# Patient Record
Sex: FEMALE | Race: OTHER | HISPANIC OR LATINO | Employment: UNEMPLOYED | URBAN - METROPOLITAN AREA
[De-identification: names, ages, dates, MRNs, and addresses within clinical notes are randomized per-mention and may not be internally consistent; named-entity substitution may affect disease eponyms.]

---

## 2017-02-17 ENCOUNTER — HOSPITAL ENCOUNTER (EMERGENCY)
Facility: HOSPITAL | Age: 52
Discharge: HOME | End: 2017-02-17
Attending: EMERGENCY MEDICINE | Admitting: EMERGENCY MEDICINE
Payer: COMMERCIAL

## 2017-02-17 ENCOUNTER — APPOINTMENT (EMERGENCY)
Dept: RADIOLOGY | Facility: HOSPITAL | Age: 52
End: 2017-02-17
Payer: COMMERCIAL

## 2017-02-17 ENCOUNTER — TRANSCRIBE ORDERS (OUTPATIENT)
Dept: URGENT CARE | Age: 52
End: 2017-02-17

## 2017-02-17 ENCOUNTER — APPOINTMENT (OUTPATIENT)
Dept: LAB | Age: 52
End: 2017-02-17
Attending: NURSE PRACTITIONER
Payer: COMMERCIAL

## 2017-02-17 ENCOUNTER — OFFICE VISIT (OUTPATIENT)
Dept: URGENT CARE | Age: 52
End: 2017-02-17
Payer: COMMERCIAL

## 2017-02-17 VITALS
DIASTOLIC BLOOD PRESSURE: 63 MMHG | SYSTOLIC BLOOD PRESSURE: 130 MMHG | OXYGEN SATURATION: 94 % | RESPIRATION RATE: 20 BRPM | TEMPERATURE: 98.1 F | HEART RATE: 87 BPM | HEIGHT: 60 IN | WEIGHT: 180 LBS | BODY MASS INDEX: 35.34 KG/M2

## 2017-02-17 DIAGNOSIS — R10.9 ABDOMINAL PAIN: ICD-10-CM

## 2017-02-17 DIAGNOSIS — R10.9 FLANK PAIN: Primary | ICD-10-CM

## 2017-02-17 LAB
ANION GAP SERPL CALCULATED.3IONS-SCNC: 6 MMOL/L (ref 4–13)
BACTERIA UR QL AUTO: ABNORMAL /HPF
BILIRUB UR QL STRIP: NEGATIVE
BUN SERPL-MCNC: 16 MG/DL (ref 5–25)
CALCIUM SERPL-MCNC: 9.4 MG/DL (ref 8.3–10.1)
CHLORIDE SERPL-SCNC: 106 MMOL/L (ref 100–108)
CLARITY UR: CLEAR
CO2 SERPL-SCNC: 28 MMOL/L (ref 21–32)
COLOR UR: YELLOW
CREAT SERPL-MCNC: 0.76 MG/DL (ref 0.6–1.3)
GFR SERPL CREATININE-BSD FRML MDRD: >60 ML/MIN/1.73SQ M
GLUCOSE SERPL-MCNC: 81 MG/DL (ref 65–140)
GLUCOSE UR STRIP-MCNC: NEGATIVE MG/DL
HGB UR QL STRIP.AUTO: ABNORMAL
KETONES UR STRIP-MCNC: NEGATIVE MG/DL
LEUKOCYTE ESTERASE UR QL STRIP: NEGATIVE
NITRITE UR QL STRIP: NEGATIVE
NON-SQ EPI CELLS URNS QL MICRO: ABNORMAL /HPF
PH UR STRIP.AUTO: 7 [PH] (ref 4.5–8)
POTASSIUM SERPL-SCNC: 3.7 MMOL/L (ref 3.5–5.3)
PROT UR STRIP-MCNC: NEGATIVE MG/DL
RBC #/AREA URNS AUTO: ABNORMAL /HPF
SODIUM SERPL-SCNC: 140 MMOL/L (ref 136–145)
SP GR UR STRIP.AUTO: 1.01 (ref 1–1.03)
UROBILINOGEN UR QL STRIP.AUTO: 0.2 E.U./DL
WBC #/AREA URNS AUTO: ABNORMAL /HPF

## 2017-02-17 PROCEDURE — G0382 LEV 3 HOSP TYPE B ED VISIT: HCPCS | Performed by: FAMILY MEDICINE

## 2017-02-17 PROCEDURE — 96375 TX/PRO/DX INJ NEW DRUG ADDON: CPT

## 2017-02-17 PROCEDURE — 87086 URINE CULTURE/COLONY COUNT: CPT

## 2017-02-17 PROCEDURE — 81002 URINALYSIS NONAUTO W/O SCOPE: CPT | Performed by: FAMILY MEDICINE

## 2017-02-17 PROCEDURE — 74176 CT ABD & PELVIS W/O CONTRAST: CPT

## 2017-02-17 PROCEDURE — 36415 COLL VENOUS BLD VENIPUNCTURE: CPT | Performed by: EMERGENCY MEDICINE

## 2017-02-17 PROCEDURE — 81001 URINALYSIS AUTO W/SCOPE: CPT

## 2017-02-17 PROCEDURE — 96374 THER/PROPH/DIAG INJ IV PUSH: CPT

## 2017-02-17 PROCEDURE — 99284 EMERGENCY DEPT VISIT MOD MDM: CPT

## 2017-02-17 PROCEDURE — 80048 BASIC METABOLIC PNL TOTAL CA: CPT | Performed by: EMERGENCY MEDICINE

## 2017-02-17 PROCEDURE — 99283 EMERGENCY DEPT VISIT LOW MDM: CPT | Performed by: FAMILY MEDICINE

## 2017-02-17 RX ORDER — ONDANSETRON 4 MG/1
4 TABLET, ORALLY DISINTEGRATING ORAL EVERY 8 HOURS PRN
Qty: 10 TABLET | Refills: 0 | Status: SHIPPED | OUTPATIENT
Start: 2017-02-17 | End: 2019-06-12

## 2017-02-17 RX ORDER — DICLOFENAC POTASSIUM 50 MG/1
50 TABLET, FILM COATED ORAL 2 TIMES DAILY
Qty: 60 TABLET | Refills: 0 | Status: SHIPPED | OUTPATIENT
Start: 2017-02-17 | End: 2017-03-19

## 2017-02-17 RX ORDER — ONDANSETRON 2 MG/ML
4 INJECTION INTRAMUSCULAR; INTRAVENOUS ONCE
Status: COMPLETED | OUTPATIENT
Start: 2017-02-17 | End: 2017-02-17

## 2017-02-17 RX ADMIN — HYDROMORPHONE HYDROCHLORIDE 0.5 MG: 1 INJECTION, SOLUTION INTRAMUSCULAR; INTRAVENOUS; SUBCUTANEOUS at 17:22

## 2017-02-17 RX ADMIN — ONDANSETRON 4 MG: 2 INJECTION INTRAMUSCULAR; INTRAVENOUS at 17:04

## 2017-02-18 LAB — BACTERIA UR CULT: NORMAL

## 2017-02-19 LAB — BACTERIA UR CULT: NORMAL

## 2017-03-25 ENCOUNTER — HOSPITAL ENCOUNTER (EMERGENCY)
Facility: HOSPITAL | Age: 52
Discharge: HOME | End: 2017-03-26
Attending: EMERGENCY MEDICINE
Payer: COMMERCIAL

## 2017-03-25 DIAGNOSIS — S29.012A STRAIN OF MID-BACK, INITIAL ENCOUNTER: ICD-10-CM

## 2017-03-25 DIAGNOSIS — N30.91 HEMORRHAGIC CYSTITIS: ICD-10-CM

## 2017-03-25 DIAGNOSIS — R10.9 FLANK PAIN: Primary | ICD-10-CM

## 2017-03-26 ENCOUNTER — APPOINTMENT (EMERGENCY)
Dept: RADIOLOGY | Facility: HOSPITAL | Age: 52
End: 2017-03-26
Payer: COMMERCIAL

## 2017-03-26 VITALS
OXYGEN SATURATION: 95 % | HEART RATE: 87 BPM | DIASTOLIC BLOOD PRESSURE: 69 MMHG | TEMPERATURE: 98.3 F | BODY MASS INDEX: 35.15 KG/M2 | RESPIRATION RATE: 16 BRPM | WEIGHT: 180 LBS | SYSTOLIC BLOOD PRESSURE: 125 MMHG

## 2017-03-26 LAB
ALBUMIN SERPL BCP-MCNC: 3.3 G/DL (ref 3.5–5)
ALP SERPL-CCNC: 68 U/L (ref 46–116)
ALT SERPL W P-5'-P-CCNC: 15 U/L (ref 12–78)
ANION GAP SERPL CALCULATED.3IONS-SCNC: 7 MMOL/L (ref 4–13)
AST SERPL W P-5'-P-CCNC: 8 U/L (ref 5–45)
BACTERIA UR QL AUTO: ABNORMAL /HPF
BASOPHILS # BLD AUTO: 0.02 THOUSANDS/ΜL (ref 0–0.1)
BASOPHILS NFR BLD AUTO: 0 % (ref 0–1)
BILIRUB SERPL-MCNC: 0.22 MG/DL (ref 0.2–1)
BILIRUB UR QL STRIP: NEGATIVE
BUN SERPL-MCNC: 12 MG/DL (ref 5–25)
CALCIUM SERPL-MCNC: 8.3 MG/DL (ref 8.3–10.1)
CHLORIDE SERPL-SCNC: 107 MMOL/L (ref 100–108)
CLARITY UR: CLEAR
CO2 SERPL-SCNC: 26 MMOL/L (ref 21–32)
COLOR UR: YELLOW
COLOR, POC: YELLOW
CREAT SERPL-MCNC: 0.64 MG/DL (ref 0.6–1.3)
EOSINOPHIL # BLD AUTO: 0.03 THOUSAND/ΜL (ref 0–0.61)
EOSINOPHIL NFR BLD AUTO: 0 % (ref 0–6)
ERYTHROCYTE [DISTWIDTH] IN BLOOD BY AUTOMATED COUNT: 14.6 % (ref 11.6–15.1)
GFR SERPL CREATININE-BSD FRML MDRD: >60 ML/MIN/1.73SQ M
GLUCOSE SERPL-MCNC: 122 MG/DL (ref 65–140)
GLUCOSE UR STRIP-MCNC: NEGATIVE MG/DL
HCG UR QL: NEGATIVE
HCT VFR BLD AUTO: 37.2 % (ref 34.8–46.1)
HGB BLD-MCNC: 12.5 G/DL (ref 11.5–15.4)
HGB UR QL STRIP.AUTO: ABNORMAL
HYALINE CASTS #/AREA URNS LPF: ABNORMAL /LPF
KETONES UR STRIP-MCNC: NEGATIVE MG/DL
LEUKOCYTE ESTERASE UR QL STRIP: ABNORMAL
LIPASE SERPL-CCNC: 93 U/L (ref 73–393)
LYMPHOCYTES # BLD AUTO: 2.31 THOUSANDS/ΜL (ref 0.6–4.47)
LYMPHOCYTES NFR BLD AUTO: 28 % (ref 14–44)
MCH RBC QN AUTO: 32.6 PG (ref 26.8–34.3)
MCHC RBC AUTO-ENTMCNC: 33.6 G/DL (ref 31.4–37.4)
MCV RBC AUTO: 97 FL (ref 82–98)
MONOCYTES # BLD AUTO: 0.41 THOUSAND/ΜL (ref 0.17–1.22)
MONOCYTES NFR BLD AUTO: 5 % (ref 4–12)
NEUTROPHILS # BLD AUTO: 5.51 THOUSANDS/ΜL (ref 1.85–7.62)
NEUTS SEG NFR BLD AUTO: 67 % (ref 43–75)
NITRITE UR QL STRIP: NEGATIVE
NON-SQ EPI CELLS URNS QL MICRO: ABNORMAL /HPF
NRBC BLD AUTO-RTO: 0 /100 WBCS
PH UR STRIP.AUTO: 7.5 [PH] (ref 4.5–8)
PLATELET # BLD AUTO: 299 THOUSANDS/UL (ref 149–390)
PMV BLD AUTO: 9.3 FL (ref 8.9–12.7)
POTASSIUM SERPL-SCNC: 3.8 MMOL/L (ref 3.5–5.3)
PROT SERPL-MCNC: 6.8 G/DL (ref 6.4–8.2)
PROT UR STRIP-MCNC: ABNORMAL MG/DL
RBC # BLD AUTO: 3.84 MILLION/UL (ref 3.81–5.12)
RBC #/AREA URNS AUTO: ABNORMAL /HPF
SODIUM SERPL-SCNC: 140 MMOL/L (ref 136–145)
SP GR UR STRIP.AUTO: 1.02 (ref 1–1.03)
UROBILINOGEN UR QL STRIP.AUTO: 1 E.U./DL
WBC # BLD AUTO: 8.29 THOUSAND/UL (ref 4.31–10.16)
WBC #/AREA URNS AUTO: ABNORMAL /HPF

## 2017-03-26 PROCEDURE — 99284 EMERGENCY DEPT VISIT MOD MDM: CPT

## 2017-03-26 PROCEDURE — 96361 HYDRATE IV INFUSION ADD-ON: CPT

## 2017-03-26 PROCEDURE — 87086 URINE CULTURE/COLONY COUNT: CPT

## 2017-03-26 PROCEDURE — 74176 CT ABD & PELVIS W/O CONTRAST: CPT

## 2017-03-26 PROCEDURE — 83690 ASSAY OF LIPASE: CPT | Performed by: EMERGENCY MEDICINE

## 2017-03-26 PROCEDURE — 96375 TX/PRO/DX INJ NEW DRUG ADDON: CPT

## 2017-03-26 PROCEDURE — 80053 COMPREHEN METABOLIC PANEL: CPT | Performed by: EMERGENCY MEDICINE

## 2017-03-26 PROCEDURE — 81001 URINALYSIS AUTO W/SCOPE: CPT

## 2017-03-26 PROCEDURE — 81002 URINALYSIS NONAUTO W/O SCOPE: CPT | Performed by: EMERGENCY MEDICINE

## 2017-03-26 PROCEDURE — 96374 THER/PROPH/DIAG INJ IV PUSH: CPT

## 2017-03-26 PROCEDURE — 81025 URINE PREGNANCY TEST: CPT | Performed by: EMERGENCY MEDICINE

## 2017-03-26 PROCEDURE — 36415 COLL VENOUS BLD VENIPUNCTURE: CPT | Performed by: EMERGENCY MEDICINE

## 2017-03-26 PROCEDURE — 85025 COMPLETE CBC W/AUTO DIFF WBC: CPT | Performed by: EMERGENCY MEDICINE

## 2017-03-26 RX ORDER — LIDOCAINE 50 MG/G
1 PATCH TOPICAL ONCE
Status: DISCONTINUED | OUTPATIENT
Start: 2017-03-26 | End: 2017-03-26 | Stop reason: HOSPADM

## 2017-03-26 RX ORDER — ONDANSETRON 2 MG/ML
4 INJECTION INTRAMUSCULAR; INTRAVENOUS ONCE
Status: COMPLETED | OUTPATIENT
Start: 2017-03-26 | End: 2017-03-26

## 2017-03-26 RX ORDER — NAPROXEN 500 MG/1
500 TABLET ORAL 2 TIMES DAILY WITH MEALS
Qty: 20 TABLET | Refills: 0 | Status: SHIPPED | OUTPATIENT
Start: 2017-03-26 | End: 2017-04-05

## 2017-03-26 RX ORDER — LIDOCAINE 50 MG/G
1 PATCH TOPICAL EVERY 24 HOURS
Qty: 6 PATCH | Refills: 0 | Status: SHIPPED | OUTPATIENT
Start: 2017-03-26 | End: 2017-04-01

## 2017-03-26 RX ORDER — CEPHALEXIN 500 MG/1
500 CAPSULE ORAL 2 TIMES DAILY
Qty: 28 CAPSULE | Refills: 0 | Status: SHIPPED | OUTPATIENT
Start: 2017-03-26 | End: 2017-04-09

## 2017-03-26 RX ORDER — KETOROLAC TROMETHAMINE 30 MG/ML
15 INJECTION, SOLUTION INTRAMUSCULAR; INTRAVENOUS ONCE
Status: COMPLETED | OUTPATIENT
Start: 2017-03-26 | End: 2017-03-26

## 2017-03-26 RX ORDER — OXYCODONE HYDROCHLORIDE AND ACETAMINOPHEN 5; 325 MG/1; MG/1
1 TABLET ORAL ONCE
Status: COMPLETED | OUTPATIENT
Start: 2017-03-26 | End: 2017-03-26

## 2017-03-26 RX ADMIN — ONDANSETRON 4 MG: 2 INJECTION INTRAMUSCULAR; INTRAVENOUS at 01:12

## 2017-03-26 RX ADMIN — HYDROMORPHONE HYDROCHLORIDE 0.5 MG: 1 INJECTION, SOLUTION INTRAMUSCULAR; INTRAVENOUS; SUBCUTANEOUS at 03:38

## 2017-03-26 RX ADMIN — SODIUM CHLORIDE 1000 ML: 0.9 INJECTION, SOLUTION INTRAVENOUS at 03:34

## 2017-03-26 RX ADMIN — LIDOCAINE 1 PATCH: 50 PATCH CUTANEOUS at 05:52

## 2017-03-26 RX ADMIN — SODIUM CHLORIDE 1000 ML: 0.9 INJECTION, SOLUTION INTRAVENOUS at 01:09

## 2017-03-26 RX ADMIN — KETOROLAC TROMETHAMINE 15 MG: 30 INJECTION, SOLUTION INTRAMUSCULAR at 01:13

## 2017-03-26 RX ADMIN — OXYCODONE HYDROCHLORIDE AND ACETAMINOPHEN 1 TABLET: 5; 325 TABLET ORAL at 01:57

## 2017-03-27 LAB — BACTERIA UR CULT: NORMAL

## 2019-04-26 ENCOUNTER — OFFICE VISIT (OUTPATIENT)
Dept: URGENT CARE | Age: 54
End: 2019-04-26
Payer: COMMERCIAL

## 2019-04-26 VITALS
HEART RATE: 99 BPM | TEMPERATURE: 98.2 F | SYSTOLIC BLOOD PRESSURE: 138 MMHG | OXYGEN SATURATION: 99 % | RESPIRATION RATE: 20 BRPM | DIASTOLIC BLOOD PRESSURE: 85 MMHG

## 2019-04-26 DIAGNOSIS — M25.551 RIGHT HIP PAIN: Primary | ICD-10-CM

## 2019-04-26 PROCEDURE — G0382 LEV 3 HOSP TYPE B ED VISIT: HCPCS | Performed by: FAMILY MEDICINE

## 2019-04-26 RX ORDER — SENNOSIDES 8.6 MG
650 CAPSULE ORAL EVERY 8 HOURS PRN
COMMUNITY

## 2019-04-26 RX ORDER — DICLOFENAC POTASSIUM 50 MG/1
50 TABLET, FILM COATED ORAL 2 TIMES DAILY
COMMUNITY
Start: 2017-12-15 | End: 2019-06-12

## 2019-04-26 RX ORDER — NAPROXEN 500 MG/1
500 TABLET ORAL 2 TIMES DAILY WITH MEALS
Qty: 30 TABLET | Refills: 0 | Status: SHIPPED | OUTPATIENT
Start: 2019-04-26 | End: 2019-06-12

## 2019-04-26 RX ORDER — LIDOCAINE 50 MG/G
1 PATCH TOPICAL DAILY PRN
COMMUNITY
Start: 2017-04-26 | End: 2021-11-08

## 2019-04-27 ENCOUNTER — HOSPITAL ENCOUNTER (EMERGENCY)
Facility: HOSPITAL | Age: 54
Discharge: HOME | End: 2019-04-27
Attending: EMERGENCY MEDICINE
Payer: COMMERCIAL

## 2019-04-27 ENCOUNTER — APPOINTMENT (EMERGENCY)
Dept: RADIOLOGY | Facility: HOSPITAL | Age: 54
End: 2019-04-27
Payer: COMMERCIAL

## 2019-04-27 VITALS
TEMPERATURE: 99 F | RESPIRATION RATE: 20 BRPM | HEART RATE: 122 BPM | SYSTOLIC BLOOD PRESSURE: 192 MMHG | BODY MASS INDEX: 34.55 KG/M2 | HEIGHT: 61 IN | OXYGEN SATURATION: 99 % | WEIGHT: 183 LBS | DIASTOLIC BLOOD PRESSURE: 108 MMHG

## 2019-04-27 DIAGNOSIS — M54.31 SCIATICA OF RIGHT SIDE: Primary | ICD-10-CM

## 2019-04-27 PROCEDURE — 99284 EMERGENCY DEPT VISIT MOD MDM: CPT | Performed by: PHYSICIAN ASSISTANT

## 2019-04-27 PROCEDURE — 99283 EMERGENCY DEPT VISIT LOW MDM: CPT

## 2019-04-27 PROCEDURE — 96372 THER/PROPH/DIAG INJ SC/IM: CPT

## 2019-04-27 PROCEDURE — 73502 X-RAY EXAM HIP UNI 2-3 VIEWS: CPT

## 2019-04-27 RX ORDER — METHOCARBAMOL 500 MG/1
500 TABLET, FILM COATED ORAL ONCE
Status: COMPLETED | OUTPATIENT
Start: 2019-04-27 | End: 2019-04-27

## 2019-04-27 RX ORDER — LIDOCAINE 50 MG/G
1 PATCH TOPICAL ONCE
Status: DISCONTINUED | OUTPATIENT
Start: 2019-04-27 | End: 2019-04-27 | Stop reason: HOSPADM

## 2019-04-27 RX ORDER — OXYCODONE HYDROCHLORIDE AND ACETAMINOPHEN 5; 325 MG/1; MG/1
1 TABLET ORAL EVERY 4 HOURS PRN
Qty: 8 TABLET | Refills: 0 | Status: SHIPPED | OUTPATIENT
Start: 2019-04-27 | End: 2019-06-12

## 2019-04-27 RX ORDER — KETOROLAC TROMETHAMINE 30 MG/ML
15 INJECTION, SOLUTION INTRAMUSCULAR; INTRAVENOUS ONCE
Status: COMPLETED | OUTPATIENT
Start: 2019-04-27 | End: 2019-04-27

## 2019-04-27 RX ORDER — METHOCARBAMOL 500 MG/1
500 TABLET, FILM COATED ORAL 4 TIMES DAILY
Qty: 30 TABLET | Refills: 0 | Status: SHIPPED | OUTPATIENT
Start: 2019-04-27 | End: 2019-06-12

## 2019-04-27 RX ORDER — ACETAMINOPHEN 325 MG/1
650 TABLET ORAL ONCE
Status: COMPLETED | OUTPATIENT
Start: 2019-04-27 | End: 2019-04-27

## 2019-04-27 RX ADMIN — ACETAMINOPHEN 650 MG: 325 TABLET ORAL at 16:18

## 2019-04-27 RX ADMIN — LIDOCAINE 1 PATCH: 50 PATCH CUTANEOUS at 16:19

## 2019-04-27 RX ADMIN — METHOCARBAMOL 500 MG: 500 TABLET, FILM COATED ORAL at 16:18

## 2019-04-27 RX ADMIN — KETOROLAC TROMETHAMINE 15 MG: 30 INJECTION, SOLUTION INTRAMUSCULAR at 16:20

## 2019-05-06 PROBLEM — M20.42 OTHER HAMMER TOE(S) (ACQUIRED), LEFT FOOT: Status: ACTIVE | Noted: 2019-05-06

## 2019-05-06 PROBLEM — M20.12 ACQUIRED HALLUX VALGUS OF LEFT FOOT: Status: ACTIVE | Noted: 2019-05-06

## 2019-05-06 PROBLEM — M21.6X2 OTHER ACQUIRED DEFORMITIES OF LEFT FOOT: Status: ACTIVE | Noted: 2019-05-06

## 2019-05-20 ENCOUNTER — TRANSCRIBE ORDERS (OUTPATIENT)
Dept: ADMINISTRATIVE | Age: 54
End: 2019-05-20

## 2019-05-20 ENCOUNTER — OFFICE VISIT (OUTPATIENT)
Dept: LAB | Age: 54
End: 2019-05-20
Payer: COMMERCIAL

## 2019-05-20 ENCOUNTER — APPOINTMENT (OUTPATIENT)
Dept: LAB | Age: 54
End: 2019-05-20
Payer: COMMERCIAL

## 2019-05-20 DIAGNOSIS — Z01.818 PRE-OP TESTING: Primary | ICD-10-CM

## 2019-05-20 DIAGNOSIS — Z01.818 PRE-OP TESTING: ICD-10-CM

## 2019-05-20 LAB
ANION GAP SERPL CALCULATED.3IONS-SCNC: 7 MMOL/L (ref 4–13)
BUN SERPL-MCNC: 15 MG/DL (ref 5–25)
CALCIUM SERPL-MCNC: 9 MG/DL (ref 8.3–10.1)
CHLORIDE SERPL-SCNC: 104 MMOL/L (ref 100–108)
CO2 SERPL-SCNC: 28 MMOL/L (ref 21–32)
CREAT SERPL-MCNC: 0.83 MG/DL (ref 0.6–1.3)
GFR SERPL CREATININE-BSD FRML MDRD: 80 ML/MIN/1.73SQ M
GLUCOSE SERPL-MCNC: 93 MG/DL (ref 65–140)
POTASSIUM SERPL-SCNC: 3.9 MMOL/L (ref 3.5–5.3)
SODIUM SERPL-SCNC: 139 MMOL/L (ref 136–145)

## 2019-05-20 PROCEDURE — 93005 ELECTROCARDIOGRAM TRACING: CPT

## 2019-05-20 PROCEDURE — 80048 BASIC METABOLIC PNL TOTAL CA: CPT

## 2019-05-20 PROCEDURE — 36415 COLL VENOUS BLD VENIPUNCTURE: CPT

## 2019-05-21 LAB
ATRIAL RATE: 91 BPM
P AXIS: 41 DEGREES
PR INTERVAL: 190 MS
QRS AXIS: 53 DEGREES
QRSD INTERVAL: 82 MS
QT INTERVAL: 362 MS
QTC INTERVAL: 445 MS
T WAVE AXIS: 43 DEGREES
VENTRICULAR RATE: 91 BPM

## 2019-05-21 PROCEDURE — 93010 ELECTROCARDIOGRAM REPORT: CPT | Performed by: INTERNAL MEDICINE

## 2019-06-04 ENCOUNTER — OFFICE VISIT (OUTPATIENT)
Dept: PODIATRY | Facility: CLINIC | Age: 54
End: 2019-06-04
Payer: COMMERCIAL

## 2019-06-04 VITALS
WEIGHT: 183 LBS | DIASTOLIC BLOOD PRESSURE: 81 MMHG | HEIGHT: 61 IN | SYSTOLIC BLOOD PRESSURE: 132 MMHG | BODY MASS INDEX: 34.55 KG/M2

## 2019-06-04 DIAGNOSIS — M21.6X2 PLANTAR FLEXED METATARSAL, LEFT: Primary | ICD-10-CM

## 2019-06-04 PROCEDURE — 99212 OFFICE O/P EST SF 10 MIN: CPT | Performed by: PODIATRIST

## 2019-06-13 ENCOUNTER — ANESTHESIA EVENT (OUTPATIENT)
Dept: PERIOP | Facility: HOSPITAL | Age: 54
End: 2019-06-13
Payer: COMMERCIAL

## 2019-06-14 ENCOUNTER — APPOINTMENT (OUTPATIENT)
Dept: RADIOLOGY | Facility: HOSPITAL | Age: 54
End: 2019-06-14
Payer: COMMERCIAL

## 2019-06-14 ENCOUNTER — ANESTHESIA (OUTPATIENT)
Dept: PERIOP | Facility: HOSPITAL | Age: 54
End: 2019-06-14
Payer: COMMERCIAL

## 2019-06-14 ENCOUNTER — HOSPITAL ENCOUNTER (OUTPATIENT)
Facility: HOSPITAL | Age: 54
Setting detail: OUTPATIENT SURGERY
Discharge: HOME | End: 2019-06-14
Attending: PODIATRIST | Admitting: PODIATRIST
Payer: COMMERCIAL

## 2019-06-14 VITALS
TEMPERATURE: 98.1 F | HEART RATE: 81 BPM | RESPIRATION RATE: 16 BRPM | OXYGEN SATURATION: 98 % | BODY MASS INDEX: 33.61 KG/M2 | DIASTOLIC BLOOD PRESSURE: 87 MMHG | WEIGHT: 178 LBS | SYSTOLIC BLOOD PRESSURE: 140 MMHG | HEIGHT: 61 IN

## 2019-06-14 DIAGNOSIS — Z98.890 POST-OPERATIVE STATE: Primary | ICD-10-CM

## 2019-06-14 PROCEDURE — 73630 X-RAY EXAM OF FOOT: CPT

## 2019-06-14 PROCEDURE — 99024 POSTOP FOLLOW-UP VISIT: CPT | Performed by: PODIATRIST

## 2019-06-14 PROCEDURE — 28308 INCISION OF METATARSAL: CPT | Performed by: PODIATRIST

## 2019-06-14 RX ORDER — PROPOFOL 10 MG/ML
INJECTION, EMULSION INTRAVENOUS AS NEEDED
Status: DISCONTINUED | OUTPATIENT
Start: 2019-06-14 | End: 2019-06-14 | Stop reason: SURG

## 2019-06-14 RX ORDER — FENTANYL CITRATE/PF 50 MCG/ML
25 SYRINGE (ML) INJECTION
Status: DISCONTINUED | OUTPATIENT
Start: 2019-06-14 | End: 2019-06-14 | Stop reason: HOSPADM

## 2019-06-14 RX ORDER — BUPIVACAINE HYDROCHLORIDE 5 MG/ML
INJECTION, SOLUTION PERINEURAL AS NEEDED
Status: DISCONTINUED | OUTPATIENT
Start: 2019-06-14 | End: 2019-06-14 | Stop reason: HOSPADM

## 2019-06-14 RX ORDER — ONDANSETRON 2 MG/ML
INJECTION INTRAMUSCULAR; INTRAVENOUS AS NEEDED
Status: DISCONTINUED | OUTPATIENT
Start: 2019-06-14 | End: 2019-06-14 | Stop reason: SURG

## 2019-06-14 RX ORDER — DEXAMETHASONE SODIUM PHOSPHATE 4 MG/ML
INJECTION, SOLUTION INTRA-ARTICULAR; INTRALESIONAL; INTRAMUSCULAR; INTRAVENOUS; SOFT TISSUE AS NEEDED
Status: DISCONTINUED | OUTPATIENT
Start: 2019-06-14 | End: 2019-06-14 | Stop reason: SURG

## 2019-06-14 RX ORDER — MIDAZOLAM HYDROCHLORIDE 1 MG/ML
INJECTION INTRAMUSCULAR; INTRAVENOUS AS NEEDED
Status: DISCONTINUED | OUTPATIENT
Start: 2019-06-14 | End: 2019-06-14 | Stop reason: SURG

## 2019-06-14 RX ORDER — OXYCODONE HYDROCHLORIDE AND ACETAMINOPHEN 5; 325 MG/1; MG/1
1 TABLET ORAL EVERY 4 HOURS PRN
Status: DISCONTINUED | OUTPATIENT
Start: 2019-06-14 | End: 2019-06-14 | Stop reason: HOSPADM

## 2019-06-14 RX ORDER — CEFAZOLIN SODIUM 2 G/50ML
SOLUTION INTRAVENOUS AS NEEDED
Status: DISCONTINUED | OUTPATIENT
Start: 2019-06-14 | End: 2019-06-14 | Stop reason: SURG

## 2019-06-14 RX ORDER — SODIUM CHLORIDE 9 MG/ML
125 INJECTION, SOLUTION INTRAVENOUS CONTINUOUS
Status: DISCONTINUED | OUTPATIENT
Start: 2019-06-14 | End: 2019-06-14 | Stop reason: HOSPADM

## 2019-06-14 RX ORDER — ONDANSETRON 2 MG/ML
4 INJECTION INTRAMUSCULAR; INTRAVENOUS ONCE AS NEEDED
Status: DISCONTINUED | OUTPATIENT
Start: 2019-06-14 | End: 2019-06-14 | Stop reason: HOSPADM

## 2019-06-14 RX ORDER — FENTANYL CITRATE 50 UG/ML
INJECTION, SOLUTION INTRAMUSCULAR; INTRAVENOUS AS NEEDED
Status: DISCONTINUED | OUTPATIENT
Start: 2019-06-14 | End: 2019-06-14 | Stop reason: SURG

## 2019-06-14 RX ORDER — OXYCODONE HYDROCHLORIDE AND ACETAMINOPHEN 5; 325 MG/1; MG/1
1 TABLET ORAL EVERY 4 HOURS PRN
Qty: 30 TABLET | Refills: 0 | Status: SHIPPED | OUTPATIENT
Start: 2019-06-14 | End: 2019-06-24

## 2019-06-14 RX ADMIN — MIDAZOLAM 2 MG: 1 INJECTION INTRAMUSCULAR; INTRAVENOUS at 07:28

## 2019-06-14 RX ADMIN — SODIUM CHLORIDE 125 ML/HR: 0.9 INJECTION, SOLUTION INTRAVENOUS at 06:32

## 2019-06-14 RX ADMIN — SODIUM CHLORIDE: 0.9 INJECTION, SOLUTION INTRAVENOUS at 08:32

## 2019-06-14 RX ADMIN — CEFAZOLIN SODIUM 2000 MG: 2 SOLUTION INTRAVENOUS at 08:01

## 2019-06-14 RX ADMIN — FENTANYL CITRATE 50 MCG: 50 INJECTION, SOLUTION INTRAMUSCULAR; INTRAVENOUS at 07:40

## 2019-06-14 RX ADMIN — LIDOCAINE HYDROCHLORIDE 100 MG: 20 INJECTION, SOLUTION INTRAVENOUS at 07:32

## 2019-06-14 RX ADMIN — DEXAMETHASONE SODIUM PHOSPHATE 4 MG: 4 INJECTION, SOLUTION INTRAMUSCULAR; INTRAVENOUS at 07:44

## 2019-06-14 RX ADMIN — FENTANYL CITRATE 50 MCG: 50 INJECTION, SOLUTION INTRAMUSCULAR; INTRAVENOUS at 08:31

## 2019-06-14 RX ADMIN — PROPOFOL 200 MG: 10 INJECTION, EMULSION INTRAVENOUS at 07:32

## 2019-06-14 RX ADMIN — ONDANSETRON HYDROCHLORIDE 4 MG: 2 INJECTION, SOLUTION INTRAVENOUS at 08:27

## 2019-06-21 ENCOUNTER — OFFICE VISIT (OUTPATIENT)
Dept: PODIATRY | Facility: CLINIC | Age: 54
End: 2019-06-21

## 2019-06-21 VITALS
HEIGHT: 61 IN | HEART RATE: 77 BPM | WEIGHT: 177 LBS | DIASTOLIC BLOOD PRESSURE: 71 MMHG | BODY MASS INDEX: 33.42 KG/M2 | SYSTOLIC BLOOD PRESSURE: 122 MMHG

## 2019-06-21 DIAGNOSIS — M21.6X2 PLANTAR FLEXED METATARSAL, LEFT: Primary | ICD-10-CM

## 2019-06-21 PROCEDURE — 99024 POSTOP FOLLOW-UP VISIT: CPT | Performed by: PODIATRIST

## 2019-06-28 ENCOUNTER — OFFICE VISIT (OUTPATIENT)
Dept: PODIATRY | Facility: CLINIC | Age: 54
End: 2019-06-28

## 2019-06-28 VITALS
HEIGHT: 61 IN | SYSTOLIC BLOOD PRESSURE: 120 MMHG | WEIGHT: 175 LBS | DIASTOLIC BLOOD PRESSURE: 70 MMHG | BODY MASS INDEX: 33.04 KG/M2

## 2019-06-28 DIAGNOSIS — M21.6X2 PLANTAR FLEXED METATARSAL, LEFT: Primary | ICD-10-CM

## 2019-06-28 PROCEDURE — 99024 POSTOP FOLLOW-UP VISIT: CPT | Performed by: PODIATRIST

## 2019-07-19 ENCOUNTER — OFFICE VISIT (OUTPATIENT)
Dept: PODIATRY | Facility: CLINIC | Age: 54
End: 2019-07-19

## 2019-07-19 VITALS
BODY MASS INDEX: 32.47 KG/M2 | HEART RATE: 90 BPM | WEIGHT: 172 LBS | SYSTOLIC BLOOD PRESSURE: 117 MMHG | HEIGHT: 61 IN | DIASTOLIC BLOOD PRESSURE: 70 MMHG

## 2019-07-19 DIAGNOSIS — M21.6X2 PLANTAR FLEXED METATARSAL, LEFT: Primary | ICD-10-CM

## 2019-07-19 PROCEDURE — 99024 POSTOP FOLLOW-UP VISIT: CPT | Performed by: PODIATRIST

## 2019-07-19 RX ORDER — IBUPROFEN 600 MG/1
600 TABLET ORAL EVERY 6 HOURS PRN
Qty: 90 TABLET | Refills: 0 | Status: SHIPPED | OUTPATIENT
Start: 2019-07-19 | End: 2021-11-08

## 2019-07-19 NOTE — PROGRESS NOTES
Patient presents 5 weeks post osteotomy 3rd metatarsal left foot  Patient is still having significant pain at the surgical site and about the surgical site  The lesion appears to be going away beneath the 3rd metatarsal head however  Patient is still in a surgical shoe  She states that she can wear the running shoe but for only 4 hours daily  She does not feel that she is able to return to work on July 29th as she has a standing and ambulatory job  X-rays, two views left foot reveal continued presence of fracture line but no displacement  Patient advised to discontinue wearing any pads on the bottom of her feet  She was placed on ibuprofen 600 mg t i d  P c   She was urged to return to the running shoe  Patient will be rescheduled in 3 weeks  We will reassess her return to work at that time

## 2019-08-09 ENCOUNTER — OFFICE VISIT (OUTPATIENT)
Dept: PODIATRY | Facility: CLINIC | Age: 54
End: 2019-08-09

## 2019-08-09 VITALS
SYSTOLIC BLOOD PRESSURE: 118 MMHG | HEIGHT: 61 IN | BODY MASS INDEX: 32.66 KG/M2 | DIASTOLIC BLOOD PRESSURE: 68 MMHG | WEIGHT: 173 LBS

## 2019-08-09 DIAGNOSIS — M21.6X2 PLANTAR FLEXED METATARSAL, LEFT: Primary | ICD-10-CM

## 2019-08-09 PROCEDURE — 99024 POSTOP FOLLOW-UP VISIT: CPT | Performed by: PODIATRIST

## 2019-08-09 NOTE — LETTER
August 9, 2019     Patient: Clovis Aggarwal   YOB: 1965   Date of Visit: 8/9/2019       To Whom it May Concern:    Clovis Aggarwal is under my professional care  She was seen in my office on 8/9/2019  She may return to work on August 26, 2019  If you have any questions or concerns, please don't hesitate to call           Sincerely,          Eli Owen DPM        CC: No Recipients

## 2019-08-09 NOTE — PROGRESS NOTES
Patient presents approximately 2 months post osteotomy left 3rd metatarsal neck  She relates improvement  She still has pain in the toes of the left foot  She states that she takes ibuprofen sporadically  On exam, superficial hyperkeratotic lesion plantar aspect left foot at 3rd metatarsal head  This lesion was trimmed  Patient does not feel that she can return to work as of yet  She was told that she may return on August 26, 2019 pending her visit on August 23rd  She was advised to take ibuprofen 600 mg t i d  P c

## 2019-08-23 ENCOUNTER — OFFICE VISIT (OUTPATIENT)
Dept: PODIATRY | Facility: CLINIC | Age: 54
End: 2019-08-23
Payer: COMMERCIAL

## 2019-08-23 VITALS
DIASTOLIC BLOOD PRESSURE: 107 MMHG | HEART RATE: 88 BPM | HEIGHT: 61 IN | BODY MASS INDEX: 35.53 KG/M2 | SYSTOLIC BLOOD PRESSURE: 142 MMHG | WEIGHT: 188.2 LBS

## 2019-08-23 DIAGNOSIS — M21.6X2 PLANTAR FLEXED METATARSAL, LEFT: Primary | ICD-10-CM

## 2019-08-23 DIAGNOSIS — B35.1 ONYCHOMYCOSIS: ICD-10-CM

## 2019-08-23 PROCEDURE — 99212 OFFICE O/P EST SF 10 MIN: CPT | Performed by: PODIATRIST

## 2019-08-23 NOTE — LETTER
August 23, 2019     Patient: Olivia Barahona   YOB: 1965   Date of Visit: 8/23/2019       To Whom it May Concern:    Olivia Barahona is under my professional care  She was seen in my office on 8/23/2019  She may return to work on August 26, 2019 at full duty       If you have any questions or concerns, please don't hesitate to call           Sincerely,          Jesus Alberto Peña DPM        CC: No Recipients

## 2019-08-23 NOTE — PROGRESS NOTES
Patient presents approximately 3 months post osteotomy 3rd metatarsal left foot  She relates minimal discomfort at this time  Patient may now return to work on August 26 that full duty  Patient relates concern regarding the thickness of her toenails  Each toenail is thickened yellow with subungual debris  They are cosmetically  displeasing  After discussing treatment options patient desires to try oral Lamisil  She was referred for a liver function test   Once the results are in and that if they are acceptable we will call in a prescription for oral Lamisil and reschedule patient for 6 weeks

## 2019-09-20 ENCOUNTER — APPOINTMENT (OUTPATIENT)
Dept: LAB | Age: 54
End: 2019-09-20
Payer: COMMERCIAL

## 2019-09-20 ENCOUNTER — TELEPHONE (OUTPATIENT)
Dept: PODIATRY | Facility: CLINIC | Age: 54
End: 2019-09-20

## 2019-09-20 DIAGNOSIS — B35.1 ONYCHOMYCOSIS: ICD-10-CM

## 2019-09-20 LAB
ALBUMIN SERPL BCP-MCNC: 4.3 G/DL (ref 3.5–5)
ALP SERPL-CCNC: 97 U/L (ref 46–116)
ALT SERPL W P-5'-P-CCNC: 26 U/L (ref 12–78)
AST SERPL W P-5'-P-CCNC: 16 U/L (ref 5–45)
BASOPHILS # BLD AUTO: 0.04 THOUSANDS/ΜL (ref 0–0.1)
BASOPHILS NFR BLD AUTO: 1 % (ref 0–1)
BILIRUB DIRECT SERPL-MCNC: 0.09 MG/DL (ref 0–0.2)
BILIRUB SERPL-MCNC: 0.33 MG/DL (ref 0.2–1)
EOSINOPHIL # BLD AUTO: 0.12 THOUSAND/ΜL (ref 0–0.61)
EOSINOPHIL NFR BLD AUTO: 1 % (ref 0–6)
ERYTHROCYTE [DISTWIDTH] IN BLOOD BY AUTOMATED COUNT: 15.1 % (ref 11.6–15.1)
HCT VFR BLD AUTO: 45.4 % (ref 34.8–46.1)
HGB BLD-MCNC: 14.9 G/DL (ref 11.5–15.4)
IMM GRANULOCYTES # BLD AUTO: 0.02 THOUSAND/UL (ref 0–0.2)
IMM GRANULOCYTES NFR BLD AUTO: 0 % (ref 0–2)
LYMPHOCYTES # BLD AUTO: 3.19 THOUSANDS/ΜL (ref 0.6–4.47)
LYMPHOCYTES NFR BLD AUTO: 37 % (ref 14–44)
MCH RBC QN AUTO: 32.5 PG (ref 26.8–34.3)
MCHC RBC AUTO-ENTMCNC: 32.8 G/DL (ref 31.4–37.4)
MCV RBC AUTO: 99 FL (ref 82–98)
MONOCYTES # BLD AUTO: 0.58 THOUSAND/ΜL (ref 0.17–1.22)
MONOCYTES NFR BLD AUTO: 7 % (ref 4–12)
NEUTROPHILS # BLD AUTO: 4.61 THOUSANDS/ΜL (ref 1.85–7.62)
NEUTS SEG NFR BLD AUTO: 54 % (ref 43–75)
NRBC BLD AUTO-RTO: 0 /100 WBCS
PLATELET # BLD AUTO: 354 THOUSANDS/UL (ref 149–390)
PMV BLD AUTO: 9.6 FL (ref 8.9–12.7)
PROT SERPL-MCNC: 8.4 G/DL (ref 6.4–8.2)
RBC # BLD AUTO: 4.59 MILLION/UL (ref 3.81–5.12)
WBC # BLD AUTO: 8.56 THOUSAND/UL (ref 4.31–10.16)

## 2019-09-20 PROCEDURE — 36415 COLL VENOUS BLD VENIPUNCTURE: CPT

## 2019-09-20 PROCEDURE — 85025 COMPLETE CBC W/AUTO DIFF WBC: CPT

## 2019-09-20 PROCEDURE — 80076 HEPATIC FUNCTION PANEL: CPT

## 2019-09-20 RX ORDER — TERBINAFINE HYDROCHLORIDE 250 MG/1
250 TABLET ORAL DAILY
Qty: 84 TABLET | Refills: 0 | Status: SHIPPED | OUTPATIENT
Start: 2019-09-20 | End: 2019-12-13

## 2019-11-25 ENCOUNTER — HOSPITAL ENCOUNTER (EMERGENCY)
Facility: HOSPITAL | Age: 54
Discharge: HOME | End: 2019-11-25
Attending: EMERGENCY MEDICINE | Admitting: EMERGENCY MEDICINE
Payer: COMMERCIAL

## 2019-11-25 VITALS
OXYGEN SATURATION: 93 % | WEIGHT: 180 LBS | BODY MASS INDEX: 33.99 KG/M2 | SYSTOLIC BLOOD PRESSURE: 152 MMHG | DIASTOLIC BLOOD PRESSURE: 94 MMHG | RESPIRATION RATE: 14 BRPM | HEIGHT: 61 IN | HEART RATE: 82 BPM

## 2019-11-25 DIAGNOSIS — R42 DIZZINESS: Primary | ICD-10-CM

## 2019-11-25 LAB
ANION GAP SERPL CALCULATED.3IONS-SCNC: 6 MMOL/L (ref 4–13)
ATRIAL RATE: 83 BPM
BACTERIA UR QL AUTO: ABNORMAL /HPF
BASOPHILS # BLD AUTO: 0.04 THOUSANDS/ΜL (ref 0–0.1)
BASOPHILS NFR BLD AUTO: 0 % (ref 0–1)
BILIRUB UR QL STRIP: NEGATIVE
BUN SERPL-MCNC: 13 MG/DL (ref 5–25)
CALCIUM SERPL-MCNC: 9.6 MG/DL (ref 8.3–10.1)
CHLORIDE SERPL-SCNC: 110 MMOL/L (ref 100–108)
CLARITY UR: CLEAR
CO2 SERPL-SCNC: 27 MMOL/L (ref 21–32)
COLOR UR: YELLOW
CREAT SERPL-MCNC: 0.67 MG/DL (ref 0.6–1.3)
EOSINOPHIL # BLD AUTO: 0.11 THOUSAND/ΜL (ref 0–0.61)
EOSINOPHIL NFR BLD AUTO: 1 % (ref 0–6)
ERYTHROCYTE [DISTWIDTH] IN BLOOD BY AUTOMATED COUNT: 14.4 % (ref 11.6–15.1)
GFR SERPL CREATININE-BSD FRML MDRD: 100 ML/MIN/1.73SQ M
GLUCOSE SERPL-MCNC: 91 MG/DL (ref 65–140)
GLUCOSE UR STRIP-MCNC: NEGATIVE MG/DL
HCT VFR BLD AUTO: 42.4 % (ref 34.8–46.1)
HGB BLD-MCNC: 14.3 G/DL (ref 11.5–15.4)
HGB UR QL STRIP.AUTO: ABNORMAL
HYALINE CASTS #/AREA URNS LPF: ABNORMAL /LPF
IMM GRANULOCYTES # BLD AUTO: 0.01 THOUSAND/UL (ref 0–0.2)
IMM GRANULOCYTES NFR BLD AUTO: 0 % (ref 0–2)
KETONES UR STRIP-MCNC: NEGATIVE MG/DL
LEUKOCYTE ESTERASE UR QL STRIP: NEGATIVE
LYMPHOCYTES # BLD AUTO: 3.47 THOUSANDS/ΜL (ref 0.6–4.47)
LYMPHOCYTES NFR BLD AUTO: 36 % (ref 14–44)
MCH RBC QN AUTO: 32.7 PG (ref 26.8–34.3)
MCHC RBC AUTO-ENTMCNC: 33.7 G/DL (ref 31.4–37.4)
MCV RBC AUTO: 97 FL (ref 82–98)
MONOCYTES # BLD AUTO: 0.91 THOUSAND/ΜL (ref 0.17–1.22)
MONOCYTES NFR BLD AUTO: 10 % (ref 4–12)
NEUTROPHILS # BLD AUTO: 5.07 THOUSANDS/ΜL (ref 1.85–7.62)
NEUTS SEG NFR BLD AUTO: 53 % (ref 43–75)
NITRITE UR QL STRIP: NEGATIVE
NON-SQ EPI CELLS URNS QL MICRO: ABNORMAL /HPF
NRBC BLD AUTO-RTO: 0 /100 WBCS
P AXIS: 58 DEGREES
PH UR STRIP.AUTO: 7 [PH] (ref 4.5–8)
PLATELET # BLD AUTO: 350 THOUSANDS/UL (ref 149–390)
PMV BLD AUTO: 9.3 FL (ref 8.9–12.7)
POTASSIUM SERPL-SCNC: 4 MMOL/L (ref 3.5–5.3)
PR INTERVAL: 212 MS
PROT UR STRIP-MCNC: NEGATIVE MG/DL
QRS AXIS: 61 DEGREES
QRSD INTERVAL: 78 MS
QT INTERVAL: 342 MS
QTC INTERVAL: 401 MS
RBC # BLD AUTO: 4.37 MILLION/UL (ref 3.81–5.12)
RBC #/AREA URNS AUTO: ABNORMAL /HPF
SODIUM SERPL-SCNC: 143 MMOL/L (ref 136–145)
SP GR UR STRIP.AUTO: 1.01 (ref 1–1.03)
T WAVE AXIS: 46 DEGREES
UROBILINOGEN UR QL STRIP.AUTO: 0.2 E.U./DL
VENTRICULAR RATE: 83 BPM
WBC # BLD AUTO: 9.61 THOUSAND/UL (ref 4.31–10.16)
WBC #/AREA URNS AUTO: ABNORMAL /HPF

## 2019-11-25 PROCEDURE — 85025 COMPLETE CBC W/AUTO DIFF WBC: CPT | Performed by: EMERGENCY MEDICINE

## 2019-11-25 PROCEDURE — 96361 HYDRATE IV INFUSION ADD-ON: CPT

## 2019-11-25 PROCEDURE — 99284 EMERGENCY DEPT VISIT MOD MDM: CPT | Performed by: EMERGENCY MEDICINE

## 2019-11-25 PROCEDURE — 96375 TX/PRO/DX INJ NEW DRUG ADDON: CPT

## 2019-11-25 PROCEDURE — 80048 BASIC METABOLIC PNL TOTAL CA: CPT | Performed by: EMERGENCY MEDICINE

## 2019-11-25 PROCEDURE — 93005 ELECTROCARDIOGRAM TRACING: CPT

## 2019-11-25 PROCEDURE — 96374 THER/PROPH/DIAG INJ IV PUSH: CPT

## 2019-11-25 PROCEDURE — 93010 ELECTROCARDIOGRAM REPORT: CPT | Performed by: INTERNAL MEDICINE

## 2019-11-25 PROCEDURE — 36415 COLL VENOUS BLD VENIPUNCTURE: CPT | Performed by: EMERGENCY MEDICINE

## 2019-11-25 PROCEDURE — 99284 EMERGENCY DEPT VISIT MOD MDM: CPT

## 2019-11-25 PROCEDURE — 81001 URINALYSIS AUTO W/SCOPE: CPT

## 2019-11-25 RX ORDER — ONDANSETRON 2 MG/ML
4 INJECTION INTRAMUSCULAR; INTRAVENOUS ONCE
Status: COMPLETED | OUTPATIENT
Start: 2019-11-25 | End: 2019-11-25

## 2019-11-25 RX ORDER — MECLIZINE HCL 12.5 MG/1
12.5 TABLET ORAL 3 TIMES DAILY PRN
Qty: 30 TABLET | Refills: 0 | Status: SHIPPED | OUTPATIENT
Start: 2019-11-25 | End: 2021-11-08

## 2019-11-25 RX ORDER — DIAZEPAM 5 MG/ML
5 INJECTION, SOLUTION INTRAMUSCULAR; INTRAVENOUS ONCE
Status: DISCONTINUED | OUTPATIENT
Start: 2019-11-25 | End: 2019-11-25

## 2019-11-25 RX ORDER — DEXAMETHASONE SODIUM PHOSPHATE 4 MG/ML
10 INJECTION, SOLUTION INTRA-ARTICULAR; INTRALESIONAL; INTRAMUSCULAR; INTRAVENOUS; SOFT TISSUE ONCE
Status: COMPLETED | OUTPATIENT
Start: 2019-11-25 | End: 2019-11-25

## 2019-11-25 RX ORDER — MECLIZINE HCL 12.5 MG/1
12.5 TABLET ORAL ONCE
Status: COMPLETED | OUTPATIENT
Start: 2019-11-25 | End: 2019-11-25

## 2019-11-25 RX ORDER — DIAZEPAM 5 MG/ML
5 INJECTION, SOLUTION INTRAMUSCULAR; INTRAVENOUS ONCE
Status: COMPLETED | OUTPATIENT
Start: 2019-11-25 | End: 2019-11-25

## 2019-11-25 RX ADMIN — SODIUM CHLORIDE 1000 ML: 0.9 INJECTION, SOLUTION INTRAVENOUS at 18:30

## 2019-11-25 RX ADMIN — ONDANSETRON 4 MG: 2 INJECTION INTRAMUSCULAR; INTRAVENOUS at 18:30

## 2019-11-25 RX ADMIN — MECLIZINE HYDROCHLORIDE 12.5 MG: 12.5 TABLET, FILM COATED ORAL at 18:34

## 2019-11-25 RX ADMIN — DEXAMETHASONE SODIUM PHOSPHATE 10 MG: 4 INJECTION, SOLUTION INTRAMUSCULAR; INTRAVENOUS at 20:02

## 2019-11-25 RX ADMIN — DIAZEPAM 5 MG: 10 INJECTION, SOLUTION INTRAMUSCULAR; INTRAVENOUS at 20:01

## 2019-11-25 NOTE — ED PROVIDER NOTES
History  Chief Complaint   Patient presents with    Dizziness     Pt reports she has felt dizzy since she woke up this morning   Vomiting     Pt reports she vomited 7 times today also had one episode of diarrhea     HPI  46 yo female presents to the ED with room spinning dizziness since waking up this morning  Pt describes seeing triple and feeling like she has to hold on to the bed rails or she might fall out  Symptoms are worse with standing and trying to walk  Pt reports she has associated nausea and has vomited 7 times today  She has some mild abdominal discomfort since the vomiting  Reports one episode of nonbloody diarrhea  No fever  No numbness, weakness  States symptoms feel similar to an episode of vertigo she had several years ago  Prior to Admission Medications   Prescriptions Last Dose Informant Patient Reported?  Taking?   acetaminophen (TYLENOL) 650 mg CR tablet Unknown at Unknown time  Yes No   Sig: Take 650 mg by mouth every 8 (eight) hours as needed   ibuprofen (MOTRIN) 600 mg tablet   No No   Sig: Take 1 tablet (600 mg total) by mouth every 6 (six) hours as needed for mild pain for up to 30 days   lidocaine (LIDODERM) 5 % Unknown at Unknown time  Yes No   Sig: Place 1 patch on the skin daily as needed    terbinafine (LamISIL) 250 mg tablet Unknown at Unknown time  No No   Sig: Take 1 tablet (250 mg total) by mouth daily      Facility-Administered Medications: None       Past Medical History:   Diagnosis Date    Acquired deformity of left foot     Anesthesia complication     difficulty awakening    Arthritis     Deaf, left     Depression     Hallux valgus of left foot     Hammer toe of left foot     Headache     Kidney stone     Sciatic pain, right     intermittent       Past Surgical History:   Procedure Laterality Date    CHOLECYSTECTOMY      HERNIA REPAIR      RI OSTEOTOMY METATARSAL (NOT 1ST) Left 6/14/2019    Procedure: 3rd Metatarsal Osteotomy;  Surgeon: Nicholas Johnson DPM; Location: [facility] OR;  Service: Podiatry    TUBAL LIGATION         Family History   Problem Relation Age of Onset    Arthritis Family      I have reviewed and agree with the history as documented  Social History     Tobacco Use    Smoking status: Current Every Day Smoker     Packs/day: 1 00     Years: 15 00     Pack years: 15 00     Types: Cigarettes    Smokeless tobacco: Never Used   Substance Use Topics    Alcohol use: No    Drug use: No        Review of Systems   Constitutional: Negative for chills and fever  HENT: Negative for congestion, rhinorrhea and sore throat  Respiratory: Negative for chest tightness and shortness of breath  Cardiovascular: Negative for chest pain  Gastrointestinal: Positive for abdominal pain, diarrhea, nausea and vomiting  Genitourinary: Negative for dysuria and hematuria  Musculoskeletal: Negative for arthralgias and myalgias  Skin: Negative for rash  Neurological: Positive for dizziness  Negative for syncope, weakness, light-headedness, numbness and headaches  All other systems reviewed and are negative  Physical Exam  ED Triage Vitals   Temp Pulse Respirations Blood Pressure SpO2   -- 11/25/19 1745 11/25/19 1747 11/25/19 1745 11/25/19 1745    84 16 (!) 178/102 99 %      Temp src Heart Rate Source Patient Position - Orthostatic VS BP Location FiO2 (%)   -- 11/25/19 1745 11/25/19 1745 11/25/19 1745 --    Monitor Lying Right arm       Pain Score       11/25/19 1747       No Pain             Orthostatic Vital Signs  Vitals:    11/25/19 1745 11/25/19 1747 11/25/19 1845 11/25/19 2000   BP: (!) 178/102 (!) 181/104 (!) 160/105 152/94   Pulse: 84 85 84 82   Patient Position - Orthostatic VS: Lying Lying Lying Lying       Physical Exam   Constitutional: She is oriented to person, place, and time  She appears well-developed and well-nourished  No distress  HENT:   Head: Normocephalic and atraumatic     Right Ear: External ear normal    Left Ear: External ear normal    Nose: Nose normal    Eyes: Pupils are equal, round, and reactive to light  Conjunctivae and EOM are normal    Neck: Normal range of motion  Neck supple  Cardiovascular: Normal rate, regular rhythm, normal heart sounds and intact distal pulses  Exam reveals no gallop and no friction rub  No murmur heard  Pulmonary/Chest: Effort normal and breath sounds normal  No respiratory distress  She has no wheezes  She has no rhonchi  She has no rales  Abdominal: Soft  Bowel sounds are normal  She exhibits no distension and no mass  There is generalized tenderness  There is no rebound and no guarding  Musculoskeletal: Normal range of motion  Lymphadenopathy:     She has no cervical adenopathy  Neurological: She is alert and oriented to person, place, and time  She has normal strength  No cranial nerve deficit or sensory deficit  Coordination normal    horizontal nystagmus left and right   Skin: Skin is warm and dry  She is not diaphoretic  Psychiatric: She has a normal mood and affect  Nursing note and vitals reviewed        ED Medications  Medications   sodium chloride 0 9 % bolus 1,000 mL (0 mL Intravenous Stopped 11/25/19 2028)   ondansetron (ZOFRAN) injection 4 mg (4 mg Intravenous Given 11/25/19 1830)   meclizine (ANTIVERT) tablet 12 5 mg (12 5 mg Oral Given 11/25/19 1834)   diazepam (VALIUM) injection 5 mg (5 mg Intravenous Given 11/25/19 2001)   dexamethasone (DECADRON) injection 10 mg (10 mg Intravenous Given 11/25/19 2002)       Diagnostic Studies  Results Reviewed     Procedure Component Value Units Date/Time    Urine Microscopic [[id number]]  (Abnormal) Collected:  11/25/19 1930    Lab Status:  Final result Specimen:  Urine, Clean Catch Updated:  11/25/19 1951     RBC, UA 4-10 /hpf      WBC, UA None Seen /hpf      Epithelial Cells None Seen /hpf      Bacteria, UA None Seen /hpf      Hyaline Casts, UA None Seen /lpf     POCT urinalysis dipstick [[id number]]  (Abnormal) Resulted:  11/25/19 1932 Lab Status:  Final result Updated:  11/25/19 1938    Urine Macroscopic, POC [[id number]]  (Abnormal) Collected:  11/25/19 1930    Lab Status:  Final result Specimen:  Urine Updated:  11/25/19 1930     Color, UA Yellow     Clarity, UA Clear     pH, UA 7 0     Leukocytes, UA Negative     Nitrite, UA Negative     Protein, UA Negative mg/dl      Glucose, UA Negative mg/dl      Ketones, UA Negative mg/dl      Urobilinogen, UA 0 2 E U /dl      Bilirubin, UA Negative     Blood, UA Small     Specific [place], UA 1 015    Narrative:       CLINITEK RESULT    Basic metabolic panel [[id number]]  (Abnormal) Collected:  11/25/19 1831    Lab Status:  Final result Specimen:  Blood from Arm, Right Updated:  11/25/19 1905     Sodium 143 mmol/L      Potassium 4 0 mmol/L      Chloride 110 mmol/L      CO2 27 mmol/L      ANION GAP 6 mmol/L      BUN 13 mg/dL      Creatinine 0 67 mg/dL      Glucose 91 mg/dL      Calcium 9 6 mg/dL      eGFR 100 ml/min/1 73sq m     Narrative:       [place] guidelines for Chronic Kidney Disease (CKD):     Stage 1 with normal or high GFR (GFR > 90 mL/min/1 73 square meters)    Stage 2 Mild CKD (GFR = 60-89 mL/min/1 73 square meters)    Stage 3A Moderate CKD (GFR = 45-59 mL/min/1 73 square meters)    Stage 3B Moderate CKD (GFR = 30-44 mL/min/1 73 square meters)    Stage 4 Severe CKD (GFR = 15-29 mL/min/1 73 square meters)    Stage 5 End Stage CKD (GFR <15 mL/min/1 73 square meters)  Note: GFR calculation is accurate only with a steady state creatinine    CBC and differential [[id number]] Collected:  11/25/19 1831    Lab Status:  Final result Specimen:  Blood from Arm, Right Updated:  11/25/19 1848     WBC 9 61 Thousand/uL      RBC 4 37 Million/uL      Hemoglobin 14 3 g/dL      Hematocrit 42 4 %      MCV 97 fL      MCH 32 7 pg      MCHC 33 7 g/dL      RDW 14 4 %      MPV 9 3 fL      Platelets 581 Thousands/uL      nRBC 0 /100 WBCs      Neutrophils Relative 53 %      Immat GRANS % 0 %      Lymphocytes Relative 36 %      Monocytes Relative 10 %      Eosinophils Relative 1 %      Basophils Relative 0 %      Neutrophils Absolute 5 07 Thousands/µL      Immature Grans Absolute 0 01 Thousand/uL      Lymphocytes Absolute 3 47 Thousands/µL      Monocytes Absolute 0 91 Thousand/µL      Eosinophils Absolute 0 11 Thousand/µL      Basophils Absolute 0 04 Thousands/µL                  No orders to display         Procedures  Procedures        ED Course  ED Course as of Nov 25 2047 Mon Nov 25, 2019   1928 Pt reports dizziness is only mildly improved by meclazine      2025 Pt reports dizziness is improved with valium, and that she is now very tired and wants to go home and sleep                                  MDM  Number of Diagnoses or Management Options  Dizziness:   dizziness, likely peripheral  No other concerning neurologic features concerning for central process  Offered CT scan to evaluate, but pt denies wanting imaging at this time  Will get CBC, BMP to evaluate electrolytes and for infection with vomiting  Will treat with IV fluids, zofran, meclizine and reassess  Disposition  Final diagnoses:   Dizziness     Time reflects when diagnosis was documented in both MDM as applicable and the Disposition within this note     Time User Action Codes Description Comment    11/25/2019  8:26 PM Jose Luis Siddiqui Add [R42] Dizziness       ED Disposition     ED Disposition Condition Date/Time Comment    Discharge Stable Mon Nov 25, 2019  8:26 PM Bobby 95 discharge to home/self care              Follow-up Information     Follow up With Specialties Details Why Contact Info Additional [address] [facility] Schedule an appointment as soon as possible for a visit  or your primary care physician Merissa [address] [facility] [id number]  [address] [place] [address], [address] [place] [address], [phone]          Discharge Medication List as of 11/25/2019  8:28 PM      START taking these medications    Details   meclizine (ANTIVERT) 12 5 MG tablet Take 1 tablet (12 5 mg total) by mouth 3 (three) times a day as needed for dizziness, Starting Mon 11/25/2019, Normal         CONTINUE these medications which have NOT CHANGED    Details   acetaminophen (TYLENOL) 650 mg CR tablet Take 650 mg by mouth every 8 (eight) hours as needed, Historical Med      ibuprofen (MOTRIN) 600 mg tablet Take 1 tablet (600 mg total) by mouth every 6 (six) hours as needed for mild pain for up to 30 days, Starting Fri 7/19/2019, Until Sun 8/18/2019, Normal      lidocaine (LIDODERM) 5 % Place 1 patch on the skin daily as needed , Starting Wed 4/26/2017, Historical Med      terbinafine (LamISIL) 250 mg tablet Take 1 tablet (250 mg total) by mouth daily, Starting Fri 9/20/2019, Until Fri 12/13/2019, Normal           No discharge procedures on file  ED Provider  Attending physically available and evaluated Nj Ana Maria MARSHALL managed the patient along with the ED Attending      Electronically Signed by         Scott Cohen MD  11/25/19 9450

## 2019-11-26 NOTE — DISCHARGE INSTRUCTIONS
Take medication as needed for dizziness  Return to the emergency department for severe worsening of symptoms, numbness, weakness, inability to walk, fever, any other new or concerning symptoms

## 2019-11-26 NOTE — ED ATTENDING ATTESTATION
11/25/2019  IDaniel DO, saw and evaluated the patient  I have discussed the patient with the resident/non-physician practitioner and agree with the resident's/non-physician practitioner's findings, Plan of Care, and MDM as documented in the resident's/non-physician practitioner's note, except where noted  All available labs and Radiology studies were reviewed  I was present for key portions of any procedure(s) performed by the resident/non-physician practitioner and I was immediately available to provide assistance  At this point I agree with the current assessment done in the Emergency Department  I have conducted an independent evaluation of this patient a history and physical is as follows:    ED Course       [de-identified] old female presenting with dizziness  Patient was this morning the room spinning sensation  Has felt nauseated and episodes of vomiting throughout the day  Symptoms worse upon standing and generalized movement of her head  No previous symptoms of vertigo  No recent illness or trauma  Denies any tinnitus or hearing loss  No other neurological deficits  Also noted some diarrhea this morning  No recent travel  Physical exam patient does have horizontal nystagmus have takes than 20 seconds when moving her head  No carotid bruits noted  Lungs are clear, heart is regular  Afebrile  No rash  Normal appearing tympanic membranes  No tenderness over the mastoid process  Patient declined a head CT  Will administer IV fluids, p o  Meclizine and nausea medication and reassess  Patient states nausea slightly better but still having episodes of vertigo although not as severe  Will administer Decadron, Valium and reassess  Symptoms resolve  Will give prescription for Antivert  Follow up with primary care physician and return if worsens      Critical Care Time  Procedures

## 2020-06-04 ENCOUNTER — OFFICE VISIT (OUTPATIENT)
Dept: PODIATRY | Facility: CLINIC | Age: 55
End: 2020-06-04
Payer: COMMERCIAL

## 2020-06-04 VITALS — WEIGHT: 182 LBS | BODY MASS INDEX: 34.36 KG/M2 | HEIGHT: 61 IN

## 2020-06-04 DIAGNOSIS — B35.3 TINEA PEDIS OF RIGHT FOOT: Primary | ICD-10-CM

## 2020-06-04 PROCEDURE — 99213 OFFICE O/P EST LOW 20 MIN: CPT | Performed by: PODIATRIST

## 2020-06-04 RX ORDER — CLOTRIMAZOLE AND BETAMETHASONE DIPROPIONATE 10; .64 MG/G; MG/G
CREAM TOPICAL 2 TIMES DAILY
Qty: 30 G | Refills: 0 | Status: SHIPPED | OUTPATIENT
Start: 2020-06-04 | End: 2021-03-10 | Stop reason: HOSPADM

## 2020-06-24 ENCOUNTER — OFFICE VISIT (OUTPATIENT)
Dept: PODIATRY | Facility: CLINIC | Age: 55
End: 2020-06-24
Payer: COMMERCIAL

## 2020-06-24 VITALS — WEIGHT: 186 LBS | SYSTOLIC BLOOD PRESSURE: 150 MMHG | DIASTOLIC BLOOD PRESSURE: 90 MMHG | BODY MASS INDEX: 35.14 KG/M2

## 2020-06-24 DIAGNOSIS — L03.115 CELLULITIS OF RIGHT FOOT: Primary | ICD-10-CM

## 2020-06-24 DIAGNOSIS — L98.9 SKIN ABNORMALITIES: ICD-10-CM

## 2020-06-24 PROCEDURE — 99213 OFFICE O/P EST LOW 20 MIN: CPT | Performed by: PODIATRIST

## 2020-06-24 RX ORDER — CEPHALEXIN 500 MG/1
500 CAPSULE ORAL 4 TIMES DAILY
Qty: 40 CAPSULE | Refills: 0 | Status: SHIPPED | OUTPATIENT
Start: 2020-06-24 | End: 2020-07-06 | Stop reason: HOSPADM

## 2020-06-29 ENCOUNTER — OFFICE VISIT (OUTPATIENT)
Dept: PODIATRY | Facility: CLINIC | Age: 55
End: 2020-06-29
Payer: COMMERCIAL

## 2020-06-29 ENCOUNTER — APPOINTMENT (INPATIENT)
Dept: RADIOLOGY | Facility: HOSPITAL | Age: 55
DRG: 574 | End: 2020-06-29
Payer: COMMERCIAL

## 2020-06-29 ENCOUNTER — HOSPITAL ENCOUNTER (INPATIENT)
Facility: HOSPITAL | Age: 55
LOS: 7 days | Discharge: HOME HEALTH | DRG: 574 | End: 2020-07-06
Attending: PODIATRIST | Admitting: PODIATRIST
Payer: COMMERCIAL

## 2020-06-29 VITALS
HEIGHT: 61 IN | WEIGHT: 186 LBS | SYSTOLIC BLOOD PRESSURE: 145 MMHG | DIASTOLIC BLOOD PRESSURE: 99 MMHG | BODY MASS INDEX: 35.12 KG/M2

## 2020-06-29 DIAGNOSIS — S91.301A WOUND OF RIGHT FOOT: Primary | ICD-10-CM

## 2020-06-29 DIAGNOSIS — L03.031 CELLULITIS OF GREAT TOE OF RIGHT FOOT: ICD-10-CM

## 2020-06-29 DIAGNOSIS — L03.115 CELLULITIS OF RIGHT FOOT: Primary | ICD-10-CM

## 2020-06-29 DIAGNOSIS — Z01.818 PRE-OPERATIVE CLEARANCE: ICD-10-CM

## 2020-06-29 DIAGNOSIS — Z98.890 POST-OPERATIVE STATE: ICD-10-CM

## 2020-06-29 LAB
ALBUMIN SERPL BCP-MCNC: 3.6 G/DL (ref 3.5–5)
ALP SERPL-CCNC: 89 U/L (ref 46–116)
ALT SERPL W P-5'-P-CCNC: 24 U/L (ref 12–78)
ANION GAP SERPL CALCULATED.3IONS-SCNC: 7 MMOL/L (ref 4–13)
AST SERPL W P-5'-P-CCNC: 15 U/L (ref 5–45)
BASOPHILS # BLD AUTO: 0.05 THOUSANDS/ΜL (ref 0–0.1)
BASOPHILS NFR BLD AUTO: 0 % (ref 0–1)
BILIRUB SERPL-MCNC: 0.27 MG/DL (ref 0.2–1)
BUN SERPL-MCNC: 8 MG/DL (ref 5–25)
CALCIUM SERPL-MCNC: 8.9 MG/DL (ref 8.3–10.1)
CHLORIDE SERPL-SCNC: 110 MMOL/L (ref 100–108)
CO2 SERPL-SCNC: 21 MMOL/L (ref 21–32)
CREAT SERPL-MCNC: 0.62 MG/DL (ref 0.6–1.3)
EOSINOPHIL # BLD AUTO: 0.1 THOUSAND/ΜL (ref 0–0.61)
EOSINOPHIL NFR BLD AUTO: 1 % (ref 0–6)
ERYTHROCYTE [DISTWIDTH] IN BLOOD BY AUTOMATED COUNT: 14.8 % (ref 11.6–15.1)
GFR SERPL CREATININE-BSD FRML MDRD: 102 ML/MIN/1.73SQ M
GLUCOSE SERPL-MCNC: 86 MG/DL (ref 65–140)
HCT VFR BLD AUTO: 41.8 % (ref 34.8–46.1)
HGB BLD-MCNC: 14 G/DL (ref 11.5–15.4)
IMM GRANULOCYTES # BLD AUTO: 0.03 THOUSAND/UL (ref 0–0.2)
IMM GRANULOCYTES NFR BLD AUTO: 0 % (ref 0–2)
LYMPHOCYTES # BLD AUTO: 3.69 THOUSANDS/ΜL (ref 0.6–4.47)
LYMPHOCYTES NFR BLD AUTO: 33 % (ref 14–44)
MCH RBC QN AUTO: 32.3 PG (ref 26.8–34.3)
MCHC RBC AUTO-ENTMCNC: 33.5 G/DL (ref 31.4–37.4)
MCV RBC AUTO: 97 FL (ref 82–98)
MONOCYTES # BLD AUTO: 1 THOUSAND/ΜL (ref 0.17–1.22)
MONOCYTES NFR BLD AUTO: 9 % (ref 4–12)
NEUTROPHILS # BLD AUTO: 6.27 THOUSANDS/ΜL (ref 1.85–7.62)
NEUTS SEG NFR BLD AUTO: 57 % (ref 43–75)
NRBC BLD AUTO-RTO: 0 /100 WBCS
PLATELET # BLD AUTO: 311 THOUSANDS/UL (ref 149–390)
PMV BLD AUTO: 9 FL (ref 8.9–12.7)
POTASSIUM SERPL-SCNC: 3.9 MMOL/L (ref 3.5–5.3)
PROT SERPL-MCNC: 7.8 G/DL (ref 6.4–8.2)
RBC # BLD AUTO: 4.33 MILLION/UL (ref 3.81–5.12)
SODIUM SERPL-SCNC: 138 MMOL/L (ref 136–145)
WBC # BLD AUTO: 11.14 THOUSAND/UL (ref 4.31–10.16)

## 2020-06-29 PROCEDURE — 99213 OFFICE O/P EST LOW 20 MIN: CPT | Performed by: PODIATRIST

## 2020-06-29 PROCEDURE — 85025 COMPLETE CBC W/AUTO DIFF WBC: CPT | Performed by: STUDENT IN AN ORGANIZED HEALTH CARE EDUCATION/TRAINING PROGRAM

## 2020-06-29 PROCEDURE — 73630 X-RAY EXAM OF FOOT: CPT

## 2020-06-29 PROCEDURE — 80053 COMPREHEN METABOLIC PANEL: CPT | Performed by: STUDENT IN AN ORGANIZED HEALTH CARE EDUCATION/TRAINING PROGRAM

## 2020-06-29 PROCEDURE — 99222 1ST HOSP IP/OBS MODERATE 55: CPT | Performed by: PODIATRIST

## 2020-06-29 PROCEDURE — 87040 BLOOD CULTURE FOR BACTERIA: CPT | Performed by: STUDENT IN AN ORGANIZED HEALTH CARE EDUCATION/TRAINING PROGRAM

## 2020-06-29 RX ORDER — DOCUSATE SODIUM 100 MG/1
100 CAPSULE, LIQUID FILLED ORAL 2 TIMES DAILY PRN
Status: DISCONTINUED | OUTPATIENT
Start: 2020-06-29 | End: 2020-07-06 | Stop reason: HOSPADM

## 2020-06-29 RX ORDER — OXYCODONE HYDROCHLORIDE 5 MG/1
2.5 TABLET ORAL EVERY 4 HOURS PRN
Status: DISCONTINUED | OUTPATIENT
Start: 2020-06-29 | End: 2020-06-30

## 2020-06-29 RX ORDER — ONDANSETRON 2 MG/ML
4 INJECTION INTRAMUSCULAR; INTRAVENOUS EVERY 6 HOURS PRN
Status: DISCONTINUED | OUTPATIENT
Start: 2020-06-29 | End: 2020-07-06 | Stop reason: HOSPADM

## 2020-06-29 RX ORDER — HEPARIN SODIUM 5000 [USP'U]/ML
5000 INJECTION, SOLUTION INTRAVENOUS; SUBCUTANEOUS EVERY 8 HOURS SCHEDULED
Status: DISCONTINUED | OUTPATIENT
Start: 2020-06-29 | End: 2020-07-06 | Stop reason: HOSPADM

## 2020-06-29 RX ORDER — ACETAMINOPHEN 325 MG/1
650 TABLET ORAL EVERY 6 HOURS PRN
Status: DISCONTINUED | OUTPATIENT
Start: 2020-06-29 | End: 2020-06-30

## 2020-06-29 RX ORDER — METRONIDAZOLE 500 MG/1
500 TABLET ORAL EVERY 8 HOURS SCHEDULED
Status: DISCONTINUED | OUTPATIENT
Start: 2020-06-29 | End: 2020-07-06 | Stop reason: HOSPADM

## 2020-06-29 RX ADMIN — METRONIDAZOLE 500 MG: 500 TABLET ORAL at 14:53

## 2020-06-29 RX ADMIN — OXYCODONE HYDROCHLORIDE 2.5 MG: 5 TABLET ORAL at 15:54

## 2020-06-29 RX ADMIN — CEFEPIME HYDROCHLORIDE 2000 MG: 2 INJECTION, POWDER, FOR SOLUTION INTRAVENOUS at 16:45

## 2020-06-29 RX ADMIN — NICOTINE 1 PATCH: 7 PATCH TRANSDERMAL at 14:53

## 2020-06-29 RX ADMIN — OXYCODONE HYDROCHLORIDE 2.5 MG: 5 TABLET ORAL at 22:03

## 2020-06-29 RX ADMIN — METRONIDAZOLE 500 MG: 500 TABLET ORAL at 22:03

## 2020-06-29 NOTE — PLAN OF CARE
Problem: PAIN - ADULT  Goal: Verbalizes/displays adequate comfort level or baseline comfort level  Description  Interventions:  - Encourage patient to monitor pain and request assistance  - Assess pain using appropriate pain scale  - Administer analgesics based on type and severity of pain and evaluate response  - Implement non-pharmacological measures as appropriate and evaluate response  - Consider cultural and social influences on pain and pain management  - Notify physician/advanced practitioner if interventions unsuccessful or patient reports new pain  Outcome: Progressing     Problem: INFECTION - ADULT  Goal: Absence or prevention of progression during hospitalization  Description  INTERVENTIONS:  - Assess and monitor for signs and symptoms of infection  - Monitor lab/diagnostic results  - Monitor all insertion sites, i e  indwelling lines, tubes, and drains  - Monitor endotracheal if appropriate and nasal secretions for changes in amount and color  - [place] appropriate cooling/warming therapies per order  - Administer medications as ordered  - Instruct and encourage patient and family to use good hand hygiene technique  - Identify and instruct in appropriate isolation precautions for identified infection/condition  Outcome: Progressing  Goal: Absence of fever/infection during neutropenic period  Description  INTERVENTIONS:  - Monitor WBC    Outcome: Progressing     Problem: SAFETY ADULT  Goal: Patient will remain free of falls  Description  INTERVENTIONS:  - Assess patient frequently for physical needs  -  Identify cognitive and physical deficits and behaviors that affect risk of falls    -  [place] fall precautions as indicated by assessment   - Educate patient/family on patient safety including physical limitations  - Instruct patient to call for assistance with activity based on assessment  - Modify environment to reduce risk of injury  - Consider OT/PT consult to assist with strengthening/mobility  Outcome: Progressing  Goal: Maintain or return to baseline ADL function  Description  INTERVENTIONS:  -  Assess patient's ability to carry out ADLs; assess patient's baseline for ADL function and identify physical deficits which impact ability to perform ADLs (bathing, care of mouth/teeth, toileting, grooming, dressing, etc )  - Assess/evaluate cause of self-care deficits   - Assess range of motion  - Assess patient's mobility; develop plan if impaired  - Assess patient's need for assistive devices and provide as appropriate  - Encourage maximum independence but intervene and supervise when necessary  - Involve family in performance of ADLs  - Assess for home care needs following discharge   - Consider OT consult to assist with ADL evaluation and planning for discharge  - Provide patient education as appropriate  Outcome: Progressing  Goal: Maintain or return mobility status to optimal level  Description  INTERVENTIONS:  - Assess patient's baseline mobility status (ambulation, transfers, stairs, etc )    - Identify cognitive and physical deficits and behaviors that affect mobility  - Identify mobility aids required to assist with transfers and/or ambulation (gait belt, sit-to-stand, lift, walker, cane, etc )  - [place] fall precautions as indicated by assessment  - Record patient progress and toleration of activity level on Mobility SBAR; progress patient to next Phase/Stage  - Instruct patient to call for assistance with activity based on assessment  - Consider rehabilitation consult to assist with strengthening/weightbearing, etc   Outcome: Progressing     Problem: DISCHARGE PLANNING  Goal: Discharge to home or other facility with appropriate resources  Description  INTERVENTIONS:  - Identify barriers to discharge w/patient and caregiver  - Arrange for needed discharge resources and transportation as appropriate  - Identify discharge learning needs (meds, wound care, etc )  - Arrange for interpretive services to assist at discharge as needed  - Refer to Case Management Department for coordinating discharge planning if the patient needs post-hospital services based on physician/advanced practitioner order or complex needs related to functional status, cognitive ability, or social support system  Outcome: Progressing     Problem: Knowledge Deficit  Goal: Patient/family/caregiver demonstrates understanding of disease process, treatment plan, medications, and discharge instructions  Description  Complete learning assessment and assess knowledge base  Interventions:  - Provide teaching at level of understanding  - Provide teaching via preferred learning methods  Outcome: Progressing     Problem: Nutrition/Hydration-ADULT  Goal: Nutrient/Hydration intake appropriate for improving, restoring or maintaining nutritional needs  Description  Monitor and assess patient's nutrition/hydration status for malnutrition  Collaborate with interdisciplinary team and initiate plan and interventions as ordered  Monitor patient's weight and dietary intake as ordered or per policy  Utilize nutrition screening tool and intervene as necessary  Determine patient's food preferences and provide high-protein, high-caloric foods as appropriate       INTERVENTIONS:  - Monitor oral intake, urinary output, labs, and treatment plans  - Assess nutrition and hydration status and recommend course of action  - Evaluate amount of meals eaten  - Assist patient with eating if necessary   - Allow adequate time for meals  - Recommend/ encourage appropriate diets, oral nutritional supplements, and vitamin/mineral supplements  - Order, calculate, and assess calorie counts as needed  - Recommend, monitor, and adjust tube feedings and TPN/PPN based on assessed needs  - Assess need for intravenous fluids  - Provide specific nutrition/hydration education as appropriate  - Include patient/family/caregiver in decisions related to nutrition  Outcome: Progressing     Problem: SKIN/TISSUE INTEGRITY - ADULT  Goal: Skin integrity remains intact  Description  INTERVENTIONS  - Identify patients at risk for skin breakdown  - Assess and monitor skin integrity  - Assess and monitor nutrition and hydration status  - Monitor labs (i e  albumin)  - Assess for incontinence   - Turn and reposition patient  - Assist with mobility/ambulation  - Relieve pressure over bony prominences  - Avoid friction and shearing  - Provide appropriate hygiene as needed including keeping skin clean and dry  - Evaluate need for skin moisturizer/barrier cream  - Collaborate with interdisciplinary team (i e  Nutrition, Rehabilitation, etc )   - Patient/family teaching  Outcome: Progressing  Goal: Incision(s), wounds(s) or drain site(s) healing without S/S of infection  Description  INTERVENTIONS  - Assess and document risk factors for skin impairment   - Assess and document dressing, incision, wound bed, drain sites and surrounding tissue  - Consider nutrition services referral as needed  - Oral mucous membranes remain intact  - Provide patient/ family education  Outcome: Progressing  Goal: Oral mucous membranes remain intact  Description  INTERVENTIONS  - Assess oral mucosa and hygiene practices  - Implement preventative oral hygiene regimen  - Implement oral medicated treatments as ordered  - Initiate Nutrition services referral as needed  Outcome: Progressing     Problem: MUSCULOSKELETAL - ADULT  Goal: Maintain or return mobility to safest level of function  Description  INTERVENTIONS:  - Assess patient's ability to carry out ADLs; assess patient's baseline for ADL function and identify physical deficits which impact ability to perform ADLs (bathing, care of mouth/teeth, toileting, grooming, dressing, etc )  - Assess/evaluate cause of self-care deficits   - Assess range of motion  - Assess patient's mobility  - Assess patient's need for assistive devices and provide as appropriate  - Encourage maximum independence but intervene and supervise when necessary  - Involve family in performance of ADLs  - Assess for home care needs following discharge   - Consider OT consult to assist with ADL evaluation and planning for discharge  - Provide patient education as appropriate  Outcome: Progressing  Goal: Maintain proper alignment of affected body part  Description  INTERVENTIONS:  - Support, maintain and protect limb and body alignment  - Provide patient/ family with appropriate education  Outcome: Progressing

## 2020-06-29 NOTE — H&P
[address] [facility] - H&P  Jenni Cantu 54 y o  female MRN: [id number]  Unit/Bed#: -01 Encounter: [id number]  Admission Date: 06/29/20    ASSESSMENT:    Jenni Cantu is a 54 y o  female with:    1  Wound, right 1st interspace  2  Cellulitis, right foot    PLAN:    · No plan for surgical intervention at this time  Will continue to monitor cellulitis with daily serial foot exams  · Right foot x-ray ordered to assess for underlying DONNIE or OM  Will obtain further advanced imaging if concern for abscess arises or if no resolution symptoms occurs  · Begin IV antibiotics  Broad spectrum coverage with IV Cefepime/PO Flagyl  · Maxorb rope to right 1st interspace  · Blood culture pending  · WBAT  · Educated patient on tobacco cessation  Nicoderm patch while in house  · Will discuss this plan with my attending and update as needed  Antibiotics started: metronidazole and cefepime  Pharmacologic VTE Prophylaxis: Heparin   Mechanical VTE Prophylaxis: sequential compression device       Disposition:  Patient requires >2 midnight stay for IV antibiotics  SUBJECTIVE    History of Present Illness:    Jenni Cantu is a 54 y o  female who presents with right foot cellulitis  Patient states that current symptoms have been present for approximately 1 week  Patient states that she was told by podiatrist Dr Dara Bundy that she has a wound within the 1st interspace that became infected on June 24  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  Patient states that she noticed green discharge from interspace  Patient states that she was putting mupirocin ointment within the 1st interspace given by Dr Dara Bundy  She did not notice improvement  She was thereafter placed on oral Keflex 500mg qid by podiatrist   She failed to note improvement and was therefore sent to [facility] for IV antibiotics after being seen by podiatrist during follow-up visit today    Patient complains of pain at the right 1st interspace and right great toe  Patient reports that she has current smoker  Patient denies history of diabetes  Patient denies numbness/tingling at the feet  Review of Systems:    Constitutional: Negative  HENT: Negative  Eyes: Negative  Respiratory: Negative  Cardiovascular: Negative  Gastrointestinal: Negative  Musculoskeletal:  Negative   Skin:  Right foot cellulitis, right 1st interspace wound   Neurological:  Negative  Psych: Negative       Past Medical and Surgical History:     Past Medical History:   Diagnosis Date    Acquired deformity of left foot     Anesthesia complication     difficulty awakening    Arthritis     Deaf, left     Depression     Hallux valgus of left foot     Hammer toe of left foot     Headache     Kidney stone     Sciatic pain, right     intermittent       Past Surgical History:   Procedure Laterality Date    CHOLECYSTECTOMY      HERNIA REPAIR      MI OSTEOTOMY METATARSAL (NOT 1ST) Left 6/14/2019    Procedure: 3rd Metatarsal Osteotomy;  Surgeon: Temo Yoon DPM;  Location: [facility];  Service: Podiatry    TUBAL LIGATION         Meds/Allergies:    Medications Prior to Admission   Medication    acetaminophen (TYLENOL) 650 mg CR tablet    cephalexin (KEFLEX) 500 mg capsule    clotrimazole-betamethasone (LOTRISONE) 1-0 05 % cream    ibuprofen (MOTRIN) 600 mg tablet    lidocaine (LIDODERM) 5 %    meclizine (ANTIVERT) 12 5 MG tablet    mupirocin (BACTROBAN) 2 % ointment       Allergies   Allergen Reactions    Caffeine      Blisters in mouth-        Social History:    Social History     Marital Status: Single    Substance Use History:   Social History     Substance and Sexual Activity   Alcohol Use Yes    Frequency: 2-4 times a month     Social History     Tobacco Use   Smoking Status Current Every Day Smoker    Packs/day: 1 00    Years: 15 00    Pack years: 15 00    Types: Cigarettes   Smokeless Tobacco Never Used     Social History     Substance and Sexual Activity   Drug Use No       Family History:    Family History   Problem Relation Age of Onset    Arthritis Family          OBJECTIVE:    First Vitals:   Blood Pressure: 142/94 (06/29/20 1130)  Pulse: 103 (06/29/20 1130)  Temperature: 99 7 °F (37 6 °C) (06/29/20 1130)  Respirations: 20 (06/29/20 1130)  Height: 5' 1" (154 9 cm) (06/29/20 1130)  Weight - Scale: 84 8 kg (186 lb 15 2 oz) (06/29/20 1130)  SpO2: 93 % (06/29/20 1130)    Current Vitals:   Blood Pressure: 142/94 (06/29/20 1130)  Pulse: 103 (06/29/20 1130)  Temperature: 99 7 °F (37 6 °C) (06/29/20 1130)  Respirations: 20 (06/29/20 1130)  Height: 5' 1" (154 9 cm) (06/29/20 1130)  Weight - Scale: 84 8 kg (186 lb 15 2 oz) (06/29/20 1130)  SpO2: 93 % (06/29/20 1130)      Physical Exam:    General Appearance: Alert, cooperative, no distress  HEENT: Head normocephalic, atraumatic, without obvious abnormality  Heart: Normal rate and rhythm  Lungs: Non-labored breathing  No respiratory distress  Abdomen: Without distension  Psychiatric: AAOx3  Lower Extremity:  Vascular:   Pedal pulses on the left are present  Pedal pulses on the right are present  CRT brisk at the digits  +1/4 edema noted at bilateral lower extremities  Pedal hair is absent  Skin temperature is WNL bilaterally  Musculoskeletal:  MMT is 5/5 in all muscle compartments bilaterally  ROM at the 1st MPJ and ankle joint are decreased bilaterally with the leg extended  Pain on palpation of right hallux and interspace  Pain with splaying of the digits at the right 1st interspace  Bilateral HAV deformity noted  Dermatological:  Maceration located within the right 1st interspace  There is a wound noted, however, unable to appreciate wound bed due to guarding and refusal secondary to pain  There is blanchable erythema located on the dorsum of the right hallux and right 1st interspace coursing proximally to the level of the MPJ    There is tenderness to palpation of the right hallux and 1st interspace  Neurological:  Gross sensation is intact  Protective sensation is intact  Patient Denies numbness and/or paresthesias  Clinical Images 06/29/20:      Lab Results:   No visits with results within 1 Day(s) from this visit     Latest known visit with results is:   Admission on 11/25/2019, Discharged on 11/25/2019   Component Date Value    WBC 11/25/2019 9 61     RBC 11/25/2019 4 37     Hemoglobin 11/25/2019 14 3     Hematocrit 11/25/2019 42 4     MCV 11/25/2019 97     MCH 11/25/2019 32 7     MCHC 11/25/2019 33 7     RDW 11/25/2019 14 4     MPV 11/25/2019 9 3     Platelets 04/00/3420 350     nRBC 11/25/2019 0     Neutrophils Relative 11/25/2019 53     Immat GRANS % 11/25/2019 0     Lymphocytes Relative 11/25/2019 36     Monocytes Relative 11/25/2019 10     Eosinophils Relative 11/25/2019 1     Basophils Relative 11/25/2019 0     Neutrophils Absolute 11/25/2019 5 07     Immature Grans Absolute 11/25/2019 0 01     Lymphocytes Absolute 11/25/2019 3 47     Monocytes Absolute 11/25/2019 0 91     Eosinophils Absolute 11/25/2019 0 11     Basophils Absolute 11/25/2019 0 04     Sodium 11/25/2019 143     Potassium 11/25/2019 4 0     Chloride 11/25/2019 110*    CO2 11/25/2019 27     ANION GAP 11/25/2019 6     BUN 11/25/2019 13     Creatinine 11/25/2019 0 67     Glucose 11/25/2019 91     Calcium 11/25/2019 9 6     eGFR 11/25/2019 100     Ventricular Rate 11/25/2019 83     Atrial Rate 11/25/2019 83     [place] Interval 11/25/2019 212     QRSD Interval 11/25/2019 78     QT Interval 11/25/2019 342     QTC Interval 11/25/2019 401     P Axis 11/25/2019 58     QRS Axis 11/25/2019 61     T Wave Axis 11/25/2019 46     Color, UA 11/25/2019 Yellow     Clarity, UA 11/25/2019 Clear     pH, UA 11/25/2019 7 0     Leukocytes, UA 11/25/2019 Negative     Nitrite, UA 11/25/2019 Negative     Protein, UA 11/25/2019 Negative     Glucose, UA 11/25/2019 Negative     Ketones, UA 11/25/2019 Negative     Urobilinogen, UA 11/25/2019 0 2     Bilirubin, UA 11/25/2019 Negative     Blood, UA 11/25/2019 Small*    Specific [place], UA 11/25/2019 1 015     RBC, UA 11/25/2019 4-10*    WBC, UA 11/25/2019 None Seen     Epithelial Cells 11/25/2019 None Seen     Bacteria, UA 11/25/2019 None Seen     Hyaline Casts, UA 11/25/2019 None Seen        Cultures:            Imaging: I have personally reviewed pertinent films in PACS  No results found  EKG, Pathology, and Other Studies: I have personally reviewed pertinent reports        Code Status: No Order  Advance Directive and Living Will:      Power of :    POLST:

## 2020-06-30 PROBLEM — Z01.818 PREOPERATIVE CLEARANCE: Status: ACTIVE | Noted: 2020-06-30

## 2020-06-30 LAB
ANION GAP SERPL CALCULATED.3IONS-SCNC: 8 MMOL/L (ref 4–13)
BUN SERPL-MCNC: 11 MG/DL (ref 5–25)
CALCIUM SERPL-MCNC: 8.8 MG/DL (ref 8.3–10.1)
CHLORIDE SERPL-SCNC: 108 MMOL/L (ref 100–108)
CO2 SERPL-SCNC: 21 MMOL/L (ref 21–32)
CREAT SERPL-MCNC: 0.75 MG/DL (ref 0.6–1.3)
ERYTHROCYTE [DISTWIDTH] IN BLOOD BY AUTOMATED COUNT: 14.6 % (ref 11.6–15.1)
GFR SERPL CREATININE-BSD FRML MDRD: 90 ML/MIN/1.73SQ M
GLUCOSE SERPL-MCNC: 123 MG/DL (ref 65–140)
HCT VFR BLD AUTO: 43.2 % (ref 34.8–46.1)
HGB BLD-MCNC: 14.1 G/DL (ref 11.5–15.4)
MCH RBC QN AUTO: 31.7 PG (ref 26.8–34.3)
MCHC RBC AUTO-ENTMCNC: 32.6 G/DL (ref 31.4–37.4)
MCV RBC AUTO: 97 FL (ref 82–98)
PLATELET # BLD AUTO: 321 THOUSANDS/UL (ref 149–390)
PMV BLD AUTO: 9 FL (ref 8.9–12.7)
POTASSIUM SERPL-SCNC: 4.3 MMOL/L (ref 3.5–5.3)
RBC # BLD AUTO: 4.45 MILLION/UL (ref 3.81–5.12)
SARS-COV-2 RNA RESP QL NAA+PROBE: NEGATIVE
SODIUM SERPL-SCNC: 137 MMOL/L (ref 136–145)
URATE SERPL-MCNC: 4 MG/DL (ref 2–6.8)
WBC # BLD AUTO: 8.85 THOUSAND/UL (ref 4.31–10.16)

## 2020-06-30 PROCEDURE — 99223 1ST HOSP IP/OBS HIGH 75: CPT | Performed by: INTERNAL MEDICINE

## 2020-06-30 PROCEDURE — 99232 SBSQ HOSP IP/OBS MODERATE 35: CPT | Performed by: PODIATRIST

## 2020-06-30 PROCEDURE — 84550 ASSAY OF BLOOD/URIC ACID: CPT | Performed by: PODIATRIST

## 2020-06-30 PROCEDURE — 99221 1ST HOSP IP/OBS SF/LOW 40: CPT | Performed by: INTERNAL MEDICINE

## 2020-06-30 PROCEDURE — U0003 INFECTIOUS AGENT DETECTION BY NUCLEIC ACID (DNA OR RNA); SEVERE ACUTE RESPIRATORY SYNDROME CORONAVIRUS 2 (SARS-COV-2) (CORONAVIRUS DISEASE [COVID-19]), AMPLIFIED PROBE TECHNIQUE, MAKING USE OF HIGH THROUGHPUT TECHNOLOGIES AS DESCRIBED BY CMS-2020-01-R: HCPCS | Performed by: STUDENT IN AN ORGANIZED HEALTH CARE EDUCATION/TRAINING PROGRAM

## 2020-06-30 PROCEDURE — 85027 COMPLETE CBC AUTOMATED: CPT | Performed by: STUDENT IN AN ORGANIZED HEALTH CARE EDUCATION/TRAINING PROGRAM

## 2020-06-30 PROCEDURE — 80048 BASIC METABOLIC PNL TOTAL CA: CPT | Performed by: STUDENT IN AN ORGANIZED HEALTH CARE EDUCATION/TRAINING PROGRAM

## 2020-06-30 PROCEDURE — 93005 ELECTROCARDIOGRAM TRACING: CPT

## 2020-06-30 RX ORDER — ACETAMINOPHEN 325 MG/1
650 TABLET ORAL EVERY 6 HOURS SCHEDULED
Status: DISCONTINUED | OUTPATIENT
Start: 2020-07-01 | End: 2020-07-02

## 2020-06-30 RX ORDER — OXYCODONE HYDROCHLORIDE 5 MG/1
5 TABLET ORAL EVERY 4 HOURS PRN
Status: DISCONTINUED | OUTPATIENT
Start: 2020-06-30 | End: 2020-07-06 | Stop reason: HOSPADM

## 2020-06-30 RX ORDER — HYDROMORPHONE HCL/PF 1 MG/ML
0.5 SYRINGE (ML) INJECTION EVERY 4 HOURS PRN
Status: DISCONTINUED | OUTPATIENT
Start: 2020-06-30 | End: 2020-07-02

## 2020-06-30 RX ORDER — SODIUM CHLORIDE, SODIUM LACTATE, POTASSIUM CHLORIDE, CALCIUM CHLORIDE 600; 310; 30; 20 MG/100ML; MG/100ML; MG/100ML; MG/100ML
100 INJECTION, SOLUTION INTRAVENOUS CONTINUOUS
Status: DISCONTINUED | OUTPATIENT
Start: 2020-07-01 | End: 2020-07-01

## 2020-06-30 RX ADMIN — NICOTINE 1 PATCH: 7 PATCH TRANSDERMAL at 09:04

## 2020-06-30 RX ADMIN — METRONIDAZOLE 500 MG: 500 TABLET ORAL at 13:22

## 2020-06-30 RX ADMIN — METRONIDAZOLE 500 MG: 500 TABLET ORAL at 21:37

## 2020-06-30 RX ADMIN — HYDROMORPHONE HYDROCHLORIDE 0.5 MG: 1 INJECTION, SOLUTION INTRAMUSCULAR; INTRAVENOUS; SUBCUTANEOUS at 20:33

## 2020-06-30 RX ADMIN — ACETAMINOPHEN 650 MG: 325 TABLET ORAL at 09:08

## 2020-06-30 RX ADMIN — CEFEPIME HYDROCHLORIDE 2000 MG: 2 INJECTION, POWDER, FOR SOLUTION INTRAVENOUS at 03:19

## 2020-06-30 RX ADMIN — METRONIDAZOLE 500 MG: 500 TABLET ORAL at 05:54

## 2020-06-30 RX ADMIN — CEFEPIME HYDROCHLORIDE 2000 MG: 2 INJECTION, POWDER, FOR SOLUTION INTRAVENOUS at 13:22

## 2020-06-30 RX ADMIN — OXYCODONE HYDROCHLORIDE 2.5 MG: 5 TABLET ORAL at 15:51

## 2020-06-30 NOTE — PROGRESS NOTES
[facility] Podiatry - Progress Note  Patient: Ruben Bettencourt 54 y o  female   MRN: [id number]  PCP: No primary care provider on file  Unit/Bed#: -01 Encounter: [id number]  Date Of Visit: 20    ASSESSMENT:    Ruben Bettencourt is a 54 y o  female with:    1  Cellulitis right foot  2  Wound right 1st interspace      PLAN:    · Tentative plan is to take patient to OR tomorrow, 2020, for right foot I&D and possible wound VAC application with Dr Burton Castellanos  Risks versus benefits of procedure were discussed with the patient  All questions and concerns addressed  Patient amenable to proceed with surgical intervention  · NPO with IVF at midnight  · MRI with contrast of right forefoot ordered  Assess for underlying abscess or fluid collection  · Right foot Xray reviewed showing no evidence of DONNIE or OM  · Uric Acid WNL  · Continue IV antibiotic therapy with IV cefepime/PO Flagyl  Leukocytosis resolved  WBC 8 85 today  Blood culture pending  Will consider infectious disease consultation  · COVID-19 r/o for surgical planning  · Continue wound care with maxorb rope, DSD  · WBAT        SUBJECTIVE:     The patient was seen, evaluated, and assessed at bedside today  The patient was awake, alert, and in no acute distress  No acute events overnight  The patient reports continued pain/tenderness at the right hallux and 1st interspace  Patient denies N/V/F/chills/SOB/CP  OBJECTIVE:     Vitals:   /95   Pulse 82   Temp 98 °F (36 7 °C)   Resp 15   Ht 5' 1" (1 549 m)   Wt 84 8 kg (186 lb 15 2 oz)   SpO2 93%   BMI 35 32 kg/m²     Temp (24hrs), Av 7 °F (37 1 °C), Min:98 °F (36 7 °C), Max:99 7 °F (37 6 °C)      Physical Exam :     General:  Alert, cooperative, and in no distress  Lower extremity exam:  Cardiovascular status at baseline  Neurological status at baseline  Musculoskeletal status at baseline  No calf tenderness noted bilaterally    Right 1st interspace with fissure with surrounding maceration of soft tissue  There is severe pain on palpation of the 1st interspace and hallux  There is erythema and edema of the dorsum of the right hallux and 1st interspace  Clinical Images 06/30/20: Additional Data:     Labs:    Results from last 7 days   Lab Units 06/30/20  0616 06/29/20  1438   WBC Thousand/uL 8 85 11 14*   HEMOGLOBIN g/dL 14 1 14 0   HEMATOCRIT % 43 2 41 8   PLATELETS Thousands/uL 321 311   NEUTROS PCT %  --  57   LYMPHS PCT %  --  33   MONOS PCT %  --  9   EOS PCT %  --  1     Results from last 7 days   Lab Units 06/30/20  0616 06/29/20  1438   POTASSIUM mmol/L 4 3 3 9   CHLORIDE mmol/L 108 110*   CO2 mmol/L 21 21   BUN mg/dL 11 8   CREATININE mg/dL 0 75 0 62   CALCIUM mg/dL 8 8 8 9   ALK PHOS U/L  --  89   ALT U/L  --  24   AST U/L  --  15           * I Have Reviewed All Lab Data Listed Above  Recent Cultures (last 7 days):     Results from last 7 days   Lab Units 06/29/20  1541   BLOOD CULTURE  Received in Microbiology Lab  Culture in Progress  Imaging: I have personally reviewed pertinent films in PACS  Pathology, and Other Studies: I have personally reviewed pertinent reports  ** Please Note: Portions of the record may have been created with voice recognition software  Occasional wrong word or "sound a like" substitutions may have occurred due to the inherent limitations of voice recognition software  Read the chart carefully and recognize, using context, where substitutions have occurred   **

## 2020-06-30 NOTE — UTILIZATION REVIEW
Initial Clinical Review    Admission: Date/Time/Statement: Admission Orders (From admission, onward)     Ordered        06/29/20 1407  Inpatient Admission  Once                   Orders Placed This Encounter   Procedures    Inpatient Admission     Standing Status:   Standing     Number of Occurrences:   1     Order Specific Question:   Admitting Physician     Answer:   Jonathon Grimes [[id number]]     Order Specific Question:   Level of Care     Answer:   Med Surg [[id number]]     Order Specific Question:   Estimated length of stay     Answer:   More than 2 Midnights     Order Specific Question:   Certification     Answer:   I certify that inpatient services are medically necessary for this patient for a duration of greater than two midnights  See H&P and MD Progress Notes for additional information about the patient's course of treatment  Assessment/Plan:   55y Female, Direct admission from Podiatry service  Presents with right foot cellulitis  Pt seen by Podiatry on 06/24 with 1st interspace wound with green discharge and placed on Keflex 500 mg qid  No improvement on antibiotics and sent for IV antibiotics after f/u visit today with Podiatry  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  Admit Inpatient level of care for Rigth 1st interspace wound and Right foot cellulitis  Start Iv antibiotics  Bld culture pending  WBAT  Maxorb rope to right 1st interspace  No plan for surgical intervention at this time  Will continue to monitor cellulitis with daily serial foot exams  On exam; + edema to BLE  Right great toe is bright red and there is increased swelling  6/30 Progress notes; Tentative plan is to take patient to OR tomorrow, 07/01/2020, for right foot I&D and possible wound VAC application  NPO with IVF at midnight  MRI right forefoot  Continue Iv antibiotics  Bld culture pending  R/o COVID  Continue wound care with maxorb rope, DSD   Patient reports continued pain/tenderness at the right hallux and 1st interspace  Infectious Disease consult  7/1 Tentative OR - INCISION AND DRAINAGE (I&D) EXTREMITY (Right Foot)      APPLICATION VAC DRESSING (Right Foot)              Vitals   Temperature Pulse Respirations Blood Pressure SpO2   06/29/20 1130 06/29/20 1130 06/29/20 1130 06/29/20 1130 06/29/20 1130   99 7 °F (37 6 °C) 103 20 142/94 93 %      Temp src Heart Rate Source Patient Position - Orthostatic VS BP Location FiO2 (%)   -- -- -- -- --             Pain Score       06/29/20 1130       9        Wt Readings from Last 1 Encounters:   06/29/20 84 8 kg (186 lb 15 2 oz)     Additional Vital Signs:   06/30/20 07:18:27  98 °F (36 7 °C)  82  15  133/95  108  93 %     06/29/20 22:25:33    94    133/95  108  96 %     06/29/20 1600            97 %  None (Room air)   06/29/20 15:49:53  98 4 °F (36 9 °C)  119Abnormal         97 %       Pertinent Labs/Diagnostic Test Results:   6/29 Xray Right Foot - No radiographic evidence of osteomyelitis  Degenerative changes  6/30 MRI Right Forefoot - pending      Results from last 7 days   Lab Units 06/30/20  0616 06/29/20  1438   WBC Thousand/uL 8 85 11 14*   HEMOGLOBIN g/dL 14 1 14 0   HEMATOCRIT % 43 2 41 8   PLATELETS Thousands/uL 321 311   NEUTROS ABS Thousands/µL  --  6 27         Results from last 7 days   Lab Units 06/30/20  0616 06/29/20  1438   SODIUM mmol/L 137 138   POTASSIUM mmol/L 4 3 3 9   CHLORIDE mmol/L 108 110*   CO2 mmol/L 21 21   ANION GAP mmol/L 8 7   BUN mg/dL 11 8   CREATININE mg/dL 0 75 0 62   EGFR ml/min/1 73sq m 90 102   CALCIUM mg/dL 8 8 8 9     Results from last 7 days   Lab Units 06/29/20  1438   AST U/L 15   ALT U/L 24   ALK PHOS U/L 89   TOTAL PROTEIN g/dL 7 8   ALBUMIN g/dL 3 6   TOTAL BILIRUBIN mg/dL 0 27         Results from last 7 days   Lab Units 06/30/20  0616 06/29/20  1438   GLUCOSE RANDOM mg/dL 123 86     Results from last 7 days   Lab Units 06/29/20  1541   BLOOD CULTURE  Received in Microbiology Lab   Culture in Progress  Past Medical History:   Diagnosis Date    Acquired deformity of left foot     Anesthesia complication     difficulty awakening    Arthritis     Deaf, left     Depression     Hallux valgus of left foot     Hammer toe of left foot     Headache     Kidney stone     Sciatic pain, right     intermittent     Present on Admission:   Cellulitis of great toe of right foot   Wound of right foot      Admitting Diagnosis: Cellulitis of foot, right [L03 [id number]]  Age/Sex: 54 y o  female     Admission Orders:  Bld culture   Infectious Disease consult    Scheduled Medications:  Medications:  cefepime 2,000 mg Intravenous Q12H   heparin (porcine) 5,000 Units Subcutaneous Q8H [facility]   metroNIDAZOLE 500 mg Oral Q8H [facility]   nicotine 1 patch Transdermal Daily     Continuous IV Infusions: None     PRN Meds:  acetaminophen 650 mg Oral Q6H PRN 6/29 x1   docusate sodium 100 mg Oral BID PRN   ondansetron 4 mg Intravenous Q6H PRN   oxyCODONE 2 5 mg Oral Q4H PRN 6/29 x2       Network Utilization Review Department  [email or web address]  ATTENTION: Please call with any questions or concerns to [phone] and carefully listen to the prompts so that you are directed to the right person  All voicemails are confidential   Chula Bee all requests for admission clinical reviews, approved or denied determinations and any other requests to dedicated fax number below belonging to the campus where the patient is receiving treatment   List of dedicated fax numbers for the Facilities:  [address] DENIALS (Administrative/Medical Necessity) [phone]   [address] (Maternity/NICU/Pediatrics) [phone]   Dominik Negro [phone]   Marlen Banda [phone]   Miguel Ángel Olmos [phone]   Osorio Beth [address] [facility] [phone]   [address] [facility]  [phone] [address] [address] [address] [facility] [phone]

## 2020-06-30 NOTE — CONSULTS
Consultation - Infectious Disease   Laurel Rogers 54 y o  female MRN: [id number]  Unit/Bed#: -01 Encounter: [id number]      IMPRESSION & RECOMMENDATIONS:   Impression/Recommendations:  1  Right foot cellulitis  In the setting of #2  Consider underlying abscess given severity of pain, restriction of motion  X-ray was negative for osteomyelitis  No active drainage on exam   Consider Pseudomonas given water exposure and lack of response to oral Keflex  Now improving on IV cefepime and Flagyl      -continue IV cefepime  -continue oral Flagyl  -follow-up pending MRI  -tentative plan for OR tomorrow  Will followup operative findings and cultures to guide further recommendations    -serial foot exams     2  Right 1st interspace wound  Likely etiology of #1  No active drainage  Consider deeper infection, as above     -antibiotic plan as above  -follow-up MRI findings  -local wound care    3  Obesity  Risk factor for development of infection  Recommend weight loss/dietary changes  4  Tobacco use  Risk factor for poor wound healing  Recommend smoking cessation  Antibiotics:  Cefepime/Flagyl 2    I discussed above plan with patient, and with Podiatry service  I personally reviewed recent medical records  Thank you for this consultation  We will follow along with you  HISTORY OF PRESENT ILLNESS:  Reason for Consult: Foot cellulitis    HPI: Laurel Rogers is a 54 y o  female with tobacco use who was admitted on 06/29 due to concern for persistent foot infection arising from 1st interspace  Patient noticed worsening hallux and 1st interspace pain several weeks ago for which she was seen by Dr Kurtis Hanna, her outpatient podiatrist, and was given topical mupirocin without any improvement  She was then started on oral Keflex, but pain, redness and swelling worsened  She was again seen by Dr Kurtis Hanna on Monday and was referred to the ER for further evaluation    Foot x-ray showed no evidence of osteomyelitis  Patient was started on IV cefepime and Flagyl  She does note some improvement in surrounding redness and swelling today, but still has severe hallux pain with impairment of motion  She is pending MRI  She otherwise feels well and denies fevers or chills  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  Her feet are frequently soaked  Denies any foot trauma  Denies animal bites or scratches  REVIEW OF SYSTEMS:  A complete system-based review of systems is otherwise negative  PAST MEDICAL HISTORY:  Past Medical History:   Diagnosis Date    Acquired deformity of left foot     Anesthesia complication     difficulty awakening    Arthritis     Deaf, left     Depression     Hallux valgus of left foot     Hammer toe of left foot     Headache     Kidney stone     Sciatic pain, right     intermittent     Past Surgical History:   Procedure Laterality Date    CHOLECYSTECTOMY      HERNIA REPAIR      MD OSTEOTOMY METATARSAL (NOT 1ST) Left 2019    Procedure: 3rd Metatarsal Osteotomy;  Surgeon: Evelyn Hale DPM;  Location: [facility];  Service: Podiatry    TUBAL LIGATION         FAMILY HISTORY:  Non-contributory    SOCIAL HISTORY:  Social History     Substance and Sexual Activity   Alcohol Use Yes    Frequency: 2-4 times a month     Social History     Substance and Sexual Activity   Drug Use No     Social History     Tobacco Use   Smoking Status Current Every Day Smoker    Packs/day: 1 00    Years: 15 00    Pack years: 15 00    Types: Cigarettes   Smokeless Tobacco Never Used       ALLERGIES:  Allergies   Allergen Reactions    Caffeine      Blisters in mouth-        MEDICATIONS:  All current active medications have been reviewed      PHYSICAL EXAM:  Vitals:  Temp:  [98 °F (36 7 °C)-98 4 °F (36 9 °C)] 98 °F (36 7 °C)  HR:  [] 82  Resp:  [15] 15  BP: (133)/(95) 133/95  SpO2:  [93 %-97 %] 93 %  Temp (24hrs), Av 2 °F (36 8 °C), Min:98 °F (36 7 °C), Max:98 4 °F (36 9 °C)  Current: Temperature: 98 °F (36 7 °C)     Physical Exam:  General:  Well-nourished, well-developed, in no acute distress  Eyes:  Conjunctive clear with no hemorrhages or effusions  Oropharynx:  No ulcers, no lesions  Neck:  Supple, no lymphadenopathy  Lungs:  Clear to auscultation bilaterally, no accessory muscle use  Cardiac:  Regular rate and rhythm, no murmurs  Abdomen:  Soft, non-tender, non-distended  Extremities:  No peripheral cyanosis, clubbing, or edema  Skin:  No rashes  Right 1st interspace fissure with erythema and edema of dorsal aspect of right hallux and 1st interspace  No fluctuance or active drainage  Neurological:  Moves all four extremities spontaneously    LABS, IMAGING, & OTHER STUDIES:  Lab Results:  I have personally reviewed pertinent labs  Results from last 7 days   Lab Units 06/30/20  0616 06/29/20  1438   POTASSIUM mmol/L 4 3 3 9   CHLORIDE mmol/L 108 110*   CO2 mmol/L 21 21   BUN mg/dL 11 8   CREATININE mg/dL 0 75 0 62   EGFR ml/min/1 73sq m 90 102   CALCIUM mg/dL 8 8 8 9   AST U/L  --  15   ALT U/L  --  24   ALK PHOS U/L  --  89     Results from last 7 days   Lab Units 06/30/20  0616 06/29/20  1438   WBC Thousand/uL 8 85 11 14*   HEMOGLOBIN g/dL 14 1 14 0   PLATELETS Thousands/uL 321 311     Results from last 7 days   Lab Units 06/29/20  1541   BLOOD CULTURE  Received in Microbiology Lab  Culture in Progress  Imaging Studies:   I have personally reviewed pertinent imaging study reports and images in PACS  Right foot x-ray shows no evidence of osteomyelitis  EKG, Pathology, and Other Studies:   I have personally reviewed pertinent reports

## 2020-06-30 NOTE — PLAN OF CARE
Problem: PAIN - ADULT  Goal: Verbalizes/displays adequate comfort level or baseline comfort level  Description  Interventions:  - Encourage patient to monitor pain and request assistance  - Assess pain using appropriate pain scale  - Administer analgesics based on type and severity of pain and evaluate response  - Implement non-pharmacological measures as appropriate and evaluate response  - Consider cultural and social influences on pain and pain management  - Notify physician/advanced practitioner if interventions unsuccessful or patient reports new pain  Outcome: Progressing     Problem: INFECTION - ADULT  Goal: Absence or prevention of progression during hospitalization  Description  INTERVENTIONS:  - Assess and monitor for signs and symptoms of infection  - Monitor lab/diagnostic results  - Monitor all insertion sites, i e  indwelling lines, tubes, and drains  - Monitor endotracheal if appropriate and nasal secretions for changes in amount and color  - Kimberly appropriate cooling/warming therapies per order  - Administer medications as ordered  - Instruct and encourage patient and family to use good hand hygiene technique  - Identify and instruct in appropriate isolation precautions for identified infection/condition  Outcome: Progressing  Goal: Absence of fever/infection during neutropenic period  Description  INTERVENTIONS:  - Monitor WBC    Outcome: Progressing     Problem: SAFETY ADULT  Goal: Patient will remain free of falls  Description  INTERVENTIONS:  - Assess patient frequently for physical needs  -  Identify cognitive and physical deficits and behaviors that affect risk of falls    -  Kimberly fall precautions as indicated by assessment   - Educate patient/family on patient safety including physical limitations  - Instruct patient to call for assistance with activity based on assessment  - Modify environment to reduce risk of injury  - Consider OT/PT consult to assist with strengthening/mobility  Outcome: Progressing  Goal: Maintain or return to baseline ADL function  Description  INTERVENTIONS:  -  Assess patient's ability to carry out ADLs; assess patient's baseline for ADL function and identify physical deficits which impact ability to perform ADLs (bathing, care of mouth/teeth, toileting, grooming, dressing, etc )  - Assess/evaluate cause of self-care deficits   - Assess range of motion  - Assess patient's mobility; develop plan if impaired  - Assess patient's need for assistive devices and provide as appropriate  - Encourage maximum independence but intervene and supervise when necessary  - Involve family in performance of ADLs  - Assess for home care needs following discharge   - Consider OT consult to assist with ADL evaluation and planning for discharge  - Provide patient education as appropriate  Outcome: Progressing  Goal: Maintain or return mobility status to optimal level  Description  INTERVENTIONS:  - Assess patient's baseline mobility status (ambulation, transfers, stairs, etc )    - Identify cognitive and physical deficits and behaviors that affect mobility  - Identify mobility aids required to assist with transfers and/or ambulation (gait belt, sit-to-stand, lift, walker, cane, etc )  - [place] fall precautions as indicated by assessment  - Record patient progress and toleration of activity level on Mobility SBAR; progress patient to next Phase/Stage  - Instruct patient to call for assistance with activity based on assessment  - Consider rehabilitation consult to assist with strengthening/weightbearing, etc   Outcome: Progressing     Problem: DISCHARGE PLANNING  Goal: Discharge to home or other facility with appropriate resources  Description  INTERVENTIONS:  - Identify barriers to discharge w/patient and caregiver  - Arrange for needed discharge resources and transportation as appropriate  - Identify discharge learning needs (meds, wound care, etc )  - Arrange for interpretive services to assist at discharge as needed  - Refer to Case Management Department for coordinating discharge planning if the patient needs post-hospital services based on physician/advanced practitioner order or complex needs related to functional status, cognitive ability, or social support system  Outcome: Progressing     Problem: Knowledge Deficit  Goal: Patient/family/caregiver demonstrates understanding of disease process, treatment plan, medications, and discharge instructions  Description  Complete learning assessment and assess knowledge base  Interventions:  - Provide teaching at level of understanding  - Provide teaching via preferred learning methods  Outcome: Progressing     Problem: Nutrition/Hydration-ADULT  Goal: Nutrient/Hydration intake appropriate for improving, restoring or maintaining nutritional needs  Description  Monitor and assess patient's nutrition/hydration status for malnutrition  Collaborate with interdisciplinary team and initiate plan and interventions as ordered  Monitor patient's weight and dietary intake as ordered or per policy  Utilize nutrition screening tool and intervene as necessary  Determine patient's food preferences and provide high-protein, high-caloric foods as appropriate       INTERVENTIONS:  - Monitor oral intake, urinary output, labs, and treatment plans  - Assess nutrition and hydration status and recommend course of action  - Evaluate amount of meals eaten  - Assist patient with eating if necessary   - Allow adequate time for meals  - Recommend/ encourage appropriate diets, oral nutritional supplements, and vitamin/mineral supplements  - Order, calculate, and assess calorie counts as needed  - Recommend, monitor, and adjust tube feedings and TPN/PPN based on assessed needs  - Assess need for intravenous fluids  - Provide specific nutrition/hydration education as appropriate  - Include patient/family/caregiver in decisions related to nutrition  Outcome: Progressing     Problem: SKIN/TISSUE INTEGRITY - ADULT  Goal: Skin integrity remains intact  Description  INTERVENTIONS  - Identify patients at risk for skin breakdown  - Assess and monitor skin integrity  - Assess and monitor nutrition and hydration status  - Monitor labs (i e  albumin)  - Assess for incontinence   - Turn and reposition patient  - Assist with mobility/ambulation  - Relieve pressure over bony prominences  - Avoid friction and shearing  - Provide appropriate hygiene as needed including keeping skin clean and dry  - Evaluate need for skin moisturizer/barrier cream  - Collaborate with interdisciplinary team (i e  Nutrition, Rehabilitation, etc )   - Patient/family teaching  Outcome: Progressing  Goal: Incision(s), wounds(s) or drain site(s) healing without S/S of infection  Description  INTERVENTIONS  - Assess and document risk factors for skin impairment   - Assess and document dressing, incision, wound bed, drain sites and surrounding tissue  - Consider nutrition services referral as needed  - Oral mucous membranes remain intact  - Provide patient/ family education  Outcome: Progressing  Goal: Oral mucous membranes remain intact  Description  INTERVENTIONS  - Assess oral mucosa and hygiene practices  - Implement preventative oral hygiene regimen  - Implement oral medicated treatments as ordered  - Initiate Nutrition services referral as needed  Outcome: Progressing     Problem: MUSCULOSKELETAL - ADULT  Goal: Maintain or return mobility to safest level of function  Description  INTERVENTIONS:  - Assess patient's ability to carry out ADLs; assess patient's baseline for ADL function and identify physical deficits which impact ability to perform ADLs (bathing, care of mouth/teeth, toileting, grooming, dressing, etc )  - Assess/evaluate cause of self-care deficits   - Assess range of motion  - Assess patient's mobility  - Assess patient's need for assistive devices and provide as appropriate  - Encourage maximum independence but intervene and supervise when necessary  - Involve family in performance of ADLs  - Assess for home care needs following discharge   - Consider OT consult to assist with ADL evaluation and planning for discharge  - Provide patient education as appropriate  Outcome: Progressing  Goal: Maintain proper alignment of affected body part  Description  INTERVENTIONS:  - Support, maintain and protect limb and body alignment  - Provide patient/ family with appropriate education  Outcome: Progressing     Problem: Potential for Falls  Goal: Patient will remain free of falls  Description  INTERVENTIONS:  - Assess patient frequently for physical needs  -  Identify cognitive and physical deficits and behaviors that affect risk of falls    -  [place] fall precautions as indicated by assessment   - Educate patient/family on patient safety including physical limitations  - Instruct patient to call for assistance with activity based on assessment  - Modify environment to reduce risk of injury  - Consider OT/PT consult to assist with strengthening/mobility  Outcome: Progressing

## 2020-06-30 NOTE — UTILIZATION REVIEW
Notification of Inpatient Admission/Inpatient Authorization Request   This is a Notification of Inpatient Admission for [address]  Be advised that this patient was admitted to our facility under Inpatient Status  Contact Gina Nichole at [phone] for additional admission information  [address] [place] [address] DEPT  DEDICATED [phone]  Patient Name:   Nelly Ferrer   YOB: 1965       [address]   [address]:   [facility]  Tax ID: [id number]  NPI: [id number] Attending Provider/NPI:  Phone:  Address: Army Vineet Given [[id number]]  [phone]  Same as Facility   Place of Service Code: 24     Place of Service Name:  [address] [place] [address]   Start Date: 6/29/20 1117 Discharge Date & Time: No discharge date for patient encounter  Type of Admission: Inpatient Status Discharge Disposition (if discharged): Home/Self Care   Patient Diagnoses: Cellulitis of foot, right [Y84 375]     Orders: Admission Orders (From admission, onward)     Ordered        06/29/20 1407  Inpatient Admission  Once                    Assigned Utilization Review Contact: Gina Nichole  Utilization ,   Network Utilization Review Department  Phone: [phone]; Fax [phone] Email: [email or web address]   ATTENTION PAYERS: Please call the assigned Utilization  directly with any questions or concerns ALL voicemails in the department are confidential  Send all requests for admission clinical reviews, approved or denied determinations and any other requests to dedicated fax number belonging to the campus where the patient is receiving treatment

## 2020-07-01 ENCOUNTER — APPOINTMENT (INPATIENT)
Dept: RADIOLOGY | Facility: HOSPITAL | Age: 55
DRG: 574 | End: 2020-07-01
Payer: COMMERCIAL

## 2020-07-01 ENCOUNTER — ANESTHESIA (INPATIENT)
Dept: PERIOP | Facility: HOSPITAL | Age: 55
DRG: 574 | End: 2020-07-01
Payer: COMMERCIAL

## 2020-07-01 ENCOUNTER — ANESTHESIA EVENT (INPATIENT)
Dept: PERIOP | Facility: HOSPITAL | Age: 55
DRG: 574 | End: 2020-07-01
Payer: COMMERCIAL

## 2020-07-01 LAB
ATRIAL RATE: 87 BPM
ERYTHROCYTE [DISTWIDTH] IN BLOOD BY AUTOMATED COUNT: 14.6 % (ref 11.6–15.1)
HCT VFR BLD AUTO: 41 % (ref 34.8–46.1)
HGB BLD-MCNC: 13.5 G/DL (ref 11.5–15.4)
MCH RBC QN AUTO: 32.3 PG (ref 26.8–34.3)
MCHC RBC AUTO-ENTMCNC: 32.9 G/DL (ref 31.4–37.4)
MCV RBC AUTO: 98 FL (ref 82–98)
P AXIS: 55 DEGREES
PLATELET # BLD AUTO: 334 THOUSANDS/UL (ref 149–390)
PMV BLD AUTO: 9.3 FL (ref 8.9–12.7)
PR INTERVAL: 200 MS
QRS AXIS: 66 DEGREES
QRSD INTERVAL: 80 MS
QT INTERVAL: 374 MS
QTC INTERVAL: 450 MS
RBC # BLD AUTO: 4.18 MILLION/UL (ref 3.81–5.12)
T WAVE AXIS: 49 DEGREES
VENTRICULAR RATE: 87 BPM
WBC # BLD AUTO: 9.57 THOUSAND/UL (ref 4.31–10.16)

## 2020-07-01 PROCEDURE — 87185 SC STD ENZYME DETCJ PER NZM: CPT | Performed by: PODIATRIST

## 2020-07-01 PROCEDURE — 87075 CULTR BACTERIA EXCEPT BLOOD: CPT | Performed by: PODIATRIST

## 2020-07-01 PROCEDURE — 85027 COMPLETE CBC AUTOMATED: CPT | Performed by: STUDENT IN AN ORGANIZED HEALTH CARE EDUCATION/TRAINING PROGRAM

## 2020-07-01 PROCEDURE — 0JBQ0ZZ EXCISION OF RIGHT FOOT SUBCUTANEOUS TISSUE AND FASCIA, OPEN APPROACH: ICD-10-PCS | Performed by: PODIATRIST

## 2020-07-01 PROCEDURE — 28003 TREATMENT OF FOOT INFECTION: CPT | Performed by: PODIATRIST

## 2020-07-01 PROCEDURE — NC001 PR NO CHARGE: Performed by: PODIATRIST

## 2020-07-01 PROCEDURE — 87070 CULTURE OTHR SPECIMN AEROBIC: CPT | Performed by: PODIATRIST

## 2020-07-01 PROCEDURE — 87076 CULTURE ANAEROBE IDENT EACH: CPT | Performed by: PODIATRIST

## 2020-07-01 PROCEDURE — A9585 GADOBUTROL INJECTION: HCPCS | Performed by: PODIATRIST

## 2020-07-01 PROCEDURE — 87205 SMEAR GRAM STAIN: CPT | Performed by: PODIATRIST

## 2020-07-01 PROCEDURE — A6550 NEG PRES WOUND THER DRSG SET: HCPCS | Performed by: PODIATRIST

## 2020-07-01 PROCEDURE — 73720 MRI LWR EXTREMITY W/O&W/DYE: CPT

## 2020-07-01 PROCEDURE — 93010 ELECTROCARDIOGRAM REPORT: CPT | Performed by: INTERNAL MEDICINE

## 2020-07-01 RX ORDER — METOCLOPRAMIDE HYDROCHLORIDE 5 MG/ML
INJECTION INTRAMUSCULAR; INTRAVENOUS AS NEEDED
Status: DISCONTINUED | OUTPATIENT
Start: 2020-07-01 | End: 2020-07-01 | Stop reason: SURG

## 2020-07-01 RX ORDER — METOCLOPRAMIDE HYDROCHLORIDE 5 MG/ML
10 INJECTION INTRAMUSCULAR; INTRAVENOUS ONCE AS NEEDED
Status: DISCONTINUED | OUTPATIENT
Start: 2020-07-01 | End: 2020-07-01 | Stop reason: HOSPADM

## 2020-07-01 RX ORDER — LIDOCAINE HYDROCHLORIDE 10 MG/ML
INJECTION, SOLUTION EPIDURAL; INFILTRATION; INTRACAUDAL; PERINEURAL AS NEEDED
Status: DISCONTINUED | OUTPATIENT
Start: 2020-07-01 | End: 2020-07-01 | Stop reason: HOSPADM

## 2020-07-01 RX ORDER — GABAPENTIN 300 MG/1
300 CAPSULE ORAL ONCE
Status: COMPLETED | OUTPATIENT
Start: 2020-07-01 | End: 2020-07-01

## 2020-07-01 RX ORDER — ONDANSETRON 2 MG/ML
4 INJECTION INTRAMUSCULAR; INTRAVENOUS ONCE AS NEEDED
Status: DISCONTINUED | OUTPATIENT
Start: 2020-07-01 | End: 2020-07-01 | Stop reason: HOSPADM

## 2020-07-01 RX ORDER — EPHEDRINE SULFATE 50 MG/ML
INJECTION INTRAVENOUS AS NEEDED
Status: DISCONTINUED | OUTPATIENT
Start: 2020-07-01 | End: 2020-07-01 | Stop reason: SURG

## 2020-07-01 RX ORDER — FENTANYL CITRATE 50 UG/ML
INJECTION, SOLUTION INTRAMUSCULAR; INTRAVENOUS AS NEEDED
Status: DISCONTINUED | OUTPATIENT
Start: 2020-07-01 | End: 2020-07-01 | Stop reason: SURG

## 2020-07-01 RX ORDER — ONDANSETRON 2 MG/ML
INJECTION INTRAMUSCULAR; INTRAVENOUS AS NEEDED
Status: DISCONTINUED | OUTPATIENT
Start: 2020-07-01 | End: 2020-07-01 | Stop reason: SURG

## 2020-07-01 RX ORDER — HYDROMORPHONE HCL/PF 1 MG/ML
0.5 SYRINGE (ML) INJECTION
Status: DISCONTINUED | OUTPATIENT
Start: 2020-07-01 | End: 2020-07-01 | Stop reason: HOSPADM

## 2020-07-01 RX ORDER — LIDOCAINE HYDROCHLORIDE 10 MG/ML
INJECTION, SOLUTION EPIDURAL; INFILTRATION; INTRACAUDAL; PERINEURAL AS NEEDED
Status: DISCONTINUED | OUTPATIENT
Start: 2020-07-01 | End: 2020-07-01 | Stop reason: SURG

## 2020-07-01 RX ORDER — PROPOFOL 10 MG/ML
INJECTION, EMULSION INTRAVENOUS AS NEEDED
Status: DISCONTINUED | OUTPATIENT
Start: 2020-07-01 | End: 2020-07-01 | Stop reason: SURG

## 2020-07-01 RX ORDER — SODIUM CHLORIDE, SODIUM LACTATE, POTASSIUM CHLORIDE, CALCIUM CHLORIDE 600; 310; 30; 20 MG/100ML; MG/100ML; MG/100ML; MG/100ML
INJECTION, SOLUTION INTRAVENOUS CONTINUOUS PRN
Status: DISCONTINUED | OUTPATIENT
Start: 2020-07-01 | End: 2020-07-01 | Stop reason: SURG

## 2020-07-01 RX ORDER — BUPIVACAINE HYDROCHLORIDE 5 MG/ML
INJECTION, SOLUTION PERINEURAL AS NEEDED
Status: DISCONTINUED | OUTPATIENT
Start: 2020-07-01 | End: 2020-07-01 | Stop reason: HOSPADM

## 2020-07-01 RX ADMIN — CEFEPIME HYDROCHLORIDE 2000 MG: 2 INJECTION, POWDER, FOR SOLUTION INTRAVENOUS at 04:03

## 2020-07-01 RX ADMIN — ACETAMINOPHEN 650 MG: 325 TABLET ORAL at 11:25

## 2020-07-01 RX ADMIN — FENTANYL CITRATE 50 MCG: 50 INJECTION, SOLUTION INTRAMUSCULAR; INTRAVENOUS at 14:48

## 2020-07-01 RX ADMIN — HYDROMORPHONE HYDROCHLORIDE 0.5 MG: 1 INJECTION, SOLUTION INTRAMUSCULAR; INTRAVENOUS; SUBCUTANEOUS at 16:10

## 2020-07-01 RX ADMIN — SODIUM CHLORIDE, SODIUM LACTATE, POTASSIUM CHLORIDE, AND CALCIUM CHLORIDE 100 ML/HR: .6; .31; .03; .02 INJECTION, SOLUTION INTRAVENOUS at 00:03

## 2020-07-01 RX ADMIN — ACETAMINOPHEN 650 MG: 325 TABLET ORAL at 17:10

## 2020-07-01 RX ADMIN — LIDOCAINE HYDROCHLORIDE 50 MG: 10 INJECTION, SOLUTION EPIDURAL; INFILTRATION; INTRACAUDAL; PERINEURAL at 14:48

## 2020-07-01 RX ADMIN — ONDANSETRON 4 MG: 2 INJECTION INTRAMUSCULAR; INTRAVENOUS at 15:25

## 2020-07-01 RX ADMIN — SODIUM CHLORIDE, SODIUM LACTATE, POTASSIUM CHLORIDE, AND CALCIUM CHLORIDE: .6; .31; .03; .02 INJECTION, SOLUTION INTRAVENOUS at 14:42

## 2020-07-01 RX ADMIN — GADOBUTROL 8 ML: 604.72 INJECTION INTRAVENOUS at 03:20

## 2020-07-01 RX ADMIN — EPHEDRINE SULFATE 10 MG: 50 INJECTION, SOLUTION INTRAVENOUS at 15:15

## 2020-07-01 RX ADMIN — GABAPENTIN 300 MG: 300 CAPSULE ORAL at 21:44

## 2020-07-01 RX ADMIN — ACETAMINOPHEN 650 MG: 325 TABLET ORAL at 05:50

## 2020-07-01 RX ADMIN — PROPOFOL 200 MG: 10 INJECTION, EMULSION INTRAVENOUS at 14:48

## 2020-07-01 RX ADMIN — FENTANYL CITRATE 50 MCG: 50 INJECTION, SOLUTION INTRAMUSCULAR; INTRAVENOUS at 15:03

## 2020-07-01 RX ADMIN — ACETAMINOPHEN 650 MG: 325 TABLET ORAL at 00:03

## 2020-07-01 RX ADMIN — METOCLOPRAMIDE 10 MG: 5 INJECTION, SOLUTION INTRAMUSCULAR; INTRAVENOUS at 15:25

## 2020-07-01 RX ADMIN — METRONIDAZOLE 500 MG: 500 TABLET ORAL at 05:50

## 2020-07-01 RX ADMIN — METRONIDAZOLE 500 MG: 500 TABLET ORAL at 21:44

## 2020-07-01 NOTE — QUICK NOTE
Plan for OR today with primary team  Continue IV abx for cellulitis as per primary team   Seen by attending for OR clearance 6/30, low risk  Will see postop day 1  Please call with questions/concerns  SHAILA Wesley

## 2020-07-01 NOTE — ASSESSMENT & PLAN NOTE
Patient does not have any known cardiac or pulmonary disease  Renal in liver function stable on labs  Denies any problems anesthesia in the past   Denies any chest pain or shortness of breath ever  Patient can proceed to OR with low risk of perioperative medical complications for a low risk surgery

## 2020-07-01 NOTE — ANESTHESIA PREPROCEDURE EVALUATION
Review of Systems/Medical History  Patient summary reviewed  Chart reviewed  No history of anesthetic complications     Cardiovascular  Negative cardio ROS    Pulmonary  Smoker cigarette smoker  ,        GI/Hepatic  Negative GI/hepatic ROS          Kidney stones,        Endo/Other  Negative endo/other ROS      GYN  Negative gynecology ROS          Hematology  Negative hematology ROS      Musculoskeletal    Arthritis     Neurology    Headaches,    Psychology   Depression ,              Physical Exam    Airway    Mallampati score: II  TM Distance: >3 FB  Neck ROM: full     Dental       Cardiovascular  Comment: Negative ROS,     Pulmonary      Other Findings        Anesthesia Plan  ASA Score- 2     Anesthesia Type- general with ASA Monitors  Additional Monitors:   Airway Plan: LMA  Plan Factors-    Induction- intravenous  Postoperative Plan-     Informed Consent- Anesthetic plan and risks discussed with patient  I personally reviewed this patient with the CRNA  Discussed and agreed on the Anesthesia Plan with the CRNA  Dionne Neville

## 2020-07-01 NOTE — PROGRESS NOTES
[facility] Podiatry - Progress Note  Patient: Val Blandon 54 y o  female   MRN: [id number]  PCP: No primary care provider on file  Unit/Bed#: -01 Encounter: [id number]  Date Of Visit: 20    ASSESSMENT:    Val Blandon is a 54 y o  female with:    1  Cellulitis right foot   2  Right foot abscess      PLAN:    · MRI shows no deep abscess or OM  · She is planned to go to OR  time unknown  · Discussed the necessity of this procedure with risks and benefits discussed  · No leukocytosis  · Uric acid normal  · Continue pain regimen   · NPO since midnight  · Medical clearance obtained  · covid negative  · Rest of care per primary team        SUBJECTIVE:     The patient was seen, evaluated, and assessed at bedside today  The patient was awake, alert, and in no acute distress  No acute events overnight  The patient reports pain is better since change in medications  She is very uncomfortable with not eating for this amount of time  She states she will get a headache and is worried she will vomit if she does not get her coffee  She was informed of the importance of staying NPO and that we are working to get an OR time  She  Patient denies N/V/F/chills/SOB/CP  OBJECTIVE:     Vitals:   /90   Pulse 80   Temp (!) 97 3 °F (36 3 °C)   Resp 15   Ht 5' 1" (1 549 m)   Wt 84 8 kg (186 lb 15 2 oz)   SpO2 99%   BMI 35 32 kg/m²     Temp (24hrs), Av °F (36 7 °C), Min:97 3 °F (36 3 °C), Max:98 8 °F (37 1 °C)      Physical Exam :     General:  Alert, cooperative, and in no distress  Lower extremity exam:  Cardiovascular status at baseline  Neurological status at baseline  Musculoskeletal status at baseline  No calf tenderness noted bilaterally  Right dorsal hallux skin nodule larger than 6/30  White central spot noticed   Severe pain with manipulation and palpation of the digit        Additional Data:     Labs:    Results from last 7 days   Lab Units 20  0503  20  1438   WBC Thousand/uL 9 57   < > 11 14*   HEMOGLOBIN g/dL 13 5   < > 14 0   HEMATOCRIT % 41 0   < > 41 8   PLATELETS Thousands/uL 334   < > 311   NEUTROS PCT %  --   --  57   LYMPHS PCT %  --   --  33   MONOS PCT %  --   --  9   EOS PCT %  --   --  1    < > = values in this interval not displayed  Results from last 7 days   Lab Units 06/30/20  0616 06/29/20  1438   POTASSIUM mmol/L 4 3 3 9   CHLORIDE mmol/L 108 110*   CO2 mmol/L 21 21   BUN mg/dL 11 8   CREATININE mg/dL 0 75 0 62   CALCIUM mg/dL 8 8 8 9   ALK PHOS U/L  --  89   ALT U/L  --  24   AST U/L  --  15           * I Have Reviewed All Lab Data Listed Above  Recent Cultures (last 7 days):     Results from last 7 days   Lab Units 06/29/20  1541   BLOOD CULTURE  No Growth at 24 hrs  Imaging: I have personally reviewed pertinent films in PACS  Pathology, and Other Studies: I have personally reviewed pertinent reports  ** Please Note: Portions of the record may have been created with voice recognition software  Occasional wrong word or "sound a like" substitutions may have occurred due to the inherent limitations of voice recognition software  Read the chart carefully and recognize, using context, where substitutions have occurred   **

## 2020-07-01 NOTE — CONSULTS
Consult- Nimisha Warner 1965, 54 y o  female MRN: [id number]    Unit/Bed#: -01 Encounter: [id number]    Primary Care Provider: No primary care provider on file  Date and time admitted to hospital: 6/29/2020 11:17 AM      Inpatient consult to Internal Medicine  Consult performed by: Ying Cabello MD  Consult ordered by: Karen Aguilera DPM          * Cellulitis of great toe of right foot  Assessment & Plan  Management per primary Podiatry team   Plan for OR tomorrow  Antibiotics per ID  Preoperative clearance  Assessment & Plan  Patient does not have any known cardiac or pulmonary disease  Renal in liver function stable on labs  Denies any problems anesthesia in the past   Denies any chest pain or shortness of breath ever  Patient can proceed to OR with low risk of perioperative medical complications for a low risk surgery  VTE Prophylaxis: Heparin  / sequential compression device     Recommendations for Discharge:  Per primary Podiatry Service    Counseling / Coordination of Care Time: 30 minutes  Greater than 50% of total time spent on patient counseling and coordination of care  Collaboration of Care: Were Recommendations Directly Discussed with Primary Treatment Team? - Yes     History of Present Illness:    Nimisha Warner is a 54 y o  female who is originally admitted to the podiatry service on 6/29/2020 due to right great toe cellulitis/abscess  Does not have any significant chronic medical problems  We are consulted for preoperative clearance  Patient currently complaining of pain in the right foot and mild headache  No other complaints  Does not have any known heart or lung problems  Never any problems with anesthesia from previous surgeries  Review of Systems:    Review of Systems   Constitutional: Negative for chills and fever  HENT: Negative for congestion and sore throat  Respiratory: Negative for cough and shortness of breath  Cardiovascular: Negative for chest pain and palpitations  Gastrointestinal: Negative for abdominal pain, nausea and vomiting  Genitourinary: Negative for difficulty urinating, dysuria, enuresis, flank pain, frequency and urgency  Musculoskeletal: Positive for arthralgias  Skin: Positive for color change  Negative for pallor  Neurological: Positive for headaches  Negative for dizziness and light-headedness  Psychiatric/Behavioral: Negative for agitation, behavioral problems and confusion  Past Medical and Surgical History:     Past Medical History:   Diagnosis Date    Acquired deformity of left foot     Anesthesia complication     difficulty awakening    Arthritis     Deaf, left     Depression     Hallux valgus of left foot     Hammer toe of left foot     Headache     Kidney stone     Sciatic pain, right     intermittent       Past Surgical History:   Procedure Laterality Date    CHOLECYSTECTOMY      HERNIA REPAIR      [place] OSTEOTOMY METATARSAL (NOT 1ST) Left 6/14/2019    Procedure: 3rd Metatarsal Osteotomy;  Surgeon: Dylan Oconnell DPM;  Location: [facility];  Service: Podiatry    TUBAL LIGATION         Meds/Allergies:    all medications and allergies reviewed    Allergies:    Allergies   Allergen Reactions    Caffeine      Blisters in mouth-        Social History:     Marital Status: Single    Substance Use History:   Social History     Substance and Sexual Activity   Alcohol Use Yes    Frequency: 2-4 times a month     Social History     Tobacco Use   Smoking Status Current Every Day Smoker    Packs/day: 1 00    Years: 15 00    Pack years: 15 00    Types: Cigarettes   Smokeless Tobacco Never Used     Social History     Substance and Sexual Activity   Drug Use No       Family History:    Family History   Problem Relation Age of Onset    Arthritis Family        Physical Exam:     Vitals:   Blood Pressure: 140/99 (06/30/20 1532)  Pulse: 102 (06/30/20 1532)  Temperature: 98 8 °F (37 1 °C) (06/30/20 1532)  Respirations: 18 (06/30/20 1532)  Height: 5' 1" (154 9 cm) (06/29/20 1130)  Weight - Scale: 84 8 kg (186 lb 15 2 oz) (06/29/20 1130)  SpO2: 94 % (06/30/20 1532)    Physical Exam    Constitutional: Pt appears comfortable  Not in any acute distress  HENT:   Head: Normocephalic and atraumatic  Eyes: EOM are normal    Neck: Neck supple  Cardiovascular: Normal rate, regular rhythm, normal heart sounds  No murmur heard  Pulmonary/Chest: Effort normal, air entry b/l equal  No respiratory distress  Pt has no wheezes or crackles  Abdominal: Soft  Non-distended, Non-tender  Bowel sounds are normal    Rt foot - redness and swelling at the base of the right toe, tenderness to palpation  Neurological: awake, alert  Moving all extremities spontaneously  Psychiatric: normal mood and affect  Additional Data:     Lab Results: I have personally reviewed pertinent reports  Results from last 7 days   Lab Units 06/30/20  0616 06/29/20  1438   WBC Thousand/uL 8 85 11 14*   HEMOGLOBIN g/dL 14 1 14 0   HEMATOCRIT % 43 2 41 8   PLATELETS Thousands/uL 321 311   NEUTROS PCT %  --  57   LYMPHS PCT %  --  33   MONOS PCT %  --  9   EOS PCT %  --  1     Results from last 7 days   Lab Units 06/30/20  0616 06/29/20  1438   POTASSIUM mmol/L 4 3 3 9   CHLORIDE mmol/L 108 110*   CO2 mmol/L 21 21   BUN mg/dL 11 8   CREATININE mg/dL 0 75 0 62   CALCIUM mg/dL 8 8 8 9   ALK PHOS U/L  --  89   ALT U/L  --  24   AST U/L  --  15           Imaging: I have personally reviewed pertinent reports  Xr Foot 3+ Vw Right    Result Date: 6/30/2020  Narrative: RIGHT FOOT INDICATION:   right 1st interspace ulcer; cellulitis  COMPARISON:  None VIEWS:  XR FOOT 3+ VW RIGHT Images: 3 FINDINGS: There is no acute fracture or dislocation  Severe hallux valgus  Small calcaneal spur noted  No periosteal reaction or cortical destruction to suggest osteomyelitis  Soft tissues are unremarkable       Impression: No radiographic evidence of osteomyelitis  Degenerative changes  Workstation performed: [id number]       EKG, Pathology, and Other Studies Reviewed on Admission:   · EKG:     ** Please Note: This note has been constructed using a voice recognition system   **

## 2020-07-01 NOTE — ANESTHESIA POSTPROCEDURE EVALUATION
Post-Op Assessment Note    CV Status:  Stable  Pain Score: 0    Pain management: adequate     Mental Status:  Alert and awake   Hydration Status:  Euvolemic   PONV Controlled:  Controlled   Airway Patency:  Patent   Post Op Vitals Reviewed: Yes      Staff: CRNA   Comments: report to RN          BP (P) 119/73 (07/01/20 1543)    Temp (P) 97 9 °F (36 6 °C) (07/01/20 1543)    Pulse (!) (P) 109 (07/01/20 1543)   Resp (P) 15 (07/01/20 1543)    SpO2 (P) 97 % (07/01/20 1543)

## 2020-07-01 NOTE — SOCIAL WORK
Patient is not a bundle  Patient is a 30 day readmission  Cm met with patient to review role CM  Patient reported that she lives with her boyfriend Jacquie Soler and that her emergency contact is Marilin Ellis, [phone]  Patient reported that they live in a 3 story home with 8-9 steps with rails  Patient reported that bedroom/bathroom on 1st floor  Patient was independent with ADLS PTA  Patient denied having any inpatient PT/OT, inpatient MH, substance abuse treatment or [facility] services  Patient reported that she has a RW and cane at home  Patient reported that pharmacy of choice is [facility] on [address]  Patient's PCP is [facility]  CM reviewed d/c planning process including the following: identifying help at home, patient preference for d/c planning needs, Discharge Lounge, [facility] Meds to Bed program, availability of treatment team to discuss questions or concerns patient and/or family may have regarding understanding medications and recognizing signs and symptoms once discharged  CM also encouraged patient to follow up with all recommended appointments after discharge  Patient advised of importance for patient and family to participate in managing patients medical well being

## 2020-07-01 NOTE — OP NOTE
OPERATIVE REPORT - Podiatry  PATIENT NAME: Pari Abrams    :  1965  MRN: [id number]  Pt Location: [facility] OR ROOM 03    SURGERY DATE: 2020    Surgeon(s) and Role:     * Connor Calle DPM - Primary     * Verena Chung DPM - Assisting    Pre-op Diagnosis:  Wound of right foot [S91 301A]  Cellulitis of great toe of right foot [L03 031]    Post-Op Diagnosis Codes:     * Wound of right foot [S91 301A]     * Cellulitis of great toe of right foot [L03 031]    Procedure(s) (LRB):  INCISION AND DRAINAGE (I&D) EXTREMITY (Right)  APPLICATION VAC DRESSING (Right)    Specimen(s):  ID Type Source Tests Collected by Time Destination   A : RIGHT FOOT abscess Wound Wound ANAEROBIC CULTURE AND GRAM STAIN, WOUND CULTURE Connor Calle DPM 2020 1506        Estimated Blood Loss:   Minimal    Drains:  Negative Pressure Wound Therapy (V A C ) Foot Right (Active)   Black foam- # applied 2 2020  3:31 PM   Other- # applied 1 2020  3:31 PM   Dressing Status Clean;Dry; Intact 2020 12:00 AM   Number of days: 0       Anesthesia Type:   Choice with 10 ml of 1% Lidocaine and 0 5% Bupivacaine in a 1:1 mixture,3 cc's of 1% lidocaine plain and 7 ccs n0 5 % bupivicaine plain    Hemostasis:  Surgical dissection  Pneumatic ankle tourniquet placed for 18 minutes    Materials:  Wound VAC application  1  Number of sponges: 2  2  Pressure settin continuous  3  Size of wound: 6 x 1x 1 0 cm   4  Description of wound: underlying subcutaneous tissue with no exposed bone or capsule    Operative Findings:  Abscess noted of the right 1st interspace  Approximately 5 cc's of purulence noted and cultured  No underlying myonecrosis or fat necrosis noted  Complications:   None    Procedure and Technique:     Under mild sedation, the patient was brought into the operating room and placed on the operating room table in the supine position   A pneumatic tourniquet was then placed around the patient's right lower extremity with ample webril padding  A time out was performed to confirm the correct patient, procedure and site with all parties in agreement  Following IV sedation, a bain block was performed consisting of 10 ml of 1% Lidocaine and 0 5% Bupivacaine in a 1:1 mixture  The foot was then scrubbed, prepped and draped in the usual aseptic manner  An esmarch bandage was utilized to exsangunate the patients foot and the tourniquet was then inflated  The esmarch bandage was removed and the foot was placed on the operating room table  1st interspace was dried with a sponge and interspace draining sinus was noted  A #15 scalpel was utilized to make an incision along the central aspect of the abscess  Cultures were taken  Infection was noted of about 5 cc's  Debridement was performed to the level of subcutaneous tissue  Post debridement wound measured 6 0 cm x 1 0 cm x 1 0 cm  The right 1st interspace was debrided to the plantar sulcus  An esmarch was utilized to exsanguinate the right foot  The touriniquet was then inflated      The surgical incision was irrigated with copious amounts of normal sterile saline  The foot was then cleansed and dried  A postoperative injection consisting of 7 ml of 0 5% Bupivacaine was performed  The incision site was dressed with Wound Vac Dresing applied at 125mmHg, 4x4 gauze, and ABD  This was then covered with a Kerlix and an ACE wrap  The tourniquet was deflated and normal hyperemic flush was noted to all digits  The patient tolerated the procedure and anesthesia well and was transported to the PACU with vital signs stable  As with many limb salvage procedures, we contemplate the possibility of performing further stages to this procedure  Procedures may include debridements, delayed closure, plastic surgery techniques, or more proximal amputations  This procedure may be considered part of a multi-staged limb salvage treatment plan        Dr Bernadette Vanegas was present during the entire procedure and participated in all key aspects  SIGNATURE: Sandy Calhoun DPM  DATE: July 1, 2020  TIME: 3:37 PM      Portions of the record may have been created with voice recognition software  Occasional wrong word or "sound a like" substitutions may have occurred due to the inherent limitations of voice recognition software  Read the chart carefully and recognize, using context, where substitutions have occurred

## 2020-07-02 PROBLEM — Z72.0 TOBACCO ABUSE: Status: ACTIVE | Noted: 2020-07-02

## 2020-07-02 LAB
ERYTHROCYTE [DISTWIDTH] IN BLOOD BY AUTOMATED COUNT: 14.9 % (ref 11.6–15.1)
HCT VFR BLD AUTO: 40.2 % (ref 34.8–46.1)
HGB BLD-MCNC: 13 G/DL (ref 11.5–15.4)
MCH RBC QN AUTO: 31.6 PG (ref 26.8–34.3)
MCHC RBC AUTO-ENTMCNC: 32.3 G/DL (ref 31.4–37.4)
MCV RBC AUTO: 98 FL (ref 82–98)
PLATELET # BLD AUTO: 323 THOUSANDS/UL (ref 149–390)
PMV BLD AUTO: 9.3 FL (ref 8.9–12.7)
RBC # BLD AUTO: 4.11 MILLION/UL (ref 3.81–5.12)
WBC # BLD AUTO: 8.81 THOUSAND/UL (ref 4.31–10.16)

## 2020-07-02 PROCEDURE — 97167 OT EVAL HIGH COMPLEX 60 MIN: CPT

## 2020-07-02 PROCEDURE — 99233 SBSQ HOSP IP/OBS HIGH 50: CPT | Performed by: INTERNAL MEDICINE

## 2020-07-02 PROCEDURE — 99232 SBSQ HOSP IP/OBS MODERATE 35: CPT | Performed by: PHYSICIAN ASSISTANT

## 2020-07-02 PROCEDURE — NC001 PR NO CHARGE: Performed by: PODIATRIST

## 2020-07-02 PROCEDURE — 85027 COMPLETE CBC AUTOMATED: CPT | Performed by: PODIATRIST

## 2020-07-02 PROCEDURE — 97163 PT EVAL HIGH COMPLEX 45 MIN: CPT

## 2020-07-02 RX ORDER — ACETAMINOPHEN 325 MG/1
975 TABLET ORAL EVERY 6 HOURS SCHEDULED
Status: DISCONTINUED | OUTPATIENT
Start: 2020-07-02 | End: 2020-07-06 | Stop reason: HOSPADM

## 2020-07-02 RX ORDER — HYDROMORPHONE HCL/PF 1 MG/ML
0.5 SYRINGE (ML) INJECTION
Status: DISCONTINUED | OUTPATIENT
Start: 2020-07-02 | End: 2020-07-06 | Stop reason: HOSPADM

## 2020-07-02 RX ORDER — OXYCODONE HYDROCHLORIDE 10 MG/1
10 TABLET ORAL EVERY 4 HOURS PRN
Status: DISCONTINUED | OUTPATIENT
Start: 2020-07-02 | End: 2020-07-06 | Stop reason: HOSPADM

## 2020-07-02 RX ADMIN — HEPARIN SODIUM 5000 UNITS: 5000 INJECTION INTRAVENOUS; SUBCUTANEOUS at 13:30

## 2020-07-02 RX ADMIN — OXYCODONE HYDROCHLORIDE 10 MG: 10 TABLET ORAL at 20:41

## 2020-07-02 RX ADMIN — METRONIDAZOLE 500 MG: 500 TABLET ORAL at 05:52

## 2020-07-02 RX ADMIN — ACETAMINOPHEN 975 MG: 325 TABLET ORAL at 18:03

## 2020-07-02 RX ADMIN — ACETAMINOPHEN 975 MG: 325 TABLET ORAL at 11:19

## 2020-07-02 RX ADMIN — METRONIDAZOLE 500 MG: 500 TABLET ORAL at 21:57

## 2020-07-02 RX ADMIN — CEFEPIME HYDROCHLORIDE 2000 MG: 2 INJECTION, POWDER, FOR SOLUTION INTRAVENOUS at 15:04

## 2020-07-02 RX ADMIN — OXYCODONE HYDROCHLORIDE 5 MG: 5 TABLET ORAL at 03:35

## 2020-07-02 RX ADMIN — ACETAMINOPHEN 650 MG: 325 TABLET ORAL at 01:14

## 2020-07-02 RX ADMIN — OXYCODONE HYDROCHLORIDE 10 MG: 10 TABLET ORAL at 10:42

## 2020-07-02 RX ADMIN — ACETAMINOPHEN 650 MG: 325 TABLET ORAL at 05:51

## 2020-07-02 RX ADMIN — CEFEPIME HYDROCHLORIDE 2000 MG: 2 INJECTION, POWDER, FOR SOLUTION INTRAVENOUS at 03:35

## 2020-07-02 RX ADMIN — METRONIDAZOLE 500 MG: 500 TABLET ORAL at 13:31

## 2020-07-02 NOTE — ASSESSMENT & PLAN NOTE
· Patient presented with cellulitis of right foot, status post incision and drainage and wound VAC placement 7/1  · Follow-up intraoperative wound cultures   · Appreciate infectious disease input, continue cefepime and flagyl   · Wound care per primary, Podiatry  · Pain control, add Oxy 10 mg for severe pain and change IV Dilaudid to every 3 hours for breakthrough pain

## 2020-07-02 NOTE — OCCUPATIONAL THERAPY NOTE
Occupational Therapy Evaluation     Patient Name: Nimisha Warner  KQRVB'V Date: 7/2/2020  Problem List  Principal Problem:    Abscess of bursa of right foot  Active Problems:    Cellulitis of great toe of right foot    Wound of right foot    Preoperative clearance    Tobacco abuse    Past Medical History  Past Medical History:   Diagnosis Date    Acquired deformity of left foot     Anesthesia complication     difficulty awakening    Arthritis     Deaf, left     Depression     Hallux valgus of left foot     Hammer toe of left foot     Headache     Kidney stone     Sciatic pain, right     intermittent     Past Surgical History  Past Surgical History:   Procedure Laterality Date    CHOLECYSTECTOMY      HERNIA REPAIR      [place] OSTEOTOMY METATARSAL (NOT 1ST) Left 6/14/2019    Procedure: 3rd Metatarsal Osteotomy;  Surgeon: Jennifer Tomlin DPM;  Location: [facility];  Service: Podiatry    TUBAL LIGATION             07/02/20 0910   Note Type   Note type Eval/Treat   Restrictions/Precautions   Weight Bearing Precautions Per Order Yes   RLE Weight Bearing Per Order NWB   Other Precautions   (wound vac RLE)   Pain Assessment   Pain Assessment Tool 0-10   Home Living   Type of Home House   Home Layout Multi-level; Able to live on main level with bedroom/bathroom   Bathroom Shower/Tub Tub/shower unit   Bathroom Toilet Standard   Bathroom Equipment   (none)   [address] [place]   Additional Comments Pt has 9+ DONNIE from the front, but can go in through the back with 2-3 DONNIE  Bed and bath on 1st floor  Prior Function   Level of [place] Independent with ADLs and functional mobility   Lives With Significant other; Son   Yrn Help From Family   ADL Assistance Independent   IADLs Independent   Falls in the last 6 months 0   Vocational Full time employment   Comments Pt has access to a walker   Lifestyle   Autonomy Pt was I w/ ADLs, IADLs, and mobility PTA   She lives w/ s/o and son who are home to assist if needed  Reciprocal Relationships Lives w/ s/o and son   Service to Others Works full time in a [employer]   Intrinsic Gratification Pt independent PTA   Psychosocial   Psychosocial (WDL) WDL   ADL   Where Assessed Edge of bed   Eating Assistance 7  Independent   Grooming Assistance 4  Minimal Assistance   [address] 4  Minimal Assistance   LB [address] 3  Moderate Assistance   [address] 4  [address] 3  Moderate [address]  4  Minimal Assistance   Functional Assistance 4  Minimal Assistance   Bed Mobility   Supine to Sit 4  Minimal assistance   Additional items Assist x 1   Transfers   Sit to Stand 4  Minimal assistance   Additional items Assist x 1   Stand to Sit 4  Minimal assistance   Additional items Assist x 1   Toilet transfer 4  Minimal assistance   Additional items Assist x 1;Commode   Additional Comments Pt performed sit<>stand OOB and ambulated to the bathroom w/ Ax1 and RW  She performed toilet transfer w/ BSC over toilet  Functional Mobility   Functional Mobility 4  Minimal assistance   Additional Comments Ax1 w/ RW and wound vac   Balance   Static Sitting Fair +   Dynamic Sitting Fair   Static Standing Fair -   Dynamic Standing Fair -   Activity Tolerance   Activity Tolerance Patient limited by fatigue;Patient limited by pain   Medical Staff Made Aware PT, [address]    Nurse Made Aware Okay to see per RN   RUE Assessment   RUE Assessment WFL   LUE Assessment   LUE Assessment WFL   Hand Function   Gross Motor Coordination Functional   Fine Motor Coordination Functional   Cognition   Overall Cognitive Status WFL   Arousal/Participation Alert; Responsive; Cooperative   Attention Within functional limits   Orientation Level Oriented X4   Memory Within functional limits   Following Commands Follows all commands and directions without difficulty   Comments Pt pleasant and cooperative  Able to answer all questions appropriately  Assessment   Limitation Decreased ADL status; Decreased endurance;Decreased high-level ADLs; Decreased self-care trans   Prognosis Good   Assessment Pt is a 54 y o  female who was [facility] to [facility] on 6/29/2020 with Abscess of bursa of right foot  Her other comorbidities include tobacco abuse, acquired hallux valgus of L foot, and other L foot deformities  Pt is s/p incision and drainage w/ wound VAC placement on 7/1 and is NWB on RLE  At baseline pt was I w/ ADLs, IADLs, and mobility  She has access to a RW  Pt works full time in a [employer]  Pt lives with her s/o and son who are able to assist if/as needed  Currently pt requires S-MIN A for UB ADLs and mod A for LB ADLs  She is a min Ax1 for functional mobility/transfers w/ RW  Pt currently presents with impairments in the following categories -difficulty performing ADLS and difficulty performing IADLS  activity tolerance, endurance and standing balance/tolerance  These impairments, as well as pt's fatigue, pain, WBS  and risk for falls  limit pt's ability to safely engage in all baseline areas of occupation, including grooming, bathing, dressing, toileting, functional mobility/transfers, house maintenance and leisure activities  From OT standpoint, anticipate pt will progress to be able to return home w/ family support and home therapy as needed upon D/C  Will cont to monitor progress  OT will continue to follow to address the below stated goals  Goals   Patient Goals to get better   LTG Time Frame 7-10   Long Term Goal see below goals   Plan   Treatment Interventions ADL retraining;Functional transfer training;Patient/family training; Endurance training;Equipment evaluation/education; Compensatory technique education; Energy conservation   Goal Expiration Date 07/12/20   OT Frequency 3-5x/wk   Recommendation   OT Discharge Recommendation Home with skilled therapy  (pending progress)   OT - OK to Discharge No  (once medically stable)   Barthel Index   Feeding 10   Bathing 0   Grooming Score 0   Dressing Score 5   Bladder Score 10   Bowels Score 10   Toilet Use Score 5   Transfers (Bed/Chair) Score 10   Mobility (Level Surface) Score 0   Stairs Score 0   Barthel Index Score 50     LTG (7-10 DAYS)  Pt will improve activity tolerance to G for min 30 min txment sessions to increase participation in ADLs    Pt will complete ADLs/self care w/ mod I using adaptive device and DME as needed    Pt will complete toileting w/ mod I w/ G hygiene/thoroughness using DME as needed    Pt will improve functional transfers on/off all surfaces to mod I using DME as needed w/ G balance/safety  Pt will improve functional mobility during ADL/IADL/leisure tasks to mod I using DME as needed w/ G balance/safety  Pt will improve standing balance to G for 5-8 minutes of purposeful activity w/ G endurance      Pt will demonstrate G carryover of pt/caregiver education and training as appropriate w/ mod I w/o cues w/ good tolerance    Pt will demonstrate 100% carryover of energy conservation techniques w/ mod I t/o functional I/ADL/leisure tasks w/o cues s/p skilled education       Aba Lepe, OTR/L

## 2020-07-02 NOTE — PLAN OF CARE
Problem: PAIN - ADULT  Goal: Verbalizes/displays adequate comfort level or baseline comfort level  Description  Interventions:  - Encourage patient to monitor pain and request assistance  - Assess pain using appropriate pain scale  - Administer analgesics based on type and severity of pain and evaluate response  - Implement non-pharmacological measures as appropriate and evaluate response  - Consider cultural and social influences on pain and pain management  - Notify physician/advanced practitioner if interventions unsuccessful or patient reports new pain  Outcome: Progressing     Problem: INFECTION - ADULT  Goal: Absence or prevention of progression during hospitalization  Description  INTERVENTIONS:  - Assess and monitor for signs and symptoms of infection  - Monitor lab/diagnostic results  - Monitor all insertion sites, i e  indwelling lines, tubes, and drains  - Monitor endotracheal if appropriate and nasal secretions for changes in amount and color  - [place] appropriate cooling/warming therapies per order  - Administer medications as ordered  - Instruct and encourage patient and family to use good hand hygiene technique  - Identify and instruct in appropriate isolation precautions for identified infection/condition  Outcome: Progressing  Goal: Absence of fever/infection during neutropenic period  Description  INTERVENTIONS:  - Monitor WBC    Outcome: Progressing     Problem: SAFETY ADULT  Goal: Patient will remain free of falls  Description  INTERVENTIONS:  - Assess patient frequently for physical needs  -  Identify cognitive and physical deficits and behaviors that affect risk of falls    -  [place] fall precautions as indicated by assessment   - Educate patient/family on patient safety including physical limitations  - Instruct patient to call for assistance with activity based on assessment  - Modify environment to reduce risk of injury  - Consider OT/PT consult to assist with strengthening/mobility  Outcome: Progressing  Goal: Maintain or return to baseline ADL function  Description  INTERVENTIONS:  -  Assess patient's ability to carry out ADLs; assess patient's baseline for ADL function and identify physical deficits which impact ability to perform ADLs (bathing, care of mouth/teeth, toileting, grooming, dressing, etc )  - Assess/evaluate cause of self-care deficits   - Assess range of motion  - Assess patient's mobility; develop plan if impaired  - Assess patient's need for assistive devices and provide as appropriate  - Encourage maximum independence but intervene and supervise when necessary  - Involve family in performance of ADLs  - Assess for home care needs following discharge   - Consider OT consult to assist with ADL evaluation and planning for discharge  - Provide patient education as appropriate  Outcome: Progressing  Goal: Maintain or return mobility status to optimal level  Description  INTERVENTIONS:  - Assess patient's baseline mobility status (ambulation, transfers, stairs, etc )    - Identify cognitive and physical deficits and behaviors that affect mobility  - Identify mobility aids required to assist with transfers and/or ambulation (gait belt, sit-to-stand, lift, walker, cane, etc )  - [place] fall precautions as indicated by assessment  - Record patient progress and toleration of activity level on Mobility SBAR; progress patient to next Phase/Stage  - Instruct patient to call for assistance with activity based on assessment  - Consider rehabilitation consult to assist with strengthening/weightbearing, etc   Outcome: Progressing     Problem: DISCHARGE PLANNING  Goal: Discharge to home or other facility with appropriate resources  Description  INTERVENTIONS:  - Identify barriers to discharge w/patient and caregiver  - Arrange for needed discharge resources and transportation as appropriate  - Identify discharge learning needs (meds, wound care, etc )  - Arrange for interpretive services to assist at discharge as needed  - Refer to Case Management Department for coordinating discharge planning if the patient needs post-hospital services based on physician/advanced practitioner order or complex needs related to functional status, cognitive ability, or social support system  Outcome: Progressing     Problem: Knowledge Deficit  Goal: Patient/family/caregiver demonstrates understanding of disease process, treatment plan, medications, and discharge instructions  Description  Complete learning assessment and assess knowledge base  Interventions:  - Provide teaching at level of understanding  - Provide teaching via preferred learning methods  Outcome: Progressing     Problem: Nutrition/Hydration-ADULT  Goal: Nutrient/Hydration intake appropriate for improving, restoring or maintaining nutritional needs  Description  Monitor and assess patient's nutrition/hydration status for malnutrition  Collaborate with interdisciplinary team and initiate plan and interventions as ordered  Monitor patient's weight and dietary intake as ordered or per policy  Utilize nutrition screening tool and intervene as necessary  Determine patient's food preferences and provide high-protein, high-caloric foods as appropriate       INTERVENTIONS:  - Monitor oral intake, urinary output, labs, and treatment plans  - Assess nutrition and hydration status and recommend course of action  - Evaluate amount of meals eaten  - Assist patient with eating if necessary   - Allow adequate time for meals  - Recommend/ encourage appropriate diets, oral nutritional supplements, and vitamin/mineral supplements  - Order, calculate, and assess calorie counts as needed  - Recommend, monitor, and adjust tube feedings and TPN/PPN based on assessed needs  - Assess need for intravenous fluids  - Provide specific nutrition/hydration education as appropriate  - Include patient/family/caregiver in decisions related to nutrition  Outcome: Progressing     Problem: SKIN/TISSUE INTEGRITY - ADULT  Goal: Skin integrity remains intact  Description  INTERVENTIONS  - Identify patients at risk for skin breakdown  - Assess and monitor skin integrity  - Assess and monitor nutrition and hydration status  - Monitor labs (i e  albumin)  - Assess for incontinence   - Turn and reposition patient  - Assist with mobility/ambulation  - Relieve pressure over bony prominences  - Avoid friction and shearing  - Provide appropriate hygiene as needed including keeping skin clean and dry  - Evaluate need for skin moisturizer/barrier cream  - Collaborate with interdisciplinary team (i e  Nutrition, Rehabilitation, etc )   - Patient/family teaching  Outcome: Progressing  Goal: Incision(s), wounds(s) or drain site(s) healing without S/S of infection  Description  INTERVENTIONS  - Assess and document risk factors for skin impairment   - Assess and document dressing, incision, wound bed, drain sites and surrounding tissue  - Consider nutrition services referral as needed  - Oral mucous membranes remain intact  - Provide patient/ family education  Outcome: Progressing  Goal: Oral mucous membranes remain intact  Description  INTERVENTIONS  - Assess oral mucosa and hygiene practices  - Implement preventative oral hygiene regimen  - Implement oral medicated treatments as ordered  - Initiate Nutrition services referral as needed  Outcome: Progressing     Problem: MUSCULOSKELETAL - ADULT  Goal: Maintain or return mobility to safest level of function  Description  INTERVENTIONS:  - Assess patient's ability to carry out ADLs; assess patient's baseline for ADL function and identify physical deficits which impact ability to perform ADLs (bathing, care of mouth/teeth, toileting, grooming, dressing, etc )  - Assess/evaluate cause of self-care deficits   - Assess range of motion  - Assess patient's mobility  - Assess patient's need for assistive devices and provide as appropriate  - Encourage maximum independence but intervene and supervise when necessary  - Involve family in performance of ADLs  - Assess for home care needs following discharge   - Consider OT consult to assist with ADL evaluation and planning for discharge  - Provide patient education as appropriate  Outcome: Progressing  Goal: Maintain proper alignment of affected body part  Description  INTERVENTIONS:  - Support, maintain and protect limb and body alignment  - Provide patient/ family with appropriate education  Outcome: Progressing     Problem: Potential for Falls  Goal: Patient will remain free of falls  Description  INTERVENTIONS:  - Assess patient frequently for physical needs  -  Identify cognitive and physical deficits and behaviors that affect risk of falls    -  [place] fall precautions as indicated by assessment   - Educate patient/family on patient safety including physical limitations  - Instruct patient to call for assistance with activity based on assessment  - Modify environment to reduce risk of injury  - Consider OT/PT consult to assist with strengthening/mobility  Outcome: Progressing

## 2020-07-02 NOTE — PHYSICAL THERAPY NOTE
Physical Therapy Evaluation    Patient's Name: Bailey Mccray    Admitting Diagnosis  Cellulitis of foot, right [L03 [id number]]    Problem List  Patient Active Problem List   Diagnosis    Acquired hallux valgus of left foot    Other hammer toe(s) (acquired), left foot    Other acquired deformities of left foot    Cellulitis of great toe of right foot    Wound of right foot    Preoperative clearance    Abscess of bursa of right foot    Tobacco abuse       Past Medical History  Past Medical History:   Diagnosis Date    Acquired deformity of left foot     Anesthesia complication     difficulty awakening    Arthritis     Deaf, left     Depression     Hallux valgus of left foot     Hammer toe of left foot     Headache     Kidney stone     Sciatic pain, right     intermittent       Past Surgical History  Past Surgical History:   Procedure Laterality Date    CHOLECYSTECTOMY      HERNIA REPAIR      INCISION AND DRAINAGE OF WOUND Right 7/1/2020    Procedure: INCISION AND DRAINAGE (I&D) EXTREMITY;  Surgeon: Jesus Marley DPM;  Location: [facility];  Service: Podiatry    NC OSTEOTOMY METATARSAL (NOT 1ST) Left 6/14/2019    Procedure: 3rd Metatarsal Osteotomy;  Surgeon: Joan Dowd DPM;  Location: [facility];  Service: Podiatry    TUBAL LIGATION      VAC DRESSING APPLICATION Right 7/8/3201    Procedure: APPLICATION VAC DRESSING;  Surgeon: Jesus Marley DPM;  Location: [facility];  Service: Podiatry          07/02/20 2715   Note Type   Note type Eval only   Pain Assessment   Pain Assessment Tool 0-10   Pain Score 7   Pain Location/Orientation Orientation: Right;Location: Foot   Patient's Stated Pain Goal No pain   Hospital Pain Intervention(s) Ambulation/increased activity   Home Living   Type of Home House   Additional Comments Resides w/ s/o and son in 3 story home w/ first floor setup, 9 DONNIE front vs 2-3 in garage    indep self care, work FT   Restrictions/Precautions   [place] Bearing Precautions Per Order Yes   RLE Weight Bearing Per Order NWB   Other Precautions Multiple lines; Fall Risk;Pain  (vac in place)   General   Family/Caregiver Present No   Cognition   Overall Cognitive Status WFL   Attention Attends with cues to redirect   Orientation Level Oriented X4   Memory Unable to assess   Following Commands Follows one step commands without difficulty   Bed Mobility   Supine to Sit 5  Supervision   Additional items Assist x 1   Transfers   Sit to Stand 4  Minimal assistance   Additional items Assist x 1   Stand to Sit 4  Minimal assistance   Additional items Assist x 1   Toilet transfer 4  Minimal assistance   Additional items Assist x 1   Ambulation/Elevation   Gait pattern   (slow, mild lateral sway)   Gait Assistance 4  Minimal assist   Additional items Assist x 1   Assistive Device Rolling walker   Distance 15'x1 from bed to bathroom   Balance   Static Sitting Good   Dynamic Sitting Fair   Static Standing Fair -   Dynamic Standing Fair -   Ambulatory Fair -   Endurance Deficit   Endurance Deficit Yes   Endurance Deficit Description fatigue, weakness, pain   Activity Tolerance   Activity Tolerance Patient limited by fatigue;Patient limited by pain;Treatment limited secondary to medical complications (Comment)   Nurse Made Aware yes   Assessment   Prognosis Good   Problem List Decreased strength;Decreased endurance; Impaired balance;Decreased mobility;Pain;Orthopedic restrictions   Assessment Pt seen for high complexity physical therapy evaluation  Pt is a 55 y/o female w/ history/comorbidities of deaf L ear, depression who is now admitted w/ R foot cellulitis and abscess R foot, had I&D and VAC 7/1  Due to acute medical issues, pain, new NWB status, fall risk, note unstable clinical picture  PT consulted to assess mobility, d/c needs  Pt presents w/ decreased functional mob, standing balance, endurance, B LE strength, barriers at home  will benefit from skilled PT to corretc for the above problems  Anticipate that pending progress, can d/c home w/ RW, ? home therapies  Goals   Patient Goals to get better   STG Expiration Date 07/16/20   Short Term Goal #1 1-2 wks: bed mob and transfers w/ indep, standing balance to good w/ device, ambulate  ft w/ RW and S NWB on R, increase B LE strength by 1/2 -1 grade, ambulate 2-3 DONNIE w/ S   PT Treatment Day 0   Plan   Treatment/Interventions Functional transfer training;LE strengthening/ROM; Elevations; Therapeutic exercise; Endurance training;Patient/family training;Equipment eval/education; Bed mobility;Gait training   PT Frequency Other (Comment)  (3-5x/wk)   Recommendation   PT Discharge Recommendation Home with skilled therapy  (home PT pending progress)   Equipment Recommended Walker  (recommend RW at home    )   PT - OK to Discharge No   Modified Kelli Scale   Modified [place] Scale 4         Cory Pink PT, DPT, CSRS

## 2020-07-02 NOTE — PROGRESS NOTES
Progress Note - Infectious Disease   Jesusita Mayo 54 y o  female MRN: [id number]  Unit/Bed#: -01 Encounter: [id number]      IMPRESSION & RECOMMENDATIONS:   Impression/Recommendations:  1  Right foot cellulitis and abscess of right 1st interspace  In the setting of #2  X-ray was negative for osteomyelitis  No active drainage on exam   Consider Pseudomonas given water exposure and lack of response to oral Keflex  Now status post I and D of abscess  No underlying myonecrosis or fat necrosis noted intraoperatively  MRI was negative for osteomyelitis  Cellulitis has been responding to IV cefepime/Flagyl  Will continue for now pending OR cultures      -continue IV cefepime for now  -continue oral Flagyl  -follow-up operative cultures to guide further antibiotic recommendations  -wound VAC management per Podiatry  -serial foot exams      2  Right 1st interspace wound  Likely etiology of #1  MRI was negative for osteomyelitis      -antibiotic plan as above  -wound VAC management per Podiatry  -local wound care     3  Obesity  Risk factor for development of infection  Recommend weight loss/dietary changes      4  Tobacco use  Risk factor for poor wound healing  Recommend smoking cessation         Antibiotics:  Cefepime/Flagyl 4  POD1     I discussed above plan with patient, and with Clifford Serrato from primary service  Subjective:  Patient went to OR yesterday for I and D of abscess in right 1st interspace  5 cc of purulence was noted in sent for culture  Complains of foot pain  No fevers, chills      Objective:  Vitals:  Temp:  [97 5 °F (36 4 °C)-98 1 °F (36 7 °C)] 98 1 °F (36 7 °C)  HR:  [] 78  Resp:  [13-18] 18  BP: (115-141)/(73-99) 131/83  SpO2:  [92 %-98 %] 93 %  Temp (24hrs), Av 9 °F (36 6 °C), Min:97 5 °F (36 4 °C), Max:98 1 °F (36 7 °C)  Current: Temperature: 98 1 °F (36 7 °C)    Physical Exam:   General:  No acute distress  Psychiatric:  Awake and alert  Pulmonary:  Normal respiratory excursion without accessory muscle use  Abdomen:  Soft, nontender  Extremities:  No edema  Foot wound VAC in place  Skin:  No rashes    Lab Results:  I have personally reviewed pertinent labs  Results from last 7 days   Lab Units 06/30/20  0616 06/29/20  1438   POTASSIUM mmol/L 4 3 3 9   CHLORIDE mmol/L 108 110*   CO2 mmol/L 21 21   BUN mg/dL 11 8   CREATININE mg/dL 0 75 0 62   EGFR ml/min/1 73sq m 90 102   CALCIUM mg/dL 8 8 8 9   AST U/L  --  15   ALT U/L  --  24   ALK PHOS U/L  --  89     Results from last 7 days   Lab Units 07/02/20  0550 07/01/20  0503 06/30/20  0616   WBC Thousand/uL 8 81 9 57 8 85   HEMOGLOBIN g/dL 13 0 13 5 14 1   PLATELETS Thousands/uL 323 334 321     Results from last 7 days   Lab Units 07/01/20  1506 06/29/20  1541   BLOOD CULTURE   --  No Growth at 48 hrs  GRAM STAIN RESULT  No Polys or Bacteria seen  --    WOUND CULTURE  Culture too young- will reincubate  --        Imaging Studies:   I have personally reviewed pertinent imaging study reports and images in PACS  MRI of the foot shows mild proximal hallux and 1st interspace cellulitis with no definitive abscess  No osteomyelitis  EKG, Pathology, and Other Studies:   I have personally reviewed pertinent reports  Operative report was reviewed in detail

## 2020-07-02 NOTE — PROGRESS NOTES
Progress Note - Podiatry  Khris Economy 54 y o  female MRN: [id number]  Unit/Bed#: -01 Encounter: [id number]    Assessment:  54 y o  Female  1  Cellulitis right foot  2  Right 1st interspace abcess  - S/P I&D right foot (DOS 7/1/20)  3  Obesity  4  Tobacco use    Plan:  - Patient was seen postoperatively with wound vac intact on right foot  Plan to remove wound vac and evaluate wound tomorrow 7/3 and determine further podiatric surgery pending such    - Follow-up intra-op wound cultures  - Reviewed labs  No leukocytosis noted  Patient is afebrile  Vital signs are stable  - Appreciate medical management per internal medicine  - Appreciate infectious disease for antibiotic reccomendations    Weight bearing status: Non-weight bearing right foot  PT and OT consulted  DVT prophylaxis: Heparin subcutaneous  Antibiotics per ID: Cefepime and flagyl    Disposition: Patient is not stable for discharge yet due to above      Subjective/Objective   Chief Complaint: No chief complaint on file  Subjective: 54 y o  female was seen and evaluated at bedside  No acute distress  Moderate pain, controlled with medication  She was informed of the importance of remaining non-weight bearing to the right foot at this time  She expressed discomfort with heparin injections, however we discussed the importance of DVT prophylaxis due to non-weight bearing status  Blood pressure 131/83, pulse 78, temperature 98 1 °F (36 7 °C), resp  rate 18, height 5' 1" (1 549 m), weight 84 8 kg (186 lb 15 2 oz), SpO2 93 %, not currently breastfeeding  Body mass index is 35 32 kg/m²  Physical Exam:   General: Alert, cooperative and no distress  Lower Extremities: Right LE wound vac running low continuous at 125mmhg without appreciated leaks  S/P right 1st interspace I&D with wound vac intact  Pain noted to surgical site  Neurovascular status baseline         Lab, Imaging and other studies:   Results from last 7 days   Lab Units 07/02/20  0550  06/29/20  1438   WBC Thousand/uL 8 81   < > 11 14*   HEMOGLOBIN g/dL 13 0   < > 14 0   HEMATOCRIT % 40 2   < > 41 8   PLATELETS Thousands/uL 323   < > 311   NEUTROS PCT %  --   --  57   LYMPHS PCT %  --   --  33   MONOS PCT %  --   --  9   EOS PCT %  --   --  1    < > = values in this interval not displayed  Results from last 7 days   Lab Units 06/30/20  0616 06/29/20  1438   POTASSIUM mmol/L 4 3 3 9   CHLORIDE mmol/L 108 110*   CO2 mmol/L 21 21   BUN mg/dL 11 8   CREATININE mg/dL 0 75 0 62   CALCIUM mg/dL 8 8 8 9   ALK PHOS U/L  --  89   ALT U/L  --  24   AST U/L  --  15           Results from last 7 days   Lab Units 07/01/20  1506 06/29/20  1541   BLOOD CULTURE   --  No Growth at 48 hrs  GRAM STAIN RESULT  No Polys or Bacteria seen  --      Results from last 7 days   Lab Units 07/01/20  1506   ANAEROBIC CULTURE  Culture results to follow  Imaging: I have personally reviewed pertinent films and reports in PACS  EKG, Pathology, and Other Studies: I have personally reviewed pertinent reports  VTE Pharmacologic Prophylaxis: Heparin    Portions of the record may have been created with voice recognition software  Occasional wrong word or "sound a like" substitutions may have occurred due to the inherent limitations of voice recognition software  Read the chart carefully and recognize, using context, where substitutions have occurred      Carrillo Hollingsworth DPM

## 2020-07-02 NOTE — PLAN OF CARE
Problem: PHYSICAL THERAPY ADULT  Goal: Performs mobility at highest level of function for planned discharge setting  See evaluation for individualized goals  Description  Treatment/Interventions: Functional transfer training, LE strengthening/ROM, Elevations, Therapeutic exercise, Endurance training, Patient/family training, Equipment eval/education, Bed mobility, Gait training  Equipment Recommended: Walker(recommend RW at home   )       See flowsheet documentation for full assessment, interventions and recommendations  Note:   Prognosis: Good  Problem List: Decreased strength, Decreased endurance, Impaired balance, Decreased mobility, Pain, Orthopedic restrictions  Assessment: Pt seen for high complexity physical therapy evaluation  Pt is a 53 y/o female w/ history/comorbidities of deaf L ear, depression who is now admitted w/ R foot cellulitis and abscess R foot, had I&D and VAC 7/1  Due to acute medical issues, pain, new NWB status, fall risk, note unstable clinical picture  PT consulted to assess mobility, d/c needs  Pt presents w/ decreased functional mob, standing balance, endurance, B LE strength, barriers at home  will benefit from skilled PT to corretc for the above problems  Anticipate that pending progress, can d/c home w/ RW, ? home therapies  PT Discharge Recommendation: Home with skilled therapy(home PT pending progress)     PT - OK to Discharge: No    See flowsheet documentation for full assessment

## 2020-07-02 NOTE — PLAN OF CARE
Problem: OCCUPATIONAL THERAPY ADULT  Goal: Performs self-care activities at highest level of function for planned discharge setting  See evaluation for individualized goals  Description  Treatment Interventions: ADL retraining, Functional transfer training, Patient/family training, Endurance training, Equipment evaluation/education, Compensatory technique education, Energy conservation      See flowsheet documentation for full assessment, interventions and recommendations  Note:   Limitation: Decreased ADL status, Decreased endurance, Decreased high-level ADLs, Decreased self-care trans  Prognosis: Good  Assessment: Pt is a 54 y o  female who was [facility]/2020 with Abscess of bursa of right foot  Her other comorbidities include tobacco abuse, acquired hallux valgus of L foot, and other L foot deformities  Pt is s/p incision and drainage w/ wound VAC placement on 7/1 and is NWB on RLE  At baseline pt was I w/ ADLs, IADLs, and mobility  She has access to a RW  Pt works full time in a [employer]  Pt lives with her s/o and son who are able to assist if/as needed  Currently pt requires S-MIN A for UB ADLs and mod A for LB ADLs  She is a min Ax1 for functional mobility/transfers w/ RW  Pt currently presents with impairments in the following categories -difficulty performing ADLS and difficulty performing IADLS  activity tolerance, endurance and standing balance/tolerance  These impairments, as well as pt's fatigue, pain, WBS  and risk for falls  limit pt's ability to safely engage in all baseline areas of occupation, including grooming, bathing, dressing, toileting, functional mobility/transfers, house maintenance and leisure activities  From OT standpoint, anticipate pt will progress to be able to return home w/ family support and home therapy as needed upon D/C  Will cont to monitor progress  OT will continue to follow to address the below stated goals        OT Discharge Recommendation: Home with skilled therapy(pending progress)  OT - OK to Discharge: No(once medically stable)

## 2020-07-02 NOTE — PROGRESS NOTES
Progress Note - Mady Fall 1965, 54 y o  female MRN: [id number]  Unit/Bed#: -01 Encounter: [id number] DOS: 2020  Primary Care Provider: No primary care provider on file  Date and time admitted to hospital: 2020 11:17 AM    * Abscess of bursa of right foot  Assessment & Plan  · Patient presented with cellulitis of right foot, status post incision and drainage and wound VAC placement   · Follow-up intraoperative wound cultures   · Appreciate infectious disease input, continue cefepime and flagyl   · Wound care per primary, Podiatry  · Pain control, add Oxy 10 mg for severe pain and change IV Dilaudid to every 3 hours for breakthrough pain    Tobacco abuse  Assessment & Plan  · Encourage cessation, especially to improve wound healing   · NRT ordered     VTE Pharmacologic Prophylaxis:   Pharmacologic: Heparin  Mechanical VTE Prophylaxis in Place: Yes    Patient Centered Rounds: I have performed bedside rounds with nursing staff today  Discussions with Specialists or Other Care Team Provider: nursing     Education and Discussions with Family / Patient: patient     Time Spent for Care: 30 minutes  More than 50% of total time spent on counseling and coordination of care as described above  Current Length of Stay: 3 day(s)    Current Patient Status: Inpatient   Certification Statement: The patient will continue to require additional inpatient hospital stay due to pending ongoing post op care and IV abx for foot wound/abscess/cellulitis     Discharge Plan: pending clinical course     Code Status: Level 1 - Full Code    Subjective:   Pt seen and examined at bedside  Did not sleep well due to pain  Right foot pain is sharp and she's trying to distract herself       Objective:     Vitals:   Temp (24hrs), Av 9 °F (36 6 °C), Min:97 5 °F (36 4 °C), Max:98 1 °F (36 7 °C)    Temp:  [97 5 °F (36 4 °C)-98 1 °F (36 7 °C)] 98 1 °F (36 7 °C)  HR:  [] 78  Resp:  [13-18] 18  BP: (115-141)/(73-99) 131/83  SpO2:  [92 %-98 %] 93 %  Body mass index is 35 32 kg/m²  Input and Output Summary (last 24 hours): Intake/Output Summary (Last 24 hours) at 7/2/2020 0816  Last data filed at 7/1/2020 1630  Gross per 24 hour   Intake 600 ml   Output    Net 600 ml       Physical Exam:     Physical Exam   Constitutional: She is oriented to person, place, and time  She appears well-developed and well-nourished  No distress  HENT:   Head: Normocephalic and atraumatic  Eyes: EOM are normal  No scleral icterus  Neck: Normal range of motion  Neck supple  Cardiovascular: Normal rate, regular rhythm and normal heart sounds  Pulmonary/Chest: Effort normal and breath sounds normal    Abdominal: Soft  Bowel sounds are normal    Musculoskeletal: Normal range of motion  She exhibits no edema  Neurological: She is alert and oriented to person, place, and time  Skin: Skin is warm and dry  Right foot dressing c/d/i with wound vac apparatus in place    Psychiatric: She has a normal mood and affect  Additional Data:     Labs:    Results from last 7 days   Lab Units 07/02/20  0550  06/29/20  1438   WBC Thousand/uL 8 81   < > 11 14*   HEMOGLOBIN g/dL 13 0   < > 14 0   HEMATOCRIT % 40 2   < > 41 8   PLATELETS Thousands/uL 323   < > 311   NEUTROS PCT %  --   --  57   LYMPHS PCT %  --   --  33   MONOS PCT %  --   --  9   EOS PCT %  --   --  1    < > = values in this interval not displayed  Results from last 7 days   Lab Units 06/30/20  0616 06/29/20  1438   SODIUM mmol/L 137 138   POTASSIUM mmol/L 4 3 3 9   CHLORIDE mmol/L 108 110*   CO2 mmol/L 21 21   BUN mg/dL 11 8   CREATININE mg/dL 0 75 0 62   ANION GAP mmol/L 8 7   CALCIUM mg/dL 8 8 8 9   ALBUMIN g/dL  --  3 6   TOTAL BILIRUBIN mg/dL  --  0 27   ALK PHOS U/L  --  89   ALT U/L  --  24   AST U/L  --  15   GLUCOSE RANDOM mg/dL 123 86                           * I Have Reviewed All Lab Data Listed Above    * Additional Pertinent Lab Tests Reviewed: Dean 66 Admission Reviewed    Imaging:    Imaging Reports Reviewed Today Include: all  Imaging Personally Reviewed by Myself Includes:  none    Recent Cultures (last 7 days):     Results from last 7 days   Lab Units 07/01/20  1506 06/29/20  1541   BLOOD CULTURE   --  No Growth at 48 hrs  GRAM STAIN RESULT  No Polys or Bacteria seen  --        Last 24 Hours Medication List:     Current Facility-Administered Medications:  acetaminophen 650 mg Oral Q6H [facility] [place] Lottie, DPM    cefepime 2,000 mg Intravenous Q12H Lyndsey Flash, DPM Last Rate: 2,000 mg (07/02/20 0335)   docusate sodium 100 mg Oral BID PRN [place] Lottie, DPM    heparin (porcine) 5,000 Units Subcutaneous [facility] [place] V[place], DPM    HYDROmorphone 0 5 mg Intravenous Q3H PRN Oumou Ventura PA-C    metroNIDAZOLE 500 mg Oral Q8H [facility] Lyndsey Lottie, DPM    nicotine 1 patch Transdermal Daily Lyndsey Flash, DPM    ondansetron 4 mg Intravenous Q6H PRN Lyndsey Lottie, DPM    oxyCODONE 10 mg Oral Q4H PRN Marilee Oconnor PA-C    oxyCODONE 5 mg Oral Q4H PRN Lyndsey Lottie DPANGEL         Today, Patient Was Seen By: Marilee Oconnor PA-C    ** Please Note: Dictation voice to text software may have been used in the creation of this document   **

## 2020-07-03 PROBLEM — R51.9 HEADACHE: Status: ACTIVE | Noted: 2020-07-03

## 2020-07-03 LAB
ANION GAP SERPL CALCULATED.3IONS-SCNC: 6 MMOL/L (ref 4–13)
BACTERIA WND AEROBE CULT: NORMAL
BUN SERPL-MCNC: 11 MG/DL (ref 5–25)
CALCIUM SERPL-MCNC: 9.3 MG/DL (ref 8.3–10.1)
CHLORIDE SERPL-SCNC: 110 MMOL/L (ref 100–108)
CO2 SERPL-SCNC: 24 MMOL/L (ref 21–32)
CREAT SERPL-MCNC: 0.69 MG/DL (ref 0.6–1.3)
ERYTHROCYTE [DISTWIDTH] IN BLOOD BY AUTOMATED COUNT: 14.8 % (ref 11.6–15.1)
GFR SERPL CREATININE-BSD FRML MDRD: 98 ML/MIN/1.73SQ M
GLUCOSE SERPL-MCNC: 101 MG/DL (ref 65–140)
GRAM STN SPEC: NORMAL
HCT VFR BLD AUTO: 41 % (ref 34.8–46.1)
HGB BLD-MCNC: 13.5 G/DL (ref 11.5–15.4)
MCH RBC QN AUTO: 32.2 PG (ref 26.8–34.3)
MCHC RBC AUTO-ENTMCNC: 32.9 G/DL (ref 31.4–37.4)
MCV RBC AUTO: 98 FL (ref 82–98)
PLATELET # BLD AUTO: 341 THOUSANDS/UL (ref 149–390)
PMV BLD AUTO: 9.1 FL (ref 8.9–12.7)
POTASSIUM SERPL-SCNC: 4.2 MMOL/L (ref 3.5–5.3)
RBC # BLD AUTO: 4.19 MILLION/UL (ref 3.81–5.12)
SODIUM SERPL-SCNC: 140 MMOL/L (ref 136–145)
WBC # BLD AUTO: 9.1 THOUSAND/UL (ref 4.31–10.16)

## 2020-07-03 PROCEDURE — 99232 SBSQ HOSP IP/OBS MODERATE 35: CPT | Performed by: PHYSICIAN ASSISTANT

## 2020-07-03 PROCEDURE — 97530 THERAPEUTIC ACTIVITIES: CPT

## 2020-07-03 PROCEDURE — 99233 SBSQ HOSP IP/OBS HIGH 50: CPT | Performed by: INTERNAL MEDICINE

## 2020-07-03 PROCEDURE — 85027 COMPLETE CBC AUTOMATED: CPT | Performed by: PODIATRIST

## 2020-07-03 PROCEDURE — 97116 GAIT TRAINING THERAPY: CPT

## 2020-07-03 PROCEDURE — 99024 POSTOP FOLLOW-UP VISIT: CPT | Performed by: PODIATRIST

## 2020-07-03 PROCEDURE — 80048 BASIC METABOLIC PNL TOTAL CA: CPT | Performed by: PHYSICIAN ASSISTANT

## 2020-07-03 RX ORDER — LIDOCAINE HYDROCHLORIDE 20 MG/ML
20 INJECTION, SOLUTION EPIDURAL; INFILTRATION; INTRACAUDAL; PERINEURAL ONCE
Status: COMPLETED | OUTPATIENT
Start: 2020-07-04 | End: 2020-07-04

## 2020-07-03 RX ORDER — SENNOSIDES 8.6 MG
1 TABLET ORAL
Status: DISCONTINUED | OUTPATIENT
Start: 2020-07-03 | End: 2020-07-06 | Stop reason: HOSPADM

## 2020-07-03 RX ORDER — CEFAZOLIN SODIUM 2 G/50ML
2000 SOLUTION INTRAVENOUS EVERY 8 HOURS
Status: DISCONTINUED | OUTPATIENT
Start: 2020-07-03 | End: 2020-07-06 | Stop reason: HOSPADM

## 2020-07-03 RX ADMIN — CEFEPIME HYDROCHLORIDE 2000 MG: 2 INJECTION, POWDER, FOR SOLUTION INTRAVENOUS at 02:37

## 2020-07-03 RX ADMIN — CEFAZOLIN SODIUM 2000 MG: 2 SOLUTION INTRAVENOUS at 16:18

## 2020-07-03 RX ADMIN — OXYCODONE HYDROCHLORIDE 10 MG: 10 TABLET ORAL at 05:21

## 2020-07-03 RX ADMIN — METRONIDAZOLE 500 MG: 500 TABLET ORAL at 15:58

## 2020-07-03 RX ADMIN — ACETAMINOPHEN 975 MG: 325 TABLET ORAL at 05:19

## 2020-07-03 RX ADMIN — ACETAMINOPHEN 975 MG: 325 TABLET ORAL at 12:42

## 2020-07-03 RX ADMIN — ACETAMINOPHEN 975 MG: 325 TABLET ORAL at 18:26

## 2020-07-03 RX ADMIN — METRONIDAZOLE 500 MG: 500 TABLET ORAL at 21:29

## 2020-07-03 RX ADMIN — METRONIDAZOLE 500 MG: 500 TABLET ORAL at 05:19

## 2020-07-03 NOTE — PLAN OF CARE
Problem: PHYSICAL THERAPY ADULT  Goal: Performs mobility at highest level of function for planned discharge setting  See evaluation for individualized goals  Description  Treatment/Interventions: Functional transfer training, LE strengthening/ROM, Elevations, Therapeutic exercise, Endurance training, Patient/family training, Equipment eval/education, Bed mobility, Gait training  Equipment Recommended: Walker(recommend RW at home   )       See flowsheet documentation for full assessment, interventions and recommendations  Note:   Prognosis: Good  Problem List: Decreased strength, Decreased endurance, Impaired balance, Decreased mobility, Pain  Assessment: Pt seen for session for setup, bed mob, few min on EOB, transfers, gait w/ rest time and repositioning  Pt cooperative, willing to mobilize  Limited by pain when LE hangs in a dependant positioning  able to maintain NWB well, and able to ambulate an increased distance  remains appropriate to d/c home w/ RW, family support when stable        PT Discharge Recommendation: Return to previous environment with social support, Home with skilled therapy(and home PT)     PT - OK to Discharge: (S) Yes(when stable to home w/ RW, home PT)    See flowsheet documentation for full assessment

## 2020-07-03 NOTE — PROGRESS NOTES
Progress Note - Podiatry  Mirella Pereira 54 y o  female MRN: [id number]  Unit/Bed#: -01 Encounter: [id number]    Assessment:  54 y o  Female  1  Cellulitis right foot secondary to right 1st interspace abscess  2  Right 1st interspace abcess  - S/P I&D right foot (DOS 7/1/20)  3  Obesity  4  Tobacco use    Plan:  - Plan was discussed with Dr Guajardo List, patient will have wound vac inspected and removed completely vs reapplication pending appearance of wound  Discharge is pending the appearance of her wound at [address] visit     -Antiobiotic plan as above, wound vac will be inspected today, planned removal tomorrow 7/4, for wound evaluation  Intra-op wound culture and gram stain negative for bacterial growth  Still pending results of anaerobic intra-op wound cultures    -Labs reviewed, no leukocytosis noted, patient is afebrile, vital signs are stable  - Appreciate medical management per internal medicine  -Appreciate infectious disease for antibiotic recommendations  Weight bearing status: Non-weightbearing to right foot  PT and OT consulted  DVT prophylaxis: Heparin  Antibiotics: cefepime IV, Flagyl PO per infectious disease      Subjective/Objective   Chief Complaint: No chief complaint on file  Subjective: 54 y o  female was seen and evaluated at bedside  She denies any acute events overnight  She complains of "pain in her arms from being stuck too many times " She mentions moderate pain to her right foot, however states that the pain is much better than pre-operative pain  Patient denies nausea, vomiting, chest pain, shortness of breath, chills, fever  Blood pressure 127/74, pulse 97, temperature 97 9 °F (36 6 °C), resp  rate 18, height 5' 1" (1 549 m), weight 84 8 kg (186 lb 15 2 oz), SpO2 95 %, not currently breastfeeding  Body mass index is 35 32 kg/m²        Physical Exam:   General: Alert, cooperative and no distress  Lower Extremities: Neurovascular status at baseline bilaterally, musculoskeletal function at baseline bilaterally, no calf tenderness bilaterally  Less erythema noted today than yesterday  Wound vac is clean, dry, and intact and functioning correctly at 125mmHg with no leaks  Lab, Imaging and other studies:   Results from last 7 days   Lab Units 07/03/20  0453  06/29/20  1438   WBC Thousand/uL 9 10   < > 11 14*   HEMOGLOBIN g/dL 13 5   < > 14 0   HEMATOCRIT % 41 0   < > 41 8   PLATELETS Thousands/uL 341   < > 311   NEUTROS PCT %  --   --  57   LYMPHS PCT %  --   --  33   MONOS PCT %  --   --  9   EOS PCT %  --   --  1    < > = values in this interval not displayed  Results from last 7 days   Lab Units 07/03/20  0453  06/29/20  1438   POTASSIUM mmol/L 4 2   < > 3 9   CHLORIDE mmol/L 110*   < > 110*   CO2 mmol/L 24   < > 21   BUN mg/dL 11   < > 8   CREATININE mg/dL 0 69   < > 0 62   CALCIUM mg/dL 9 3   < > 8 9   ALK PHOS U/L  --   --  89   ALT U/L  --   --  24   AST U/L  --   --  15    < > = values in this interval not displayed  Results from last 7 days   Lab Units 07/01/20  1506 06/29/20  1541   BLOOD CULTURE   --  No Growth at 72 hrs  GRAM STAIN RESULT  No Polys or Bacteria seen  --    WOUND CULTURE  Culture too young- will reincubate  --      Results from last 7 days   Lab Units 07/01/20  1506   ANAEROBIC CULTURE  Culture results to follow  Imaging: I have personally reviewed pertinent films and reports in PACS  EKG, Pathology, and Other Studies: I have personally reviewed pertinent reports  VTE Pharmacologic Prophylaxis: Heparin    Portions of the record may have been created with voice recognition software  Occasional wrong word or "sound a like" substitutions may have occurred due to the inherent limitations of voice recognition software  Read the chart carefully and recognize, using context, where substitutions have occurred      Jolie Bobo DPM

## 2020-07-03 NOTE — PLAN OF CARE
Problem: PAIN - ADULT  Goal: Verbalizes/displays adequate comfort level or baseline comfort level  Description  Interventions:  - Encourage patient to monitor pain and request assistance  - Assess pain using appropriate pain scale  - Administer analgesics based on type and severity of pain and evaluate response  - Implement non-pharmacological measures as appropriate and evaluate response  - Consider cultural and social influences on pain and pain management  - Notify physician/advanced practitioner if interventions unsuccessful or patient reports new pain  Outcome: Progressing     Problem: INFECTION - ADULT  Goal: Absence or prevention of progression during hospitalization  Description  INTERVENTIONS:  - Assess and monitor for signs and symptoms of infection  - Monitor lab/diagnostic results  - Monitor all insertion sites, i e  indwelling lines, tubes, and drains  - Monitor endotracheal if appropriate and nasal secretions for changes in amount and color  - [place] appropriate cooling/warming therapies per order  - Administer medications as ordered  - Instruct and encourage patient and family to use good hand hygiene technique  - Identify and instruct in appropriate isolation precautions for identified infection/condition  Outcome: Progressing  Goal: Absence of fever/infection during neutropenic period  Description  INTERVENTIONS:  - Monitor WBC    Outcome: Progressing     Problem: SAFETY ADULT  Goal: Patient will remain free of falls  Description  INTERVENTIONS:  - Assess patient frequently for physical needs  -  Identify cognitive and physical deficits and behaviors that affect risk of falls    -  [place] fall precautions as indicated by assessment   - Educate patient/family on patient safety including physical limitations  - Instruct patient to call for assistance with activity based on assessment  - Modify environment to reduce risk of injury  - Consider OT/PT consult to assist with strengthening/mobility  Outcome: Progressing  Goal: Maintain or return to baseline ADL function  Description  INTERVENTIONS:  -  Assess patient's ability to carry out ADLs; assess patient's baseline for ADL function and identify physical deficits which impact ability to perform ADLs (bathing, care of mouth/teeth, toileting, grooming, dressing, etc )  - Assess/evaluate cause of self-care deficits   - Assess range of motion  - Assess patient's mobility; develop plan if impaired  - Assess patient's need for assistive devices and provide as appropriate  - Encourage maximum independence but intervene and supervise when necessary  - Involve family in performance of ADLs  - Assess for home care needs following discharge   - Consider OT consult to assist with ADL evaluation and planning for discharge  - Provide patient education as appropriate  Outcome: Progressing  Goal: Maintain or return mobility status to optimal level  Description  INTERVENTIONS:  - Assess patient's baseline mobility status (ambulation, transfers, stairs, etc )    - Identify cognitive and physical deficits and behaviors that affect mobility  - Identify mobility aids required to assist with transfers and/or ambulation (gait belt, sit-to-stand, lift, walker, cane, etc )  - [place] fall precautions as indicated by assessment  - Record patient progress and toleration of activity level on Mobility SBAR; progress patient to next Phase/Stage  - Instruct patient to call for assistance with activity based on assessment  - Consider rehabilitation consult to assist with strengthening/weightbearing, etc   Outcome: Progressing     Problem: DISCHARGE PLANNING  Goal: Discharge to home or other facility with appropriate resources  Description  INTERVENTIONS:  - Identify barriers to discharge w/patient and caregiver  - Arrange for needed discharge resources and transportation as appropriate  - Identify discharge learning needs (meds, wound care, etc )  - Arrange for interpretive services to assist at discharge as needed  - Refer to Case Management Department for coordinating discharge planning if the patient needs post-hospital services based on physician/advanced practitioner order or complex needs related to functional status, cognitive ability, or social support system  Outcome: Progressing     Problem: Knowledge Deficit  Goal: Patient/family/caregiver demonstrates understanding of disease process, treatment plan, medications, and discharge instructions  Description  Complete learning assessment and assess knowledge base  Interventions:  - Provide teaching at level of understanding  - Provide teaching via preferred learning methods  Outcome: Progressing     Problem: Nutrition/Hydration-ADULT  Goal: Nutrient/Hydration intake appropriate for improving, restoring or maintaining nutritional needs  Description  Monitor and assess patient's nutrition/hydration status for malnutrition  Collaborate with interdisciplinary team and initiate plan and interventions as ordered  Monitor patient's weight and dietary intake as ordered or per policy  Utilize nutrition screening tool and intervene as necessary  Determine patient's food preferences and provide high-protein, high-caloric foods as appropriate       INTERVENTIONS:  - Monitor oral intake, urinary output, labs, and treatment plans  - Assess nutrition and hydration status and recommend course of action  - Evaluate amount of meals eaten  - Assist patient with eating if necessary   - Allow adequate time for meals  - Recommend/ encourage appropriate diets, oral nutritional supplements, and vitamin/mineral supplements  - Order, calculate, and assess calorie counts as needed  - Recommend, monitor, and adjust tube feedings and TPN/PPN based on assessed needs  - Assess need for intravenous fluids  - Provide specific nutrition/hydration education as appropriate  - Include patient/family/caregiver in decisions related to nutrition  Outcome: Progressing     Problem: SKIN/TISSUE INTEGRITY - ADULT  Goal: Skin integrity remains intact  Description  INTERVENTIONS  - Identify patients at risk for skin breakdown  - Assess and monitor skin integrity  - Assess and monitor nutrition and hydration status  - Monitor labs (i e  albumin)  - Assess for incontinence   - Turn and reposition patient  - Assist with mobility/ambulation  - Relieve pressure over bony prominences  - Avoid friction and shearing  - Provide appropriate hygiene as needed including keeping skin clean and dry  - Evaluate need for skin moisturizer/barrier cream  - Collaborate with interdisciplinary team (i e  Nutrition, Rehabilitation, etc )   - Patient/family teaching  Outcome: Progressing  Goal: Incision(s), wounds(s) or drain site(s) healing without S/S of infection  Description  INTERVENTIONS  - Assess and document risk factors for skin impairment   - Assess and document dressing, incision, wound bed, drain sites and surrounding tissue  - Consider nutrition services referral as needed  - Oral mucous membranes remain intact  - Provide patient/ family education  Outcome: Progressing  Goal: Oral mucous membranes remain intact  Description  INTERVENTIONS  - Assess oral mucosa and hygiene practices  - Implement preventative oral hygiene regimen  - Implement oral medicated treatments as ordered  - Initiate Nutrition services referral as needed  Outcome: Progressing     Problem: MUSCULOSKELETAL - ADULT  Goal: Maintain or return mobility to safest level of function  Description  INTERVENTIONS:  - Assess patient's ability to carry out ADLs; assess patient's baseline for ADL function and identify physical deficits which impact ability to perform ADLs (bathing, care of mouth/teeth, toileting, grooming, dressing, etc )  - Assess/evaluate cause of self-care deficits   - Assess range of motion  - Assess patient's mobility  - Assess patient's need for assistive devices and provide as appropriate  - Encourage maximum independence but intervene and supervise when necessary  - Involve family in performance of ADLs  - Assess for home care needs following discharge   - Consider OT consult to assist with ADL evaluation and planning for discharge  - Provide patient education as appropriate  Outcome: Progressing  Goal: Maintain proper alignment of affected body part  Description  INTERVENTIONS:  - Support, maintain and protect limb and body alignment  - Provide patient/ family with appropriate education  Outcome: Progressing     Problem: Potential for Falls  Goal: Patient will remain free of falls  Description  INTERVENTIONS:  - Assess patient frequently for physical needs  -  Identify cognitive and physical deficits and behaviors that affect risk of falls    -  [place] fall precautions as indicated by assessment   - Educate patient/family on patient safety including physical limitations  - Instruct patient to call for assistance with activity based on assessment  - Modify environment to reduce risk of injury  - Consider OT/PT consult to assist with strengthening/mobility  Outcome: Progressing

## 2020-07-03 NOTE — PROGRESS NOTES
Progress Note - Dell Manzanares 1965, 54 y o  female MRN: [id number]  Unit/Bed#: -01 Encounter: [id number] DOS: 7/3/2020  Primary Care Provider: No primary care provider on file  Date and time admitted to hospital: 6/29/2020 11:17 AM    * Abscess of bursa of right foot  Assessment & Plan  · Patient presented with cellulitis of right foot, status post incision and drainage and wound VAC placement 7/1  · Follow-up intraoperative wound cultures   · Appreciate infectious disease input, continue cefepime and flagyl   · Wound care per primary, Podiatry  · Pain control - improved today     Headache  Assessment & Plan  · Pt c/o caffeine withdrawal headache  · Caffeine listed as allergy - this is clinically insignificant and will remove from allergy list   · States she took a caffeine pill once and developed blisters in her mouth but drinks caffeinated beverages daily     Tobacco abuse  Assessment & Plan  · Encourage cessation, especially to improve wound healing   · NRT ordered     VTE Pharmacologic Prophylaxis:   Pharmacologic: Heparin  Mechanical VTE Prophylaxis in Place: Yes    Patient Centered Rounds: I have performed bedside rounds with nursing staff today  Discussions with Specialists or Other Care Team Provider: tangela     Education and Discussions with Family / Patient: patient     Time Spent for Care: 30 minutes  More than 50% of total time spent on counseling and coordination of care as described above  Current Length of Stay: 4 day(s)    Current Patient Status: Inpatient   Certification Statement: The patient will continue to require additional inpatient hospital stay due to pending ongoing work up per podiatry, wound vac in place     Discharge Plan: pending clinical course     Code Status: Level 1 - Full Code    Subjective:   Pt seen and examined at bedside  Refusing any more blood work from now on  Also wants coffee but states she has caffeine pills listed as an allergy       Objective: Vitals:   Temp (24hrs), Av 1 °F (36 7 °C), Min:97 9 °F (36 6 °C), Max:98 2 °F (36 8 °C)    Temp:  [97 9 °F (36 6 °C)-98 2 °F (36 8 °C)] 97 9 °F (36 6 °C)  HR:  [97] 97  Resp:  [18] 18  BP: (127-130)/(74-90) 127/74  SpO2:  [94 %-95 %] 95 %  Body mass index is 35 32 kg/m²  Input and Output Summary (last 24 hours): Intake/Output Summary (Last 24 hours) at 7/3/2020 0850  Last data filed at 2020 2100  Gross per 24 hour   Intake 840 ml   Output 955 ml   Net -115 ml       Physical Exam:     Physical Exam   Constitutional: She is oriented to person, place, and time  She appears well-developed and well-nourished  No distress  HENT:   Head: Normocephalic and atraumatic  Eyes: EOM are normal  No scleral icterus  Neck: Normal range of motion  Neck supple  Cardiovascular: Normal rate, regular rhythm and normal heart sounds  Pulmonary/Chest: Effort normal and breath sounds normal    Abdominal: Soft  Bowel sounds are normal    Musculoskeletal: Normal range of motion  She exhibits no edema  Neurological: She is alert and oriented to person, place, and time  Skin: Skin is warm and dry  Wound vac in place R foot    Psychiatric: She has a normal mood and affect  Additional Data:     Labs:    Results from last 7 days   Lab Units 20  1438   WBC Thousand/uL 9 10   < > 11 14*   HEMOGLOBIN g/dL 13 5   < > 14 0   HEMATOCRIT % 41 0   < > 41 8   PLATELETS Thousands/uL 341   < > 311   NEUTROS PCT %  --   --  57   LYMPHS PCT %  --   --  33   MONOS PCT %  --   --  9   EOS PCT %  --   --  1    < > = values in this interval not displayed       Results from last 7 days   Lab Units 20  1438   SODIUM mmol/L 140   < > 138   POTASSIUM mmol/L 4 2   < > 3 9   CHLORIDE mmol/L 110*   < > 110*   CO2 mmol/L 24   < > 21   BUN mg/dL 11   < > 8   CREATININE mg/dL 0 69   < > 0 62   ANION GAP mmol/L 6   < > 7   CALCIUM mg/dL 9 3   < > 8 9   ALBUMIN g/dL  --   --  3 6   TOTAL BILIRUBIN mg/dL  --   --  0 27   ALK PHOS U/L  --   --  89   ALT U/L  --   --  24   AST U/L  --   --  15   GLUCOSE RANDOM mg/dL 101   < > 86    < > = values in this interval not displayed  * I Have Reviewed All Lab Data Listed Above  * Additional Pertinent Lab Tests Reviewed: Dean 66 Admission Reviewed    Imaging:    Imaging Reports Reviewed Today Include: all  Imaging Personally Reviewed by Myself Includes:  None     Recent Cultures (last 7 days):     Results from last 7 days   Lab Units 07/01/20  1506 06/29/20  1541   BLOOD CULTURE   --  No Growth at 72 hrs  GRAM STAIN RESULT  No Polys or Bacteria seen  --    WOUND CULTURE  Culture too young- will reincubate  --        Last 24 Hours Medication List:     Current Facility-Administered Medications:  acetaminophen 975 mg Oral Q6H [facility] Marilin Coker, DPM    cefepime 2,000 mg Intravenous Q12H Emre , DPM Last Rate: 2,000 mg (07/03/20 0237)   docusate sodium 100 mg Oral BID PRN Emre , DPM    heparin (porcine) 5,000 Units Subcutaneous [facility] [place] , DPM    HYDROmorphone 0 5 mg Intravenous Q3H PRN Barbara Ventura PA-C    metroNIDAZOLE 500 mg Oral Q8H [facility] Emre , DPM    nicotine 1 patch Transdermal Daily Emre , DPM    ondansetron 4 mg Intravenous Q6H PRN Emre , DPM    oxyCODONE 10 mg Oral Q4H PRN Barbara Ventura PA-C    oxyCODONE 5 mg Oral Q4H PRN [place] , DPM    senna 1 tablet Oral HS Barbara Ventura PA-C         Today, Patient Was Seen By: Davin Rodrigues PA-C    ** Please Note: Dictation voice to text software may have been used in the creation of this document   **

## 2020-07-03 NOTE — ASSESSMENT & PLAN NOTE
· Patient presented with cellulitis of right foot, status post incision and drainage and wound VAC placement 7/1  · Follow-up intraoperative wound cultures   · Appreciate infectious disease input, continue cefepime and flagyl   · Wound care per primary, Podiatry  · Pain control - improved today

## 2020-07-03 NOTE — PHYSICAL THERAPY NOTE
Physical Therapy Treatment Note       07/03/20 1010   Pain Assessment   Pain Assessment Tool 0-10   Pain Score 8   Pain Location/Orientation Location: Foot   Patient's Stated Pain Goal No pain   Hospital Pain Intervention(s) Ambulation/increased activity   Restrictions/Precautions   Weight Bearing Precautions Per Order Yes   RLE Weight Bearing Per Order NWB   Other Precautions WBS; Multiple lines; Fall Risk;Pain   General   Chart Reviewed Yes   Family/Caregiver Present No   Cognition   Overall Cognitive Status WFL   Arousal/Participation Responsive   Attention Attends with cues to redirect   Orientation Level Oriented X4   Memory Unable to assess   Following Commands Follows one step commands without difficulty   Subjective   Subjective states she feels OK, but pain when LE hangs in the dependant position   Bed Mobility   Supine to Sit 5  Supervision   Additional items Assist x 1   Sit to Supine 5  Supervision   Additional items Assist x 1   Transfers   Sit to Stand 4  Minimal assistance   Additional items Assist x 1   Stand to Sit 4  Minimal assistance   Additional items Assist x 1   Ambulation/Elevation   Gait pattern   (slow, antalgic, good ability to maintain NWB)   Gait Assistance 4  Minimal assist   Additional items Assist x 1  (2nd for a chair follow)   Assistive Device Rolling walker   Distance 20'x2 w/ 5 min seated rest   time spent for setup, repositioning   Balance   Static Sitting Good   Dynamic Sitting Fair   Static Standing Fair -   Dynamic Standing Fair -   Ambulatory Fair -   Endurance Deficit   Endurance Deficit Yes   Endurance Deficit Description weakness, fatigue, pain   Activity Tolerance   Activity Tolerance Patient tolerated treatment well;Patient limited by fatigue;Patient limited by pain;Treatment limited secondary to medical complications (Comment)   Nurse Made Aware yes   Assessment   Prognosis Good   Problem List Decreased strength;Decreased endurance; Impaired balance;Decreased mobility;Pain   Assessment Pt seen for session for setup, bed mob, few min on EOB, transfers, gait w/ rest time and repositioning  Pt cooperative, willing to mobilize  Limited by pain when LE hangs in a dependant positioning  able to maintain NWB well, and able to ambulate an increased distance  remains appropriate to d/c home w/ RW, family support when stable   Goals   Patient Goals to have less pain   STG Expiration Date 07/16/20   PT Treatment Day 1   Plan   Treatment/Interventions Functional transfer training;LE strengthening/ROM; Endurance training;Elevations; Therapeutic exercise;Patient/family training;Equipment eval/education;Gait training;Bed mobility   Progress Progressing toward goals   PT Frequency Other (Comment)  (3-5x/wk)   Recommendation   PT Discharge Recommendation Return to previous environment with social support;Home with skilled therapy  (and home PT)   Equipment Recommended Walker   PT - OK to Discharge Yes  (when stable to home w/ RW, home PT)   Adarsh Vivar PT, DPT CSRS

## 2020-07-03 NOTE — RESTORATIVE TECHNICIAN NOTE
Restorative Specialist Mobility Note       Activity: Ambulate in [place], Ambulate in room, Bathroom privileges, Chair, Dangle, Stand at bedside(Educated/encouraged pt to ambulate with assistance 3-4 x's/day  Bed alarm on  Pt callbell, phone/tray within reach )     Assistive Device: Front wheel walker, Other (Comment)(Assist x1 with chair follow  Pt is NWB to R LE   Assisted PT Lisa Feng )          Juanita CHRISTINE, Restorative Technician, [employer]

## 2020-07-03 NOTE — PROGRESS NOTES
Progress Note - Infectious Disease   Briana Chen 54 y o  female MRN: [id number]  Unit/Bed#: -01 Encounter: [id number]      IMPRESSION & RECOMMENDATIONS:   Impression/Recommendations:  1  Right foot cellulitis and abscess of right 1st interspace   In the setting of #2   X-ray was negative for osteomyelitis   No active drainage on exam   Consider Pseudomonas given water exposure and lack of response to oral Keflex  Now status post I and D of abscess  No underlying myonecrosis or fat necrosis noted intraoperatively  MRI was negative for osteomyelitis  OR culture was negative  Lack of response to Keflex was likely secondary to inadequate source control  Cellulitis continues to improve      -discontinue cefepime  -start IV cefazolin  -continue oral Flagyl pending anaerobic culture  -wound VAC management per Podiatry  -serial foot exams   -if continues to improve, plan to complete 10 day postoperative course of oral Keflex +/-Flagyl, through       2  Right 1st interspace wound   Likely etiology of #1   MRI was negative for osteomyelitis      -antibiotic plan as above  -wound VAC management per Podiatry  -local wound care     3  Obesity   Risk factor for development of infection   Recommend weight loss/dietary changes      4  Tobacco use   Risk factor for poor wound healing   Recommend smoking cessation         Antibiotics:  Cefepime/Flagyl 5  POD2     I discussed above plan with patient, and with Cheng Thompson from primary service  If remains in hospital, will re-evaluate patient again on   Please call us with any new questions in the interim          Subjective:  Complains of foot pain  No fevers, chills or other systemic complaints      Objective:  Vitals:  Temp:  [97 9 °F (36 6 °C)-98 2 °F (36 8 °C)] 97 9 °F (36 6 °C)  HR:  [97] 97  Resp:  [18] 18  BP: (127-130)/(74-90) 127/74  SpO2:  [94 %-95 %] 95 %  Temp (24hrs), Av 1 °F (36 7 °C), Min:97 9 °F (36 6 °C), Max:98 2 °F (36 8 °C)  Current: Temperature: 97 9 °F (36 6 °C)    Physical Exam:   General:  No acute distress  Psychiatric:  Awake and alert  Pulmonary:  Normal respiratory excursion without accessory muscle use  Abdomen:  Soft, nontender  Extremities:  No edema  Foot wound VAC in place with no ascending erythema  Skin:  No rashes    Lab Results:  I have personally reviewed pertinent labs  Results from last 7 days   Lab Units 07/03/20  0453 06/30/20  0616 06/29/20  1438   POTASSIUM mmol/L 4 2 4 3 3 9   CHLORIDE mmol/L 110* 108 110*   CO2 mmol/L 24 21 21   BUN mg/dL 11 11 8   CREATININE mg/dL 0 69 0 75 0 62   EGFR ml/min/1 73sq m 98 90 102   CALCIUM mg/dL 9 3 8 8 8 9   AST U/L  --   --  15   ALT U/L  --   --  24   ALK PHOS U/L  --   --  89     Results from last 7 days   Lab Units 07/03/20  0453 07/02/20  0550 07/01/20  0503   WBC Thousand/uL 9 10 8 81 9 57   HEMOGLOBIN g/dL 13 5 13 0 13 5   PLATELETS Thousands/uL 341 323 334     Results from last 7 days   Lab Units 07/01/20  1506 06/29/20  1541   BLOOD CULTURE   --  No Growth at 72 hrs  GRAM STAIN RESULT  No Polys or Bacteria seen  --    WOUND CULTURE  1+ Growth of   --        Imaging Studies:   I have personally reviewed pertinent imaging study reports and images in PACS  EKG, Pathology, and Other Studies:   I have personally reviewed pertinent reports

## 2020-07-03 NOTE — ASSESSMENT & PLAN NOTE
· Pt c/o caffeine withdrawal headache  · Caffeine listed as allergy - this is clinically insignificant and will remove from allergy list   · States she took a caffeine pill once and developed blisters in her mouth but drinks caffeinated beverages daily

## 2020-07-03 NOTE — PLAN OF CARE
Problem: PAIN - ADULT  Goal: Verbalizes/displays adequate comfort level or baseline comfort level  Description  Interventions:  - Encourage patient to monitor pain and request assistance  - Assess pain using appropriate pain scale  - Administer analgesics based on type and severity of pain and evaluate response  - Implement non-pharmacological measures as appropriate and evaluate response  - Consider cultural and social influences on pain and pain management  - Notify physician/advanced practitioner if interventions unsuccessful or patient reports new pain  Outcome: Progressing     Problem: INFECTION - ADULT  Goal: Absence or prevention of progression during hospitalization  Description  INTERVENTIONS:  - Assess and monitor for signs and symptoms of infection  - Monitor lab/diagnostic results  - Monitor all insertion sites, i e  indwelling lines, tubes, and drains  - Monitor endotracheal if appropriate and nasal secretions for changes in amount and color  - [place] appropriate cooling/warming therapies per order  - Administer medications as ordered  - Instruct and encourage patient and family to use good hand hygiene technique  - Identify and instruct in appropriate isolation precautions for identified infection/condition  Outcome: Progressing  Goal: Absence of fever/infection during neutropenic period  Description  INTERVENTIONS:  - Monitor WBC    Outcome: Progressing     Problem: SAFETY ADULT  Goal: Patient will remain free of falls  Description  INTERVENTIONS:  - Assess patient frequently for physical needs  -  Identify cognitive and physical deficits and behaviors that affect risk of falls    -  [place] fall precautions as indicated by assessment   - Educate patient/family on patient safety including physical limitations  - Instruct patient to call for assistance with activity based on assessment  - Modify environment to reduce risk of injury  - Consider OT/PT consult to assist with strengthening/mobility  Outcome: Progressing  Goal: Maintain or return to baseline ADL function  Description  INTERVENTIONS:  -  Assess patient's ability to carry out ADLs; assess patient's baseline for ADL function and identify physical deficits which impact ability to perform ADLs (bathing, care of mouth/teeth, toileting, grooming, dressing, etc )  - Assess/evaluate cause of self-care deficits   - Assess range of motion  - Assess patient's mobility; develop plan if impaired  - Assess patient's need for assistive devices and provide as appropriate  - Encourage maximum independence but intervene and supervise when necessary  - Involve family in performance of ADLs  - Assess for home care needs following discharge   - Consider OT consult to assist with ADL evaluation and planning for discharge  - Provide patient education as appropriate  Outcome: Progressing  Goal: Maintain or return mobility status to optimal level  Description  INTERVENTIONS:  - Assess patient's baseline mobility status (ambulation, transfers, stairs, etc )    - Identify cognitive and physical deficits and behaviors that affect mobility  - Identify mobility aids required to assist with transfers and/or ambulation (gait belt, sit-to-stand, lift, walker, cane, etc )  - [place] fall precautions as indicated by assessment  - Record patient progress and toleration of activity level on Mobility SBAR; progress patient to next Phase/Stage  - Instruct patient to call for assistance with activity based on assessment  - Consider rehabilitation consult to assist with strengthening/weightbearing, etc   Outcome: Progressing     Problem: DISCHARGE PLANNING  Goal: Discharge to home or other facility with appropriate resources  Description  INTERVENTIONS:  - Identify barriers to discharge w/patient and caregiver  - Arrange for needed discharge resources and transportation as appropriate  - Identify discharge learning needs (meds, wound care, etc )  - Arrange for interpretive services to assist at discharge as needed  - Refer to Case Management Department for coordinating discharge planning if the patient needs post-hospital services based on physician/advanced practitioner order or complex needs related to functional status, cognitive ability, or social support system  Outcome: Progressing     Problem: Knowledge Deficit  Goal: Patient/family/caregiver demonstrates understanding of disease process, treatment plan, medications, and discharge instructions  Description  Complete learning assessment and assess knowledge base  Interventions:  - Provide teaching at level of understanding  - Provide teaching via preferred learning methods  Outcome: Progressing     Problem: Nutrition/Hydration-ADULT  Goal: Nutrient/Hydration intake appropriate for improving, restoring or maintaining nutritional needs  Description  Monitor and assess patient's nutrition/hydration status for malnutrition  Collaborate with interdisciplinary team and initiate plan and interventions as ordered  Monitor patient's weight and dietary intake as ordered or per policy  Utilize nutrition screening tool and intervene as necessary  Determine patient's food preferences and provide high-protein, high-caloric foods as appropriate       INTERVENTIONS:  - Monitor oral intake, urinary output, labs, and treatment plans  - Assess nutrition and hydration status and recommend course of action  - Evaluate amount of meals eaten  - Assist patient with eating if necessary   - Allow adequate time for meals  - Recommend/ encourage appropriate diets, oral nutritional supplements, and vitamin/mineral supplements  - Order, calculate, and assess calorie counts as needed  - Recommend, monitor, and adjust tube feedings and TPN/PPN based on assessed needs  - Assess need for intravenous fluids  - Provide specific nutrition/hydration education as appropriate  - Include patient/family/caregiver in decisions related to nutrition  Outcome: Progressing     Problem: SKIN/TISSUE INTEGRITY - ADULT  Goal: Skin integrity remains intact  Description  INTERVENTIONS  - Identify patients at risk for skin breakdown  - Assess and monitor skin integrity  - Assess and monitor nutrition and hydration status  - Monitor labs (i e  albumin)  - Assess for incontinence   - Turn and reposition patient  - Assist with mobility/ambulation  - Relieve pressure over bony prominences  - Avoid friction and shearing  - Provide appropriate hygiene as needed including keeping skin clean and dry  - Evaluate need for skin moisturizer/barrier cream  - Collaborate with interdisciplinary team (i e  Nutrition, Rehabilitation, etc )   - Patient/family teaching  Outcome: Progressing  Goal: Incision(s), wounds(s) or drain site(s) healing without S/S of infection  Description  INTERVENTIONS  - Assess and document risk factors for skin impairment   - Assess and document dressing, incision, wound bed, drain sites and surrounding tissue  - Consider nutrition services referral as needed  - Oral mucous membranes remain intact  - Provide patient/ family education  Outcome: Progressing  Goal: Oral mucous membranes remain intact  Description  INTERVENTIONS  - Assess oral mucosa and hygiene practices  - Implement preventative oral hygiene regimen  - Implement oral medicated treatments as ordered  - Initiate Nutrition services referral as needed  Outcome: Progressing     Problem: MUSCULOSKELETAL - ADULT  Goal: Maintain or return mobility to safest level of function  Description  INTERVENTIONS:  - Assess patient's ability to carry out ADLs; assess patient's baseline for ADL function and identify physical deficits which impact ability to perform ADLs (bathing, care of mouth/teeth, toileting, grooming, dressing, etc )  - Assess/evaluate cause of self-care deficits   - Assess range of motion  - Assess patient's mobility  - Assess patient's need for assistive devices and provide as appropriate  - Encourage maximum independence but intervene and supervise when necessary  - Involve family in performance of ADLs  - Assess for home care needs following discharge   - Consider OT consult to assist with ADL evaluation and planning for discharge  - Provide patient education as appropriate  Outcome: Progressing  Goal: Maintain proper alignment of affected body part  Description  INTERVENTIONS:  - Support, maintain and protect limb and body alignment  - Provide patient/ family with appropriate education  Outcome: Progressing     Problem: Potential for Falls  Goal: Patient will remain free of falls  Description  INTERVENTIONS:  - Assess patient frequently for physical needs  -  Identify cognitive and physical deficits and behaviors that affect risk of falls    -  [place] fall precautions as indicated by assessment   - Educate patient/family on patient safety including physical limitations  - Instruct patient to call for assistance with activity based on assessment  - Modify environment to reduce risk of injury  - Consider OT/PT consult to assist with strengthening/mobility  Outcome: Progressing

## 2020-07-04 LAB — BACTERIA BLD CULT: NORMAL

## 2020-07-04 PROCEDURE — 99232 SBSQ HOSP IP/OBS MODERATE 35: CPT | Performed by: PHYSICIAN ASSISTANT

## 2020-07-04 PROCEDURE — 99024 POSTOP FOLLOW-UP VISIT: CPT | Performed by: PODIATRIST

## 2020-07-04 RX ORDER — CEFAZOLIN SODIUM 2 G/50ML
2000 SOLUTION INTRAVENOUS
Status: DISCONTINUED | OUTPATIENT
Start: 2020-07-05 | End: 2020-07-05 | Stop reason: HOSPADM

## 2020-07-04 RX ORDER — LORAZEPAM 2 MG/ML
1 INJECTION INTRAMUSCULAR ONCE
Status: COMPLETED | OUTPATIENT
Start: 2020-07-04 | End: 2020-07-04

## 2020-07-04 RX ADMIN — LIDOCAINE HYDROCHLORIDE 20 ML: 20 INJECTION, SOLUTION EPIDURAL; INFILTRATION; INTRACAUDAL; PERINEURAL at 07:54

## 2020-07-04 RX ADMIN — CEFAZOLIN SODIUM 2000 MG: 2 SOLUTION INTRAVENOUS at 09:50

## 2020-07-04 RX ADMIN — METRONIDAZOLE 500 MG: 500 TABLET ORAL at 21:39

## 2020-07-04 RX ADMIN — LORAZEPAM 1 MG: 2 INJECTION INTRAMUSCULAR; INTRAVENOUS at 08:13

## 2020-07-04 RX ADMIN — ACETAMINOPHEN 975 MG: 325 TABLET ORAL at 00:23

## 2020-07-04 RX ADMIN — MORPHINE SULFATE 2 MG: 2 INJECTION, SOLUTION INTRAMUSCULAR; INTRAVENOUS at 07:59

## 2020-07-04 RX ADMIN — METRONIDAZOLE 500 MG: 500 TABLET ORAL at 06:25

## 2020-07-04 RX ADMIN — CEFAZOLIN SODIUM 2000 MG: 2 SOLUTION INTRAVENOUS at 17:28

## 2020-07-04 RX ADMIN — HEPARIN SODIUM 5000 UNITS: 5000 INJECTION INTRAVENOUS; SUBCUTANEOUS at 14:35

## 2020-07-04 RX ADMIN — STANDARDIZED SENNA CONCENTRATE 8.6 MG: 8.6 TABLET ORAL at 21:39

## 2020-07-04 RX ADMIN — METRONIDAZOLE 500 MG: 500 TABLET ORAL at 14:45

## 2020-07-04 RX ADMIN — ACETAMINOPHEN 975 MG: 325 TABLET ORAL at 06:25

## 2020-07-04 RX ADMIN — CEFAZOLIN SODIUM 2000 MG: 2 SOLUTION INTRAVENOUS at 00:23

## 2020-07-04 RX ADMIN — ACETAMINOPHEN 975 MG: 325 TABLET ORAL at 13:26

## 2020-07-04 NOTE — PROGRESS NOTES
Progress Note - Laurel Rogers 1965, 54 y o  female MRN: [id number]  Unit/Bed#: -01 Encounter: [id number] DOS: 2020  Primary Care Provider: No primary care provider on file  Date and time admitted to hospital: 2020 11:17 AM    * Abscess of bursa of right foot  Assessment & Plan  · Patient presented with cellulitis of right foot, status post incision and drainage and wound VAC placement   · Follow-up intraoperative wound cultures - negative thus far   · Appreciate infectious disease input - discontinue cefepime and start IV cefazolin, continue oral Flagyl pending anaerobic culture and plan plan to complete 10 day postoperative course of oral Keflex +/-Flagyl, through   · Wound care per primary, Podiatry  Wound vac removed today  · Likely back to OR tomorrow   · Pain control     Tobacco abuse  Assessment & Plan  · Encourage cessation, especially to improve wound healing   · NRT ordered     VTE Pharmacologic Prophylaxis:   Pharmacologic: Heparin  Mechanical VTE Prophylaxis in Place: Yes    Patient Centered Rounds: I have performed bedside rounds with nursing staff today  Discussions with Specialists or Other Care Team Provider: nursing     Education and Discussions with Family / Patient: patient     Time Spent for Care: 30 minutes  More than 50% of total time spent on counseling and coordination of care as described above  Current Length of Stay: 5 day(s)    Current Patient Status: Inpatient   Certification Statement: The patient will continue to require additional inpatient hospital stay due to ongoing podiatry surgical plan     Discharge Plan: pending clinical course     Code Status: Level 1 - Full Code      Subjective:   Pt seen and examined at bedside  Just removed wound vac  In pain  Wants coffee       Objective:     Vitals:   Temp (24hrs), Av °F (36 7 °C), Min:97 7 °F (36 5 °C), Max:98 4 °F (36 9 °C)    Temp:  [97 7 °F (36 5 °C)-98 4 °F (36 9 °C)] 97 7 °F (36 5 °C)  HR: [67-83] 67  Resp:  [14-17] 14  BP: (141-172)/() 147/86  SpO2:  [94 %-97 %] 97 %  Body mass index is 35 32 kg/m²  Input and Output Summary (last 24 hours): Intake/Output Summary (Last 24 hours) at 7/4/2020 0930  Last data filed at 7/4/2020 0313  Gross per 24 hour   Intake 480 ml   Output 1425 ml   Net -945 ml       Physical Exam:     Physical Exam   Constitutional: She is oriented to person, place, and time  She appears well-developed and well-nourished  HENT:   Head: Normocephalic and atraumatic  Eyes: EOM are normal  No scleral icterus  Neck: Normal range of motion  Neck supple  Cardiovascular: Normal rate, regular rhythm and normal heart sounds  Pulmonary/Chest: Effort normal and breath sounds normal    Abdominal: Soft  Bowel sounds are normal    Musculoskeletal: Normal range of motion  She exhibits no edema  Neurological: She is alert and oriented to person, place, and time  Skin: Skin is warm and dry  No erythema  Psychiatric: She has a normal mood and affect  Additional Data:     Labs:    Results from last 7 days   Lab Units 07/03/20  0453  06/29/20  1438   WBC Thousand/uL 9 10   < > 11 14*   HEMOGLOBIN g/dL 13 5   < > 14 0   HEMATOCRIT % 41 0   < > 41 8   PLATELETS Thousands/uL 341   < > 311   NEUTROS PCT %  --   --  57   LYMPHS PCT %  --   --  33   MONOS PCT %  --   --  9   EOS PCT %  --   --  1    < > = values in this interval not displayed  Results from last 7 days   Lab Units 07/03/20  0453  06/29/20  1438   SODIUM mmol/L 140   < > 138   POTASSIUM mmol/L 4 2   < > 3 9   CHLORIDE mmol/L 110*   < > 110*   CO2 mmol/L 24   < > 21   BUN mg/dL 11   < > 8   CREATININE mg/dL 0 69   < > 0 62   ANION GAP mmol/L 6   < > 7   CALCIUM mg/dL 9 3   < > 8 9   ALBUMIN g/dL  --   --  3 6   TOTAL BILIRUBIN mg/dL  --   --  0 27   ALK PHOS U/L  --   --  89   ALT U/L  --   --  24   AST U/L  --   --  15   GLUCOSE RANDOM mg/dL 101   < > 86    < > = values in this interval not displayed  * I Have Reviewed All Lab Data Listed Above  * Additional Pertinent Lab Tests Reviewed: Dean 66 Admission Reviewed    Imaging:    Imaging Reports Reviewed Today Include: all  Imaging Personally Reviewed by Myself Includes:  none    Recent Cultures (last 7 days):     Results from last 7 days   Lab Units 07/01/20  1506 06/29/20  1541   BLOOD CULTURE   --  No Growth After 4 Days  GRAM STAIN RESULT  No Polys or Bacteria seen  --    WOUND CULTURE  1+ Growth of   --        Last 24 Hours Medication List:     Current Facility-Administered Medications:  acetaminophen 975 mg Oral Q6H [address] Gely Bone, DPM    cefazolin 2,000 mg Intravenous Q8H Jenelle Aldea, DO Last Rate: 2,000 mg (07/04/20 0023)   cefazolin 2,000 mg Intravenous On Call To OR Gely Bone, DPM    docusate sodium 100 mg Oral BID PRN Aleyda Milder, DPM    heparin (porcine) 5,000 Units Subcutaneous [facility] Aleyda Milder, DPM    HYDROmorphone 0 5 mg Intravenous Q3H PRN Tony Ventura PA-C    metroNIDAZOLE 500 mg Oral Q8H [address] Aleyda Milder, DPM    ondansetron 4 mg Intravenous Q6H PRN Aleyda Milder, DPM    oxyCODONE 10 mg Oral Q4H PRN Tony Ventura PA-C    oxyCODONE 5 mg Oral Q4H PRN Aleyda Milder, DPM    senna 1 tablet Oral HS Tony Ventura PA-C         Today, Patient Was Seen By: Noni Llanos PA-C    ** Please Note: Dictation voice to text software may have been used in the creation of this document   **

## 2020-07-04 NOTE — ASSESSMENT & PLAN NOTE
· Patient presented with cellulitis of right foot, status post incision and drainage and wound VAC placement 7/1  · Follow-up intraoperative wound cultures - negative thus far   · Appreciate infectious disease input - discontinue cefepime and start IV cefazolin, continue oral Flagyl pending anaerobic culture and plan plan to complete 10 day postoperative course of oral Keflex +/-Flagyl, through 7/11  · Wound care per primary, Podiatry  Wound vac removed today    · Likely back to OR tomorrow   · Pain control

## 2020-07-04 NOTE — PROGRESS NOTES
Progress Note - Podiatry  Maddy Alexander 54 y o  female MRN: [id number]  Unit/Bed#: -01 Encounter: [id number]    Assessment:  54 y o  Female  1  Cellulitis right foot secondary to right 1st interspace abscess- Cellulite resolved  2  Right 1st interspace abcess  - S/P I&D right foot (DOS 7/1/20)  3  Obesity  4  Tobacco use    Plan:  - Wound vac was removed and underlining wound was assessed: Appear granular and with out acute sign of infection  Plan for [facility] application of skin graft substitute tomorrow 7/5/20  NPO midnight  Consent to be reviewed and signed tomorrow  - Intra op wound culture: gram stain negative for bacterial growth; pending anaerobic intra-op wound cultures  NO leukocytosis, afebrile, vital signs stable  - Appreciate medical manage per Internal medicine  - Appreciate antibiotic management per ID  - Plan was discussed with Dr Alexi Fernandes on bed side rounds    Weight bearing status: NWB right foot  DVT prophylaxis: heparin  Antibiotics: Flagyl, Cefazolin per ID    Disposition: patient is not stable for discharge yet      Subjective/Objective   Chief Complaint: No chief complaint on file  Subjective: 54 y o  female was seen and evaluated at bedside  She reports pain on right foot  Blood pressure (!) 172/102, pulse 83, temperature 97 9 °F (36 6 °C), resp  rate 17, height 5' 1" (1 549 m), weight 84 8 kg (186 lb 15 2 oz), SpO2 94 %, not currently breastfeeding  Body mass index is 35 32 kg/m²  Physical Exam:   General: Alert, cooperative and no distress  Lower Extremities: Neurovascular status at baseline bilaterally, musculoskeletal function at baseline bilaterally, no calf tenderness bilaterally, no caff tenderness  Wound was assess and it appears granular  NO erythema, no edema, no crepitus, no pus, no fluctuance  NO gross signs of infection                       Add picture       Lab, Imaging and other studies:   Results from last 7 days   Lab Units 07/03/20  7939 06/29/20  1438   WBC Thousand/uL 9 10   < > 11 14*   HEMOGLOBIN g/dL 13 5   < > 14 0   HEMATOCRIT % 41 0   < > 41 8   PLATELETS Thousands/uL 341   < > 311   NEUTROS PCT %  --   --  57   LYMPHS PCT %  --   --  33   MONOS PCT %  --   --  9   EOS PCT %  --   --  1    < > = values in this interval not displayed  Results from last 7 days   Lab Units 07/03/20  0453  06/29/20  1438   POTASSIUM mmol/L 4 2   < > 3 9   CHLORIDE mmol/L 110*   < > 110*   CO2 mmol/L 24   < > 21   BUN mg/dL 11   < > 8   CREATININE mg/dL 0 69   < > 0 62   CALCIUM mg/dL 9 3   < > 8 9   ALK PHOS U/L  --   --  89   ALT U/L  --   --  24   AST U/L  --   --  15    < > = values in this interval not displayed  Results from last 7 days   Lab Units 07/01/20  1506 06/29/20  1541   BLOOD CULTURE   --  No Growth After 4 Days  GRAM STAIN RESULT  No Polys or Bacteria seen  --    WOUND CULTURE  1+ Growth of   --      Results from last 7 days   Lab Units 07/01/20  1506   ANAEROBIC CULTURE  Culture results to follow  Imaging: I have personally reviewed pertinent films and reports in PACS  EKG, Pathology, and Other Studies: I have personally reviewed pertinent reports  VTE Pharmacologic Prophylaxis: Heparin    Portions of the record may have been created with voice recognition software  Occasional wrong word or "sound a like" substitutions may have occurred due to the inherent limitations of voice recognition software  Read the chart carefully and recognize, using context, where substitutions have occurred      LEONCIO Aguirre [facility]

## 2020-07-05 ENCOUNTER — ANESTHESIA (INPATIENT)
Dept: PERIOP | Facility: HOSPITAL | Age: 55
DRG: 574 | End: 2020-07-05
Payer: COMMERCIAL

## 2020-07-05 ENCOUNTER — ANESTHESIA EVENT (INPATIENT)
Dept: PERIOP | Facility: HOSPITAL | Age: 55
DRG: 574 | End: 2020-07-05
Payer: COMMERCIAL

## 2020-07-05 LAB
BACTERIA SPEC ANAEROBE CULT: ABNORMAL
BACTERIA SPEC ANAEROBE CULT: ABNORMAL

## 2020-07-05 PROCEDURE — 0HRMXK4 REPLACEMENT OF RIGHT FOOT SKIN WITH NONAUTOLOGOUS TISSUE SUBSTITUTE, PARTIAL THICKNESS, EXTERNAL APPROACH: ICD-10-PCS | Performed by: PODIATRIST

## 2020-07-05 PROCEDURE — 13160 SEC CLSR SURG WND/DEHSN XTN: CPT | Performed by: PODIATRIST

## 2020-07-05 PROCEDURE — 15271 SKIN SUB GRAFT TRNK/ARM/LEG: CPT | Performed by: PODIATRIST

## 2020-07-05 PROCEDURE — 99232 SBSQ HOSP IP/OBS MODERATE 35: CPT | Performed by: PHYSICIAN ASSISTANT

## 2020-07-05 PROCEDURE — NC001 PR NO CHARGE: Performed by: PODIATRIST

## 2020-07-05 DEVICE — IMPLANTABLE DEVICE: Type: IMPLANTABLE DEVICE | Site: FOOT | Status: FUNCTIONAL

## 2020-07-05 RX ORDER — FENTANYL CITRATE/PF 50 MCG/ML
25 SYRINGE (ML) INJECTION
Status: DISCONTINUED | OUTPATIENT
Start: 2020-07-05 | End: 2020-07-05 | Stop reason: HOSPADM

## 2020-07-05 RX ORDER — LIDOCAINE HYDROCHLORIDE 10 MG/ML
INJECTION, SOLUTION EPIDURAL; INFILTRATION; INTRACAUDAL; PERINEURAL AS NEEDED
Status: DISCONTINUED | OUTPATIENT
Start: 2020-07-05 | End: 2020-07-05 | Stop reason: SURG

## 2020-07-05 RX ORDER — EPHEDRINE SULFATE 50 MG/ML
INJECTION INTRAVENOUS AS NEEDED
Status: DISCONTINUED | OUTPATIENT
Start: 2020-07-05 | End: 2020-07-05 | Stop reason: SURG

## 2020-07-05 RX ORDER — ONDANSETRON 2 MG/ML
4 INJECTION INTRAMUSCULAR; INTRAVENOUS ONCE AS NEEDED
Status: DISCONTINUED | OUTPATIENT
Start: 2020-07-05 | End: 2020-07-05 | Stop reason: HOSPADM

## 2020-07-05 RX ORDER — PROPOFOL 10 MG/ML
INJECTION, EMULSION INTRAVENOUS AS NEEDED
Status: DISCONTINUED | OUTPATIENT
Start: 2020-07-05 | End: 2020-07-05 | Stop reason: SURG

## 2020-07-05 RX ORDER — MIDAZOLAM HYDROCHLORIDE 2 MG/2ML
INJECTION, SOLUTION INTRAMUSCULAR; INTRAVENOUS AS NEEDED
Status: DISCONTINUED | OUTPATIENT
Start: 2020-07-05 | End: 2020-07-05 | Stop reason: SURG

## 2020-07-05 RX ORDER — ONDANSETRON 2 MG/ML
INJECTION INTRAMUSCULAR; INTRAVENOUS AS NEEDED
Status: DISCONTINUED | OUTPATIENT
Start: 2020-07-05 | End: 2020-07-05 | Stop reason: SURG

## 2020-07-05 RX ORDER — SODIUM CHLORIDE, SODIUM LACTATE, POTASSIUM CHLORIDE, CALCIUM CHLORIDE 600; 310; 30; 20 MG/100ML; MG/100ML; MG/100ML; MG/100ML
INJECTION, SOLUTION INTRAVENOUS CONTINUOUS PRN
Status: DISCONTINUED | OUTPATIENT
Start: 2020-07-05 | End: 2020-07-05 | Stop reason: SURG

## 2020-07-05 RX ORDER — FENTANYL CITRATE 50 UG/ML
INJECTION, SOLUTION INTRAMUSCULAR; INTRAVENOUS AS NEEDED
Status: DISCONTINUED | OUTPATIENT
Start: 2020-07-05 | End: 2020-07-05 | Stop reason: SURG

## 2020-07-05 RX ORDER — DEXAMETHASONE SODIUM PHOSPHATE 10 MG/ML
INJECTION, SOLUTION INTRAMUSCULAR; INTRAVENOUS AS NEEDED
Status: DISCONTINUED | OUTPATIENT
Start: 2020-07-05 | End: 2020-07-05 | Stop reason: SURG

## 2020-07-05 RX ADMIN — EPHEDRINE SULFATE 10 MG: 50 INJECTION, SOLUTION INTRAVENOUS at 08:20

## 2020-07-05 RX ADMIN — METRONIDAZOLE 500 MG: 500 TABLET ORAL at 15:42

## 2020-07-05 RX ADMIN — HEPARIN SODIUM 5000 UNITS: 5000 INJECTION INTRAVENOUS; SUBCUTANEOUS at 15:33

## 2020-07-05 RX ADMIN — ONDANSETRON 4 MG: 2 INJECTION INTRAMUSCULAR; INTRAVENOUS at 08:08

## 2020-07-05 RX ADMIN — ACETAMINOPHEN 975 MG: 325 TABLET ORAL at 00:09

## 2020-07-05 RX ADMIN — PHENYLEPHRINE HYDROCHLORIDE 100 MCG: 10 INJECTION INTRAVENOUS at 08:26

## 2020-07-05 RX ADMIN — FENTANYL CITRATE 50 MCG: 50 INJECTION, SOLUTION INTRAMUSCULAR; INTRAVENOUS at 07:58

## 2020-07-05 RX ADMIN — PHENYLEPHRINE HYDROCHLORIDE 200 MCG: 10 INJECTION INTRAVENOUS at 08:37

## 2020-07-05 RX ADMIN — FENTANYL CITRATE 50 MCG: 50 INJECTION, SOLUTION INTRAMUSCULAR; INTRAVENOUS at 08:07

## 2020-07-05 RX ADMIN — SODIUM CHLORIDE, SODIUM LACTATE, POTASSIUM CHLORIDE, AND CALCIUM CHLORIDE: .6; .31; .03; .02 INJECTION, SOLUTION INTRAVENOUS at 07:41

## 2020-07-05 RX ADMIN — EPHEDRINE SULFATE 10 MG: 50 INJECTION, SOLUTION INTRAVENOUS at 08:14

## 2020-07-05 RX ADMIN — METRONIDAZOLE 500 MG: 500 TABLET ORAL at 05:23

## 2020-07-05 RX ADMIN — DEXAMETHASONE SODIUM PHOSPHATE 10 MG: 10 INJECTION, SOLUTION INTRAMUSCULAR; INTRAVENOUS at 08:08

## 2020-07-05 RX ADMIN — PHENYLEPHRINE HYDROCHLORIDE 100 MCG: 10 INJECTION INTRAVENOUS at 08:24

## 2020-07-05 RX ADMIN — ACETAMINOPHEN 975 MG: 325 TABLET ORAL at 22:02

## 2020-07-05 RX ADMIN — METRONIDAZOLE 500 MG: 500 TABLET ORAL at 22:01

## 2020-07-05 RX ADMIN — LIDOCAINE HYDROCHLORIDE 100 MG: 10 INJECTION, SOLUTION EPIDURAL; INFILTRATION; INTRACAUDAL; PERINEURAL at 07:58

## 2020-07-05 RX ADMIN — CEFAZOLIN SODIUM 2000 MG: 2 SOLUTION INTRAVENOUS at 16:13

## 2020-07-05 RX ADMIN — PROPOFOL 200 MG: 10 INJECTION, EMULSION INTRAVENOUS at 07:58

## 2020-07-05 RX ADMIN — CEFAZOLIN SODIUM 2000 MG: 2 SOLUTION INTRAVENOUS at 00:09

## 2020-07-05 RX ADMIN — MIDAZOLAM 2 MG: 1 INJECTION INTRAMUSCULAR; INTRAVENOUS at 07:49

## 2020-07-05 RX ADMIN — CEFAZOLIN SODIUM 2000 MG: 2 SOLUTION INTRAVENOUS at 07:49

## 2020-07-05 RX ADMIN — PHENYLEPHRINE HYDROCHLORIDE 100 MCG: 10 INJECTION INTRAVENOUS at 08:20

## 2020-07-05 NOTE — PLAN OF CARE
Problem: PAIN - ADULT  Goal: Verbalizes/displays adequate comfort level or baseline comfort level  Description  Interventions:  - Encourage patient to monitor pain and request assistance  - Assess pain using appropriate pain scale  - Administer analgesics based on type and severity of pain and evaluate response  - Implement non-pharmacological measures as appropriate and evaluate response  - Consider cultural and social influences on pain and pain management  - Notify physician/advanced practitioner if interventions unsuccessful or patient reports new pain  Outcome: Progressing     Problem: INFECTION - ADULT  Goal: Absence or prevention of progression during hospitalization  Description  INTERVENTIONS:  - Assess and monitor for signs and symptoms of infection  - Monitor lab/diagnostic results  - Monitor all insertion sites, i e  indwelling lines, tubes, and drains  - Monitor endotracheal if appropriate and nasal secretions for changes in amount and color  - [place] appropriate cooling/warming therapies per order  - Administer medications as ordered  - Instruct and encourage patient and family to use good hand hygiene technique  - Identify and instruct in appropriate isolation precautions for identified infection/condition  Outcome: Progressing  Goal: Absence of fever/infection during neutropenic period  Description  INTERVENTIONS:  - Monitor WBC    Outcome: Progressing     Problem: SAFETY ADULT  Goal: Patient will remain free of falls  Description  INTERVENTIONS:  - Assess patient frequently for physical needs  -  Identify cognitive and physical deficits and behaviors that affect risk of falls    -  [place] fall precautions as indicated by assessment   - Educate patient/family on patient safety including physical limitations  - Instruct patient to call for assistance with activity based on assessment  - Modify environment to reduce risk of injury  - Consider OT/PT consult to assist with strengthening/mobility  Outcome: Progressing  Goal: Maintain or return to baseline ADL function  Description  INTERVENTIONS:  -  Assess patient's ability to carry out ADLs; assess patient's baseline for ADL function and identify physical deficits which impact ability to perform ADLs (bathing, care of mouth/teeth, toileting, grooming, dressing, etc )  - Assess/evaluate cause of self-care deficits   - Assess range of motion  - Assess patient's mobility; develop plan if impaired  - Assess patient's need for assistive devices and provide as appropriate  - Encourage maximum independence but intervene and supervise when necessary  - Involve family in performance of ADLs  - Assess for home care needs following discharge   - Consider OT consult to assist with ADL evaluation and planning for discharge  - Provide patient education as appropriate  Outcome: Progressing  Goal: Maintain or return mobility status to optimal level  Description  INTERVENTIONS:  - Assess patient's baseline mobility status (ambulation, transfers, stairs, etc )    - Identify cognitive and physical deficits and behaviors that affect mobility  - Identify mobility aids required to assist with transfers and/or ambulation (gait belt, sit-to-stand, lift, walker, cane, etc )  - [place] fall precautions as indicated by assessment  - Record patient progress and toleration of activity level on Mobility SBAR; progress patient to next Phase/Stage  - Instruct patient to call for assistance with activity based on assessment  - Consider rehabilitation consult to assist with strengthening/weightbearing, etc   Outcome: Progressing     Problem: DISCHARGE PLANNING  Goal: Discharge to home or other facility with appropriate resources  Description  INTERVENTIONS:  - Identify barriers to discharge w/patient and caregiver  - Arrange for needed discharge resources and transportation as appropriate  - Identify discharge learning needs (meds, wound care, etc )  - Arrange for interpretive services to assist at discharge as needed  - Refer to Case Management Department for coordinating discharge planning if the patient needs post-hospital services based on physician/advanced practitioner order or complex needs related to functional status, cognitive ability, or social support system  Outcome: Progressing     Problem: Knowledge Deficit  Goal: Patient/family/caregiver demonstrates understanding of disease process, treatment plan, medications, and discharge instructions  Description  Complete learning assessment and assess knowledge base  Interventions:  - Provide teaching at level of understanding  - Provide teaching via preferred learning methods  Outcome: Progressing     Problem: Nutrition/Hydration-ADULT  Goal: Nutrient/Hydration intake appropriate for improving, restoring or maintaining nutritional needs  Description  Monitor and assess patient's nutrition/hydration status for malnutrition  Collaborate with interdisciplinary team and initiate plan and interventions as ordered  Monitor patient's weight and dietary intake as ordered or per policy  Utilize nutrition screening tool and intervene as necessary  Determine patient's food preferences and provide high-protein, high-caloric foods as appropriate       INTERVENTIONS:  - Monitor oral intake, urinary output, labs, and treatment plans  - Assess nutrition and hydration status and recommend course of action  - Evaluate amount of meals eaten  - Assist patient with eating if necessary   - Allow adequate time for meals  - Recommend/ encourage appropriate diets, oral nutritional supplements, and vitamin/mineral supplements  - Order, calculate, and assess calorie counts as needed  - Recommend, monitor, and adjust tube feedings and TPN/PPN based on assessed needs  - Assess need for intravenous fluids  - Provide specific nutrition/hydration education as appropriate  - Include patient/family/caregiver in decisions related to nutrition  Outcome: Progressing     Problem: SKIN/TISSUE INTEGRITY - ADULT  Goal: Skin integrity remains intact  Description  INTERVENTIONS  - Identify patients at risk for skin breakdown  - Assess and monitor skin integrity  - Assess and monitor nutrition and hydration status  - Monitor labs (i e  albumin)  - Assess for incontinence   - Turn and reposition patient  - Assist with mobility/ambulation  - Relieve pressure over bony prominences  - Avoid friction and shearing  - Provide appropriate hygiene as needed including keeping skin clean and dry  - Evaluate need for skin moisturizer/barrier cream  - Collaborate with interdisciplinary team (i e  Nutrition, Rehabilitation, etc )   - Patient/family teaching  Outcome: Progressing  Goal: Incision(s), wounds(s) or drain site(s) healing without S/S of infection  Description  INTERVENTIONS  - Assess and document risk factors for skin impairment   - Assess and document dressing, incision, wound bed, drain sites and surrounding tissue  - Consider nutrition services referral as needed  - Oral mucous membranes remain intact  - Provide patient/ family education  Outcome: Progressing  Goal: Oral mucous membranes remain intact  Description  INTERVENTIONS  - Assess oral mucosa and hygiene practices  - Implement preventative oral hygiene regimen  - Implement oral medicated treatments as ordered  - Initiate Nutrition services referral as needed  Outcome: Progressing     Problem: MUSCULOSKELETAL - ADULT  Goal: Maintain or return mobility to safest level of function  Description  INTERVENTIONS:  - Assess patient's ability to carry out ADLs; assess patient's baseline for ADL function and identify physical deficits which impact ability to perform ADLs (bathing, care of mouth/teeth, toileting, grooming, dressing, etc )  - Assess/evaluate cause of self-care deficits   - Assess range of motion  - Assess patient's mobility  - Assess patient's need for assistive devices and provide as appropriate  - Encourage maximum independence but intervene and supervise when necessary  - Involve family in performance of ADLs  - Assess for home care needs following discharge   - Consider OT consult to assist with ADL evaluation and planning for discharge  - Provide patient education as appropriate  Outcome: Progressing  Goal: Maintain proper alignment of affected body part  Description  INTERVENTIONS:  - Support, maintain and protect limb and body alignment  - Provide patient/ family with appropriate education  Outcome: Progressing     Problem: Potential for Falls  Goal: Patient will remain free of falls  Description  INTERVENTIONS:  - Assess patient frequently for physical needs  -  Identify cognitive and physical deficits and behaviors that affect risk of falls    -  [place] fall precautions as indicated by assessment   - Educate patient/family on patient safety including physical limitations  - Instruct patient to call for assistance with activity based on assessment  - Modify environment to reduce risk of injury  - Consider OT/PT consult to assist with strengthening/mobility  Outcome: Progressing

## 2020-07-05 NOTE — OP NOTE
OPERATIVE REPORT  PATIENT NAME: Lorri Hylton    :  1965  MRN: [id number]  Pt Location:  OR ROOM 07    SURGERY DATE: 2020    Surgeon(s) and Role:     * Esther Kohli DPM - Primary     * Marilin Coker DPM - Assisting    Preop Diagnosis:  Wound of right foot [S91 301A]    Post-Op Diagnosis Codes:     * Wound of right foot [S91 301A]    Procedure(s) (LRB):  CLOSURE DELAYED PRIMARY and application of skin graft substitute (Right)    Specimen(s):  * No specimens in log *    Estimated Blood Loss:   Minimal    Drains:  * No LDAs found *    Anesthesia Type:   Choice    Operative Indications:  Wound of right foot [S91 301A]      Operative Findings:  No signs of active infection: no purulence, no malodor, no ascending erythema, no crepitus, no fluctuance  Wound bed appeared fully granular with bleeding viable tissue  No purulence expressed  Complications:   None    Procedure and Technique:  Under mild sedation, the patient was brought into the operating room and placed on the operating room table in the supine position  A pneumatic ankle tourniquet was then placed around the patient's right ankle with ample webril padding  A time out was performed to confirm the correct patient, procedure and site with all parties in agreement  Following IV sedation, a "Brunett" block was performed consisting of 14 ml of 1% Lidocaine and 0 5% Bupivacaine in a 1:1 mixture  The foot was then scrubbed, prepped and draped in the usual aseptic manner  An esmarch bandage was utilized to exsangunate the patients foot and the pneumatic ankle tourniquet was then inflated  The esmarch bandage was removed and the foot was placed on the operating room table  Right 1st interspace wound was flushed with copious amount of normal sterile saline  A curette was used to remove all fibrotic, non-viable tissue   The wound bed appears granular and was inspected with no signs of active infection: no purulence, no malodor, no ascending erythema, no crepitus, no fluctuance  A #15 blade was then used to stimulate activate bleeding in the wound bed  The wound was again flushed with copious amounts of normal sterile saline  The foot was then cleansed and dried  Skin prep was applied to the skin surrounding the wound  5cm x 5cm Hyalomatrix was applied partially within the distal portion of the wound, the remaining Hyalomatrix was sutured to the distal aspect of the wound  The proximal, dorsal aspect of the incision was closed with 4-0 Nylon  The area was then dressed with adaptic and DSD  The tourniquet was deflated with normal hyperemic response noted in all toes  The patient tolerated the procedure and anesthesia well and was transported to the PACU with vital signs stable  As with many limb salvage procedures, we contemplate the possibility of performing further stages to this procedure  Procedures may include debridements, delayed closure, plastic surgery techniques, or more proximal amputations  This procedure may be considered part of a multi-staged limb salvage treatment plan        Patient Disposition:  PACU     SIGNATURE: Marilin Coker DPM  DATE: July 5, 2020  TIME: 8:52 AM

## 2020-07-05 NOTE — DISCHARGE INSTRUCTIONS
Discharge Instructions - Podiatry    Weight Bearing Status: Nonweightbearing right foot  Pain: Continue analgesics as directed    Follow-up appointment instructions: Please make an appointment within one week of discharge with Dr Alexi Fernandes at [address] [place] [address]  Contact sooner if any increase in pain, or signs of infection occur  Wound Care: Leave dressings clean, dry, and intact until 1st outpatient office visit  Nursing Instructions: Every M, W, F dressing changes to right foot consisting of adaptic and DSD  Please assess for signs of infection

## 2020-07-05 NOTE — ANESTHESIA PREPROCEDURE EVALUATION
Review of Systems/Medical History  Patient summary reviewed  Chart reviewed  No history of anesthetic complications     Cardiovascular  Exercise tolerance (METS): >4,     Pulmonary  Smoker cigarette smoker  Cumulative Pack Years: 13,        GI/Hepatic       Kidney stones,        Endo/Other     GYN       Hematology   Musculoskeletal  Sciatica,   Arthritis     Neurology   Psychology   Depression ,              Physical Exam    Airway    Mallampati score: II  TM Distance: >3 FB  Neck ROM: full     Dental   No notable dental hx     Cardiovascular  Cardiovascular exam normal    Pulmonary  Pulmonary exam normal     Other Findings        Anesthesia Plan  ASA Score- 2     Anesthesia Type- general with ASA Monitors  Additional Monitors:   Airway Plan: LMA  Plan Factors-    Induction- intravenous  Postoperative Plan- Plan for postoperative opioid use  Informed Consent- Anesthetic plan and risks discussed with patient  I personally reviewed this patient with the CRNA  Discussed and agreed on the Anesthesia Plan with the CRNA  Matthew Carvajal

## 2020-07-05 NOTE — ASSESSMENT & PLAN NOTE
· Patient presented with cellulitis of right foot, status post incision and drainage and wound VAC placement 7/1, vac takedown 7/4, and s/p delayed primary closure with application of skin graft 7/5   · Follow-up intraoperative wound cultures - negative thus far   · Appreciate infectious disease input - continue IV cefazolin and oral Flagyl pending anaerobic culture and plan plan to complete 10 day postoperative course of oral Keflex +/-Flagyl, through 7/11    · Wound care per primary, Podiatry   · Pain control

## 2020-07-05 NOTE — PROGRESS NOTES
Progress Note - Kevin Saavedra 1965, 54 y o  female MRN: [id number]  Unit/Bed#: -01 Encounter: [id number] DOS: 2020  Primary Care Provider: No primary care provider on file  Date and time admitted to hospital: 2020 11:17 AM    * Abscess of bursa of right foot  Assessment & Plan  · Patient presented with cellulitis of right foot, status post incision and drainage and wound VAC placement , vac takedown , and s/p delayed primary closure with application of skin graft    · Follow-up intraoperative wound cultures - negative thus far   · Appreciate infectious disease input - continue IV cefazolin and oral Flagyl pending anaerobic culture and plan plan to complete 10 day postoperative course of oral Keflex +/-Flagyl, through   · Wound care per primary, Podiatry   · Pain control     Tobacco abuse  Assessment & Plan  · Encourage cessation, especially to improve wound healing   · NRT ordered     VTE Pharmacologic Prophylaxis:   Pharmacologic: Heparin  Mechanical VTE Prophylaxis in Place: Yes    Patient Centered Rounds: I have performed bedside rounds with nursing staff today  Discussions with Specialists or Other Care Team Provider: nursing     Education and Discussions with Family / Patient: patient    Time Spent for Care: 30 minutes  More than 50% of total time spent on counseling and coordination of care as described above  Current Length of Stay: 6 day(s)    Current Patient Status: Inpatient   Certification Statement: The patient will continue to require additional inpatient hospital stay due to pending improvement in cellulitis and surgical plan per primary     Discharge Plan: medically stable for d/c from medicine standpoint, ongoing surgical plan per primary     Code Status: Level 1 - Full Code    Subjective:   Pt seen and examined at bedside  Notes dizziness post op  Pain controlled       Objective:     Vitals:   Temp (24hrs), Av 9 °F (36 6 °C), Min:97 2 °F (36 2 °C), Max:98 2 °F (36 8 °C)    Temp:  [97 2 °F (36 2 °C)-98 2 °F (36 8 °C)] 98 1 °F (36 7 °C)  HR:  [] 100  Resp:  [14-21] 21  BP: ()/() 110/73  SpO2:  [93 %-98 %] 98 %  Body mass index is 35 32 kg/m²  Input and Output Summary (last 24 hours): Intake/Output Summary (Last 24 hours) at 7/5/2020 1122  Last data filed at 7/5/2020 4627  Gross per 24 hour   Intake 750 ml   Output 2150 ml   Net -1400 ml       Physical Exam:     Physical Exam   Constitutional: She is oriented to person, place, and time  She appears well-developed and well-nourished  No distress  HENT:   Head: Normocephalic and atraumatic  Eyes: EOM are normal  No scleral icterus  Neck: Normal range of motion  Neck supple  Cardiovascular: Normal rate, regular rhythm and normal heart sounds  Pulmonary/Chest: Effort normal and breath sounds normal    Abdominal: Soft  Bowel sounds are normal    Musculoskeletal: Normal range of motion  She exhibits no edema  Neurological: She is alert and oriented to person, place, and time  Skin: Skin is warm and dry  Right foot dressing intact    Psychiatric: She has a normal mood and affect  Additional Data:     Labs:    Results from last 7 days   Lab Units 07/03/20  0453  06/29/20  1438   WBC Thousand/uL 9 10   < > 11 14*   HEMOGLOBIN g/dL 13 5   < > 14 0   HEMATOCRIT % 41 0   < > 41 8   PLATELETS Thousands/uL 341   < > 311   NEUTROS PCT %  --   --  57   LYMPHS PCT %  --   --  33   MONOS PCT %  --   --  9   EOS PCT %  --   --  1    < > = values in this interval not displayed       Results from last 7 days   Lab Units 07/03/20  0453  06/29/20  1438   SODIUM mmol/L 140   < > 138   POTASSIUM mmol/L 4 2   < > 3 9   CHLORIDE mmol/L 110*   < > 110*   CO2 mmol/L 24   < > 21   BUN mg/dL 11   < > 8   CREATININE mg/dL 0 69   < > 0 62   ANION GAP mmol/L 6   < > 7   CALCIUM mg/dL 9 3   < > 8 9   ALBUMIN g/dL  --   --  3 6   TOTAL BILIRUBIN mg/dL  --   --  0 27   ALK PHOS U/L  --   --  89   ALT U/L  --   --  24   AST U/L  --   --  15   GLUCOSE RANDOM mg/dL 101   < > 86    < > = values in this interval not displayed  * I Have Reviewed All Lab Data Listed Above  * Additional Pertinent Lab Tests Reviewed: Dean 66 Admission Reviewed    Imaging:    Imaging Reports Reviewed Today Include: all  Imaging Personally Reviewed by Myself Includes:  none    Recent Cultures (last 7 days):     Results from last 7 days   Lab Units 07/01/20  1506 06/29/20  1541   BLOOD CULTURE   --  No Growth After 5 Days  GRAM STAIN RESULT  No Polys or Bacteria seen  --    WOUND CULTURE  1+ Growth of   --        Last 24 Hours Medication List:     Current Facility-Administered Medications:  acetaminophen 975 mg Oral Q6H [facility] Cece Joyner, DPM    cefazolin 2,000 mg Intravenous Q8H Cece Joyner, DPM Last Rate: 0 mg (07/05/20 0039)   docusate sodium 100 mg Oral BID PRN Cece Joyner, DPM    heparin (porcine) 5,000 Units Subcutaneous [facility] Ar, DPM    HYDROmorphone 0 5 mg Intravenous Q3H PRN [facility] Ar, DPM    metroNIDAZOLE 500 mg Oral [facility], DPM    ondansetron 4 mg Intravenous Q6H PRN [facility] Ar, DPM    oxyCODONE 10 mg Oral Q4H PRN [facility] Ar, DPM    oxyCODONE 5 mg Oral Q4H PRN [facility], D[facility]    senna 1 tablet Oral HS Cecemolly Joyner, DPM         Today, Patient Was Seen By: Minetta Gaucher, PA-C    ** Please Note: Dictation voice to text software may have been used in the creation of this document   **

## 2020-07-05 NOTE — ANESTHESIA POSTPROCEDURE EVALUATION
Post-Op Assessment Note    CV Status:  Stable  Pain Score: 0    Pain management: adequate     Mental Status:  Alert and awake   Hydration Status:  Euvolemic and stable   PONV Controlled:  None   Airway Patency:  Patent   Post Op Vitals Reviewed: Yes      Staff: CRNA           /68 (07/05/20 0841)    Temp (!) 97 4 °F (36 3 °C) (07/05/20 0841)    Pulse 96 (07/05/20 0841)   Resp 12 (07/05/20 0841)    SpO2 98 % (07/05/20 0841)

## 2020-07-05 NOTE — PROGRESS NOTES
PRE-Operative Note - Podiatry  Elaine Smith 54 y o  female MRN: [id number]  Unit/Bed#: -01 Encounter: [id number]    Assessment:  1  Cellulitis right foot  2  Right 1st interspace abcess  - S/P I&D right foot (DOS 7/1/20)  3  Obesity  4  Tobacco use    Plan:  1  Consent signed and in chart  Right foot wound delayed primary closure with possible application of skin graft substitute  2  Confirmed NPO status  3  H&P reviewed, no changes  4  Alternatives, risks, and complications discussed with patient  5  All questions answered  No guarantees given to outcome of procedure  6  Ancef 2g IV one dose 30 minutes pre-op  7  Will follow-up with Dr Layne Hamilton as an outpatient  Subjective/Objective   Chief Complaint: No chief complaint on file  Subjective: 54 y o  Female was seen and evaluated bedside  Alert, oriented, and in no acute distress  Consent was reviewed and signed with all questions and concerns addressed  She reports mild pain to the right foot  Dressing is clean, dry, and intact  Patient denies nausea, vomiting, chest pain, shortness of breath, chills, fever  Blood pressure 156/97, pulse 101, temperature 97 7 °F (36 5 °C), resp  rate 14, height 5' 1" (1 549 m), weight 84 8 kg (186 lb 15 2 oz), SpO2 93 %, not currently breastfeeding  ,Body mass index is 35 32 kg/m²  Invasive Devices     Peripheral Intravenous Line            Peripheral IV 07/03/20 Dorsal (posterior); Left Hand 2 days          Drain            Negative Pressure Wound Therapy (V A C ) Foot Right 3 days                Physical Exam:   General appearance: alert, cooperative and no distress    Extremity: NV baseline bilaterally, MSK baseline bilaterally, no calf tenderness bilaterally  Dressing clean dry and intact                Lab, Imaging and other studies:   Admission on 06/29/2020   Component Date Value    Sodium 06/29/2020 138     Potassium 06/29/2020 3 9     Chloride 06/29/2020 110*    CO2 06/29/2020 21     ANION GAP 06/29/2020 7     BUN 06/29/2020 8     Creatinine 06/29/2020 0 62     Glucose 06/29/2020 86     Calcium 06/29/2020 8 9     AST 06/29/2020 15     ALT 06/29/2020 24     Alkaline Phosphatase 06/29/2020 89     Total Protein 06/29/2020 7 8     Albumin 06/29/2020 3 6     Total Bilirubin 06/29/2020 0 27     eGFR 06/29/2020 102     WBC 06/29/2020 11 14*    RBC 06/29/2020 4 33     Hemoglobin 06/29/2020 14 0     Hematocrit 06/29/2020 41 8     MCV 06/29/2020 97     MCH 06/29/2020 32 3     MCHC 06/29/2020 33 5     RDW 06/29/2020 14 8     MPV 06/29/2020 9 0     Platelets 03/11/3605 311     nRBC 06/29/2020 0     Neutrophils Relative 06/29/2020 57     Immat GRANS % 06/29/2020 0     Lymphocytes Relative 06/29/2020 33     Monocytes Relative 06/29/2020 9     Eosinophils Relative 06/29/2020 1     Basophils Relative 06/29/2020 0     Neutrophils Absolute 06/29/2020 6 27     Immature Grans Absolute 06/29/2020 0 03     Lymphocytes Absolute 06/29/2020 3 69     Monocytes Absolute 06/29/2020 1 00     Eosinophils Absolute 06/29/2020 0 10     Basophils Absolute 06/29/2020 0 05     Blood Culture 06/29/2020 No Growth After 5 Days       Sodium 06/30/2020 137     Potassium 06/30/2020 4 3     Chloride 06/30/2020 108     CO2 06/30/2020 21     ANION GAP 06/30/2020 8     BUN 06/30/2020 11     Creatinine 06/30/2020 0 75     Glucose 06/30/2020 123     Calcium 06/30/2020 8 8     eGFR 06/30/2020 90     WBC 06/30/2020 8 85     RBC 06/30/2020 4 45     Hemoglobin 06/30/2020 14 1     Hematocrit 06/30/2020 43 2     MCV 06/30/2020 97     MCH 06/30/2020 31 7     MCHC 06/30/2020 32 6     RDW 06/30/2020 14 6     Platelets 48/45/9421 321     MPV 06/30/2020 9 0     Uric Acid 06/30/2020 4 0     SARS-CoV-2 06/30/2020 Negative     WBC 07/01/2020 9 57     RBC 07/01/2020 4 18     Hemoglobin 07/01/2020 13 5     Hematocrit 07/01/2020 41 0     MCV 07/01/2020 98     MCH 07/01/2020 32 3     MCHC 07/01/2020 32 9  RDW 07/01/2020 14 6     Platelets 99/84/1621 334     MPV 07/01/2020 9 3     Ventricular Rate 06/30/2020 87     Atrial Rate 06/30/2020 87     CO Interval 06/30/2020 200     QRSD Interval 06/30/2020 80     QT Interval 06/30/2020 374     QTC Interval 06/30/2020 450     P Axis 06/30/2020 55     QRS Axis 06/30/2020 66     T Wave Axis 06/30/2020 49     Anaerobic Culture 07/01/2020 Culture results to follow       Wound Culture 07/01/2020 1+ Growth of      Gram Stain Result 07/01/2020 No Polys or Bacteria seen     WBC 07/02/2020 8 81     RBC 07/02/2020 4 11     Hemoglobin 07/02/2020 13 0     Hematocrit 07/02/2020 40 2     MCV 07/02/2020 98     MCH 07/02/2020 31 6     MCHC 07/02/2020 32 3     RDW 07/02/2020 14 9     Platelets 60/06/2667 323     MPV 07/02/2020 9 3     WBC 07/03/2020 9 10     RBC 07/03/2020 4 19     Hemoglobin 07/03/2020 13 5     Hematocrit 07/03/2020 41 0     MCV 07/03/2020 98     MCH 07/03/2020 32 2     MCHC 07/03/2020 32 9     RDW 07/03/2020 14 8     Platelets 97/33/6310 341     MPV 07/03/2020 9 1     Sodium 07/03/2020 140     Potassium 07/03/2020 4 2     Chloride 07/03/2020 110*    CO2 07/03/2020 24     ANION GAP 07/03/2020 6     BUN 07/03/2020 11     Creatinine 07/03/2020 0 69     Glucose 07/03/2020 101     Calcium 07/03/2020 9 3     eGFR 07/03/2020 98      Imaging: I have personally reviewed pertinent films in PACS

## 2020-07-06 ENCOUNTER — TELEPHONE (OUTPATIENT)
Dept: PODIATRY | Facility: CLINIC | Age: 55
End: 2020-07-06

## 2020-07-06 VITALS
OXYGEN SATURATION: 99 % | HEART RATE: 94 BPM | DIASTOLIC BLOOD PRESSURE: 103 MMHG | BODY MASS INDEX: 35.3 KG/M2 | RESPIRATION RATE: 14 BRPM | SYSTOLIC BLOOD PRESSURE: 157 MMHG | TEMPERATURE: 98.3 F | WEIGHT: 186.95 LBS | HEIGHT: 61 IN

## 2020-07-06 PROBLEM — L03.031 CELLULITIS OF GREAT TOE OF RIGHT FOOT: Status: RESOLVED | Noted: 2020-06-29 | Resolved: 2020-07-06

## 2020-07-06 PROCEDURE — 99232 SBSQ HOSP IP/OBS MODERATE 35: CPT | Performed by: INTERNAL MEDICINE

## 2020-07-06 PROCEDURE — 99024 POSTOP FOLLOW-UP VISIT: CPT | Performed by: PODIATRIST

## 2020-07-06 RX ORDER — CEPHALEXIN 500 MG/1
500 CAPSULE ORAL EVERY 6 HOURS SCHEDULED
Qty: 28 CAPSULE | Refills: 0 | Status: SHIPPED | OUTPATIENT
Start: 2020-07-06 | End: 2020-07-13

## 2020-07-06 RX ORDER — METRONIDAZOLE 500 MG/1
500 TABLET ORAL EVERY 8 HOURS SCHEDULED
Qty: 21 TABLET | Refills: 0 | Status: SHIPPED | OUTPATIENT
Start: 2020-07-06 | End: 2020-07-13

## 2020-07-06 RX ADMIN — METRONIDAZOLE 500 MG: 500 TABLET ORAL at 05:31

## 2020-07-06 RX ADMIN — CEFAZOLIN SODIUM 2000 MG: 2 SOLUTION INTRAVENOUS at 00:16

## 2020-07-06 RX ADMIN — ACETAMINOPHEN 975 MG: 325 TABLET ORAL at 09:09

## 2020-07-06 RX ADMIN — METRONIDAZOLE 500 MG: 500 TABLET ORAL at 13:11

## 2020-07-06 RX ADMIN — CEFAZOLIN SODIUM 2000 MG: 2 SOLUTION INTRAVENOUS at 09:09

## 2020-07-06 NOTE — PROGRESS NOTES
Progress Note - Infectious Disease   Susan King 54 y o  female MRN: [id number]  Unit/Bed#: -01 Encounter: [id number]      Impression/Recommendations:  1  Right foot cellulitis and abscess of right 1st interspace   In the setting of #2   X-ray was negative for osteomyelitis   No active drainage on exam   Now status post I and D of abscess 20   No underlying myonecrosis or fat necrosis noted intraoperatively   MRI was// negative for osteomyelitis    OR culture with Finegoldia  Lack of response to Keflex was likely secondary to inadequate source control versus need for anaerobic coverage  Cellulitis continues to improve  Now status post DPC/STSG 20      -change antibiotics to Keflex/Flagyl to continue through  to complete 10 day postoperative course of antibiotics  -LWC/postoperative care per Podiatry     2  Right 1st interspace wound   Likely etiology of #1   MRI was negative for osteomyelitis      -antibiotic plan as above  -[address] per Podiatry     3  Obesity   Risk factor for development of infection   Recommend weight loss/dietary changes      4  Tobacco use   Risk factor for poor wound healing   Recommend smoking cessation      The patient is stable from an ID standpoint for D/C at any time      Antibiotics:  Cefazolin/Flagyl  Antibiotics #8  POD #5    Subjective:  Patient seen on AM rounds  Having 2-3 soft stools daily  Denies fevers, chills, sweats, nausea, vomiting, or diarrhea  [employer] to go home  24 Hour Events:  Went to OR yesterday for DPC/STSG  Wound noted to be granular without purulence  No documented fevers, chills, sweats, nausea, vomiting, or diarrhea      Objective:  Vitals:  Temp:  [97 6 °F (36 4 °C)-98 3 °F (36 8 °C)] 98 3 °F (36 8 °C)  HR:  [] 94  Resp:  [14-20] 14  BP: (135-157)/() 157/103  SpO2:  [97 %-99 %] 99 %  Temp (24hrs), Av 9 °F (36 6 °C), Min:97 6 °F (36 4 °C), Max:98 3 °F (36 8 °C)  Current: Temperature: 98 3 °F (36 8 °C)    Physical Exam: General:  No acute distress  Psychiatric:  Awake and alert  Pulmonary:  Normal respiratory excursion without accessory muscle use  Abdomen:  Soft, nontender  Extremities:  No edema, right foot wrap intact  Skin:  No rashes    Lab Results:  I have personally reviewed pertinent labs  Results from last 7 days   Lab Units 07/03/20  0453 06/30/20  0616 06/29/20  1438   POTASSIUM mmol/L 4 2 4 3 3 9   CHLORIDE mmol/L 110* 108 110*   CO2 mmol/L 24 21 21   BUN mg/dL 11 11 8   CREATININE mg/dL 0 69 0 75 0 62   EGFR ml/min/1 73sq m 98 90 102   CALCIUM mg/dL 9 3 8 8 8 9   AST U/L  --   --  15   ALT U/L  --   --  24   ALK PHOS U/L  --   --  89     Results from last 7 days   Lab Units 07/03/20  0453 07/02/20  0550 07/01/20  0503   WBC Thousand/uL 9 10 8 81 9 57   HEMOGLOBIN g/dL 13 5 13 0 13 5   PLATELETS Thousands/uL 341 323 334     Results from last 7 days   Lab Units 07/01/20  1506 06/29/20  1541   BLOOD CULTURE   --  No Growth After 5 Days  GRAM STAIN RESULT  No Polys or Bacteria seen  --    WOUND CULTURE  1+ Growth of   --        Imaging Studies:   I have personally reviewed pertinent imaging study reports and images in PACS  EKG, Pathology, and Other Studies:   I have personally reviewed pertinent reports

## 2020-07-06 NOTE — PROGRESS NOTES
Podiatry - Progress Note  Patient: Bailey Mccray 54 y o  female   MRN: [id number]  PCP: No primary care provider on file  Unit/Bed#: -01 Encounter: [id number]  Date Of Visit: 20    ASSESSMENT:    Bailey Mccray is a 54 y o  female with:    1  Cellulitis right foot secondary to right 1st interspace abscess- Cellulite resolved  2  Right 1st interspace abscess - S/P I&D right foot (DOS 20)  3  Obesity  4  Tobacco use      PLAN:    · Patient is stable for discharge per Podiatry  Postoperatively patient on VNA for dressing changes  Podiatry will continue working case management to establish VNA services Ariadna Wilkinson Once VNA has been established patient will be discharged, hopefully today  · Surgical site was assess and appears stable  No clinical signs of acute infection are noted surgical site  Graft remains intact  · Patient is afebrile, no leukocytosis noted, vital signs stable, and nontoxic in appearance  · Patient to be discharged with 10 days postoperative course of oral Keflex and Flagyl per Infectious Disease (through )  · Partial weight-bearing to heel of right foot with walker in a Darco wedge shoe  · Appreciate medical management per SLIM  · Rest of medical care per primary team        SUBJECTIVE:     The patient was seen, evaluated, and assessed at bedside today  The patient was awake, alert, and in no acute distress  No acute events overnight  The patient reports she is eager to get home  Patient denies N/V/F/chills/SOB/CP  OBJECTIVE:     Vitals:   BP (!) 157/103   Pulse 94   Temp 98 3 °F (36 8 °C)   Resp 14   Ht 5' 1" (1 549 m)   Wt 84 8 kg (186 lb 15 2 oz)   SpO2 99%   BMI 35 32 kg/m²     Temp (24hrs), Av 8 °F (36 6 °C), Min:97 2 °F (36 2 °C), Max:98 3 °F (36 8 °C)      Physical Exam:     General:  Alert, cooperative, and in no distress  Lower extremity exam:  Cardiovascular status at baseline  Neurological status at baseline    Musculoskeletal status at baseline  No calf tenderness noted  Stable surgical site located of right foot 1st interspace  Hyalomatrix graft appears intact  All sutures remain intact  No clinical signs of acute infection are noted at this time  Additional Data:     Labs:    Results from last 7 days   Lab Units 07/03/20  0453  06/29/20  1438   WBC Thousand/uL 9 10   < > 11 14*   HEMOGLOBIN g/dL 13 5   < > 14 0   HEMATOCRIT % 41 0   < > 41 8   PLATELETS Thousands/uL 341   < > 311   NEUTROS PCT %  --   --  57   LYMPHS PCT %  --   --  33   MONOS PCT %  --   --  9   EOS PCT %  --   --  1    < > = values in this interval not displayed  Results from last 7 days   Lab Units 07/03/20  0453  06/29/20  1438   POTASSIUM mmol/L 4 2   < > 3 9   CHLORIDE mmol/L 110*   < > 110*   CO2 mmol/L 24   < > 21   BUN mg/dL 11   < > 8   CREATININE mg/dL 0 69   < > 0 62   CALCIUM mg/dL 9 3   < > 8 9   ALK PHOS U/L  --   --  89   ALT U/L  --   --  24   AST U/L  --   --  15    < > = values in this interval not displayed  * I Have Reviewed All Lab Data Listed Above  Recent Cultures (last 7 days):     Results from last 7 days   Lab Units 07/01/20  1506 06/29/20  1541   BLOOD CULTURE   --  No Growth After 5 Days  GRAM STAIN RESULT  No Polys or Bacteria seen  --    WOUND CULTURE  1+ Growth of   --      Results from last 7 days   Lab Units 07/01/20  1506   ANAEROBIC CULTURE  2+ Growth of   Finegoldia magna*       Imaging: I have personally reviewed pertinent films in PACS  EKG, Pathology, and Other Studies: I have personally reviewed pertinent reports  ** Please Note: Portions of the record may have been created with voice recognition software  Occasional wrong word or "sound a like" substitutions may have occurred due to the inherent limitations of voice recognition software  Read the chart carefully and recognize, using context, where substitutions have occurred   **

## 2020-07-06 NOTE — ORTHOTIC NOTE
Orthotic Note            Date: 7/6/2020      Patient Name: Elaine Smith        Time: 13:30pm    Reason for Consult:  Patient Active Problem List   Diagnosis    Acquired hallux valgus of left foot    Other hammer toe(s) (acquired), left foot    Other acquired deformities of left foot    Wound of right foot    Preoperative clearance    Tobacco abuse    Headache   Darco Wedge Shoe  RLE  Per Podiatry  Shoe Size 8-8 5    I measured, fit, and donned Medium Darco Wedge Shoe to patient's RLE while she was sitting at edge of bed  Patient tolerated well and instructions/adjustments reviewed during this time x's 3  My contact information and instructions at bedside  RN present and aware  Recommendations:  Please call Mobility Coordinator at ext  [phone] in regards to bracing instruction and/or adjustment  Kyle Wallace Mobility Coordinator LCFo, LCOF, ASOP R  O T, O B T

## 2020-07-06 NOTE — DISCHARGE SUMMARY
PODIATRY DISCHARGE SUMMARY     Patient Name: Briana Chen   Age & Sex: 54 y o  female   MRN: [id number]  Unit/Bed#: -01   Encounter: [id number]  Length of Stay: 7 days    Active Problems:    Wound of right foot    Tobacco abuse    Headache      No new Assessment & Plan notes have been filed under this hospital service since the last note was generated  Service: [facility] COURSE     Briana Chen was admitted to the hospital under podiatry service 06/29/2020 due to failing outpatient course of antibiotics and increasing cellulitis of right lower extremity  Patient was started on IV antibiotics upon admittance to the hospital   X-ray imaging was obtained which was negative for osteomyelitis of the right foot  An MRI was also obtained of the right foot showing no osteomyelitis or underlying abscess  Patient was tested as preoperative precaution for COVID-19 and was found to be negative  Patient was taken to the operating room 775307 for right foot incision and drainage  This is part of a staged procedure and the wound was left open until the patient was taken back to the operating room 984398 delayed primary closure with skin graft substitute application (Hyalomatrix)  In the interim Internal Medicine and Infectious Disease were consulted  Per Infectious Disease recommendations placed was placed on IV Ancef and p o  Flagyl  Patient was cleared for both surgeries by Internal Medicine  On 07/06/2020 patient was cleared for discharge from all following services  Per Infectious Disease recommendations patient was placed on p o  Keflex and p o  Flagyl through 07/13/2020  Patient was discharged with walker and Darco wedge shoe  She was told to be nonambulatory as much as possible to right lower extremity  Appropriate follow-up information was placed in patient's chart    Case management was able to establish visiting nursing for patient for postoperative dressing changes in between office visit  Patient will follow up with Dr Sanchez Scruggs in the outpatient the setting at the [address] in [facility]  DISCHARGE INFORMATION     PCP at Discharge: No primary care provider on file  Admitting Provider: Feng Arnold  Admission Date: 6/29/2020     Discharge Provider: Bear Tee  Discharge Date: 07/06/20    Discharge Disposition: Home with VNA Services (Reminder: Complete face to face encounter)  Discharge Condition: Good  Discharge with Lines: No  Discharge Diet:  Regular  Activity Restrictions: PWB to heel of right foot with walker and Darco wedge shoe  Test Results Pending at Discharge:  None  Medications at Discharge: See after visit summary for reconciled discharge medications provided to patient and family  Discharge Diagnoses: Active Problems:    Wound of right foot    Tobacco abuse    Headache      Consulting Providers:  Infectious disease  Internal Medicine    Diagnostic & Therapeutic Procedures Performed:  Xr Foot 3+ Vw Right    Result Date: 6/30/2020  Impression: No radiographic evidence of osteomyelitis  Degenerative changes  Workstation performed: [id number]     Mri Foot/forefoot Toes Right W Wo Contrast    Result Date: 7/1/2020  Impression: Mild proximal hallux and first interspace cellulitis  No abscess  No osteomyelitis  Workstation performed: [id number]       Code Status: Level 1 - Full Code  Advance Directive and Living Will:      Power of :    POLST:      FOLLOW-UP     PCP Outpatient Follow-up:    Consulting Providers Follow-up:    Active Issues Requiring Follow-Up:    Discharge Statement:  I spent 30 minutes discharging the patient  This time was spent on the day of discharge  I had direct contact with the patient on the day of discharge  Additional documentation is required if more than 30 minutes were spent on discharge  Portions of the record may have been created with voice recognition software    Occasional wrong word or "sound a like" substitutions may have occurred due to the inherent limitations of voice recognition software    Read the chart carefully and recognize, using context, where substitutions have occurred   ==  Alon Copeland, [facility]  Podiatric Medicine & Surgery PGY-1

## 2020-07-06 NOTE — SOCIAL WORK
CM met w/pt @ bedside to discuss recommendations for home health care, PT/OT  Pt provided with list for choices  [facility] referral sent to [facility] per pt request  Pt accepted w/SOC 7/8/20; Fax the [facility] and H&P [phone]  Added to pt's list of follow up providers  A post acute care recommendation was made by your care team for [facility]  Discussed Freedom of Choice with patient  List of agencies given to patient via in person  patient aware the list is custom filtered for them by preference  and that [facility]'s post acute providers are designated

## 2020-07-07 NOTE — UTILIZATION REVIEW
Initial Clinical Review    Admission: Date/Time/Statement: Admission Orders (From admission, onward)     Ordered        06/29/20 1407  Inpatient Admission  Once                   Orders Placed This Encounter   Procedures    Inpatient Admission     Standing Status:   Standing     Number of Occurrences:   1     Order Specific Question:   Admitting Physician     Answer:   Senia Orozco [[id number]]     Order Specific Question:   Level of Care     Answer:   Med Surg [[id number]]     Order Specific Question:   Estimated length of stay     Answer:   More than 2 Midnights     Order Specific Question:   Certification     Answer:   I certify that inpatient services are medically necessary for this patient for a duration of greater than two midnights  See H&P and MD Progress Notes for additional information about the patient's course of treatment  Assessment/Plan:   55y Female, Direct admission from Podiatry service  Presents with right foot cellulitis  Pt seen by Podiatry on 06/24 with 1st interspace wound with green discharge and placed on Keflex 500 mg qid  No improvement on antibiotics and sent for IV antibiotics after f/u visit today with Podiatry  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  Admit Inpatient level of care for Rigth 1st interspace wound and Right foot cellulitis  Start Iv antibiotics  Bld culture pending  WBAT  Maxorb rope to right 1st interspace  No plan for surgical intervention at this time  Will continue to monitor cellulitis with daily serial foot exams  On exam; + edema to BLE  Right great toe is bright red and there is increased swelling  6/30 Progress notes; Tentative plan is to take patient to OR tomorrow, 07/01/2020, for right foot I&D and possible wound VAC application  NPO with IVF at midnight  MRI right forefoot  Continue Iv antibiotics  Bld culture pending  R/o COVID  Continue wound care with maxorb rope, DSD   Patient reports continued pain/tenderness at the right hallux and 1st interspace  Infectious Disease consult  7/1 Tentative OR - INCISION AND DRAINAGE (I&D) EXTREMITY (Right Foot)      APPLICATION VAC DRESSING (Right Foot)              Vitals   Temperature Pulse Respirations Blood Pressure SpO2   06/29/20 1130 06/29/20 1130 06/29/20 1130 06/29/20 1130 06/29/20 1130   99 7 °F (37 6 °C) 103 20 142/94 93 %      Temp src Heart Rate Source Patient Position - Orthostatic VS BP Location FiO2 (%)   -- -- -- -- --             Pain Score       06/29/20 1130       9        Wt Readings from Last 1 Encounters:   06/29/20 84 8 kg (186 lb 15 2 oz)     Additional Vital Signs:   06/30/20 07:18:27  98 °F (36 7 °C)  82  15  133/95  108  93 %     06/29/20 22:25:33    94    133/95  108  96 %     06/29/20 1600            97 %  None (Room air)   06/29/20 15:49:53  98 4 °F (36 9 °C)  119Abnormal         97 %       Pertinent Labs/Diagnostic Test Results:   6/29 Xray Right Foot - No radiographic evidence of osteomyelitis  Degenerative changes  6/30 MRI Right Forefoot - pending      Results from last 7 days   Lab Units 06/30/20  0616 06/29/20  1438   WBC Thousand/uL 8 85 11 14*   HEMOGLOBIN g/dL 14 1 14 0   HEMATOCRIT % 43 2 41 8   PLATELETS Thousands/uL 321 311   NEUTROS ABS Thousands/µL  --  6 27         Results from last 7 days   Lab Units 06/30/20  0616 06/29/20  1438   SODIUM mmol/L 137 138   POTASSIUM mmol/L 4 3 3 9   CHLORIDE mmol/L 108 110*   CO2 mmol/L 21 21   ANION GAP mmol/L 8 7   BUN mg/dL 11 8   CREATININE mg/dL 0 75 0 62   EGFR ml/min/1 73sq m 90 102   CALCIUM mg/dL 8 8 8 9     Results from last 7 days   Lab Units 06/29/20  1438   AST U/L 15   ALT U/L 24   ALK PHOS U/L 89   TOTAL PROTEIN g/dL 7 8   ALBUMIN g/dL 3 6   TOTAL BILIRUBIN mg/dL 0 27         Results from last 7 days   Lab Units 06/30/20  0616 06/29/20  1438   GLUCOSE RANDOM mg/dL 123 86     Results from last 7 days   Lab Units 06/29/20  1541   BLOOD CULTURE  Received in Microbiology Lab   Culture in Progress  Past Medical History:   Diagnosis Date    Acquired deformity of left foot     Anesthesia complication     difficulty awakening    Arthritis     Deaf, left     Depression     Hallux valgus of left foot     Hammer toe of left foot     Headache     Kidney stone     Sciatic pain, right     intermittent     Present on Admission:   Cellulitis of great toe of right foot   Wound of right foot      Admitting Diagnosis: Cellulitis of foot, right [L03 115]  Age/Sex: 54 y o  female     Admission Orders:  Bld culture   Infectious Disease consult    Scheduled Medications:  Medications:  cefepime 2,000 mg Intravenous Q12H   heparin (porcine) 5,000 Units Subcutaneous Q8H [address]   metroNIDAZOLE 500 mg Oral Q8H [address]   nicotine 1 patch Transdermal Daily     Continuous IV Infusions: None     PRN Meds:  acetaminophen 650 mg Oral Q6H PRN 6/29 x1   docusate sodium 100 mg Oral BID PRN   ondansetron 4 mg Intravenous Q6H PRN   oxyCODONE 2 5 mg Oral Q4H PRN 6/29 x2       Network Utilization Review Department  [email or web address]  ATTENTION: Please call with any questions or concerns to [phone] and carefully listen to the prompts so that you are directed to the right person  All voicemails are confidential   Lashawn Stahl all requests for admission clinical reviews, approved or denied determinations and any other requests to dedicated fax number below belonging to the campus where the patient is receiving treatment   List of dedicated fax numbers for the Facilities:  [address] DENIALS (Administrative/Medical Necessity) [phone]   [address] (Maternity/NICU/Pediatrics) [phone]   Colton Baptise [phone]   Areta Sell [phone]   Tracey Pitch [phone]   [facility] [address] [facility] [phone]   220 [facility]  [phone]   31 [facility] 1000 [facility] [phone]       Notification of Discharge  This is a Notification of Discharge from our facility [facility]ie [facility]on  Please be advised that this patient has been discharge from our facility  Below you will find the admission and discharge date and time including the patients disposition  PRESENTATION DATE: 6/29/2020 11:17 AM  OBS ADMISSION DATE:   IP ADMISSION DATE: 6/29/20 1117   DISCHARGE DATE: 7/6/2020  3:19 PM  DIS[facility]ITION[facility]   Admission Orders listed below:  Admission Orders (From admission, onward)     Ordered        06/29/20 1407  Inpatient Admission  Once                   Please contact the UR Department if additional information is required to close this patient's authorization/case  [address] Utilization Review Department  Main: [phone] x carefully listen to the prompts  All voicemails are confidential   [email or web address]  Send all requests for admission clinical reviews, approved or denied determinations and any other requests to dedicated fax number below belonging to the campus where the patient is receiving treatment   List of dedicated fax numbers:  FACILITY NAME UR FAX NUMBER   ADMISSION DENIALS (Administrative/Medical Necessity) [phone]   [address]  (Maternity/NICU/Pediatrics) [phone]   Rajat Tejeda [phone]   Mela [facility] [phone]   Brett Golden [phone]   [facility] 1525 [facility] [address] [facility] 1000 [facility] [phone]

## 2020-07-10 ENCOUNTER — OFFICE VISIT (OUTPATIENT)
Dept: PODIATRY | Facility: CLINIC | Age: 55
End: 2020-07-10
Payer: COMMERCIAL

## 2020-07-10 VITALS
SYSTOLIC BLOOD PRESSURE: 122 MMHG | DIASTOLIC BLOOD PRESSURE: 86 MMHG | HEART RATE: 111 BPM | HEIGHT: 61 IN | WEIGHT: 187 LBS | BODY MASS INDEX: 35.3 KG/M2

## 2020-07-10 DIAGNOSIS — L03.115 CELLULITIS OF RIGHT FOOT: Primary | ICD-10-CM

## 2020-07-10 DIAGNOSIS — S91.301D UNSPECIFIED OPEN WOUND, RIGHT FOOT, SUBSEQUENT ENCOUNTER: ICD-10-CM

## 2020-07-10 PROCEDURE — 99213 OFFICE O/P EST LOW 20 MIN: CPT | Performed by: PODIATRIST

## 2020-07-10 NOTE — PROGRESS NOTES
PATIENT:  Vandana Pacheco      1965    ASSESSMENT     1  Cellulitis of right foot     2  Unspecified open wound, right foot, subsequent encounter            PLAN  1  Patient is doing well with decreased pain  Redressed with betadine and DSD  2  Instructed resting and elevation  Finish abx as prescribed  3  Instructed proper off-loading with orthowedge shoe and walker  4  Sent wound care order to [facility]  5  Call if any increase in pain, fevers, calf pain, shortness of breath, or general distress is noted  Patient instructed to go to ER if call is not returned immediately  RA in 1 week  HISTORY OF PRESENT ILLNESS  Patient presents for right foot evaluation  Her pain is much better  She tolerates antibiotics well with no side effects  She presents with orthowedge shoe and walker  No new complaint  REVIEW OF SYSTEMS  Patient denied CP, SOB, fever, chills, palpatation, HA, GI problem, or calf pain  PHYSICAL EXAMINATION  GENERAL  The patient appears in NAD / non-toxic  Afebrile  VSS    VASCULAR EXAM  Pedal pulses and vascular status are intact  No calf pain or edema bilaterally  No cyanosis  DERMATOLOGIC EXAM  Incision is coapted and healing well  Graft intact  No signs of infection  No active drainage  No redness  Minimal edema  Mild maceration right 1st interspace  NEUROLOGIC EXAM  AAO X 3  No focal neurologic deficit  Neurologic status is intact BLE  MUSCULOSKELETAL EXAM  ROM intact  No fluctuation or crepitus  Bunion deformity noted bilaterally

## 2020-07-13 ENCOUNTER — TELEPHONE (OUTPATIENT)
Dept: PODIATRY | Facility: CLINIC | Age: 55
End: 2020-07-13

## 2020-07-13 NOTE — TELEPHONE ENCOUNTER
I received a call from [facility]  She wanted to know if Marlys's status is still non weight bearing  I told her that she was instructed proper off loading with an orthowedge shoe & walker  She did not tell any of this to the Home Health Nurse and they just want you to verify it

## 2020-07-14 ENCOUNTER — TELEPHONE (OUTPATIENT)
Dept: PODIATRY | Facility: CLINIC | Age: 55
End: 2020-07-14

## 2020-07-14 NOTE — TELEPHONE ENCOUNTER
Pt called in and stated she is supposed to receive home health care 3x/week  When the nurse came yesterday, she did not have the medication she needed to clean the wound  Pt sent her  to the pharmacy to get it and they did not have it  She was not sure what the medication was  The nurse is coming on 7/15 to clean wounds  She wanted to know if you could call in whatever medication it was that was requested for wound care

## 2020-07-17 ENCOUNTER — OFFICE VISIT (OUTPATIENT)
Dept: PODIATRY | Facility: CLINIC | Age: 55
End: 2020-07-17
Payer: COMMERCIAL

## 2020-07-17 VITALS — BODY MASS INDEX: 35.5 KG/M2 | WEIGHT: 188 LBS | HEIGHT: 61 IN

## 2020-07-17 DIAGNOSIS — S91.301D UNSPECIFIED OPEN WOUND, RIGHT FOOT, SUBSEQUENT ENCOUNTER: Primary | ICD-10-CM

## 2020-07-17 PROCEDURE — 99213 OFFICE O/P EST LOW 20 MIN: CPT | Performed by: PODIATRIST

## 2020-07-17 NOTE — PROGRESS NOTES
PATIENT:  Julissa Champ      1965    ASSESSMENT     1  Unspecified open wound, right foot, subsequent encounter            PLAN  1  Patient is doing well with decreased pain  Wound was cleaned with betadine  DSD applied right foot  Continue local care with betadine and DSD  2  Instructed resting and elevation  Instructed proper off-loading with orthowedge shoe and walker  3  Call if any increase in pain, fevers, calf pain, shortness of breath, or general distress is noted  Patient instructed to go to ER if call is not returned immediately  RA in 1 week  HISTORY OF PRESENT ILLNESS  Patient presents for right foot evaluation  Decreased pain right foot  She still feels some throbbing on occasion  She presents with orthowedge shoe and walker  No new complaint  REVIEW OF SYSTEMS  Patient denied CP, SOB, fever, chills, palpatation, HA, GI problem, or calf pain  PHYSICAL EXAMINATION  GENERAL  The patient appears in NAD / non-toxic  Afebrile  VSS    VASCULAR EXAM  Pedal pulses and vascular status are intact  No calf pain or edema bilaterally  No cyanosis  DERMATOLOGIC EXAM  Wound is healing well  Graft intact  No signs of infection  No drainage  Edema resolved  No redness  Minimal maceration right 1st interspace  NEUROLOGIC EXAM  AAO X 3  No focal neurologic deficit  Neurologic status is intact BLE  MUSCULOSKELETAL EXAM  ROM intact  No fluctuation or crepitus  Bunion deformity noted bilaterally

## 2020-07-24 ENCOUNTER — OFFICE VISIT (OUTPATIENT)
Dept: PODIATRY | Facility: CLINIC | Age: 55
End: 2020-07-24
Payer: COMMERCIAL

## 2020-07-24 VITALS — HEIGHT: 61 IN | TEMPERATURE: 98.3 F | WEIGHT: 188 LBS | BODY MASS INDEX: 35.5 KG/M2

## 2020-07-24 DIAGNOSIS — S91.301D UNSPECIFIED OPEN WOUND, RIGHT FOOT, SUBSEQUENT ENCOUNTER: Primary | ICD-10-CM

## 2020-07-24 PROCEDURE — 99213 OFFICE O/P EST LOW 20 MIN: CPT | Performed by: PODIATRIST

## 2020-07-24 NOTE — PROGRESS NOTES
PATIENT:  Pari Abrams      1965    ASSESSMENT     1  Unspecified open wound, right foot, subsequent encounter            PLAN  1  Sutures were removed  Silicone layer was also removed from the graft  D/C betadine  Start silver alginate to right 1st interspace  2  Instructed resting and elevation  Instructed proper off-loading with orthowedge shoe and walker  3  Call if any increase in pain, fevers, calf pain, shortness of breath, or general distress is noted  Patient instructed to go to ER if call is not returned immediately  RA in 2 weeks  HISTORY OF PRESENT ILLNESS  Patient presents for right foot evaluation  Decreased pain right foot  She still feels some throbbing  She presents with orthowedge shoe and walker  No new complaint  REVIEW OF SYSTEMS  Patient denied CP, SOB, fever, chills, palpatation, HA, GI problem, or calf pain  PHYSICAL EXAMINATION  GENERAL  The patient appears in NAD / non-toxic  Afebrile  VSS    VASCULAR EXAM  Pedal pulses and vascular status are intact  No calf pain or edema bilaterally  No cyanosis  DERMATOLOGIC EXAM  Incision looks healed  Wound/ graft stable right foot  No redness or edema  No signs of infection  No drainage  Edema resolved  No redness  NEUROLOGIC EXAM  AAO X 3  No focal neurologic deficit  Neurologic status is intact BLE  MUSCULOSKELETAL EXAM  ROM intact  No fluctuation or crepitus  Bunion deformity noted bilaterally

## 2020-08-07 ENCOUNTER — PROCEDURE VISIT (OUTPATIENT)
Dept: PODIATRY | Facility: CLINIC | Age: 55
End: 2020-08-07
Payer: COMMERCIAL

## 2020-08-07 VITALS
HEART RATE: 108 BPM | BODY MASS INDEX: 35.52 KG/M2 | SYSTOLIC BLOOD PRESSURE: 127 MMHG | TEMPERATURE: 96.6 F | DIASTOLIC BLOOD PRESSURE: 88 MMHG | HEIGHT: 61 IN

## 2020-08-07 DIAGNOSIS — M20.12 VALGUS DEFORMITY OF BOTH GREAT TOES: ICD-10-CM

## 2020-08-07 DIAGNOSIS — S91.301D UNSPECIFIED OPEN WOUND, RIGHT FOOT, SUBSEQUENT ENCOUNTER: Primary | ICD-10-CM

## 2020-08-07 DIAGNOSIS — B35.1 ONYCHOMYCOSIS: ICD-10-CM

## 2020-08-07 DIAGNOSIS — M20.11 VALGUS DEFORMITY OF BOTH GREAT TOES: ICD-10-CM

## 2020-08-07 PROCEDURE — 99213 OFFICE O/P EST LOW 20 MIN: CPT | Performed by: PODIATRIST

## 2020-08-07 NOTE — PROGRESS NOTES
PATIENT:  Michael Morocho      1965    ASSESSMENT     1  Unspecified open wound, right foot, subsequent encounter     2  Onychomycosis     3  Valgus deformity of both great toes            PLAN  1  Wound was cleaned and dressing reapplied  Her wound looks well with no signs of infection  Instructed toe spacer  2  Instructed resting and elevation  Instructed proper off-loading with orthowedge shoe and walker  3  She also has mycotic nails  Will start pulse dose Lamisil once her wound is healed  4  Call if any increase in pain, fevers, calf pain, shortness of breath, or general distress is noted  Patient instructed to go to ER if call is not returned immediately  RA in 2 weeks  HISTORY OF PRESENT ILLNESS  Patient presents for right foot evaluation  Decreased pain right foot  Her pain is about 4 out of 10  She presents with orthowedge shoe and walker  She reports she has toenail fungus  She had oral medications last year  It looks much better, but she still has residual thickening and discoloration  REVIEW OF SYSTEMS  Patient denied CP, SOB, fever, chills, palpatation, HA, GI problem, or calf pain  PHYSICAL EXAMINATION  GENERAL  The patient appears in NAD / non-toxic  Afebrile  VSS    VASCULAR EXAM  Pedal pulses and vascular status are intact  No calf pain or edema bilaterally  No cyanosis  DERMATOLOGIC EXAM  Wound stable right 1st interspace  40% reduction in size  It is still about 1 0 X 0 4 X 0 1 cm  No redness or edema  No signs of infection  Minimal drainage  Mycotic nails present with mild thickening and discoloration  NEUROLOGIC EXAM  AAO X 3  No focal neurologic deficit  Neurologic status is intact BLE  MUSCULOSKELETAL EXAM  ROM intact  No fluctuation or crepitus  Severe bunion deformity noted bilaterally

## 2020-08-21 ENCOUNTER — OFFICE VISIT (OUTPATIENT)
Dept: PODIATRY | Facility: CLINIC | Age: 55
End: 2020-08-21

## 2020-08-21 VITALS
HEIGHT: 61 IN | DIASTOLIC BLOOD PRESSURE: 84 MMHG | BODY MASS INDEX: 35.52 KG/M2 | SYSTOLIC BLOOD PRESSURE: 119 MMHG | HEART RATE: 102 BPM

## 2020-08-21 DIAGNOSIS — S91.301D UNSPECIFIED OPEN WOUND, RIGHT FOOT, SUBSEQUENT ENCOUNTER: Primary | ICD-10-CM

## 2020-08-21 PROCEDURE — 99024 POSTOP FOLLOW-UP VISIT: CPT | Performed by: PODIATRIST

## 2020-08-21 NOTE — PROGRESS NOTES
PATIENT:  Tevin Tyson      1965    ASSESSMENT     1  Unspecified open wound, right foot, subsequent encounter            PLAN  1  Wound was cleaned and dressing reapplied  Her wound looks mostly healed with no signs of infection  Continue dry dressing right 1st interspace  2  Instructed resting and elevation  Okay to try regular shoe  3  Call if any increase in pain, fevers, calf pain, shortness of breath, or general distress is noted  Patient instructed to go to ER if call is not returned immediately  RA in 2 weeks  HISTORY OF PRESENT ILLNESS  Patient presents for right foot evaluation  Decreased pain right foot  Her pain is about 3-4 out of 10  She presents with orthowedge shoe and walker  No redness or edema  REVIEW OF SYSTEMS  Patient denied CP, SOB, fever, chills, palpatation, HA, GI problem, or calf pain  PHYSICAL EXAMINATION  GENERAL  The patient appears in NAD / non-toxic  Afebrile  VSS    VASCULAR EXAM  Pedal pulses and vascular status are intact  No calf pain or edema bilaterally  No cyanosis  DERMATOLOGIC EXAM  Wound stable right 1st interspace  It is 0 5 X 0 2 cm  Wound looks epithelized  No redness or edema  No signs of infection  Minimal drainage  Mycotic nails present with mild thickening and discoloration  NEUROLOGIC EXAM  AAO X 3  No focal neurologic deficit  Neurologic status is intact BLE  MUSCULOSKELETAL EXAM  ROM intact  No fluctuation or crepitus  Severe bunion deformity noted bilaterally

## 2020-09-04 ENCOUNTER — OFFICE VISIT (OUTPATIENT)
Dept: PODIATRY | Facility: CLINIC | Age: 55
End: 2020-09-04
Payer: COMMERCIAL

## 2020-09-04 VITALS — HEIGHT: 61 IN | BODY MASS INDEX: 35.52 KG/M2

## 2020-09-04 DIAGNOSIS — L85.1 ACQUIRED KERATODERMA: ICD-10-CM

## 2020-09-04 DIAGNOSIS — M79.672 PAIN IN BOTH FEET: ICD-10-CM

## 2020-09-04 DIAGNOSIS — M21.6X9 OTHER ACQUIRED DEFORMITIES OF UNSPECIFIED FOOT: ICD-10-CM

## 2020-09-04 DIAGNOSIS — B35.1 ONYCHOMYCOSIS: ICD-10-CM

## 2020-09-04 DIAGNOSIS — S91.301D UNSPECIFIED OPEN WOUND, RIGHT FOOT, SUBSEQUENT ENCOUNTER: Primary | ICD-10-CM

## 2020-09-04 DIAGNOSIS — M79.671 PAIN IN BOTH FEET: ICD-10-CM

## 2020-09-04 PROCEDURE — 11056 PARNG/CUTG B9 HYPRKR LES 2-4: CPT | Performed by: PODIATRIST

## 2020-09-04 PROCEDURE — 99213 OFFICE O/P EST LOW 20 MIN: CPT | Performed by: PODIATRIST

## 2020-09-04 RX ORDER — TERBINAFINE HYDROCHLORIDE 250 MG/1
250 TABLET ORAL DAILY
Qty: 14 TABLET | Refills: 0 | Status: SHIPPED | OUTPATIENT
Start: 2020-09-04 | End: 2020-09-18

## 2020-09-04 NOTE — PROGRESS NOTES
PATIENT:  Vandana Pacheco      1965    ASSESSMENT     1  Unspecified open wound, right foot, subsequent encounter     2  Other acquired deformities of unspecified foot     3  Acquired keratoderma  Lesion Destruction   4  Pain in both feet     5  Onychomycosis  terbinafine (LamISIL) 250 mg tablet          PLAN  1  Instructed betadine paint and dry gauze right 1st interspace  Instructed her to monitor for any drainage  2  Advance shoes as tolerated  Slowly increase activity  3  She has [address] submet 2 bilaterally related to severe foot deformity  Discussed proper footwear and pads  Options were also discussed  4  Rx pulse dose Lamisil for onychomycosis  Discussed possible side effects and risks  No alcohol while she is on Lamisil  5  Call if any increase in pain, signs of infection, fevers, calf pain, shortness of breath, or general distress is noted  Patient instructed to go to ER if call is not returned immediately  RA in 3 weeks  Procedure: All hyperkeratotic skin lesion(s) were sharply pared with a scalpel with no bleeding or evidence of ulceration  Patient tolerated procedure(s) well without complications  HISTORY OF PRESENT ILLNESS  Patient presents for right foot evaluation  No redness or edema  Minimal drainage  She has complained of bilateral foot pain plantarly  She has chronic skin lesions with sharp pain when she walks  She also complained of mycotic nails  She was on oral medication several years ago and it cleared  But, it is re-occurring in the past 1-1 5 year  REVIEW OF SYSTEMS  Patient denied CP, SOB, fever, chills, palpatation, HA, GI problem, or calf pain  PHYSICAL EXAMINATION  GENERAL  The patient appears in NAD / non-toxic  Afebrile  VSS    VASCULAR EXAM  Pedal pulses and vascular status are intact  No calf pain or edema bilaterally  No cyanosis  DERMATOLOGIC EXAM  Mild maceration right 1st interspace  Full epithelization noted  No redness or edema  No signs of infection  Mycotic nails present with mild thickening and discoloration  IPK bilateral submet 2 with pain on palpation  NEUROLOGIC EXAM  AAO X 3  No focal neurologic deficit  Neurologic status is intact BLE  MUSCULOSKELETAL EXAM  ROM intact  No fluctuation or crepitus  Severe bunion deformity noted bilaterally

## 2020-09-17 ENCOUNTER — TELEPHONE (OUTPATIENT)
Dept: PODIATRY | Facility: CLINIC | Age: 55
End: 2020-09-17

## 2020-09-25 ENCOUNTER — OFFICE VISIT (OUTPATIENT)
Dept: PODIATRY | Facility: CLINIC | Age: 55
End: 2020-09-25
Payer: COMMERCIAL

## 2020-09-25 VITALS
DIASTOLIC BLOOD PRESSURE: 87 MMHG | HEIGHT: 61 IN | BODY MASS INDEX: 35.52 KG/M2 | SYSTOLIC BLOOD PRESSURE: 124 MMHG | HEART RATE: 111 BPM

## 2020-09-25 DIAGNOSIS — M20.12 VALGUS DEFORMITY OF BOTH GREAT TOES: ICD-10-CM

## 2020-09-25 DIAGNOSIS — S91.301D UNSPECIFIED OPEN WOUND, RIGHT FOOT, SUBSEQUENT ENCOUNTER: Primary | ICD-10-CM

## 2020-09-25 DIAGNOSIS — M20.11 VALGUS DEFORMITY OF BOTH GREAT TOES: ICD-10-CM

## 2020-09-25 DIAGNOSIS — R29.898 WEAKNESS OF BOTH LOWER EXTREMITIES: ICD-10-CM

## 2020-09-25 PROCEDURE — 99213 OFFICE O/P EST LOW 20 MIN: CPT | Performed by: PODIATRIST

## 2020-09-25 NOTE — PROGRESS NOTES
PATIENT:  Maddy Alexander      1965    ASSESSMENT     1  Unspecified open wound, right foot, subsequent encounter     2  Weakness of both lower extremities  Ambulatory referral to Physical Therapy   3  Valgus deformity of both great toes            PLAN  1  Instructed toe spacers  Toe spacers and lamb's wool were dispensed  D/C dressing  Instructed her to monitor for any drainage  2  Advance shoes as tolerated  Advance activity as tolerated  3  Referred to PT for general weakness of legs  Awaits evaluation per ortho for knee pain  4  Call if any increase in pain, signs of infection, fevers, calf pain, shortness of breath, or general distress is noted  Patient instructed to go to ER if call is not returned immediately  RA in 4 weeks  HISTORY OF PRESENT ILLNESS  Patient presents for right foot evaluation  No redness or edema  No drainage  Still little tender right 1st interspace  She has been having some knee pains and weakness of legs  No calf pain  REVIEW OF SYSTEMS  Patient denied CP, SOB, fever, chills, palpatation, HA, GI problem, or calf pain  PHYSICAL EXAMINATION  GENERAL  The patient appears in NAD / non-toxic  Afebrile  VSS    VASCULAR EXAM  Pedal pulses and vascular status are intact  No calf pain or edema bilaterally  No cyanosis  DERMATOLOGIC EXAM  Maceration resolved right 1st interspace  Wound healed  No redness or edema  No signs of infection  Mycotic nails present with mild thickening and discoloration  NEUROLOGIC EXAM  AAO X 3  No focal neurologic deficit  Neurologic status is intact BLE  MUSCULOSKELETAL EXAM  ROM intact  No fluctuation or crepitus  Severe bunion deformity noted bilaterally  Decreased MMT - 4+/5 dorsiflexion of foot

## 2020-10-05 ENCOUNTER — EVALUATION (OUTPATIENT)
Dept: PHYSICAL THERAPY | Facility: CLINIC | Age: 55
End: 2020-10-05
Payer: COMMERCIAL

## 2020-10-05 DIAGNOSIS — R29.898 WEAKNESS OF BOTH LOWER EXTREMITIES: Primary | ICD-10-CM

## 2020-10-05 PROCEDURE — 97110 THERAPEUTIC EXERCISES: CPT | Performed by: PHYSICAL THERAPIST

## 2020-10-05 PROCEDURE — 97535 SELF CARE MNGMENT TRAINING: CPT | Performed by: PHYSICAL THERAPIST

## 2020-10-05 PROCEDURE — 97161 PT EVAL LOW COMPLEX 20 MIN: CPT | Performed by: PHYSICAL THERAPIST

## 2020-10-07 ENCOUNTER — OFFICE VISIT (OUTPATIENT)
Dept: PHYSICAL THERAPY | Facility: CLINIC | Age: 55
End: 2020-10-07
Payer: COMMERCIAL

## 2020-10-07 DIAGNOSIS — R29.898 WEAKNESS OF BOTH LOWER EXTREMITIES: Primary | ICD-10-CM

## 2020-10-07 PROCEDURE — 97110 THERAPEUTIC EXERCISES: CPT

## 2020-10-07 PROCEDURE — 97112 NEUROMUSCULAR REEDUCATION: CPT

## 2020-10-07 PROCEDURE — 97140 MANUAL THERAPY 1/> REGIONS: CPT

## 2020-10-12 ENCOUNTER — OFFICE VISIT (OUTPATIENT)
Dept: PHYSICAL THERAPY | Facility: CLINIC | Age: 55
End: 2020-10-12
Payer: COMMERCIAL

## 2020-10-12 DIAGNOSIS — R29.898 WEAKNESS OF BOTH LOWER EXTREMITIES: Primary | ICD-10-CM

## 2020-10-12 PROCEDURE — 97110 THERAPEUTIC EXERCISES: CPT | Performed by: PHYSICAL THERAPIST

## 2020-10-12 PROCEDURE — 97112 NEUROMUSCULAR REEDUCATION: CPT | Performed by: PHYSICAL THERAPIST

## 2020-10-12 PROCEDURE — 97140 MANUAL THERAPY 1/> REGIONS: CPT | Performed by: PHYSICAL THERAPIST

## 2020-10-15 ENCOUNTER — OFFICE VISIT (OUTPATIENT)
Dept: PHYSICAL THERAPY | Facility: CLINIC | Age: 55
End: 2020-10-15
Payer: COMMERCIAL

## 2020-10-15 DIAGNOSIS — R29.898 WEAKNESS OF BOTH LOWER EXTREMITIES: Primary | ICD-10-CM

## 2020-10-15 PROCEDURE — 97112 NEUROMUSCULAR REEDUCATION: CPT | Performed by: PHYSICAL THERAPIST

## 2020-10-15 PROCEDURE — 97110 THERAPEUTIC EXERCISES: CPT | Performed by: PHYSICAL THERAPIST

## 2020-10-15 PROCEDURE — 97140 MANUAL THERAPY 1/> REGIONS: CPT | Performed by: PHYSICAL THERAPIST

## 2020-10-20 ENCOUNTER — OFFICE VISIT (OUTPATIENT)
Dept: PHYSICAL THERAPY | Facility: CLINIC | Age: 55
End: 2020-10-20
Payer: COMMERCIAL

## 2020-10-20 DIAGNOSIS — R29.898 WEAKNESS OF BOTH LOWER EXTREMITIES: Primary | ICD-10-CM

## 2020-10-20 PROCEDURE — 97110 THERAPEUTIC EXERCISES: CPT

## 2020-10-20 PROCEDURE — 97140 MANUAL THERAPY 1/> REGIONS: CPT

## 2020-10-20 PROCEDURE — 97112 NEUROMUSCULAR REEDUCATION: CPT

## 2020-10-22 ENCOUNTER — OFFICE VISIT (OUTPATIENT)
Dept: PHYSICAL THERAPY | Facility: CLINIC | Age: 55
End: 2020-10-22
Payer: COMMERCIAL

## 2020-10-22 DIAGNOSIS — R29.898 WEAKNESS OF BOTH LOWER EXTREMITIES: Primary | ICD-10-CM

## 2020-10-22 PROCEDURE — 97110 THERAPEUTIC EXERCISES: CPT | Performed by: PHYSICAL THERAPIST

## 2020-10-22 PROCEDURE — 97112 NEUROMUSCULAR REEDUCATION: CPT | Performed by: PHYSICAL THERAPIST

## 2020-10-22 PROCEDURE — 97140 MANUAL THERAPY 1/> REGIONS: CPT | Performed by: PHYSICAL THERAPIST

## 2020-10-23 ENCOUNTER — OFFICE VISIT (OUTPATIENT)
Dept: PODIATRY | Facility: CLINIC | Age: 55
End: 2020-10-23
Payer: COMMERCIAL

## 2020-10-23 VITALS — HEIGHT: 61 IN | HEART RATE: 97 BPM | BODY MASS INDEX: 35.52 KG/M2

## 2020-10-23 DIAGNOSIS — M25.561 CHRONIC PAIN OF RIGHT KNEE: ICD-10-CM

## 2020-10-23 DIAGNOSIS — M20.11 VALGUS DEFORMITY OF BOTH GREAT TOES: ICD-10-CM

## 2020-10-23 DIAGNOSIS — G89.29 CHRONIC PAIN OF RIGHT KNEE: ICD-10-CM

## 2020-10-23 DIAGNOSIS — S91.301D UNSPECIFIED OPEN WOUND, RIGHT FOOT, SUBSEQUENT ENCOUNTER: Primary | ICD-10-CM

## 2020-10-23 DIAGNOSIS — M20.12 VALGUS DEFORMITY OF BOTH GREAT TOES: ICD-10-CM

## 2020-10-23 PROCEDURE — 99213 OFFICE O/P EST LOW 20 MIN: CPT | Performed by: PODIATRIST

## 2020-10-27 ENCOUNTER — OFFICE VISIT (OUTPATIENT)
Dept: OBGYN CLINIC | Facility: HOSPITAL | Age: 55
End: 2020-10-27
Payer: COMMERCIAL

## 2020-10-27 ENCOUNTER — HOSPITAL ENCOUNTER (OUTPATIENT)
Dept: RADIOLOGY | Facility: HOSPITAL | Age: 55
Discharge: HOME | End: 2020-10-27
Attending: ORTHOPAEDIC SURGERY
Payer: COMMERCIAL

## 2020-10-27 VITALS
SYSTOLIC BLOOD PRESSURE: 127 MMHG | HEIGHT: 61 IN | WEIGHT: 195.4 LBS | HEART RATE: 93 BPM | DIASTOLIC BLOOD PRESSURE: 87 MMHG | BODY MASS INDEX: 36.89 KG/M2

## 2020-10-27 DIAGNOSIS — M17.11 PRIMARY OSTEOARTHRITIS OF RIGHT KNEE: Primary | ICD-10-CM

## 2020-10-27 DIAGNOSIS — M25.561 ACUTE PAIN OF RIGHT KNEE: ICD-10-CM

## 2020-10-27 PROCEDURE — 20610 DRAIN/INJ JOINT/BURSA W/O US: CPT | Performed by: ORTHOPAEDIC SURGERY

## 2020-10-27 PROCEDURE — 99203 OFFICE O/P NEW LOW 30 MIN: CPT | Performed by: ORTHOPAEDIC SURGERY

## 2020-10-27 PROCEDURE — 73562 X-RAY EXAM OF KNEE 3: CPT

## 2020-10-27 RX ORDER — MECLIZINE HYDROCHLORIDE 25 MG/1
TABLET ORAL
COMMUNITY
Start: 2020-10-16 | End: 2021-03-10 | Stop reason: HOSPADM

## 2020-10-27 RX ORDER — MELOXICAM 7.5 MG/1
7.5 TABLET ORAL DAILY
COMMUNITY
Start: 2020-09-25 | End: 2021-11-08

## 2020-10-27 RX ORDER — NICOTINE 21 MG/24HR
1 PATCH, TRANSDERMAL 24 HOURS TRANSDERMAL EVERY 24 HOURS
COMMUNITY
Start: 2020-10-23 | End: 2020-11-22

## 2020-10-27 RX ORDER — ATORVASTATIN CALCIUM 20 MG/1
20 TABLET, FILM COATED ORAL EVERY EVENING
COMMUNITY
Start: 2020-10-23

## 2020-10-27 RX ORDER — METHYLPREDNISOLONE ACETATE 40 MG/ML
2 INJECTION, SUSPENSION INTRA-ARTICULAR; INTRALESIONAL; INTRAMUSCULAR; SOFT TISSUE
Status: COMPLETED | OUTPATIENT
Start: 2020-10-27 | End: 2020-10-27

## 2020-10-27 RX ORDER — BUPIVACAINE HYDROCHLORIDE 2.5 MG/ML
2 INJECTION, SOLUTION INFILTRATION; PERINEURAL
Status: COMPLETED | OUTPATIENT
Start: 2020-10-27 | End: 2020-10-27

## 2020-10-27 RX ADMIN — METHYLPREDNISOLONE ACETATE 2 ML: 40 INJECTION, SUSPENSION INTRA-ARTICULAR; INTRALESIONAL; INTRAMUSCULAR; SOFT TISSUE at 10:28

## 2020-10-27 RX ADMIN — BUPIVACAINE HYDROCHLORIDE 2 ML: 2.5 INJECTION, SOLUTION INFILTRATION; PERINEURAL at 10:28

## 2020-10-29 ENCOUNTER — APPOINTMENT (OUTPATIENT)
Dept: PHYSICAL THERAPY | Facility: CLINIC | Age: 55
End: 2020-10-29
Payer: COMMERCIAL

## 2020-10-30 ENCOUNTER — OFFICE VISIT (OUTPATIENT)
Dept: PHYSICAL THERAPY | Facility: CLINIC | Age: 55
End: 2020-10-30
Payer: COMMERCIAL

## 2020-10-30 DIAGNOSIS — R29.898 WEAKNESS OF BOTH LOWER EXTREMITIES: Primary | ICD-10-CM

## 2020-10-30 PROCEDURE — 97110 THERAPEUTIC EXERCISES: CPT

## 2020-10-30 PROCEDURE — 97112 NEUROMUSCULAR REEDUCATION: CPT

## 2020-11-02 ENCOUNTER — OFFICE VISIT (OUTPATIENT)
Dept: PHYSICAL THERAPY | Facility: CLINIC | Age: 55
End: 2020-11-02
Payer: COMMERCIAL

## 2020-11-02 DIAGNOSIS — R29.898 WEAKNESS OF BOTH LOWER EXTREMITIES: Primary | ICD-10-CM

## 2020-11-02 PROCEDURE — 97112 NEUROMUSCULAR REEDUCATION: CPT

## 2020-11-02 PROCEDURE — 97110 THERAPEUTIC EXERCISES: CPT

## 2020-11-02 PROCEDURE — 97140 MANUAL THERAPY 1/> REGIONS: CPT

## 2020-11-04 ENCOUNTER — OFFICE VISIT (OUTPATIENT)
Dept: PHYSICAL THERAPY | Facility: CLINIC | Age: 55
End: 2020-11-04
Payer: COMMERCIAL

## 2020-11-04 DIAGNOSIS — R29.898 WEAKNESS OF BOTH LOWER EXTREMITIES: Primary | ICD-10-CM

## 2020-11-04 PROCEDURE — 97112 NEUROMUSCULAR REEDUCATION: CPT

## 2020-11-04 PROCEDURE — 97140 MANUAL THERAPY 1/> REGIONS: CPT

## 2020-11-04 PROCEDURE — 97110 THERAPEUTIC EXERCISES: CPT

## 2020-11-06 ENCOUNTER — OFFICE VISIT (OUTPATIENT)
Dept: PODIATRY | Facility: CLINIC | Age: 55
End: 2020-11-06
Payer: COMMERCIAL

## 2020-11-06 VITALS — BODY MASS INDEX: 36.82 KG/M2 | TEMPERATURE: 97.5 F | WEIGHT: 195 LBS | HEIGHT: 61 IN

## 2020-11-06 DIAGNOSIS — S91.301D UNSPECIFIED OPEN WOUND, RIGHT FOOT, SUBSEQUENT ENCOUNTER: ICD-10-CM

## 2020-11-06 DIAGNOSIS — B35.1 ONYCHOMYCOSIS: ICD-10-CM

## 2020-11-06 DIAGNOSIS — M20.11 VALGUS DEFORMITY OF BOTH GREAT TOES: Primary | ICD-10-CM

## 2020-11-06 DIAGNOSIS — M20.12 VALGUS DEFORMITY OF BOTH GREAT TOES: Primary | ICD-10-CM

## 2020-11-06 PROCEDURE — 99213 OFFICE O/P EST LOW 20 MIN: CPT | Performed by: PODIATRIST

## 2020-11-06 RX ORDER — TERBINAFINE HYDROCHLORIDE 250 MG/1
250 TABLET ORAL DAILY
Qty: 14 TABLET | Refills: 0 | Status: SHIPPED | OUTPATIENT
Start: 2020-11-06 | End: 2020-11-20

## 2020-11-11 ENCOUNTER — OFFICE VISIT (OUTPATIENT)
Dept: PHYSICAL THERAPY | Facility: CLINIC | Age: 55
End: 2020-11-11
Payer: COMMERCIAL

## 2020-11-11 DIAGNOSIS — R29.898 WEAKNESS OF BOTH LOWER EXTREMITIES: Primary | ICD-10-CM

## 2020-11-11 PROCEDURE — 97110 THERAPEUTIC EXERCISES: CPT

## 2020-11-11 PROCEDURE — 97140 MANUAL THERAPY 1/> REGIONS: CPT

## 2020-11-11 PROCEDURE — 97112 NEUROMUSCULAR REEDUCATION: CPT

## 2020-11-12 ENCOUNTER — OFFICE VISIT (OUTPATIENT)
Dept: PHYSICAL THERAPY | Facility: CLINIC | Age: 55
End: 2020-11-12
Payer: COMMERCIAL

## 2020-11-12 DIAGNOSIS — R29.898 WEAKNESS OF BOTH LOWER EXTREMITIES: Primary | ICD-10-CM

## 2020-11-12 PROCEDURE — 97112 NEUROMUSCULAR REEDUCATION: CPT | Performed by: PHYSICAL THERAPIST

## 2020-11-12 PROCEDURE — 97140 MANUAL THERAPY 1/> REGIONS: CPT | Performed by: PHYSICAL THERAPIST

## 2020-11-12 PROCEDURE — 97110 THERAPEUTIC EXERCISES: CPT | Performed by: PHYSICAL THERAPIST

## 2020-11-16 ENCOUNTER — OFFICE VISIT (OUTPATIENT)
Dept: PHYSICAL THERAPY | Facility: CLINIC | Age: 55
End: 2020-11-16
Payer: COMMERCIAL

## 2020-11-16 DIAGNOSIS — R29.898 WEAKNESS OF BOTH LOWER EXTREMITIES: Primary | ICD-10-CM

## 2020-11-16 PROCEDURE — 97140 MANUAL THERAPY 1/> REGIONS: CPT

## 2020-11-16 PROCEDURE — 97110 THERAPEUTIC EXERCISES: CPT

## 2020-11-16 PROCEDURE — 97112 NEUROMUSCULAR REEDUCATION: CPT

## 2020-11-17 ENCOUNTER — APPOINTMENT (OUTPATIENT)
Dept: PHYSICAL THERAPY | Facility: CLINIC | Age: 55
End: 2020-11-17
Payer: COMMERCIAL

## 2020-11-18 ENCOUNTER — OFFICE VISIT (OUTPATIENT)
Dept: PHYSICAL THERAPY | Facility: CLINIC | Age: 55
End: 2020-11-18
Payer: COMMERCIAL

## 2020-11-18 DIAGNOSIS — R29.898 WEAKNESS OF BOTH LOWER EXTREMITIES: Primary | ICD-10-CM

## 2020-11-18 PROCEDURE — 97140 MANUAL THERAPY 1/> REGIONS: CPT

## 2020-11-18 PROCEDURE — 97112 NEUROMUSCULAR REEDUCATION: CPT

## 2020-11-18 PROCEDURE — 97110 THERAPEUTIC EXERCISES: CPT

## 2020-11-19 ENCOUNTER — APPOINTMENT (OUTPATIENT)
Dept: PHYSICAL THERAPY | Facility: CLINIC | Age: 55
End: 2020-11-19
Payer: COMMERCIAL

## 2020-11-30 ENCOUNTER — OFFICE VISIT (OUTPATIENT)
Dept: PHYSICAL THERAPY | Facility: CLINIC | Age: 55
End: 2020-11-30
Payer: COMMERCIAL

## 2020-11-30 DIAGNOSIS — R29.898 WEAKNESS OF BOTH LOWER EXTREMITIES: Primary | ICD-10-CM

## 2020-11-30 PROCEDURE — 97140 MANUAL THERAPY 1/> REGIONS: CPT

## 2020-11-30 PROCEDURE — 97110 THERAPEUTIC EXERCISES: CPT

## 2020-11-30 PROCEDURE — 97112 NEUROMUSCULAR REEDUCATION: CPT

## 2020-12-02 ENCOUNTER — OFFICE VISIT (OUTPATIENT)
Dept: PHYSICAL THERAPY | Facility: CLINIC | Age: 55
End: 2020-12-02
Payer: COMMERCIAL

## 2020-12-02 DIAGNOSIS — R29.898 WEAKNESS OF BOTH LOWER EXTREMITIES: Primary | ICD-10-CM

## 2020-12-02 PROCEDURE — 97110 THERAPEUTIC EXERCISES: CPT

## 2020-12-02 PROCEDURE — 97112 NEUROMUSCULAR REEDUCATION: CPT

## 2020-12-02 PROCEDURE — 97140 MANUAL THERAPY 1/> REGIONS: CPT

## 2020-12-07 ENCOUNTER — OFFICE VISIT (OUTPATIENT)
Dept: PHYSICAL THERAPY | Facility: CLINIC | Age: 55
End: 2020-12-07
Payer: COMMERCIAL

## 2020-12-07 DIAGNOSIS — R29.898 WEAKNESS OF BOTH LOWER EXTREMITIES: Primary | ICD-10-CM

## 2020-12-07 PROCEDURE — 97112 NEUROMUSCULAR REEDUCATION: CPT | Performed by: PHYSICAL THERAPIST

## 2020-12-07 PROCEDURE — 97110 THERAPEUTIC EXERCISES: CPT | Performed by: PHYSICAL THERAPIST

## 2020-12-10 ENCOUNTER — OFFICE VISIT (OUTPATIENT)
Dept: PHYSICAL THERAPY | Facility: CLINIC | Age: 55
End: 2020-12-10
Payer: COMMERCIAL

## 2020-12-10 DIAGNOSIS — R29.898 WEAKNESS OF BOTH LOWER EXTREMITIES: Primary | ICD-10-CM

## 2020-12-10 PROCEDURE — 97112 NEUROMUSCULAR REEDUCATION: CPT | Performed by: PHYSICAL THERAPIST

## 2020-12-10 PROCEDURE — 97110 THERAPEUTIC EXERCISES: CPT | Performed by: PHYSICAL THERAPIST

## 2021-01-28 ENCOUNTER — OFFICE VISIT (OUTPATIENT)
Dept: OBGYN CLINIC | Facility: HOSPITAL | Age: 56
End: 2021-01-28
Payer: COMMERCIAL

## 2021-01-28 VITALS
HEIGHT: 61 IN | BODY MASS INDEX: 36.44 KG/M2 | WEIGHT: 193 LBS | DIASTOLIC BLOOD PRESSURE: 89 MMHG | HEART RATE: 92 BPM | SYSTOLIC BLOOD PRESSURE: 141 MMHG

## 2021-01-28 DIAGNOSIS — M17.11 PRIMARY OSTEOARTHRITIS OF RIGHT KNEE: Primary | ICD-10-CM

## 2021-01-28 PROCEDURE — 99213 OFFICE O/P EST LOW 20 MIN: CPT | Performed by: PHYSICIAN ASSISTANT

## 2021-01-28 PROCEDURE — 20610 DRAIN/INJ JOINT/BURSA W/O US: CPT | Performed by: PHYSICIAN ASSISTANT

## 2021-01-28 RX ORDER — BUPIVACAINE HYDROCHLORIDE 2.5 MG/ML
2 INJECTION, SOLUTION INFILTRATION; PERINEURAL
Status: COMPLETED | OUTPATIENT
Start: 2021-01-28 | End: 2021-01-28

## 2021-01-28 RX ORDER — METHYLPREDNISOLONE ACETATE 40 MG/ML
2 INJECTION, SUSPENSION INTRA-ARTICULAR; INTRALESIONAL; INTRAMUSCULAR; SOFT TISSUE
Status: COMPLETED | OUTPATIENT
Start: 2021-01-28 | End: 2021-01-28

## 2021-01-28 RX ADMIN — METHYLPREDNISOLONE ACETATE 2 ML: 40 INJECTION, SUSPENSION INTRA-ARTICULAR; INTRALESIONAL; INTRAMUSCULAR; SOFT TISSUE at 17:00

## 2021-01-28 RX ADMIN — BUPIVACAINE HYDROCHLORIDE 2 ML: 2.5 INJECTION, SOLUTION INFILTRATION; PERINEURAL at 17:00

## 2021-01-28 NOTE — PROGRESS NOTES
Orthopedics          Gaetano Atul 54 y o  female MRN: [id number]      Chief Complaint:   right knee pain    HPI:   54 y  o female complaining of right knee pain  Patient presents office today regarding chronic right knee pain  Patient has been diagnosed with right knee pain due to osteoarthritis in the past   Patient states the pain is aching nature worse with weight-bearing localized her right knee  She denies instability clicking popping catching her right knee at this time  Patient has had intra-articular injections significantly relieved her pain since returned                  Review Of Systems:   · Skin: Normal  · Neuro: See HPI  · Musculoskeletal: See HPI  · All other systems reviewed and are negative    Past Medical History:   Past Medical History:   Diagnosis Date    Acquired deformity of left foot     Anesthesia complication     difficulty awakening    Arthritis     Deaf, left     Depression     Hallux valgus of left foot     Hammer toe of left foot     Headache     Kidney stone     Sciatic pain, right     intermittent       Past Surgical History:   Past Surgical History:   Procedure Laterality Date    CHOLECYSTECTOMY      CLOSURE DELAYED PRIMARY Right 7/5/2020    Procedure: CLOSURE DELAYED PRIMARY and application of skin graft substitute;  Surgeon: Bre Watson DPM;  [facility];  Service: Podiatry    HERNIA REPAIR      INCISION AND DRAINAGE OF WOUND Right 7/1/2020    Procedure: INCISION AND DRAINAGE (I&D) EXTREMITY;  Surgeon: Bre Watson DPM;  [facility];  Service: Podiatry    [place] OSTEOTOMY METATARSAL (NOT 1ST) Left 6/14/2019    Procedure: 3rd Metatarsal Osteotomy;  Surgeon: Alpa Lundberg DPM;  [facility];  Service: Podiatry    TUBAL LIGATION      VAC DRESSING APPLICATION Right 0/0/5591    Procedure: APPLICATION VAC DRESSING;  Surgeon: Bre Watson DPM;  [facility];  Service: Podiatry       Family History:  Family history reviewed and non-contributory  Family History   Problem Relation Age of Onset    Arthritis Family          Social History:  Social History     Socioeconomic History    Marital status: Single     Spouse name: Not on file    Number of children: Not on file    Years of education: Not on file    Highest education level: Not on file   Occupational History    Not on file   Social Needs    Financial resource strain: Not on file    Food insecurity     Worry: Not on file     Inability: Not on file    Transportation needs     Medical: Not on file     Non-medical: Not on file   Tobacco Use    Smoking status: Current Every Day Smoker     Packs/day: 1 00     Years: 15 00     Pack years: 15 00     Types: Cigarettes    Smokeless tobacco: Never Used   Substance and Sexual Activity    Alcohol use: Yes     Frequency: 2-4 times a month    Drug use: No    Sexual activity: Not on file   Lifestyle    Physical activity     Days per week: Not on file     Minutes per session: Not on file    Stress: Not on file   Relationships    Social connections     Talks on phone: Not on file     Gets together: Not on file     Attends [employer] service: Not on file     Active member of club or organization: Not on file     Attends meetings of clubs or organizations: Not on file     Relationship status: Not on file    Intimate partner violence     Fear of current or ex partner: Not on file     Emotionally abused: Not on file     Physically abused: Not on file     Forced sexual activity: Not on file   Other Topics Concern    Not on file   Social History Narrative    Not on file       Allergies:   No Known Allergies    Labs:  0   Lab Value Date/Time    HCT 41 0 07/03/2020 0453    HCT 40 2 07/02/2020 0550    HCT 41 0 07/01/2020 0503    HCT 39 9 09/08/2014 0924    HGB 13 5 07/03/2020 0453    HGB 13 0 07/02/2020 0550    HGB 13 5 07/01/2020 0503    HGB 13 4 09/08/2014 0924    WBC 9 10 07/03/2020 0453    WBC 8 81 07/02/2020 0550    WBC 9 57 07/01/2020 0503    WBC 11 73 (H) 09/08/2014 7983       Meds:    Current Outpatient Medications:     acetaminophen (TYLENOL) 650 mg CR tablet, Take 650 mg by mouth every 8 (eight) hours as needed, Disp: , Rfl:     atorvastatin (LIPITOR) 20 mg tablet, Take 20 mg by mouth every evening, Disp: , Rfl:     clotrimazole-betamethasone (LOTRISONE) 1-0 05 % cream, Apply topically 2 (two) times a day, Disp: 30 g, Rfl: 0    ibuprofen (MOTRIN) 600 mg tablet, Take 1 tablet (600 mg total) by mouth every 6 (six) hours as needed for mild pain for up to 30 days, Disp: 90 tablet, Rfl: 0    lidocaine (LIDODERM) 5 %, Place 1 patch on the skin daily as needed , Disp: , Rfl:     meclizine (ANTIVERT) 12 5 MG tablet, Take 1 tablet (12 5 mg total) by mouth 3 (three) times a day as needed for dizziness, Disp: 30 tablet, Rfl: 0    meclizine (ANTIVERT) 25 mg tablet, One q 8 hours prn, Disp: , Rfl:     meloxicam (MOBIC) 7 5 mg tablet, Take 7 5 mg by mouth daily, Disp: , Rfl:       Physical Exam:     General Appearance:    Alert, cooperative, no distress, appears stated age   Head:    Normocephalic, without obvious abnormality, atraumatic   Eyes:    conjunctiva/corneas clear, both eyes        Nose:   Nares normal, septum midline, no drainage    Throat:   Lips normal; teeth and gums normal   Neck:    symmetrical, trachea midline, ;     thyroid:  no enlargement/   Back:     Symmetric, no curvature, ROM normal   Lungs:   No audible wheezing or labored breathing   Chest Wall:    No tenderness or deformity    Heart:    Regular rate and rhythm               Pulses:   2+ and symmetric all extremities   Skin:   Skin color, texture, turgor normal, no rashes or lesions   Neurologic:   normal strength, sensation and reflexes     throughout       Musculoskeletal: right lower extremity  ·   On examination of the right knee there is no effusion, no erythema  Range of motion to full active extension and flexion to greater than 120°    Pain on palpation medial and lateral joint lines  There is crepitus with range of motion, no warmth to palpation, bony enlargement noted  No pain on palpation pes anserine bursa region or distal iliotibial band  Stable to varus and valgus stress without pain or gapping  Negative anterior and posterior drawer testing  Sensation intact distal pulses present  Large joint arthrocentesis: R knee  Universal Protocol:  Consent given by: patient  Patient understanding: patient states understanding of the procedure being performed  Site marked: the operative site was marked  Patient identity confirmed: verbally with patient    Supporting Documentation  Indications: pain   Procedure Details  Location: knee - R knee  Needle size: 22 G  Approach: superior  Medications administered: 2 mL bupivacaine 0 25 %; 2 mL methylPREDNISolone acetate 40 mg/mL    Patient tolerance: patient tolerated the procedure well with no immediate complications            _*_*_*_*_*_*_*_*_*_*_*_*_*_*_*_*_*_*_*_*_*_*_*_*_*_*_*_*_*_*_*_*_*_*_*_*_*_*_*_*_*    Assessment:  54 y  o female with right knee   Chronic pain due to osteoarthritis    Plan:   · Weight bearing as tolerated  right lower extremity  ·  right knee intra-articular steroid injection given as noted above  ·  Patient advised should they develop any increasing pain, redness, drainage, numbness, tingling or swelling surrounding the injection sight, they are to contact our office or present to the emergency department    · Follow up in 3 months or sooner if needed      Delvin Phillips PA-C

## 2021-02-05 ENCOUNTER — HOSPITAL ENCOUNTER (EMERGENCY)
Facility: HOSPITAL | Age: 56
Discharge: HOME | End: 2021-02-06
Attending: EMERGENCY MEDICINE | Admitting: EMERGENCY MEDICINE
Payer: COMMERCIAL

## 2021-02-05 VITALS
OXYGEN SATURATION: 99 % | TEMPERATURE: 98 F | DIASTOLIC BLOOD PRESSURE: 90 MMHG | SYSTOLIC BLOOD PRESSURE: 129 MMHG | HEART RATE: 102 BPM | RESPIRATION RATE: 22 BRPM

## 2021-02-05 DIAGNOSIS — N61.0 CELLULITIS OF RIGHT BREAST: Primary | ICD-10-CM

## 2021-02-05 PROCEDURE — 99283 EMERGENCY DEPT VISIT LOW MDM: CPT

## 2021-02-05 PROCEDURE — 99284 EMERGENCY DEPT VISIT MOD MDM: CPT | Performed by: EMERGENCY MEDICINE

## 2021-02-05 RX ORDER — IBUPROFEN 400 MG/1
400 TABLET ORAL ONCE
Status: COMPLETED | OUTPATIENT
Start: 2021-02-05 | End: 2021-02-05

## 2021-02-05 RX ORDER — ACETAMINOPHEN 325 MG/1
975 TABLET ORAL ONCE
Status: COMPLETED | OUTPATIENT
Start: 2021-02-05 | End: 2021-02-05

## 2021-02-05 RX ORDER — DOXYCYCLINE HYCLATE 100 MG/1
100 CAPSULE ORAL 2 TIMES DAILY
Qty: 20 CAPSULE | Refills: 0 | Status: SHIPPED | OUTPATIENT
Start: 2021-02-05 | End: 2021-02-15

## 2021-02-05 RX ORDER — DOXYCYCLINE HYCLATE 100 MG/1
100 CAPSULE ORAL ONCE
Status: COMPLETED | OUTPATIENT
Start: 2021-02-05 | End: 2021-02-05

## 2021-02-05 RX ADMIN — IBUPROFEN 400 MG: 400 TABLET ORAL at 23:20

## 2021-02-05 RX ADMIN — DOXYCYCLINE 100 MG: 100 CAPSULE ORAL at 23:45

## 2021-02-05 RX ADMIN — ACETAMINOPHEN 975 MG: 325 TABLET, FILM COATED ORAL at 23:20

## 2021-02-05 NOTE — Clinical Note
Lynnie Brittle was seen and treated in our emergency department on 2/5/2021  Diagnosis: Breast cellulitis    Marlys    She may return on this date: 02/07/2021         If you have any questions or concerns, please don't hesitate to call        Sam Saab MD    ______________________________           _______________          _______________  Hospital Representative                              Date                                Time

## 2021-02-06 NOTE — DISCHARGE INSTRUCTIONS
You were seen in the ED today for your breast pain  At this time there is concern for cellulitis  We will give you a prescription for doxycycline which you will take for 10 days  You will take 1 tablet twice a day for 10 days  I would also like you to give a call to our breast surgeon on Monday so that you can schedule and appointment for follow up  I would let them know that you were seen in the ED and that you need to follow up with your cellulitis and ED visit  Return to the ED for fevers or chills

## 2021-02-06 NOTE — ED PROVIDER NOTES
History  Chief Complaint   Patient presents with    Breast Pain     pt reports breast pain for "couple months"  states she has severe right breast pain  she was told she has a "clogged vein"  59-year-old female past medical history significant for prior mammograms due to a righ sided breast pain that is presenting today to the ED for right-sided breast pain  Patient said that she has been dealing with this pain for the last year  She got a mammogram in December that showed some intraductal debris  She also had an ultrasound to workup her pain  She has tried using warm compresses with minimal relief as well as over-the-counter pain medications  She denies any discharge from her nipple  She denies any fevers or chills at home  She says that the pain in her breast makes it so tender that she is unable to wear a bra and even wearing a shirt is uncomfortable for her  Prior to Admission Medications   Prescriptions Last Dose Informant Patient Reported?  Taking?   acetaminophen (TYLENOL) 650 mg CR tablet   Yes No   Sig: Take 650 mg by mouth every 8 (eight) hours as needed   atorvastatin (LIPITOR) 20 mg tablet   Yes No   Sig: Take 20 mg by mouth every evening   clotrimazole-betamethasone (LOTRISONE) 1-0 05 % cream   No No   Sig: Apply topically 2 (two) times a day   ibuprofen (MOTRIN) 600 mg tablet   No No   Sig: Take 1 tablet (600 mg total) by mouth every 6 (six) hours as needed for mild pain for up to 30 days   lidocaine (LIDODERM) 5 %   Yes No   Sig: Place 1 patch on the skin daily as needed    meclizine (ANTIVERT) 12 5 MG tablet   No No   Sig: Take 1 tablet (12 5 mg total) by mouth 3 (three) times a day as needed for dizziness   meclizine (ANTIVERT) 25 mg tablet   Yes No   Sig: One q 8 hours prn   meloxicam (MOBIC) 7 5 mg tablet   Yes No   Sig: Take 7 5 mg by mouth daily      Facility-Administered Medications: None       Past Medical History:   Diagnosis Date    Acquired deformity of left foot     Anesthesia complication     difficulty awakening    Arthritis     Deaf, left     Depression     Hallux valgus of left foot     Hammer toe of left foot     Headache     Kidney stone     Sciatic pain, right     intermittent       Past Surgical History:   Procedure Laterality Date    CHOLECYSTECTOMY      CLOSURE DELAYED PRIMARY Right 7/5/2020    Procedure: CLOSURE DELAYED PRIMARY and application of skin graft substitute;  Surgeon: Dana Osborn DPM;  [facility];  Service: Podiatry    HERNIA REPAIR      INCISION AND DRAINAGE OF WOUND Right 7/1/2020    Procedure: INCISION AND DRAINAGE (I&D) EXTREMITY;  Surgeon: Dana Osborn DPM;  [facility];  Service: Podiatry    [address] [place] (NOT 1ST) Left 6/14/2019    Procedure: 3rd Metatarsal Osteotomy;  Surgeon: Claudia Guzman DPM;  [facility];  Service: Podiatry    TUBAL LIGATION      VAC DRESSING APPLICATION Right 1/4/9349    Procedure: APPLICATION VAC DRESSING;  Surgeon: Dana Osborn DPM;  [facility];  Service: Podiatry       Family History   Problem Relation Age of Onset    Arthritis Family      I have reviewed and agree with the history as documented  E-Cigarette/Vaping    E-Cigarette Use Never User      E-Cigarette/Vaping Substances    Nicotine No     THC No     CBD No     Flavoring No     Other No     Unknown No      Social History     Tobacco Use    Smoking status: Current Every Day Smoker     Packs/day: 1 00     Years: 15 00     Pack years: 15 00     Types: Cigarettes    Smokeless tobacco: Never Used   Substance Use Topics    Alcohol use: Yes     Frequency: 2-4 times a month    Drug use: No        Review of Systems   Constitutional: Negative for chills and fever  HENT: Negative for hearing loss  Eyes: Negative for visual disturbance  Respiratory: Negative for shortness of breath  Cardiovascular: Negative for chest pain     Gastrointestinal: Negative for abdominal pain, constipation, diarrhea, nausea and vomiting  Genitourinary: Negative for difficulty urinating  Musculoskeletal: Negative for myalgias  Skin: Negative for color change  Neurological: Negative for dizziness and headaches  Psychiatric/Behavioral: Negative for agitation  All other systems reviewed and are negative  Physical Exam  ED Triage Vitals [02/05/21 2023]   Temperature Pulse Respirations Blood Pressure SpO2   98 °F (36 7 °C) 102 22 133/91 99 %      Temp Source Heart Rate Source Patient Position - Orthostatic VS BP Location FiO2 (%)   Oral Monitor Sitting Right arm --      Pain Score       Worst Possible Pain             Orthostatic Vital Signs  Vitals:    02/05/21 2023 02/05/21 2132   BP: 133/91 129/90   Pulse: 102    Patient Position - Orthostatic VS: Sitting        Physical Exam  Vitals signs and nursing note reviewed  Constitutional:       General: She is not in acute distress  Appearance: Normal appearance  She is well-developed  She is not ill-appearing  HENT:      Head: Normocephalic and atraumatic  Right Ear: External ear normal       Left Ear: External ear normal       Nose: Nose normal       Mouth/Throat:      Mouth: Mucous membranes are moist       Pharynx: Oropharynx is clear  No oropharyngeal exudate  Eyes:      Extraocular Movements: Extraocular movements intact  Conjunctiva/sclera: Conjunctivae normal       Pupils: Pupils are equal, round, and reactive to light  Neck:      Musculoskeletal: Normal range of motion and neck supple  Cardiovascular:      Rate and Rhythm: Normal rate and regular rhythm  Heart sounds: Normal heart sounds  No murmur  No friction rub  No gallop  Pulmonary:      Effort: Pulmonary effort is normal  No respiratory distress  Breath sounds: Normal breath sounds  No stridor  No wheezing  Chest:      Comments: Patient has tenderness the right past as well as erythema around the areola    No area of fluctuance appreciated on exam   Abdominal: General: Bowel sounds are normal  There is no distension  Palpations: Abdomen is soft  Tenderness: There is no abdominal tenderness  Musculoskeletal: Normal range of motion  Skin:     General: Skin is warm and dry  Neurological:      Mental Status: She is alert and oriented to person, place, and time  Psychiatric:         Mood and Affect: Mood normal          Behavior: Behavior normal          ED Medications  Medications   acetaminophen (TYLENOL) tablet 975 mg (975 mg Oral Given 2/5/21 2320)   ibuprofen (MOTRIN) tablet 400 mg (400 mg Oral Given 2/5/21 2320)   doxycycline hyclate (VIBRAMYCIN) capsule 100 mg (100 mg Oral Given 2/5/21 2345)       Diagnostic Studies  Results Reviewed     None                 No orders to display         Procedures  Procedures      ED Course                                       MDM  Number of Diagnoses or Management Options  Cellulitis of right breast:   Diagnosis management comments: 79-year-old female with right-sided breast pain  At this time likely diagnosis is cellulitis of the right breast   Will give the patient Tylenol and ibuprofen for pain control also give the patient p o  Doxycycline 100 mg and give the patient a prescription for a 10 day course of doxycycline  I also give the patient information for follow-up with our breast surgeon as an outpatient  Disposition  Final diagnoses:   Cellulitis of right breast     Time reflects when diagnosis was documented in both MDM as applicable and the Disposition within this note     Time User Action Codes Description Comment    2/5/2021 11:42 PM Michelle Marte Add [N61 [id number]] Cellulitis of right breast       ED Disposition     ED Disposition Condition Date/Time Comment    Discharge Stable Fri Feb 5, 2021 11:42 PM Vidhi [place] discharge to home/self care              Follow-up Information     Follow up With Specialties Details Why Contact Info Additional Information    Denisse Cope MD General Surgery Call Call on Monday to sched [address]  [address] [facility] Emergency Department Emergency Medicine Go to  If symptoms worsen, As needed [address]  [address] [facility] [address] Department, [address], [place], [place], [place] [address]    Helen Mae MD Family Medicine Schedule an appointment as soon as possible for a visit in 2 days to follow up on ED visit Fina [address]  [phone]             Discharge Medication List as of 2/6/2021 12:08 AM      START taking these medications    Details   doxycycline hyclate (VIBRAMYCIN) 100 mg capsule Take 1 capsule (100 mg total) by mouth 2 (two) times a day for 10 days, Starting Fri 2/5/2021, Until Mon 2/15/2021, Normal         CONTINUE these medications which have NOT CHANGED    Details   acetaminophen (TYLENOL) 650 mg CR tablet Take 650 mg by mouth every 8 (eight) hours as needed, Historical Med      atorvastatin (LIPITOR) 20 mg tablet Take 20 mg by mouth every evening, Starting Fri 10/23/2020, Historical Med      clotrimazole-betamethasone (LOTRISONE) 1-0 05 % cream Apply topically 2 (two) times a day, Starting Thu 6/4/2020, Normal      ibuprofen (MOTRIN) 600 mg tablet Take 1 tablet (600 mg total) by mouth every 6 (six) hours as needed for mild pain for up to 30 days, Starting Fri 7/19/2019, Until Sun 8/18/2019, Normal      lidocaine (LIDODERM) 5 % Place 1 patch on the skin daily as needed , Starting Wed 4/26/2017, Historical Med      !! meclizine (ANTIVERT) 12 5 MG tablet Take 1 tablet (12 5 mg total) by mouth 3 (three) times a day as needed for dizziness, Starting Mon 11/25/2019, Normal      !! meclizine (ANTIVERT) 25 mg tablet One q 8 hours prn, Historical Med      meloxicam (MOBIC) 7 5 mg tablet Take 7 5 mg by mouth daily, Starting Fri 9/25/2020, Until Sat 9/25/2021, Historical Med       !! - Potential duplicate medications found  Please discuss with provider  No discharge procedures on file  PDMP Review     None           ED Provider  Attending physically available and evaluated Shayla Lewis I managed the patient along with the ED Attending      Electronically Signed by         Liam Bullock MD  02/06/21 0082

## 2021-02-06 NOTE — ED ATTENDING ATTESTATION
2/5/2021  IDario MD, saw and evaluated the patient  I have discussed the patient with the resident/non-physician practitioner and agree with the resident's/non-physician practitioner's findings, Plan of Care, and MDM as documented in the resident's/non-physician practitioner's note, except where noted  All available labs and Radiology studies were reviewed  I was present for key portions of any procedure(s) performed by the resident/non-physician practitioner and I was immediately available to provide assistance  At this point I agree with the current assessment done in the Emergency Department  I have conducted an independent evaluation of this patient a history and physical is as follows:    ED Course     49-year-old female presenting to the emergency department for evaluation of right breast pain  Patient reports that she has had a history of intermittent pain in the right breast, and reports that she was seen at [facility] approximately year ago for the same  In reviewing her medical records though folic and find is that she was admitted in June for a cellulitis of the foot  Patient had a mammogram in December that was negative and a breast ultrasound in December that demonstrated a dilated intramammary duct with debris  Patient is presenting to the emergency department for evaluation of a 3 day history of increased erythema to the right breast, increased warmth, and increased pain of the right breast   Patient denies any systemic symptoms including fever, general malaise, chest pain or shortness of breath  On examination of the breast with the nurse chaperoning, the patient is noted to have more violaceous appearance to the right areola when compared to the left and a small rim of erythema that extends from approximately 07:00 o'clock through 10:00 o'clock with a crescent of erythema measuring about a centimeter outside of the areola    The patient has some induration in the subareolar tissue without any notable fluctuance  This area is tender to palpation  Assessment and plan:  Likely breast infection  Plan is antibiotics with MRSA coverage, warm packs, Tylenol and Motrin, and recommendations to follow-up with Michelle Valdez from breast surgery for re-evaluation early next week      Critical Care Time  Procedures

## 2021-02-09 ENCOUNTER — TELEPHONE (OUTPATIENT)
Dept: SURGERY | Facility: CLINIC | Age: 56
End: 2021-02-09

## 2021-02-09 ENCOUNTER — CONSULT (OUTPATIENT)
Dept: SURGERY | Facility: CLINIC | Age: 56
End: 2021-02-09
Payer: COMMERCIAL

## 2021-02-09 ENCOUNTER — OFFICE VISIT (OUTPATIENT)
Dept: PODIATRY | Facility: CLINIC | Age: 56
End: 2021-02-09
Payer: COMMERCIAL

## 2021-02-09 VITALS — BODY MASS INDEX: 36.82 KG/M2 | WEIGHT: 195 LBS | HEIGHT: 61 IN

## 2021-02-09 VITALS
WEIGHT: 195.4 LBS | SYSTOLIC BLOOD PRESSURE: 130 MMHG | TEMPERATURE: 96.9 F | DIASTOLIC BLOOD PRESSURE: 80 MMHG | HEIGHT: 61 IN | HEART RATE: 101 BPM | BODY MASS INDEX: 36.89 KG/M2

## 2021-02-09 DIAGNOSIS — B35.1 ONYCHOMYCOSIS: ICD-10-CM

## 2021-02-09 DIAGNOSIS — M20.11 VALGUS DEFORMITY OF BOTH GREAT TOES: Primary | ICD-10-CM

## 2021-02-09 DIAGNOSIS — N64.4 BREAST PAIN: Primary | ICD-10-CM

## 2021-02-09 DIAGNOSIS — M20.12 VALGUS DEFORMITY OF BOTH GREAT TOES: Primary | ICD-10-CM

## 2021-02-09 PROCEDURE — 99213 OFFICE O/P EST LOW 20 MIN: CPT | Performed by: PODIATRIST

## 2021-02-09 PROCEDURE — 99203 OFFICE O/P NEW LOW 30 MIN: CPT | Performed by: PHYSICIAN ASSISTANT

## 2021-02-09 RX ORDER — TERBINAFINE HYDROCHLORIDE 250 MG/1
250 TABLET ORAL DAILY
Qty: 14 TABLET | Refills: 0 | Status: SHIPPED | OUTPATIENT
Start: 2021-02-09 | End: 2021-02-23

## 2021-02-09 NOTE — PROGRESS NOTES
PATIENT:  Madison Martinez      1965    ASSESSMENT     1  Valgus deformity of both great toes     2  Onychomycosis  terbinafine (LamISIL) 250 mg tablet          PLAN  1  Instructed her to use toe spacers at all times  Betadine to macerated skin  2  Consider surgical repair of HAV in the future  3  Renewed Lamisil  Discussed possible risks and side effects  Avoid alcohol while she is on Lamisil  4  HPK X 2 trimmed  Instructed proper footwear and skin care  5  RA in 3 months  HISTORY OF PRESENT ILLNESS  Patient presents for foot evaluation  No redness or edema  No recurring wounds or infection  She complained of pain on plantar feet  Sharp sensation when she walks  Her toenails look better now        PAST MEDICAL HISTORY:  Past Medical History:   Diagnosis Date    Acquired deformity of left foot     Anesthesia complication     difficulty awakening    Arthritis     Deaf, left     Depression     Hallux valgus of left foot     Hammer toe of left foot     Headache     Kidney stone     Sciatic pain, right     intermittent       PAST SURGICAL HISTORY:  Past Surgical History:   Procedure Laterality Date    CHOLECYSTECTOMY      CLOSURE DELAYED PRIMARY Right 7/5/2020    Procedure: CLOSURE DELAYED PRIMARY and application of skin graft substitute;  Surgeon: Deloris Hodgkins, DPM;  [facility];  Service: Podiatry    HERNIA REPAIR      INCISION AND DRAINAGE OF WOUND Right 7/1/2020    Procedure: INCISION AND DRAINAGE (I&D) EXTREMITY;  Surgeon: Deloris Hodgkins, DPM;  [facility];  Service: Podiatry    [address] (NOT 1ST) Left 6/14/2019    Procedure: 3rd Metatarsal Osteotomy;  Surgeon: João Plummer DPM;  [facility];  Service: Podiatry    TUBAL LIGATION      VAC DRESSING APPLICATION Right 9/5/2671    Procedure: APPLICATION VAC DRESSING;  Surgeon: Deloris Hodgkins, DPM;  [facility];  Service: Podiatry        ALLERGIES:  Patient has no known allergies  MEDICATIONS:  Current Outpatient Medications   Medication Sig Dispense Refill    acetaminophen (TYLENOL) 650 mg CR tablet Take 650 mg by mouth every 8 (eight) hours as needed      atorvastatin (LIPITOR) 20 mg tablet Take 20 mg by mouth every evening      clotrimazole-betamethasone (LOTRISONE) 1-0 05 % cream Apply topically 2 (two) times a day 30 g 0    doxycycline hyclate (VIBRAMYCIN) 100 mg capsule Take 1 capsule (100 mg total) by mouth 2 (two) times a day for 10 days 20 capsule 0    lidocaine (LIDODERM) 5 % Place 1 patch on the skin daily as needed       meclizine (ANTIVERT) 12 5 MG tablet Take 1 tablet (12 5 mg total) by mouth 3 (three) times a day as needed for dizziness 30 tablet 0    meclizine (ANTIVERT) 25 mg tablet One q 8 hours prn      meloxicam (MOBIC) 7 5 mg tablet Take 7 5 mg by mouth daily      ibuprofen (MOTRIN) 600 mg tablet Take 1 tablet (600 mg total) by mouth every 6 (six) hours as needed for mild pain for up to 30 days 90 tablet 0    terbinafine (LamISIL) 250 mg tablet Take 1 tablet (250 mg total) by mouth daily for 14 days 14 tablet 0     No current facility-administered medications for this visit          SOCIAL HISTORY:  Social History     Socioeconomic History    Marital status: Single     Spouse name: None    Number of children: None    Years of education: None    Highest education level: None   Occupational History    None   Social Needs    Financial resource strain: None    Food insecurity     Worry: None     Inability: None    Transportation needs     Medical: None     Non-medical: None   Tobacco Use    Smoking status: Current Every Day Smoker     Packs/day: 1 00     Years: 15 00     Pack years: 15 00     Types: Cigarettes    Smokeless tobacco: Never Used   Substance and Sexual Activity    Alcohol use: Yes     Frequency: 2-4 times a month    Drug use: No    Sexual activity: None   Lifestyle    Physical activity     Days per week: None     Minutes per session: None    Stress: None   Relationships    Social connections     Talks on phone: None     Gets together: None     Attends [employer] service: None     Active member of club or organization: None     Attends meetings of clubs or organizations: None     Relationship status: None    Intimate partner violence     Fear of current or ex partner: None     Emotionally abused: None     Physically abused: None     Forced sexual activity: None   Other Topics Concern    None   Social History Narrative    None      REVIEW OF SYSTEMS  Patient denied CP, SOB, fever, chills, palpitation, HA, GI problem, or calf pain  PHYSICAL EXAMINATION  GENERAL  The patient appears in NAD / non-toxic  Afebrile  VSS    VASCULAR EXAM  Pedal pulses and vascular status are intact  No calf pain or edema bilaterally  No cyanosis  DERMATOLOGIC EXAM  No ulcer  Maceration right 1st interspace  No redness or edema  No signs of infection  HPK submet 2 bilaterally  Decreased thickening and discoloration of toenails  NEUROLOGIC EXAM  AAO X 3  No focal neurologic deficit  Neurologic status is intact BLE  MUSCULOSKELETAL EXAM  ROM intact  No fluctuation or crepitus  Severe bunion deformity noted bilaterally

## 2021-02-09 NOTE — PROGRESS NOTES
Assessment/Plan:   Riley Cortez is a 54 y  o female who is here for a breast concern  78-year-old female with right breast pain  She has been doing with this since November of last year  She got a diagnostic mammogram and ultrasound  Intraductal debris at the 4 o'clock  Position  Pain has been constant associated with some skin changes of her areola are  She presented to the emergency department on Friday and prescribe some oral antibiotics    On exam and upon review of recent breast imaging:  December 2020  At [facility] BI-RADS 3, probable benign short interval follow-up suggested    Plan:  Repeat diagnostic ultrasound to evaluate for fluid or drainable collection  Complete course of antibiotics as prescribed  Close follow-up after ultrasound    depending on ultrasound may consider MRI            _______________________________________________________  HPI:  Riley Cortez is a 54 y  o female who was referred for evaluation of Follow-up (ED Right breast celluitis )    Currently:  Right breast pain and skin changes  Duration symptoms: approximately 4  To 6 months     Symptoms:   positive for - nipple changes and  Breast pain    Most recent mammogram:  December 2020 BI-RADS 3      Hormone Replacement Therapy:   no    Previous breast biopsy: None reported    Family history: None    ROS:  General ROS: negative  negative for - chills, fatigue, fever or night sweats, weight loss  Respiratory ROS: no cough, shortness of breath, or wheezing  Cardiovascular ROS: no chest pain or dyspnea on exertion  Genito-Urinary ROS: no dysuria, trouble voiding, or hematuria  Musculoskeletal ROS: negative for - gait disturbance, joint pain or muscle pain  Neurological ROS: no TIA or stroke symptoms  Breast ROS: see HPI  GI ROS: see HPI  Skin ROS: no new rashes or lesions   Lymphatic ROS: no new adenopathy noted by pt     GYN ROS: see HPI, no new GYN history or bleeding noted  Psy ROS: no new mental or behavioral disturbances               Patient Active Problem List   Diagnosis    Acquired hallux valgus of left foot    Other hammer toe(s) (acquired), left foot    Other acquired deformities of left foot    Wound of right foot    Preoperative clearance    Tobacco abuse    Headache         Allergies: Patient has no known allergies      Meds:   Current Outpatient Medications:     acetaminophen (TYLENOL) 650 mg CR tablet, Take 650 mg by mouth every 8 (eight) hours as needed, Disp: , Rfl:     atorvastatin (LIPITOR) 20 mg tablet, Take 20 mg by mouth every evening, Disp: , Rfl:     clotrimazole-betamethasone (LOTRISONE) 1-0 05 % cream, Apply topically 2 (two) times a day, Disp: 30 g, Rfl: 0    doxycycline hyclate (VIBRAMYCIN) 100 mg capsule, Take 1 capsule (100 mg total) by mouth 2 (two) times a day for 10 days, Disp: 20 capsule, Rfl: 0    lidocaine (LIDODERM) 5 %, Place 1 patch on the skin daily as needed , Disp: , Rfl:     meclizine (ANTIVERT) 12 5 MG tablet, Take 1 tablet (12 5 mg total) by mouth 3 (three) times a day as needed for dizziness, Disp: 30 tablet, Rfl: 0    meclizine (ANTIVERT) 25 mg tablet, One q 8 hours prn, Disp: , Rfl:     meloxicam (MOBIC) 7 5 mg tablet, Take 7 5 mg by mouth daily, Disp: , Rfl:     ibuprofen (MOTRIN) 600 mg tablet, Take 1 tablet (600 mg total) by mouth every 6 (six) hours as needed for mild pain for up to 30 days, Disp: 90 tablet, Rfl: 0    PMH:   Past Medical History:   Diagnosis Date    Acquired deformity of left foot     Anesthesia complication     difficulty awakening    Arthritis     Deaf, left     Depression     Hallux valgus of left foot     Hammer toe of left foot     Headache     Kidney stone     Sciatic pain, right     intermittent       PSHx:   Past Surgical History:   Procedure Laterality Date    CHOLECYSTECTOMY      CLOSURE DELAYED PRIMARY Right 7/5/2020    Procedure: CLOSURE DELAYED PRIMARY and application of skin graft substitute;  Surgeon: David Blank DPM;  [facility];  Service: Podiatry    HERNIA REPAIR      INCISION AND DRAINAGE OF WOUND Right 7/1/2020    Procedure: INCISION AND DRAINAGE (I&D) EXTREMITY;  Surgeon: David Blank DPM;  [facility];  Service: Podiatry    [address] [place] (NOT 1ST) Left 6/14/2019    Procedure: 3rd Metatarsal Osteotomy;  Surgeon: Michaela Mckeon DPM;  [facility];  Service: Podiatry    TUBAL LIGATION      VAC DRESSING APPLICATION Right 8/8/9887    Procedure: APPLICATION VAC DRESSING;  Surgeon: David Blank DPM;  [facility];  Service: Podiatry       Family History:   Family History   Problem Relation Age of Onset    Arthritis Family        Social History:  reports that she has been smoking cigarettes  She has a 15 00 pack-year smoking history  She has never used smokeless tobacco  She reports current alcohol use  She reports that she does not use drugs  Imaging: No new pertinent imaging studies         Lab Results   Component Value Date    WBC 9 10 07/03/2020    HGB 13 5 07/03/2020    HCT 41 0 07/03/2020    MCV 98 07/03/2020     07/03/2020     Lab Results   Component Value Date     (L) 09/08/2014    K 4 2 07/03/2020     (H) 07/03/2020    CO2 24 07/03/2020    ANIONGAP 7 09/08/2014    BUN 11 07/03/2020    CREATININE 0 69 07/03/2020    GLUCOSE 130 09/08/2014    CALCIUM 9 3 07/03/2020    AST 15 06/29/2020    ALT 24 06/29/2020    ALKPHOS 89 06/29/2020    PROT 7 9 09/08/2014    BILITOT 0 26 09/08/2014    EGFR 98 07/03/2020       Vitals:    02/09/21 1151   BP: 130/80   Pulse: 101   Temp: (!) 96 9 °F (36 1 °C)          PHYSICAL EXAM  General Appearance:    Alert, cooperative, no distress   Head:    Normocephalic without obvious abnormality   Eyes:    PERRL, conjunctiva/corneas clear      Neck:   Supple, no adenopathy, no JVD   Back:     Symmetric, no spinal or CVA tenderness   Lungs:     Clear to auscultation bilaterally,   Heart:  Breast:    Regular rate and rhythm, S1 and S2 normal, no murmur    No nipple discharge or bleeding, No axillary or supraclavicular adenopathy, left breast normal without mass, skin or nipple changes or axillary nodes,  Right  Breast with skin changes at the area roller with induration but no fluctuance, tender to palpitation  No dominant masses noted but difficult to assess due to tenderness  Abdomen:     Soft, nontender   Extremities:   Extremities normal  No clubbing, cyanosis or edema   Psych:   Normal Affect, AOx3  Neurologic:  Skin:   CNII-XII intact  Strength symmetric, speech intact    Warm, dry, intact, no visible rashes or lesions                       Signature:     Rashad Martin PA-C    Date: 2/9/2021 Time: 12:08 PM                 Some portions of this record may have been generated with voice recognition software  There may be translation, syntax,  or grammatical errors  Occasional wrong word or "sound-a-like" substitutions may have occurred due to the inherent limitations of the voice recognition software  Read the chart carefully and recognize, using context, where substitutions may have occurred  If you have any questions, please contact the dictating provider for clarification or correction, as needed  This encounter has been coded by a physician, non certified coder

## 2021-02-09 NOTE — TELEPHONE ENCOUNTER
Faxed request to Steven Gross for disc and reports to be sent to the office  Patient is scheduled for u/s at [facility] on Monday, 2/15/21  When disc is received, will be sent to reading room to be uploaded into PACS

## 2021-02-15 ENCOUNTER — HOSPITAL ENCOUNTER (OUTPATIENT)
Dept: ULTRASOUND IMAGING | Facility: CLINIC | Age: 56
Discharge: HOME | End: 2021-02-15
Payer: COMMERCIAL

## 2021-02-15 VITALS — BODY MASS INDEX: 36.82 KG/M2 | HEIGHT: 61 IN | WEIGHT: 195 LBS

## 2021-02-15 DIAGNOSIS — N64.4 BREAST PAIN: ICD-10-CM

## 2021-02-15 PROCEDURE — 76642 ULTRASOUND BREAST LIMITED: CPT

## 2021-02-17 ENCOUNTER — TELEPHONE (OUTPATIENT)
Dept: SURGERY | Facility: CLINIC | Age: 56
End: 2021-02-17

## 2021-02-17 NOTE — TELEPHONE ENCOUNTER
Called lmom for patient to go over Breast US results and see if she can come in earlier tomorrow for office appt or Monday afternoon   MUST  [address]

## 2021-02-17 NOTE — TELEPHONE ENCOUNTER
Received call from patient  She'll come in earlier tomorrow afternoon  If she is unable to make it due to inclement weather, she'll call and we'll r/s to Monday

## 2021-02-17 NOTE — TELEPHONE ENCOUNTER
Patient called back, she is aware of results and need to follow up with Dr Anthony    She needs to check with work if she can come in at 130pm Thursday or 315pm Monday

## 2021-02-18 ENCOUNTER — OFFICE VISIT (OUTPATIENT)
Dept: SURGERY | Facility: CLINIC | Age: 56
End: 2021-02-18
Payer: COMMERCIAL

## 2021-02-18 VITALS
BODY MASS INDEX: 37.19 KG/M2 | DIASTOLIC BLOOD PRESSURE: 80 MMHG | WEIGHT: 197 LBS | HEART RATE: 98 BPM | SYSTOLIC BLOOD PRESSURE: 148 MMHG | HEIGHT: 61 IN | TEMPERATURE: 96.5 F

## 2021-02-18 DIAGNOSIS — Z72.0 TOBACCO ABUSE: ICD-10-CM

## 2021-02-18 DIAGNOSIS — N61.1 CHRONIC ABSCESS OF BREAST: Primary | ICD-10-CM

## 2021-02-18 DIAGNOSIS — F17.200 SMOKER: ICD-10-CM

## 2021-02-18 PROCEDURE — 99214 OFFICE O/P EST MOD 30 MIN: CPT | Performed by: SURGERY

## 2021-02-18 NOTE — PROGRESS NOTES
Assessment/Plan:   Alethea Ureña is a 54 y  o female who is here for a breast concern  On exam and upon review of recent breast imagin  7:00 a m  chronic abscess waxes and wanes over 1 year  With multiple ER visits and never fully improving  2  4:00 a m  BI-RADS 3 mammogram and ultrasound from [facility] from 2020 with a debris-filled duct  They recommended short interval follow-up which would have been late 2021  3  Chronic smoker    PLAN:   1  Excisional debridement of chronic abscess cavity 7 o'clock position right breast   In the operating room under anesthesia  We discussed the need to excise the chronic abscess cavity and leave the wound either partially closed or partially open with potential several weeks of wound care  This is her best option for a permanent resolution  Her smoking and obesity limited self to poor wound healing, and delayed wound closure, and recurrence  Her son was present when we discussed this  I will give her a 75% chance of primary closure and full healing on the 1st try and she may requires multiple surgeries  we also discussed that in the face of a chronic nonhealing abscess, 1 always has to rule out the idea of a an occult breast cancer  We also spoke to Radiology regarding the issue at the 4 o'clock position  They will review her outside films and determine if we should biopsy both at the same surgery to avoid having to take off multiple times  2    Follow-up ultrasound and mammogram at [facility] with her 4 o'clock position debris-filled duct BI-RADS 3 ultrasound  If she has surgery before this then we will need to postpone this follow-up in till perhaps 2021  3   Smoking cessation counseling performed in the office again in the presence of her son        Over 50 minutes of counseling and work in the office between the [place] and myself in regarding full history, discussion of surgical options versus nonsurgical options, and counseling the patient has occurred  Post op pain management:  Norco      _______________________________________________________  HPI:  Vidhi Washington is a 54 y  o female who was referred for evaluation of Follow-up (Right breast celluitis; )    Currently:   Duration symptoms:     Symptoms:   positive for -   Chronic intermittent abscess over 1 year time in the 7 o'clock position of the right breast   She gets inflamed red mastitis without drainage, treated by antibiotics  This is resulted in multiple ER visits           Most recent mammogram:  December 2020 with a BI-RADS 3 right breast ultrasound at the 4 o'clock position    Number of children: G 4  Menses 12     Age at first child or pregnancy >3 months: 21    Menopause: 50    Hormone Replacement Therapy:   no    Previous breast biopsy: None reported    Family history: None    ROS:  General ROS: negative  negative for - chills, fatigue, fever or night sweats, weight loss  Respiratory ROS: no cough, shortness of breath, or wheezing  Cardiovascular ROS: no chest pain or dyspnea on exertion  Genito-Urinary ROS: no dysuria, trouble voiding, or hematuria  Musculoskeletal ROS: negative for - gait disturbance, joint pain or muscle pain  Neurological ROS: no TIA or stroke symptoms  Breast ROS: see HPI  GI ROS: see HPI  Skin ROS: no new rashes or lesions   Lymphatic ROS: no new adenopathy noted by pt  GYN ROS: see HPI, no new GYN history or bleeding noted  Psy ROS: no new mental or behavioral disturbances               Patient Active Problem List   Diagnosis    Acquired hallux valgus of left foot    Other hammer toe(s) (acquired), left foot    Other acquired deformities of left foot    Wound of right foot    Preoperative clearance    Tobacco abuse    Headache         Allergies: Patient has no known allergies      Meds:   Current Outpatient Medications:     acetaminophen (TYLENOL) 650 mg CR tablet, Take 650 mg by mouth every 8 (eight) hours as needed, Disp: , Rfl:     atorvastatin (LIPITOR) 20 mg tablet, Take 20 mg by mouth every evening, Disp: , Rfl:     clotrimazole-betamethasone (LOTRISONE) 1-0 05 % cream, Apply topically 2 (two) times a day, Disp: 30 g, Rfl: 0    lidocaine (LIDODERM) 5 %, Place 1 patch on the skin daily as needed , Disp: , Rfl:     meclizine (ANTIVERT) 12 5 MG tablet, Take 1 tablet (12 5 mg total) by mouth 3 (three) times a day as needed for dizziness, Disp: 30 tablet, Rfl: 0    meclizine (ANTIVERT) 25 mg tablet, One q 8 hours prn, Disp: , Rfl:     meloxicam (MOBIC) 7 5 mg tablet, Take 7 5 mg by mouth daily, Disp: , Rfl:     terbinafine (LamISIL) 250 mg tablet, Take 1 tablet (250 mg total) by mouth daily for 14 days, Disp: 14 tablet, Rfl: 0    ibuprofen (MOTRIN) 600 mg tablet, Take 1 tablet (600 mg total) by mouth every 6 (six) hours as needed for mild pain for up to 30 days, Disp: 90 tablet, Rfl: 0    PMH:   Past Medical History:   Diagnosis Date    Acquired deformity of left foot     Anesthesia complication     difficulty awakening    Arthritis     Deaf, left     Depression     Hallux valgus of left foot     Hammer toe of left foot     Headache     Kidney stone     Sciatic pain, right     intermittent       PSHx:   Past Surgical History:   Procedure Laterality Date    CHOLECYSTECTOMY      CLOSURE DELAYED PRIMARY Right 7/5/2020    Procedure: CLOSURE DELAYED PRIMARY and application of skin graft substitute;  Surgeon: Josiah Kayser, DPM;  [facility];  Service: Podiatry    HERNIA REPAIR      INCISION AND DRAINAGE OF WOUND Right 7/1/2020    Procedure: INCISION AND DRAINAGE (I&D) EXTREMITY;  Surgeon: Josiah Kayser, DPM;  [facility];  Service: Podiatry    MD OSTEOTOMY METATARSAL (NOT 1ST) Left 6/14/2019    Procedure: 3rd Metatarsal Osteotomy;  Surgeon: Alivia Pitt DPM;  [facility];  Service: Podiatry    TUBAL LIGATION      VAC DRESSING APPLICATION Right 2/8/7748 Procedure: APPLICATION VAC DRESSING;  Surgeon: Meggan Brown DPM;  [facility];  Service: Podiatry       Family History:   Family History   Problem Relation Age of Onset    Arthritis Family        Social History:  reports that she has been smoking cigarettes  She has a 15 00 pack-year smoking history  She has never used smokeless tobacco  She reports current alcohol use  She reports that she does not use drugs  Imaging: I personally reviewed the radiology studies, my impression is as follows:  Reviewed with radiologist as well  There are 2 separate issues going on         Lab Results   Component Value Date    WBC 9 10 07/03/2020    HGB 13 5 07/03/2020    HCT 41 0 07/03/2020    MCV 98 07/03/2020     07/03/2020     Lab Results   Component Value Date     (L) 09/08/2014    K 4 2 07/03/2020     (H) 07/03/2020    CO2 24 07/03/2020    ANIONGAP 7 09/08/2014    BUN 11 07/03/2020    CREATININE 0 69 07/03/2020    GLUCOSE 130 09/08/2014    CALCIUM 9 3 07/03/2020    AST 15 06/29/2020    ALT 24 06/29/2020    ALKPHOS 89 06/29/2020    PROT 7 9 09/08/2014    BILITOT 0 26 09/08/2014    EGFR 98 07/03/2020       Vitals:    02/18/21 1149   BP: 148/80   Pulse: 98   Temp: (!) 96 5 °F (35 8 °C)          PHYSICAL EXAM  General Appearance:    Alert, cooperative, no distress,    Head:    Normocephalic without obvious abnormality   Eyes:    PERRL, conjunctiva/corneas clear      Neck:   Supple, no adenopathy, no JVD   Back:     Symmetric, no spinal or CVA tenderness   Lungs:     Clear to auscultation bilaterally,   Heart:  Breast:    Regular rate and rhythm, S1 and S2 normal, no murmur    normal appearance, no masses or tenderness, breasts appear normal, no suspicious masses, no skin or nipple changes or axillary nodes    Physical Exam  Chest:             This area has chronic skin changes without erythema or well-circumscribed mass but it has a indurated hard feeling to it    This is not characteristic of breast cancer but more so of a chronic abscess  Abdomen:     Soft, nontender   Extremities:   Extremities normal  No clubbing, cyanosis or edema   Psych:   Normal Affect, AOx3  Neurologic:  Skin:   CNII-XII intact  Strength symmetric, speech intact    Warm, dry, intact, no visible rashes or lesions                       Signature:     Jaylin Alonso MD    Date: 2/18/2021 Time: 12:19 PM                 Some portions of this record may have been generated with voice recognition software  There may be translation, syntax,  or grammatical errors  Occasional wrong word or "sound-a-like" substitutions may have occurred due to the inherent limitations of the voice recognition software  Read the chart carefully and recognize, using context, where substitutions may have occurred  If you have any questions, please contact the dictating provider for clarification or correction, as needed  This encounter has been coded by a physician, non certified coder

## 2021-02-18 NOTE — H&P (VIEW-ONLY)
Assessment/Plan:   Anne Christianson is a 54 y  o female who is here for a breast concern  On exam and upon review of recent breast imagin  7:00 a m  chronic abscess waxes and wanes over 1 year  With multiple ER visits and never fully improving  2  4:00 a m  BI-RADS 3 mammogram and ultrasound from [facility] from 2020 with a debris-filled duct  They recommended short interval follow-up which would have been late 2021  3  Chronic smoker    PLAN:   1  Excisional debridement of chronic abscess cavity 7 o'clock position right breast   In the operating room under anesthesia  We discussed the need to excise the chronic abscess cavity and leave the wound either partially closed or partially open with potential several weeks of wound care  This is her best option for a permanent resolution  Her smoking and obesity limited self to poor wound healing, and delayed wound closure, and recurrence  Her son was present when we discussed this  I will give her a 75% chance of primary closure and full healing on the 1st try and she may requires multiple surgeries  we also discussed that in the face of a chronic nonhealing abscess, 1 always has to rule out the idea of a an occult breast cancer  We also spoke to Radiology regarding the issue at the 4 o'clock position  They will review her outside films and determine if we should biopsy both at the same surgery to avoid having to take off multiple times  2    Follow-up ultrasound and mammogram at [facility] with her 4 o'clock position debris-filled duct BI-RADS 3 ultrasound  If she has surgery before this then we will need to postpone this follow-up in till perhaps 2021  3   Smoking cessation counseling performed in the office again in the presence of her son        Over 50 minutes of counseling and work in the office between the [address] and myself in regarding full history, discussion of surgical options versus nonsurgical options, and counseling the patient has occurred  Post op pain management:  Norco      _______________________________________________________  HPI:  Ricardo Barrett is a 54 y  o female who was referred for evaluation of Follow-up (Right breast celluitis; )    Currently:   Duration symptoms:     Symptoms:   positive for -   Chronic intermittent abscess over 1 year time in the 7 o'clock position of the right breast   She gets inflamed red mastitis without drainage, treated by antibiotics  This is resulted in multiple ER visits           Most recent mammogram:  December 2020 with a BI-RADS 3 right breast ultrasound at the 4 o'clock position    Number of children: G 4  Menses 12     Age at first child or pregnancy >3 months: 21    Menopause: 50    Hormone Replacement Therapy:   no    Previous breast biopsy: None reported    Family history: None    ROS:  General ROS: negative  negative for - chills, fatigue, fever or night sweats, weight loss  Respiratory ROS: no cough, shortness of breath, or wheezing  Cardiovascular ROS: no chest pain or dyspnea on exertion  Genito-Urinary ROS: no dysuria, trouble voiding, or hematuria  Musculoskeletal ROS: negative for - gait disturbance, joint pain or muscle pain  Neurological ROS: no TIA or stroke symptoms  Breast ROS: see HPI  GI ROS: see HPI  Skin ROS: no new rashes or lesions   Lymphatic ROS: no new adenopathy noted by pt  GYN ROS: see HPI, no new GYN history or bleeding noted  Psy ROS: no new mental or behavioral disturbances               Patient Active Problem List   Diagnosis    Acquired hallux valgus of left foot    Other hammer toe(s) (acquired), left foot    Other acquired deformities of left foot    Wound of right foot    Preoperative clearance    Tobacco abuse    Headache         Allergies: Patient has no known allergies      Meds:   Current Outpatient Medications:     acetaminophen (TYLENOL) 650 mg CR tablet, Take 650 mg by mouth every 8 (eight) hours as needed, Disp: , Rfl:     atorvastatin (LIPITOR) 20 mg tablet, Take 20 mg by mouth every evening, Disp: , Rfl:     clotrimazole-betamethasone (LOTRISONE) 1-0 05 % cream, Apply topically 2 (two) times a day, Disp: 30 g, Rfl: 0    lidocaine (LIDODERM) 5 %, Place 1 patch on the skin daily as needed , Disp: , Rfl:     meclizine (ANTIVERT) 12 5 MG tablet, Take 1 tablet (12 5 mg total) by mouth 3 (three) times a day as needed for dizziness, Disp: 30 tablet, Rfl: 0    meclizine (ANTIVERT) 25 mg tablet, One q 8 hours prn, Disp: , Rfl:     meloxicam (MOBIC) 7 5 mg tablet, Take 7 5 mg by mouth daily, Disp: , Rfl:     terbinafine (LamISIL) 250 mg tablet, Take 1 tablet (250 mg total) by mouth daily for 14 days, Disp: 14 tablet, Rfl: 0    ibuprofen (MOTRIN) 600 mg tablet, Take 1 tablet (600 mg total) by mouth every 6 (six) hours as needed for mild pain for up to 30 days, Disp: 90 tablet, Rfl: 0    PMH:   Past Medical History:   Diagnosis Date    Acquired deformity of left foot     Anesthesia complication     difficulty awakening    Arthritis     Deaf, left     Depression     Hallux valgus of left foot     Hammer toe of left foot     Headache     Kidney stone     Sciatic pain, right     intermittent       PSHx:   Past Surgical History:   Procedure Laterality Date    CHOLECYSTECTOMY      CLOSURE DELAYED PRIMARY Right 7/5/2020    Procedure: CLOSURE DELAYED PRIMARY and application of skin graft substitute;  Surgeon: Missy Jacinto DPM;  [facility];  Service: Podiatry    HERNIA REPAIR      INCISION AND DRAINAGE OF WOUND Right 7/1/2020    Procedure: INCISION AND DRAINAGE (I&D) EXTREMITY;  Surgeon: Missy Jacinto DPM;  [facility];  Service: Podiatry    CO OSTEOTOMY METATARSAL (NOT 1ST) Left 6/14/2019    Procedure: 3rd Metatarsal Osteotomy;  Surgeon: Miguelina Villagomez DPM;  [facility];  Service: Podiatry    TUBAL LIGATION      VAC DRESSING APPLICATION Right 5/9/6555 Procedure: APPLICATION VAC DRESSING;  Surgeon: Meggan Brown DPM;  [facility];  Service: Podiatry       Family History:   Family History   Problem Relation Age of Onset    Arthritis Family        Social History:  reports that she has been smoking cigarettes  She has a 15 00 pack-year smoking history  She has never used smokeless tobacco  She reports current alcohol use  She reports that she does not use drugs  Imaging: I personally reviewed the radiology studies, my impression is as follows:  Reviewed with radiologist as well  There are 2 separate issues going on         Lab Results   Component Value Date    WBC 9 10 07/03/2020    HGB 13 5 07/03/2020    HCT 41 0 07/03/2020    MCV 98 07/03/2020     07/03/2020     Lab Results   Component Value Date     (L) 09/08/2014    K 4 2 07/03/2020     (H) 07/03/2020    CO2 24 07/03/2020    ANIONGAP 7 09/08/2014    BUN 11 07/03/2020    CREATININE 0 69 07/03/2020    GLUCOSE 130 09/08/2014    CALCIUM 9 3 07/03/2020    AST 15 06/29/2020    ALT 24 06/29/2020    ALKPHOS 89 06/29/2020    PROT 7 9 09/08/2014    BILITOT 0 26 09/08/2014    EGFR 98 07/03/2020       Vitals:    02/18/21 1149   BP: 148/80   Pulse: 98   Temp: (!) 96 5 °F (35 8 °C)          PHYSICAL EXAM  General Appearance:    Alert, cooperative, no distress,    Head:    Normocephalic without obvious abnormality   Eyes:    PERRL, conjunctiva/corneas clear      Neck:   Supple, no adenopathy, no JVD   Back:     Symmetric, no spinal or CVA tenderness   Lungs:     Clear to auscultation bilaterally,   Heart:  Breast:    Regular rate and rhythm, S1 and S2 normal, no murmur    normal appearance, no masses or tenderness, breasts appear normal, no suspicious masses, no skin or nipple changes or axillary nodes    Physical Exam  Chest:             This area has chronic skin changes without erythema or well-circumscribed mass but it has a indurated hard feeling to it    This is not characteristic of breast cancer but more so of a chronic abscess  Abdomen:     Soft, nontender   Extremities:   Extremities normal  No clubbing, cyanosis or edema   Psych:   Normal Affect, AOx3  Neurologic:  Skin:   CNII-XII intact  Strength symmetric, speech intact    Warm, dry, intact, no visible rashes or lesions                       Signature:     Sarina Askew MD    Date: 2/18/2021 Time: 12:19 PM                 Some portions of this record may have been generated with voice recognition software  There may be translation, syntax,  or grammatical errors  Occasional wrong word or "sound-a-like" substitutions may have occurred due to the inherent limitations of the voice recognition software  Read the chart carefully and recognize, using context, where substitutions may have occurred  If you have any questions, please contact the dictating provider for clarification or correction, as needed  This encounter has been coded by a physician, non certified coder

## 2021-02-19 ENCOUNTER — TELEPHONE (OUTPATIENT)
Dept: SURGERY | Facility: CLINIC | Age: 56
End: 2021-02-19

## 2021-02-19 NOTE — TELEPHONE ENCOUNTER
Called and left message for patient to return call to office  RE: Courtesy Call  Appt: 2/18  Scheduled for surgery & follow-up testing:  YES    PLAN:   1  Excisional debridement of chronic abscess cavity 7 o'clock position right breast   In the operating room under anesthesia  we also discussed that in the face of a chronic nonhealing abscess, 1 always has to rule out the idea of a an occult breast cancer        We also spoke to Radiology regarding the issue at the 4 o'clock position  They will review her outside films and determine if we should biopsy both at the same surgery to avoid having to take off multiple times      2  Follow-up ultrasound and mammogram at [facility] with her 4 o'clock position debris-filled duct BI-RADS 3 ultrasound  If she has surgery before this then we will need to postpone this follow-up in till perhaps June 2021

## 2021-02-22 ENCOUNTER — TELEPHONE (OUTPATIENT)
Dept: SURGERY | Facility: CLINIC | Age: 56
End: 2021-02-22

## 2021-02-22 ENCOUNTER — TRANSCRIBE ORDERS (OUTPATIENT)
Dept: SURGERY | Facility: CLINIC | Age: 56
End: 2021-02-22

## 2021-02-22 DIAGNOSIS — N61.1 CHRONIC ABSCESS OF BREAST: Primary | ICD-10-CM

## 2021-02-22 DIAGNOSIS — N64.4 BREAST PAIN: ICD-10-CM

## 2021-02-22 NOTE — TELEPHONE ENCOUNTER
(late Friday) Received return call from patient  She states that she has no questions at this time and she's understands and is aware of everything  She thanked us for the call  She'll call if she needs us

## 2021-02-22 NOTE — TELEPHONE ENCOUNTER
Per Dr Kael Willard who spoke with Dr Sergo Serna radiologist regarding outside films from [facility]  Patient should have a follow up Right Breast US with biopsy prior to surgery  Called [facility] spoke with Sterling malik  Patient scheduled for Breast US with biopsy 3/4/21 900am and then biopsy at 10am if needed  Dr Sergo Serna will perform even though he is not doing procedures that day  Called lmom for patient to call office back to relay info  Surgery will be tentative on 3/10 depending on US and biopsy

## 2021-02-23 ENCOUNTER — OFFICE VISIT (OUTPATIENT)
Dept: OBGYN CLINIC | Facility: HOSPITAL | Age: 56
End: 2021-02-23
Payer: COMMERCIAL

## 2021-02-23 VITALS
DIASTOLIC BLOOD PRESSURE: 94 MMHG | HEIGHT: 61 IN | HEART RATE: 98 BPM | WEIGHT: 192.2 LBS | BODY MASS INDEX: 36.29 KG/M2 | SYSTOLIC BLOOD PRESSURE: 138 MMHG

## 2021-02-23 DIAGNOSIS — M17.11 PRIMARY OSTEOARTHRITIS OF RIGHT KNEE: Primary | ICD-10-CM

## 2021-02-23 DIAGNOSIS — M76.32 IT BAND SYNDROME, LEFT: ICD-10-CM

## 2021-02-23 PROCEDURE — 99213 OFFICE O/P EST LOW 20 MIN: CPT | Performed by: ORTHOPAEDIC SURGERY

## 2021-02-23 NOTE — LETTER
February 23, 2021     Patient: Mariposa Leong   YOB: 1965   Date of Visit: 2/23/2021       To Whom it May Concern:    Mariposa Leong is under my professional care  She was seen in my office on 2/23/2021  She is unable to work on 2/24/2021  If you have any questions or concerns, please don't hesitate to call           Sincerely,          Marley Clay MD        CC: No Recipients

## 2021-02-23 NOTE — PROGRESS NOTES
Assessment:  1  Primary osteoarthritis of right knee     2  It band syndrome, left         Plan:      Patient has left IT band syndrome    weight bear as tolerated left lower extremity    patient declined left IT band steroid injection in the office today     Recommended patient to use over-the-counter Voltaren gel and to  massage over the lateral aspect of left knee  Also recommended patient to use a massage point jacknobber to massage over lateral aspect of the left knee   Follow up PRN         The above stated was discussed in layman's terms and the patient expressed understanding  All questions were answered to the patient's satisfaction  Subjective:   Keyur Sevilla is a 54 y o  female who presents  Today pain over the lateral aspect of the left knee  She states she has been having pain over the lateral aspect of the left knee for a few weeks,   Denies any falls or trauma  She does states that she does stand on concrete for 8-10 hours a day  Today she is having no pain or discomfort  Denies any numbness or tingling        Review of systems negative unless otherwise specified in HPI    Past Medical History:   Diagnosis Date    Acquired deformity of left foot     Anesthesia complication     difficulty awakening    Arthritis     Deaf, left     Depression     Hallux valgus of left foot     Hammer toe of left foot     Headache     Kidney stone     Sciatic pain, right     intermittent       Past Surgical History:   Procedure Laterality Date    CHOLECYSTECTOMY      CLOSURE DELAYED PRIMARY Right 7/5/2020    Procedure: CLOSURE DELAYED PRIMARY and application of skin graft substitute;  Surgeon: Josiah Kayser, DPM;  [facility];  Service: Podiatry    HERNIA REPAIR      INCISION AND DRAINAGE OF WOUND Right 7/1/2020    Procedure: INCISION AND DRAINAGE (I&D) EXTREMITY;  Surgeon: Josiah Kayser, DPM;  [facility];  Service: Podiatry    AL OSTEOTOMY METATARSAL (NOT 1ST) Left 6/14/2019 Procedure: 3rd Metatarsal Osteotomy;  Surgeon: Kya Mcfadden DPM;  [facility];  Service: Podiatry    TUBAL LIGATION      VAC DRESSING APPLICATION Right 3/8/6528    Procedure: APPLICATION VAC DRESSING;  Surgeon: Guerita Curran DPM;  [facility];  Service: Podiatry       Family History   Problem Relation Age of Onset    Arthritis Family        Social History     Occupational History    Not on file   Tobacco Use    Smoking status: Current Every Day Smoker     Packs/day: 1 00     Years: 15 00     Pack years: 15 00     Types: Cigarettes    Smokeless tobacco: Never Used   Substance and Sexual Activity    Alcohol use: Yes     Frequency: 2-4 times a month    Drug use: No    Sexual activity: Not on file         Current Outpatient Medications:     acetaminophen (TYLENOL) 650 mg CR tablet, Take 650 mg by mouth every 8 (eight) hours as needed, Disp: , Rfl:     atorvastatin (LIPITOR) 20 mg tablet, Take 20 mg by mouth every evening, Disp: , Rfl:     clotrimazole-betamethasone (LOTRISONE) 1-0 05 % cream, Apply topically 2 (two) times a day, Disp: 30 g, Rfl: 0    ibuprofen (MOTRIN) 600 mg tablet, Take 1 tablet (600 mg total) by mouth every 6 (six) hours as needed for mild pain for up to 30 days, Disp: 90 tablet, Rfl: 0    lidocaine (LIDODERM) 5 %, Place 1 patch on the skin daily as needed , Disp: , Rfl:     meclizine (ANTIVERT) 12 5 MG tablet, Take 1 tablet (12 5 mg total) by mouth 3 (three) times a day as needed for dizziness, Disp: 30 tablet, Rfl: 0    meclizine (ANTIVERT) 25 mg tablet, One q 8 hours prn, Disp: , Rfl:     meloxicam (MOBIC) 7 5 mg tablet, Take 7 5 mg by mouth daily, Disp: , Rfl:     terbinafine (LamISIL) 250 mg tablet, Take 1 tablet (250 mg total) by mouth daily for 14 days, Disp: 14 tablet, Rfl: 0    No Known Allergies         Vitals:    02/23/21 1517   BP: 138/94   Pulse: 98       Objective:            Physical Exam  · General: Awake, Alert, Oriented  · Eyes: Pupils equal, round and reactive to light  · Heart: regular rate and rhythm  · Lungs: No audible wheezing  · Abdomen: soft                    Ortho Exam    Left knee   No lacerations, no abrasions, no open wounds  No erythema   Tenderness to palpation of the distal IT band and Gerdy's tubercle region   Stable to varus and valgus stress  Neurovascularly Intact Distally     Diagnostics, reviewed and taken today if performed as documented:    None performed      Procedures, if performed today:    Procedures    None performed      Scribe Attestation    I,:  Jeremi Alvarez am acting as a scribe while in the presence of the attending physician :       I,:  Brandon Craig MD personally performed the services described in this documentation    as scribed in my presence :             Portions of the record may have been created with voice recognition software  Occasional wrong word or "sound a like" substitutions may have occurred due to the inherent limitations of voice recognition software  Read the chart carefully and recognize, using context, where substitutions have occurred

## 2021-03-04 ENCOUNTER — HOSPITAL ENCOUNTER (OUTPATIENT)
Dept: MAMMOGRAPHY | Facility: CLINIC | Age: 56
Discharge: HOME | End: 2021-03-04
Payer: COMMERCIAL

## 2021-03-04 ENCOUNTER — HOSPITAL ENCOUNTER (OUTPATIENT)
Dept: ULTRASOUND IMAGING | Facility: CLINIC | Age: 56
Discharge: HOME | End: 2021-03-04
Payer: COMMERCIAL

## 2021-03-04 ENCOUNTER — HOSPITAL ENCOUNTER (OUTPATIENT)
Dept: ULTRASOUND IMAGING | Facility: CLINIC | Age: 56
Discharge: HOME | End: 2021-03-04
Admitting: RADIOLOGY
Payer: COMMERCIAL

## 2021-03-04 VITALS — HEIGHT: 61 IN | BODY MASS INDEX: 36.25 KG/M2 | WEIGHT: 192 LBS

## 2021-03-04 VITALS — DIASTOLIC BLOOD PRESSURE: 80 MMHG | SYSTOLIC BLOOD PRESSURE: 138 MMHG | HEART RATE: 72 BPM

## 2021-03-04 VITALS — HEART RATE: 84 BPM | DIASTOLIC BLOOD PRESSURE: 88 MMHG | SYSTOLIC BLOOD PRESSURE: 154 MMHG

## 2021-03-04 DIAGNOSIS — N61.1 CHRONIC ABSCESS OF BREAST: ICD-10-CM

## 2021-03-04 DIAGNOSIS — R92.8 ABNORMAL ULTRASOUND OF BREAST: ICD-10-CM

## 2021-03-04 DIAGNOSIS — N64.4 BREAST PAIN: ICD-10-CM

## 2021-03-04 PROCEDURE — A4648 IMPLANTABLE TISSUE MARKER: HCPCS

## 2021-03-04 PROCEDURE — 88305 TISSUE EXAM BY PATHOLOGIST: CPT | Performed by: PATHOLOGY

## 2021-03-04 PROCEDURE — 76642 ULTRASOUND BREAST LIMITED: CPT

## 2021-03-04 PROCEDURE — 77066 DX MAMMO INCL CAD BI: CPT

## 2021-03-04 PROCEDURE — 19083 BX BREAST 1ST LESION US IMAG: CPT

## 2021-03-04 PROCEDURE — G0279 TOMOSYNTHESIS, MAMMO: HCPCS

## 2021-03-04 RX ORDER — LIDOCAINE HYDROCHLORIDE 10 MG/ML
5 INJECTION, SOLUTION EPIDURAL; INFILTRATION; INTRACAUDAL; PERINEURAL ONCE
Status: COMPLETED | OUTPATIENT
Start: 2021-03-04 | End: 2021-03-04

## 2021-03-04 RX ORDER — LIDOCAINE HYDROCHLORIDE 10 MG/ML
5 INJECTION, SOLUTION EPIDURAL; INFILTRATION; INTRACAUDAL; PERINEURAL ONCE
Status: DISCONTINUED | OUTPATIENT
Start: 2021-03-04 | End: 2021-03-05 | Stop reason: HOSPADM

## 2021-03-04 RX ADMIN — LIDOCAINE HYDROCHLORIDE 5 ML: 10 INJECTION, SOLUTION EPIDURAL; INFILTRATION; INTRACAUDAL; PERINEURAL at 10:53

## 2021-03-04 NOTE — DISCHARGE INSTR - OTHER ORDERS
POST LARGE CORE BREAST BIOPSY PATIENT INFORMATION      1  Place an ice pack inside your bra over the top of the dressing every hour for 20 minutes (20 minutes on, 60 minutes off)  Do this until bedtime  2  Do not shower or bathe until the following morning  3  You may bathe your breast carefully with the steri-strips in place  Be careful    Not to loosen them  The steri-strips will fall off in 3-5 days  4  You may have mild discomfort, and you may have some bruising where the   Needle entered the skin  This should clear within 5-7 days  5  If you need medicine for discomfort, take acetaminophen products such as   Tylenol  You may also take Advil or Motrin products  6  Do not participate in strenuous activities such as-tennis, aerobics, skiing,  Weight lifting, etc  for 24 hours  Refrain from swimming/soaking for 72 hours  7  Wearing a bra for sleeping may be more comfortable for the first 24-48 hours  8  Watch for continued bleeding, pain or fever over 101; please call with any questions or concerns  For procedures done at the [facility] [address] call:  [facility] at [phone]                    *After 4 PM call the Interventional Radiology Department                    [phone] and ask to speak with the nurse on call  For procedures done at the [address] [place] [address] call:         Briseyda Rojas RN at   *After 4 PM call the Interventional Radiology Department   [phone] and ask to speak with the nurse on call  For procedures done at [address] [place] [address] call: The Radiology Nurse at [phone]  *After 4 PM call your physician, or go to the Emergency Department  9          The final results of your biopsy are usually available within one week

## 2021-03-04 NOTE — PROGRESS NOTES
Procedure type:    __x___ultrasound guided _____stereotactic    Breast:    _____Left __x___Right    Location: 4 o'clock Retro    Needle: 12ga Wiley    # of passes: 2    Clip: Heart(Ultraclip)    Performed by: Dr Corby Ramirez held for 5 minutes by:  Shukri Boyle(PCA)    Steri Strips:    __X___yes _____no    Band aid:    ___X__yes_____no    Tape and guaze:    _____yes __X___no    Tolerated procedure:    ___X__yes _____no

## 2021-03-04 NOTE — PROGRESS NOTES
Met with patient and Dr John Juarez regarding recommendation for;      __x___ RIGHT ______LEFT      __x___Ultrasound guided  ______Stereotactic  Breast biopsy  __x___Verbalized understanding        Blood thinners:  _____yes __x___no    Date stopped: ____n/a_______    Biopsy teaching sheet given and reviewed:  ___x____yes ______no

## 2021-03-05 NOTE — PROGRESS NOTES
Post procedure call completed    Bleeding: _____yes __x___no    Pain: _____yes ___x___no    Redness/Swelling: ______yes __x____no    Band aid removed: _____yes __x___no - will remove after shower today    Steri-Strips intact: __x____yes _____no

## 2021-03-08 NOTE — PRE-PROCEDURE INSTRUCTIONS
Pre-Surgery Instructions:   Medication Instructions    acetaminophen (TYLENOL) 650 mg CR tablet Instructed patient per Anesthesia Guidelines   atorvastatin (LIPITOR) 20 mg tablet Instructed patient per Anesthesia Guidelines   clotrimazole-betamethasone (LOTRISONE) 1-0 05 % cream Instructed patient per Anesthesia Guidelines   ibuprofen (MOTRIN) 600 mg tablet Instructed patient per Anesthesia Guidelines   meclizine (ANTIVERT) 12 5 MG tablet Instructed patient per Anesthesia Guidelines   meloxicam (MOBIC) 7 5 mg tablet Instructed patient per Anesthesia Guidelines  Pre-op and bathing instructions given  Pt advised to hold meloxicam until after sx  Pt stated she will use antibacterial soap for pre-op showers

## 2021-03-09 ENCOUNTER — ANESTHESIA EVENT (OUTPATIENT)
Dept: PERIOP | Facility: HOSPITAL | Age: 56
End: 2021-03-09
Payer: COMMERCIAL

## 2021-03-10 ENCOUNTER — ANESTHESIA (OUTPATIENT)
Dept: PERIOP | Facility: HOSPITAL | Age: 56
End: 2021-03-10
Payer: COMMERCIAL

## 2021-03-10 ENCOUNTER — HOSPITAL ENCOUNTER (OUTPATIENT)
Facility: HOSPITAL | Age: 56
Setting detail: OUTPATIENT SURGERY
Discharge: HOME | End: 2021-03-10
Attending: SURGERY | Admitting: SURGERY
Payer: COMMERCIAL

## 2021-03-10 VITALS
HEIGHT: 61 IN | RESPIRATION RATE: 20 BRPM | BODY MASS INDEX: 36.25 KG/M2 | WEIGHT: 192 LBS | SYSTOLIC BLOOD PRESSURE: 108 MMHG | HEART RATE: 93 BPM | OXYGEN SATURATION: 94 % | TEMPERATURE: 98.3 F | DIASTOLIC BLOOD PRESSURE: 61 MMHG

## 2021-03-10 DIAGNOSIS — N61.1 CHRONIC ABSCESS OF BREAST: Primary | ICD-10-CM

## 2021-03-10 PROBLEM — Z72.0 TOBACCO ABUSE: Status: RESOLVED | Noted: 2020-07-02 | Resolved: 2021-03-10

## 2021-03-10 PROBLEM — Z78.9 EX-SMOKER FOR LESS THAN 1 YEAR: Status: ACTIVE | Noted: 2021-03-10

## 2021-03-10 PROBLEM — E66.9 OBESITY (BMI 35.0-39.9 WITHOUT COMORBIDITY): Status: ACTIVE | Noted: 2021-03-10

## 2021-03-10 PROCEDURE — 88342 IMHCHEM/IMCYTCHM 1ST ANTB: CPT | Performed by: PATHOLOGY

## 2021-03-10 PROCEDURE — 88341 IMHCHEM/IMCYTCHM EA ADD ANTB: CPT | Performed by: PATHOLOGY

## 2021-03-10 PROCEDURE — 88304 TISSUE EXAM BY PATHOLOGIST: CPT | Performed by: PATHOLOGY

## 2021-03-10 PROCEDURE — 19120 REMOVAL OF BREAST LESION: CPT | Performed by: SURGERY

## 2021-03-10 RX ORDER — HYDROCODONE BITARTRATE AND ACETAMINOPHEN 5; 325 MG/1; MG/1
1 TABLET ORAL EVERY 6 HOURS PRN
Status: DISCONTINUED | OUTPATIENT
Start: 2021-03-10 | End: 2021-03-10 | Stop reason: HOSPADM

## 2021-03-10 RX ORDER — FENTANYL CITRATE 50 UG/ML
25 INJECTION, SOLUTION INTRAMUSCULAR; INTRAVENOUS
Status: DISCONTINUED | OUTPATIENT
Start: 2021-03-10 | End: 2021-03-10 | Stop reason: HOSPADM

## 2021-03-10 RX ORDER — CEFAZOLIN SODIUM 2 G/50ML
2000 SOLUTION INTRAVENOUS ONCE
Status: COMPLETED | OUTPATIENT
Start: 2021-03-10 | End: 2021-03-10

## 2021-03-10 RX ORDER — ONDANSETRON 2 MG/ML
INJECTION INTRAMUSCULAR; INTRAVENOUS AS NEEDED
Status: DISCONTINUED | OUTPATIENT
Start: 2021-03-10 | End: 2021-03-10

## 2021-03-10 RX ORDER — HYDROCODONE BITARTRATE AND ACETAMINOPHEN 5; 325 MG/1; MG/1
1 TABLET ORAL EVERY 6 HOURS PRN
Qty: 5 TABLET | Refills: 0 | Status: SHIPPED | OUTPATIENT
Start: 2021-03-10 | End: 2021-11-08

## 2021-03-10 RX ORDER — EPHEDRINE SULFATE 50 MG/ML
INJECTION INTRAVENOUS AS NEEDED
Status: DISCONTINUED | OUTPATIENT
Start: 2021-03-10 | End: 2021-03-10

## 2021-03-10 RX ORDER — ONDANSETRON 2 MG/ML
4 INJECTION INTRAMUSCULAR; INTRAVENOUS EVERY 8 HOURS PRN
Status: DISCONTINUED | OUTPATIENT
Start: 2021-03-10 | End: 2021-03-10 | Stop reason: HOSPADM

## 2021-03-10 RX ORDER — MIDAZOLAM HYDROCHLORIDE 2 MG/2ML
INJECTION, SOLUTION INTRAMUSCULAR; INTRAVENOUS AS NEEDED
Status: DISCONTINUED | OUTPATIENT
Start: 2021-03-10 | End: 2021-03-10

## 2021-03-10 RX ORDER — HYDROMORPHONE HCL/PF 1 MG/ML
0.5 SYRINGE (ML) INJECTION
Status: DISCONTINUED | OUTPATIENT
Start: 2021-03-10 | End: 2021-03-10 | Stop reason: HOSPADM

## 2021-03-10 RX ORDER — ACETAMINOPHEN 325 MG/1
650 TABLET ORAL EVERY 6 HOURS PRN
Status: DISCONTINUED | OUTPATIENT
Start: 2021-03-10 | End: 2021-03-10 | Stop reason: HOSPADM

## 2021-03-10 RX ORDER — SODIUM CHLORIDE 9 MG/ML
125 INJECTION, SOLUTION INTRAVENOUS CONTINUOUS
Status: DISCONTINUED | OUTPATIENT
Start: 2021-03-10 | End: 2021-03-10 | Stop reason: HOSPADM

## 2021-03-10 RX ORDER — ONDANSETRON 4 MG/1
4 TABLET, ORALLY DISINTEGRATING ORAL EVERY 8 HOURS PRN
Status: DISCONTINUED | OUTPATIENT
Start: 2021-03-10 | End: 2021-03-10 | Stop reason: HOSPADM

## 2021-03-10 RX ORDER — DEXAMETHASONE SODIUM PHOSPHATE 4 MG/ML
INJECTION, SOLUTION INTRA-ARTICULAR; INTRALESIONAL; INTRAMUSCULAR; INTRAVENOUS; SOFT TISSUE AS NEEDED
Status: DISCONTINUED | OUTPATIENT
Start: 2021-03-10 | End: 2021-03-10

## 2021-03-10 RX ORDER — PROPOFOL 10 MG/ML
INJECTION, EMULSION INTRAVENOUS AS NEEDED
Status: DISCONTINUED | OUTPATIENT
Start: 2021-03-10 | End: 2021-03-10

## 2021-03-10 RX ORDER — DEXTROSE AND SODIUM CHLORIDE 5; .45 G/100ML; G/100ML
80 INJECTION, SOLUTION INTRAVENOUS CONTINUOUS
Status: DISCONTINUED | OUTPATIENT
Start: 2021-03-10 | End: 2021-03-10 | Stop reason: HOSPADM

## 2021-03-10 RX ORDER — ONDANSETRON 2 MG/ML
4 INJECTION INTRAMUSCULAR; INTRAVENOUS EVERY 6 HOURS PRN
Status: DISCONTINUED | OUTPATIENT
Start: 2021-03-10 | End: 2021-03-10 | Stop reason: HOSPADM

## 2021-03-10 RX ORDER — FENTANYL CITRATE 50 UG/ML
INJECTION, SOLUTION INTRAMUSCULAR; INTRAVENOUS AS NEEDED
Status: DISCONTINUED | OUTPATIENT
Start: 2021-03-10 | End: 2021-03-10

## 2021-03-10 RX ADMIN — SODIUM CHLORIDE 125 ML/HR: 0.9 INJECTION, SOLUTION INTRAVENOUS at 12:19

## 2021-03-10 RX ADMIN — PROPOFOL 200 MG: 10 INJECTION, EMULSION INTRAVENOUS at 15:37

## 2021-03-10 RX ADMIN — EPHEDRINE SULFATE 5 MG: 50 INJECTION, SOLUTION INTRAVENOUS at 16:23

## 2021-03-10 RX ADMIN — HYDROCODONE BITARTRATE AND ACETAMINOPHEN 1 TABLET: 5; 325 TABLET ORAL at 18:09

## 2021-03-10 RX ADMIN — SODIUM CHLORIDE: 0.9 INJECTION, SOLUTION INTRAVENOUS at 15:48

## 2021-03-10 RX ADMIN — FENTANYL CITRATE 25 MCG: 50 INJECTION INTRAMUSCULAR; INTRAVENOUS at 16:38

## 2021-03-10 RX ADMIN — ONDANSETRON 4 MG: 2 INJECTION INTRAMUSCULAR; INTRAVENOUS at 15:37

## 2021-03-10 RX ADMIN — DEXAMETHASONE SODIUM PHOSPHATE 4 MG: 4 INJECTION INTRA-ARTICULAR; INTRALESIONAL; INTRAMUSCULAR; INTRAVENOUS; SOFT TISSUE at 15:37

## 2021-03-10 RX ADMIN — FENTANYL CITRATE 50 MCG: 50 INJECTION, SOLUTION INTRAMUSCULAR; INTRAVENOUS at 15:41

## 2021-03-10 RX ADMIN — LIDOCAINE HYDROCHLORIDE 100 MG: 20 INJECTION, SOLUTION INTRAVENOUS at 15:37

## 2021-03-10 RX ADMIN — FENTANYL CITRATE 50 MCG: 50 INJECTION, SOLUTION INTRAMUSCULAR; INTRAVENOUS at 15:55

## 2021-03-10 RX ADMIN — CEFAZOLIN SODIUM 2000 MG: 2 SOLUTION INTRAVENOUS at 15:27

## 2021-03-10 RX ADMIN — MIDAZOLAM 2 MG: 1 INJECTION INTRAMUSCULAR; INTRAVENOUS at 15:34

## 2021-03-10 NOTE — INTERVAL H&P NOTE
H&P reviewed  After examining the patient I find no changes in the patients condition since the H&P had been written  Patient is aware we may close our we may leave the wound open  Of note the 4 o'clock position lesion seen on mammogram was biopsied and was benign  No surgical intervention is needed there      Vitals:    03/10/21 1155   BP: 153/82   Pulse: 87   Temp: 99 °F (37 2 °C)   SpO2: 97%

## 2021-03-10 NOTE — OP NOTE
Excisional Breast debridement  7 o'clock position  Postoperative Note  PATIENT NAME: Jono Coates  : 1965  MRN: [id number]  [facility] ROOM 08    Surgery Date: 3/10/2021    Pre operative diagnosis:   Chronic abscess of breast [N61 1]    Operative Indications:  Soft tissue mass, right Breast    Operative Findings:  Right sub areolar chronic infection mass      Consent:  The risks, benefits, and alternatives to the surgery were discussed with the patient and with the family prior to surgery if available, personally by Dr Tanika Esqueda  If the consent was obtained by the physician assistant or other representative, the consent was reviewed once again personally by the operating physician  Common complications particular for this procedure as well as unusual complications were discussed, including but not limited to:  bleeding, wound infection, prolonged wound healing, open wounds, reoperation, leak from the bowel or viscus, leak from the bile duct or injury to adjacent or other organs or blood vessels in the abdomen  A  was used if necessary  The patient expressed understanding of the issues discussed and wished and consented to the procedure to proceed  All questions were answered  Dr Tanika Esqueda personally discussed the informed consent with this patient  Post operative diagnosis :and findings  Post-Op Diagnosis Codes:     * Chronic abscess of breast [N61 1]    Procedure:   Procedure(s):  Excisional Breast debridement  7 o'clock position    Surgeon(s) and Role:     * Scott Thompson MD - Primary     * Katerine Valdes DO - Assisting fist     The assistant was medically necessary for surgical safety the case including suturing, retraction, and hemostasis  A qualified resident was available  I provided direct and immediate supervision  I was present for the entire procedure        Drains:  * No LDAs found *    Specimens:  ID Type Source Tests Collected by Time Destination   1 : 7 o clock position; right subareolar mass Tissue Mass TISSUE EXAM Sarina Askew MD 3/10/2021 1559    2 : Subareolar nodullary Tissue Mass TISSUE EXAM Sarina Askew MD 3/10/2021 1604        Estimated Blood Loss:   Minimal    Anesthesia Type:   Choice     Procedure: The patient was seen in the Holding Room  The risks, benefits, complications, treatment options, and expected outcomes were discussed with the patient  The possibilities of reaction to medication, bleeding, infection, the need for additional procedures, failure to diagnose a condition, and creating a complication operation were discussed with the patient  The patient concurred with the proposed plan, giving informed consent  The site of surgery properly noted/marked and confirmed with the patient  The patient was taken to Operating Room, identified as Henry Guzman and staff verified the patient name, , site, and laterality, if applicable  A Time Out was held and the above information confirmed  The patient was placed supine  The 7 o'clock position of the right breast was prepped and draped in standard fashion  Local anesthesia was used to anesthetize the skin surrounding the lesion  A oblique elliptical incision was made over the lesion  Sharp and blunt dissection were used to mobilize the mass which was in a subcutaneous location  Full dissection was used with the knife to scrape off the chronically infected duct at the 7 o'clock position in the subareolar nipple complex  These were all sent as pathology specimen  There is no evidence of malignancy but there was chronic inflammation and nodularity  This was sent for biopsy and pathology  Hemostasis was achieved with cautery  Scissors, knife, and cautery were used in the excision so that 2mm  margins were taken around the lesion  Closure was achieved a with layered closure utilizing a 3-0 Vicryl subcutaneous layer and a  4-0 Monocryl subcuticular stitch    Surgical glue and then Steri-Strips were applied and the wound was dressed  At the end of the operation, all sponge, instrument, and needle counts were correct  Skin and soft tissue and fat were removed along with the mass  Some portions of this records may have been generated with voice recognition software  There may be translation, syntax,  or grammatical errors  Occasional wrong word or "sound-a-like" substitutions may have occurred due to the inherent limitations of the voice recognition software  Read the chart carefully and recognize, using context, where substations may have occurred  If you have any questions, please contact the dictating provider for clarification or correction, as needed         Complications: None    Condition: Stable to PACU    SIGNATURE: Missy Berman MD   DATE: March 10, 2021   TIME: 4:31 PM

## 2021-03-10 NOTE — ANESTHESIA PREPROCEDURE EVALUATION
Review of Systems/Medical History  Patient summary reviewed  Chart reviewed  No history of anesthetic complications     Cardiovascular  Exercise tolerance (METS): >4 ,     Pulmonary  Smoker cigarette smoker  Cumulative Pack Years: 13,        GI/Hepatic       Kidney stones,        Endo/Other     GYN       Hematology   Musculoskeletal  Sciatica,   Arthritis     Neurology   Psychology   Depression ,              Physical Exam    Airway    Mallampati score: II  TM Distance: >3 FB  Neck ROM: full     Dental   No notable dental hx     Cardiovascular  Cardiovascular exam normal    Pulmonary  Pulmonary exam normal     Other Findings        Anesthesia Plan  ASA Score- 2     Anesthesia Type- general with ASA Monitors  Additional Monitors:   Airway Plan: LMA  Plan Factors-Exercise tolerance (METS): >4     Chart reviewed  Patient summary reviewed  Patient is not a current smoker  Patient instructed to abstain from smoking on day of procedure  Patient did not smoke on day of surgery  Obstructive sleep apnea risk education given perioperatively  Induction- intravenous  Postoperative Plan- Plan for postoperative opioid use  Informed Consent- Anesthetic plan and risks discussed with patient

## 2021-03-10 NOTE — DISCHARGE INSTRUCTIONS
[facility] Instructions  Dr Payton Luna MD, FACS    1  General: You will feel pulling sensations around the wound or funny aches and pains around the incisions  This is normal  Even minor surgery is a change in your body and this is your bodys way of reaction to it  If you have had abdominal surgery, it may help to support the incision with a small pillow or blanket for comfort when moving or coughing  2  Wound care: Make sure to remove the bandage in about 24 hours, unless instructed otherwise  You usually don't have to redress the wound after 24-48 hours, unless for comfort  Keep the incision clean and dry  Let air get to it  If this Steri-Strips fall off, just keep the wound clean  3  Water: You may shower over the wound, unless there are drain tubes left in place  Do not bathe or use a pool or hot tub until cleared by the physician  You may shower right over the staples or Steri-Strips and packing dry when you are done  4  Activity: You may go up and down stairs, walk as much as you are comfortable, but walk at least 3 times each day  If you have had abdominal surgery, do not lift anything heavier than 15 pounds for at least 2-4 weeks, unless cleared by the doctor  5  Diet: You may resume a regular diet  If you had a same-day surgery or overnight stay surgery, you may wish to eat lightly for a few days: soups, crackers, and sandwiches  You may resume a regular diet when ready  6  Medications: Resume all of your previous medications, unless told otherwise by the doctor  Avoid aspirin or ibuprofen (Advil, Motrin, etc ) products for 2-3 days after the date of surgery  You may, at that time, began to take them again  Tylenol is always fine, unless you are taking any narcotic pain medication containing Tylenol (such as Percocet, Darvocet, Vicodin, or anything containing acetaminophen)  Do not take Tylenol if you're taking these medications   You do not need to take the narcotic pain medications unless you are having significant pain and discomfort  7  Driving: You will need someone to drive you home on the day of surgery  Do not drive or make any important decisions while on narcotic pain medication or 24 hours and after anesthesia or sedation for surgery  Generally, you may drive when your off all narcotic pain medications  8  Upset Stomach: You may take Maalox, Tums, or similar items for an upset stomach  If your narcotic pain medication causes an upset stomach, do not take it on an empty stomach  Try taking it with at least some crackers or toast      9  Constipation: Patients often experienced constipation after surgery  You may take over-the-counter medication for this, such as Metamucil, Senokot, Dulcolax, milk of magnesia, etc  You may take a suppository unless you have had anorectal surgery such as a procedure on your hemorrhoids  If you experience significant nausea or vomiting after abdominal surgery, call the office before trying any of these medications  10  Call the office: If you are experiencing any of the following, fevers above 101 5°, significant nausea or vomiting, if the wound develops drainage and/or is excessive redness around the wound, or if you have significant diarrhea or other worsening symptoms  11  Pain: You may be given a prescription for pain  This will be given to the hospital, the day of surgery  12  Sexual Activity: You may resume sexual activity when you feel ready and comfortable and your incision is sealed and healed without apparent infection risk  13  Urination: If you haven't urinated in 6 hours, go directly to the ER for evaluation for urinary retention       [address], [address]  [place], [address]   Phone: [phone]

## 2021-03-10 NOTE — ANESTHESIA POSTPROCEDURE EVALUATION
Post-Op Assessment Note    CV Status:  Stable    Pain management: adequate     Mental Status:  Alert and awake   Hydration Status:  Euvolemic   PONV Controlled:  Controlled   Airway Patency:  Patent      Post Op Vitals Reviewed: Yes      Staff: Anesthesiologist         No complications documented      /55 (03/10/21 1703)    Temp 98 3 °F (36 8 °C) (03/10/21 1703)    Pulse 91 (03/10/21 1703)   Resp 20 (03/10/21 1703)    SpO2 94 % (03/10/21 1703)

## 2021-03-11 ENCOUNTER — TELEPHONE (OUTPATIENT)
Dept: SURGERY | Facility: CLINIC | Age: 56
End: 2021-03-11

## 2021-03-11 NOTE — TELEPHONE ENCOUNTER
S/P = Excision of breast debridement = 3/10/21    Called and spoke with patient  She denies any F/V/N/C and she had a bowel movement  Patient is aware to remove the dressing and shower normally  She is aware to let soap and water run over her incision  Patient is aware to keep her incision dry and clean  She is aware she will be receiving a call once her pathology has been finalized and verified  Path pending

## 2021-03-15 ENCOUNTER — OFFICE VISIT (OUTPATIENT)
Dept: SURGERY | Facility: CLINIC | Age: 56
End: 2021-03-15

## 2021-03-15 VITALS
SYSTOLIC BLOOD PRESSURE: 120 MMHG | WEIGHT: 198.4 LBS | DIASTOLIC BLOOD PRESSURE: 60 MMHG | BODY MASS INDEX: 37.46 KG/M2 | TEMPERATURE: 97.2 F | HEIGHT: 61 IN | HEART RATE: 70 BPM

## 2021-03-15 DIAGNOSIS — N61.1 CHRONIC ABSCESS OF BREAST: Primary | ICD-10-CM

## 2021-03-15 PROCEDURE — 99024 POSTOP FOLLOW-UP VISIT: CPT | Performed by: SURGERY

## 2021-03-15 NOTE — PROGRESS NOTES
Assessment/Plan:   Rasheeda García is a 54 y  o female who comes in today for postoperative check after excision of  A right breast lumpectomy for a chronic abscess     patient is overall doing well but is complaining of some soreness and throbbing sensation a at her incision     Pathology:  pending    Postoperative restrictions reviewed  All questions answered  ____________________________________________________________    HPI:  Rasheeda García is a 54 y  o female who comes in today for postoperative check after recent surgery  Currently doing well with some problems :  pain, no fever or chills,no nausea and no vomiting  Reports some soreness and pain  ROS:  General ROS: negative for - chills, fatigue, fever or night sweats, weight loss  Respiratory ROS: no cough, shortness of breath, or wheezing  Cardiovascular ROS: no chest pain or dyspnea on exertion  Genito-Urinary ROS: no dysuria, trouble voiding, or hematuria  Musculoskeletal ROS: negative for - gait disturbance, joint pain or muscle pain  Neurological ROS: no TIA or stroke symptoms  GI ROS: see HPI  Skin ROS: no new rashes or lesions   Lymphatic ROS: no new adenopathy noted by pt     GYN ROS: see HPI, no new GYN history or bleeding noted  Psy ROS: no new mental or behavioral disturbances       Patient Active Problem List   Diagnosis    Acquired hallux valgus of left foot    Other hammer toe(s) (acquired), left foot    Other acquired deformities of left foot    Wound of right foot    Preoperative clearance    Headache    Obesity (BMI 35 0-39 9 without comorbidity)    Ex-smoker for less than 1 year         Allergies:  Caffeine      Current Outpatient Medications:     acetaminophen (TYLENOL) 650 mg CR tablet, Take 650 mg by mouth every 8 (eight) hours as needed, Disp: , Rfl:     atorvastatin (LIPITOR) 20 mg tablet, Take 20 mg by mouth every evening, Disp: , Rfl:     HYDROcodone-acetaminophen (NORCO) 5-325 mg per tablet, Take 1 tablet by mouth every 6 (six) hours as needed for pain for up to 5 dosesMax Daily Amount: 4 tablets, Disp: 5 tablet, Rfl: 0    lidocaine (LIDODERM) 5 %, Place 1 patch on the skin daily as needed , Disp: , Rfl:     meclizine (ANTIVERT) 12 5 MG tablet, Take 1 tablet (12 5 mg total) by mouth 3 (three) times a day as needed for dizziness, Disp: 30 tablet, Rfl: 0    meloxicam (MOBIC) 7 5 mg tablet, Take 7 5 mg by mouth daily, Disp: , Rfl:     ibuprofen (MOTRIN) 600 mg tablet, Take 1 tablet (600 mg total) by mouth every 6 (six) hours as needed for mild pain for up to 30 days, Disp: 90 tablet, Rfl: 0    Past Medical History:   Diagnosis Date    Acquired deformity of left foot     Anesthesia complication     difficulty awakening    Arthritis     Deaf, left     Depression     Hallux valgus of left foot     Hammer toe of left foot     Headache     Kidney stone     Sciatic pain, right     intermittent       Past Surgical History:   Procedure Laterality Date    BREAST BIOPSY Right 03/04/2021    BREAST BIOPSY Right 3/10/2021    Procedure: Excisional Breast debridement  7 o'clock position;  Surgeon: Scott Thompson MD;  [facility];  Service: General    CHOLECYSTECTOMY      CLOSURE DELAYED PRIMARY Right 7/5/2020    Procedure: CLOSURE DELAYED PRIMARY and application of skin graft substitute;  Surgeon: Jorgito Florian DPM;  [facility];  Service: Podiatry    HERNIA REPAIR      INCISION AND DRAINAGE OF WOUND Right 7/1/2020    Procedure: INCISION AND DRAINAGE (I&D) EXTREMITY;  Surgeon: Jorgito Florian DPM;  [facility];  Service: Podiatry    [address] [place] (NOT 1ST) Left 6/14/2019    Procedure: 3rd Metatarsal Osteotomy;  Surgeon: Jesenia Maddox DPM;  [facility];  Service: Podiatry    TUBAL LIGATION      US GUIDED BREAST BIOPSY RIGHT COMPLETE Right 3/4/2021    VAC DRESSING APPLICATION Right 7/8/6025    Procedure: APPLICATION VAC DRESSING;  Surgeon: Jorgito Florian DPM;  [facility];  Service: Podiatry       Family History   Problem Relation Age of Onset    No Known Problems Mother     No Known Problems Father     No Known Problems Sister     No Known Problems Daughter     No Known Problems Maternal Grandmother     Colon cancer Maternal Grandfather     No Known Problems Paternal Grandmother     No Known Problems Paternal Grandfather         reports that she quit smoking about 3 weeks ago  Her smoking use included cigarettes  She has a 15 00 pack-year smoking history  She has never used smokeless tobacco  She reports previous alcohol use  She reports that she does not use drugs  Invalid input(s):  EOSPCT          Invalid input(s): LABALBU    Imaging: No new pertinent imaging studies  Vitals:    03/15/21 1122   BP: 120/60   Pulse: 70   Temp: (!) 97 2 °F (36 2 °C)        PHYSICAL EXAM  General: normal, cooperative, no distress  Incision: clean, dry, and intact and healing well      Some portions of this record may have been generated with voice recognition software  There may be translation, syntax,  or grammatical errors  Occasional wrong word or "sound-a-like" substitutions may have occurred due to the inherent limitations of the voice recognition software  Read the chart carefully and recognize, using context, where substitutions may have occurred  If you have any questions, please contact the dictating provider for clarification or correction, as needed  This encounter has been coded by a non-certified coder         Gio Pond MD    Date: 3/15/2021 Time: 11:36 AM

## 2021-03-29 ENCOUNTER — OFFICE VISIT (OUTPATIENT)
Dept: SURGERY | Facility: CLINIC | Age: 56
End: 2021-03-29

## 2021-03-29 VITALS
BODY MASS INDEX: 37.76 KG/M2 | HEIGHT: 61 IN | TEMPERATURE: 97.7 F | DIASTOLIC BLOOD PRESSURE: 72 MMHG | WEIGHT: 200 LBS | SYSTOLIC BLOOD PRESSURE: 128 MMHG | HEART RATE: 102 BPM

## 2021-03-29 DIAGNOSIS — N61.1 CHRONIC ABSCESS OF BREAST: Primary | ICD-10-CM

## 2021-03-29 PROCEDURE — 99024 POSTOP FOLLOW-UP VISIT: CPT | Performed by: PHYSICIAN ASSISTANT

## 2021-03-29 RX ORDER — OXYBUTYNIN CHLORIDE 5 MG/1
5 TABLET ORAL DAILY
COMMUNITY
Start: 2020-12-01 | End: 2021-11-08

## 2021-03-29 NOTE — PROGRESS NOTES
Assessment/Plan:   German Roa is a 54 y  o female who comes in today for postoperative check after excision of a  Right breast cyst /chronic abscess  On 03/04/2021     patient still with right breast pain worse with activity  Patient is concerned about returning to work due to heavy lifting requirements  Will keep out of work 1 more week and have her return on April 14th  Pathology: Reviewed with patient, all questions answered  Negative for atypia or carcinoma  Postoperative restrictions reviewed  All questions answered  ____________________________________________________________    HPI:  German Roa is a 54 y  o female who comes in today for postoperative check after recent surgery  Currently doing well with some problems : painful right breast, no fever or chills,no nausea and no vomiting  Reports painful right breast     ROS:  General ROS: negative for - chills, fatigue, fever or night sweats, weight loss  Respiratory ROS: no cough, shortness of breath, or wheezing  Cardiovascular ROS: no chest pain or dyspnea on exertion  Genito-Urinary ROS: no dysuria, trouble voiding, or hematuria  Musculoskeletal ROS: negative for - gait disturbance, joint pain or muscle pain  Neurological ROS: no TIA or stroke symptoms  GI ROS: see HPI  Skin ROS: no new rashes or lesions   Lymphatic ROS: no new adenopathy noted by pt     GYN ROS: see HPI, no new GYN history or bleeding noted  Psy ROS: no new mental or behavioral disturbances       Patient Active Problem List   Diagnosis    Acquired hallux valgus of left foot    Other hammer toe(s) (acquired), left foot    Other acquired deformities of left foot    Wound of right foot    Preoperative clearance    Headache    Obesity (BMI 35 0-39 9 without comorbidity)    Ex-smoker for less than 1 year         Allergies:  Caffeine      Current Outpatient Medications:     acetaminophen (TYLENOL) 650 mg CR tablet, Take 650 mg by mouth every 8 (eight) hours as needed, Disp: , Rfl:     atorvastatin (LIPITOR) 20 mg tablet, Take 20 mg by mouth every evening, Disp: , Rfl:     meloxicam (MOBIC) 7 5 mg tablet, Take 7 5 mg by mouth daily, Disp: , Rfl:     oxybutynin (DITROPAN) 5 mg tablet, Take 5 mg by mouth daily, Disp: , Rfl:     HYDROcodone-acetaminophen (NORCO) 5-325 mg per tablet, Take 1 tablet by mouth every 6 (six) hours as needed for pain for up to 5 dosesMax Daily Amount: 4 tablets (Patient not taking: Reported on 3/29/2021), Disp: 5 tablet, Rfl: 0    ibuprofen (MOTRIN) 600 mg tablet, Take 1 tablet (600 mg total) by mouth every 6 (six) hours as needed for mild pain for up to 30 days, Disp: 90 tablet, Rfl: 0    lidocaine (LIDODERM) 5 %, Place 1 patch on the skin daily as needed , Disp: , Rfl:     meclizine (ANTIVERT) 12 5 MG tablet, Take 1 tablet (12 5 mg total) by mouth 3 (three) times a day as needed for dizziness (Patient not taking: Reported on 3/29/2021), Disp: 30 tablet, Rfl: 0    Past Medical History:   Diagnosis Date    Acquired deformity of left foot     Anesthesia complication     difficulty awakening    Arthritis     Deaf, left     Depression     Hallux valgus of left foot     Hammer toe of left foot     Headache     Kidney stone     Sciatic pain, right     intermittent       Past Surgical History:   Procedure Laterality Date    BREAST BIOPSY Right 03/04/2021    BREAST BIOPSY Right 3/10/2021    Procedure: Excisional Breast debridement  7 o'clock position;  Surgeon: Aamir Bolanos MD;  [facility];  Service: General    CHOLECYSTECTOMY      CLOSURE DELAYED PRIMARY Right 7/5/2020    Procedure: CLOSURE DELAYED PRIMARY and application of skin graft substitute;  Surgeon: Janki Ram DPM;  [facility];  Service: Podiatry    HERNIA REPAIR      INCISION AND DRAINAGE OF WOUND Right 7/1/2020    Procedure: INCISION AND DRAINAGE (I&D) EXTREMITY;  Surgeon: Janki Ram DPM;  [facility];  Service: Podiatry    [place] OSTEOTOMY METATARSAL (NOT 1ST) Left 6/14/2019    Procedure: 3rd Metatarsal Osteotomy;  Surgeon: Ariane Pope DPM;  [facility];  Service: Podiatry    TUBAL LIGATION      US GUIDED BREAST BIOPSY RIGHT COMPLETE Right 3/4/2021    VAC DRESSING APPLICATION Right 1/0/1437    Procedure: APPLICATION VAC DRESSING;  Surgeon: Haleigh Ruby DPM;  [facility];  Service: Podiatry       Family History   Problem Relation Age of Onset    No Known Problems Mother     No Known Problems Father     No Known Problems Sister     No Known Problems Daughter     No Known Problems Maternal Grandmother     Colon cancer Maternal Grandfather     No Known Problems Paternal Grandmother     No Known Problems Paternal Grandfather         reports that she quit smoking about 5 weeks ago  Her smoking use included cigarettes  She has a 15 00 pack-year smoking history  She has never used smokeless tobacco  She reports previous alcohol use  She reports that she does not use drugs  Invalid input(s):  EOSPCT          Invalid input(s): LABALBU    Imaging: No new pertinent imaging studies  Vitals:    03/29/21 1029   BP: 128/72   Pulse: 102   Temp: 97 7 °F (36 5 °C)        PHYSICAL EXAM  General: normal, cooperative, no distress  Incision: clean, dry, and intact and healing well      Some portions of this record may have been generated with voice recognition software  There may be translation, syntax,  or grammatical errors  Occasional wrong word or "sound-a-like" substitutions may have occurred due to the inherent limitations of the voice recognition software  Read the chart carefully and recognize, using context, where substitutions may have occurred  If you have any questions, please contact the dictating provider for clarification or correction, as needed  This encounter has been coded by a non-certified coder         Christiane Schirmer, MD    Date: 3/29/2021 Time: 10:42 AM

## 2021-04-28 ENCOUNTER — IMMUNIZATIONS (OUTPATIENT)
Dept: FAMILY MEDICINE CLINIC | Facility: HOSPITAL | Age: 56
End: 2021-04-28

## 2021-04-28 DIAGNOSIS — Z23 ENCOUNTER FOR IMMUNIZATION: Primary | ICD-10-CM

## 2021-04-28 PROCEDURE — 91300 SARS-COV-2 / COVID-19 MRNA VACCINE (PFIZER-BIONTECH) 30 MCG: CPT

## 2021-04-28 PROCEDURE — 0001A SARS-COV-2 / COVID-19 MRNA VACCINE (PFIZER-BIONTECH) 30 MCG: CPT

## 2021-05-04 ENCOUNTER — OFFICE VISIT (OUTPATIENT)
Dept: PODIATRY | Facility: CLINIC | Age: 56
End: 2021-05-04
Payer: COMMERCIAL

## 2021-05-04 VITALS
SYSTOLIC BLOOD PRESSURE: 117 MMHG | WEIGHT: 200 LBS | HEART RATE: 123 BPM | DIASTOLIC BLOOD PRESSURE: 80 MMHG | BODY MASS INDEX: 37.76 KG/M2 | HEIGHT: 61 IN

## 2021-05-04 DIAGNOSIS — M20.12 VALGUS DEFORMITY OF BOTH GREAT TOES: Primary | ICD-10-CM

## 2021-05-04 DIAGNOSIS — M20.11 VALGUS DEFORMITY OF BOTH GREAT TOES: Primary | ICD-10-CM

## 2021-05-04 DIAGNOSIS — L85.1 ACQUIRED KERATODERMA: ICD-10-CM

## 2021-05-04 DIAGNOSIS — B35.1 ONYCHOMYCOSIS: ICD-10-CM

## 2021-05-04 PROCEDURE — 3008F BODY MASS INDEX DOCD: CPT | Performed by: PODIATRIST

## 2021-05-04 PROCEDURE — 1036F TOBACCO NON-USER: CPT | Performed by: PODIATRIST

## 2021-05-04 PROCEDURE — 99213 OFFICE O/P EST LOW 20 MIN: CPT | Performed by: PODIATRIST

## 2021-05-04 RX ORDER — TERBINAFINE HYDROCHLORIDE 250 MG/1
250 TABLET ORAL DAILY
Qty: 14 TABLET | Refills: 0 | Status: SHIPPED | OUTPATIENT
Start: 2021-05-04 | End: 2021-05-18

## 2021-05-04 NOTE — PROGRESS NOTES
PATIENT:  Elaine Aw      1965    ASSESSMENT     1  Valgus deformity of both great toes     2  Onychomycosis  terbinafine (LamISIL) 250 mg tablet   3  Acquired keratoderma            PLAN  1  Reviewed previous office note  2   Instructed her to use toe spacers at all times  Consider surgical repair of HAV in the future  3  Renewed Lamisil  Discussed possible risks and side effects  Avoid alcohol while she is on Lamisil  4  HPK X 2 trimmed  Instructed proper footwear and skin care  Dispensed pads  5  RA in 3 months  HISTORY OF PRESENT ILLNESS  Patient presents for foot evaluation  No redness or edema  No recurring wounds or infection  She complained of pain on plantar feet with skin lesions  Sharp sensation when she walks  Her toenails look better now  Tolerated Lamisil with no side effects         PAST MEDICAL HISTORY:  Past Medical History:   Diagnosis Date    Acquired deformity of left foot     Anesthesia complication     difficulty awakening    Arthritis     Deaf, left     Depression     Hallux valgus of left foot     Hammer toe of left foot     Headache     Kidney stone     Sciatic pain, right     intermittent       PAST SURGICAL HISTORY:  Past Surgical History:   Procedure Laterality Date    BREAST BIOPSY Right 03/04/2021    BREAST BIOPSY Right 3/10/2021    Procedure: Excisional Breast debridement  7 o'clock position;  Surgeon: Xavier Nguyen MD;  [facility];  Service: General    CHOLECYSTECTOMY      CLOSURE DELAYED PRIMARY Right 7/5/2020    Procedure: CLOSURE DELAYED PRIMARY and application of skin graft substitute;  Surgeon: Ruchi Steiner DPM;  [facility];  Service: Podiatry    HERNIA REPAIR      INCISION AND DRAINAGE OF WOUND Right 7/1/2020    Procedure: INCISION AND DRAINAGE (I&D) EXTREMITY;  Surgeon: Ruchi Steiner DPM;  [facility];  Service: Podiatry    [address] (NOT 1ST) Left 6/14/2019    Procedure: 3rd Metatarsal Osteotomy;  Surgeon: Kam Gotti DPM;  [facility];  Service: Podiatry    TUBAL LIGATION      US GUIDED BREAST BIOPSY RIGHT COMPLETE Right 3/4/2021    VAC DRESSING APPLICATION Right 4/0/9110    Procedure: APPLICATION VAC DRESSING;  Surgeon: Izabela Duggan DPM;  [facility];  Service: Podiatry        ALLERGIES:  Caffeine - food allergy    MEDICATIONS:  Current Outpatient Medications   Medication Sig Dispense Refill    acetaminophen (TYLENOL) 650 mg CR tablet Take 650 mg by mouth every 8 (eight) hours as needed      atorvastatin (LIPITOR) 20 mg tablet Take 20 mg by mouth every evening      meloxicam (MOBIC) 7 5 mg tablet Take 7 5 mg by mouth daily      oxybutynin (DITROPAN) 5 mg tablet Take 5 mg by mouth daily      HYDROcodone-acetaminophen (NORCO) 5-325 mg per tablet Take 1 tablet by mouth every 6 (six) hours as needed for pain for up to 5 dosesMax Daily Amount: 4 tablets (Patient not taking: Reported on 3/29/2021) 5 tablet 0    ibuprofen (MOTRIN) 600 mg tablet Take 1 tablet (600 mg total) by mouth every 6 (six) hours as needed for mild pain for up to 30 days 90 tablet 0    lidocaine (LIDODERM) 5 % Place 1 patch on the skin daily as needed       meclizine (ANTIVERT) 12 5 MG tablet Take 1 tablet (12 5 mg total) by mouth 3 (three) times a day as needed for dizziness (Patient not taking: Reported on 3/29/2021) 30 tablet 0    terbinafine (LamISIL) 250 mg tablet Take 1 tablet (250 mg total) by mouth daily for 14 days 14 tablet 0     No current facility-administered medications for this visit          SOCIAL HISTORY:  Social History     Socioeconomic History    Marital status: Single     Spouse name: None    Number of children: None    Years of education: None    Highest education level: None   Occupational History    None   Social Needs    Financial resource strain: None    Food insecurity     Worry: None     Inability: None    Transportation needs     Medical: None Non-medical: None   Tobacco Use    Smoking status: Former Smoker     Packs/day: 1 00     Years: 15 00     Pack years: 15 00     Types: Cigarettes     Quit date: 2021     Years since quittin 2    Smokeless tobacco: Never Used   Substance and Sexual Activity    Alcohol use: Not Currently    Drug use: No    Sexual activity: Yes   Lifestyle    Physical activity     Days per week: None     Minutes per session: None    Stress: None   Relationships    Social connections     Talks on phone: None     Gets together: None     Attends Taoist service: None     Active member of club or organization: None     Attends meetings of clubs or organizations: None     Relationship status: None    Intimate partner violence     Fear of current or ex partner: None     Emotionally abused: None     Physically abused: None     Forced sexual activity: None   Other Topics Concern    None   Social History Narrative    None      REVIEW OF SYSTEMS  Patient denied CP, SOB, fever, chills, palpitation, HA, GI problem, or calf pain  PHYSICAL EXAMINATION  GENERAL  The patient appears in NAD / non-toxic  Afebrile  VSS    VASCULAR EXAM  Pedal pulses and vascular status are intact  No calf pain or edema bilaterally  No cyanosis  No ischemia  DERMATOLOGIC EXAM  No ulcer  No maceration  No redness or edema  No signs of infection  HPK submet 2 bilaterally  Decreased thickening and discoloration of toenails  NEUROLOGIC EXAM  AAO X 3  No focal neurologic deficit  Neurologic status is intact BLE  MMT WNL  MUSCULOSKELETAL EXAM  ROM intact  No fluctuation  No crepitus  Severe bunion deformity noted bilaterally

## 2021-05-20 ENCOUNTER — IMMUNIZATIONS (OUTPATIENT)
Dept: FAMILY MEDICINE CLINIC | Facility: HOSPITAL | Age: 56
End: 2021-05-20

## 2021-05-20 DIAGNOSIS — Z23 ENCOUNTER FOR IMMUNIZATION: Primary | ICD-10-CM

## 2021-05-20 PROCEDURE — 0002A SARS-COV-2 / COVID-19 MRNA VACCINE (PFIZER-BIONTECH) 30 MCG: CPT

## 2021-05-20 PROCEDURE — 91300 SARS-COV-2 / COVID-19 MRNA VACCINE (PFIZER-BIONTECH) 30 MCG: CPT

## 2021-07-27 ENCOUNTER — OFFICE VISIT (OUTPATIENT)
Dept: PODIATRY | Facility: CLINIC | Age: 56
End: 2021-07-27
Payer: COMMERCIAL

## 2021-07-27 VITALS
WEIGHT: 192 LBS | HEART RATE: 89 BPM | BODY MASS INDEX: 36.25 KG/M2 | DIASTOLIC BLOOD PRESSURE: 90 MMHG | SYSTOLIC BLOOD PRESSURE: 131 MMHG | HEIGHT: 61 IN

## 2021-07-27 DIAGNOSIS — L85.1 ACQUIRED KERATODERMA: ICD-10-CM

## 2021-07-27 DIAGNOSIS — M20.12 VALGUS DEFORMITY OF BOTH GREAT TOES: Primary | ICD-10-CM

## 2021-07-27 DIAGNOSIS — M20.11 VALGUS DEFORMITY OF BOTH GREAT TOES: Primary | ICD-10-CM

## 2021-07-27 DIAGNOSIS — B35.1 ONYCHOMYCOSIS: ICD-10-CM

## 2021-07-27 PROCEDURE — 1036F TOBACCO NON-USER: CPT | Performed by: PODIATRIST

## 2021-07-27 PROCEDURE — 99213 OFFICE O/P EST LOW 20 MIN: CPT | Performed by: PODIATRIST

## 2021-07-27 PROCEDURE — 3008F BODY MASS INDEX DOCD: CPT | Performed by: PODIATRIST

## 2021-07-27 RX ORDER — TERBINAFINE HYDROCHLORIDE 250 MG/1
250 TABLET ORAL DAILY
Qty: 14 TABLET | Refills: 0 | Status: SHIPPED | OUTPATIENT
Start: 2021-07-27 | End: 2021-08-10

## 2021-07-27 NOTE — PROGRESS NOTES
PATIENT:  Renuka Roca      1965    ASSESSMENT     1  Valgus deformity of both great toes     2  Onychomycosis  terbinafine (LamISIL) 250 mg tablet   3  Acquired keratoderma            PLAN  1  Reviewed previous office note  2   Instructed her to use toe spacers at all times  [facility] [address] [facility]  Discussed surgical repair and she would consider it in the near future  3  Renewed Lamisil  Discussed possible risks and side effects  Avoid alcohol while she is on Lamisil  4  HPK X 2 trimmed  Instructed proper footwear and skin care  5  RA in 3 months  HISTORY OF PRESENT ILLNESS  Patient presents for foot evaluation  No redness or edema  No recurring wounds  She complained of sharp pain on plantar feet with skin lesions  Tolerated Lamisil with no side effects         PAST MEDICAL HISTORY:  Past Medical History:   Diagnosis Date    Acquired deformity of left foot     Anesthesia complication     difficulty awakening    Arthritis     Deaf, left     Depression     Hallux valgus of left foot     Hammer toe of left foot     Headache     Kidney stone     Sciatic pain, right     intermittent       PAST SURGICAL HISTORY:  Past Surgical History:   Procedure Laterality Date    BREAST BIOPSY Right 03/04/2021    BREAST BIOPSY Right 3/10/2021    Procedure: Excisional Breast debridement  7 o'clock position;  Surgeon: Bennie Pennington MD;  [facility];  Service: General    CHOLECYSTECTOMY      CLOSURE DELAYED PRIMARY Right 7/5/2020    Procedure: CLOSURE DELAYED PRIMARY and application of skin graft substitute;  Surgeon: Richard Torres DPM;  [facility];  Service: Podiatry    HERNIA REPAIR      INCISION AND DRAINAGE OF WOUND Right 7/1/2020    Procedure: INCISION AND DRAINAGE (I&D) EXTREMITY;  Surgeon: Richard Torres DPM;  [facility];  Service: Podiatry    [address] (NOT 1ST) Left 6/14/2019    Procedure: 3rd Metatarsal Osteotomy;  Surgeon: Dominik Nuñez DPM;  [facility];  Service: Podiatry    TUBAL LIGATION      US GUIDED BREAST BIOPSY RIGHT COMPLETE Right 3/4/2021    VAC DRESSING APPLICATION Right 7/5/5612    Procedure: APPLICATION VAC DRESSING;  Surgeon: Cole Anderson DPM;  [facility];  Service: Podiatry        ALLERGIES:  Caffeine - food allergy    MEDICATIONS:  Current Outpatient Medications   Medication Sig Dispense Refill    acetaminophen (TYLENOL) 650 mg CR tablet Take 650 mg by mouth every 8 (eight) hours as needed      ibuprofen (MOTRIN) 600 mg tablet Take 1 tablet (600 mg total) by mouth every 6 (six) hours as needed for mild pain for up to 30 days 90 tablet 0    atorvastatin (LIPITOR) 20 mg tablet Take 20 mg by mouth every evening (Patient not taking: Reported on 7/27/2021)      HYDROcodone-acetaminophen (NORCO) 5-325 mg per tablet Take 1 tablet by mouth every 6 (six) hours as needed for pain for up to 5 dosesMax Daily Amount: 4 tablets (Patient not taking: Reported on 3/29/2021) 5 tablet 0    lidocaine (LIDODERM) 5 % Place 1 patch on the skin daily as needed  (Patient not taking: Reported on 7/27/2021)      meclizine (ANTIVERT) 12 5 MG tablet Take 1 tablet (12 5 mg total) by mouth 3 (three) times a day as needed for dizziness (Patient not taking: Reported on 3/29/2021) 30 tablet 0    meloxicam (MOBIC) 7 5 mg tablet Take 7 5 mg by mouth daily (Patient not taking: Reported on 7/27/2021)      oxybutynin (DITROPAN) 5 mg tablet Take 5 mg by mouth daily (Patient not taking: Reported on 7/27/2021)      terbinafine (LamISIL) 250 mg tablet Take 1 tablet (250 mg total) by mouth daily for 14 days 14 tablet 0     No current facility-administered medications for this visit         SOCIAL HISTORY:  Social History     Socioeconomic History    Marital status: Single     Spouse name: None    Number of children: None    Years of education: None    Highest education level: None   Occupational History    None   Tobacco Use    Smoking status: Former Smoker     Packs/day: 1 00     Years: 15 00     Pack years: 15 00     Types: Cigarettes     Quit date: 2021     Years since quittin 4    Smokeless tobacco: Never Used   Vaping Use    Vaping Use: Never used   Substance and Sexual Activity    Alcohol use: Not Currently    Drug use: No    Sexual activity: Yes   Other Topics Concern    None   Social History Narrative    None     Social Determinants of Health     Financial Resource Strain:     Difficulty of Paying Living Expenses:    Food Insecurity:     Worried About Running Out of Food in the Last Year:     Ran Out of Food in the Last Year:    Transportation Needs:     Lack of Transportation (Medical):  Lack of Transportation (Non-Medical):    Physical Activity:     Days of Exercise per Week:     Minutes of Exercise per Session:    Stress:     Feeling of Stress :    Social Connections:     Frequency of Communication with Friends and Family:     Frequency of Social Gatherings with Friends and Family:     Attends [employer] Services:     Active Member of Clubs or Organizations:     Attends Club or Organization Meetings:     Marital Status:    Intimate Partner Violence:     Fear of Current or Ex-Partner:     Emotionally Abused:     Physically Abused:     Sexually Abused:       REVIEW OF SYSTEMS  Patient denied CP, SOB, fever, chills, palpitation, HA, GI problem, or calf pain  PHYSICAL EXAMINATION  GENERAL  The patient appears in NAD / non-toxic  Afebrile  VSS    VASCULAR EXAM  Pedal pulses and vascular status are intact  No calf pain or edema bilaterally  No cyanosis  No ischemia  DERMATOLOGIC EXAM  No ulcer  No maceration  No redness or edema  No signs of infection  HPK submet 2 bilaterally  Decreased thickening and discoloration of toenails  NEUROLOGIC EXAM  AAO X 3  No focal neurologic deficit  Neurologic status is intact BLE    MMT WNL     MUSCULOSKELETAL EXAM  ROM intact  No fluctuation  No crepitus  Severe bunion deformity noted bilaterally

## 2021-09-30 ENCOUNTER — OFFICE VISIT (OUTPATIENT)
Dept: PODIATRY | Facility: CLINIC | Age: 56
End: 2021-09-30

## 2021-09-30 VITALS
DIASTOLIC BLOOD PRESSURE: 87 MMHG | HEIGHT: 61 IN | BODY MASS INDEX: 37.08 KG/M2 | HEART RATE: 80 BPM | SYSTOLIC BLOOD PRESSURE: 137 MMHG | WEIGHT: 196.4 LBS

## 2021-09-30 DIAGNOSIS — M20.12 VALGUS DEFORMITY OF BOTH GREAT TOES: Primary | ICD-10-CM

## 2021-09-30 DIAGNOSIS — M20.11 VALGUS DEFORMITY OF BOTH GREAT TOES: Primary | ICD-10-CM

## 2021-09-30 DIAGNOSIS — B35.1 ONYCHOMYCOSIS: ICD-10-CM

## 2021-09-30 DIAGNOSIS — L85.1 ACQUIRED KERATODERMA: ICD-10-CM

## 2021-09-30 PROCEDURE — NCFTCARE PR NON-COVERED FOOT CARE: Performed by: PODIATRIST

## 2021-09-30 NOTE — PROGRESS NOTES
PATIENT:  Keiry Pederson      1965    ASSESSMENT     1  Valgus deformity of both great toes     2  Onychomycosis     3  Acquired keratoderma          PLAN  1  Instructed her to use toe spacers at all times  She has [address] and severe pain at submet 2 related to severe foot deformity  Discussed surgical repair and she would consider it in the near future  HPK X 2 trimmed  Instructed proper footwear and skin care  HISTORY OF PRESENT ILLNESS  Patient presents for foot evaluation  No redness or edema  No recurring wounds  She complained of sharp pain on plantar feet with skin lesions  She has difficulty walking      PAST MEDICAL HISTORY:  Past Medical History:   Diagnosis Date    Acquired deformity of left foot     Anesthesia complication     difficulty awakening    Arthritis     Deaf, left     Depression     Hallux valgus of left foot     Hammer toe of left foot     Headache     Kidney stone     Sciatic pain, right     intermittent       PAST SURGICAL HISTORY:  Past Surgical History:   Procedure Laterality Date    BREAST BIOPSY Right 03/04/2021    BREAST BIOPSY Right 3/10/2021    Procedure: Excisional Breast debridement  7 o'clock position;  Surgeon: Andrew Turcios MD;  [facility];  Service: General    CHOLECYSTECTOMY      CLOSURE DELAYED PRIMARY Right 7/5/2020    Procedure: CLOSURE DELAYED PRIMARY and application of skin graft substitute;  Surgeon: Vinicius Casanova DPM;  [facility];  Service: Podiatry    HERNIA REPAIR      INCISION AND DRAINAGE OF WOUND Right 7/1/2020    Procedure: INCISION AND DRAINAGE (I&D) EXTREMITY;  Surgeon: Vinicius Casanova DPM;  [facility];  Service: Podiatry    [address] (NOT 1ST) Left 6/14/2019    Procedure: 3rd Metatarsal Osteotomy;  Surgeon: Krystal Madrid DPM;  [facility];  Service: Podiatry    TUBAL LIGATION      US GUIDED BREAST BIOPSY RIGHT COMPLETE Right 3/4/2021    VAC DRESSING APPLICATION Right 2020    Procedure: APPLICATION VAC DRESSING;  Surgeon: Adry Henderson DPM;  [facility];  Service: Podiatry        ALLERGIES:  Caffeine - food allergy    MEDICATIONS:  Current Outpatient Medications   Medication Sig Dispense Refill    acetaminophen (TYLENOL) 650 mg CR tablet Take 650 mg by mouth every 8 (eight) hours as needed      atorvastatin (LIPITOR) 20 mg tablet Take 20 mg by mouth every evening (Patient not taking: Reported on 2021)      HYDROcodone-acetaminophen (NORCO) 5-325 mg per tablet Take 1 tablet by mouth every 6 (six) hours as needed for pain for up to 5 dosesMax Daily Amount: 4 tablets (Patient not taking: Reported on 3/29/2021) 5 tablet 0    ibuprofen (MOTRIN) 600 mg tablet Take 1 tablet (600 mg total) by mouth every 6 (six) hours as needed for mild pain for up to 30 days 90 tablet 0    lidocaine (LIDODERM) 5 % Place 1 patch on the skin daily as needed  (Patient not taking: Reported on 2021)      meclizine (ANTIVERT) 12 5 MG tablet Take 1 tablet (12 5 mg total) by mouth 3 (three) times a day as needed for dizziness (Patient not taking: Reported on 3/29/2021) 30 tablet 0    meloxicam (MOBIC) 7 5 mg tablet Take 7 5 mg by mouth daily (Patient not taking: Reported on 2021)      oxybutynin (DITROPAN) 5 mg tablet Take 5 mg by mouth daily (Patient not taking: Reported on 2021)       No current facility-administered medications for this visit         SOCIAL HISTORY:  Social History     Socioeconomic History    Marital status: Single     Spouse name: None    Number of children: None    Years of education: None    Highest education level: None   Occupational History    None   Tobacco Use    Smoking status: Former Smoker     Packs/day: 1 00     Years: 15 00     Pack years: 15 00     Types: Cigarettes     Quit date: 2021     Years since quittin 6    Smokeless tobacco: Never Used   Vaping Use    Vaping Use: Never used   Substance and Sexual Activity    Alcohol use: Not Currently    Drug use: No    Sexual activity: Yes   Other Topics Concern    None   Social History Narrative    None     Social Determinants of Health     Financial Resource Strain:     Difficulty of Paying Living Expenses:    Food Insecurity:     Worried About Running Out of Food in the Last Year:     Ran Out of Food in the Last Year:    Transportation Needs:     Lack of Transportation (Medical):  Lack of Transportation (Non-Medical):    Physical Activity:     Days of Exercise per Week:     Minutes of Exercise per Session:    Stress:     Feeling of Stress :    Social Connections:     Frequency of Communication with Friends and Family:     Frequency of Social Gatherings with Friends and Family:     Attends [employer] Services:     Active Member of Clubs or Organizations:     Attends Club or Organization Meetings:     Marital Status:    Intimate Partner Violence:     Fear of Current or Ex-Partner:     Emotionally Abused:     Physically Abused:     Sexually Abused:       REVIEW OF SYSTEMS  Patient denied CP, SOB, fever, chills, palpitation, HA, GI problem, or calf pain  PHYSICAL EXAMINATION  GENERAL  The patient appears in NAD / non-toxic  Afebrile  VSS    VASCULAR EXAM  Pedal pulses and vascular status are intact  No calf pain or edema bilaterally  No cyanosis  No ischemia  DERMATOLOGIC EXAM  No ulcer  No maceration  No redness or edema  No signs of infection  HPK submet 2 bilaterally  NEUROLOGIC EXAM  AAO X 3  No focal neurologic deficit  Neurologic status is intact BLE  MMT WNL  MUSCULOSKELETAL EXAM  ROM intact  No fluctuation  No crepitus  Severe bunion deformity noted bilaterally

## 2021-10-19 ENCOUNTER — OFFICE VISIT (OUTPATIENT)
Dept: PODIATRY | Facility: CLINIC | Age: 56
End: 2021-10-19
Payer: COMMERCIAL

## 2021-10-19 VITALS — HEIGHT: 61 IN | WEIGHT: 193.8 LBS | BODY MASS INDEX: 36.59 KG/M2

## 2021-10-19 DIAGNOSIS — M20.12 HALLUX VALGUS OF LEFT FOOT: Primary | ICD-10-CM

## 2021-10-19 DIAGNOSIS — M20.41 HAMMER TOES OF BOTH FEET: ICD-10-CM

## 2021-10-19 DIAGNOSIS — M20.42 HAMMER TOES OF BOTH FEET: ICD-10-CM

## 2021-10-19 DIAGNOSIS — M20.11 HALLUX VALGUS OF RIGHT FOOT: ICD-10-CM

## 2021-10-19 PROCEDURE — 99214 OFFICE O/P EST MOD 30 MIN: CPT | Performed by: PODIATRIST

## 2021-10-19 RX ORDER — ALPRAZOLAM 0.25 MG/1
TABLET ORAL
COMMUNITY
Start: 2021-09-17 | End: 2021-11-08

## 2021-10-20 ENCOUNTER — PREP FOR PROCEDURE (OUTPATIENT)
Dept: PODIATRY | Facility: CLINIC | Age: 56
End: 2021-10-20

## 2021-10-20 DIAGNOSIS — M21.6X2 OTHER ACQUIRED DEFORMITIES OF LEFT FOOT: ICD-10-CM

## 2021-10-20 DIAGNOSIS — M20.12 HALLUX VALGUS OF LEFT FOOT: Primary | ICD-10-CM

## 2021-10-20 DIAGNOSIS — Z01.818 PRE-OP TESTING: ICD-10-CM

## 2021-10-26 RX ORDER — CEFAZOLIN SODIUM 2 G/50ML
2000 SOLUTION INTRAVENOUS ONCE
Status: CANCELLED | OUTPATIENT
Start: 2021-11-12

## 2021-10-26 RX ORDER — CHLORHEXIDINE GLUCONATE 4 G/100ML
SOLUTION TOPICAL DAILY PRN
Status: CANCELLED | OUTPATIENT
Start: 2021-11-12

## 2021-10-26 RX ORDER — CHLORHEXIDINE GLUCONATE 0.12 MG/ML
15 RINSE ORAL ONCE
Status: CANCELLED | OUTPATIENT
Start: 2021-11-12

## 2021-10-27 ENCOUNTER — APPOINTMENT (OUTPATIENT)
Dept: LAB | Facility: HOSPITAL | Age: 56
End: 2021-10-27
Attending: PODIATRIST
Payer: COMMERCIAL

## 2021-10-27 DIAGNOSIS — M20.12 HALLUX VALGUS OF LEFT FOOT: ICD-10-CM

## 2021-10-27 DIAGNOSIS — Z01.818 PRE-OP TESTING: ICD-10-CM

## 2021-10-27 DIAGNOSIS — E78.2 MIXED HYPERLIPIDEMIA: ICD-10-CM

## 2021-10-27 LAB
ALBUMIN SERPL BCP-MCNC: 3.6 G/DL (ref 3.5–5)
ALP SERPL-CCNC: 101 U/L (ref 46–116)
ALT SERPL W P-5'-P-CCNC: 19 U/L (ref 12–78)
ANION GAP SERPL CALCULATED.3IONS-SCNC: 2 MMOL/L (ref 4–13)
AST SERPL W P-5'-P-CCNC: 15 U/L (ref 5–45)
BASOPHILS # BLD AUTO: 0.04 THOUSANDS/ΜL (ref 0–0.1)
BASOPHILS NFR BLD AUTO: 1 % (ref 0–1)
BILIRUB SERPL-MCNC: 0.47 MG/DL (ref 0.2–1)
BUN SERPL-MCNC: 9 MG/DL (ref 5–25)
CALCIUM SERPL-MCNC: 9.3 MG/DL (ref 8.3–10.1)
CHLORIDE SERPL-SCNC: 108 MMOL/L (ref 100–108)
CHOLEST SERPL-MCNC: 256 MG/DL (ref 50–200)
CO2 SERPL-SCNC: 28 MMOL/L (ref 21–32)
CREAT SERPL-MCNC: 0.75 MG/DL (ref 0.6–1.3)
EOSINOPHIL # BLD AUTO: 0.14 THOUSAND/ΜL (ref 0–0.61)
EOSINOPHIL NFR BLD AUTO: 2 % (ref 0–6)
ERYTHROCYTE [DISTWIDTH] IN BLOOD BY AUTOMATED COUNT: 15.2 % (ref 11.6–15.1)
EST. AVERAGE GLUCOSE BLD GHB EST-MCNC: 137 MG/DL
GFR SERPL CREATININE-BSD FRML MDRD: 89 ML/MIN/1.73SQ M
GLUCOSE P FAST SERPL-MCNC: 118 MG/DL (ref 65–99)
HBA1C MFR BLD: 6.4 %
HCT VFR BLD AUTO: 44.5 % (ref 34.8–46.1)
HDLC SERPL-MCNC: 39 MG/DL
HGB BLD-MCNC: 14.6 G/DL (ref 11.5–15.4)
IMM GRANULOCYTES # BLD AUTO: 0.02 THOUSAND/UL (ref 0–0.2)
IMM GRANULOCYTES NFR BLD AUTO: 0 % (ref 0–2)
LDLC SERPL CALC-MCNC: 180 MG/DL (ref 0–100)
LYMPHOCYTES # BLD AUTO: 3.27 THOUSANDS/ΜL (ref 0.6–4.47)
LYMPHOCYTES NFR BLD AUTO: 40 % (ref 14–44)
MCH RBC QN AUTO: 32.1 PG (ref 26.8–34.3)
MCHC RBC AUTO-ENTMCNC: 32.8 G/DL (ref 31.4–37.4)
MCV RBC AUTO: 98 FL (ref 82–98)
MONOCYTES # BLD AUTO: 0.67 THOUSAND/ΜL (ref 0.17–1.22)
MONOCYTES NFR BLD AUTO: 8 % (ref 4–12)
NEUTROPHILS # BLD AUTO: 4.02 THOUSANDS/ΜL (ref 1.85–7.62)
NEUTS SEG NFR BLD AUTO: 49 % (ref 43–75)
NONHDLC SERPL-MCNC: 217 MG/DL
NRBC BLD AUTO-RTO: 0 /100 WBCS
PLATELET # BLD AUTO: 357 THOUSANDS/UL (ref 149–390)
PMV BLD AUTO: 9.4 FL (ref 8.9–12.7)
POTASSIUM SERPL-SCNC: 4 MMOL/L (ref 3.5–5.3)
PROT SERPL-MCNC: 8.2 G/DL (ref 6.4–8.2)
RBC # BLD AUTO: 4.55 MILLION/UL (ref 3.81–5.12)
SODIUM SERPL-SCNC: 138 MMOL/L (ref 136–145)
TRIGL SERPL-MCNC: 185 MG/DL
WBC # BLD AUTO: 8.16 THOUSAND/UL (ref 4.31–10.16)

## 2021-10-27 PROCEDURE — 83036 HEMOGLOBIN GLYCOSYLATED A1C: CPT

## 2021-10-27 PROCEDURE — 80053 COMPREHEN METABOLIC PANEL: CPT

## 2021-10-27 PROCEDURE — 80061 LIPID PANEL: CPT

## 2021-10-27 PROCEDURE — 36415 COLL VENOUS BLD VENIPUNCTURE: CPT

## 2021-10-27 PROCEDURE — 85025 COMPLETE CBC W/AUTO DIFF WBC: CPT

## 2021-11-02 ENCOUNTER — OFFICE VISIT (OUTPATIENT)
Dept: PODIATRY | Facility: CLINIC | Age: 56
End: 2021-11-02

## 2021-11-02 VITALS
SYSTOLIC BLOOD PRESSURE: 119 MMHG | WEIGHT: 193 LBS | HEART RATE: 98 BPM | BODY MASS INDEX: 36.44 KG/M2 | HEIGHT: 61 IN | DIASTOLIC BLOOD PRESSURE: 84 MMHG

## 2021-11-02 DIAGNOSIS — M21.6X2 OTHER ACQUIRED DEFORMITIES OF LEFT FOOT: ICD-10-CM

## 2021-11-02 DIAGNOSIS — M20.12 HALLUX VALGUS OF LEFT FOOT: Primary | ICD-10-CM

## 2021-11-08 RX ORDER — NICOTINE 21 MG/24HR
1 PATCH, TRANSDERMAL 24 HOURS TRANSDERMAL EVERY 24 HOURS
COMMUNITY

## 2021-11-10 ENCOUNTER — ANESTHESIA EVENT (OUTPATIENT)
Dept: PERIOP | Facility: HOSPITAL | Age: 56
End: 2021-11-10
Payer: COMMERCIAL

## 2021-11-12 ENCOUNTER — ANESTHESIA (OUTPATIENT)
Dept: PERIOP | Facility: HOSPITAL | Age: 56
End: 2021-11-12
Payer: COMMERCIAL

## 2021-11-12 ENCOUNTER — HOSPITAL ENCOUNTER (OUTPATIENT)
Dept: RADIOLOGY | Facility: HOSPITAL | Age: 56
Setting detail: OUTPATIENT SURGERY
Discharge: HOME | End: 2021-11-12
Payer: COMMERCIAL

## 2021-11-12 ENCOUNTER — APPOINTMENT (OUTPATIENT)
Dept: RADIOLOGY | Facility: HOSPITAL | Age: 56
End: 2021-11-12
Payer: COMMERCIAL

## 2021-11-12 ENCOUNTER — HOSPITAL ENCOUNTER (OUTPATIENT)
Facility: HOSPITAL | Age: 56
Setting detail: OUTPATIENT SURGERY
Discharge: HOME | End: 2021-11-12
Attending: PODIATRIST | Admitting: PODIATRIST
Payer: COMMERCIAL

## 2021-11-12 VITALS
DIASTOLIC BLOOD PRESSURE: 89 MMHG | TEMPERATURE: 97.8 F | HEART RATE: 89 BPM | RESPIRATION RATE: 18 BRPM | SYSTOLIC BLOOD PRESSURE: 136 MMHG | BODY MASS INDEX: 36.17 KG/M2 | OXYGEN SATURATION: 95 % | HEIGHT: 61 IN | WEIGHT: 191.58 LBS

## 2021-11-12 DIAGNOSIS — M20.12 HALLUX VALGUS OF LEFT FOOT: ICD-10-CM

## 2021-11-12 DIAGNOSIS — Z98.890 POST-OPERATIVE STATE: Primary | ICD-10-CM

## 2021-11-12 PROCEDURE — NC001 PR NO CHARGE: Performed by: PODIATRIST

## 2021-11-12 PROCEDURE — C1713 ANCHOR/SCREW BN/BN,TIS/BN: HCPCS | Performed by: PODIATRIST

## 2021-11-12 PROCEDURE — 28308 INCISION OF METATARSAL: CPT | Performed by: PODIATRIST

## 2021-11-12 PROCEDURE — C9290 INJ, BUPIVACAINE LIPOSOME: HCPCS | Performed by: PODIATRIST

## 2021-11-12 PROCEDURE — 28297 COR HLX VLGS JT ARTHRD: CPT | Performed by: PODIATRIST

## 2021-11-12 PROCEDURE — 73630 X-RAY EXAM OF FOOT: CPT

## 2021-11-12 PROCEDURE — 73620 X-RAY EXAM OF FOOT: CPT

## 2021-11-12 DEVICE — LOCKING SCREWS
Type: IMPLANTABLE DEVICE | Site: FOOT | Status: FUNCTIONAL
Brand: FASTPITCH 2.7MM HIGH PITCH LOCKING SCREW

## 2021-11-12 DEVICE — GUIDEWIRE 0.9X100MM SGL TROCAR
Type: IMPLANTABLE DEVICE | Site: FOOT | Status: FUNCTIONAL
Brand: VILEX GUIDEWIRE

## 2021-11-12 DEVICE — ANATOMIC BIPLANAR FIXATION
Type: IMPLANTABLE DEVICE | Site: FOOT | Status: FUNCTIONAL
Brand: LAPIPLASTY SYSTEM 1

## 2021-11-12 DEVICE — SCREW, HEADLESS 2.0X12MM_TI GREEN
Type: IMPLANTABLE DEVICE | Site: FOOT | Status: FUNCTIONAL
Brand: VILEX DUAL THREAD SCREW

## 2021-11-12 RX ORDER — ONDANSETRON 2 MG/ML
4 INJECTION INTRAMUSCULAR; INTRAVENOUS EVERY 8 HOURS PRN
Status: DISCONTINUED | OUTPATIENT
Start: 2021-11-12 | End: 2021-11-13 | Stop reason: HOSPADM

## 2021-11-12 RX ORDER — CEFAZOLIN SODIUM 2 G/50ML
2000 SOLUTION INTRAVENOUS ONCE
Status: COMPLETED | OUTPATIENT
Start: 2021-11-12 | End: 2021-11-12

## 2021-11-12 RX ORDER — ONDANSETRON 2 MG/ML
INJECTION INTRAMUSCULAR; INTRAVENOUS AS NEEDED
Status: DISCONTINUED | OUTPATIENT
Start: 2021-11-12 | End: 2021-11-12

## 2021-11-12 RX ORDER — ASPIRIN 325 MG
325 TABLET, DELAYED RELEASE (ENTERIC COATED) ORAL DAILY
Qty: 30 TABLET | Refills: 0 | Status: SHIPPED | OUTPATIENT
Start: 2021-11-12 | End: 2021-11-12 | Stop reason: SDUPTHER

## 2021-11-12 RX ORDER — OXYCODONE HYDROCHLORIDE AND ACETAMINOPHEN 5; 325 MG/1; MG/1
1 TABLET ORAL EVERY 4 HOURS PRN
Qty: 30 TABLET | Refills: 0 | Status: SHIPPED | OUTPATIENT
Start: 2021-11-12 | End: 2021-11-12 | Stop reason: SDUPTHER

## 2021-11-12 RX ORDER — ONDANSETRON 2 MG/ML
4 INJECTION INTRAMUSCULAR; INTRAVENOUS ONCE AS NEEDED
Status: DISCONTINUED | OUTPATIENT
Start: 2021-11-12 | End: 2021-11-12 | Stop reason: HOSPADM

## 2021-11-12 RX ORDER — FENTANYL CITRATE/PF 50 MCG/ML
25 SYRINGE (ML) INJECTION
Status: DISCONTINUED | OUTPATIENT
Start: 2021-11-12 | End: 2021-11-12 | Stop reason: HOSPADM

## 2021-11-12 RX ORDER — SODIUM CHLORIDE, SODIUM LACTATE, POTASSIUM CHLORIDE, CALCIUM CHLORIDE 600; 310; 30; 20 MG/100ML; MG/100ML; MG/100ML; MG/100ML
125 INJECTION, SOLUTION INTRAVENOUS CONTINUOUS
Status: DISCONTINUED | OUTPATIENT
Start: 2021-11-12 | End: 2021-11-13 | Stop reason: HOSPADM

## 2021-11-12 RX ORDER — CHLORHEXIDINE GLUCONATE 4 G/100ML
SOLUTION TOPICAL DAILY PRN
Status: DISCONTINUED | OUTPATIENT
Start: 2021-11-12 | End: 2021-11-13 | Stop reason: HOSPADM

## 2021-11-12 RX ORDER — MEPERIDINE HYDROCHLORIDE 25 MG/ML
12.5 INJECTION INTRAMUSCULAR; INTRAVENOUS; SUBCUTANEOUS
Status: DISCONTINUED | OUTPATIENT
Start: 2021-11-12 | End: 2021-11-12 | Stop reason: HOSPADM

## 2021-11-12 RX ORDER — ASPIRIN 325 MG
325 TABLET, DELAYED RELEASE (ENTERIC COATED) ORAL DAILY
Qty: 30 TABLET | Refills: 0 | Status: SHIPPED | OUTPATIENT
Start: 2021-11-12

## 2021-11-12 RX ORDER — CHLORHEXIDINE GLUCONATE 0.12 MG/ML
15 RINSE ORAL ONCE
Status: COMPLETED | OUTPATIENT
Start: 2021-11-12 | End: 2021-11-12

## 2021-11-12 RX ORDER — KETOROLAC TROMETHAMINE 30 MG/ML
INJECTION, SOLUTION INTRAMUSCULAR; INTRAVENOUS AS NEEDED
Status: DISCONTINUED | OUTPATIENT
Start: 2021-11-12 | End: 2021-11-12

## 2021-11-12 RX ORDER — DEXAMETHASONE SODIUM PHOSPHATE 4 MG/ML
INJECTION, SOLUTION INTRA-ARTICULAR; INTRALESIONAL; INTRAMUSCULAR; INTRAVENOUS; SOFT TISSUE AS NEEDED
Status: DISCONTINUED | OUTPATIENT
Start: 2021-11-12 | End: 2021-11-12

## 2021-11-12 RX ORDER — PROPOFOL 10 MG/ML
INJECTION, EMULSION INTRAVENOUS AS NEEDED
Status: DISCONTINUED | OUTPATIENT
Start: 2021-11-12 | End: 2021-11-12

## 2021-11-12 RX ORDER — FENTANYL CITRATE 50 UG/ML
INJECTION, SOLUTION INTRAMUSCULAR; INTRAVENOUS AS NEEDED
Status: DISCONTINUED | OUTPATIENT
Start: 2021-11-12 | End: 2021-11-12

## 2021-11-12 RX ORDER — HYDROMORPHONE HCL/PF 1 MG/ML
0.5 SYRINGE (ML) INJECTION
Status: DISCONTINUED | OUTPATIENT
Start: 2021-11-12 | End: 2021-11-12 | Stop reason: HOSPADM

## 2021-11-12 RX ORDER — OXYCODONE HYDROCHLORIDE AND ACETAMINOPHEN 5; 325 MG/1; MG/1
1 TABLET ORAL EVERY 6 HOURS PRN
Qty: 30 TABLET | Refills: 0 | Status: SHIPPED | OUTPATIENT
Start: 2021-11-12

## 2021-11-12 RX ORDER — OXYCODONE HYDROCHLORIDE AND ACETAMINOPHEN 5; 325 MG/1; MG/1
1 TABLET ORAL EVERY 4 HOURS PRN
Status: DISCONTINUED | OUTPATIENT
Start: 2021-11-12 | End: 2021-11-13 | Stop reason: HOSPADM

## 2021-11-12 RX ORDER — MAGNESIUM HYDROXIDE 1200 MG/15ML
LIQUID ORAL AS NEEDED
Status: DISCONTINUED | OUTPATIENT
Start: 2021-11-12 | End: 2021-11-12 | Stop reason: HOSPADM

## 2021-11-12 RX ORDER — OXYCODONE HYDROCHLORIDE AND ACETAMINOPHEN 5; 325 MG/1; MG/1
1 TABLET ORAL EVERY 4 HOURS PRN
Qty: 30 TABLET | Refills: 0 | Status: SHIPPED | OUTPATIENT
Start: 2021-11-12 | End: 2021-11-22

## 2021-11-12 RX ORDER — LIDOCAINE HYDROCHLORIDE 20 MG/ML
INJECTION, SOLUTION EPIDURAL; INFILTRATION; INTRACAUDAL; PERINEURAL AS NEEDED
Status: DISCONTINUED | OUTPATIENT
Start: 2021-11-12 | End: 2021-11-12

## 2021-11-12 RX ORDER — MIDAZOLAM HYDROCHLORIDE 2 MG/2ML
INJECTION, SOLUTION INTRAMUSCULAR; INTRAVENOUS AS NEEDED
Status: DISCONTINUED | OUTPATIENT
Start: 2021-11-12 | End: 2021-11-12

## 2021-11-12 RX ADMIN — OXYCODONE HYDROCHLORIDE AND ACETAMINOPHEN 1 TABLET: 5; 325 TABLET ORAL at 20:02

## 2021-11-12 RX ADMIN — CEFAZOLIN SODIUM 2000 MG: 2 SOLUTION INTRAVENOUS at 14:40

## 2021-11-12 RX ADMIN — SODIUM CHLORIDE, SODIUM LACTATE, POTASSIUM CHLORIDE, AND CALCIUM CHLORIDE: .6; .31; .03; .02 INJECTION, SOLUTION INTRAVENOUS at 16:40

## 2021-11-12 RX ADMIN — PROPOFOL 170 MG: 10 INJECTION, EMULSION INTRAVENOUS at 14:51

## 2021-11-12 RX ADMIN — FENTANYL CITRATE 25 MCG: 50 INJECTION INTRAMUSCULAR; INTRAVENOUS at 15:14

## 2021-11-12 RX ADMIN — ONDANSETRON 4 MG: 2 INJECTION INTRAMUSCULAR; INTRAVENOUS at 16:34

## 2021-11-12 RX ADMIN — FENTANYL CITRATE 25 MCG: 50 INJECTION INTRAMUSCULAR; INTRAVENOUS at 14:58

## 2021-11-12 RX ADMIN — MIDAZOLAM 2 MG: 1 INJECTION INTRAMUSCULAR; INTRAVENOUS at 14:42

## 2021-11-12 RX ADMIN — FENTANYL CITRATE 50 MCG: 50 INJECTION INTRAMUSCULAR; INTRAVENOUS at 17:27

## 2021-11-12 RX ADMIN — LIDOCAINE HYDROCHLORIDE 80 MG: 20 INJECTION, SOLUTION EPIDURAL; INFILTRATION; INTRACAUDAL; PERINEURAL at 14:51

## 2021-11-12 RX ADMIN — FENTANYL CITRATE 25 MCG: 50 INJECTION INTRAMUSCULAR; INTRAVENOUS at 14:51

## 2021-11-12 RX ADMIN — FENTANYL CITRATE 25 MCG: 50 INJECTION INTRAMUSCULAR; INTRAVENOUS at 15:17

## 2021-11-12 RX ADMIN — DEXAMETHASONE SODIUM PHOSPHATE 4 MG: 4 INJECTION INTRA-ARTICULAR; INTRALESIONAL; INTRAMUSCULAR; INTRAVENOUS; SOFT TISSUE at 14:51

## 2021-11-12 RX ADMIN — FENTANYL CITRATE 50 MCG: 50 INJECTION INTRAMUSCULAR; INTRAVENOUS at 16:32

## 2021-11-12 RX ADMIN — SODIUM CHLORIDE, SODIUM LACTATE, POTASSIUM CHLORIDE, AND CALCIUM CHLORIDE: .6; .31; .03; .02 INJECTION, SOLUTION INTRAVENOUS at 15:00

## 2021-11-12 RX ADMIN — CHLORHEXIDINE GLUCONATE 0.12% ORAL RINSE 15 ML: 1.2 LIQUID ORAL at 12:41

## 2021-11-12 RX ADMIN — KETOROLAC TROMETHAMINE 15 MG: 30 INJECTION, SOLUTION INTRAMUSCULAR; INTRAVENOUS at 16:56

## 2021-11-12 RX ADMIN — SODIUM CHLORIDE, SODIUM LACTATE, POTASSIUM CHLORIDE, AND CALCIUM CHLORIDE 125 ML/HR: .6; .31; .03; .02 INJECTION, SOLUTION INTRAVENOUS at 12:57

## 2021-11-12 RX ADMIN — PROPOFOL 100 MG: 10 INJECTION, EMULSION INTRAVENOUS at 16:26

## 2021-11-19 ENCOUNTER — OFFICE VISIT (OUTPATIENT)
Dept: PODIATRY | Facility: CLINIC | Age: 56
End: 2021-11-19

## 2021-11-19 VITALS
SYSTOLIC BLOOD PRESSURE: 150 MMHG | HEIGHT: 60 IN | DIASTOLIC BLOOD PRESSURE: 90 MMHG | HEART RATE: 108 BPM | BODY MASS INDEX: 37.42 KG/M2

## 2021-11-19 DIAGNOSIS — M20.12 HALLUX VALGUS OF LEFT FOOT: Primary | ICD-10-CM

## 2021-11-19 DIAGNOSIS — M21.6X2 OTHER ACQUIRED DEFORMITIES OF LEFT FOOT: ICD-10-CM

## 2021-11-19 DIAGNOSIS — Z98.890 POST-OPERATIVE STATE: ICD-10-CM

## 2021-11-19 PROCEDURE — 99024 POSTOP FOLLOW-UP VISIT: CPT | Performed by: PODIATRIST

## 2021-11-30 ENCOUNTER — OFFICE VISIT (OUTPATIENT)
Dept: PODIATRY | Facility: CLINIC | Age: 56
End: 2021-11-30

## 2021-11-30 VITALS
WEIGHT: 195 LBS | DIASTOLIC BLOOD PRESSURE: 79 MMHG | HEIGHT: 60 IN | BODY MASS INDEX: 38.28 KG/M2 | HEART RATE: 84 BPM | SYSTOLIC BLOOD PRESSURE: 127 MMHG

## 2021-11-30 DIAGNOSIS — M20.12 HALLUX VALGUS OF LEFT FOOT: Primary | ICD-10-CM

## 2021-11-30 DIAGNOSIS — M21.6X2 OTHER ACQUIRED DEFORMITIES OF LEFT FOOT: ICD-10-CM

## 2021-11-30 PROCEDURE — 99024 POSTOP FOLLOW-UP VISIT: CPT | Performed by: PODIATRIST

## 2021-12-14 ENCOUNTER — OFFICE VISIT (OUTPATIENT)
Dept: PODIATRY | Facility: CLINIC | Age: 56
End: 2021-12-14

## 2021-12-14 VITALS — WEIGHT: 195 LBS | HEIGHT: 60 IN | BODY MASS INDEX: 38.28 KG/M2

## 2021-12-14 DIAGNOSIS — M20.12 HALLUX VALGUS OF LEFT FOOT: Primary | ICD-10-CM

## 2021-12-14 PROCEDURE — 99024 POSTOP FOLLOW-UP VISIT: CPT | Performed by: PODIATRIST

## 2022-01-11 ENCOUNTER — OFFICE VISIT (OUTPATIENT)
Dept: PODIATRY | Facility: CLINIC | Age: 57
End: 2022-01-11

## 2022-01-11 VITALS
SYSTOLIC BLOOD PRESSURE: 136 MMHG | BODY MASS INDEX: 38.08 KG/M2 | HEIGHT: 60 IN | DIASTOLIC BLOOD PRESSURE: 84 MMHG | HEART RATE: 103 BPM

## 2022-01-11 DIAGNOSIS — M20.12 HALLUX VALGUS OF LEFT FOOT: Primary | ICD-10-CM

## 2022-01-11 DIAGNOSIS — M21.6X2 OTHER ACQUIRED DEFORMITIES OF LEFT FOOT: ICD-10-CM

## 2022-01-11 PROCEDURE — 99024 POSTOP FOLLOW-UP VISIT: CPT | Performed by: PODIATRIST

## 2022-01-11 NOTE — PROGRESS NOTES
PATIENT:  Arvind Quintero      1965    ASSESSMENT     1  Hallux valgus of left foot  XR foot 3+ vw left   2  Other acquired deformities of left foot  XR foot 3+ vw left          PLAN  Post-op X-ray was taken and reviewed with her  Okay to advance shoes as tolerated  Slowly increase activity as tolerated  Instructed skin care and protection  Instructed supportive care  RA in 6 weeks  HISTORY OF PRESENT ILLNESS  Patient presents for post-op appointment  Post-op pain is resolving well  Still has some tingling sensation around the incision  The patient is feeling well and in good spirits  REVIEW OF SYSTEMS  GENERAL: No fever or chills  HEART: No chest pain, or palpitation  RESPIRATORY:  No SOB or cough  GI: No Nausea, vomit or diarrhea  NEUROLOGIC: No syncope or acute weakness  MUSCULOSKELETAL: No calf pain or edema  PHYSICAL EXAMINATION  GENERAL  The patient appears in NAD / non-toxic  Afebrile  VSS    VASCULAR EXAM  Pedal pulses and vascular status are intact  No calf pain or edema bilaterally  No cyanosis  DERMATOLOGIC EXAM  No wound  No signs of infection  No drainage  Post-op edema continues to resolve  No necrosis or dehiscence  NEUROLOGIC EXAM  AAO X 3  No focal neurologic deficit  Neurologic status is intact BLE  MUSCULOSKELETAL EXAM  Good surgical correction  Normal post-op findings  ROM intact  No fluctuation or crepitus

## 2022-01-12 ENCOUNTER — TELEPHONE (OUTPATIENT)
Dept: PODIATRY | Facility: CLINIC | Age: 57
End: 2022-01-12

## 2022-01-12 NOTE — TELEPHONE ENCOUNTER
Brandon Roberson -  from [address] [place] [address] [address], [address], and Kathern Cranker - [phone]  Called regarding some paperwork that was faxed over regarding this patient  Is looking for a status of this request     Her fax [phone]    Generic medical and billing itemized requests

## 2022-02-15 ENCOUNTER — OFFICE VISIT (OUTPATIENT)
Dept: PODIATRY | Facility: CLINIC | Age: 57
End: 2022-02-15
Payer: COMMERCIAL

## 2022-02-15 VITALS
SYSTOLIC BLOOD PRESSURE: 134 MMHG | WEIGHT: 195 LBS | BODY MASS INDEX: 38.28 KG/M2 | DIASTOLIC BLOOD PRESSURE: 87 MMHG | HEART RATE: 102 BPM | HEIGHT: 60 IN

## 2022-02-15 DIAGNOSIS — M21.6X2 OTHER ACQUIRED DEFORMITIES OF LEFT FOOT: ICD-10-CM

## 2022-02-15 DIAGNOSIS — M20.12 HALLUX VALGUS OF LEFT FOOT: Primary | ICD-10-CM

## 2022-02-15 PROCEDURE — 99212 OFFICE O/P EST SF 10 MIN: CPT | Performed by: PODIATRIST

## 2022-02-15 NOTE — PROGRESS NOTES
PATIENT:  Mirella Pereira      1965    ASSESSMENT     1  Hallux valgus of left foot  Ambulatory referral to Physical Therapy   2  Other acquired deformities of left foot  Ambulatory referral to Physical Therapy          PLAN  Reviewed the last X-ray  Will start her on PT/OT  Advance shoes and activity as tolerated  RA in 6 weeks  HISTORY OF PRESENT ILLNESS  Patient presents for left foot evaluation  She reports some burning sensation at the end of the day  She also still notices some swelling at night time  She can wear some regular shoes  Certain shoes are still too tight  REVIEW OF SYSTEMS  GENERAL: No fever or chills  HEART: No chest pain, or palpitation  RESPIRATORY:  No SOB or cough  GI: No Nausea, vomit or diarrhea  NEUROLOGIC: No syncope or acute weakness  MUSCULOSKELETAL: No calf pain or edema  PHYSICAL EXAMINATION  GENERAL  The patient appears in NAD / non-toxic  Afebrile  VSS    VASCULAR EXAM  Pedal pulses and vascular status are intact  No calf pain or edema bilaterally  No cyanosis  DERMATOLOGIC EXAM  No wound  No signs of infection  No drainage  Post-op edema continues to resolve  No necrosis or dehiscence  No recurring HPK left submet 2  NEUROLOGIC EXAM  AAO X 3  No focal neurologic deficit  Neurologic status is intact BLE  MUSCULOSKELETAL EXAM  Good surgical correction  Normal post-op findings  ROM intact  No fluctuation or crepitus

## 2022-02-23 ENCOUNTER — EVALUATION (OUTPATIENT)
Dept: PHYSICAL THERAPY | Facility: REHABILITATION | Age: 57
End: 2022-02-23
Payer: COMMERCIAL

## 2022-02-23 DIAGNOSIS — M21.6X2 OTHER ACQUIRED DEFORMITIES OF LEFT FOOT: ICD-10-CM

## 2022-02-23 DIAGNOSIS — M79.672 LEFT FOOT PAIN: ICD-10-CM

## 2022-02-23 DIAGNOSIS — M20.12 HALLUX VALGUS OF LEFT FOOT: Primary | ICD-10-CM

## 2022-02-23 PROCEDURE — 97110 THERAPEUTIC EXERCISES: CPT | Performed by: PHYSICAL THERAPIST

## 2022-02-23 PROCEDURE — 97161 PT EVAL LOW COMPLEX 20 MIN: CPT | Performed by: PHYSICAL THERAPIST

## 2022-02-23 NOTE — PROGRESS NOTES
PT Evaluation     Today's date: 2022  Patient name: Bailey Mccray  : 1965  MRN: [id number]  Referring provider: Kaur Rojas DPM  Dx:   Encounter Diagnosis     ICD-10-CM    1  Hallux valgus of left foot  M20 12 Ambulatory referral to Physical Therapy   2  Other acquired deformities of left foot  M21 6X2 Ambulatory referral to Physical Therapy   3  Left foot pain  M79 672        Start Time: 1400  Stop Time: 1435  Total time in clinic (min): 35 minutes    Assessment/Plan  Bailey Mccray is a 64 y o  female who was referred to physical therapy for management of L foot pain/dysfunction s/p bunionectomy  Primary impairments include foot hyperalgesia, poor gait pattern with reliance on assistive device, and significantly decreased strength/ROM  Consequently, patient has difficulty completing ADLs including all WBing activity  Karlos Montiel would benefit from skilled intervention to address all deficits and improve functional capability  Patient is a good candidate for therapy, pending compliance with HEP and 2x weekly participation  Thank you for the referral and please do not hesitate to contact me with any questions or concerns regarding [facility] care! Plan  Frequency: 2x per week   Duration in visits: 12  Duration in weeks: 6  POC start date: 22  POC end date:  22  Therapeutic exercise/activity, neuromuscular reeducation, manual therapy, and modalities  Patient understands and agrees to plan of care  Goals  Short Term--4 weeks  1  2 point decrease in pain levels  2  Patient able to fully WB through L foot with minimal onset of pain  3  10 deg improvement in ankle/foot ROM  Long Term--By Discharge  1  Patient will demonstrate normal gait pattern without use of AD   2  Patient will safely navigate flight of steps utilizing reciprocal step pattern and use of 1 HR  3  Patient will be able to cook without requiring sitting breaks  Patient's Goal: Decrease pain in foot  Subjective  History   Date of Surgery: 11/12/21 Description: Post bunionectomy  Patient reports that she had no foot pain prior to surgery but was experiencing hip pain secondary to gait dysfunction caused by foot deformity  Currently she complains of severe pain on dorsum of her foot and significant swelling, especially at night with discoloration present  Her foot also feels "hot" at night  Symptoms  Constant pain in dorsum of her foot that is worse with both movement and touch  She is afraid to put any weight through her L foot and requires use of cane in order to ambulate  She reports that she requires step-to pattern to navigate steps at home and requires multiple rest breaks in order to cook dinner  Pain at best: 7  Pain at worst: [address] lives with her boyfriend in a multistory home with washer/dryer in the basement  Objective **Patient demonstrated significantly decreased tolerance to objective exam today and declined passive measurements secondary to severe pain  Measurements should be obtained, as able  GAIT: Ambulates with SPC in RUE  Foot flat at L initial contact with premature toe off during late stance resulting in decreased R step length  Patient WBs primarily though lateral aspect of her L foot  MMT    Hip         R          L   Flex  4+ 4   Extn  NT NT   Abd  NT NT                 MMT    Knee      R          L   Flex  5 5   Extn  5 5                MMT          AROM          PROM   Ankle         R          L          R         L          L   PF HR NT HR NT NT NT NT   DF  5 2+ 10 20 (hypo)  NT   DF bent knee   NT NT NT   EHL 5 2 20 5 NT   Inv  5 2+ 30 10 NT   Ever  5 2+ 24 9 NT       Palpation: (+) SEVERE hyperalgesia on dorsum of L foot/toes  Ankle:    joint findings= TBA           Precautions: Severe hallux valgus deformity R foot         Manuals 2/23            Gentle IASTM for desensitization             Ankle/foot mobs and PROM, as tolerated                                       Neuro Re-Ed             Weight shifting             NBOS                                                                              Ther Ex             Ankle pumps A/P, ML 20x HEP            Toe crunches 20x HEP            HEP Ice/elev and desensitization                                                                             Ther Activity                                       Gait Training                                       Modalities

## 2022-03-01 ENCOUNTER — OFFICE VISIT (OUTPATIENT)
Dept: PHYSICAL THERAPY | Facility: REHABILITATION | Age: 57
End: 2022-03-01
Payer: COMMERCIAL

## 2022-03-01 DIAGNOSIS — M79.672 LEFT FOOT PAIN: ICD-10-CM

## 2022-03-01 DIAGNOSIS — M21.6X2 OTHER ACQUIRED DEFORMITIES OF LEFT FOOT: ICD-10-CM

## 2022-03-01 DIAGNOSIS — M20.12 HALLUX VALGUS OF LEFT FOOT: Primary | ICD-10-CM

## 2022-03-01 PROCEDURE — 97140 MANUAL THERAPY 1/> REGIONS: CPT

## 2022-03-01 PROCEDURE — 97110 THERAPEUTIC EXERCISES: CPT

## 2022-03-01 NOTE — PROGRESS NOTES
Daily Note     Today's date: 3/1/2022  Patient name: Mady Fall  : 1965  MRN: [id number]  Referring provider: Mary Kitchen DPM  Dx:   Encounter Diagnosis     ICD-10-CM    1  Hallux valgus of left foot  M20 12    2  Other acquired deformities of left foot  M21 6X2    3  Left foot pain  M79 672        Start Time: 1130  Stop Time: 1215  Total time in clinic (min): 45 minutes    Subjective: Sam reports constant burning and pain on the dorsum of left foot  Objective: See treatment diary below      Assessment: Hyperalgesia along deep and common fibular nerve distribution with possible overlap of distal saphenous  Significant TTP dorsum of left foot  Positive slump and SLR (tibial, common fib)  Updated HEP to include: sciatic nerve glides (common fib bias)  Patient is reporting hx of LBP with radiating symptoms since surgery  Will evaluate lumbar next visit  Patient would benefit from continued PT      Plan: Continue per plan of care  Precautions: Severe hallux valgus deformity R foot         Manuals 2/23 3/1           Gentle IASTM for desensitization             Ankle/foot mobs and PROM, as tolerated                          LASER  4' 14 W no DC           Neuro Re-Ed             Weight shifting             NBOS                                                                 Re-Assessment  30'           Ther Ex             Ankle pumps A/P, ML 20x HEP            Toe crunches 20x HEP            HEP Ice/elev and desensitization            Sciatic Nerve Glides: Common Fib Bias  2x10 HEP                                                               Ther Activity                                       Gait Training                                       Modalities

## 2022-03-04 ENCOUNTER — OFFICE VISIT (OUTPATIENT)
Dept: PHYSICAL THERAPY | Facility: REHABILITATION | Age: 57
End: 2022-03-04
Payer: COMMERCIAL

## 2022-03-04 DIAGNOSIS — M20.12 HALLUX VALGUS OF LEFT FOOT: Primary | ICD-10-CM

## 2022-03-04 DIAGNOSIS — M79.672 LEFT FOOT PAIN: ICD-10-CM

## 2022-03-04 DIAGNOSIS — M21.6X2 OTHER ACQUIRED DEFORMITIES OF LEFT FOOT: ICD-10-CM

## 2022-03-04 PROCEDURE — 97140 MANUAL THERAPY 1/> REGIONS: CPT

## 2022-03-04 PROCEDURE — 97110 THERAPEUTIC EXERCISES: CPT

## 2022-03-04 PROCEDURE — 97112 NEUROMUSCULAR REEDUCATION: CPT

## 2022-03-04 NOTE — PROGRESS NOTES
Daily Note     Today's date: 3/4/2022  Patient name: Helga Joyner  : 1965  MRN: [id number]  Referring provider: Shaista Winston DPM  Dx:   Encounter Diagnosis     ICD-10-CM    1  Hallux valgus of left foot  M20 12    2  Other acquired deformities of left foot  M21 6X2    3  Left foot pain  M79 672        Start Time: 1430  Stop Time: 1515  Total time in clinic (min): 45 minutes    Subjective: Patient reports she was very sore after last visit  States she had a hard time falling asleep  Objective: See treatment diary below      Assessment: Patient had reproduction of left foot symptoms with lumbar PA pressures at L4-S1  Progress very slowly  Highly irritable symptoms  Very sensitive to light and passive modalities  Will d/c LASER next visit if patient has continued increased pain following  Patient would benefit from continued PT      Plan: Continue per plan of care  Precautions: Severe hallux valgus deformity R foot         Manuals 2/23 3/1 3/4          Gentle IASTM for desensitization             Ankle/foot mobs and PROM, as tolerated             Central Gliders   3x10 L4-5 right sidelying          LASER  4' 14 W no DC 4' 10 W no dc          Neuro Re-Ed             Weight shifting             NBOS                                                                 Re-Assessment  30'           Ther Ex             Ankle pumps A/P, ML 20x HEP            Toe crunches 20x HEP  reviewed          HEP Ice/elev and desensitization            Sciatic Nerve Glides: Common Fib Bias  2x10 HEP                                                               Ther Activity                                       Gait Training                                       Modalities

## 2022-03-08 ENCOUNTER — OFFICE VISIT (OUTPATIENT)
Dept: PHYSICAL THERAPY | Facility: REHABILITATION | Age: 57
End: 2022-03-08
Payer: COMMERCIAL

## 2022-03-08 DIAGNOSIS — M79.672 LEFT FOOT PAIN: ICD-10-CM

## 2022-03-08 DIAGNOSIS — M21.6X2 OTHER ACQUIRED DEFORMITIES OF LEFT FOOT: Primary | ICD-10-CM

## 2022-03-08 DIAGNOSIS — M20.12 HALLUX VALGUS OF LEFT FOOT: ICD-10-CM

## 2022-03-08 PROCEDURE — 97110 THERAPEUTIC EXERCISES: CPT

## 2022-03-08 NOTE — PROGRESS NOTES
Daily Note     Today's date: 3/8/2022  Patient name: Shella Apley  : 1965  MRN: [id number]  Referring provider: Briseyda Keith DPM  Dx:   Encounter Diagnosis     ICD-10-CM    1  Other acquired deformities of left foot  M21 6X2    2  Left foot pain  M79 672    3  Hallux valgus of left foot  M20 12        Start Time: 1615  Stop Time: 1700  Total time in clinic (min): 45 minutes    Subjective: Patient reports he did not respond well to LASER      Objective: See treatment diary below  MATHEUS: increased pain no change  RFIS: increased pain no change  CANDI: no change, no effect  PPUs: no effect, no effect  Std RLSG: no effect, no effect    Assessment: Mixed results with repeated motions testing  Symptoms consistent with an inflamed deep peroneal nerve and may benefit from medications to reduce irritiability  Reviewed peroneal nerve glides  Patient continues to have extreme sensitivity with brisk touch  Patient would benefit from continued PT      Plan: Continue per plan of care  Precautions: Severe hallux valgus deformity R foot         Manuals 2/23 3/1 3/4 3/8         Gentle IASTM for desensitization             Re-Assessment    45'         Central Gliders   3x10 L4-5 right sidelying          LASER  4' 14 W no DC 4' 10 W no dc discharge         Neuro Re-Ed             Weight shifting             NBOS                                                                 Re-Assessment  30'           Ther Ex             Ankle pumps A/P, ML 20x HEP            Toe crunches 20x HEP  reviewed          HEP Ice/elev and desensitization            Sciatic Nerve Glides: Common Fib Bias  2x10 HEP           Prone Hips Off-Center to Right    2x10         Standing RLSG    2x10                                   Ther Activity                                       Gait Training                                       Modalities

## 2022-03-11 ENCOUNTER — OFFICE VISIT (OUTPATIENT)
Dept: PHYSICAL THERAPY | Facility: REHABILITATION | Age: 57
End: 2022-03-11
Payer: COMMERCIAL

## 2022-03-11 DIAGNOSIS — M79.672 LEFT FOOT PAIN: ICD-10-CM

## 2022-03-11 DIAGNOSIS — M21.6X2 OTHER ACQUIRED DEFORMITIES OF LEFT FOOT: Primary | ICD-10-CM

## 2022-03-11 DIAGNOSIS — M20.12 HALLUX VALGUS OF LEFT FOOT: ICD-10-CM

## 2022-03-11 PROCEDURE — 97110 THERAPEUTIC EXERCISES: CPT

## 2022-03-11 PROCEDURE — 97140 MANUAL THERAPY 1/> REGIONS: CPT

## 2022-03-15 ENCOUNTER — OFFICE VISIT (OUTPATIENT)
Dept: PHYSICAL THERAPY | Facility: REHABILITATION | Age: 57
End: 2022-03-15
Payer: COMMERCIAL

## 2022-03-15 DIAGNOSIS — M79.672 LEFT FOOT PAIN: Primary | ICD-10-CM

## 2022-03-15 DIAGNOSIS — M20.12 HALLUX VALGUS OF LEFT FOOT: ICD-10-CM

## 2022-03-15 DIAGNOSIS — M21.6X2 OTHER ACQUIRED DEFORMITIES OF LEFT FOOT: ICD-10-CM

## 2022-03-15 PROCEDURE — 97112 NEUROMUSCULAR REEDUCATION: CPT

## 2022-03-15 PROCEDURE — 97140 MANUAL THERAPY 1/> REGIONS: CPT

## 2022-03-15 PROCEDURE — 97110 THERAPEUTIC EXERCISES: CPT

## 2022-03-15 NOTE — PROGRESS NOTES
Daily Note     Today's date: 3/15/2022  Patient name: Merry Umanzor  : 1965  MRN: [id number]  Referring provider: Bebe Mcgowan DPM  Dx:   Encounter Diagnosis     ICD-10-CM    1  Left foot pain  M79 672    2  Hallux valgus of left foot  M20 12    3  Other acquired deformities of left foot  M21 6X2        Start Time: 1530  Stop Time: 1615  Total time in clinic (min): 45 minutes    Subjective: No change since last visit  Objective: See treatment diary below      Assessment: Responded well to neutral leg with central gliders  Allodynia and hyperalgesia at superficial and deep peroneal nerve distally  Updated HEP to include: desensitization with brush, open books right sidelying  Patient had reduced sensitivity end of session with brisk palpation  Patient would benefit from continued PT      Plan: Continue per plan of care  Precautions: Severe hallux valgus deformity R foot         Manuals 2/23 3/1 3/4 3/8 3/11 3/15       Gentle IASTM for desensitization             Re-Assessment    45'         Central Gliders   3x10 L4-5 right sidelying  3x10 L4-5 right sidelying 3x10 L4-5 right sidelying no tension       LASER  4' 14 W no DC 4' 10 W no dc discharge         Neuro Re-Ed             Weight shifting             NBOS             Desensiziation with brush      5' HEP       Open Books Right Sidelying      2x10 HEP                                 Re-Assessment  30'    30       Ther Ex             Ankle pumps A/P, ML 20x HEP            Toe crunches 20x HEP  reviewed          HEP Ice/elev and desensitization            Sciatic Nerve Glides: Common Fib Bias  2x10 HEP           Prone Hips Off-Center to Right    2x10         Standing RLSG    2x10 2x10        Supine Flexion     2x10 w/ PT OP        Supine Flexion right Rotation     2x1- w/ PT OP        Ther Activity                                       Gait Training                                       Modalities

## 2022-03-18 ENCOUNTER — APPOINTMENT (OUTPATIENT)
Dept: PHYSICAL THERAPY | Facility: REHABILITATION | Age: 57
End: 2022-03-18
Payer: COMMERCIAL

## 2022-03-18 DIAGNOSIS — M20.12 HALLUX VALGUS OF LEFT FOOT: ICD-10-CM

## 2022-03-18 DIAGNOSIS — M79.2 NEUROPATHIC PAIN: Primary | ICD-10-CM

## 2022-03-18 DIAGNOSIS — Z98.890 POST-OPERATIVE STATE: ICD-10-CM

## 2022-03-18 RX ORDER — GABAPENTIN 100 MG/1
100 CAPSULE ORAL 3 TIMES DAILY
Qty: 90 CAPSULE | Refills: 0 | Status: SHIPPED | OUTPATIENT
Start: 2022-03-18 | End: 2022-03-31 | Stop reason: SDUPTHER

## 2022-03-22 ENCOUNTER — OFFICE VISIT (OUTPATIENT)
Dept: PHYSICAL THERAPY | Facility: REHABILITATION | Age: 57
End: 2022-03-22
Payer: COMMERCIAL

## 2022-03-22 DIAGNOSIS — M20.12 HALLUX VALGUS OF LEFT FOOT: ICD-10-CM

## 2022-03-22 DIAGNOSIS — M21.6X2 OTHER ACQUIRED DEFORMITIES OF LEFT FOOT: ICD-10-CM

## 2022-03-22 DIAGNOSIS — M79.672 LEFT FOOT PAIN: Primary | ICD-10-CM

## 2022-03-22 PROCEDURE — 97110 THERAPEUTIC EXERCISES: CPT

## 2022-03-22 PROCEDURE — 97140 MANUAL THERAPY 1/> REGIONS: CPT

## 2022-03-22 PROCEDURE — 97112 NEUROMUSCULAR REEDUCATION: CPT

## 2022-03-22 NOTE — PROGRESS NOTES
Daily Note     Today's date: 3/22/2022  Patient name: Mady Fall  : 1965  MRN: [id number]  Referring provider: Mary Kitchen DPM  Dx:   Encounter Diagnosis     ICD-10-CM    1  Left foot pain  M79 672    2  Hallux valgus of left foot  M20 12    3  Other acquired deformities of left foot  M21 6X2        Start Time: 1530  Stop Time: 1615  Total time in clinic (min): 45 minutes    Subjective: Patient reports less hypersensitivity in foot since last visit  Pain and swelling still is reporting      Objective: See treatment diary below  TTP dorsum of foot focused around 1st and 2nd rays  TTP along medial aspect of LLE  Assessment: Patient had reduced hypersensitivity and allodynia end of session  Continues to respond well to lumbar mobilizations and gentle peroneal nerve glides  Will progress very slowly  Patient would benefit from continued PT      Plan: Continue per plan of care  Precautions: Severe hallux valgus deformity R foot         Manuals 2/23 3/1 3/4 3/8 3/11 3/15 3/22      Gentle IASTM for desensitization             Re-Assessment    45'         Central Gliders   3x10 L4-5 right sidelying  3x10 L4-5 right sidelying 3x10 L4-5 right sidelying no tension 3x10 L4-5 right sidelying no tension      LASER  4' 14 W no DC 4' 10 W no dc discharge         Neuro Re-Ed             Weight shifting             NBOS             Desensiziation with brush      5' HEP       Open Books Right Sidelying      2x10 HEP                                 Re-Assessment  30'    30' 30'      Ther Ex             Ankle pumps A/P, ML 20x HEP            Toe crunches 20x HEP  reviewed          HEP Ice/elev and desensitization            Sciatic Nerve Glides: Common Fib Bias  2x10 HEP     2x10      Prone Hips Off-Center to Right    2x10         Standing RLSG    2x10 2x10        Supine Flexion     2x10 w/ PT OP        Supine Flexion right Rotation     2x1- w/ PT OP        Ther Activity                                       Gait Training                                       Modalities

## 2022-03-25 ENCOUNTER — OFFICE VISIT (OUTPATIENT)
Dept: PHYSICAL THERAPY | Facility: REHABILITATION | Age: 57
End: 2022-03-25
Payer: COMMERCIAL

## 2022-03-25 DIAGNOSIS — M21.6X2 OTHER ACQUIRED DEFORMITIES OF LEFT FOOT: ICD-10-CM

## 2022-03-25 DIAGNOSIS — M20.12 HALLUX VALGUS OF LEFT FOOT: ICD-10-CM

## 2022-03-25 DIAGNOSIS — M79.672 LEFT FOOT PAIN: Primary | ICD-10-CM

## 2022-03-25 PROCEDURE — 97110 THERAPEUTIC EXERCISES: CPT

## 2022-03-25 NOTE — PROGRESS NOTES
Daily Note     Today's date: 3/25/2022  Patient name: Nelly Ferrer  : 1965  MRN: [id number]  Referring provider: Maryann Kendall DPM  Dx:   Encounter Diagnosis     ICD-10-CM    1  Left foot pain  M79 672    2  Hallux valgus of left foot  M20 12    3  Other acquired deformities of left foot  M21 6X2        Start Time: 1361  Stop Time: 1600  Total time in clinic (min): 45 minutes    Subjective: Patient reports she was unable to find a comfortable position last night trying to sleep  Objective: See treatment diary below      Assessment: Reduced allodynia and hypersensitivity along dorsum of foot, but still present in 1st and 2nd web space  Patient has had some mild improvements since start of PT  Provided patient with compression sleeve for ankle and foot today to help control presence of swelling  Will re-assess next visit  Patient would benefit from continued PT      Plan: Continue per plan of care  Precautions: Severe hallux valgus deformity R foot         Manuals 2/23 3/1 3/4 3/8 3/11 3/15 3/22 3/25     Gentle IASTM for desensitization             Re-Assessment    45'         Central Gliders   3x10 L4-5 right sidelying  3x10 L4-5 right sidelying 3x10 L4-5 right sidelying no tension 3x10 L4-5 right sidelying no tension 3x10 L4-5 right sidelying no tension     LASER  4' 14 W no DC 4' 10 W no dc discharge         Neuro Re-Ed             Weight shifting             NBOS             Desensiziation with brush      5' HEP       Open Books Right Sidelying      2x10 HEP                                 Re-Assessment  30'    30' 30' 30'     Ther Ex             Ankle pumps A/P, ML 20x HEP            Toe crunches 20x HEP  reviewed          HEP Ice/elev and desensitization            Sciatic Nerve Glides: Common Fib Bias  2x10 HEP     2x10      Prone Hips Off-Center to Right    2x10         Standing RLSG    2x10 2x10        Supine Flexion     2x10 w/ PT OP        Supine Flexion right Rotation     2x1- w/ PT OP VG        L4 4' DL     Ther Activity                                       Gait Training                                       Modalities

## 2022-03-28 ENCOUNTER — OFFICE VISIT (OUTPATIENT)
Dept: PHYSICAL THERAPY | Facility: REHABILITATION | Age: 57
End: 2022-03-28
Payer: COMMERCIAL

## 2022-03-28 DIAGNOSIS — M21.6X2 OTHER ACQUIRED DEFORMITIES OF LEFT FOOT: ICD-10-CM

## 2022-03-28 DIAGNOSIS — M20.12 HALLUX VALGUS OF LEFT FOOT: ICD-10-CM

## 2022-03-28 DIAGNOSIS — M79.672 LEFT FOOT PAIN: Primary | ICD-10-CM

## 2022-03-28 PROCEDURE — 97110 THERAPEUTIC EXERCISES: CPT

## 2022-03-28 PROCEDURE — 97140 MANUAL THERAPY 1/> REGIONS: CPT

## 2022-03-28 NOTE — PROGRESS NOTES
Daily Note     Today's date: 3/28/2022  Patient name: Dom Pride  : 1965  MRN: [id number]  Referring provider: Shar Gotti DPM  Dx:   Encounter Diagnosis     ICD-10-CM    1  Left foot pain  M79 672    2  Hallux valgus of left foot  M20 12    3  Other acquired deformities of left foot  M21 6X2                   Subjective: Pt reports she still gets swelling, numbness, and throbbing in L foot  Notes compression sleeve provided LV has been helping with swelling, stating if she takes it off at night time the swelling quickly returns  Has seen improvement since beginning therapy  Can no slightly spread/wiggle toes  Has follow up with MD Thursday, 3/31  Objective: See treatment diary below  Improved FOTO score of 50/50 this visit  Assessment: Tolerated treatment fair  Continued with program as outlined below  Progressed with addition of recumbent bike  Is limited with exercise she is able to perform d/t hypersensitivity; no significant increase in symptoms post treatment  Patient would benefit from continued PT to further improve strength, decrease pain, and maximize overall function  Plan: Continue per plan of care  Precautions: Severe hallux valgus deformity R foot         Manuals 2/23 3/1 3/4 3/8 3/11 3/15 3/22 3/25 3/28    Gentle IASTM for desensitization             Re-Assessment    45'         Central Gliders   3x10 L4-5 right sidelying  3x10 L4-5 right sidelying 3x10 L4-5 right sidelying no tension 3x10 L4-5 right sidelying no tension 3x10 L4-5 right sidelying no tension 3x10 L4-5 right sidelying no tension    LASER  4' 14 W no DC 4' 10 W no dc discharge         Neuro Re-Ed             Weight shifting             NBOS             Desensiziation with brush      5' HEP       Open Books Right Sidelying      2x10 HEP                                 Re-Assessment  30'    30' 30' 30'     Ther Ex             Ankle pumps A/P, ML 20x HEP            Toe crunches 20x HEP  reviewed HEP Ice/elev and desensitization            Sciatic Nerve Glides: Common Fib Bias  2x10 HEP     2x10      Prone Hips Off-Center to Right    2x10         Standing RLSG    2x10 2x10        Supine Flexion     2x10 w/ PT OP        Supine Flexion right Rotation     2x1- w/ PT OP        VG        L4 4' DL L4 4' DL    Recumbent bike         No resistance  5 min    Ther Activity                                       Gait Training                                       Modalities

## 2022-03-28 NOTE — PROGRESS NOTES
Daily Note     Today's date: 3/28/2022  Patient name: Briana Chen  : 1965  MRN: [id number]  Referring provider: Lilibeth Remy DPM  Dx:   Encounter Diagnosis     ICD-10-CM    1  Left foot pain  M79 672    2  Hallux valgus of left foot  M20 12    3  Other acquired deformities of left foot  M21 6X2                   Subjective: ***      Objective: See treatment diary below      Assessment: Tolerated treatment {Tolerated treatment :[id number]}  Patient {assessment:[id number]}      Plan: {PLAN:[id number]}     Precautions: Severe hallux valgus deformity R foot         Manuals 2/23 3/1 3/4 3/8 3/11 3/15 3/22 3/25 3/28    Gentle IASTM for desensitization             Re-Assessment    45'         Central Gliders   3x10 L4-5 right sidelying  3x10 L4-5 right sidelying 3x10 L4-5 right sidelying no tension 3x10 L4-5 right sidelying no tension 3x10 L4-5 right sidelying no tension 3x10 L4-5 right sidelying no tension    LASER  4' 14 W no DC 4' 10 W no dc discharge         Neuro Re-Ed             Weight shifting             NBOS             Desensiziation with brush      5' HEP       Open Books Right Sidelying      2x10 HEP                                 Re-Assessment  30'    30' 30' 30'     Ther Ex             Ankle pumps A/P, ML 20x HEP            Toe crunches 20x HEP  reviewed          HEP Ice/elev and desensitization            Sciatic Nerve Glides: Common Fib Bias  2x10 HEP     2x10      Prone Hips Off-Center to Right    2x10         Standing RLSG    2x10 2x10        Supine Flexion     2x10 w/ PT OP        Supine Flexion right Rotation     2x1- w/ PT OP        VG        L4 4' DL L4 4' DL                 Ther Activity                                       Gait Training                                       Modalities

## 2022-03-31 ENCOUNTER — OFFICE VISIT (OUTPATIENT)
Dept: PODIATRY | Facility: CLINIC | Age: 57
End: 2022-03-31
Payer: COMMERCIAL

## 2022-03-31 VITALS — HEIGHT: 60 IN | BODY MASS INDEX: 38.28 KG/M2 | WEIGHT: 195 LBS

## 2022-03-31 DIAGNOSIS — M20.12 HALLUX VALGUS OF LEFT FOOT: Primary | ICD-10-CM

## 2022-03-31 DIAGNOSIS — G57.92 NEURITIS OF LEFT FOOT: ICD-10-CM

## 2022-03-31 DIAGNOSIS — Z98.890 POST-OPERATIVE STATE: ICD-10-CM

## 2022-03-31 DIAGNOSIS — M79.2 NEUROPATHIC PAIN: ICD-10-CM

## 2022-03-31 PROCEDURE — 99213 OFFICE O/P EST LOW 20 MIN: CPT | Performed by: PODIATRIST

## 2022-03-31 RX ORDER — GABAPENTIN 300 MG/1
300 CAPSULE ORAL 2 TIMES DAILY
Qty: 60 CAPSULE | Refills: 1 | Status: SHIPPED | OUTPATIENT
Start: 2022-03-31 | End: 2022-05-30

## 2022-03-31 RX ORDER — TRAZODONE HYDROCHLORIDE 50 MG/1
TABLET ORAL
COMMUNITY
Start: 2022-02-28

## 2022-03-31 NOTE — PROGRESS NOTES
PATIENT:  Neymar Dickerson      1965    ASSESSMENT     1  Hallux valgus of left foot  gabapentin (NEURONTIN) 300 mg capsule   2  Neuritis of left foot  Diclofenac Sodium (VOLTAREN) 1 %   3  Neuropathic pain  gabapentin (NEURONTIN) 300 mg capsule   4  Post-operative state  gabapentin (NEURONTIN) 300 mg capsule          PLAN  Reviewed / discussed PT notes  Increase Gabapentin to 300 mg bid  Add Voltaren gel  Instructed home exercise and supportive care  Ice / compression as needed  Advance shoes and activity as tolerated  HPK right foot trimmed  RA in 6 weeks  HISTORY OF PRESENT ILLNESS  Patient presents for left foot evaluation  Still has burning sensation at night time  PT did not seem to help her  She can wear some regular shoes  No new complaint  She complained of painful callus right plantar foot        PAST MEDICAL HISTORY:  Past Medical History:   Diagnosis Date    Acquired deformity of left foot     Anesthesia complication     difficulty awakening    Arthritis     Deaf, left     Depression     Hallux valgus of left foot     Hammer toe of left foot     Headache     Kidney stone     Sciatic pain, right     intermittent       PAST SURGICAL HISTORY:  Past Surgical History:   Procedure Laterality Date    BREAST BIOPSY Right 03/04/2021    BREAST BIOPSY Right 3/10/2021    Procedure: Excisional Breast debridement  7 o'clock position;  Surgeon: Rachna Bocanegra MD;  [facility];  Service: General    CHOLECYSTECTOMY      CLOSURE DELAYED PRIMARY Right 7/5/2020    Procedure: CLOSURE DELAYED PRIMARY and application of skin graft substitute;  Surgeon: Lupe Suggs DPM;  [facility];  Service: Podiatry    HERNIA REPAIR      INCISION AND DRAINAGE OF WOUND Right 7/1/2020    Procedure: INCISION AND DRAINAGE (I&D) EXTREMITY;  Surgeon: Lupe Suggs DPM;  [facility];  Service: Podiatry    Joshua BERNAL  97  W/KASSIDY W/1METAR MEDIAL CNF Left 11/12/2021 Procedure: Terie Mom;  Surgeon: Sai Schmitt DPM;  [facility];  Service: Podiatry    [place] OSTEOTOMY METATARSAL (NOT 1ST) Left 6/14/2019    Procedure: 3rd Metatarsal Osteotomy;  Surgeon: Shravan Tanner DPM;  [facility];  Service: Podiatry    [address] (NOT 1ST) Left 11/12/2021    Procedure: OSTEOTOMY 2ND METATARSAL;  Surgeon: Sai Schmitt DPM;  [facility];  Service: Podiatry    TUBAL LIGATION      US GUIDED BREAST BIOPSY RIGHT COMPLETE Right 3/4/2021    VAC DRESSING APPLICATION Right 5/1/6377    Procedure: APPLICATION VAC DRESSING;  Surgeon: Sai Schmitt DPM;  [facility];  Service: Podiatry        ALLERGIES:  Sertraline and Caffeine - food allergy    MEDICATIONS:  Current Outpatient Medications   Medication Sig Dispense Refill    acetaminophen (TYLENOL) 650 mg CR tablet Take 650 mg by mouth every 8 (eight) hours as needed      aspirin (ECOTRIN) 325 mg EC tablet Take 1 tablet (325 mg total) by mouth daily 30 tablet 0    atorvastatin (LIPITOR) 20 mg tablet Take 20 mg by mouth every evening        gabapentin (NEURONTIN) 300 mg capsule Take 1 capsule (300 mg total) by mouth 2 (two) times a day 60 capsule 1    nicotine (Nicotine Step 2) 14 mg/24hr TD 24 hr patch Place 1 patch on the skin every 24 hours      traZODone (DESYREL) 50 mg tablet take 1 tablet by mouth nightly - TAKE AT BEDTIME      Diclofenac Sodium (VOLTAREN) 1 % Apply 2 g topically 4 (four) times a day 150 g 1    ibuprofen (MOTRIN) 600 mg tablet Take 1 tablet (600 mg total) by mouth every 6 (six) hours as needed for mild pain for up to 30 days 90 tablet 0    oxyCODONE-acetaminophen (Percocet) 5-325 mg per tablet Take 1 tablet by mouth every 6 (six) hours as needed for severe pain for up to 30 doses Max Daily Amount: 4 tablets (Patient not taking: Reported on 2/15/2022 ) 30 tablet 0     No current facility-administered medications for this visit         SOCIAL HISTORY:  Social History Socioeconomic History    Marital status: Single     Spouse name: None    Number of children: None    Years of education: None    Highest education level: None   Occupational History    None   Tobacco Use    Smoking status: Current Some Day Smoker     Packs/day: 1 00     Years: 15 00     Pack years: 15 00     Types: Cigarettes     Last attempt to quit: 2021     Years since quittin 3    Smokeless tobacco: Never Used    Tobacco comment: trying to quit - using judson  patch   Vaping Use    Vaping Use: Never used   Substance and Sexual Activity    Alcohol use: Not Currently    Drug use: No    Sexual activity: Yes   Other Topics Concern    None   Social History Narrative    None     Social Determinants of Health     Financial Resource Strain: Not on file   Food Insecurity: Not on file   Transportation Needs: Not on file   Physical Activity: Not on file   Stress: Not on file   Social Connections: Not on file   Intimate Partner Violence: Not on file   Housing Stability: Not on file        REVIEW OF SYSTEMS  GENERAL: No fever or chills  HEART: No chest pain, or palpitation  RESPIRATORY:  No SOB or cough  GI: No Nausea, vomit or diarrhea  NEUROLOGIC: No syncope or acute weakness  MUSCULOSKELETAL: No calf pain or edema  PHYSICAL EXAMINATION  GENERAL  The patient appears in NAD / non-toxic  Afebrile  VSS    VASCULAR EXAM  Pedal pulses and vascular status are intact  No calf pain or edema bilaterally  No cyanosis  DERMATOLOGIC EXAM  No wound  No signs of infection  No drainage  No necrosis or dehiscence  No recurring HPK left submet 2  HPK right submet 2  Mild sensitivity around the incision  No allodynia  No trophic change  NEUROLOGIC EXAM  AAO X 3  No focal neurologic deficit  Neurologic status is intact BLE  MUSCULOSKELETAL EXAM  Good surgical correction  ROM intact  No fluctuation or crepitus

## 2022-05-02 NOTE — PROGRESS NOTES
PT Discharge Note  Marlys attended 9 PT visits from 2/28/22-3/28/22  At last visit, patient was instructed to f/u with PT  Since then, patient has not returned or communicated intent to return to physical therapy for greater than 30 days  POC will be discharged at this time

## 2022-05-13 ENCOUNTER — OFFICE VISIT (OUTPATIENT)
Dept: PODIATRY | Facility: CLINIC | Age: 57
End: 2022-05-13
Payer: COMMERCIAL

## 2022-05-13 VITALS
BODY MASS INDEX: 38.28 KG/M2 | HEIGHT: 60 IN | WEIGHT: 195 LBS | HEART RATE: 98 BPM | SYSTOLIC BLOOD PRESSURE: 138 MMHG | DIASTOLIC BLOOD PRESSURE: 92 MMHG

## 2022-05-13 DIAGNOSIS — G57.92 NEURITIS OF LEFT FOOT: Primary | ICD-10-CM

## 2022-05-13 DIAGNOSIS — M20.12 HALLUX VALGUS OF LEFT FOOT: ICD-10-CM

## 2022-05-13 PROCEDURE — 99213 OFFICE O/P EST LOW 20 MIN: CPT | Performed by: PODIATRIST

## 2022-05-13 RX ORDER — LIDOCAINE AND PRILOCAINE 25; 25 MG/G; MG/G
CREAM TOPICAL AS NEEDED
Qty: 30 G | Refills: 5 | Status: SHIPPED | OUTPATIENT
Start: 2022-05-13 | End: 2022-07-14 | Stop reason: SDUPTHER

## 2022-05-13 NOTE — PROGRESS NOTES
PATIENT:  Vidhi Washington      1965      ASSESSMENT     1  Neuritis of left foot  lidocaine-prilocaine (EMLA) cream   2  Hallux valgus of left foot          PLAN  Wean off Gabapentin  Continue Voltaren gel  Add EMLA cream   Instructed home exercise and supportive care  Ice / compression as needed  HPK right foot trimmed  RA in 2 months    HISTORY OF PRESENT ILLNESS  Patient presents for left foot evaluation  Still has burning sensation at night time  Gabapentin does not seem to help  She tolerates regular shies  She presents with a cane today  No acute edema or redness  She complained of painful callus right plantar foot        PAST MEDICAL HISTORY:  Past Medical History:   Diagnosis Date    Acquired deformity of left foot     Anesthesia complication     difficulty awakening    Arthritis     Deaf, left     Depression     Hallux valgus of left foot     Hammer toe of left foot     Headache     Kidney stone     Sciatic pain, right     intermittent       PAST SURGICAL HISTORY:  Past Surgical History:   Procedure Laterality Date    BREAST BIOPSY Right 03/04/2021    BREAST BIOPSY Right 3/10/2021    Procedure: Excisional Breast debridement  7 o'clock position;  Surgeon: Denisse Cope MD;  [facility];  Service: General    CHOLECYSTECTOMY      CLOSURE DELAYED PRIMARY Right 7/5/2020    Procedure: CLOSURE DELAYED PRIMARY and application of skin graft substitute;  Surgeon: Lyla Santiago DPM;  [facility];  Service: Podiatry    HERNIA REPAIR      INCISION AND DRAINAGE OF WOUND Right 7/1/2020    Procedure: INCISION AND DRAINAGE (I&D) EXTREMITY;  Surgeon: Lyla Santiago DPM;  [facility];  Service: Podiatry    Joshua U  89  W/SESMDC W/1METAR MEDIAL CNF Left 11/12/2021    Procedure: Ottoniel Otero;  Surgeon: Lyla Santiago DPM;  [facility];  Service: Podiatry    [place] OSTEOTOMY METATARSAL (NOT 1ST) Left 6/14/2019    Procedure: 3rd Metatarsal Osteotomy;  Surgeon: Jesenia Maddox DPM;  [facility];  Service: [address] (NOT 1ST) Left 11/12/2021    Procedure: OSTEOTOMY 2ND METATARSAL;  Surgeon: Jorgito Florian DPM;  [facility];  Service: Podiatry    TUBAL LIGATION      US GUIDED BREAST BIOPSY RIGHT COMPLETE Right 3/4/2021    VAC DRESSING APPLICATION Right 1/2/6124    Procedure: APPLICATION VAC DRESSING;  Surgeon: Jorgito Florian DPM;  [facility];  Service: Podiatry        ALLERGIES:  Sertraline and Caffeine - food allergy    MEDICATIONS:  Current Outpatient Medications   Medication Sig Dispense Refill    acetaminophen (TYLENOL) 650 mg CR tablet Take 650 mg by mouth every 8 (eight) hours as needed      aspirin (ECOTRIN) 325 mg EC tablet Take 1 tablet (325 mg total) by mouth daily 30 tablet 0    atorvastatin (LIPITOR) 20 mg tablet Take 20 mg by mouth every evening        Diclofenac Sodium (VOLTAREN) 1 % Apply 2 g topically 4 (four) times a day 150 g 1    gabapentin (NEURONTIN) 300 mg capsule Take 1 capsule (300 mg total) by mouth 2 (two) times a day 60 capsule 1    lidocaine-prilocaine (EMLA) cream Apply topically as needed for mild pain or moderate pain 30 g 5    nicotine (NICODERM CQ) 14 mg/24hr TD 24 hr patch Place 1 patch on the skin every 24 hours      traZODone (DESYREL) 50 mg tablet take 1 tablet by mouth nightly - TAKE AT BEDTIME      ibuprofen (MOTRIN) 600 mg tablet Take 1 tablet (600 mg total) by mouth every 6 (six) hours as needed for mild pain for up to 30 days 90 tablet 0    oxyCODONE-acetaminophen (Percocet) 5-325 mg per tablet Take 1 tablet by mouth every 6 (six) hours as needed for severe pain for up to 30 doses Max Daily Amount: 4 tablets (Patient not taking: No sig reported) 30 tablet 0     No current facility-administered medications for this visit         SOCIAL HISTORY:  Social History     Socioeconomic History    Marital status: Single     Spouse name: None    Number of children: None    Years of education: None    Highest education level: None   Occupational History    None   Tobacco Use    Smoking status: Current Some Day Smoker     Packs/day: 1 00     Years: 15 00     Pack years: 15 00     Types: Cigarettes     Last attempt to quit: 2021     Years since quittin 5    Smokeless tobacco: Never Used    Tobacco comment: trying to quit - using judson  patch   Vaping Use    Vaping Use: Never used   Substance and Sexual Activity    Alcohol use: Not Currently    Drug use: No    Sexual activity: Yes   Other Topics Concern    None   Social History Narrative    None     Social Determinants of Health     Financial Resource Strain: Not on file   Food Insecurity: Not on file   Transportation Needs: Not on file   Physical Activity: Not on file   Stress: Not on file   Social Connections: Not on file   Intimate Partner Violence: Not on file   Housing Stability: Not on file        REVIEW OF SYSTEMS  GENERAL: No fever or chills  HEART: No chest pain, or palpitation  RESPIRATORY:  No SOB or cough  GI: No Nausea, vomit or diarrhea  NEUROLOGIC: No syncope or acute weakness  MUSCULOSKELETAL: No calf pain or edema  PHYSICAL EXAMINATION  GENERAL  The patient appears in NAD / non-toxic  Afebrile  VSS    VASCULAR EXAM  Pedal pulses and vascular status are intact  No calf pain or edema bilaterally  No cyanosis  DERMATOLOGIC EXAM  No wound  No signs of infection  No drainage  No necrosis or dehiscence  No recurring HPK left submet 2  HPK right submet 2  No allodynia  No trophic change  NEUROLOGIC EXAM  AAO X 3  No focal neurologic deficit  Neurologic status is intact BLE  MUSCULOSKELETAL EXAM  Good surgical correction  ROM intact  No fluctuation or crepitus  Diffuse sensitivity around the incision

## 2022-07-14 ENCOUNTER — OFFICE VISIT (OUTPATIENT)
Dept: PODIATRY | Facility: CLINIC | Age: 57
End: 2022-07-14
Payer: COMMERCIAL

## 2022-07-14 VITALS
DIASTOLIC BLOOD PRESSURE: 89 MMHG | HEART RATE: 111 BPM | HEIGHT: 60 IN | BODY MASS INDEX: 38.08 KG/M2 | SYSTOLIC BLOOD PRESSURE: 129 MMHG

## 2022-07-14 DIAGNOSIS — G57.92 NEURITIS OF LEFT FOOT: Primary | ICD-10-CM

## 2022-07-14 DIAGNOSIS — M20.11 HALLUX VALGUS OF RIGHT FOOT: ICD-10-CM

## 2022-07-14 PROCEDURE — 99213 OFFICE O/P EST LOW 20 MIN: CPT | Performed by: PODIATRIST

## 2022-07-14 RX ORDER — LIDOCAINE AND PRILOCAINE 25; 25 MG/G; MG/G
CREAM TOPICAL AS NEEDED
Qty: 30 G | Refills: 5 | Status: SHIPPED | OUTPATIENT
Start: 2022-07-14

## 2022-07-14 RX ORDER — QUETIAPINE FUMARATE 25 MG/1
25 TABLET, FILM COATED ORAL EVERY EVENING
COMMUNITY
Start: 2022-06-30

## 2022-07-14 NOTE — PROGRESS NOTES
PATIENT:  Merry Umanzor      1965      ASSESSMENT     1  Neuritis of left foot  lidocaine-prilocaine (EMLA) cream    Diclofenac Sodium (VOLTAREN) 1 %   2  Hallux valgus of right foot          PLAN  Reviewed medical records  Continue Voltaren gel and EMLA cream   Offered injection, but she was not amenable  Instructed home exercise and supportive care  Ice / compression as needed  HPK right foot trimmed  RA in 2 months    HISTORY OF PRESENT ILLNESS  Patient presents for foot evaluation  She has painful callus right submet 2  Still has sensitivity around the incision sites  She also notices some swelling in left foot at the end of some days  She tolerates regular shies  She presents with a cane today  No acute edema or redness       PAST MEDICAL HISTORY:  Past Medical History:   Diagnosis Date    Acquired deformity of left foot     Anesthesia complication     difficulty awakening    Arthritis     Deaf, left     Depression     Hallux valgus of left foot     Hammer toe of left foot     Headache     Kidney stone     Sciatic pain, right     intermittent       PAST SURGICAL HISTORY:  Past Surgical History:   Procedure Laterality Date    BREAST BIOPSY Right 03/04/2021    BREAST BIOPSY Right 3/10/2021    Procedure: Excisional Breast debridement  7 o'clock position;  Surgeon: Jemima Rouse MD;  [facility];  Service: General    CHOLECYSTECTOMY      CLOSURE DELAYED PRIMARY Right 7/5/2020    Procedure: CLOSURE DELAYED PRIMARY and application of skin graft substitute;  Surgeon: Ricardo Marina DPM;  [facility];  Service: Podiatry    HERNIA REPAIR      INCISION AND DRAINAGE OF WOUND Right 7/1/2020    Procedure: INCISION AND DRAINAGE (I&D) EXTREMITY;  Surgeon: Ricardo Marina DPM;  [facility];  Service: Podiatry    Joshua U  89  W/SESMDC W/1METAR MEDIAL CNF Left 11/12/2021    Procedure: Lesvia Oconnor;  Surgeon: Ricardo Marina DPM;  [facility]; Service: Podiatry    [place] OSTEOTOMY METATARSAL (NOT 1ST) Left 6/14/2019    Procedure: 3rd Metatarsal Osteotomy;  Surgeon: Abigail Pagan DPM;  [facility];  Service: Podiatry    [address] (NOT 1ST) Left 11/12/2021    Procedure: OSTEOTOMY 2ND METATARSAL;  Surgeon: Lizzette Moser DPM;  [facility];  Service: Podiatry    TUBAL LIGATION      US GUIDED BREAST BIOPSY RIGHT COMPLETE Right 3/4/2021    VAC DRESSING APPLICATION Right 5/7/3146    Procedure: APPLICATION VAC DRESSING;  Surgeon: Lizzette Moser DPM;  [facility];  Service: Podiatry        ALLERGIES:  Sertraline and Caffeine - food allergy    MEDICATIONS:  Current Outpatient Medications   Medication Sig Dispense Refill    acetaminophen (TYLENOL) 650 mg CR tablet Take 650 mg by mouth every 8 (eight) hours as needed      aspirin (ECOTRIN) 325 mg EC tablet Take 1 tablet (325 mg total) by mouth daily 30 tablet 0    atorvastatin (LIPITOR) 20 mg tablet Take 20 mg by mouth every evening        Diclofenac Sodium (VOLTAREN) 1 % Apply 2 g topically 4 (four) times a day 150 g 1    lidocaine-prilocaine (EMLA) cream Apply topically as needed for mild pain or moderate pain 30 g 5    nicotine (NICODERM CQ) 14 mg/24hr TD 24 hr patch Place 1 patch on the skin every 24 hours      QUEtiapine (SEROquel) 25 mg tablet Take 25 mg by mouth every evening      traZODone (DESYREL) 50 mg tablet take 1 tablet by mouth nightly - TAKE AT BEDTIME      gabapentin (NEURONTIN) 300 mg capsule Take 1 capsule (300 mg total) by mouth 2 (two) times a day 60 capsule 1    ibuprofen (MOTRIN) 600 mg tablet Take 1 tablet (600 mg total) by mouth every 6 (six) hours as needed for mild pain for up to 30 days 90 tablet 0    oxyCODONE-acetaminophen (Percocet) 5-325 mg per tablet Take 1 tablet by mouth every 6 (six) hours as needed for severe pain for up to 30 doses Max Daily Amount: 4 tablets (Patient not taking: No sig reported) 30 tablet 0     No current facility-administered medications for this visit  SOCIAL HISTORY:  Social History     Socioeconomic History    Marital status: Single     Spouse name: None    Number of children: None    Years of education: None    Highest education level: None   Occupational History    None   Tobacco Use    Smoking status: Current Some Day Smoker     Packs/day: 1 00     Years: 15 00     Pack years: 15 00     Types: Cigarettes     Last attempt to quit: 2021     Years since quittin 6    Smokeless tobacco: Never Used    Tobacco comment: trying to quit - using judson  patch   Vaping Use    Vaping Use: Never used   Substance and Sexual Activity    Alcohol use: Not Currently    Drug use: No    Sexual activity: Yes   Other Topics Concern    None   Social History Narrative    None     Social Determinants of Health     Financial Resource Strain: Not on file   Food Insecurity: Not on file   Transportation Needs: Not on file   Physical Activity: Not on file   Stress: Not on file   Social Connections: Not on file   Intimate Partner Violence: Not on file   Housing Stability: Not on file        REVIEW OF SYSTEMS  GENERAL: No fever or chills  HEART: No chest pain, or palpitation  RESPIRATORY:  No SOB or cough  GI: No Nausea, vomit or diarrhea  NEUROLOGIC: No syncope or acute weakness  MUSCULOSKELETAL: No calf pain or edema  PHYSICAL EXAMINATION  GENERAL  The patient appears in NAD / non-toxic  Afebrile  VSS    VASCULAR EXAM  Pedal pulses and vascular status are intact  No calf pain or edema bilaterally  No cyanosis  No ischemia  DERMATOLOGIC EXAM  No wound  No signs of infection  HPK right submet 2  No allodynia  No trophic change  NEUROLOGIC EXAM  AAO X 3  No focal neurologic deficit  Neurologic status is intact BLE  MMT intact  MUSCULOSKELETAL EXAM  Good surgical correction  ROM intact  No fluctuation or crepitus  Diffuse sensitivity around the incision  No edema

## 2022-09-15 ENCOUNTER — OFFICE VISIT (OUTPATIENT)
Dept: PODIATRY | Facility: CLINIC | Age: 57
End: 2022-09-15
Payer: COMMERCIAL

## 2022-09-15 VITALS
BODY MASS INDEX: 38.08 KG/M2 | HEART RATE: 102 BPM | HEIGHT: 60 IN | DIASTOLIC BLOOD PRESSURE: 90 MMHG | SYSTOLIC BLOOD PRESSURE: 138 MMHG

## 2022-09-15 DIAGNOSIS — M20.11 HALLUX VALGUS OF RIGHT FOOT: ICD-10-CM

## 2022-09-15 DIAGNOSIS — G57.92 NEURITIS OF LEFT FOOT: Primary | ICD-10-CM

## 2022-09-15 PROCEDURE — 99213 OFFICE O/P EST LOW 20 MIN: CPT | Performed by: PODIATRIST

## 2022-09-15 PROCEDURE — 20550 NJX 1 TENDON SHEATH/LIGAMENT: CPT | Performed by: PODIATRIST

## 2022-09-15 RX ORDER — DULOXETIN HYDROCHLORIDE 30 MG/1
30 CAPSULE, DELAYED RELEASE ORAL DAILY
COMMUNITY
Start: 2022-07-19

## 2022-09-15 RX ORDER — TRIAMCINOLONE ACETONIDE 40 MG/ML
20 INJECTION, SUSPENSION INTRA-ARTICULAR; INTRAMUSCULAR ONCE
Status: COMPLETED | OUTPATIENT
Start: 2022-09-15 | End: 2022-09-15

## 2022-09-15 RX ORDER — MECLIZINE HYDROCHLORIDE 25 MG/1
TABLET ORAL
COMMUNITY
Start: 2022-08-30

## 2022-09-15 RX ORDER — LIDOCAINE HYDROCHLORIDE 10 MG/ML
1 INJECTION, SOLUTION EPIDURAL; INFILTRATION; INTRACAUDAL; PERINEURAL ONCE
Status: COMPLETED | OUTPATIENT
Start: 2022-09-15 | End: 2022-09-15

## 2022-09-15 RX ADMIN — TRIAMCINOLONE ACETONIDE 20 MG: 40 INJECTION, SUSPENSION INTRA-ARTICULAR; INTRAMUSCULAR at 16:45

## 2022-09-15 RX ADMIN — LIDOCAINE HYDROCHLORIDE 1 ML: 10 INJECTION, SOLUTION EPIDURAL; INFILTRATION; INTRACAUDAL; PERINEURAL at 16:45

## 2022-09-23 ENCOUNTER — EVALUATION (OUTPATIENT)
Dept: PHYSICAL THERAPY | Facility: REHABILITATION | Age: 57
End: 2022-09-23
Payer: COMMERCIAL

## 2022-09-23 DIAGNOSIS — G57.92 NEURITIS OF LEFT FOOT: Primary | ICD-10-CM

## 2022-09-23 PROCEDURE — 97162 PT EVAL MOD COMPLEX 30 MIN: CPT

## 2022-09-23 PROCEDURE — 97110 THERAPEUTIC EXERCISES: CPT

## 2022-09-23 NOTE — PROGRESS NOTES
PT Evaluation     Today's date: 2022  Patient name: Nimisha Warner  : 1965  MRN: [id number]  Referring provider: Marilyn Rodriguez DPM  Dx:   Encounter Diagnosis     ICD-10-CM    1  Neuritis of left foot  G57 92 Ambulatory referral to Physical Therapy       Start Time: 1230  Stop Time: 1315  Total time in clinic (min): 45 minutes    Assessment  Assessment details: Nimisha Warner is a pleasant 62 y o  female who presents with chronic left foot pain  Margarita School is 10 months s/p left bunionectomy and demonstrates singificant limitations in ankle and foot mobility, impaired sciatic nerve mobility, and increased sensitivity to palpation resulting in worry over not knowing what's wrong, fear of not being able to keep active and future ill health (and wanting to prevent it)  No further referral appears necessary at this time based upon examination results  I expect she will improve in 6-8 weeks  Positive prognostic indicators include positive attitude toward recovery and good understanding of diagnosis and treatment plan options  Negative prognostic indicators include chronicity of symptoms, anxiety, depression, high symptom irritability and obesity  Problem List:  1) Chronic Left Foot Pain    Comparable signs:  1) Positive SLR  2) Positive Slump Test  Impairments: abnormal gait, abnormal muscle firing, abnormal or restricted ROM, impaired balance, impaired physical strength, lacks appropriate home exercise program, pain with function and weight-bearing intolerance    Symptom irritability: highUnderstanding of Dx/Px/POC: good   Prognosis: fair    Goals  Short Term Goals (Week 4):  1  Decreased pain by 50%  2  Improve ROM by 10 degrees   3  Improve strength by 1/2 measure      Long Term Goals (8 weeks):  1  <2/10 pain with light and heavy household activities  2  Exceed FOTO predicted outcome score  3  Fully independent with HEP by discharge  4  Patient will be able to manage symptoms independently  Plan  Patient would benefit from: skilled physical therapy  Planned modality interventions: electrical stimulation/Russian stimulation and thermotherapy: hydrocollator packs  Planned therapy interventions: joint mobilization, manual therapy, activity modification, balance/weight bearing training, behavior modification, patient education, neuromuscular re-education, strengthening, stretching, therapeutic activities, therapeutic exercise, home exercise program, graded activity, functional ROM exercises, flexibility and gait training  Frequency: 2x week  Duration in visits: 12  Duration in weeks: 6  Treatment plan discussed with: patient        Subjective Evaluation    History of Present Illness  Date of onset: 2021  Mechanism of injury: surgery  Mechanism of injury: Patient reports her symptoms have improved since her injection 9/15/22 by podiatry  Patient reports she is still experiencing significant tenderness pain over the incision site  Patient reports pain at night is still present  Patient reports her symptoms are worst at night  Patient numbness/tingling  Patient reports her symptoms are radiating down her leg from her hip and in to her foot     Pain  Current pain ratin  At worst pain ratin  Relieving factors: ice  Progression: improved    Treatments  Previous treatment: physical therapy  Patient Goals  Patient goals for therapy: decreased pain, increased motion, increased strength, independence with ADLs/IADLs and return to sport/leisure activities  Patient goal: have less pain, be able to get the surgery for her right foot, no have to use her cane, wear close-toed shoes        Objective         MMT          AROM         PROM     Ankle           DF 3+5  0   0     PF 3+/5  WNL   WNL     Inv 3+/5  WNL   WNL     Evr 3+/5   WNL   WNL     GT Ext 3/5  25   25       Foot Posture  Abducted 1st ray    Special Tests  SLR (+), Slump Test (+)    Sensation Testing:  Reduced along 1st and 2nd ray    Tenderness with Palpation  Along 1st and 2nd ray    Gait Assessment  Reduced stance on left, antalgic, use of SPC         Precautions: s/p left bunionectomy 11/12/21      Manuals 9/23            STM (LIGHT) nv            Nerve Glides nv                                      Neuro Re-Ed                                                                                                        Ther Ex             Slump Gliders HEP            Supine 90/90 Sciatic Nerve Gliders HEP                                                                                          Ther Activity                                       Gait Training                                       Modalities

## 2022-09-27 ENCOUNTER — OFFICE VISIT (OUTPATIENT)
Dept: PHYSICAL THERAPY | Facility: REHABILITATION | Age: 57
End: 2022-09-27
Payer: COMMERCIAL

## 2022-09-27 DIAGNOSIS — G57.92 NEURITIS OF LEFT FOOT: Primary | ICD-10-CM

## 2022-09-27 PROCEDURE — 97110 THERAPEUTIC EXERCISES: CPT

## 2022-09-27 PROCEDURE — 97530 THERAPEUTIC ACTIVITIES: CPT

## 2022-09-27 PROCEDURE — 97140 MANUAL THERAPY 1/> REGIONS: CPT

## 2022-09-27 NOTE — PROGRESS NOTES
Daily Note     Today's date: 2022  Patient name: Lorri Hylton  : 1965  MRN: [id number]  Referring provider: Alexander Worley DPM  Dx:   Encounter Diagnosis     ICD-10-CM    1  Neuritis of left foot  G57 92        Start Time: 1400  Stop Time: 1445  Total time in clinic (min): 45 minutes    Subjective: Patient reports good and bad days over the weekend  Objective: See treatment diary below      Assessment: Patient demonstrated hypersentivity with light touch today  Hypersensitivity improved with light STM and sensory level stim  Carefully progress patient, high irritability  Patient would benefit from continued PT      Plan: Continue per plan of care        Precautions: s/p left bunionectomy 21, high irritability      Manuals            STM (LIGHT) nv MA 5'           Nerve Glides nv            NMES  lv2 10' very light                        Neuro Re-Ed                                                                                                        Ther Ex             Slump Gliders HEP            Supine 90/90 Sciatic Nerve Gliders HEP            VG  L4 5'           Bike  No resistance 5'                                                               Ther Activity                                       Gait Training                                       Modalities

## 2022-09-30 ENCOUNTER — OFFICE VISIT (OUTPATIENT)
Dept: PHYSICAL THERAPY | Facility: REHABILITATION | Age: 57
End: 2022-09-30
Payer: COMMERCIAL

## 2022-09-30 DIAGNOSIS — G57.92 NEURITIS OF LEFT FOOT: Primary | ICD-10-CM

## 2022-09-30 PROCEDURE — 97110 THERAPEUTIC EXERCISES: CPT

## 2022-09-30 PROCEDURE — 97140 MANUAL THERAPY 1/> REGIONS: CPT

## 2022-09-30 PROCEDURE — 97112 NEUROMUSCULAR REEDUCATION: CPT

## 2022-09-30 NOTE — PROGRESS NOTES
Daily Note     Today's date: 2022  Patient name: Elizabeth Carmona  : 1965  MRN: [id number]  Referring provider: Zahraa Swenson DPM  Dx:   Encounter Diagnosis     ICD-10-CM    1  Neuritis of left foot  G57 92        Start Time: 78  Stop Time: 153  Total time in clinic (min): 45 minutes    Subjective: Patient reports no increased pain after last visit  Objective: See treatment diary below      Assessment: Hypersensitivity in dorsum of foot improved following manuals today  Progress slowly pending irritability levels  Patient would benefit from continued PT      Plan: Continue per plan of care        Precautions: s/p left bunionectomy 21, high irritability      Manuals           STM (LIGHT) nv KY 5' KY 5'          Nerve Glides nv            NMES  lv2 10' very light lv2 10' very light          Densensitization w/ Brush   KY 5'          Neuro Re-Ed                                                                                                        Ther Ex             Slump Gliders HEP            Supine 90/90 Sciatic Nerve Gliders HEP            VG  L4 5' L5 5'          Bike  No resistance 5' No resistance 5'                                                              Ther Activity                                       Gait Training                                       Modalities

## 2022-10-03 ENCOUNTER — OFFICE VISIT (OUTPATIENT)
Dept: PHYSICAL THERAPY | Facility: REHABILITATION | Age: 57
End: 2022-10-03
Payer: COMMERCIAL

## 2022-10-03 DIAGNOSIS — G57.92 NEURITIS OF LEFT FOOT: Primary | ICD-10-CM

## 2022-10-03 PROCEDURE — 97112 NEUROMUSCULAR REEDUCATION: CPT | Performed by: PHYSICAL THERAPIST

## 2022-10-03 PROCEDURE — 97140 MANUAL THERAPY 1/> REGIONS: CPT | Performed by: PHYSICAL THERAPIST

## 2022-10-03 PROCEDURE — 97110 THERAPEUTIC EXERCISES: CPT | Performed by: PHYSICAL THERAPIST

## 2022-10-03 NOTE — PROGRESS NOTES
Daily Note     Today's date: 10/3/2022  Patient name: Deshawn Muñoz  : 1965  MRN: [id number]  Referring provider: Magaly Reza DPM  Dx:   No diagnosis found  Subjective: Patient reported burning after last visit for one day  She notes high levels of pain rated 7/10 today  Objective: See treatment diary below      Assessment: The patient demonstrated fair tolerance to manuals today compared to prior sessions  The patient is still hypersensitive to touch along the dorsum of the foot but reports compliance with desensitization using a paintbrush at home  Plan: Continue per plan of care        Precautions: s/p left bunionectomy 21, high irritability      Manuals 9/23 9/27 9/30 10/3         STM (LIGHT) nv GA 5' GA 5' NB 5'         Nerve Glides nv            NMES  lv2 10' very light lv2 10' very light lvl 10' very light         Densensitization w/ Brush   GA 5' NB 5'         Neuro Re-Ed                                                                                                        Ther Ex             Slump Gliders HEP            Supine 90/90 Sciatic Nerve Gliders HEP            VG  L4 5' L5 5' L5 5'         Bike  No resistance 5' No resistance 5' No resistance 10'                                                             Ther Activity                                       Gait Training                                       Modalities

## 2022-10-06 ENCOUNTER — OFFICE VISIT (OUTPATIENT)
Dept: PHYSICAL THERAPY | Facility: REHABILITATION | Age: 57
End: 2022-10-06
Payer: COMMERCIAL

## 2022-10-06 DIAGNOSIS — G57.92 NEURITIS OF LEFT FOOT: Primary | ICD-10-CM

## 2022-10-06 PROCEDURE — 97140 MANUAL THERAPY 1/> REGIONS: CPT

## 2022-10-06 PROCEDURE — 97112 NEUROMUSCULAR REEDUCATION: CPT

## 2022-10-06 PROCEDURE — 97110 THERAPEUTIC EXERCISES: CPT

## 2022-10-06 NOTE — PROGRESS NOTES
Daily Note     Today's date: 10/6/2022  Patient name: Elizabeth Carmona  : 1965  MRN: [id number]  Referring provider: Zahraa Swneson DPM  Dx:   Encounter Diagnosis     ICD-10-CM    1  Neuritis of left foot  G57 92                   Subjective: Patient noted increased pain in Bilateral knees and feet Tuesday night  Patient noted 7/10 pain in L foot and Bilateral knees  Objective: See treatment diary below      Assessment: Tolerated treatment fair  Patient performed rec bike for 5 min due to increased pain in Bilateral knees and feet while performing bike  Patient still sensitive on top of foot continued to work on desensitizing with brush  Patient would benefit from continued PT      Plan: Continue per plan of care        Precautions: s/p left bunionectomy 21, high irritability      Manuals 9/23 9/27 9/30 10/3 10/6        STM (LIGHT) nv AZ 5' AZ 5' NB 5'         Nerve Glides nv            NMES  lv2 10' very light lv2 10' very light lvl 10' very light         Densensitization w/ Brush   AZ 5' NB 5'         Neuro Re-Ed                                                                                                        Ther Ex             Slump Gliders HEP            Supine 90/90 Sciatic Nerve Gliders HEP            VG  L4 5' L5 5' L5 5' L5 5'        Bike  No resistance 5' No resistance 5' No resistance 10' No resistance 5'                                                            Ther Activity                                       Gait Training                                       Modalities

## 2022-10-11 ENCOUNTER — OFFICE VISIT (OUTPATIENT)
Dept: PHYSICAL THERAPY | Facility: REHABILITATION | Age: 57
End: 2022-10-11
Payer: COMMERCIAL

## 2022-10-11 DIAGNOSIS — G57.92 NEURITIS OF LEFT FOOT: Primary | ICD-10-CM

## 2022-10-11 PROCEDURE — 97110 THERAPEUTIC EXERCISES: CPT

## 2022-10-11 PROCEDURE — 97140 MANUAL THERAPY 1/> REGIONS: CPT

## 2022-10-11 PROCEDURE — 97112 NEUROMUSCULAR REEDUCATION: CPT

## 2022-10-11 NOTE — PROGRESS NOTES
Daily Note     Today's date: 10/11/2022  Patient name: Kevin Saavedra  : 1965  MRN: [id number]  Referring provider: Monisha Dean DPM  Dx:   Encounter Diagnosis     ICD-10-CM    1  Neuritis of left foot  G57 92        Start Time: 174  Stop Time: 1830  Total time in clinic (min): 45 minutes    Subjective: Patient reports her foot was sore after the light STIM from last visit  Patient continues to reports pain the radiates from her back all the way down to the dorsum of her foot  Objective: See treatment diary below      Assessment: Patient had reductions in pain and sensitivity following moist HP today start of session  Patient will use HP at home daily for symptom modulation  Patient would benefit from continued PT      Plan: Continue per plan of care        Precautions: s/p left bunionectomy 21, high irritability      Manuals 9/23 9/27 9/30 10/3 10/6 10/11       STM (LIGHT) nv MN 5' MN 5' NB 5'         Nerve Glides nv            NMES  lv2 10' very light lv2 10' very light lvl 10' very light         Densensitization w/ Brush   MN 5' NB 5'         Patient Education      25'       Neuro Re-Ed                                                                                                        Ther Ex             Slump Gliders HEP            Supine 90/90 Sciatic Nerve Gliders HEP            VG  L4 5' L5 5' L5 5' L5 5' L7 5'       Bike  No resistance 5' No resistance 5' No resistance 10' No resistance 5' No resistance 5'                                                            Ther Activity                                       Gait Training                                       Modalities             HP      10' start

## 2022-10-13 ENCOUNTER — OFFICE VISIT (OUTPATIENT)
Dept: PHYSICAL THERAPY | Facility: REHABILITATION | Age: 57
End: 2022-10-13
Payer: COMMERCIAL

## 2022-10-13 DIAGNOSIS — G57.92 NEURITIS OF LEFT FOOT: Primary | ICD-10-CM

## 2022-10-13 PROCEDURE — 97530 THERAPEUTIC ACTIVITIES: CPT

## 2022-10-13 PROCEDURE — 97112 NEUROMUSCULAR REEDUCATION: CPT

## 2022-10-13 PROCEDURE — 97110 THERAPEUTIC EXERCISES: CPT

## 2022-10-13 NOTE — PROGRESS NOTES
Daily Note     Today's date: 10/13/2022  Patient name: Dom Pride  : 1965  MRN: [id number]  Referring provider: Shar Gotti DPM  Dx:   Encounter Diagnosis     ICD-10-CM    1  Neuritis of left foot  G57 92        Start Time: 7476  Stop Time: 1500  Total time in clinic (min): 45 minutes    Subjective: Patient reports her foot felt better after last visit  Objective: See treatment diary below      Assessment: Patient was able to tolerate all activities today with minimal pain in left foot  Patient responding well to moist heat on her foot start of session  Tolerance for activity improving  Patient was able to ambulate without her [facility] with minimal pain  Patient would benefit from continued PT      Plan: Continue per plan of care        Precautions: s/p left bunionectomy 21, high irritability      Manuals 9/23 9/27 9/30 10/3 10/6 10/11 10/13      STM (LIGHT) nv NV 5' NV 5' NB 5'         Nerve Glides nv            NMES  lv2 10' very light lv2 10' very light lvl 10' very light         Densensitization w/ Brush   NV 5' NB 5'         Patient Education      25'       Neuro Re-Ed             Toe curls/ext       5'      Seated Gastroc Stretch w/ Towel       10" hold x20                                                                       Ther Ex             Slump Gliders HEP            Supine 90/90 Sciatic Nerve Gliders HEP            VG  L4 5' L5 5' L5 5' L5 5' L7 5' L7 5'      Bike  No resistance 5' No resistance 5' No resistance 10' No resistance 5' No resistance 5'  No resistance 5'                                                           Ther Activity                                       Gait Training                                       Modalities             HP      10' start 10' start

## 2022-10-18 ENCOUNTER — OFFICE VISIT (OUTPATIENT)
Dept: PHYSICAL THERAPY | Facility: REHABILITATION | Age: 57
End: 2022-10-18
Payer: COMMERCIAL

## 2022-10-18 DIAGNOSIS — G57.92 NEURITIS OF LEFT FOOT: Primary | ICD-10-CM

## 2022-10-18 PROCEDURE — 97110 THERAPEUTIC EXERCISES: CPT

## 2022-10-18 PROCEDURE — 97530 THERAPEUTIC ACTIVITIES: CPT

## 2022-10-18 PROCEDURE — 97112 NEUROMUSCULAR REEDUCATION: CPT

## 2022-10-18 NOTE — PROGRESS NOTES
Daily Note     Today's date: 10/18/2022  Patient name: Vandana Pacheco  : 1965  MRN: [id number]  Referring provider: Yee Wynn DPM  Dx:   Encounter Diagnosis     ICD-10-CM    1  Neuritis of left foot  G57 92        Start Time: 1700  Stop Time: 1745  Total time in clinic (min): 45 minutes    Subjective: Patient reports her foot is slowly improving in her pain levels and motion      Objective: See treatment diary below      Assessment: Patient is making slow progress in her pain levels and left foot function  Patient still has high irritability levels in her symptoms of her left foot  Patient would benefit from continued PT      Plan: Continue per plan of care        Precautions: s/p left bunionectomy 21, high irritability      Manuals 9/23 9/27 9/30 10/3 10/6 10/11 10/13 10/18     STM (LIGHT) nv MS 5' MS 5' NB 5'         Nerve Glides nv            NMES  lv2 10' very light lv2 10' very light lvl 10' very light         Densensitization w/ Brush   MS 5' NB 5'         Patient Education      25'       Neuro Re-Ed             Toe curls/ext       5' 5'     Seated Gastroc Stretch w/ Towel       10" hold x20 10" hold x20     Seated HRs        x10                                                         Ther Ex             Slump Gliders HEP            Supine 90/90 Sciatic Nerve Gliders HEP            VG  L4 5' L5 5' L5 5' L5 5' L7 5' L7 5' L7 5'     Bike  No resistance 5' No resistance 5' No resistance 10' No resistance 5' No resistance 5'  No resistance 5'  No resistance 5'                                                          Ther Activity                                       Gait Training                                       Modalities             HP      10' start 10' start 10' start

## 2022-10-20 ENCOUNTER — OFFICE VISIT (OUTPATIENT)
Dept: PHYSICAL THERAPY | Facility: REHABILITATION | Age: 57
End: 2022-10-20
Payer: COMMERCIAL

## 2022-10-20 DIAGNOSIS — G57.92 NEURITIS OF LEFT FOOT: Primary | ICD-10-CM

## 2022-10-20 PROCEDURE — 97530 THERAPEUTIC ACTIVITIES: CPT

## 2022-10-20 PROCEDURE — 97110 THERAPEUTIC EXERCISES: CPT

## 2022-10-20 PROCEDURE — 97112 NEUROMUSCULAR REEDUCATION: CPT

## 2022-10-20 NOTE — PROGRESS NOTES
Daily Note     Today's date: 10/20/2022  Patient name: Mirella Pereira  : 1965  MRN: [id number]  Referring provider: Viet Nguyen DPM  Dx:   Encounter Diagnosis     ICD-10-CM    1  Neuritis of left foot  G57 92        Start Time: 1400  Stop Time: 1445  Total time in clinic (min): 45 minutes    Subjective: Patient reports her feet hurt today after driving for 2 hours  Objective: See treatment diary below      Assessment: Mild tolerance for all activities today  Patient had increased symptom irritability today, reason unknown  Patient would benefit from continued PT      Plan: Continue per plan of care        Precautions: s/p left bunionectomy 21, high irritability      Manuals 9/23 9/27 9/30 10/3 10/6 10/11 10/13 10/18 10/20    STM (LIGHT) nv VA 5' VA 5' NB 5'         Nerve Glides nv            NMES  lv2 10' very light lv2 10' very light lvl 10' very light         Densensitization w/ Brush   VA 5' NB 5'         Patient Education      25'       FOTO         perf    Neuro Re-Ed             Toe curls/ext       5' 5' 5'    Seated Gastroc Stretch w/ Towel       10" hold x20 10" hold x20 10" hold x20    Seated HRs        x10 2x10                                                        Ther Ex             Slump Gliders HEP            Supine 90/90 Sciatic Nerve Gliders HEP            VG  L4 5' L5 5' L5 5' L5 5' L7 5' L7 5' L7 5' L7 5'    Bike  No resistance 5' No resistance 5' No resistance 10' No resistance 5' No resistance 5'  No resistance 5'  No resistance 5'  No resistance 5'                                                         Ther Activity                                       Gait Training                                       Modalities             HP      10' start 10' start 10' start 10' start

## 2022-10-24 ENCOUNTER — TELEPHONE (OUTPATIENT)
Dept: PODIATRY | Facility: CLINIC | Age: 57
End: 2022-10-24

## 2022-10-24 NOTE — TELEPHONE ENCOUNTER
Patient came in to [facility] office stated she is having 7/10 pain on greater right toe where the graft is  Redness and pulsating at night, throughout the day feels like a sharp pain  She is scared of possible infection  Appt was made for [email or web address]  But she would like to know if she should be taking any countermeasures/medications or just wait for the appointment that's been made

## 2022-10-25 ENCOUNTER — OFFICE VISIT (OUTPATIENT)
Dept: PHYSICAL THERAPY | Facility: REHABILITATION | Age: 57
End: 2022-10-25
Payer: COMMERCIAL

## 2022-10-25 ENCOUNTER — TELEPHONE (OUTPATIENT)
Dept: PODIATRY | Facility: CLINIC | Age: 57
End: 2022-10-25

## 2022-10-25 DIAGNOSIS — G57.92 NEURITIS OF LEFT FOOT: Primary | ICD-10-CM

## 2022-10-25 DIAGNOSIS — L03.031 CELLULITIS OF RIGHT TOE: ICD-10-CM

## 2022-10-25 DIAGNOSIS — S91.301A UNSPECIFIED OPEN WOUND, RIGHT FOOT, INITIAL ENCOUNTER: Primary | ICD-10-CM

## 2022-10-25 PROCEDURE — 97110 THERAPEUTIC EXERCISES: CPT

## 2022-10-25 PROCEDURE — 97140 MANUAL THERAPY 1/> REGIONS: CPT

## 2022-10-25 PROCEDURE — 97112 NEUROMUSCULAR REEDUCATION: CPT

## 2022-10-25 RX ORDER — CEPHALEXIN 500 MG/1
500 CAPSULE ORAL EVERY 6 HOURS SCHEDULED
Qty: 28 CAPSULE | Refills: 0 | Status: SHIPPED | OUTPATIENT
Start: 2022-10-25 | End: 2022-11-01

## 2022-10-25 NOTE — PROGRESS NOTES
Daily Note     Today's date: 10/25/2022  Patient name: Franchesca Horowitz  : 1965  MRN: [id number]  Referring provider: Nilay Marcano DPM  Dx:   Encounter Diagnosis     ICD-10-CM    1  Neuritis of left foot  G57 92        Start Time:   Stop Time:   Total time in clinic (min): 45 minutes    Subjective: Patient reports her right great toe was swollen, had redness, and had throbbing pain over the last few days  Objective: See treatment diary below      Assessment: No visible redness, swelling, and/or drainage on patient's right great toe today  Patient denies any n/v, fever/chills, and/or constitutional symptoms  Deferred bilateral activities today due to righr great toe pain  Advised patient to notify her workers compensation physician about her right great toe symptoms tomorrow  Patient advised to go to ED if she increased redness, swelling, and/or drainage  Patient would benefit from continued PT      Plan: Continue per plan of care        Precautions: s/p left bunionectomy 21, high irritability      Manuals 9/23 9/27 9/30 10/3 10/6 10/11 10/13 10/18 10/20 10/25   STM (LIGHT) nv MS 5' MS 5' NB 5'         Nerve Glides nv            NMES  lv2 10' very light lv2 10' very light lvl 10' very light         Densensitization w/ Brush   MS 5' NB 5'         Patient Education      25'       FOTO         perf    Neuro Re-Ed             Toe curls/ext       5' 5' 5' 5' w/ towel   Seated Gastroc Stretch w/ Towel       10" hold x20 10" hold x20 10" hold x20 10" hold x20   Seated HRs        x10 2x10 2x10 on L                                                       Ther Ex             Slump Gliders HEP            Supine 90/90 Sciatic Nerve Gliders HEP            VG  L4 5' L5 5' L5 5' L5 5' L7 5' L7 5' L7 5' L7 5' L5 5' SL   Bike  No resistance 5' No resistance 5' No resistance 10' No resistance 5' No resistance 5'  No resistance 5'  No resistance 5'  No resistance 5'  np resume Ther Activity                                       Gait Training                                       Modalities             HP      10' start 10' start 10' start 10' start 10' start

## 2022-10-25 NOTE — TELEPHONE ENCOUNTER
Caller: Sandra Nicole    Doctor: Dr Ramya Cobos    Reason for call: to thank Tyra Cook for all the help she has been to her/tried to transfer call to [place] office but Jaylin Yoon!!     Call back#: NA

## 2022-10-27 ENCOUNTER — OFFICE VISIT (OUTPATIENT)
Dept: PHYSICAL THERAPY | Facility: REHABILITATION | Age: 57
End: 2022-10-27
Payer: COMMERCIAL

## 2022-10-27 DIAGNOSIS — G57.92 NEURITIS OF LEFT FOOT: Primary | ICD-10-CM

## 2022-10-27 PROCEDURE — 97530 THERAPEUTIC ACTIVITIES: CPT

## 2022-10-27 PROCEDURE — 97112 NEUROMUSCULAR REEDUCATION: CPT

## 2022-10-27 PROCEDURE — 97110 THERAPEUTIC EXERCISES: CPT

## 2022-10-27 NOTE — PROGRESS NOTES
Daily Note     Today's date: 10/27/2022  Patient name: Sandra Nicole  : 1965  MRN: [id number]  Referring provider: Pablo Triana DPM  Dx:   Encounter Diagnosis     ICD-10-CM    1  Neuritis of left foot  G57 92        Start Time: 1500  Stop Time: 1540  Total time in clinic (min): 40 minutes    Subjective: Patient reports her toes are moving better today compared to last visit  Patient reports her       Objective: See treatment diary below      Assessment: Patient left foot pain and sensitvity improved over last week  Patient demonstrating better foot and ankle mobility additionally  Patient had to leave today's session earlier due to another appointment  Patient would benefit from continued PT      Plan: Continue per plan of care        Precautions: s/p left bunionectomy 21, high irritability      Manuals 10/27    10/6 10/11 10/13 10/18 10/20 10/25   STM (LIGHT)             Nerve Glides             NMES             Densensitization w/ Brush             Patient Education      25'       FOTO         perf    Neuro Re-Ed             Toe curls/ext 5' w/ towel      5' 5' 5' 5' w/ towel   Seated Gastroc Stretch w/ Towel 10" hold x20      10" hold x20 10" hold x20 10" hold x20 10" hold x20   Seated HRs 2x10 on L       x10 2x10 2x10 on L                                                       Ther Ex             Slump Gliders             Supine 90/90 Sciatic Nerve Gliders             VG L5 5' SL    L5 5' L7 5' L7 5' L7 5' L7 5' L5 5' SL   Bike     No resistance 5' No resistance 5'  No resistance 5'  No resistance 5'  No resistance 5'  np resume                                                       Ther Activity                                       Gait Training                                       Modalities             HP      10' start 10' start 10' start 10' start 10' start

## 2022-10-28 ENCOUNTER — OFFICE VISIT (OUTPATIENT)
Dept: PODIATRY | Facility: CLINIC | Age: 57
End: 2022-10-28
Payer: COMMERCIAL

## 2022-10-28 VITALS
BODY MASS INDEX: 38.08 KG/M2 | DIASTOLIC BLOOD PRESSURE: 91 MMHG | HEART RATE: 87 BPM | SYSTOLIC BLOOD PRESSURE: 131 MMHG | HEIGHT: 60 IN

## 2022-10-28 DIAGNOSIS — S91.301A UNSPECIFIED OPEN WOUND, RIGHT FOOT, INITIAL ENCOUNTER: Primary | ICD-10-CM

## 2022-10-28 DIAGNOSIS — B35.3 TINEA PEDIS OF RIGHT FOOT: ICD-10-CM

## 2022-10-28 PROCEDURE — 99213 OFFICE O/P EST LOW 20 MIN: CPT | Performed by: PODIATRIST

## 2022-10-28 NOTE — PROGRESS NOTES
PATIENT:  Cesar Sanches      1965    ASSESSMENT     1  Unspecified open wound, right foot, initial encounter     2  Tinea pedis of right foot            PLAN  1  Reviewed medical records  She has maceration on right 1st interspace  2  Betadine and dry dressing / toe spacer  Change 2-3 times a day  3  Instructed resting and elevation  4  Call if any increase in pain, fevers, calf pain, shortness of breath, or general distress is noted  Patient instructed to go to ER if call is not returned immediately  RA in 2 weeks  HISTORY OF PRESENT ILLNESS  Patient presents for right foot evaluation  She has chief complaint of increased pain and redness around right great toe for a week  Keflex called in and redness resolved  Still has pain / burning  No injury          PAST MEDICAL HISTORY:  Past Medical History:   Diagnosis Date   • Acquired deformity of left foot    • Anesthesia complication     difficulty awakening   • Arthritis    • Deaf, left    • Depression    • Hallux valgus of left foot    • Hammer toe of left foot    • Headache    • Kidney stone    • Sciatic pain, right     intermittent       PAST SURGICAL HISTORY:  Past Surgical History:   Procedure Laterality Date   • BREAST BIOPSY Right 03/04/2021   • BREAST BIOPSY Right 3/10/2021    Procedure: Excisional Breast debridement  7 o'clock position;  Surgeon: Kiya Peter MD;  Location[facility];  Service: General   • CHOLECYSTECTOMY     • CLOSURE DELAYED PRIMARY Right 7/5/2020    Procedure: CLOSURE DELAYED PRIMARY and application of skin graft substitute;  Surgeon: Isabelle Burns DPM;  Location: [facility];  Service: Podiatry   • HERNIA REPAIR     • INCISION AND DRAINAGE OF WOUND Right 7/1/2020    Procedure: INCISION AND DRAINAGE (I&D) EXTREMITY;  Surgeon: Isabelle Burns DPM;  [facility]: [facility];  Service: Podiatry   • Joshua U  89  W/SESMDC W/1METAR MEDIAL CNF Left 11/12/2021    Procedure: Danielle Carrillo;  Surgeon: Parvez Sanders Gurwinder Mckeon DPM;  Location[facility];  Service: Podiatry   • DC OSTEOTOMY METATARSAL (NOT 1ST) Left 6/14/2019    Procedure: 3rd Metatarsal Osteotomy;  Surgeon: Jayleen Cristobal DPM;  [facility];  Service: Podiatry   • DC OSTEOTOMY METATARSAL (NOT 1ST) Left 11/12/2021    Procedure: OSTEOTOMY 2ND METATARSAL;  Surgeon: Vitaliy Chirinos DPM;  [facility];  Service: Podiatry   • TUBAL LIGATION     • US GUIDED BREAST BIOPSY RIGHT COMPLETE Right 3/4/2021   • VAC DRESSING APPLICATION Right 2/1/8562    Procedure: APPLICATION VAC DRESSING;  Surgeon: Vitaliy Chirinos DPM;  [facility];  Service: Podiatry        ALLERGIES:  Sertraline and Caffeine - food allergy    MEDICATIONS:  Current Outpatient Medications   Medication Sig Dispense Refill   • acetaminophen (TYLENOL) 650 mg CR tablet Take 650 mg by mouth every 8 (eight) hours as needed     • aspirin (ECOTRIN) 325 mg EC tablet Take 1 tablet (325 mg total) by mouth daily 30 tablet 0   • atorvastatin (LIPITOR) 20 mg tablet Take 20 mg by mouth every evening       • Diclofenac Sodium (VOLTAREN) 1 % Apply 2 g topically 4 (four) times a day 150 g 1   • DULoxetine (CYMBALTA) 30 mg delayed release capsule Take 30 mg by mouth daily     • lidocaine-prilocaine (EMLA) cream Apply topically as needed for mild pain or moderate pain 30 g 5   • meclizine (ANTIVERT) 25 mg tablet 1 every 8 hours as needed     • nicotine (NICODERM CQ) 14 mg/24hr TD 24 hr patch Place 1 patch on the skin every 24 hours     • QUEtiapine (SEROquel) 25 mg tablet Take 25 mg by mouth every evening     • traZODone (DESYREL) 50 mg tablet take 1 tablet by mouth nightly - TAKE AT BEDTIME     • cephalexin (KEFLEX) 500 mg capsule Take 1 capsule (500 mg total) by mouth every 6 (six) hours for 7 days (Patient not taking: Reported on 10/28/2022) 28 capsule 0   • gabapentin (NEURONTIN) 300 mg capsule Take 1 capsule (300 mg total) by mouth 2 (two) times a day 60 capsule 1   • ibuprofen (MOTRIN) 600 mg tablet Take 1 tablet (600 mg total) by mouth every 6 (six) hours as needed for mild pain for up to 30 days 90 tablet 0   • oxyCODONE-acetaminophen (Percocet) 5-325 mg per tablet Take 1 tablet by mouth every 6 (six) hours as needed for severe pain for up to 30 doses Max Daily Amount: 4 tablets (Patient not taking: No sig reported) 30 tablet 0     No current facility-administered medications for this visit  SOCIAL HISTORY:  Social History     Socioeconomic History   • Marital status: Single     Spouse name: None   • Number of children: None   • Years of education: None   • Highest education level: None   Occupational History   • None   Tobacco Use   • Smoking status: Current Some Day Smoker     Packs/day: 1 00     Years: 15 00     Pack years: 15      Types: Cigarettes     Last attempt to quit: 2021     Years since quittin 9   • Smokeless tobacco: Never Used   • Tobacco comment: trying to quit - using judson  patch   Vaping Use   • Vaping Use: Never used   Substance and Sexual Activity   • Alcohol use: Not Currently   • Drug use: No   • Sexual activity: Yes   Other Topics Concern   • None   Social History Narrative   • None     Social Determinants of Health     Financial Resource Strain: Not on file   Food Insecurity: Not on file   Transportation Needs: Not on file   Physical Activity: Not on file   Stress: Not on file   Social Connections: Not on file   Intimate Partner Violence: Not on file   Housing Stability: Not on file      REVIEW OF SYSTEMS  Patient denied CP, SOB, fever, chills, palpatation, HA, GI problem, or calf pain  PHYSICAL EXAMINATION  GENERAL  The patient appears in NAD / non-toxic  Afebrile  VSS    VASCULAR EXAM  Pedal pulses and vascular status are intact  No calf pain or edema bilaterally  No cyanosis  No ischemia  DERMATOLOGIC EXAM  Maceration noted right 1st interspace  No abscess  No redness  No purulence  NEUROLOGIC EXAM  AAO X 3  No focal neurologic deficit    Neurologic status is intact BLE  MMT intact  MUSCULOSKELETAL EXAM  ROM intact  No fluctuation or crepitus  Bunion deformity noted right foot  No acute swelling

## 2022-11-01 ENCOUNTER — OFFICE VISIT (OUTPATIENT)
Dept: PHYSICAL THERAPY | Facility: REHABILITATION | Age: 57
End: 2022-11-01

## 2022-11-01 DIAGNOSIS — G57.92 NEURITIS OF LEFT FOOT: Primary | ICD-10-CM

## 2022-11-01 NOTE — PROGRESS NOTES
Daily Note     Today's date: 2022  Patient name: Faith Tse  : 1965  MRN: [id number]  Referring provider: Yao Cano DPM  Dx:   Encounter Diagnosis     ICD-10-CM    1  Neuritis of left foot  G57 92        Start Time: 1700  Stop Time: 1745  Total time in clinic (min): 45 minutes    Subjective: Patient reports her left foot is slowly making improvements      Objective: See treatment diary below      Assessment: Pain levels are less with each week  Sensitivity is slowly improving  Patient tolerating progressions well  Patient would benefit from continued PT      Plan: Continue per plan of care        Precautions: s/p left bunionectomy 21, high irritability      Manuals 10/27 11/1   10/6 10/11 10/13 10/18 10/20 10/25   STM (LIGHT)             Nerve Glides             NMES             Densensitization w/ Brush             Patient Education      25'       FOTO         perf    Neuro Re-Ed             Toe curls/ext 5' w/ towel 5' w/ towel     5' 5' 5' 5' w/ towel   Seated Gastroc Stretch w/ Towel 10" hold x20 10" hold x20     10" hold x20 10" hold x20 10" hold x20 10" hold x20   Seated HRs 2x10 on L 2x10 on L      x10 2x10 2x10 on L                                                       Ther Ex             Slump Gliders             Supine 90/90 Sciatic Nerve Gliders             VG L5 5' SL L5 5' DL   L5 5' L7 5' L7 5' L7 5' L7 5' L5 5' SL   Bike     No resistance 5' No resistance 5'  No resistance 5'  No resistance 5'  No resistance 5'  np resume                                                       Ther Activity                                       Gait Training                                       Modalities             HP 10' start 10' start    10' start 10' start 10' start 10' start 10' start

## 2022-11-03 ENCOUNTER — OFFICE VISIT (OUTPATIENT)
Dept: PHYSICAL THERAPY | Facility: REHABILITATION | Age: 57
End: 2022-11-03

## 2022-11-03 DIAGNOSIS — G57.92 NEURITIS OF LEFT FOOT: Primary | ICD-10-CM

## 2022-11-03 NOTE — PROGRESS NOTES
Daily Note     Today's date: 11/3/2022  Patient name: Sandra Nicole  : 1965  MRN: [id number]  Referring provider: Pablo Triana DPM  Dx:   Encounter Diagnosis     ICD-10-CM    1  Neuritis of left foot  G57 92                   Subjective: Pt reports she has been seeing improvements with movement/control of toes in L foot  Still gets burning along dorsum of L foot  Objective: See treatment diary below      Assessment: Tolerated treatment well  Continued with program as outlined below  Was challenged with addition of toe yoga and toe add/abd, but able to complete with no significant aggravation of symptoms  Patient would benefit from continued PT to further improve strength, increase ROM, and maximize overall function  Plan: Continue per plan of care        Precautions: s/p left bunionectomy 21, high irritability      Manuals 10/27 11/1 11/3  10/6 10/11 10/13 10/18 10/20 10/25   STM (LIGHT)             Nerve Glides             NMES             Densensitization w/ Brush             Patient Education      25'       FOTO         perf    Neuro Re-Ed             Toe curls/ext 5' w/ towel 5' w/ towel 5' w/ towel    5' 5' 5' 5' w/ towel   Seated Gastroc Stretch w/ Towel 10" hold x20 10" hold x20 10" hold x20    10" hold x20 10" hold x20 10" hold x20 10" hold x20   Seated HRs 2x10 on L 2x10 on L 2x10 on L     x10 2x10 2x10 on L   Toe yoga   Long sit  x20          Toe add/abd   Long sit  x20                                    Ther Ex             Slump Gliders             Supine 90/90 Sciatic Nerve Gliders             VG L5 5' SL L5 5' DL defer  L5 5' L7 5' L7 5' L7 5' L7 5' L5 5' SL   Bike     No resistance 5' No resistance 5'  No resistance 5'  No resistance 5'  No resistance 5'  np resume                                                       Ther Activity                                       Gait Training                                       Modalities             HP 10' start 10' start 10' 10' start 10' start 10' start 10' start 10' start

## 2022-11-08 ENCOUNTER — OFFICE VISIT (OUTPATIENT)
Dept: PHYSICAL THERAPY | Facility: REHABILITATION | Age: 57
End: 2022-11-08

## 2022-11-08 DIAGNOSIS — G57.92 NEURITIS OF LEFT FOOT: Primary | ICD-10-CM

## 2022-11-08 NOTE — PROGRESS NOTES
Daily Note     Today's date: 2022  Patient name: Madison Martinez  : 1965  MRN: [id number]  Referring provider: Nini Bernal DPM  Dx:   Encounter Diagnosis     ICD-10-CM    1  Neuritis of left foot  G57 92        Start Time: 174  Stop Time: 1830  Total time in clinic (min): 45 minutes    Subjective: Patient reports increased pain in her left foot today  Objective: See treatment diary below      Assessment: Poor tolerance for activities today due to pain  Pain localized to lateral ankle and 1 digit  Deferred multiple activiites today, will resume next visit    Patient would benefit from continued PT      Plan: Continue per plan of care        Precautions: s/p left bunionectomy 21, high irritability      Manuals 10/27 11/1 11/3 11/8     10/20 10/25   STM (LIGHT)             Nerve Glides             NMES             Densensitization w/ Brush             Patient Education             FOTO         perf    Neuro Re-Ed             Toe curls/ext 5' w/ towel 5' w/ towel 5' w/ towel 5' w/ towel     5' 5' w/ towel   Seated Gastroc Stretch w/ Towel 10" hold x20 10" hold x20 10" hold x20 10" hold x20     10" hold x20 10" hold x20   Seated HRs 2x10 on L 2x10 on L 2x10 on L hold     2x10 2x10 on L   Toe yoga   Long sit  x20 Long sit  x20         Toe add/abd   Long sit  x20 Long sit  x20                                   Ther Ex             Slump Gliders             Supine 90/90 Sciatic Nerve Gliders             VG L5 5' SL L5 5' DL defer defer     L7 5' L5 5' SL   Bike         No resistance 5'  np resume                                                       Ther Activity                                       Gait Training                                       Modalities             HP 10' start 10' start 10'      10' start 10' start

## 2022-11-10 ENCOUNTER — EVALUATION (OUTPATIENT)
Dept: PHYSICAL THERAPY | Facility: REHABILITATION | Age: 57
End: 2022-11-10

## 2022-11-10 DIAGNOSIS — G57.92 NEURITIS OF LEFT FOOT: Primary | ICD-10-CM

## 2022-11-10 NOTE — PROGRESS NOTES
PT Re-Evaluation     Today's date: 11/10/2022  Patient name: Gustavo Flood  : 1965  MRN: [id number]  Referring provider: Dianna Ferguson DPM  Dx:   Encounter Diagnosis     ICD-10-CM    1  Neuritis of left foot  G57 92        Start Time:   Stop Time: 1730  Total time in clinic (min): 45 minutes    Assessment  Assessment details: Gustavo Flood is a pleasant 62 y o  female who presents with chronic left foot pain  Karinagee Arreola has actively participated in 15 sessions of formal Physical Therapy from 22-/11/10/22  Patient has made improvements in left foot function that correlates with improvements in function  FOTO testing indicates an improvement from 35 to 40 with an expected goal of 50  Patient has made a GROC of 75% since start of PT  Patient would continue to benefit from skilled physical therapy to address the above listed impairments and enable her to return to her PLOF  Problem List:  1) Chronic Left Foot Pain    Impairments: abnormal gait, abnormal muscle firing, abnormal or restricted ROM, impaired balance, impaired physical strength, pain with function and weight-bearing intolerance    Symptom irritability: moderateUnderstanding of Dx/Px/POC: good   Prognosis: fair    Goals  Short Term Goals (Week 4):  1  Decreased pain by 50% (in progress)  2  Improve ROM by 10 degrees  (met)  3  Improve strength by 1/2 measure (met)      Long Term Goals (8 weeks):  1  <2/10 pain with light and heavy household activities  2  Exceed FOTO predicted outcome score  3  Fully independent with HEP by discharge  4  Patient will be able to manage symptoms independently       Plan  Patient would benefit from: skilled physical therapy  Planned modality interventions: electrical stimulation/Russian stimulation and thermotherapy: hydrocollator packs  Planned therapy interventions: joint mobilization, manual therapy, activity modification, balance/weight bearing training, behavior modification, patient education, neuromuscular re-education, strengthening, stretching, therapeutic activities, therapeutic exercise, home exercise program, graded activity, functional ROM exercises, flexibility and gait training  Frequency: 2x week  Duration in visits: 12  Duration in weeks: 6  Treatment plan discussed with: patient        Subjective Evaluation    History of Present Illness  Date of onset: 2021  Mechanism of injury: surgery  Mechanism of injury: Patient reports her symptoms have improved since her injection 9/15/22 by podiatry  Patient reports she is still experiencing significant tenderness pain over the incision site  Patient reports pain at night is still present  Patient reports her symptoms are worst at night  Patient numbness/tingling  Patient reports her symptoms are radiating down her leg from her hip and in to her foot  Re-Evaluation 11/10/22:  Patient reports 75% improvement in her symptoms since the start of PT  Patient reports her biggest limitations at this point are her inability to flex her toes and the ongoing numbness/tingling in her dorsum of foot  Patient reports her pain is worst at night  Patient still requires use of [facility] for safety and balance  Patient reports she is also experiencing pain the radiates from her low back all the day to her foot     Pain  Current pain ratin  At worst pain ratin  Relieving factors: ice  Progression: improved    Treatments  Previous treatment: physical therapy  Patient Goals  Patient goals for therapy: decreased pain, increased motion, increased strength, independence with ADLs/IADLs and return to sport/leisure activities  Patient goal: have less pain, be able to get the surgery for her right foot, no have to use her cane, wear close-toed shoes        Objective         MMT          AROM         PROM     Ankle           DF 4-/5  10   10     PF 4-/5  WNL   WNL     Inv 4-/5  WNL   WNL     Evr 4-/5   WNL   WNL     GT Ext 3+/5  35   35       Foot Posture  Abducted 1st ray    Special Tests  SLR (+), Slump Test (+)    Sensation Testing:  Reduced along 1st and 2nd ray    Tenderness with Palpation  Along 1st and 2nd ray    Gait Assessment  Reduced stance on left, antalgic, use of SPC         Precautions: s/p left bunionectomy 11/12/21    Manuals 10/27 11/1 11/3 11/8 11/10    10/20 10/25                                          Re-Evaluation     25'        Patient Education             FOTO         perf    Neuro Re-Ed             Toe curls/ext 5' w/ towel 5' w/ towel 5' w/ towel 5' w/ towel 5' w/ towel    5' 5' w/ towel   Seated Gastroc Stretch w/ Towel 10" hold x20 10" hold x20 10" hold x20 10" hold x20 10" hold x20    10" hold x20 10" hold x20   Seated HRs 2x10 on L 2x10 on L 2x10 on L hold     2x10 2x10 on L   Toe yoga   Long sit  x20 Long sit  x20 Long sit  x40        Toe add/abd   Long sit  x20 Long sit  x20 Long sit  x40                                  Ther Ex             Slump Gliders             Supine 90/90 Sciatic Nerve Gliders             VG L5 5' SL L5 5' DL defer defer L7 5' DL    L7 5' L5 5' SL   Bike         No resistance 5'  np resume                                                       Ther Activity                                       Gait Training                                       Modalities             HP 10' start 10' start 10'  10' start    10' start 10' start

## 2022-11-15 ENCOUNTER — OFFICE VISIT (OUTPATIENT)
Dept: PHYSICAL THERAPY | Facility: REHABILITATION | Age: 57
End: 2022-11-15

## 2022-11-15 DIAGNOSIS — G57.92 NEURITIS OF LEFT FOOT: Primary | ICD-10-CM

## 2022-11-15 NOTE — PROGRESS NOTES
Daily Note     Today's date: 11/15/2022  Patient name: Lis Perez  : 1965  MRN: [id number]  Referring provider: Abdirashid Joseph DPM  Dx:   Encounter Diagnosis     ICD-10-CM    1  Neuritis of left foot  G57 92        Start Time: 1700  Stop Time: 1745  Total time in clinic (min): 45 minutes    Subjective: Patient reports she has her f/u with podiatry on 22  Objective: See treatment diary below      Assessment: Patient continues to have increased sensitivity in her left foot along the dorsum  Strength and mobility of ankle and toes has improved with PT along with sensitivity  Re-Evaluation performed last visit 11/10/22, see for more detailed assessment  Will await f/u with podiatry on Thursday to change POC  Patient would benefit from continued PT      Plan: Continue per plan of care        Precautions: s/p left bunionectomy 21    Manuals 10/27 11/1 11/3 11/8 11/10 11/15   10/20 10/25                                          Re-Evaluation     25'        Patient Education             FOTO         perf    Neuro Re-Ed             Toe curls/ext 5' w/ towel 5' w/ towel 5' w/ towel 5' w/ towel 5' w/ towel 5' w/ towel   5' 5' w/ towel   Seated Gastroc Stretch w/ Towel 10" hold x20 10" hold x20 10" hold x20 10" hold x20 10" hold x20 10" hold x20   10" hold x20 10" hold x20   Seated HRs 2x10 on L 2x10 on L 2x10 on L hold  2x10 on L   2x10 2x10 on L   Toe yoga   Long sit  x20 Long sit  x20 Long sit  x40 Long sit  x40       Toe add/abd   Long sit  x20 Long sit  x20 Long sit  x40 Long sit  x40                                 Ther Ex             Slump Gliders             Supine 90/90 Sciatic Nerve Gliders             VG L5 5' SL L5 5' DL defer defer L7 5' DL    L7 5' L5 5' SL   Bike         No resistance 5'  np resume                                                       Ther Activity                                       Gait Training                                       Modalities             HP 10' start 10' start 10'  10' start 10' start   10' start 10' start

## 2022-11-17 ENCOUNTER — OFFICE VISIT (OUTPATIENT)
Dept: PODIATRY | Facility: CLINIC | Age: 57
End: 2022-11-17

## 2022-11-17 VITALS — BODY MASS INDEX: 38.08 KG/M2 | HEIGHT: 60 IN

## 2022-11-17 DIAGNOSIS — G57.92 NEURITIS OF LEFT FOOT: ICD-10-CM

## 2022-11-17 DIAGNOSIS — M20.11 HALLUX VALGUS OF RIGHT FOOT: ICD-10-CM

## 2022-11-17 DIAGNOSIS — S91.301A UNSPECIFIED OPEN WOUND, RIGHT FOOT, INITIAL ENCOUNTER: Primary | ICD-10-CM

## 2022-11-17 NOTE — PROGRESS NOTES
PATIENT:  Julissa Zhu      1965    ASSESSMENT     1  Unspecified open wound, right foot, initial encounter        2  Neuritis of left foot        3  Hallux valgus of right foot               PLAN  1  Reviewed medical records  Wound healed  Instructed to continue toe spacer  2  Discussed proper skin care and protection  3  HPK trimmed  Discussed proper footwear  4  Continue PT for left foot  5  RA in 8 weeks  HISTORY OF PRESENT ILLNESS  Patient presents for right foot evaluation  No drainage or redness  Her pain is much better now  PT was helping her left foot with decreased pain and improved ROM  No new complaint  She uses a cane when she walks  She has back pain / sciatica        PAST MEDICAL HISTORY:  Past Medical History:   Diagnosis Date   • Acquired deformity of left foot    • Anesthesia complication     difficulty awakening   • Arthritis    • Deaf, left    • Depression    • Hallux valgus of left foot    • Hammer toe of left foot    • Headache    • Kidney stone    • Sciatic pain, right     intermittent       PAST SURGICAL HISTORY:  Past Surgical History:   Procedure Laterality Date   • BREAST BIOPSY Right 03/04/2021   • BREAST BIOPSY Right 3/10/2021    Procedure: Excisional Breast debridement  7 o'clock position;  Surgeon: Danny Brian MD;  [facility];  Service: General   • CHOLECYSTECTOMY     • CLOSURE DELAYED PRIMARY Right 7/5/2020    Procedure: CLOSURE DELAYED PRIMARY and application of skin graft substitute;  Surgeon: Hi Park DPM;  [facility];  Service: Podiatry   • HERNIA REPAIR     • INCISION AND DRAINAGE OF WOUND Right 7/1/2020    Procedure: INCISION AND DRAINAGE (I&D) EXTREMITY;  Surgeon: Hi Park DPM;  [facility];  Service: Podiatry   • LA CORRJ HALLUX VALGUS W/SESMDC W/1METAR MEDIAL CNF Left 11/12/2021    Procedure: Verito Groom;  Surgeon: Hi Park DPM;  [facility];  Service: Podiatry   • LA OSTEOTOMY METATARSAL (NOT 1ST) Left 6/14/2019    Procedure: 3rd Metatarsal Osteotomy;  Surgeon: Jai Key DPM;  [facility];  Service: Podiatry   • MT OSTEOTOMY METATARSAL (NOT 1ST) Left 11/12/2021    Procedure: OSTEOTOMY 2ND METATARSAL;  Surgeon: Kingsley Renae DPM;  [facility];  Service: Podiatry   • TUBAL LIGATION     • US GUIDED BREAST BIOPSY RIGHT COMPLETE Right 3/4/2021   • VAC DRESSING APPLICATION Right 5/5/7870    Procedure: APPLICATION VAC DRESSING;  Surgeon: Kingsley Renae DPM;  [facility];  Service: Podiatry        ALLERGIES:  Sertraline and Caffeine - food allergy    MEDICATIONS:  Current Outpatient Medications   Medication Sig Dispense Refill   • acetaminophen (TYLENOL) 650 mg CR tablet Take 650 mg by mouth every 8 (eight) hours as needed     • aspirin (ECOTRIN) 325 mg EC tablet Take 1 tablet (325 mg total) by mouth daily 30 tablet 0   • atorvastatin (LIPITOR) 20 mg tablet Take 20 mg by mouth every evening       • Diclofenac Sodium (VOLTAREN) 1 % Apply 2 g topically 4 (four) times a day 150 g 1   • DULoxetine (CYMBALTA) 30 mg delayed release capsule Take 30 mg by mouth daily     • lidocaine-prilocaine (EMLA) cream Apply topically as needed for mild pain or moderate pain 30 g 5   • meclizine (ANTIVERT) 25 mg tablet 1 every 8 hours as needed     • nicotine (NICODERM CQ) 14 mg/24hr TD 24 hr patch Place 1 patch on the skin every 24 hours     • QUEtiapine (SEROquel) 25 mg tablet Take 25 mg by mouth every evening     • traZODone (DESYREL) 50 mg tablet take 1 tablet by mouth nightly - TAKE AT BEDTIME     • gabapentin (NEURONTIN) 300 mg capsule Take 1 capsule (300 mg total) by mouth 2 (two) times a day 60 capsule 1   • ibuprofen (MOTRIN) 600 mg tablet Take 1 tablet (600 mg total) by mouth every 6 (six) hours as needed for mild pain for up to 30 days 90 tablet 0   • oxyCODONE-acetaminophen (Percocet) 5-325 mg per tablet Take 1 tablet by mouth every 6 (six) hours as needed for severe pain for up to 30 doses Max Daily Amount: 4 tablets (Patient not taking: Reported on 2/15/2022) 30 tablet 0     No current facility-administered medications for this visit  SOCIAL HISTORY:  Social History     Socioeconomic History   • Marital status: Single     Spouse name: None   • Number of children: None   • Years of education: None   • Highest education level: None   Occupational History   • None   Tobacco Use   • Smoking status: Some Days     Packs/day: 1 00     Years: 15 00     Pack years: 15 00     Types: Cigarettes     Last attempt to quit: 2021     Years since quittin 0   • Smokeless tobacco: Never   • Tobacco comments:     trying to quit - using judson  patch   Vaping Use   • Vaping Use: Never used   Substance and Sexual Activity   • Alcohol use: Not Currently   • Drug use: No   • Sexual activity: Yes   Other Topics Concern   • None   Social History Narrative   • None     Social Determinants of Health     Financial Resource Strain: Not on file   Food Insecurity: Not on file   Transportation Needs: Not on file   Physical Activity: Not on file   Stress: Not on file   Social Connections: Not on file   Intimate Partner Violence: Not on file   Housing Stability: Not on file      REVIEW OF SYSTEMS  Patient denied CP, SOB, fever, chills, palpatation, HA, GI problem, or calf pain  PHYSICAL EXAMINATION  GENERAL  The patient appears in NAD / non-toxic  Afebrile  VSS    VASCULAR EXAM  Pedal pulses and vascular status are intact  No calf pain or edema bilaterally  No cyanosis  No ischemia  DERMATOLOGIC EXAM  Maceration resolved right 1st interspace  No wound  No abscess  No redness  No purulence  NEUROLOGIC EXAM  AAO X 3  No focal neurologic deficit  Neurologic status is intact BLE  MMT intact  MUSCULOSKELETAL EXAM  ROM intact  No fluctuation or crepitus  Bunion deformity noted right foot  No acute swelling

## 2023-01-06 ENCOUNTER — HOSPITAL ENCOUNTER (EMERGENCY)
Facility: HOSPITAL | Age: 58
Discharge: HOME | End: 2023-01-06
Attending: EMERGENCY MEDICINE

## 2023-01-06 ENCOUNTER — NURSE TRIAGE (OUTPATIENT)
Dept: PHYSICAL THERAPY | Facility: OTHER | Age: 58
End: 2023-01-06

## 2023-01-06 VITALS
TEMPERATURE: 97.8 F | SYSTOLIC BLOOD PRESSURE: 169 MMHG | HEART RATE: 111 BPM | DIASTOLIC BLOOD PRESSURE: 104 MMHG | RESPIRATION RATE: 20 BRPM | OXYGEN SATURATION: 99 %

## 2023-01-06 DIAGNOSIS — M54.42 ACUTE LEFT-SIDED LOW BACK PAIN WITH LEFT-SIDED SCIATICA: Primary | ICD-10-CM

## 2023-01-06 DIAGNOSIS — M54.42 ACUTE BILATERAL LOW BACK PAIN WITH LEFT-SIDED SCIATICA: Primary | ICD-10-CM

## 2023-01-06 RX ORDER — OXYCODONE HYDROCHLORIDE 5 MG/1
5 TABLET ORAL ONCE
Status: COMPLETED | OUTPATIENT
Start: 2023-01-06 | End: 2023-01-06

## 2023-01-06 RX ORDER — OXYCODONE HYDROCHLORIDE AND ACETAMINOPHEN 5; 325 MG/1; MG/1
1 TABLET ORAL EVERY 4 HOURS PRN
Qty: 15 TABLET | Refills: 0 | Status: SHIPPED | OUTPATIENT
Start: 2023-01-06

## 2023-01-06 RX ADMIN — OXYCODONE HYDROCHLORIDE 5 MG: 5 TABLET ORAL at 04:49

## 2023-01-06 NOTE — DISCHARGE INSTRUCTIONS
You can take 1 tablet of the percocet every 4 hours as needed for worsening pain    Continue taking the naproxen, and the steroids  STOP TAKING THE CYCLOBENZAPRINE  Call the Spine physical therapy number below  They may call you but in case they dont, the number is below  Return for worsening symptoms, numbness in between your legs, urinary incontinence or retention

## 2023-01-06 NOTE — TELEPHONE ENCOUNTER
Additional Information  • Negative: Has the patient had unexplained weight loss? • Negative: Does the patient have a fever? • Negative: Is the patient experiencing urine retention? • Negative: Is the patient experiencing blood in sputum? • Negative: Has the patient experienced major trauma? (fall from height, high speed collision, direct blow to spine) and is also experiencing nausea, light-headedness, or loss of consciousness? • Negative: Is the patient experiencing acute drop foot or paralysis? • Negative: Is this a chronic condition? Protocols used: SL AMB COMPREHENSIVE SPINE PROGRAM PROTOCOL  Nurse completed triage and NO RF s/s present  Referral entered for the [address] [place] [address] site and contact info given to her as well  Pt to call if she does not hear from clerical staff  Patients information was sent to the preferred site and pt made aware clerical would be calling to schedule appointment  Nurse also offered a call from the [address] [place] [address] counselor d/t SBT score  Patient declined  Patient was pleasant and appropriate during this encounter  All information about patients intended care-plan reviewed and patient is in agreement with course of treatment  Patient appreciative of CB and referral for evaluation with [facility]      Nurse wished her well and referral closed

## 2023-01-06 NOTE — ED PROVIDER NOTES
History  Chief Complaint   Patient presents with   • Leg Pain     Pt reports L leg pain/numbness x 2 days r/t sciatica  Steroids and muscle relaxers prescribed from urgent care aren't helping pain  26-year-old female past medical history significant for hyperlipidemia that presents ED today for left-sided back pain  Patient states that she has had this left-sided back pain for the last 2 days  She went to an urgent care initially and they prescribed her steroids, naproxen, Flexeril  She said that initially she had 1 day of relief but then the pain started again today  She describes it as a pain in the left lower lumbar region that radiates towards the back of her leg  She says that it gets worse with walking  Denies any abdominal pain  - Patient has no NANNETTE FISH red flags: Trauma, unexplained weight loss, neurologic symptoms / retention / overflow incontinence, Fever/night sweats, IVDU, chronic steroids, nor hx of cancer (prostate, renal, breast, lung)            Prior to Admission Medications   Prescriptions Last Dose Informant Patient Reported? Taking?    DULoxetine (CYMBALTA) 30 mg delayed release capsule   Yes No   Sig: Take 30 mg by mouth daily   Diclofenac Sodium (VOLTAREN) 1 %   No No   Sig: Apply 2 g topically 4 (four) times a day   QUEtiapine (SEROquel) 25 mg tablet   Yes No   Sig: Take 25 mg by mouth every evening   acetaminophen (TYLENOL) 650 mg CR tablet   Yes No   Sig: Take 650 mg by mouth every 8 (eight) hours as needed   aspirin (ECOTRIN) 325 mg EC tablet   No No   Sig: Take 1 tablet (325 mg total) by mouth daily   atorvastatin (LIPITOR) 20 mg tablet   Yes No   Sig: Take 20 mg by mouth every evening     gabapentin (NEURONTIN) 300 mg capsule   No No   Sig: Take 1 capsule (300 mg total) by mouth 2 (two) times a day   ibuprofen (MOTRIN) 600 mg tablet   No No   Sig: Take 1 tablet (600 mg total) by mouth every 6 (six) hours as needed for mild pain for up to 30 days   lidocaine-prilocaine (EMLA) cream   No No   Sig: Apply topically as needed for mild pain or moderate pain   meclizine (ANTIVERT) 25 mg tablet   Yes No   Si every 8 hours as needed   nicotine (NICODERM CQ) 14 mg/24hr TD 24 hr patch   Yes No   Sig: Place 1 patch on the skin every 24 hours   oxyCODONE-acetaminophen (Percocet) 5-325 mg per tablet   No No   Sig: Take 1 tablet by mouth every 6 (six) hours as needed for severe pain for up to 30 doses Max Daily Amount: 4 tablets   Patient not taking: Reported on 2/15/2022   traZODone (DESYREL) 50 mg tablet   Yes No   Sig: take 1 tablet by mouth nightly - TAKE AT BEDTIME      Facility-Administered Medications: None       Past Medical History:   Diagnosis Date   • Acquired deformity of left foot    • Anesthesia complication     difficulty awakening   • Arthritis    • Deaf, left    • Depression    • Hallux valgus of left foot    • Hammer toe of left foot    • Headache    • Kidney stone    • Sciatic pain, right     intermittent       Past Surgical History:   Procedure Laterality Date   • BREAST BIOPSY Right 2021   • BREAST BIOPSY Right 3/10/2021    Procedure: Excisional Breast debridement  7 o'clock position;  Surgeon: Charmayne Honey, MD;  [facility];  Service: General   • CHOLECYSTECTOMY     • CLOSURE DELAYED PRIMARY Right 2020    Procedure: CLOSURE DELAYED PRIMARY and application of skin graft substitute;  Surgeon: Magalie Mazarigeos DPM;  [facility];  Service: Podiatry   • HERNIA REPAIR     • INCISION AND DRAINAGE OF WOUND Right 2020    Procedure: INCISION AND DRAINAGE (I&D) EXTREMITY;  Surgeon: Magalie Mazariegos DPM;  [facility];  Service: Podiatry   • SC CORRJ HALLUX VALGUS W/SESMDC W/1METAR MEDIAL CNF Left 2021    Procedure: Jane Montoya;  Surgeon: Magalie Mazariegos DPM;  [facility];  Service: Podiatry   • [facility] [address] [facility]/CORRJ METAR XCP 1ST EA Left 2019    Procedure: 3rd Metatarsal Osteotomy;  Surgeon: Braulio Campbell DPM;  [facility];  Service: Podiatry   • OR OSTEOT W/ [address] SHRT/CORRJ METAR XCP 1ST EA Left 2021    Procedure: OSTEOTOMY 2ND METATARSAL;  Surgeon: Izabela Duggan DPM;  [facility];  Service: Podiatry   • TUBAL LIGATION     • US GUIDED BREAST BIOPSY RIGHT COMPLETE Right 3/4/2021   • VAC DRESSING APPLICATION Right     Procedure: APPLICATION VAC DRESSING;  Surgeon: Izabela Duggan DPM;  [facility];  Service: Podiatry       Family History   Problem Relation Age of Onset   • No Known Problems Mother    • No Known Problems Father    • No Known Problems Sister    • No Known Problems Daughter    • No Known Problems Maternal Grandmother    • Colon cancer Maternal Grandfather    • No Known Problems Paternal Grandmother    • No Known Problems Paternal Grandfather      I have reviewed and agree with the history as documented  E-Cigarette/Vaping   • E-Cigarette Use Never User      E-Cigarette/Vaping Substances   • Nicotine No    • THC No    • CBD No    • Flavoring No    • Other No    • Unknown No      Social History     Tobacco Use   • Smoking status: Some Days     Packs/day: 1 00     Years: 15 00     Pack years: 15 00     Types: Cigarettes     Last attempt to quit: 2021     Years since quittin 1   • Smokeless tobacco: Never   • Tobacco comments:     trying to quit - using judson  patch   Vaping Use   • Vaping Use: Never used   Substance Use Topics   • Alcohol use: Not Currently   • Drug use: No        Review of Systems   Constitutional: Negative for chills and fever  HENT: Negative for hearing loss  Eyes: Negative for visual disturbance  Respiratory: Negative for shortness of breath  Cardiovascular: Negative for chest pain  Gastrointestinal: Negative for abdominal pain, constipation, diarrhea, nausea and vomiting  Genitourinary: Negative for difficulty urinating  Musculoskeletal: Positive for back pain  Negative for myalgias  Skin: Negative for color change     Neurological: Negative for dizziness and headaches  Psychiatric/Behavioral: Negative for agitation  All other systems reviewed and are negative  Physical Exam  ED Triage Vitals [01/06/23 0239]   Temperature Pulse Respirations Blood Pressure SpO2   97 8 °F (36 6 °C) (!) 111 20 (!) 169/104 99 %      Temp Source Heart Rate Source Patient Position - Orthostatic VS BP Location FiO2 (%)   Oral Monitor Sitting Left arm --      Pain Score       10 - Worst Possible Pain             Orthostatic Vital Signs  Vitals:    01/06/23 0239   BP: (!) 169/104   Pulse: (!) 111   Patient Position - Orthostatic VS: Sitting       Physical Exam  Vitals and nursing note reviewed  Constitutional:       General: She is not in acute distress  Appearance: Normal appearance  She is well-developed  She is not ill-appearing  HENT:      Head: Normocephalic and atraumatic  Right Ear: External ear normal       Left Ear: External ear normal       Nose: Nose normal       Mouth/Throat:      Mouth: Mucous membranes are moist       Pharynx: Oropharynx is clear  No oropharyngeal exudate  Eyes:      General:         Right eye: No discharge  Left eye: No discharge  Extraocular Movements: Extraocular movements intact  Conjunctiva/sclera: Conjunctivae normal       Pupils: Pupils are equal, round, and reactive to light  Cardiovascular:      Rate and Rhythm: Normal rate and regular rhythm  Heart sounds: Normal heart sounds  No murmur heard  No friction rub  No gallop  Pulmonary:      Effort: Pulmonary effort is normal  No respiratory distress  Breath sounds: Normal breath sounds  No stridor  No wheezing  Abdominal:      General: Bowel sounds are normal  There is no distension  Palpations: Abdomen is soft  Tenderness: There is no abdominal tenderness  Musculoskeletal:         General: No swelling  Normal range of motion  Cervical back: Normal range of motion and neck supple  No rigidity        Comments: No C, T, L-spine tenderness  Patient has tenderness to palpation left lower lumbar region  Positive straight leg raise on the left  Skin:     General: Skin is warm and dry  Capillary Refill: Capillary refill takes less than 2 seconds  Neurological:      General: No focal deficit present  Mental Status: She is alert and oriented to person, place, and time  Mental status is at baseline  Motor: No weakness  Gait: Gait normal    Psychiatric:         Mood and Affect: Mood normal          Behavior: Behavior normal          ED Medications  Medications   oxyCODONE (ROXICODONE) IR tablet 5 mg (5 mg Oral Given 1/6/23 0449)       Diagnostic Studies  Results Reviewed     None                 No orders to display         Procedures  Procedures      ED Course                                       Medical Decision Making  14-year-old female presenting to ED today for acute left-sided low back pain  At this time it is consistent with sciatica  Less likely to be cauda equina given that the patient has no red flag symptoms  At this time given her significant pain we will treat her with oxycodone p o  Will instruct patient to stop taking the Flexeril and we will prescribe her oxycodone as an outpatient  Discussed with patient that she will need physical therapy for her back  Amatory referral placed for comprehensive spine program   Also gave her information to call in case they do not call her  Strict return to ER precautions given including urinary retention, saddle anesthesia and patient was discharged home  Acute left-sided low back pain with left-sided sciatica: acute illness or injury  Risk  Prescription drug management              Disposition  Final diagnoses:   Acute left-sided low back pain with left-sided sciatica     Time reflects when diagnosis was documented in both MDM as applicable and the Disposition within this note     Time User Action Codes Description Comment    1/6/2023  5:07 AM Lux Bebo Add [T59 08] Acute left-sided low back pain with left-sided sciatica       ED Disposition     ED Disposition   Discharge    Condition   Stable    Date/Time   Fri Jan 6, 2023  5:07 AM    Comment   Laurel Rogers discharge to home/self care  Follow-up Information     Follow up With Specialties Details Why Contact Info Additional Information    [facility] Physical Therapy Call  to schedule an appointment for managment of your back pain  [phone] [facility] Emergency Medicine Go to  If symptoms worsen, As needed [place] [phone] [address] Emergency Department, [address], [place], [place], [address]          Discharge Medication List as of 1/6/2023  5:13 AM      START taking these medications    Details   !! oxyCODONE-acetaminophen (Percocet) 5-325 mg per tablet Take 1 tablet by mouth every 4 (four) hours as needed for moderate pain for up to 15 doses Max Daily Amount: 15 tablets, Starting Fri 1/6/2023, Normal       !! - Potential duplicate medications found  Please discuss with provider        CONTINUE these medications which have NOT CHANGED    Details   acetaminophen (TYLENOL) 650 mg CR tablet Take 650 mg by mouth every 8 (eight) hours as needed, Historical Med      aspirin (ECOTRIN) 325 mg EC tablet Take 1 tablet (325 mg total) by mouth daily, Starting Fri 11/12/2021, Normal      atorvastatin (LIPITOR) 20 mg tablet Take 20 mg by mouth every evening  , Starting Fri 10/23/2020, Historical Med      Diclofenac Sodium (VOLTAREN) 1 % Apply 2 g topically 4 (four) times a day, Starting Thu 7/14/2022, Normal      DULoxetine (CYMBALTA) 30 mg delayed release capsule Take 30 mg by mouth daily, Starting Tue 7/19/2022, Historical Med      gabapentin (NEURONTIN) 300 mg capsule Take 1 capsule (300 mg total) by mouth 2 (two) times a day, Starting Thu 3/31/2022, Until Mon 5/30/2022, Normal      ibuprofen (MOTRIN) 600 mg tablet Take 1 tablet (600 mg total) by mouth every 6 (six) hours as needed for mild pain for up to 30 days, Starting Fri 7/19/2019, Until Mon 11/8/2021 at 2359, Normal      lidocaine-prilocaine (EMLA) cream Apply topically as needed for mild pain or moderate pain, Starting u 7/14/2022, Normal      meclizine (ANTIVERT) 25 mg tablet 1 every 8 hours as needed, Historical Med      nicotine (NICODERM CQ) 14 mg/24hr TD 24 hr patch Place 1 patch on the skin every 24 hours, Historical Med      !! oxyCODONE-acetaminophen (Percocet) 5-325 mg per tablet Take 1 tablet by mouth every 6 (six) hours as needed for severe pain for up to 30 doses Max Daily Amount: 4 tablets, Starting Fri 11/12/2021, Normal      QUEtiapine (SEROquel) 25 mg tablet Take 25 mg by mouth every evening, Starting Thu 6/30/2022, Historical Med      traZODone (DESYREL) 50 mg tablet take 1 tablet by mouth nightly - TAKE AT BEDTIME, Historical Med       !! - Potential duplicate medications found  Please discuss with provider  PDMP Review     None           ED Provider  Attending physically available and evaluated Khris Melendez I managed the patient along with the ED Attending      Electronically Signed by         Surjit Moura MD  01/06/23 8649

## 2023-01-06 NOTE — TELEPHONE ENCOUNTER
Additional Information  • Negative: Is this related to a work injury? • Negative: Is this related to an MVA? • Negative: Are you currently recieving homecare services? Background - Initial Assessment  Clinical complaint: Pain is bilat low back, radiates down left leg to the foot  Has numbness in left knee  [facility]  Pain started 12/29/22  [facility]  Had SX on left foot, she wasn't sure if that is the reason for the back pain  Her left toes are still numb since sx   Was seen in ED 1/6/23  Date of onset: 12/29/22  Frequency of pain: constant  Quality of pain: numb and sharp    Protocols used: [facility] COMPREHENSIVE SPINE PROGRAM PROTOCOL

## 2023-01-09 ENCOUNTER — EVALUATION (OUTPATIENT)
Dept: PHYSICAL THERAPY | Facility: REHABILITATION | Age: 58
End: 2023-01-09

## 2023-01-09 DIAGNOSIS — M54.42 ACUTE BILATERAL LOW BACK PAIN WITH LEFT-SIDED SCIATICA: ICD-10-CM

## 2023-01-09 NOTE — PROGRESS NOTES
PT Evaluation     Today's date: 2023  Patient name: Ruben Bettencourt  : 1965  MRN: [id number]  Referring provider: Juan Summers PT  Dx:   Encounter Diagnosis     ICD-10-CM    1  Acute bilateral low back pain with left-sided sciatica  M54 42 Ambulatory referral to PT spine          Start Time: 1525  Stop Time: 1620  Total time in clinic (min): 55 minutes    Assessment  Assessment details: 61 y/o female presents with c/o left sided LBP which is severely limiting her ability to function  Her pain started on 23  She went to the ER on 23  She was prescribed oral steroids and pain medication and referred to PT via the [facility] program    Sitting is severely limited  She has been urinating while in a standing position and has been avoiding defecating for 48 hours to avoid sitting down  Weightbearing through the left LE is severely painful and she has been walking with a SPC     Impairments: abnormal gait, abnormal muscle firing, abnormal muscle tone, abnormal or restricted ROM, impaired physical strength, lacks appropriate home exercise program and pain with function    Symptom irritability: highUnderstanding of Dx/Px/POC: good   Prognosis: good    Goals  ST - I with HEP  2 - stand > 30 seconds without external support  LT - FOTO > 39  2 - walk to/from bedroom to the bathroom with use of SPC   3 - perform a sit-to-stand transfer from off/on the toilet    Plan  Plan details: Potential referral to Spine and Pain  Patient would benefit from: skilled physical therapy  Planned therapy interventions: manual therapy, activity modification, abdominal trunk stabilization, neuromuscular re-education, patient education, home exercise program, strengthening, therapeutic activities and therapeutic exercise  Frequency: 2x week  Duration in visits: 8  Treatment plan discussed with: patient        Subjective Evaluation    Quality of life: good    Pain  At worst pain rating: 10  Quality: sharp, needle-like, tight, pressure and discomfort  Relieving factors: support  Aggravating factors: sitting, walking, stair climbing and lifting      Diagnostic Tests  No diagnostic tests performed  Treatments  Current treatment: medication  Patient Goals  Patient goals for therapy: decreased pain, increased motion, increased strength, independence with ADLs/IADLs and return to sport/leisure activities          Objective     Concurrent Complaints  Negative for disturbed sleep, bladder dysfunction, bowel dysfunction and history of trauma    Neurological Testing     Sensation     Lumbar   Left   Intact: pin prick  Diminished: pin prick    Right   Intact: pin prick    Reflexes   Left   Patellar (L4): trace (1+)    Right   Patellar (L4): normal (2+)    Active Range of Motion     Lumbar   Extension:  with pain Restriction level: maximal  Right lateral flexion:  with pain Restriction level: moderate  Left rotation:  Restriction level: minimal  Right rotation:  with pain Restriction level: moderate    Strength/Myotome Testing     Left Hip   Planes of Motion   Flexion: 4    Right Hip   Planes of Motion   Flexion: 5    Left Knee   Flexion: 3+  Extension: 3    Right Knee   Flexion: 5  Extension: 5    Left Ankle/Foot   Dorsiflexion: 3  Plantar flexion: 4  Great toe extension: 3-    Right Ankle/Foot   Dorsiflexion: 5  Great toe extension: 5    General Comments:      Lumbar Comments  FOTO = 10    Assessment performed mostly in standing due to pt's irritability             Precautions: depression      Manuals 01/09                                                                Neuro Re-Ed 01/09                                                                                                       Ther Ex 01/09            HEP LMR            Weight shift onto left LE x5            Standing lx ext 3x5                                                                             Ther Activity                                       Gait Training                                       Modalities

## 2023-01-11 ENCOUNTER — OFFICE VISIT (OUTPATIENT)
Dept: PHYSICAL THERAPY | Facility: REHABILITATION | Age: 58
End: 2023-01-11

## 2023-01-11 DIAGNOSIS — M54.42 ACUTE BILATERAL LOW BACK PAIN WITH LEFT-SIDED SCIATICA: Primary | ICD-10-CM

## 2023-01-11 NOTE — ED ATTENDING ATTESTATION
1/6/2023  IFrannie MD, saw and evaluated the patient  I have discussed the patient with the resident/non-physician practitioner and agree with the resident's/non-physician practitioner's findings, Plan of Care, and MDM as documented in the resident's/non-physician practitioner's note, except where noted  All available labs and Radiology studies were reviewed  I was present for key portions of any procedure(s) performed by the resident/non-physician practitioner and I was immediately available to provide assistance  At this point I agree with the current assessment done in the Emergency Department  I have conducted an independent evaluation of this patient a history and physical is as follows:    History Source: Patient    HPI: 51-year-old female presents with left leg pain and numbness for past 2 days recent diagnosis of sciatica  Patient states she was in urgent care and states the medications prescribed at urgent care not helping her pain at this time  Patient states pain begins in her lower lumbar region radiates down to the back of her leg  She denies any trauma, history of weight loss neurologic symptoms or urinary symptoms  There is no saddle anesthesias per report  PE:    Vitals reviewed  General no acute distress  Heart regular rate and rhythm without murmurs rubs or gallops  Lungs clear to auscultation bilaterally  Abdomen soft nontender nondistended normal bowel sounds  No signs of peritonitis   Extremities normal pulses, no edema no tenderness  Back: No CTL spine tenderness  Patient has mild tenderness palpation over left lower lumbar region  The positive straight leg raise on the left    Neuro exam: Nonfocal strength 5 out of 5 bilaterally normal sensation no saddle anesthesias no focal motor or sensory deficits noted on exam        Impression/Plan: Sciatica        The patient presents with left lower extremity pain numbness consistent from lumbar radiculopathy versus sciatica  I have considered but have low suspicion for spinal epidural abscess, cauda equina, acute disc herniation based on patient's reassuring presentation, absence of focal neurologic deficits, absence of red flags on history and exam        Plan is to treat with short course of opiod medications for pain  Will place referral for patient to comprehensive spine program   Strict return precautions        ED Course         Critical Care Time  Procedures

## 2023-01-12 ENCOUNTER — OFFICE VISIT (OUTPATIENT)
Dept: PODIATRY | Facility: CLINIC | Age: 58
End: 2023-01-12

## 2023-01-12 VITALS
DIASTOLIC BLOOD PRESSURE: 99 MMHG | BODY MASS INDEX: 38.08 KG/M2 | HEIGHT: 60 IN | SYSTOLIC BLOOD PRESSURE: 134 MMHG | HEART RATE: 99 BPM

## 2023-01-12 DIAGNOSIS — M20.11 HALLUX VALGUS OF RIGHT FOOT: ICD-10-CM

## 2023-01-12 DIAGNOSIS — G57.92 NEURITIS OF LEFT FOOT: ICD-10-CM

## 2023-01-12 DIAGNOSIS — M54.16 LUMBAR BACK PAIN WITH RADICULOPATHY AFFECTING LEFT LOWER EXTREMITY: Primary | ICD-10-CM

## 2023-01-12 RX ORDER — NAPROXEN 500 MG/1
500 TABLET ORAL 2 TIMES DAILY PRN
COMMUNITY
Start: 2023-01-02

## 2023-01-12 RX ORDER — DULOXETIN HYDROCHLORIDE 60 MG/1
CAPSULE, DELAYED RELEASE ORAL
COMMUNITY
Start: 2022-12-13

## 2023-01-12 RX ORDER — CYCLOBENZAPRINE HCL 10 MG
TABLET ORAL
COMMUNITY
Start: 2023-01-02

## 2023-01-12 NOTE — PROGRESS NOTES
PATIENT:  Tevin Tyson      1965    ASSESSMENT     1  Lumbar back pain with radiculopathy affecting left lower extremity  Ambulatory Referral to Pain Management      2  Hallux valgus of right foot        3  Neuritis of left foot               PLAN  1  Reviewed medical records  Patient was counseled and educated on the condition and the diagnosis  2  Discussed proper skin care and protection  Continue toe spacers  3  HPK trimmed  Discussed proper footwear  4  Referred her to pain and spine specialist for lumbar radiculopathy / sciatica  5  RA in 3 months  HISTORY OF PRESENT ILLNESS  Patient presents for right foot evaluation  No recurring wound right foot  She has chronic pain on plantar right foot with callus  She still has significant deformity of right foot  She has been dealing with severe back pain and pain radiates to LLE  Increased pain in the past week and she went to ED  She is going to PT        PAST MEDICAL HISTORY:  Past Medical History:   Diagnosis Date   • Acquired deformity of left foot    • Anesthesia complication     difficulty awakening   • Arthritis    • Deaf, left    • Depression    • Hallux valgus of left foot    • Hammer toe of left foot    • Headache    • Kidney stone    • Sciatic pain, right     intermittent       PAST SURGICAL HISTORY:  Past Surgical History:   Procedure Laterality Date   • BREAST BIOPSY Right 03/04/2021   • BREAST BIOPSY Right 3/10/2021    Procedure: Excisional Breast debridement  7 o'clock position;  Surgeon: Roro Sosa MD;  Location: [facility];  Service: General   • CHOLECYSTECTOMY     • CLOSURE DELAYED PRIMARY Right 7/5/2020    Procedure: CLOSURE DELAYED PRIMARY and application of skin graft substitute;  Surgeon: Bettye Sam DPM;  [facility]: [facility];  Service: Podiatry   • HERNIA REPAIR     • INCISION AND DRAINAGE OF WOUND Right 7/1/2020    Procedure: INCISION AND DRAINAGE (I&D) EXTREMITY;  Surgeon: Bettye Sam DPM;  [facility];  Service: Podiatry   • W[place] CORRJ HALLUX VALGUS W/SESMDC W/1METAR MEDIAL CNF Left 11/12/2021    Procedure: Mertha Meigs;  Surgeon: Lupe Suggs DPM;  Location: [facility];  Service: Podiatry   • [place] [address] [facility]/CORRJ METAR XCP 1ST EA Left 6/14/2019    Procedure: 3rd Metatarsal Osteotomy;  Surgeon: Reggie Gonzales DPM;  [facility];  Service: Podiatry   • [place] OSTEOT W/ [facility] SHRT/CORRJ METAR XCP 1ST EA Left 11/12/2021    Procedure: OSTEOTOMY 2ND METATARSAL;  Surgeon: Lupe Suggs DPM;  [facility];  Service: Podiatry   • TUBAL LIGATION     • US GUIDED BREAST BIOPSY RIGHT COMPLETE Right 3/4/2021   • VAC DRESSING APPLICATION Right 0/3/9529    Procedure: APPLICATION VAC DRESSING;  Surgeon: Lupe Suggs DPM;  [facility];  Service: Podiatry        ALLERGIES:  Sertraline and Caffeine - food allergy    MEDICATIONS:  Current Outpatient Medications   Medication Sig Dispense Refill   • acetaminophen (TYLENOL) 650 mg CR tablet Take 650 mg by mouth every 8 (eight) hours as needed     • aspirin (ECOTRIN) 325 mg EC tablet Take 1 tablet (325 mg total) by mouth daily 30 tablet 0   • atorvastatin (LIPITOR) 20 mg tablet Take 20 mg by mouth every evening       • cyclobenzaprine (FLEXERIL) 10 mg tablet take 1 tablet by mouth three times a day for muscle spasm     • Diclofenac Sodium (VOLTAREN) 1 % Apply 2 g topically 4 (four) times a day 150 g 1   • DULoxetine (CYMBALTA) 30 mg delayed release capsule Take 30 mg by mouth daily     • DULoxetine (CYMBALTA) 60 mg delayed release capsule TAKE 2 CAPSULES (120 MG TOTAL) B MOUTH DAILY     • lidocaine-prilocaine (EMLA) cream Apply topically as needed for mild pain or moderate pain 30 g 5   • meclizine (ANTIVERT) 25 mg tablet 1 every 8 hours as needed     • naproxen (NAPROSYN) 500 mg tablet Take 500 mg by mouth 2 (two) times a day as needed Take with food     • nicotine (NICODERM CQ) 14 mg/24hr TD 24 hr patch Place 1 patch on the skin every 24 hours     • oxyCODONE-acetaminophen (Percocet) 5-325 mg per tablet Take 1 tablet by mouth every 4 (four) hours as needed for moderate pain for up to 15 doses Max Daily Amount: 15 tablets 15 tablet 0   • QUEtiapine (SEROquel) 25 mg tablet Take 25 mg by mouth every evening     • traZODone (DESYREL) 50 mg tablet take 1 tablet by mouth nightly - TAKE AT BEDTIME     • gabapentin (NEURONTIN) 300 mg capsule Take 1 capsule (300 mg total) by mouth 2 (two) times a day 60 capsule 1   • ibuprofen (MOTRIN) 600 mg tablet Take 1 tablet (600 mg total) by mouth every 6 (six) hours as needed for mild pain for up to 30 days 90 tablet 0   • oxyCODONE-acetaminophen (Percocet) 5-325 mg per tablet Take 1 tablet by mouth every 6 (six) hours as needed for severe pain for up to 30 doses Max Daily Amount: 4 tablets (Patient not taking: Reported on 2/15/2022) 30 tablet 0     No current facility-administered medications for this visit         SOCIAL HISTORY:  Social History     Socioeconomic History   • Marital status: Single     Spouse name: None   • Number of children: None   • Years of education: None   • Highest education level: None   Occupational History   • None   Tobacco Use   • Smoking status: Some Days     Packs/day: 1 00     Years: 15 00     Pack years: 15 00     Types: Cigarettes     Last attempt to quit: 2021     Years since quittin 1   • Smokeless tobacco: Never   • Tobacco comments:     trying to quit - using judson  patch   Vaping Use   • Vaping Use: Never used   Substance and Sexual Activity   • Alcohol use: Not Currently   • Drug use: No   • Sexual activity: Yes   Other Topics Concern   • None   Social History Narrative   • None     Social Determinants of Health     Financial Resource Strain: Not on file   Food Insecurity: Not on file   Transportation Needs: Not on file   Physical Activity: Not on file   Stress: Not on file   Social Connections: Not on file   Intimate Partner Violence: Not on file   Housing Stability: Not on file      REVIEW OF SYSTEMS  Patient denied CP, SOB, fever, chills, palpatation, HA, GI problem, or calf pain  PHYSICAL EXAMINATION  GENERAL  The patient appears in NAD / non-toxic  Afebrile  VSS    VASCULAR EXAM  Pedal pulses and vascular status are intact  No calf pain or edema bilaterally  No cyanosis  No ischemia  DERMATOLOGIC EXAM  No maceration right 1st interspace  No wound  No abscess  No redness  No purulence  NEUROLOGIC EXAM  AAO X 3  No focal neurologic deficit  Neurologic status is intact BLE  MMT intact  MUSCULOSKELETAL EXAM  ROM intact  No fluctuation or crepitus  Bunion deformity noted right foot  No acute swelling

## 2023-01-16 ENCOUNTER — APPOINTMENT (OUTPATIENT)
Dept: PHYSICAL THERAPY | Facility: REHABILITATION | Age: 58
End: 2023-01-16

## 2023-01-19 ENCOUNTER — OFFICE VISIT (OUTPATIENT)
Dept: PHYSICAL THERAPY | Facility: REHABILITATION | Age: 58
End: 2023-01-19

## 2023-01-19 DIAGNOSIS — M54.42 ACUTE BILATERAL LOW BACK PAIN WITH LEFT-SIDED SCIATICA: Primary | ICD-10-CM

## 2023-01-19 NOTE — PROGRESS NOTES
Daily Note     Today's date: 2023  Patient name: Herrera Callejas  : 1965  MRN: [id number]  Referring provider: Marietta Bryan, PT  Dx:   Encounter Diagnosis     ICD-10-CM    1  Acute bilateral low back pain with left-sided sciatica  M54 42                      Subjective: Pt is able to walk around her house without the cane  Also, she can now sit down to urinate  She is still experiencing sx's in the lumbar region and the leg, but the sx's only peripheralize to the proximal lower leg  Objective: See treatment diary below  HEP updated with marcela and standing lx ext  Assessment: Tolerated treatment well  Patient would benefit from continued PT    Plan: Continue per plan of care        Precautions: depression      Manuals                                                               Neuro Re-Ed                                                                                                      Ther Ex           HEP LMR  LMR          Weight shift onto left LE x5  x15          Standing lx ext 3x5 3x5 x25          sustained hooklying  - position tolerance   2'          Sustained extension in lying  2x2'           marcela   7'          Walking in clinic - no AD   175 ft          Repeated movement assessment  25'           Ther Activity                                       Gait Training                                       Modalities

## 2023-01-23 ENCOUNTER — OFFICE VISIT (OUTPATIENT)
Dept: PHYSICAL THERAPY | Facility: REHABILITATION | Age: 58
End: 2023-01-23

## 2023-01-23 DIAGNOSIS — M54.42 ACUTE BILATERAL LOW BACK PAIN WITH LEFT-SIDED SCIATICA: Primary | ICD-10-CM

## 2023-01-23 NOTE — PROGRESS NOTES
Daily Note     Today's date: 2023  Patient name: Tiffany Bowser  : 1965  MRN: [id number]  Referring provider: Alice Farfan PT  Dx:   Encounter Diagnosis     ICD-10-CM    1  Acute bilateral low back pain with left-sided sciatica  M54 42           Start Time: 1115  Stop Time: 1155  Total time in clinic (min): 40 minutes    Subjective: Pt feels she is standing taller and is able to weight bear a little more through the left leg  Objective: See treatment diary below    Assessment: Tolerated treatment well  Patient would benefit from continued PT    Plan: Continue per plan of care  Scheduled to f/u with [facility] in 2 weeks        Precautions: depression      Manuals                                                              Neuro Re-Ed          hooklying TA    5"x15                                                                                       Ther Ex          HEP LMR  LMR LMR         Weight shift onto left LE x5  x15 x15         Standing lx ext 3x5 3x5 x25 x15         sustained hooklying  - position tolerance   2'          Sustained extension in lying  2x2'  5'         marcela   7' 5'         Walking in clinic - no AD   175 ft 200 ft x2         Repeated movement assessment  25'           Ther Activity                                       Gait Training                                       Modalities

## 2023-01-26 ENCOUNTER — OFFICE VISIT (OUTPATIENT)
Dept: PHYSICAL THERAPY | Facility: REHABILITATION | Age: 58
End: 2023-01-26

## 2023-01-26 DIAGNOSIS — M54.42 ACUTE BILATERAL LOW BACK PAIN WITH LEFT-SIDED SCIATICA: Primary | ICD-10-CM

## 2023-01-26 NOTE — PROGRESS NOTES
Daily Note     Today's date: 2023  Patient name: Eb Howard  : 1965  MRN: [id number]  Referring provider: Gloria Goncalves, PT  Dx:   Encounter Diagnosis     ICD-10-CM    1  Acute bilateral low back pain with left-sided sciatica  M54 42           Start Time: 1115  Stop Time: 1155  Total time in clinic (min): 40 minutes    Subjective: Pt bumped her left leg yesterday on the car door and c/o increased sensitivity to touch today  Objective: See treatment diary below  Poor control of core with bridge on p-ball  Assessment: Tolerated treatment well  Patient would benefit from continued PT    Plan: Continue per plan of care        Precautions: depression      Manuals                                                             Neuro Re-Ed         hooklying TA    5"x15         bridge     5"x15        Bridge - feet on p-ball     x5                                                            Ther Ex         HEP LMR  LMR LMR         Weight shift onto left LE x5  x15 x15         Standing lx ext 3x5 3x5 x25 x15 x15        sustained hooklying  - position tolerance   2'          Sustained extension in lying  2x2'  5' 5'        marcela   7' 5' 7'        Walking in clinic - no AD   175 ft 200 ft x2 5'        Repeated movement assessment  25'           Ther Activity                                       Gait Training                                       Modalities

## 2023-01-30 ENCOUNTER — OFFICE VISIT (OUTPATIENT)
Dept: PHYSICAL THERAPY | Facility: REHABILITATION | Age: 58
End: 2023-01-30

## 2023-01-30 DIAGNOSIS — M54.42 ACUTE BILATERAL LOW BACK PAIN WITH LEFT-SIDED SCIATICA: Primary | ICD-10-CM

## 2023-01-30 NOTE — PROGRESS NOTES
Daily Note     Today's date: 2023  Patient name: Hilaria Turner  : 1965  MRN: [id number]  Referring provider: Henry Hollis, PT  Dx:   Encounter Diagnosis     ICD-10-CM    1  Acute bilateral low back pain with left-sided sciatica  M54 42           Start Time: 1145  Stop Time: 1225  Total time in clinic (min): 40 minutes    Subjective:  Post-tx: "it feels much better "    Objective: See treatment diary below    Assessment: Tolerated treatment well  Patient would benefit from continued PT    Plan: Continue per plan of care        Precautions: depression      Manuals                                                            Neuro Re-Ed        hooklying TA    5"x15  Feet on ball - 5"x15       bridge     5"x15        Bridge - feet on p-ball     x5 15                                                           Ther Ex        HEP LMR  LMR LMR  LMR       Weight shift onto left LE x5  x15 x15         Standing lx ext 3x5 3x5 x25 x15 x15        sustained hooklying  - position tolerance   2'          Sustained extension in lying  2x2'  5' 5' 3'       marcela   7' 5' 7' 5'       Walking in clinic - no AD   175 ft 200 ft x2 5' 6'       Prone press up      5x5       Repeated movement assessment  25'           Ther Activity                                       Gait Training                                       Modalities

## 2023-02-02 ENCOUNTER — OFFICE VISIT (OUTPATIENT)
Dept: PHYSICAL THERAPY | Facility: REHABILITATION | Age: 58
End: 2023-02-02

## 2023-02-02 DIAGNOSIS — M54.42 ACUTE BILATERAL LOW BACK PAIN WITH LEFT-SIDED SCIATICA: Primary | ICD-10-CM

## 2023-02-02 NOTE — PROGRESS NOTES
Daily Note     Today's date: 2023  Patient name: Christie Vila  : 1965  MRN: [id number]  Referring provider: Ben Ennis, PT  Dx:   Encounter Diagnosis     ICD-10-CM    1  Acute bilateral low back pain with left-sided sciatica  M54 42                      Subjective:  States she has pain in the back of her right leg  Objective: See treatment diary below    Assessment: Pain in back of R leg centralizing to R glute through session w/ extension based exercises  She would benefit from continued skilled PT services to reduce pain and increase level of function  Plan: Continue per plan of care        Precautions: depression      Manuals                                                           Neuro Re-Ed       hooklying TA    5"x15  Feet on ball - 5"x15 Feet on ball - 5"x15      bridge     5"x15  3x7x2"      Bridge - feet on p-ball     x5 15 15x      Supine hip abd       GTB 4x5x5"                                             Ther Ex       HEP LMR  LMR LMR  LMR MM      Weight shift onto left LE x5  x15 x15         Standing lx ext 3x5 3x5 x25 x15 x15        sustained hooklying  - position tolerance   2'          Sustained extension in lying  2x2'  5' 5' 3'       marcela   7' 5' 7' 5'       Walking in clinic - no AD   175 ft 200 ft x2 5' 6'       Prone press up      5x5       Repeated movement assessment  25'     10'      Ther Activity                                       Gait Training                                       Modalities

## 2023-02-04 ENCOUNTER — HOSPITAL ENCOUNTER (INPATIENT)
Facility: HOSPITAL | Age: 58
LOS: 4 days | End: 2023-02-09
Attending: EMERGENCY MEDICINE | Admitting: PSYCHIATRY & NEUROLOGY

## 2023-02-04 ENCOUNTER — APPOINTMENT (OUTPATIENT)
Dept: RADIOLOGY | Facility: HOSPITAL | Age: 58
End: 2023-02-04

## 2023-02-04 ENCOUNTER — APPOINTMENT (EMERGENCY)
Dept: RADIOLOGY | Facility: HOSPITAL | Age: 58
End: 2023-02-04

## 2023-02-04 DIAGNOSIS — E11.9 DIABETES (HCC): ICD-10-CM

## 2023-02-04 DIAGNOSIS — R47.9 SPEECH DISTURBANCE: ICD-10-CM

## 2023-02-04 DIAGNOSIS — F17.200 CURRENT SMOKER: ICD-10-CM

## 2023-02-04 DIAGNOSIS — R29.810 FACIAL DROOP: Primary | ICD-10-CM

## 2023-02-04 DIAGNOSIS — G44.40 DRUG-INDUCED HEADACHE, NOT ELSEWHERE CLASSIFIED, NOT INTRACTABLE: ICD-10-CM

## 2023-02-04 DIAGNOSIS — I63.9 CEREBROVASCULAR ACCIDENT (CVA), UNSPECIFIED MECHANISM (HCC): ICD-10-CM

## 2023-02-04 DIAGNOSIS — I10 HYPERTENSION: ICD-10-CM

## 2023-02-04 DIAGNOSIS — R07.9 CHEST PAIN: ICD-10-CM

## 2023-02-04 PROBLEM — F32.A DEPRESSION: Status: ACTIVE | Noted: 2023-02-04

## 2023-02-04 PROBLEM — R29.90 STROKE-LIKE SYMPTOMS: Status: ACTIVE | Noted: 2023-02-04

## 2023-02-04 LAB
2HR DELTA HS TROPONIN: 0 NG/L
4HR DELTA HS TROPONIN: 0 NG/L
AMPHETAMINES SERPL QL SCN: NEGATIVE
ANION GAP SERPL CALCULATED.3IONS-SCNC: 4 MMOL/L (ref 4–13)
APTT PPP: 29 SECONDS (ref 23–37)
BACTERIA UR QL AUTO: ABNORMAL /HPF
BARBITURATES UR QL: NEGATIVE
BENZODIAZ UR QL: NEGATIVE
BILIRUB UR QL STRIP: NEGATIVE
BUN SERPL-MCNC: 11 MG/DL (ref 5–25)
CALCIUM SERPL-MCNC: 9.6 MG/DL (ref 8.3–10.1)
CARDIAC TROPONIN I PNL SERPL HS: 3 NG/L
CHLORIDE SERPL-SCNC: 108 MMOL/L (ref 96–108)
CLARITY UR: CLEAR
CO2 SERPL-SCNC: 26 MMOL/L (ref 21–32)
COCAINE UR QL: NEGATIVE
COLOR UR: COLORLESS
CREAT SERPL-MCNC: 0.88 MG/DL (ref 0.6–1.3)
ERYTHROCYTE [DISTWIDTH] IN BLOOD BY AUTOMATED COUNT: 14.8 % (ref 11.6–15.1)
FLUAV RNA RESP QL NAA+PROBE: NEGATIVE
FLUBV RNA RESP QL NAA+PROBE: NEGATIVE
GFR SERPL CREATININE-BSD FRML MDRD: 73 ML/MIN/1.73SQ M
GLUCOSE SERPL-MCNC: 143 MG/DL (ref 65–140)
GLUCOSE UR STRIP-MCNC: NEGATIVE MG/DL
HCT VFR BLD AUTO: 46.5 % (ref 34.8–46.1)
HGB BLD-MCNC: 15.1 G/DL (ref 11.5–15.4)
HGB UR QL STRIP.AUTO: ABNORMAL
INR PPP: 0.92 (ref 0.84–1.19)
KETONES UR STRIP-MCNC: NEGATIVE MG/DL
LEUKOCYTE ESTERASE UR QL STRIP: ABNORMAL
MCH RBC QN AUTO: 32 PG (ref 26.8–34.3)
MCHC RBC AUTO-ENTMCNC: 32.5 G/DL (ref 31.4–37.4)
MCV RBC AUTO: 99 FL (ref 82–98)
METHADONE UR QL: NEGATIVE
NITRITE UR QL STRIP: NEGATIVE
NON-SQ EPI CELLS URNS QL MICRO: ABNORMAL /HPF
OPIATES UR QL SCN: NEGATIVE
OXYCODONE+OXYMORPHONE UR QL SCN: NEGATIVE
PCP UR QL: NEGATIVE
PH UR STRIP.AUTO: 7 [PH]
PLATELET # BLD AUTO: 352 THOUSANDS/UL (ref 149–390)
PMV BLD AUTO: 9.4 FL (ref 8.9–12.7)
POTASSIUM SERPL-SCNC: 4 MMOL/L (ref 3.5–5.3)
PROT UR STRIP-MCNC: NEGATIVE MG/DL
PROTHROMBIN TIME: 12.6 SECONDS (ref 11.6–14.5)
RBC # BLD AUTO: 4.72 MILLION/UL (ref 3.81–5.12)
RBC #/AREA URNS AUTO: ABNORMAL /HPF
RSV RNA RESP QL NAA+PROBE: NEGATIVE
SARS-COV-2 RNA RESP QL NAA+PROBE: NEGATIVE
SODIUM SERPL-SCNC: 138 MMOL/L (ref 135–147)
SP GR UR STRIP.AUTO: 1.04 (ref 1–1.03)
THC UR QL: NEGATIVE
UROBILINOGEN UR STRIP-ACNC: <2 MG/DL
WBC # BLD AUTO: 9.32 THOUSAND/UL (ref 4.31–10.16)
WBC #/AREA URNS AUTO: ABNORMAL /HPF

## 2023-02-04 RX ORDER — ASPIRIN 81 MG/1
81 TABLET, CHEWABLE ORAL DAILY
Status: DISCONTINUED | OUTPATIENT
Start: 2023-02-05 | End: 2023-02-09 | Stop reason: HOSPADM

## 2023-02-04 RX ORDER — ATORVASTATIN CALCIUM 40 MG/1
40 TABLET, FILM COATED ORAL EVERY EVENING
Status: DISCONTINUED | OUTPATIENT
Start: 2023-02-04 | End: 2023-02-09 | Stop reason: HOSPADM

## 2023-02-04 RX ORDER — KETOROLAC TROMETHAMINE 30 MG/ML
15 INJECTION, SOLUTION INTRAMUSCULAR; INTRAVENOUS ONCE
Status: COMPLETED | OUTPATIENT
Start: 2023-02-04 | End: 2023-02-04

## 2023-02-04 RX ORDER — CLOPIDOGREL BISULFATE 75 MG/1
75 TABLET ORAL DAILY
Status: DISCONTINUED | OUTPATIENT
Start: 2023-02-05 | End: 2023-02-09 | Stop reason: HOSPADM

## 2023-02-04 RX ORDER — ACETAMINOPHEN 325 MG/1
650 TABLET ORAL EVERY 4 HOURS PRN
Status: DISCONTINUED | OUTPATIENT
Start: 2023-02-04 | End: 2023-02-05

## 2023-02-04 RX ORDER — ONDANSETRON 2 MG/ML
4 INJECTION INTRAMUSCULAR; INTRAVENOUS EVERY 6 HOURS PRN
Status: DISCONTINUED | OUTPATIENT
Start: 2023-02-04 | End: 2023-02-09 | Stop reason: HOSPADM

## 2023-02-04 RX ORDER — CLOPIDOGREL BISULFATE 75 MG/1
300 TABLET ORAL ONCE
Status: COMPLETED | OUTPATIENT
Start: 2023-02-04 | End: 2023-02-04

## 2023-02-04 RX ORDER — DULOXETIN HYDROCHLORIDE 60 MG/1
60 CAPSULE, DELAYED RELEASE ORAL DAILY
Status: DISCONTINUED | OUTPATIENT
Start: 2023-02-04 | End: 2023-02-09 | Stop reason: HOSPADM

## 2023-02-04 RX ORDER — ASPIRIN 325 MG
325 TABLET ORAL ONCE
Status: COMPLETED | OUTPATIENT
Start: 2023-02-04 | End: 2023-02-04

## 2023-02-04 RX ORDER — ENOXAPARIN SODIUM 100 MG/ML
40 INJECTION SUBCUTANEOUS DAILY
Status: DISCONTINUED | OUTPATIENT
Start: 2023-02-04 | End: 2023-02-09 | Stop reason: HOSPADM

## 2023-02-04 RX ORDER — SODIUM CHLORIDE 9 MG/ML
75 INJECTION, SOLUTION INTRAVENOUS CONTINUOUS
Status: DISCONTINUED | OUTPATIENT
Start: 2023-02-04 | End: 2023-02-05

## 2023-02-04 RX ADMIN — ENOXAPARIN SODIUM 40 MG: 40 INJECTION SUBCUTANEOUS at 18:05

## 2023-02-04 RX ADMIN — IOHEXOL 85 ML: 350 INJECTION, SOLUTION INTRAVENOUS at 13:23

## 2023-02-04 RX ADMIN — KETOROLAC TROMETHAMINE 15 MG: 30 INJECTION, SOLUTION INTRAMUSCULAR; INTRAVENOUS at 16:17

## 2023-02-04 RX ADMIN — SODIUM CHLORIDE 75 ML/HR: 0.9 INJECTION, SOLUTION INTRAVENOUS at 16:19

## 2023-02-04 RX ADMIN — DULOXETINE HYDROCHLORIDE 60 MG: 60 CAPSULE, DELAYED RELEASE ORAL at 18:04

## 2023-02-04 RX ADMIN — ASPIRIN 325 MG ORAL TABLET 325 MG: 325 PILL ORAL at 18:05

## 2023-02-04 RX ADMIN — CLOPIDOGREL BISULFATE 300 MG: 75 TABLET ORAL at 18:05

## 2023-02-04 RX ADMIN — ATORVASTATIN CALCIUM 40 MG: 40 TABLET, FILM COATED ORAL at 18:05

## 2023-02-04 NOTE — ED PROVIDER NOTES
History  Chief Complaint   Patient presents with   • CVA/TIA-like Symptoms     1 hour PTA, pt reported sudden facial droop and difficulty speaking       HPI     66-year-old female with medical history significant for hyperlipidemia and depression presents to the ED for evaluation of strokelike symptoms  At around 12:00 her son noticed difficulty speaking and right-sided facial droop  Denies anticoagulation  No history of strokes  Patient states she feels like she is drooling  Denies any other symptoms at this time including chest pain, shortness of breath, abdominal pain, nausea, vomiting, headache, dizziness  Denies any recent illness  Has never had symptoms like this before  Prior to Admission Medications   Prescriptions Last Dose Informant Patient Reported? Taking?    DULoxetine (CYMBALTA) 30 mg delayed release capsule Not Taking  Yes No   Sig: Take 30 mg by mouth daily   Patient not taking: Reported on 2/4/2023   DULoxetine (CYMBALTA) 60 mg delayed release capsule 2/3/2023  Yes Yes   Sig: TAKE 2 CAPSULES (120 MG TOTAL) B MOUTH DAILY   Diclofenac Sodium (VOLTAREN) 1 % Not Taking  No No   Sig: Apply 2 g topically 4 (four) times a day   Patient not taking: Reported on 2/4/2023   QUEtiapine (SEROquel) 25 mg tablet Past Week  Yes Yes   Sig: Take 25 mg by mouth every evening   acetaminophen (TYLENOL) 650 mg CR tablet   Yes No   Sig: Take 650 mg by mouth every 8 (eight) hours as needed   aspirin (ECOTRIN) 325 mg EC tablet Not Taking  No No   Sig: Take 1 tablet (325 mg total) by mouth daily   Patient not taking: Reported on 2/4/2023   atorvastatin (LIPITOR) 20 mg tablet Past Week  Yes Yes   Sig: Take 20 mg by mouth every evening     cyclobenzaprine (FLEXERIL) 10 mg tablet Not Taking  Yes No   Sig: take 1 tablet by mouth three times a day for muscle spasm   Patient not taking: Reported on 2/4/2023   gabapentin (NEURONTIN) 300 mg capsule   No No   Sig: Take 1 capsule (300 mg total) by mouth 2 (two) times a day ibuprofen (MOTRIN) 600 mg tablet   No No   Sig: Take 1 tablet (600 mg total) by mouth every 6 (six) hours as needed for mild pain for up to 30 days   lidocaine-prilocaine (EMLA) cream Not Taking  No No   Sig: Apply topically as needed for mild pain or moderate pain   Patient not taking: Reported on 2023   meclizine (ANTIVERT) 25 mg tablet Past Month  Yes Yes   Si every 8 hours as needed   naproxen (NAPROSYN) 500 mg tablet Past Month  Yes Yes   Sig: Take 500 mg by mouth 2 (two) times a day as needed Take with food   nicotine (NICODERM CQ) 14 mg/24hr TD 24 hr patch Not Taking  Yes No   Sig: Place 1 patch on the skin every 24 hours   Patient not taking: Reported on 2023   oxyCODONE-acetaminophen (Percocet) 5-325 mg per tablet Not Taking  No No   Sig: Take 1 tablet by mouth every 6 (six) hours as needed for severe pain for up to 30 doses Max Daily Amount: 4 tablets   Patient not taking: Reported on 2/15/2022   oxyCODONE-acetaminophen (Percocet) 5-325 mg per tablet Not Taking  No No   Sig: Take 1 tablet by mouth every 4 (four) hours as needed for moderate pain for up to 15 doses Max Daily Amount: 15 tablets   Patient not taking: Reported on 2023   traZODone (DESYREL) 50 mg tablet Not Taking  Yes No   Sig: take 1 tablet by mouth nightly - TAKE AT BEDTIME   Patient not taking: Reported on 2023      Facility-Administered Medications: None       Past Medical History:   Diagnosis Date   • Acquired deformity of left foot    • Anesthesia complication     difficulty awakening   • Arthritis    • Deaf, left    • Depression    • Hallux valgus of left foot    • Hammer toe of left foot    • Headache    • Kidney stone    • Sciatic pain, right     intermittent       Past Surgical History:   Procedure Laterality Date   • BREAST BIOPSY Right 2021   • BREAST BIOPSY Right 3/10/2021    Procedure: Excisional Breast debridement  7 o'clock position;  Surgeon: Maria Esther Madrigal MD;  Location: [facility] OR;  Service: General   • CHOLECYSTECTOMY     • CLOSURE DELAYED PRIMARY Right 7/5/2020    Procedure: CLOSURE DELAYED PRIMARY and application of skin graft substitute;  Surgeon: Sivakumar Ward DPM;  Location: [facility];  Service: Podiatry   • HERNIA REPAIR     • INCISION AND DRAINAGE OF WOUND Right 7/1/2020    Procedure: INCISION AND DRAINAGE (I&D) EXTREMITY;  Surgeon: Sivakumar Ward DPM;  Location: [facility];  Service: Podiatry   • Piroska U  97  W/SESMDC W/1METAR MEDIAL CNF Left 11/12/2021    Procedure: Jyothi Cami;  Surgeon: Sivakumar Ward DPM;  Location: [facility];  Service: Podiatry   • MS OSTEOT W/ [employer] SHRT/CORRJ METAR XCP 1ST EA Left 6/14/2019    Procedure: 3rd Metatarsal Osteotomy;  Surgeon: Valaria Cushing, DPM;  Location: [facility];  Service: Podiatry   • MS OSTEOT W/ [employer] SHRT/CORRJ METAR XCP 1ST EA Left 11/12/2021    Procedure: OSTEOTOMY 2ND METATARSAL;  Surgeon: Sivakumar Ward DPM;  Location: [facility];  Service: Podiatry   • TUBAL LIGATION     • US GUIDED BREAST BIOPSY RIGHT COMPLETE Right 3/4/2021   • VAC DRESSING APPLICATION Right 6/9/4791    Procedure: APPLICATION VAC DRESSING;  Surgeon: Sivakumar Ward DPM;  Location: [facility];  Service: Podiatry       Family History   Problem Relation Age of Onset   • No Known Problems Mother    • No Known Problems Father    • No Known Problems Sister    • No Known Problems Daughter    • No Known Problems Maternal Grandmother    • Colon cancer Maternal Grandfather    • No Known Problems Paternal Grandmother    • No Known Problems Paternal Grandfather      I have reviewed and agree with the history as documented      E-Cigarette/Vaping   • E-Cigarette Use Never User      E-Cigarette/Vaping Substances   • Nicotine No    • THC No    • CBD No    • Flavoring No    • Other No    • Unknown No      Social History     Tobacco Use   • Smoking status: Some Days     Packs/day: 1 00     Years: 15 00     Pack years: 15 00     Types: Cigarettes     Last attempt to quit: 11/8/2021 Years since quittin 2   • Smokeless tobacco: Never   • Tobacco comments:     trying to quit - using judson  patch   Vaping Use   • Vaping Use: Never used   Substance Use Topics   • Alcohol use: Not Currently   • Drug use: No        Review of Systems   Constitutional: Negative for chills and fever  HENT: Negative for ear pain and sore throat  Eyes: Negative for pain and visual disturbance  Respiratory: Negative for cough and shortness of breath  Cardiovascular: Negative for chest pain and palpitations  Gastrointestinal: Negative for abdominal pain, nausea and vomiting  Genitourinary: Negative for dysuria and hematuria  Musculoskeletal: Negative for arthralgias and back pain  Skin: Negative for color change and rash  Neurological: Positive for facial asymmetry and speech difficulty  Negative for dizziness, seizures, syncope, light-headedness, numbness and headaches  All other systems reviewed and are negative  Physical Exam  ED Triage Vitals   Temperature Pulse Respirations Blood Pressure SpO2   23 1446 23 1307 23 1307 23 1307 23 1307   97 9 °F (36 6 °C) (!) 114 18 143/99 98 %      Temp Source Heart Rate Source Patient Position - Orthostatic VS BP Location FiO2 (%)   23 1446 23 1307 23 1315 23 1315 --   Oral Monitor Lying Right arm       Pain Score       23 1553       9             Orthostatic Vital Signs  Vitals:    23 1415 23 1430 23 1445 23 1500   BP: 162/89 157/89 (!) 175/79 (!) 171/95   Pulse: 100 96 96 94   Patient Position - Orthostatic VS:    Lying       Physical Exam  Vitals and nursing note reviewed  Constitutional:       General: She is not in acute distress  Appearance: Normal appearance  She is well-developed  She is not ill-appearing, toxic-appearing or diaphoretic  HENT:      Head: Normocephalic and atraumatic        Mouth/Throat:      Mouth: Mucous membranes are moist    Eyes: Conjunctiva/sclera: Conjunctivae normal    Cardiovascular:      Rate and Rhythm: Normal rate and regular rhythm  Heart sounds: No murmur heard  Pulmonary:      Effort: Pulmonary effort is normal  No respiratory distress  Breath sounds: Normal breath sounds  Abdominal:      Palpations: Abdomen is soft  Tenderness: There is no abdominal tenderness  There is no guarding or rebound  Musculoskeletal:         General: No swelling  Cervical back: Neck supple  Skin:     General: Skin is warm and dry  Capillary Refill: Capillary refill takes less than 2 seconds  Neurological:      General: No focal deficit present  Mental Status: She is alert and oriented to person, place, and time  Cranial Nerves: Cranial nerve deficit present  Sensory: No sensory deficit  Motor: Weakness present        Coordination: Coordination normal       Comments: RLE weakness and right sided facial droop    Psychiatric:         Mood and Affect: Mood normal          ED Medications  Medications   DULoxetine (CYMBALTA) delayed release capsule 60 mg (has no administration in time range)   acetaminophen (TYLENOL) tablet 650 mg (has no administration in time range)   ondansetron (ZOFRAN) injection 4 mg (has no administration in time range)   atorvastatin (LIPITOR) tablet 40 mg (has no administration in time range)   aspirin tablet 325 mg (has no administration in time range)   enoxaparin (LOVENOX) subcutaneous injection 40 mg (has no administration in time range)   clopidogrel (PLAVIX) tablet 300 mg (has no administration in time range)   clopidogrel (PLAVIX) tablet 75 mg (has no administration in time range)   aspirin chewable tablet 81 mg (has no administration in time range)   sodium chloride 0 9 % infusion (has no administration in time range)   ketorolac (TORADOL) injection 15 mg (has no administration in time range)   iohexol (OMNIPAQUE) 350 MG/ML injection (SINGLE-DOSE) 85 mL (85 mL Intravenous Given 2/4/23 1323)       Diagnostic Studies  Results Reviewed     Procedure Component Value Units Date/Time    Rapid drug screen, urine [[id number]] Collected: 02/04/23 1525    Lab Status: In process Specimen: Urine, Clean Catch Updated: 02/04/23 1542    FLU/RSV/COVID - if FLU/RSV clinically relevant [[id number]] Collected: 02/04/23 1525    Lab Status: In process Specimen: Nares from Nose Updated: 02/04/23 1542    HS Troponin I 2hr [[id number]] Collected: 02/04/23 1525    Lab Status: In process Specimen: Blood from Arm, Left Updated: 02/04/23 1542    UA w Reflex to Microscopic w Reflex to Culture [[id number]] Collected: 02/04/23 1526    Lab Status:  In process Specimen: Urine, Clean Catch Updated: 02/04/23 1542    HS Troponin I 4hr [[id number]]     Lab Status: No result Specimen: Blood     HS Troponin 0hr (reflex protocol) [[id number]]  (Normal) Collected: 02/04/23 1314    Lab Status: Final result Specimen: Blood from Arm, Left Updated: 02/04/23 1406     hs TnI 0hr 3 ng/L     Protime-INR [[id number]]  (Normal) Collected: 02/04/23 1314    Lab Status: Final result Specimen: Blood from Arm, Left Updated: 02/04/23 1342     Protime 12 6 seconds      INR 0 92    APTT [[id number]]  (Normal) Collected: 02/04/23 1314    Lab Status: Final result Specimen: Blood from Arm, Left Updated: 02/04/23 1342     PTT 29 seconds     Basic metabolic panel [[id number]]  (Abnormal) Collected: 02/04/23 1314    Lab Status: Final result Specimen: Blood from Arm, Left Updated: 02/04/23 1341     Sodium 138 mmol/L      Potassium 4 0 mmol/L      Chloride 108 mmol/L      CO2 26 mmol/L      ANION GAP 4 mmol/L      BUN 11 mg/dL      Creatinine 0 88 mg/dL      Glucose 143 mg/dL      Calcium 9 6 mg/dL      eGFR 73 ml/min/1 73sq m     Narrative:      [place] guidelines for Chronic Kidney Disease (CKD):   •  Stage 1 with normal or high GFR (GFR > 90 mL/min/1 73 square meters)  •  Stage 2 Mild CKD (GFR = 60-89 mL/min/1 73 square meters)  •  Stage 3A Moderate CKD (GFR = 45-59 mL/min/1 73 square meters)  •  Stage 3B Moderate CKD (GFR = 30-44 mL/min/1 73 square meters)  •  Stage 4 Severe CKD (GFR = 15-29 mL/min/1 73 square meters)  •  Stage 5 End Stage CKD (GFR <15 mL/min/1 73 square meters)  Note: GFR calculation is accurate only with a steady state creatinine    CBC and Platelet [[id number]]  (Abnormal) Collected: 02/04/23 1314    Lab Status: Final result Specimen: Blood from Arm, Left Updated: 02/04/23 1325     WBC 9 32 Thousand/uL      RBC 4 72 Million/uL      Hemoglobin 15 1 g/dL      Hematocrit 46 5 %      MCV 99 fL      MCH 32 0 pg      MCHC 32 5 g/dL      RDW 14 8 %      Platelets 268 Thousands/uL      MPV 9 4 fL                  CT stroke alert brain   Final Result by Mejia Moreno DO (02/04 1342)      No acute intracranial abnormality  Findings were directly discussed with Breann Cabrera at 1:40 PM       Workstation performed: [id number]         CTA stroke alert (head/neck)   Final Result by Mejia Moreno DO (02/04 1348)      There are some anatomic variations of the intracranial circulation as described above with no stenosis, occlusion or thrombosis of the cervical or intracranial vasculature  Findings were directly discussed with Breann Cabrera at approximately 1:40 PM                            Workstation performed: [id number]         MRI Inpatient Order    (Results Pending)         Procedures  Procedures      ED Course  ED Course as of 02/04/23 1556   Sat Feb 04, 2023   1342 Blood Pressure: 143/99   1342 Pulse(!): 114   1342 Respirations: 18   1342 SpO2: 98 %   1342 WBC: 9 32  wnl   1342 Hemoglobin: 15 1   1342 Platelet Count: 416   9173 CT stroke alert brain  No acute intracranial abnormality     80 CTA stroke alert (head/neck)     There are some anatomic variations of the intracranial circulation as described above with no stenosis, occlusion or thrombosis of the cervical or intracranial vasculature  1435 Admitted to neurology for stroke pathway                  Stroke Assessment     Row Name 02/04/23 1442             NIH Stroke Scale    Interval --      Level of Consciousness (1a ) 0      LOC Questions (1b ) 0      LOC Commands (1c ) 0      Best Gaze (2 ) 0      Visual (3 ) 0      Facial Palsy (4 ) 2      Motor Arm, Left (5a ) 0      Motor Arm, Right (5b ) 0      Motor Leg, Left (6a ) 0      Motor Leg, Right (6b ) 1      Limb Ataxia (7 ) 0      Sensory (8 ) 0      Best Language (9 ) 0      Dysarthria (10 ) 0      Extinction and Inattention (11 ) (Formerly Neglect) 0      Total 3                                    Medical Decision Making  Facial droop: acute illness or injury  Amount and/or Complexity of Data Reviewed  Labs: ordered  Decision-making details documented in ED Course  Radiology: ordered  Decision-making details documented in ED Course  Risk  Prescription drug management  59-year-old female with medical history significant for hyperlipidemia and depression presents to the ED for evaluation of right-sided facial droop and since 12 PM today  Patient states she is not on anticoagulation  No history of strokes  No other complaints at this time  Stroke alert was activated  Hemodynamically stable  Afebrile  Right-sided facial droop and right lower extremity weakness noted  Initial NIH stroke scale 3  CBC and BMP within normal limits  Coags within normal limits  Initial troponin was 3  CT head showed no acute intracranial abnormality  CTA head and neck showed no stenosis or occlusion or thrombosis of the cervical or intracranial vasculature  Patient was admitted to neurology for stroke pathway work-up      Disposition  Final diagnoses:   Facial droop   Speech disturbance     Time reflects when diagnosis was documented in both MDM as applicable and the Disposition within this note     Time User Action Codes Description Comment    2/4/2023  1:10 PM Monalisa Melo Add [R29 [id number]] Facial droop     2/4/2023  2:30 PM Caitlin Power Add [R47 [id number]] Speech disturbance       ED Disposition     ED Disposition   Admit    Condition   Stable    Date/Time   Sat Feb 4, 2023  3:56 PM    Comment   Case was discussed with Dr Saint Lea and the patient's admission status was agreed to be Admission Status: observation status to the service of Dr Saint Lea  Follow-up Information    None         Current Discharge Medication List      CONTINUE these medications which have NOT CHANGED    Details   atorvastatin (LIPITOR) 20 mg tablet Take 20 mg by mouth every evening        !! DULoxetine (CYMBALTA) 60 mg delayed release capsule TAKE 2 CAPSULES (120 MG TOTAL) B MOUTH DAILY      meclizine (ANTIVERT) 25 mg tablet 1 every 8 hours as needed      naproxen (NAPROSYN) 500 mg tablet Take 500 mg by mouth 2 (two) times a day as needed Take with food      QUEtiapine (SEROquel) 25 mg tablet Take 25 mg by mouth every evening      acetaminophen (TYLENOL) 650 mg CR tablet Take 650 mg by mouth every 8 (eight) hours as needed      aspirin (ECOTRIN) 325 mg EC tablet Take 1 tablet (325 mg total) by mouth daily  Qty: 30 tablet, Refills: 0    Associated Diagnoses: Post-operative state      cyclobenzaprine (FLEXERIL) 10 mg tablet take 1 tablet by mouth three times a day for muscle spasm      Diclofenac Sodium (VOLTAREN) 1 % Apply 2 g topically 4 (four) times a day  Qty: 150 g, Refills: 1    Associated Diagnoses: Neuritis of left foot      !! DULoxetine (CYMBALTA) 30 mg delayed release capsule Take 30 mg by mouth daily      gabapentin (NEURONTIN) 300 mg capsule Take 1 capsule (300 mg total) by mouth 2 (two) times a day  Qty: 60 capsule, Refills: 1    Associated Diagnoses: Hallux valgus of left foot;  Neuropathic pain; Post-operative state      ibuprofen (MOTRIN) 600 mg tablet Take 1 tablet (600 mg total) by mouth every 6 (six) hours as needed for mild pain for up to 30 days  Qty: 90 tablet, Refills: 0 Associated Diagnoses: Plantar flexed metatarsal, left      lidocaine-prilocaine (EMLA) cream Apply topically as needed for mild pain or moderate pain  Qty: 30 g, Refills: 5    Associated Diagnoses: Neuritis of left foot      nicotine (NICODERM CQ) 14 mg/24hr TD 24 hr patch Place 1 patch on the skin every 24 hours      !! oxyCODONE-acetaminophen (Percocet) 5-325 mg per tablet Take 1 tablet by mouth every 6 (six) hours as needed for severe pain for up to 30 doses Max Daily Amount: 4 tablets  Qty: 30 tablet, Refills: 0    Associated Diagnoses: Post-operative state      !! oxyCODONE-acetaminophen (Percocet) 5-325 mg per tablet Take 1 tablet by mouth every 4 (four) hours as needed for moderate pain for up to 15 doses Max Daily Amount: 15 tablets  Qty: 15 tablet, Refills: 0    Associated Diagnoses: Acute left-sided low back pain with left-sided sciatica      traZODone (DESYREL) 50 mg tablet take 1 tablet by mouth nightly - TAKE AT BEDTIME       ! ! - Potential duplicate medications found  Please discuss with provider  No discharge procedures on file  PDMP Review     None           ED Provider  Attending physically available and evaluated Enrique Watson  I managed the patient along with the ED Attending      Electronically Signed by         Giovany Lee MD  02/04/23 8478

## 2023-02-04 NOTE — PROGRESS NOTES
Pastoral Care Progress Note    2023  Patient: Dylan Encarnacion : 1965  Admission Date & Time: 2023 1306  MRN: [id number] [facility]: [id number]      On call  Joann Silverio responded to stroke alert [phone] [id number]  Pt was busy with providers, no family present  Chaplains remain available                 Chaplaincy Interventions Utilized:       Relationship Building: Cultivated a relationship of care and support     23 1300   Clinical Encounter Type   Visited With Patient not available   Crisis Visit Code  (stroke alert)

## 2023-02-04 NOTE — ASSESSMENT & PLAN NOTE
61 yo F w/ a PMH of Current smoker (1ppd, 25+ years), past bunion surgery, pre-diabetic, vertigo, hypertension coming in as a stroke alert on 2/4/2023  1:06 PM with initial NIHSS of 5 and LKW 8:30am, initial Blood Pressure: 143/99  Initial presenting deficits were slurred speech, RUE and RLE weakness, and some sensory neglect  Patient had went up around 8:30 AM in the morning that was known at that time  She then stated she took a nap and then woke up around 1130am and then talk to her daughter who noticed that she was slurring her speech  Patient also had appreciated that she had a left facial droop during this time  patient also noted that she had a episode of bowel incontinence prior to this  As a result of patient outside window of TNK and most symptoms resolving pt was determined to not be a candidate for thrombolysis (TNK)  Initial Exam on 02/04/23: R facial droop, slurred speech, 5/5 strength in all extremities although in LE had some pain  Found to have a stroke in L PLIC/thalamus on MRI  • Vascular risk factors: active smoker, prediabetic, age, hypertension  • Home meds: cymbalta     Workup:  Lab Results   Component Value Date    HGBA1C 6 4 (H) 02/05/2023    CHOLESTEROL 240 (H) 02/05/2023    LDLCALC 171 (H) 02/05/2023    TRIG 132 02/05/2023    INR 0 92 02/04/2023      • CTH: negative for any bleeds or recent stroke  • CTA: negative for LVOs, aneurysms, dissection or AVMs  • MRI w/o contrast: Left posterior limb internal capsule/thalamic acute to subacute ischemia  • MRI w/ contrast showed: No abnormal enhancement, no acute changes for MRI w/o contrast  • Echocardiogram: left ventricular ejection fraction is 60%  Systolic function is normal  Wall motion is normal  Diastolic function is normal   Left atrium normal in size   • CT CAP w/ contrast:  No CT evidence for malignancy in the chest, abdomen and pelvis    Thrombosis panel:Anticardiolipin antibody negative, protein S antigen free slightly elevated, beta-2 paraproteins negative, protein S activity elevated, Antithrombin III activity 100 within normal limits,    Pertinent scores:  - NIHSS: 5  Stroke Modified [place] Score: 2 (Unable to carry out all previous activities, but able to look after own affairs without assistance)    Impression: stroke appreciated in L internal capsule and thalamus likely secondary to hypertension and other vascular risk factors but cannot rule out other factors such as a hypercoagulable state given that patient is young and has been a longtime smoker      Plan:  - Stroke pathway  - Discussed plan with neurology attending, Dr Colton Crowley  - BP: Allow for normotension and continue verapamil 121 mg daily  - rosuvastatin 40mg qhs  - Maintain glucose <180, SSI for coverage if indicated  - Medical management as per primary team appreciated  - Antiplatelet agents: DAPT w/ ASA 81mg and plavix 75mg for 21 days and then monotherapy (02/26/2023) w/ ASA 81mg  - Ordered CT chest abdomen pelvis with and without contrast that was negative for any malignancy  - d-dimer negative and f/u thrombosis labs  - DVT ppx and SCDs  - Monitor on telemetry  - PT/OT/Speech recommended rehab w/ [facility] pending placement  - Speech recommends regular house  - Stroke education

## 2023-02-04 NOTE — Clinical Note
Case was discussed with Dr Marilin Gallagher and the patient's admission status was agreed to be Admission Status: observation status to the service of Dr Marilin Gallagher

## 2023-02-04 NOTE — H&P
NEUROLOGY RESIDENCY - ADMISSION H&P NOTE     Name: Ananya Reveles   Age & Sex: 62 y o  female   MRN: [id number]  Unit/Bed#: [facility] 708-01   Encounter: [id number]    ASSESSMENT & PLAN     * Stroke-like symptoms  Assessment & Plan  63 yo F w/ a PMH of Current smoker (1ppd, 25+ years), past bunion surgery, pre-diabetic, vertigo, hypertension coming in as a stroke alert on 2/4/2023  1:06 PM with initial NIHSS of 5 and LKW 8:30am, initial Blood Pressure: 143/99  Initial presenting deficits were slurred speech, RUE and RLE weakness, and some sensory neglect  Patient had went up around 8:30 AM in the morning that was known at that time  She then stated she took a nap and then woke up around 1130am and then talk to her daughter who noticed that she was slurring her speech  Patient also had appreciated that she had a left facial droop during this time  patient also noted that she had a episode of bowel incontinence prior to this  As a result of patient outside window of TNK and most symptoms resolving pt was determined to not be a candidate for thrombolysis (TNK)     • Exam on 02/04/23: Left facial droop, slurred speech, 5/5 strength in all extremities although in LE had some pain  Current Blood Pressure: (!) 175/79, BP over 24 hours: BP  Min: 141/96  Max: 188/107   • Vascular risk factors: active smoker, prediabetic, age, hypertension  • Home meds: cymbalta     Workup:  Lab Results   Component Value Date    HGBA1C 6 4 (H) 08/10/2022    CHOLESTEROL 256 (H) 10/27/2021    LDLCALC 180 (H) 10/27/2021    TRIG 185 (H) 10/27/2021    INR 0 92 02/04/2023      • CTH: negative for any bleeds or recent stroke  • CTA: negative for LVOs, aneurysms, dissection or AVMs  • MRI: pending   • Echocardiogram: pending   • Telemetry: pending    Pertinent scores:  - NIHSS: 5  Stroke Modified Omak Score: 2 (Unable to carry out all previous activities, but able to look after own affairs without assistance)    Impression: possible stroke vs tia, possibly some functional components given parts of the exam are inconsistent    Plan:  - Stroke pathway  - Discussed plan with neurology attending, Dr Brennan Marinelli  - BP: Permissive hypertension for first 24 hours up to 220 SBP  - Obtain lipids and A1c  - atorvastatin 40mg qhs  - Maintain glucose <180, SSI for coverage if indicated  - Medical management as per primary team appreciated  - Antiplatelet agents: loaded w/ ASA 325mg, ASA 81 mg, load with Plavix 300 mg today and then reduce to 75 mg from tomorrow onward and   - TTE  - MRI brain pending  - DVT ppx and SCDs  - Monitor on telemetry  - PT/OT/Speech/PM&R input appreciated  - Stroke education      Depression  Assessment & Plan  Continue home cymbalta      Recommendations for outpatient neurological follow up have yet to be determined  VTE Prophylaxis: Enoxaparin (Lovenox)  / sequential compression device   Code Status: 1      Anticipated Length of Stay:  Patient will be admitted on an Observation basis with an anticipated length of stay of   2 midnights  Justification for Hospital Stay: Stroke like symptoms     CHIEF COMPLAINT     Chief Complaint   Patient presents with   • CVA/TIA-like Symptoms     1 hour PTA, pt reported sudden facial droop and difficulty speaking          HISTORY OF PRESENT ILLNESS     Kenia Ro is a 62 y o  female  with  PMH of Current smoker (1ppd, 25+ years), past bunion surgery, pre-diabetic, vertigo, hypertension coming in as a stroke alert on 2/4/2023  LKW 8:30am, initial Blood Pressure: 143/99  Initial presenting deficits were slurred speech, RUE and RLE weakness, and some sensory neglect  Patient had went up around 8:30 AM in the morning that was known at that time  She then stated she took a nap and then woke up around 11 a m  and went to the bathroom and then also stated that she appreciated some facial asymmetry in the bathroom  She then talked to her daughter who noticed that she was slurring her speech  Patient also had appreciated that she had a left facial droop during this time  patient also noted that she had a episode of bowel incontinence prior to this  Patient's daughter then contacted family to get patient into the hospital and patient was taken to the hospital via EMS  Patient significant other that was living with her did note that patient had her episode of bowel incontinence around 6  Patient has never had episode of stroke in the past or similar symptoms  She has gotten dizzy in the past and has a history of vertigo  She does also have a history of being a current smoker and this explained that this can increase her risk of surgery  She does note that she has some left foot pain due to recent bunion surgery  The only medication she actually takes is her Cymbalta for her depression  She does not take any other medications and this was done/reconciled on her med rec  She did note that she does have a known high cholesterol and also to be taking a statin medication but had not been regularly taking it  The only other medication that she normally takes is Tylenol and that is as needed  She mentioned during the evaluation that she also had a headache during this time and does have a history of headaches  REVIEW OF SYSTEMS     Review of Systems    PAST MEDICAL HISTORY     Past Medical History:   Diagnosis Date   • Acquired deformity of left foot    • Anesthesia complication     difficulty awakening   • Arthritis    • Deaf, left    • Depression    • Hallux valgus of left foot    • Hammer toe of left foot    • Headache    • Kidney stone    • Sciatic pain, right     intermittent       Allergies:    Allergies   Allergen Reactions   • Sertraline Other (See Comments)     Hand swelling, itching   • Caffeine - Food Allergy Blisters     meds that contain caffeine cause Blisters in mouth, can drink caffienated coffee        PAST SURGICAL HISTORY     Past Surgical History:   Procedure Laterality Date   • BREAST BIOPSY Right 2021   • BREAST BIOPSY Right 3/10/2021    Procedure: Excisional Breast debridement  7 o'clock position;  Surgeon: Patrick Taylor MD;  [facility];  Service: General   • CHOLECYSTECTOMY     • CLOSURE DELAYED PRIMARY Right 2020    Procedure: CLOSURE DELAYED PRIMARY and application of skin graft substitute;  Surgeon: Sravanthi Rivero DPM;  [facility];  Service: Podiatry   • HERNIA REPAIR     • INCISION AND DRAINAGE OF WOUND Right 2020    Procedure: INCISION AND DRAINAGE (I&D) EXTREMITY;  Surgeon: Sravanthi Rivero DPM;  [facility];  Service: Podiatry   • Piroska U  97  W/SESMDC W/1METAR MEDIAL CNF Left 2021    Procedure: Joe Conn;  Surgeon: Sravanthi Rivero DPM;  [facility];  Service: Podiatry   • MT OSTEOT W/ AppLift SHRT/CORRJ METAR XCP 1ST EA Left 2019    Procedure: 3rd Metatarsal Osteotomy;  Surgeon: Kavya Deluca DPM;  [facility];  Service: Podiatry   • MT OSTEOT W/ AppLift SHRT/CORRJ METAR XCP 1ST EA Left 2021    Procedure: OSTEOTOMY 2ND METATARSAL;  Surgeon: Sravanthi Rivero DPM;  [facility];  Service: Podiatry   • TUBAL LIGATION     • US GUIDED BREAST BIOPSY RIGHT COMPLETE Right 3/4/2021   • VAC DRESSING APPLICATION Right 3/4/5530    Procedure: APPLICATION VAC DRESSING;  Surgeon: Sravanthi Rivero DPM;  [facility];  Service: Podiatry       SOCIAL & FAMILY HISTORY     Social History     Substance and Sexual Activity   Alcohol Use Not Currently     Substance and Sexual Activity   Alcohol Use Not Currently        Substance and Sexual Activity   Drug Use No     Social History     Tobacco Use   Smoking Status Some Days   • Packs/day: 1 00   • Years: 15    • Pack years: 15    • Types: Cigarettes   • Last attempt to quit: 2021   • Years since quittin 2   Smokeless Tobacco Never   Tobacco Comments    trying to quit - using judson  patch       Marital Status: Single   Occupation: Retired  Patient Pre-hospital Living Situation: Lives w/ partner  Patient Pre-hospital Level of Mobility: uses cane   Patient Pre-hospital Diet Restrictions: none, prediabetic     Family History:  Family History   Problem Relation Age of Onset   • No Known Problems Mother    • No Known Problems Father    • No Known Problems Sister    • No Known Problems Daughter    • No Known Problems Maternal Grandmother    • Colon cancer Maternal Grandfather    • No Known Problems Paternal Grandmother    • No Known Problems Paternal Grandfather            OBJECTIVE     Vitals:    23 1553 23 1630 23 1631 23 1730   BP:  148/92 148/92 (!) 152/110   BP Location:       Pulse:  96 98 99   Resp:       Temp:   97 9 °F (36 6 °C)    TempSrc:       SpO2:  95% 95% 98%   Weight: 87 1 kg (192 lb)      Height: 5' (1 524 m)           Temperature:   Temp (24hrs), Av 9 °F (36 6 °C), Min:97 9 °F (36 6 °C), Max:97 9 °F (36 6 °C)    Temperature: 97 9 °F (36 6 °C)    Intake & Output:  I/O     None          Weights:   IBW (Ideal Body Weight): 45 5 kg    Body mass index is 37 5 kg/m²  Weight (last 2 days)     Date/Time Weight    23 1553 87 1 (192)          Physical Exam  Vitals and nursing note reviewed  Constitutional:       General: She is not in acute distress  Appearance: She is well-developed  HENT:      Head: Normocephalic and atraumatic  Eyes:      Extraocular Movements: EOM normal       Conjunctiva/sclera: Conjunctivae normal       Pupils: Pupils are equal, round, and reactive to light  Cardiovascular:      Rate and Rhythm: Normal rate  Abdominal:      Palpations: Abdomen is soft  Musculoskeletal:         General: No swelling  Cervical back: Neck supple  Skin:     General: Skin is warm and dry  Capillary Refill: Capillary refill takes less than 2 seconds  Neurological:      Mental Status: She is alert and oriented to person, place, and time        Motor: Motor strength is normal  Coordination: Finger-Nose-Finger Test and Heel to [facility] Test normal       Deep Tendon Reflexes:      Reflex Scores:       Tricep reflexes are 2+ on the right side and 2+ on the left side  Bicep reflexes are 2+ on the right side and 2+ on the left side  Brachioradialis reflexes are 2+ on the right side and 2+ on the left side  Patellar reflexes are 2+ on the right side and 2+ on the left side  Achilles reflexes are 1+ on the right side and 1+ on the left side  Psychiatric:         Mood and Affect: Mood normal           Neurologic Exam     Mental Status   Oriented to person, place, and time  Cranial Nerves     CN II   Visual fields full to confrontation  CN III, IV, VI   Pupils are equal, round, and reactive to light  Extraocular motions are normal    CN III: no CN III palsy  CN VI: no CN VI palsy  Nystagmus: none     CN V   Facial sensation intact  CN VIII   CN VIII normal      CN IX, X   CN IX normal    CN X normal      CN XI   CN XI normal      CN XII   CN XII normal        L facial droop, but when not paying attention, could appreciate no facial droop and was moving L side of face more     Motor Exam   Right arm pronator drift: absent  Left arm pronator drift: absent    Strength   Strength 5/5 throughout  Initially had bilateral lower extremity drift but was in multiple muscles later examined individually it was 5 out of 5 strength    Some possible functional component/pain related components in lower extremity particularly left plantar flexion and dorsiflexion due to past bunion surgery but was able to give a burst of strength 5 out of 5 in bilateral upper and lower extremities     Sensory Exam   Light touch normal    Pinprick normal        Had inconsistent sensory neglect appreciated where patient could not specify if both legs were being touched but was able to state that each extremity was being touched individually correctly     Gait, Coordination, and Reflexes Coordination   Finger to nose coordination: normal  Heel to shin coordination: normal    Tremor   Resting tremor: absent  Intention tremor: absent  Action tremor: absent    Reflexes   Right brachioradialis: 2+  Left brachioradialis: 2+  Right biceps: 2+  Left biceps: 2+  Right triceps: 2+  Left triceps: 2+  Right patellar: 2+  Left patellar: 2+  Right achilles: 1+  Left achilles: 1+  Right plantar: normal  Left plantar: normal         NIHSS:  1a Level of Consciousness: 0 = Alert   1b  LOC Questions: 0 = Answers both correctly   1c  LOC Commands: 0 = Obeys both correctly   2  Best Gaze: 0 = Normal   3  Visual: 0 = No visual field loss   4  Facial Palsy: 1=Minor paralysis (flattened nasolabial fold, asymmetric on smiling)   5a  Motor Right Arm: 0=No drift, limb holds 90 (or 45) degrees for full 10 seconds   5b  Motor Left Arm: 0=No drift, limb holds 90 (or 45) degrees for full 10 seconds   6a  Motor Right Le=Drift, limb holds 90 (or 45) degrees but drifts down before full 10 seconds: does not hit bed   6b  Motor Left Le=Drift, limb holds 90 (or 45) degrees but drifts down before full 10 seconds: does not hit bed   7  Limb Ataxia:  0=Absent   8  Sensory: 0=Normal; no sensory loss   9  Best Language:  0=No aphasia, normal   10  Dysarthria: 1=Mild to moderate, patient slurs at least some words and at worst, can be understood with some difficulty   11  Extinction and Inattention (formerly Neglect): 1=Visual, tactile, auditory, spatial or personal inattention or extinction to bilateral simultaneous stimulation in one of the sensory modalities   Total Score: 5     Time NIHSS was completed: 1:11pm      LABORATORY DATA     Labs: I have personally reviewed pertinent reports     and I have personally reviewed pertinent films in PACS  Results from last 7 days   Lab Units 23  1314   WBC Thousand/uL 9 32   HEMOGLOBIN g/dL 15 1   HEMATOCRIT % 46 5*   PLATELETS Thousands/uL 352      Results from last 7 days   Lab Units 02/04/23  1314   SODIUM mmol/L 138   POTASSIUM mmol/L 4 0   CHLORIDE mmol/L 108   CO2 mmol/L 26   BUN mg/dL 11   CREATININE mg/dL 0 88   CALCIUM mg/dL 9 6              Results from last 7 days   Lab Units 02/04/23  1314   INR  0 92   PTT seconds 29               Micro:  Lab Results   Component Value Date    BLOODCX No Growth After 5 Days  06/29/2020    URINECX 60,000-69,000 cfu/ml Mixed Contaminants X3 03/26/2017    URINECX No Growth <1000 cfu/mL 02/17/2017    URINECX >100,000 cfu/ml Mixed Contaminants X6 02/17/2017    WOUNDCULT 1+ Growth of 07/01/2020       IMAGING & DIAGNOSTIC TESTING     Radiology Results: I have personally reviewed pertinent reports  and I have personally reviewed pertinent films in PACS    CT stroke alert brain   Final Result by Mejia Rubin DO (02/04 1342)      No acute intracranial abnormality  Findings were directly discussed with Anju Louis at 1:40 PM       Workstation performed: [id number]         CTA stroke alert (head/neck)   Final Result by Mejia Rubin DO (02/04 1348)      There are some anatomic variations of the intracranial circulation as described above with no stenosis, occlusion or thrombosis of the cervical or intracranial vasculature  Findings were directly discussed with Anju Louis at approximately 1:40 PM                            Workstation performed: [id number]         MRI Inpatient Order    (Results Pending)       Other Diagnostic Testing: I have personally reviewed pertinent reports     and I have personally reviewed pertinent films in PACS    ACTIVE MEDICATIONS     Current Facility-Administered Medications   Medication Dose Route Frequency   • acetaminophen (TYLENOL) tablet 650 mg  650 mg Oral Q4H PRN   • [START ON 2/5/2023] aspirin chewable tablet 81 mg  81 mg Oral Daily   • aspirin tablet 325 mg  325 mg Oral Once   • atorvastatin (LIPITOR) tablet 40 mg  40 mg Oral QPM   • clopidogrel (PLAVIX) tablet 300 mg  300 mg Oral Once   • [START ON 2/5/2023] clopidogrel (PLAVIX) tablet 75 mg  75 mg Oral Daily   • DULoxetine (CYMBALTA) delayed release capsule 60 mg  60 mg Oral Daily   • enoxaparin (LOVENOX) subcutaneous injection 40 mg  40 mg Subcutaneous Daily   • ondansetron (ZOFRAN) injection 4 mg  4 mg Intravenous Q6H PRN   • sodium chloride 0 9 % infusion  75 mL/hr Intravenous Continuous         HOME MEDICATIONS     Prior to Admission medications    Medication Sig Start Date End Date Taking?  Authorizing Provider   atorvastatin (LIPITOR) 20 mg tablet Take 20 mg by mouth every evening   10/23/20  Yes Historical Provider, MD   DULoxetine (CYMBALTA) 60 mg delayed release capsule TAKE 2 CAPSULES (120 MG TOTAL) B MOUTH DAILY 12/13/22  Yes Historical Provider, MD   meclizine (ANTIVERT) 25 mg tablet 1 every 8 hours as needed 8/30/22  Yes Historical Provider, MD   naproxen (NAPROSYN) 500 mg tablet Take 500 mg by mouth 2 (two) times a day as needed Take with food 1/2/23  Yes Historical Provider, MD   QUEtiapine (SEROquel) 25 mg tablet Take 25 mg by mouth every evening 6/30/22  Yes Historical Provider, MD   acetaminophen (TYLENOL) 650 mg CR tablet Take 650 mg by mouth every 8 (eight) hours as needed    Historical Provider, MD   aspirin (ECOTRIN) 325 mg EC tablet Take 1 tablet (325 mg total) by mouth daily  Patient not taking: Reported on 2/4/2023 11/12/21   Ladan Lozada DPM   cyclobenzaprine (FLEXERIL) 10 mg tablet take 1 tablet by mouth three times a day for muscle spasm  Patient not taking: Reported on 2/4/2023 1/2/23   Historical Provider, MD   Diclofenac Sodium (VOLTAREN) 1 % Apply 2 g topically 4 (four) times a day  Patient not taking: Reported on 2/4/2023 7/14/22   Cristine Cage DPM   DULoxetine (CYMBALTA) 30 mg delayed release capsule Take 30 mg by mouth daily  Patient not taking: Reported on 2/4/2023 7/19/22   Historical Provider, MD   gabapentin (NEURONTIN) 300 mg capsule Take 1 capsule (300 mg total) by mouth 2 (two) times a day 3/31/22 5/30/22  Se Brady DPM   ibuprofen (MOTRIN) 600 mg tablet Take 1 tablet (600 mg total) by mouth every 6 (six) hours as needed for mild pain for up to 30 days 7/19/19 11/8/21  Dilan Ohara DPM   lidocaine-prilocaine (EMLA) cream Apply topically as needed for mild pain or moderate pain  Patient not taking: Reported on 2/4/2023 7/14/22   Se Brady DPM   nicotine (NICODERM CQ) 14 mg/24hr TD 24 hr patch Place 1 patch on the skin every 24 hours  Patient not taking: Reported on 2/4/2023    Historical Provider, MD   oxyCODONE-acetaminophen (Percocet) 5-325 mg per tablet Take 1 tablet by mouth every 6 (six) hours as needed for severe pain for up to 30 doses Max Daily Amount: 4 tablets  Patient not taking: Reported on 2/15/2022 11/12/21   Ina Glaser DPM   oxyCODONE-acetaminophen (Percocet) 5-325 mg per tablet Take 1 tablet by mouth every 4 (four) hours as needed for moderate pain for up to 15 doses Max Daily Amount: 15 tablets  Patient not taking: Reported on 2/4/2023 1/6/23   Antoinette Jeff MD   traZODone (DESYREL) 50 mg tablet take 1 tablet by mouth nightly - TAKE AT BEDTIME  Patient not taking: Reported on 2/4/2023 2/28/22   Historical Provider, MD     ACTIVE MEDICATIONS    ==  Sharon Ramos, [facility] Neurology Residency, PGY-2

## 2023-02-05 ENCOUNTER — APPOINTMENT (INPATIENT)
Dept: RADIOLOGY | Facility: HOSPITAL | Age: 58
End: 2023-02-05

## 2023-02-05 ENCOUNTER — APPOINTMENT (OUTPATIENT)
Dept: NON INVASIVE DIAGNOSTICS | Facility: HOSPITAL | Age: 58
End: 2023-02-05

## 2023-02-05 PROBLEM — I10 HYPERTENSION: Status: ACTIVE | Noted: 2023-02-05

## 2023-02-05 PROBLEM — R45.1 RESTLESSNESS: Status: ACTIVE | Noted: 2023-02-05

## 2023-02-05 PROBLEM — I63.9 STROKE (HCC): Status: ACTIVE | Noted: 2023-02-04

## 2023-02-05 LAB
ALBUMIN SERPL BCP-MCNC: 3.6 G/DL (ref 3.5–5)
ALP SERPL-CCNC: 88 U/L (ref 46–116)
ALT SERPL W P-5'-P-CCNC: 27 U/L (ref 12–78)
ANION GAP SERPL CALCULATED.3IONS-SCNC: 5 MMOL/L (ref 4–13)
AORTIC ROOT: 2.9 CM
APICAL FOUR CHAMBER EJECTION FRACTION: 60 %
ASCENDING AORTA: 2.7 CM
AST SERPL W P-5'-P-CCNC: 20 U/L (ref 5–45)
ATRIAL RATE: 90 BPM
ATRIAL RATE: 91 BPM
BASOPHILS # BLD AUTO: 0.05 THOUSANDS/ÂΜL (ref 0–0.1)
BASOPHILS NFR BLD AUTO: 1 % (ref 0–1)
BILIRUB SERPL-MCNC: 0.35 MG/DL (ref 0.2–1)
BUN SERPL-MCNC: 9 MG/DL (ref 5–25)
CALCIUM SERPL-MCNC: 9.2 MG/DL (ref 8.3–10.1)
CARDIAC TROPONIN I PNL SERPL HS: 4 NG/L (ref 8–18)
CHLORIDE SERPL-SCNC: 110 MMOL/L (ref 96–108)
CHOLEST SERPL-MCNC: 240 MG/DL
CO2 SERPL-SCNC: 25 MMOL/L (ref 21–32)
CREAT SERPL-MCNC: 0.64 MG/DL (ref 0.6–1.3)
E WAVE DECELERATION TIME: 214 MS
EOSINOPHIL # BLD AUTO: 0.14 THOUSAND/ÂΜL (ref 0–0.61)
EOSINOPHIL NFR BLD AUTO: 2 % (ref 0–6)
ERYTHROCYTE [DISTWIDTH] IN BLOOD BY AUTOMATED COUNT: 14.9 % (ref 11.6–15.1)
EST. AVERAGE GLUCOSE BLD GHB EST-MCNC: 137 MG/DL
FRACTIONAL SHORTENING: 32 (ref 28–44)
GFR SERPL CREATININE-BSD FRML MDRD: 99 ML/MIN/1.73SQ M
GLUCOSE SERPL-MCNC: 114 MG/DL (ref 65–140)
HBA1C MFR BLD: 6.4 %
HCT VFR BLD AUTO: 42.8 % (ref 34.8–46.1)
HDLC SERPL-MCNC: 43 MG/DL
HGB BLD-MCNC: 13.7 G/DL (ref 11.5–15.4)
IMM GRANULOCYTES # BLD AUTO: 0.03 THOUSAND/UL (ref 0–0.2)
IMM GRANULOCYTES NFR BLD AUTO: 0 % (ref 0–2)
INTERVENTRICULAR SEPTUM IN DIASTOLE (PARASTERNAL SHORT AXIS VIEW): 1 CM
INTERVENTRICULAR SEPTUM: 1 CM (ref 0.6–1.1)
LAAS-AP2: 16.2 CM2
LAAS-AP4: 10 CM2
LDLC SERPL CALC-MCNC: 171 MG/DL (ref 0–100)
LEFT ATRIUM SIZE: 2.9 CM
LEFT INTERNAL DIMENSION IN SYSTOLE: 2.1 CM (ref 2.1–4)
LEFT VENTRICULAR INTERNAL DIMENSION IN DIASTOLE: 3.1 CM (ref 3.5–6)
LEFT VENTRICULAR POSTERIOR WALL IN END DIASTOLE: 1 CM
LEFT VENTRICULAR STROKE VOLUME: 22 ML
LVSV (TEICH): 22 ML
LYMPHOCYTES # BLD AUTO: 3.89 THOUSANDS/ÂΜL (ref 0.6–4.47)
LYMPHOCYTES NFR BLD AUTO: 47 % (ref 14–44)
MCH RBC QN AUTO: 31.9 PG (ref 26.8–34.3)
MCHC RBC AUTO-ENTMCNC: 32 G/DL (ref 31.4–37.4)
MCV RBC AUTO: 100 FL (ref 82–98)
MONOCYTES # BLD AUTO: 0.7 THOUSAND/ÂΜL (ref 0.17–1.22)
MONOCYTES NFR BLD AUTO: 9 % (ref 4–12)
MV E'TISSUE VEL-SEP: 8 CM/S
MV PEAK A VEL: 0.62 M/S
MV PEAK E VEL: 53 CM/S
MV STENOSIS PRESSURE HALF TIME: 62 MS
MV VALVE AREA P 1/2 METHOD: 3.55
NEUTROPHILS # BLD AUTO: 3.35 THOUSANDS/ÂΜL (ref 1.85–7.62)
NEUTS SEG NFR BLD AUTO: 41 % (ref 43–75)
NRBC BLD AUTO-RTO: 0 /100 WBCS
P AXIS: 50 DEGREES
P AXIS: 59 DEGREES
PLATELET # BLD AUTO: 319 THOUSANDS/UL (ref 149–390)
PMV BLD AUTO: 9.4 FL (ref 8.9–12.7)
POTASSIUM SERPL-SCNC: 3.8 MMOL/L (ref 3.5–5.3)
PR INTERVAL: 198 MS
PR INTERVAL: 206 MS
PROT SERPL-MCNC: 7.3 G/DL (ref 6.4–8.4)
QRS AXIS: 53 DEGREES
QRS AXIS: 58 DEGREES
QRSD INTERVAL: 76 MS
QRSD INTERVAL: 82 MS
QT INTERVAL: 332 MS
QT INTERVAL: 364 MS
QTC INTERVAL: 408 MS
QTC INTERVAL: 445 MS
RBC # BLD AUTO: 4.29 MILLION/UL (ref 3.81–5.12)
RIGHT ATRIUM AREA SYSTOLE A4C: 10 CM2
SL CV LEFT ATRIUM LENGTH A2C: 5.3 CM
SL CV LV EF: 60
SL CV PED ECHO LEFT VENTRICLE DIASTOLIC VOLUME (MOD BIPLANE) 2D: 37 ML
SL CV PED ECHO LEFT VENTRICLE SYSTOLIC VOLUME (MOD BIPLANE) 2D: 15 ML
SODIUM SERPL-SCNC: 140 MMOL/L (ref 135–147)
T WAVE AXIS: 39 DEGREES
T WAVE AXIS: 44 DEGREES
TRICUSPID ANNULAR PLANE SYSTOLIC EXCURSION: 1.4 CM
TRIGL SERPL-MCNC: 132 MG/DL
VENTRICULAR RATE: 90 BPM
VENTRICULAR RATE: 91 BPM
WBC # BLD AUTO: 8.16 THOUSAND/UL (ref 4.31–10.16)

## 2023-02-05 RX ORDER — MAGNESIUM SULFATE HEPTAHYDRATE 40 MG/ML
2 INJECTION, SOLUTION INTRAVENOUS
Status: COMPLETED | OUTPATIENT
Start: 2023-02-05 | End: 2023-02-06

## 2023-02-05 RX ORDER — DIPHENHYDRAMINE HYDROCHLORIDE 50 MG/ML
25 INJECTION INTRAMUSCULAR; INTRAVENOUS EVERY 8 HOURS
Status: COMPLETED | OUTPATIENT
Start: 2023-02-05 | End: 2023-02-07

## 2023-02-05 RX ORDER — QUETIAPINE FUMARATE 25 MG/1
25 TABLET, FILM COATED ORAL
Status: DISCONTINUED | OUTPATIENT
Start: 2023-02-05 | End: 2023-02-09 | Stop reason: HOSPADM

## 2023-02-05 RX ORDER — METOCLOPRAMIDE HYDROCHLORIDE 5 MG/ML
10 INJECTION INTRAMUSCULAR; INTRAVENOUS EVERY 8 HOURS SCHEDULED
Status: COMPLETED | OUTPATIENT
Start: 2023-02-05 | End: 2023-02-08

## 2023-02-05 RX ORDER — LIDOCAINE 50 MG/G
1 PATCH TOPICAL DAILY
Status: DISCONTINUED | OUTPATIENT
Start: 2023-02-05 | End: 2023-02-09 | Stop reason: HOSPADM

## 2023-02-05 RX ORDER — FAMOTIDINE 20 MG/1
20 TABLET, FILM COATED ORAL 2 TIMES DAILY
Status: DISCONTINUED | OUTPATIENT
Start: 2023-02-05 | End: 2023-02-08

## 2023-02-05 RX ORDER — TRAZODONE HYDROCHLORIDE 50 MG/1
50 TABLET ORAL ONCE
Status: DISCONTINUED | OUTPATIENT
Start: 2023-02-05 | End: 2023-02-09 | Stop reason: HOSPADM

## 2023-02-05 RX ORDER — ACETAMINOPHEN 325 MG/1
975 TABLET ORAL EVERY 8 HOURS SCHEDULED
Status: DISCONTINUED | OUTPATIENT
Start: 2023-02-05 | End: 2023-02-05

## 2023-02-05 RX ORDER — TRAZODONE HYDROCHLORIDE 50 MG/1
50 TABLET ORAL
Status: DISCONTINUED | OUTPATIENT
Start: 2023-02-05 | End: 2023-02-09 | Stop reason: HOSPADM

## 2023-02-05 RX ORDER — NICOTINE 21 MG/24HR
21 PATCH, TRANSDERMAL 24 HOURS TRANSDERMAL DAILY
Status: DISCONTINUED | OUTPATIENT
Start: 2023-02-05 | End: 2023-02-09 | Stop reason: HOSPADM

## 2023-02-05 RX ORDER — QUETIAPINE FUMARATE 25 MG/1
25 TABLET, FILM COATED ORAL
Status: DISCONTINUED | OUTPATIENT
Start: 2023-02-05 | End: 2023-02-05

## 2023-02-05 RX ORDER — AMLODIPINE BESYLATE 5 MG/1
5 TABLET ORAL DAILY
Status: CANCELLED | OUTPATIENT
Start: 2023-02-05

## 2023-02-05 RX ORDER — AMLODIPINE BESYLATE 5 MG/1
5 TABLET ORAL DAILY
Status: DISCONTINUED | OUTPATIENT
Start: 2023-02-05 | End: 2023-02-05

## 2023-02-05 RX ORDER — DIPHENHYDRAMINE HYDROCHLORIDE 50 MG/ML
25 INJECTION INTRAMUSCULAR; INTRAVENOUS EVERY 8 HOURS PRN
Status: DISCONTINUED | OUTPATIENT
Start: 2023-02-05 | End: 2023-02-05

## 2023-02-05 RX ORDER — KETOROLAC TROMETHAMINE 30 MG/ML
30 INJECTION, SOLUTION INTRAMUSCULAR; INTRAVENOUS EVERY 8 HOURS
Status: DISCONTINUED | OUTPATIENT
Start: 2023-02-05 | End: 2023-02-07

## 2023-02-05 RX ADMIN — DULOXETINE HYDROCHLORIDE 60 MG: 60 CAPSULE, DELAYED RELEASE ORAL at 08:27

## 2023-02-05 RX ADMIN — AMLODIPINE BESYLATE 5 MG: 5 TABLET ORAL at 15:48

## 2023-02-05 RX ADMIN — ACETAMINOPHEN 650 MG: 325 TABLET ORAL at 00:21

## 2023-02-05 RX ADMIN — ATORVASTATIN CALCIUM 40 MG: 40 TABLET, FILM COATED ORAL at 18:15

## 2023-02-05 RX ADMIN — ENOXAPARIN SODIUM 40 MG: 40 INJECTION SUBCUTANEOUS at 08:27

## 2023-02-05 RX ADMIN — NICOTINE 21 MG: 21 PATCH, EXTENDED RELEASE TRANSDERMAL at 08:27

## 2023-02-05 RX ADMIN — METOCLOPRAMIDE HYDROCHLORIDE 10 MG: 5 INJECTION INTRAMUSCULAR; INTRAVENOUS at 16:33

## 2023-02-05 RX ADMIN — QUETIAPINE FUMARATE 25 MG: 25 TABLET ORAL at 00:22

## 2023-02-05 RX ADMIN — ASPIRIN 81 MG: 81 TABLET, CHEWABLE ORAL at 08:27

## 2023-02-05 RX ADMIN — METOCLOPRAMIDE HYDROCHLORIDE 10 MG: 5 INJECTION INTRAMUSCULAR; INTRAVENOUS at 23:24

## 2023-02-05 RX ADMIN — MAGNESIUM SULFATE HEPTAHYDRATE 2 G: 40 INJECTION, SOLUTION INTRAVENOUS at 16:32

## 2023-02-05 RX ADMIN — KETOROLAC TROMETHAMINE 30 MG: 30 INJECTION, SOLUTION INTRAMUSCULAR at 16:33

## 2023-02-05 RX ADMIN — LIDOCAINE 5% 1 PATCH: 700 PATCH TOPICAL at 10:35

## 2023-02-05 RX ADMIN — TRAZODONE HYDROCHLORIDE 50 MG: 50 TABLET ORAL at 23:23

## 2023-02-05 RX ADMIN — DIPHENHYDRAMINE HYDROCHLORIDE 25 MG: 50 INJECTION, SOLUTION INTRAMUSCULAR; INTRAVENOUS at 23:23

## 2023-02-05 RX ADMIN — ACETAMINOPHEN 975 MG: 325 TABLET ORAL at 13:28

## 2023-02-05 RX ADMIN — GADOBUTROL 8 ML: 604.72 INJECTION INTRAVENOUS at 21:21

## 2023-02-05 RX ADMIN — CLOPIDOGREL BISULFATE 75 MG: 75 TABLET ORAL at 08:27

## 2023-02-05 RX ADMIN — PERFLUTREN 0.6 ML/MIN: 6.52 INJECTION, SUSPENSION INTRAVENOUS at 10:25

## 2023-02-05 RX ADMIN — KETOROLAC TROMETHAMINE 30 MG: 30 INJECTION, SOLUTION INTRAMUSCULAR at 23:23

## 2023-02-05 NOTE — ASSESSMENT & PLAN NOTE
Patient had noted a headache over the past few months that has been intermittent but over the past 1-1/2 months has been progressively getting worse  She states that is all over her head and has been on and off getting worse every day  She has been taking Tylenol every day for it and it was explained that she likely has some component of medication overuse headache    MRI brain w/ contrast negative for any enhancement     Plan  s/p migraine cocktail: Phenergan, Toradol, Benadryl for 2 days   -Avoided steroids given patient's restlessness/anxiety  Continue Verapamil 120 mg daily as prevention

## 2023-02-05 NOTE — OCCUPATIONAL THERAPY NOTE
Occupational Therapy Evaluation and Treatment     Patient Name: Christie Vila  EGFFX'P Date: 2/5/2023  Problem List  Principal Problem:    Stroke [facility])  Active Problems:    Depression    Restlessness    Past Medical History  Past Medical History:   Diagnosis Date    Acquired deformity of left foot     Anesthesia complication     difficulty awakening    Arthritis     Deaf, left     Depression     Hallux valgus of left foot     Hammer toe of left foot     Headache     Kidney stone     Sciatic pain, right     intermittent     Past Surgical History  Past Surgical History:   Procedure Laterality Date    BREAST BIOPSY Right 03/04/2021    BREAST BIOPSY Right 3/10/2021    Procedure: Excisional Breast debridement  7 o'clock position;  Surgeon: Hiram Lombard, MD;  [facility];  Service: General    CHOLECYSTECTOMY      CLOSURE DELAYED PRIMARY Right 7/5/2020    Procedure: CLOSURE DELAYED PRIMARY and application of skin graft substitute;  Surgeon: Santiago Norris DPM;  [facility];  Service: Podiatry    HERNIA REPAIR      INCISION AND DRAINAGE OF WOUND Right 7/1/2020    Procedure: INCISION AND DRAINAGE (I&D) EXTREMITY;  Surgeon: Santiago Norris DPM;  [facility];  Service: Podiatry    KS CORRJ HALLUX VALGUS W/[facility] Enedina Joel MEDIAL CNF Left 11/12/2021    Procedure: Nieves Moya;  Surgeon: Santiago Norris DPM;  [facility];  Service: Podiatry    [facility] W[facility] [address] SHRT/CORRJ METAR XCP 1ST EA Left 6/14/2019    Procedure: 3rd Metatarsal Osteotomy;  Surgeon: Irving Sumner DPM;  [facility];  Service: Podiatry    [facility]CORRJ METAR XCP 1ST EA Left 11/12/2021    Procedure: OSTEOTOMY 2ND METATARSAL;  Surgeon: Santiago Norris DPM;  [facility];  Service: Podiatry    TUBAL LIGATION      US GUIDED BREAST BIOPSY RIGHT COMPLETE Right 3/4/2021    VAC DRESSING APPLICATION Right 5/5/0508    Procedure: APPLICATION VAC DRESSING;  Surgeon: Santiago Norris DPM;  [facility];  Service: Podiatry           02/05/23 0932   OT Last Visit   OT Visit Date 02/05/23   Note Type   Note type Evaluation   Additional Comments and treatment   Pain Assessment   Pain Assessment Tool FLACC   Pain Location/Orientation Orientation: Right;Location: Shoulder   Hospital Pain Intervention(s) Repositioned; Ambulation/increased activity; Emotional support;Relaxation technique   Pain Rating: FLACC (Rest) - Face 0   Pain Rating: FLACC (Rest) - Legs 0   Pain Rating: FLACC (Rest) - Activity 0   Pain Rating: FLACC (Rest) - Cry 0   Pain Rating: FLACC (Rest) - Consolability 0   Score: FLACC (Rest) 0   Pain Rating: FLACC (Activity) - Face 0   Pain Rating: FLACC (Activity) - Legs 0   Pain Rating: FLACC (Activity) - Activity 0   Pain Rating: FLACC (Activity) - Cry 0   Pain Rating: FLACC (Activity) - Consolability 0   Score: FLACC (Activity) 0   Restrictions/Precautions   Weight Bearing Precautions Per Order No   Other Precautions Chair Alarm; Bed Alarm;Telemetry; Fall Risk;Pain  (R side weakness)   Home Living   Type of [address] [place] [address] Multi-level;Performs ADLs on one level; Able to live on main level with bedroom/bathroom;Stairs to enter with rails  (9 DONNIE from front, 3 DONNIE from garage, ff of steps inside)   Bathroom Shower/Tub Tub/shower unit   Bathroom Toilet Standard   Bathroom Equipment Other (Comment)  (denies)   [address] Walker;Cane  (used walker at baseline)   Additional Comments Pt lives in a [employer] with a first floor set up  Pt has 9STE through the front and 3 DONNIE through the back  Pt uses a walker at baseline  Prior Function   Level of [place] Independent with ADLs; Independent with functional mobility; Needs assistance with IADLS   Lives With (S)  Son;Alone  (son will not be living with her in two weeks)   Receives Help From Family   IADLs Independent with meal prep; Independent with medication management; Family/Friend/Other provides transportation   Falls in the last 6 months 0   Vocational Unemployed   Lifestyle   Autonomy Pt was I in ADLs, IADLs, and had some A with IADLs  Reciprocal Relationships Pt is currently living with her son but her son will no longer be living with her in about 2 weeks  Service to Others Pt is currently not working  Intrinsic Gratification Pt enjoys being with her grandkids  General   Family/Caregiver Present Yes   Subjective   Subjective "I am ok"   ADL   Where Assessed Edge of bed   Eating Assistance 3  Moderate Assistance   Eating Deficit Setup; Increased time to complete;Bringing food to mouth assist;Other (Comment)  (Uses L hand to assist R hand in bringing food to mouth)   Grooming Assistance 4  Minimal Assistance   Grooming Deficit Setup; Increased time to complete   UB Bathing Assistance 4  Minimal Assistance   UB Bathing Deficit Increased time to complete   LB Bathing Assistance 3  Moderate Assistance   LB Bathing Deficit Increased time to complete   UB Dressing Assistance 4  Minimal Assistance   UB Dressing Deficit Increased time to complete   LB Dressing Assistance 3  Moderate Assistance   LB Dressing Deficit Increased time to complete   Toileting Assistance  4  Minimal Assistance   Toileting Deficit Increased time to complete   Functional Assistance 3  Moderate Assistance   Bed Mobility   Supine to Sit 4  Minimal assistance   Additional items Assist x 1; Increased time required;HOB elevated;Verbal cues   Sit to Supine Unable to assess   Additional Comments Upon arrival, pt was lying supine in bed  Pt was able to sit EOB with min Ax1  Pt was left sitting in bed-side chair with all necessary items within reach and chair alarm on  Transfers   Sit to Stand 3  Moderate assistance   Additional items Assist x 1; Increased time required;Verbal cues   Stand to Sit 3  Moderate assistance   Additional items Assist x 1; Increased time required;Verbal cues   Toilet transfer 3  Moderate assistance   Additional items Assist x 1; Increased time required;Verbal cues;Standard toilet   Additional Comments Pt transfers with RW  Pt was able to demonstrate toilet transfer with mod Ax1 and RW   Functional Mobility   Functional Mobility 3  Moderate assistance   Additional Comments Ax1  Pt was able to demonstrate short distance household mobility to the bathroom with mod Ax1 and RW  Additional items Rolling walker   Balance   Static Sitting Fair +   Dynamic Sitting Fair   Static Standing Fair -   Dynamic Standing Poor +   Ambulatory Poor +   Activity Tolerance   Activity Tolerance Patient limited by fatigue;Patient tolerated treatment well   Medical Staff Made Aware PT Rut   Nurse Made Aware RN made aware   RUE Assessment   RUE Assessment X   RUE Overall AROM   R Shoulder Flexion unable to flex shoulder full ROM 2* deficits  R Shoulder Extension Unable to extend shoulder full ROM 2* deficits  R Shoulder ABduction difficulty with abduction, causing some pain in shoulder  R Shoulder ADduction decreased ability to actively adduct 2* deficits  R Elbow Flexion difficulty with AROM flexion 2* deficits   R Elbow Extension difficulty with elbow extension 2* deficits  RUE Strength   RUE Overall Strength Deficits  (2* stroke)   R Shoulder Flexion 2+/5   R Shoulder Extension 2+/5   R Shoulder ABduction 2+/5   LUE Assessment   LUE Assessment WFL   Hand Function   Gross Motor Coordination Impaired   Fine Motor Coordination Impaired   Hand Function Comments Imparied gross and fine motor with RUE  Pt was asked to touch each finger to thumb  Pt had increase difficulty initiating what was asked and took increased time  Pt was able to touch each finger to thumb with increased time  Pt unable to hold pain in R hand  Pt is right hand dominant     Sensation   Light Touch Partial deficits in the RUE   Sharp/Dull Partial deficits in the RUE   Vision - Complex Assessment   Ocular Range of Motion Intact   Tracking Intact   Psychosocial   Psychosocial (WDL) WDL Cognition   Overall Cognitive Status Impaired   Arousal/Participation Alert; Responsive;Arousable; Cooperative   Attention Attends with cues to redirect   Orientation Level Oriented to person;Oriented to place;Oriented to time   Memory Decreased recall of recent events   Following Commands Follows one step commands with increased time or repetition   Comments Pt was pleasant and cooperative t/o session  Pt requires vc'ing for increase safety awareness and insight into deficits  Assessment   Limitation Decreased ADL status; Decreased UE ROM; Decreased UE strength;Decreased Safe judgement during ADL;Decreased endurance;Decreased fine motor control;Decreased self-care trans   Prognosis Fair   Assessment Pt is 62 y o  female [facility] w/ a stroke  Pt  has a past medical history of Acquired deformity of left foot, Anesthesia complication, Arthritis, Deaf, left, Depression, Hallux valgus of left foot, Hammer toe of left foot, Headache, Kidney stone, and Sciatic pain, right    Pt with active OT orders and activity orders  Pt resides in a a 3  with 9 DONNIE in the front and 3 DONNIE in the back  Pt is currently living with her son, but her son will be moving out shortly  Pt remains on one floor in her home and was using a RW at baseline  Pt was able to participate in a treatment session today as well  During the treatment session pt was educated on what a stroke is, her deficits, as well as her strengths  Pt was educated on the benefits of UE exercises and to complete exercises t/o the day  Pt has significant RUE deficits, decreased strength, AROM, fine and gross motor impaired  Pt also demonstrating increased processing time  Pt was educated on compensatory techniques such as using her L hand to A with her R hand during functional tasks  Pt was able to demonstrate toilet assistance and LB dressing with mod A  Pt is functioning at min A for UB ADLs and bed mobility   Pt is functioning at mod A for LB ADLs, transfers, and functional mobility  Pt is limited 2* pain, endurance, activity tolerance, functional mobility, balance, functional standing tolerance, decreased I w/ ADLS/IADLS, decreased safety awareness, decreased insight into deficits, slurred speech, impaired fine motor skills and coordination deficits  The Areas of Occupational Performance Areas to address w/ pt include: eating, grooming, bathing/shower, toilet hygiene, dressing, health maintenance and functional mobility  Based off of an OT evaulation, assessment, and performance functioning, pt is identified as a high complexity  OT recommendation for d/c includes post acute rehab  Pt would benefit from continued acute OT services for  3-5x/week to  w/in 10-14 days:  to address functional goals  Goals   Patient Goals to go home   LTG Time Frame 10-14   Long Term Goal see goals below   Plan   Treatment Interventions ADL retraining;Functional transfer training;UE strengthening/ROM; Endurance training;Cognitive reorientation;Patient/family training;Equipment evaluation/education; Neuromuscular reeducation; Fine motor coordination activities; Compensatory technique education;Continued evaluation; Energy conservation; Activityengagement   Goal Expiration Date 23   OT Frequency 3-5x/wk   Recommendation   OT Discharge Recommendation Post acute rehabilitation services  (ARC)   AM-PAC Daily Activity Inpatient   Lower Body Dressing 2   Bathing 2   Toileting 3   Upper Body Dressing 3   Grooming 3   Eating 4   Daily Activity Raw Score 17   Daily Activity Standardized Score (Calc for Raw Score >=11) 37 26   AM-PAC Applied Cognition Inpatient   Following a Speech/Presentation 2   Understanding Ordinary Conversation 4   Taking Medications 4   Remembering Where Things Are Placed or Put Away 3   Remembering List of 4-5 Errands 2   Taking Care of Complicated Tasks 2   Applied Cognition Raw Score 17   Applied Cognition Standardized Score 36 52   Additional Treatment Session   Start Time 0908   End Time 0932   Treatment Assessment Pt was able to participate in an in-depth evaluation of UE  RUE with decreased strength  Pt was educated on where the location of the stroke happened and the effects on the body  Pt was educated to continue having the LUE assist the RUE in activies such as eating, bahting, and dressing  Pt was able to demonstrate putting on socks with mod Ax1  Pt also participated in ROM exercises and pt's son was educated on exercise to keep pt moving  Pt was also educated on sitting EOB and WB through RUE and LUE  Pt was receptive to information but needed reminders and cueing  Pt's son was present and was receptive to education  Pt was able to ambulate to the BR and complete toileting with mod Ax1  Additional Treatment Day 1   End of Consult   Education Provided Yes;Family or social support of family present for education by provider   Patient Position at End of Consult Bedside chair;Bed/Chair alarm activated; All needs within reach   Nurse Communication Nurse aware of consult       Pt w/ mod I will complete daily grooming tasks w/ adaptive equipment as needed  Pt will increase standing tolerance to 10 mins w/ mod I w/ adaptive equipment as needed  Pt will complete functional transfers w/ mod I on and off all surfaces w/ equipment as needed  Pt will complete functional mobility w/ mod I on and off all surfaces w/ equipment as needed  Pt will complete tolieting w/ mod I while demonstrating safety techniques w/ DME  Pt will complete feeding tasks w/ mod I using adaptive devices  Pt will demonstrate G safety awareness w/ mod I during OT session  Pt will demonstrate LE body dressing w/ mod I w/ adaptive equipment as needed  Pt will demonstrate UE body dressing w/ mod I w/ adaptive equipment as needed  Pt will increase activity tolerance to G during a 30 min OT tx session  Pt will demonstrate bed mobility skills w/ mod I       Pt will complete IADLs tasks w/ mod I  Pt will participate in a formal/functional cognitive assessment as needed to assist with safe discharge planning       Antoni Shelton OTR/L

## 2023-02-05 NOTE — UTILIZATION REVIEW
Initial Clinical Review    OBSERVATION WRITTEN 2/4/23 @ 1446 CONVERTED TO INPATIENT ADMISSION 2/5/23 @ 1618 DUE TO FURTHER DIAGNOSTIC WORKUP REQUIRED FOR STROKE-LIKE SYMPTOMS WITH SUBACUTE ISCHEMIA, REQUIRING AT LEAST A 2 MIDNIGHT STAY  Admission: Date/Time/Statement:   Admission Orders (From admission, onward)     Ordered        02/05/23 1618  Inpatient Admission  Once            02/04/23 1446  Place in Observation  Once                      Orders Placed This Encounter   Procedures   • Inpatient Admission     Standing Status:   Standing     Number of Occurrences:   1     Order Specific Question:   Level of Care     Answer:   Med Surg [16]     Order Specific Question:   Estimated length of stay     Answer:   More than 2 Midnights     Order Specific Question:   Certification     Answer:   I certify that inpatient services are medically necessary for this patient for a duration of greater than two midnights  See H&P and MD Progress Notes for additional information about the patient's course of treatment  ED Arrival Information     Expected   -    Arrival   2/4/2023 13:04    Acuity   Emergent            Means of arrival   Walk-In    Escorted by   [facility] Member    Service   Neurology    Admission type   Emergency            Arrival complaint   Stroke            Chief Complaint   Patient presents with   • CVA/TIA-like Symptoms     1 hour PTA, pt reported sudden facial droop and difficulty speaking         Initial Presentation: 62 y o  female who presented to [facility] ED  Admitted Observation status med surg telemetry dt Stroke-like symptoms  PMHx: tobacco use, vertigo, HTN  Presented w/  slurred speech, RUE and RLE weakness, and some sensory neglect  Patient had went up around 8:30 AM in the morning that was known well at that time    She then stated she took a nap and then woke up around 11 a m  and went to the bathroom and then also stated that she appreciated some facial asymmetry in the bathroom  She then talked to her daughter who noticed that she was slurring her speech and had a left facial droop during this time  Patient also noted that she had a episode of bowel incontinence prior to this  On exam, right sided facial droop, slurred speech  CT head and CTA head/neck were unremarkable  Pt was outside the window for TNK and most symptoms resolving pt was determined to not be a candidate for thrombolysis (TNK)  Plan: order MRI and echo, neurology following, permissive HTN, obtain lipids and A1c, continue home statin, NPO, neuro checks, freq vs, accuchecks w/ SSI, TTE, PT OT ST eval     2/5 Inpatient Admission:  Continue freq VS and neuro checks, dsyphagia diet  Neurology note:  MRI shows a left posterior limb internal capsule/thalamic acute to subacute ischemia  Order EKG, home seroquel, Tylenol for HA  DAPT w/ ASA 81mg and plavix 75mg for 21 days and then monotherapy (02/26/2023)  Order CT CAP to evaluate for any underlying malignancy that be contributing to a hypercoagulable state  Order D-dimer and thrombosis panel in am, continue telemetry, PT OT ST recommend rehab with [facility]  Date:  2/6   Day 2:   HA improved today, pt eager to return to rehab or go home  Continue to monitor on telemetry, neuro checks, fu on CT CAP, continue with PT OT ST, CM following, monitor electrolytes and replete prn        ED Triage Vitals   Temperature Pulse Respirations Blood Pressure SpO2   02/04/23 1446 02/04/23 1307 02/04/23 1307 02/04/23 1307 02/04/23 1307   97 9 °F (36 6 °C) (!) 114 18 143/99 98 %      Temp Source Heart Rate Source Patient Position - Orthostatic VS BP Location FiO2 (%)   02/04/23 1446 02/04/23 1307 02/04/23 1315 02/04/23 1315 --   Oral Monitor Lying Right arm       Pain Score       02/04/23 1553       9          Wt Readings from Last 1 Encounters:   02/05/23 87 1 kg (192 lb)     Additional Vital Signs:    Date/Time Temp Pulse Resp BP MAP (mmHg) SpO2 O2 Device Patient Position - Orthostatic VS   02/06/23 06:18:10 -- 96 -- 122/99 107 95 % -- --   02/05/23 22:34:48 -- 102 -- 168/117 Abnormal  134 96 % -- --   02/05/23 2000 -- -- -- -- -- -- None (Room air) --   02/05/23 18:15:02 98 7 °F (37 1 °C) 112 Abnormal  -- 163/113 Abnormal  130 93 % -- --   02/05/23 16:28:57 97 7 °F (36 5 °C) 110 Abnormal  -- 164/113 Abnormal  130 99 % -- --   02/05/23 1548 -- -- -- 160/115 Abnormal  -- -- -- --   02/05/23 13:27:36 -- 82 -- 156/107 Abnormal  123 96 % -- --   02/05/23 10:40:18 -- 94 -- -- -- 95 % -- --   02/05/23 10:06:51 -- 79 -- 152/121 Abnormal  131 -- -- --   02/05/23 0800 -- -- -- -- -- -- None (Room air) --   02/05/23 0445 -- 79 -- 179/99 Abnormal  126 92 % -- --   02/05/23 03:13:59 -- 92 -- 184/102 Abnormal  129 95 % -- --   02/05/23 03:12:10 -- 79 -- 197/100 Abnormal  132 94 % -- --   02/05/23 0200 -- 95 -- 169/119 Abnormal  136 93 % -- --   02/05/23 01:49:41 -- 97 -- 169/119 Abnormal  136 93 % -- --   02/05/23 01:46:31 -- 95 -- 167/120 Abnormal  136 87 % Abnormal  -- --   02/05/23 0030 -- 89 -- 165/110 Abnormal  128 94 % -- --   02/05/23 00:26:54 -- 97 -- 165/110 Abnormal  128 91 % -- --   02/04/23 2236 -- 84 -- 152/104 Abnormal  120 95 % -- --   02/04/23 22:35:08 -- 91 -- 152/103 Abnormal  119 96 % -- --   02/04/23 2030 -- 99 -- 146/103 Abnormal  117 96 % -- --   02/04/23 2000 -- -- -- -- -- -- None (Room air) --   02/04/23 1930 -- 114 Abnormal  -- 146/101 Abnormal  116 96 % -- --   02/04/23 19:26:01 -- 109 Abnormal  -- 146/101 Abnormal  116 90 % -- --   02/04/23 1845 -- 104 -- 155/93 114 97 % -- --   02/04/23 18:43:01 -- 104 -- 155/93 114 94 % -- --   02/04/23 1730 -- 99 -- 152/110 Abnormal  124 98 % -- --   02/04/23 16:31:22 97 9 °F (36 6 °C) 98 -- 148/92 111 95 % -- --   02/04/23 16:30:46 -- 96 -- 148/92 111 95 % -- --   02/04/23 1553 -- -- -- -- -- -- None (Room air) --   02/04/23 1500 -- 94 22 171/95 Abnormal  125 94 % None (Room air) Lying   02/04/23 1446 97 9 °F (36 6 °C) -- -- -- -- -- -- --   02/04/23 1445 -- 96 20 175/79 Abnormal  112 94 % None (Room air) --   02/04/23 1430 -- 96 20 157/89 116 93 % None (Room air) --   02/04/23 1415 -- 100 21 162/89 118 96 % None (Room air) --   02/04/23 1400 -- 100 20 180/97 Abnormal  131 94 % None (Room air) --   02/04/23 1345 -- 104 20 186/94 Abnormal  133 95 % None (Room air) Lying   02/04/23 1330 -- 102 18 188/107 Abnormal  140 96 % None (Room air) Lying   02/04/23 1325 -- 108 Abnormal  20 175/126 Abnormal  -- 96 % None (Room air) Lying   02/04/23 1320 -- 102 20 167/84 -- 96 % None (Room air) Lying   02/04/23 1315 -- 106 Abnormal  20 141/96 -- 98 % None (Room air) Lying   02/04/23 1307 -- 114 Abnormal  18 143/99 -- 98 % None (Room air) --     Pertinent Labs/Diagnostic Test Results:   2/4 EKG: NSR    2/4 ECHO:  •  Left Ventricle: Left ventricular cavity size is normal  Wall thickness is normal  The left ventricular ejection fraction is 60%  Systolic function is normal  Wall motion is normal  Diastolic function is normal   •  Technically difficult study      MRI brain w contrast   Final Result by George Martinez MD (02/06 8398)      No abnormal enhancement  No acute change compared to yesterday  Workstation performed: [id number]         MRI brain wo contrast   Final Result by Concepción Avila MD (02/04 3673)      Left posterior limb internal capsule/thalamic acute to subacute ischemia  Findings were marked as "immediate"in Epic and will now be related to the ordering physician or covering clinical team by the radiology liaison  Workstation performed: [id number]         CT stroke alert brain   Final Result by Mejia Urban DO (02/04 1342)      No acute intracranial abnormality        Findings were directly discussed with Boris Brown at 1:40 PM       Workstation performed: [id number]         CTA stroke alert (head/neck)   Final Result by Mejia Urban DO (02/04 1348)      There are some anatomic variations of the intracranial circulation as described above with no stenosis, occlusion or thrombosis of the cervical or intracranial vasculature              Findings were directly discussed with Borsi Brown at approximately 1:40 PM                            Workstation performed: [id number]         CT chest abdomen pelvis w contrast    (Results Pending)     Results from last 7 days   Lab Units 02/04/23  1525   SARS-COV-2  Negative     Results from last 7 days   Lab Units 02/06/23  0639 02/05/23  0615 02/04/23  1314   WBC Thousand/uL 8 65 8 16 9 32   HEMOGLOBIN g/dL 14 4 13 7 15 1   HEMATOCRIT % 44 2 42 8 46 5*   PLATELETS Thousands/uL 340 319 352   NEUTROS ABS Thousands/µL  --  3 35  --          Results from last 7 days   Lab Units 02/06/23  0639 02/05/23  0615 02/04/23  1314   SODIUM mmol/L 140 140 138   POTASSIUM mmol/L 3 6 3 8 4 0   CHLORIDE mmol/L 110* 110* 108   CO2 mmol/L 24 25 26   ANION GAP mmol/L 6 5 4   BUN mg/dL 13 9 11   CREATININE mg/dL 0 70 0 64 0 88   EGFR ml/min/1 73sq m 96 99 73   CALCIUM mg/dL 9 2 9 2 9 6     Results from last 7 days   Lab Units 02/05/23  0615   AST U/L 20   ALT U/L 27   ALK PHOS U/L 88   TOTAL PROTEIN g/dL 7 3   ALBUMIN g/dL 3 6   TOTAL BILIRUBIN mg/dL 0 35         Results from last 7 days   Lab Units 02/06/23  0639 02/05/23  0615 02/04/23  1314   GLUCOSE RANDOM mg/dL 127 114 143*         Results from last 7 days   Lab Units 02/05/23  0615   HEMOGLOBIN A1C % 6 4*   EAG mg/dl 137     Results from last 7 days   Lab Units 02/04/23  1948 02/04/23  1525 02/04/23  1314   HS TNI 0HR ng/L  --   --  3   HS TNI 2HR ng/L  --  3  --    HSTNI D2 ng/L  --  0  --    HS TNI 4HR ng/L 3  --   --    HSTNI D4 ng/L 0  --   --      Results from last 7 days   Lab Units 02/06/23  0639   D-DIMER QUANTITATIVE ug/ml FEU 0 30     Results from last 7 days   Lab Units 02/04/23  1314   PROTIME seconds 12 6   INR  0 92   PTT seconds 29     Results from last 7 days   Lab Units 02/04/23  1526   CLARITY UA  Clear COLOR UA  Colorless   SPEC GRAV UA  1 037*   PH UA  7 0   GLUCOSE UA mg/dl Negative   KETONES UA mg/dl Negative   BLOOD UA  Trace*   PROTEIN UA mg/dl Negative   NITRITE UA  Negative   BILIRUBIN UA  Negative   UROBILINOGEN UA (BE) mg/dl <2 0   LEUKOCYTES UA  Small*   WBC UA /hpf 2-4*   RBC UA /hpf 1-2   BACTERIA UA /hpf Occasional   EPITHELIAL CELLS WET PREP /hpf Occasional     Results from last 7 days   Lab Units 02/04/23  1525   INFLUENZA A PCR  Negative   INFLUENZA B PCR  Negative   RSV PCR  Negative         Results from last 7 days   Lab Units 02/04/23  1525   AMPH/METH  Negative   BARBITURATE UR  Negative   BENZODIAZEPINE UR  Negative   COCAINE UR  Negative   METHADONE URINE  Negative   OPIATE UR  Negative   PCP UR  Negative   THC UR  Negative     ED Treatment:   Medication Administration from 02/04/2023 1304 to 02/04/2023 1541       Date/Time Order Dose Route Action     02/04/2023 1323 EST iohexol (OMNIPAQUE) 350 MG/ML injection (SINGLE-DOSE) 85 mL 85 mL Intravenous Given        Past Medical History:   Diagnosis Date   • Acquired deformity of left foot    • Anesthesia complication     difficulty awakening   • Arthritis    • Deaf, left    • Depression    • Hallux valgus of left foot    • Hammer toe of left foot    • Headache    • Kidney stone    • Sciatic pain, right     intermittent     Present on Admission:  • Stroke [facility])  • Headache  • Other chest pain      Admitting Diagnosis: Speech disturbance [R47 9]  Facial droop [R29 810]  Symptoms of cerebrovascular accident (CVA) [R09 89]  Age/Sex: 62 y o  female  Admission Orders:  Scheduled Medications:  aspirin, 81 mg, Oral, Daily  atorvastatin, 40 mg, Oral, QPM  clopidogrel, 75 mg, Oral, Daily  diphenhydrAMINE, 25 mg, Intravenous, Q8H  DULoxetine, 60 mg, Oral, Daily  enoxaparin, 40 mg, Subcutaneous, Daily  famotidine, 20 mg, Oral, BID  ketorolac, 30 mg, Intravenous, Q8H  lidocaine, 1 patch, Topical, Daily  metoclopramide, 10 mg, Intravenous, Q8H [facility]  nicotine, 21 mg, Transdermal, Daily  traZODone, 50 mg, Oral, Once  traZODone, 50 mg, Oral, HS  verapamil, 120 mg, Oral, Daily      Continuous IV Infusions:   sodium chloride 0 9 % infusion  Rate: 75 mL/hr Dose: 75 mL/hr  Freq: Continuous Route: IV  Last Dose: Stopped (02/05/23 0500)  Start: 02/04/23 1530 End: 02/05/23 1419     PRN Meds:  ondansetron, 4 mg, Intravenous, Q6H PRN  QUEtiapine, 25 mg, Oral, HS PRN      acetaminophen (TYLENOL) tablet 650 mg  Dose: 650 mg  Freq: Every 4 hours PRN Route: PO x1 then dc'd  PRN Reason: mild pain  Indications of Use: FEVER,HEADACHE,MILD PAIN  Start: 02/04/23 1451 End: 02/05/23 1016    scd    IP CONSULT TO CASE MANAGEMENT  IP CONSULT TO NUTRITION SERVICES  IP CONSULT TO INTERNAL MEDICINE  IP CONSULT TO NUTRITION SERVICES    Network Utilization Review Department  ATTENTION: Please call with any questions or concerns to [phone] and carefully listen to the prompts so that you are directed to the right person  All voicemails are confidential   [place] all requests for admission clinical reviews, approved or denied determinations and any other requests to dedicated fax number below belonging to the campus where the patient is receiving treatment   List of dedicated fax numbers for the Facilities:  [address] DENIALS (Administrative/Medical Necessity) [phone]   [address] (Maternity/NICU/Pediatrics) [phone]   [address] [phone]   [facility] [place]  [phone]   [address] [address] [facility] [place] [place] [address] [place] [address] [facility] [phone]   [address] [facility] [place] [phone]   [facility] [phone]   [address] [place] [address]   175 [facility] [phone]

## 2023-02-05 NOTE — SPEECH THERAPY NOTE
Speech Language/Pathology  Speech/Language Pathology  Assessment    Patient Name: Kenia Ro  NQTUU'H Date: 2/5/2023     Problem List  Principal Problem:    Stroke [facility])  Active Problems:    Depression    Restlessness    Past Medical History  Past Medical History:   Diagnosis Date   • Acquired deformity of left foot    • Anesthesia complication     difficulty awakening   • Arthritis    • Deaf, left    • Depression    • Hallux valgus of left foot    • Hammer toe of left foot    • Headache    • Kidney stone    • Sciatic pain, right     intermittent     Past Surgical History  Past Surgical History:   Procedure Laterality Date   • BREAST BIOPSY Right 03/04/2021   • BREAST BIOPSY Right 3/10/2021    Procedure: Excisional Breast debridement  7 o'clock position;  Surgeon: Jose Guadalupe Restrepo MD;  [facility];  Service: General   • CHOLECYSTECTOMY     • CLOSURE DELAYED PRIMARY Right 7/5/2020    Procedure: CLOSURE DELAYED PRIMARY and application of skin graft substitute;  Surgeon: Jyoti Chen DPM;  [facility];  Service: Podiatry   • HERNIA REPAIR     • INCISION AND DRAINAGE OF WOUND Right 7/1/2020    Procedure: INCISION AND DRAINAGE (I&D) EXTREMITY;  Surgeon: Jyoti Chen DPM;  [facility];  Service: Podiatry   • PA CORRJ HALLUX VALGUS W/SESMDC W/1METAR MEDIAL CNF Left 11/12/2021    Procedure: Therese Singletary;  Surgeon: Jyoti Chen DPM;  [facility];  Service: Podiatry   • PA OSTEOT W/ [address] SHRT/CORRJ METAR XCP 1ST EA Left 6/14/2019    Procedure: 3rd Metatarsal Osteotomy;  Surgeon: Dorita Galloway DPM;  [facility];  Service: Podiatry   • PA OSTEOT W/ [address] SHRT/CORRJ METAR XCP 1ST EA Left 11/12/2021    Procedure: OSTEOTOMY 2ND METATARSAL;  Surgeon: Jyoti Chen DPM;  [facility];  Service: Podiatry   • TUBAL LIGATION     • US GUIDED BREAST BIOPSY RIGHT COMPLETE Right 3/4/2021   • VAC DRESSING APPLICATION Right 2/4/7249    Procedure: APPLICATION VAC DRESSING; Surgeon: Se Brady DPM;  [facility];  Service: Podiatry       SLP Motor Speech Evaluation      Patient Name: Marion Pastor    Today's Date: 2/5/2023     Problem List  Principal Problem:    Stroke [facility])  Active Problems:    Depression    Restlessness      Past Medical History  Past Medical History:   Diagnosis Date   • Acquired deformity of left foot    • Anesthesia complication     difficulty awakening   • Arthritis    • Deaf, left    • Depression    • Hallux valgus of left foot    • Hammer toe of left foot    • Headache    • Kidney stone    • Sciatic pain, right     intermittent       Past Surgical History  Past Surgical History:   Procedure Laterality Date   • BREAST BIOPSY Right 03/04/2021   • BREAST BIOPSY Right 3/10/2021    Procedure: Excisional Breast debridement  7 o'clock position;  Surgeon: Rk Monterroso MD;  [facility];  Service: General   • CHOLECYSTECTOMY     • CLOSURE DELAYED PRIMARY Right 7/5/2020    Procedure: CLOSURE DELAYED PRIMARY and application of skin graft substitute;  Surgeon: Se Brady DPM;  [facility];  Service: Podiatry   • HERNIA REPAIR     • INCISION AND DRAINAGE OF WOUND Right 7/1/2020    Procedure: INCISION AND DRAINAGE (I&D) EXTREMITY;  Surgeon: Se Brady DPM;  [facility];  Service: Podiatry   • IA CORRJ HALLUX VALGUS W/SESMDC W/1METAR MEDIAL CNF Left 11/12/2021    Procedure: Lissette Suggs;  Surgeon: Se Brady DPM;  [facility];  Service: Podiatry   • IA OSTEOT W/ [address] SHRT/CORRJ METAR XCP 1ST EA Left 6/14/2019    Procedure: 3rd Metatarsal Osteotomy;  Surgeon: Dilan Ohara DPM;  [facility];  Service: Podiatry   • IA OSTEOT W/ [address] SHRT/CORRJ METAR XCP 1ST EA Left 11/12/2021    Procedure: OSTEOTOMY 2ND METATARSAL;  Surgeon: Se Brady DPM;  [facility];  Service: Podiatry   • TUBAL LIGATION     • US GUIDED BREAST BIOPSY RIGHT COMPLETE Right 3/4/2021   • VAC DRESSING APPLICATION Right 0/4/8892 Procedure: APPLICATION VAC DRESSING;  Surgeon: Lani Chambers DPM;  [facility];  Service: Podiatry         Impressions:  Pt presents w/ mild/mild-mod dysarthria cristian by slow rate, reduced breath support for speech, and imprecise articulation impacting intelligibility  Intelligibility decreases w/ words/phrases of increased length/sentence level speech, most noted in unknown contexts  Rate of speech is slow, w/ frequent breaks for breath  Pt remains fairly intelligible despite impairments, able to over articulate to improve listener understanding as needed  Speech Therapy recommended:  Yes, as able/appropriate during IP stay and recommended ongoing at next level of care     Patient's goal:  "I need to see you so I can go home"     Goals:  LONG TERM GOALS:  -The patient will demonstrate intelligible speech in all activities of daily living including dynamic conversation  -The patient will independently utilize compensatory strategies to maximize communication in all ADLs      SHORT TERM GOALS:    Respiration and Phonation  -Patient will utilize the strategy of increased respiratory support (ie “inhale more deeply”)  before beginning an utterance at the word, phrase and sentence level tasks  for 90% of trials with minimal to no cues needed in both formal speech tasks and in informal ADL activities    Articulation  -Patient will use trained strategies (eg slowed rate, over articulation, increased oral opening, writing key word, increased loudness, phrasing)  to improve speech intelligibility in word,  phrases, sentences and  conversation with 80% accuracy      -Patient will discriminate between intelligible and unintelligible speech and use trained strategies to repair communication (eg slowed rate, exaggerated articulation, repetition, rephrasal)         HISTORY AND PHYSICAL:     61 yo F w/ a PMH of Current smoker (1ppd, 25+ years), past bunion surgery, pre-diabetic, vertigo, hypertension coming in as a stroke alert on 2/4/2023  1:06 PM with initial NIHSS of 5 and LKW 8:30am, initial Blood Pressure: 143/99  Initial presenting deficits were slurred speech, RUE and RLE weakness, and some sensory neglect  Patient had went up around 8:30 AM in the morning that was known at that time  She then stated she took a nap and then woke up around 1130am and then talk to her daughter who noticed that she was slurring her speech  Patient also had appreciated that she had a left facial droop during this time  patient also noted that she had a episode of bowel incontinence prior to this  As a result of patient outside window of TNK and most symptoms resolving pt was determined to not be a candidate for thrombolysis (TNK)  Initial Exam on 02/04/23: R facial droop, slurred speech, 5/5 strength in all extremities although in LE had some pain  Found to have a stroke in L PLIC/thalamus on MRI  Speech and Swallowing Mechanism Exam   Facial: right facial droop  Labial: decreased ROM right side  Lingual: right sided tongue deviation  Velum: symmetrical  Mandible: adequate ROM  Dentition: adequate  Respiratory Support: on RA    Respiration and Phonation  Maximum Phonation Time  (Normal 15-20 seconds for healthy older adult with standard deviation of 5 5-6):  7 seconds   Able to sustain phonation counting from 1 to 20: breath groups of 3-4 numbers   Sustains phonation fairly well across words, phrases, sentences and in normal conversational speech  At times inappropriate breath groups/frequent breath groups     S/Z Ratio (Ratio in excess of 1 4 concerning for laryngeal pathology):  1  Vocal Quality:  weak     Articulation:  Single Syllable Words: mild imprecise   Multisyllabic Words: mild imprecise   Phrase Level: mild imprecise   Sentence Level: mod imprecise   Conversational Speech:  mod imprecise     Diadochokinesis:   tuh tuh tuh (normal should exceed 25 reps in 10 seconds):  17 reps/10 seconds  kuh kuh kuh (normal should exceed 25 reps in 10 seconds): 13 reps/10 seconds  puh tuh kuh (pattycake or buttercup)- (normal should exceed 8 reps in 10 seconds): 7 reps/10 seconds    Resonation: Normal nasality    S/S Oral apraxia:  None    S/S Verbal Apraxia:  None      INTELLIGIBILITY at the WORD LEVEL:  Judged to be 90% intelligible       INTELLIGIBILITY at the PHRASE LEVEL:  Judged to be 90% intelligible     INTELLIGIBILITY at the SENTENCE LEVEL:  Judged to be 80% intelligible w/ known context, less than 80% in unknown     INTELLIGIBILITY in CONVERSATIONAL SPEECH:  Judged to be less than 80% intelligible       Conversation:  Decreased coordination of respiration with speech attempts  Imprecise articulation   Slow rate

## 2023-02-05 NOTE — ASSESSMENT & PLAN NOTE
Restarted home trazodone and seroquel (was initially not ordered as patient was not regularly taking and taking prn)  Trazodone scheduled nightly and Seroquel as needed

## 2023-02-05 NOTE — SPEECH THERAPY NOTE
Speech Language/Pathology    Speech-Language Pathology Bedside Swallow Evaluation      Patient Name: Kailey Umanzor    [id number] Date: 2/5/2023     Problem List  Principal Problem:    Stroke-like symptoms  Active Problems:    Depression      Past Medical History  Past Medical History:   Diagnosis Date   • Acquired deformity of left foot    • Anesthesia complication     difficulty awakening   • Arthritis    • Deaf, left    • Depression    • Hallux valgus of left foot    • Hammer toe of left foot    • Headache    • Kidney stone    • Sciatic pain, right     intermittent       Past Surgical History  Past Surgical History:   Procedure Laterality Date   • BREAST BIOPSY Right 03/04/2021   • BREAST BIOPSY Right 3/10/2021    Procedure: Excisional Breast debridement  7 o'clock position;  Surgeon: John Remy MD;  [facility];  Service: General   • CHOLECYSTECTOMY     • CLOSURE DELAYED PRIMARY Right 7/5/2020    Procedure: CLOSURE DELAYED PRIMARY and application of skin graft substitute;  Surgeon: Cristine Cage DPM;  [facility]: [facility];  Service: Podiatry   • HERNIA REPAIR     • INCISION AND DRAINAGE OF WOUND Right 7/1/2020    Procedure: INCISION AND DRAINAGE (I&D) EXTREMITY;  Surgeon: Cristine Cage DPM;  [facility];  Service: Podiatry   • AK CORRJ HALLUX VALGUS W/SESMDC W/1METAR MEDIAL CNF Left 11/12/2021    Procedure: Leonardo Corbett;  Surgeon: Cristine Cage DPM;  [facility];  Service: Podiatry   • AK OSTEOT W/ [address] SHRT/CORRJ METAR XCP 1ST EA Left 6/14/2019    Procedure: 3rd Metatarsal Osteotomy;  Surgeon: Brisa Dumont DPM;  [facility];  Service: Podiatry   • AK OSTEOT W/ [address] SHRT/CORRJ METAR XCP 1ST EA Left 11/12/2021    Procedure: OSTEOTOMY 2ND METATARSAL;  Surgeon: Cristine Cage DPM;  [facility];  Service: Podiatry   • TUBAL LIGATION     • US GUIDED BREAST BIOPSY RIGHT COMPLETE Right 3/4/2021   • VAC DRESSING APPLICATION Right 4/0/3559    Procedure: APPLICATION VAC DRESSING;  Surgeon: Jyoti Chen DPM;  [facility];  Service: Podiatry       Summary   Pt presented with s/s suggestive of mild oral and suspected minimal pharyngeal dysphagia  Pt w/ R sided facial droop/weakness impacting mastication and oral organization, mild prolonged/labored  Pt eventually able to clear all material from oral cavity  Swallows suspected fairly prompt, ?mild delay  No overt s/s aspiration across trials  S/w pt and pt's son regarding dysphagia texture modifications given oral difficulties, pt agreeable at this time  Education initiated on strategies to optimize swallow safety and s/s aspiration to notify medical team of should they arise  Pt demonstrated understanding and denied questions at this time  Risk/s for Aspiration: Mild     Recommended Diet: soft/level 3 diet and thin liquids   Recommended Form of Meds: whole with liquid   Aspiration precautions and swallowing strategies: upright posture, only feed when fully alert and slow rate of feeding  Other Recommendations: Continue frequent oral care, motor speech evaluation         Current Medical Status  Pt is a 62 y o  female [facility]   PMH of Current smoker (1ppd, 25+ years), past bunion surgery, pre-diabetic, vertigo, hypertension coming in as a stroke alert on 2/4/2023  LKW 8:30am, initial Blood Pressure: 143/99  Initial presenting deficits were slurred speech, RUE and RLE weakness, and some sensory neglect  Patient had went up around 8:30 AM in the morning that was known at that time  She then stated she took a nap and then woke up around 11 a m  and went to the bathroom and then also stated that she appreciated some facial asymmetry in the bathroom  She then talked to her daughter who noticed that she was slurring her speech  Patient also had appreciated that she had a left facial droop during this time  patient also noted that she had a episode of bowel incontinence prior to this  Patient's daughter then contacted family to get patient into the hospital and patient was taken to the hospital via EMS     Patient significant other that was living with her did note that patient had her episode of bowel incontinence around 6  Patient has never had episode of stroke in the past or similar symptoms  She has gotten dizzy in the past and has a history of vertigo  She does also have a history of being a current smoker and this explained that this can increase her risk of surgery  She does note that she has some left foot pain due to recent bunion surgery  The only medication she actually takes is her Cymbalta for her depression  She does not take any other medications and this was done/reconciled on her med rec  She did note that she does have a known high cholesterol and also to be taking a statin medication but had not been regularly taking it  The only other medication that she normally takes is Tylenol and that is as needed      She mentioned during the evaluation that she also had a headache during this time and does have a history of headaches         Current Precautions: Allergies:  No known food allergies    Past medical history:  Please see H&P for details    Special Studies:  MRI brain 2/4: Left posterior limb internal capsule/thalamic acute to subacute ischemia      CTA head/neck 2/4: There are some anatomic variations of the intracranial circulation as described above with no stenosis, occlusion or thrombosis of the cervical or intracranial vasculature  CT stroke alert brain 2/4: No acute intracranial abnormality      Social/Education/Vocational Hx:  Pt lives home w/ significant other    Swallow Information   Current Risks for Dysphagia & Aspiration: CVA and dysarthria  Current Symptoms/Concerns: facial droop  Current Diet: puree/level 1 diet and thin liquids   Baseline Diet: regular diet and thin liquids      Baseline Assessment   Behavior/Cognition: alert  Speech/Language Status: able to participate in conversation and able to follow commands, speech is dysarthric/would benefit from further motor speech evaluation   Patient Positioning: upright in chair  Pain Status/Interventions/Response to Interventions:  No report of or nonverbal indications of pain  Swallow Mechanism Exam  Facial: right facial droop  Labial: decreased ROM right side  Lingual: decreased strength right side  Velum: symmetrical  Mandible: adequate ROM  Dentition: adequate  Vocal quality:clear/adequate   Volitional Cough: strong/productive   Respiratory Status: on RA      Consistencies Assessed and Performance   Consistencies Administered: thin liquids, puree, soft solids and hard solids    Oral Stage: mild  Mastication was prolonged/mild labored adequate with the materials administered today  Bolus formation and transfer also prolonged/labored, though no significant oral residue noted  No overt s/s reduced oral control  Pharyngeal Stage: minimal  Swallow Mechanics:  Swallowing initiation appeared fairly prompt  Laryngeal rise was palpated and judged to be within fair  No coughing, throat clearing, change in vocal quality or respiratory status noted today  Esophageal Concerns: none reported    Strategies and Efficacy: -     Summary and Recommendations (see above)    Results Reviewed with: patient, RN, MD and family     Treatment Recommended: Yes     Frequency of treatment: As able and appropriate     Patient Stated Goal: "I'm tired and sleepy"     Dysphagia LTG  -Patient will demonstrate safe and effective oral intake (without overt s/s significant oral/pharyngeal dysphagia including s/s penetration or aspiration) for the highest appropriate diet level       Short Term Goals:  -Pt will tolerate Dysphagia 3/advanced (dental soft) diet and honey thick nectar thick thin liquid with no significant s/s oral or pharyngeal dysphagia across 1-3 diagnostic session/s     -Patient will tolerate trials of upgraded food and/or liquid texture with no significant s/s of oral or pharyngeal dysphagia including aspiration across 1-3 diagnostic sessions           Speech Therapy Prognosis   Prognosis: good    Prognosis Considerations: age, medical status, prior medical history and cognitive status

## 2023-02-05 NOTE — ED ATTENDING ATTESTATION
2/4/2023   ILian MD, saw and evaluated the patient  I have discussed the patient with the resident/non-physician practitioner and agree with the resident's/non-physician practitioner's findings, Plan of Care, and MDM as documented in the resident's/non-physician practitioner's note, except where noted  All available labs and Radiology studies were reviewed  I was present for key portions of any procedure(s) performed by the resident/non-physician practitioner and I was immediately available to provide assistance  At this point I agree with the current assessment done in the Emergency Department  I have conducted an independent evaluation of this patient a history and physical is as follows:    Chief Complaint   Patient presents with   • CVA/TIA-like Symptoms     1 hour PTA, pt reported sudden facial droop and difficulty speaking               Physical Exam  BP (!) 179/99   Pulse 79   Temp 97 9 °F (36 6 °C)   Resp 22   Ht 5' (1 524 m)   Wt 87 1 kg (192 lb)   SpO2 92%   BMI 37 50 kg/m²      Vital signs and nursing notes reviewed    ** IF YOU ARE READING THIS, THE EXAM TEMPLATE BELOW HAS NOT BEEN UPDATED**    CONSTITUTIONAL: female appearing stated age resting in bed, in no acute distress  HEENT: atraumatic, normocephalic  Sclera anicteric, conjunctiva are not injected  Moist oral mucosa  CARDIOVASCULAR/CHEST: RRR, no M/R/G  2+ radial pulses  PULMONARY: Breathing comfortably on RA  Breath sounds are equal and clear to auscultation  ABDOMEN: non-distended  BS present, normoactive  Non-tender  MSK: moves all extremities, no deformities, no peripheral edema, no calf asymmetry  NEURO: Awake, alert, and oriented x 3  Face symmetric  Moves all extremities spontaneously   No focal neurologic deficits  SKIN: Warm, appears well-perfused  MENTAL STATUS: Normal affect     Stroke Assessment     Row Name 02/04/23 1442             NIH Stroke Scale    Interval --      Level of Consciousness (1a ) 0      LOC Questions (1b ) 0      LOC Commands (1c ) 0      Best Gaze (2 ) 0      Visual (3 ) 0      Facial Palsy (4 ) 2      Motor Arm, Left (5a ) 0      Motor Arm, Right (5b ) 0      Motor Leg, Left (6a ) 0      Motor Leg, Right (6b ) 1      Limb Ataxia (7 ) 0      Sensory (8 ) 0      Best Language (9 ) 0      Dysarthria (10 ) 0      Extinction and Inattention (11 ) (Formerly Neglect) 0      Total 3                    Labs and Imaging  Labs Reviewed   BASIC METABOLIC PANEL - Abnormal       Result Value Ref Range Status    Sodium 138  135 - 147 mmol/L Final    Potassium 4 0  3 5 - 5 3 mmol/L Final    Chloride 108  96 - 108 mmol/L Final    CO2 26  21 - 32 mmol/L Final    ANION GAP 4  4 - 13 mmol/L Final    BUN 11  5 - 25 mg/dL Final    Creatinine 0 88  0 60 - 1 30 mg/dL Final    Comment: Standardized to IDMS reference method    Glucose 143 (*) 65 - 140 mg/dL Final    Comment: If the patient is fasting, the ADA then defines impaired fasting glucose as > 100 mg/dL and diabetes as > or equal to 123 mg/dL  Specimen collection should occur prior to Sulfasalazine administration due to the potential for falsely depressed results  Specimen collection should occur prior to Sulfapyridine administration due to the potential for falsely elevated results      Calcium 9 6  8 3 - 10 1 mg/dL Final    eGFR 73  ml/min/1 73sq m Final    Narrative:     [place] guidelines for Chronic Kidney Disease (CKD):   •  Stage 1 with normal or high GFR (GFR > 90 mL/min/1 73 square meters)  •  Stage 2 Mild CKD (GFR = 60-89 mL/min/1 73 square meters)  •  Stage 3A Moderate CKD (GFR = 45-59 mL/min/1 73 square meters)  •  Stage 3B Moderate CKD (GFR = 30-44 mL/min/1 73 square meters)  •  Stage 4 Severe CKD (GFR = 15-29 mL/min/1 73 square meters)  •  Stage 5 End Stage CKD (GFR <15 mL/min/1 73 square meters)  Note: GFR calculation is accurate only with a steady state creatinine   CBC AND PLATELET - Abnormal    WBC 9 32  4 31 - 10 16 Thousand/uL Final    RBC 4 72  3 81 - 5 12 Million/uL Final    Hemoglobin 15 1  11 5 - 15 4 g/dL Final    Hematocrit 46 5 (*) 34 8 - 46 1 % Final    MCV 99 (*) 82 - 98 fL Final    MCH 32 0  26 8 - 34 3 pg Final    MCHC 32 5  31 4 - 37 4 g/dL Final    RDW 14 8  11 6 - 15 1 % Final    Platelets 546  215 - 390 Thousands/uL Final    MPV 9 4  8 9 - 12 7 fL Final   PROTIME-INR - Normal    Protime 12 6  11 6 - 14 5 seconds Final    INR 0 92  0 84 - 1 19 Final   APTT - Normal    PTT 29  23 - 37 seconds Final    Comment: Therapeutic Heparin Range =  60-90 seconds   HS TROPONIN I 0HR - Normal    hs TnI 0hr 3  "Refer to ACS Flowchart"- see link ng/L Final    Comment:                                              Initial (time 0) result  If >=50 ng/L, Myocardial injury suggested ;  Type of myocardial injury and treatment strategy  to be determined  If 5-49 ng/L, a delta result at 2 hours and or 4 hours will be needed to further evaluate  If <4 ng/L, and chest pain has been >3 hours since onset, patient may qualify for discharge based on the HEART score in the ED  If <5 ng/L and <3hours since onset of chest pain, a delta result at 2 hours will be needed to further evaluate  HS Troponin 99th Percentile URL of a Health Population=12 ng/L with a 95% Confidence Interval of 8-18 ng/L  Second Troponin (time 2 hours)  If calculated delta >= 20 ng/L,  Myocardial injury suggested ; Type of myocardial injury and treatment strategy to be determined  If 5-49 ng/L and the calculated delta is 5-19 ng/L, consult medical service for evaluation  Continue evaluation for ischemia on ecg and other possible etiology and repeat hs troponin at 4 hours  If delta is <5 ng/L at 2 hours, consider discharge based on risk stratification via the HEART score (if in ED), or HEYDI risk score in IP/Observation  HS Troponin 99th Percentile URL of a Health Population=12 ng/L with a 95% Confidence Interval of 8-18 ng/L         MRI brain wo contrast Final Result      Left posterior limb internal capsule/thalamic acute to subacute ischemia  Findings were marked as "immediate"in Epic and will now be related to the ordering physician or covering clinical team by the radiology liaison  Workstation performed: [id number]         CT stroke alert brain   Final Result      No acute intracranial abnormality  Findings were directly discussed with Gela Trinidad at 1:40 PM       Workstation performed: [id number]         CTA stroke alert (head/neck)   Final Result      There are some anatomic variations of the intracranial circulation as described above with no stenosis, occlusion or thrombosis of the cervical or intracranial vasculature  Findings were directly discussed with Gela Trinidad at approximately 1:40 PM                            Workstation performed: [id number]               Procedures  ECG 12 Lead Documentation Only    Date/Time: 2/4/2023 2:26 PM  Performed by: Shant Martinez MD  Authorized by: Shant Martinez MD     Comments:      Normal sinus rhythm, ventricular rate 91, ID interval 206 consistent with first-degree AV block, QRS 76, QTc 408, normal axis, no ST/T wave changes to suggest ischemia, no significant change from prior EKG dated 6/30/2020              ED Course  Medications   DULoxetine (CYMBALTA) delayed release capsule 60 mg (60 mg Oral Given 2/5/23 0827)   acetaminophen (TYLENOL) tablet 650 mg (650 mg Oral Given 2/5/23 0021)   ondansetron (ZOFRAN) injection 4 mg (has no administration in time range)   atorvastatin (LIPITOR) tablet 40 mg (40 mg Oral Given 2/4/23 1805)   enoxaparin (LOVENOX) subcutaneous injection 40 mg (40 mg Subcutaneous Given 2/5/23 0827)   clopidogrel (PLAVIX) tablet 75 mg (75 mg Oral Given 2/5/23 0827)   aspirin chewable tablet 81 mg (81 mg Oral Given 2/5/23 0827)   sodium chloride 0 9 % infusion (75 mL/hr Intravenous New Bag 2/4/23 1619)   QUEtiapine (SEROquel) tablet 25 mg (25 mg Oral Given 2/5/23 0022)   nicotine (NICODERM CQ) 21 mg/24 hr TD 24 hr patch 21 mg (21 mg Transdermal Medication Applied 2/5/23 0812)   traZODone (DESYREL) tablet 50 mg (0 mg Oral Hold 2/5/23 0249)   iohexol (OMNIPAQUE) 350 MG/ML injection (SINGLE-DOSE) 85 mL (85 mL Intravenous Given 2/4/23 1323)   aspirin tablet 325 mg (325 mg Oral Given 2/4/23 1805)   clopidogrel (PLAVIX) tablet 300 mg (300 mg Oral Given 2/4/23 1805)   ketorolac (TORADOL) injection 15 mg (15 mg Intravenous Given 2/4/23 1617)

## 2023-02-05 NOTE — PROGRESS NOTES
NEUROLOGY RESIDENCY PROGRESS NOTE     Name: Hesham Guerrier   Age & Sex: 62 y o  female   MRN: [id number]  [facility]   Encounter: [id number]    Hesham Guerrier will need follow up in 6-8 weeks  with neurovascular attending/ap/resident  She will not require outpatient neurological testing  ASSESSMENT & PLAN     * Stroke [facility])  Assessment & Plan  63 yo F w/ a PMH of Current smoker (1ppd, 25+ years), past bunion surgery, pre-diabetic, vertigo, hypertension coming in as a stroke alert on 2/4/2023  1:06 PM with initial NIHSS of 5 and LKW 8:30am, initial Blood Pressure: 143/99  Initial presenting deficits were slurred speech, RUE and RLE weakness, and some sensory neglect  Patient had went up around 8:30 AM in the morning that was known at that time  She then stated she took a nap and then woke up around 1130am and then talk to her daughter who noticed that she was slurring her speech  Patient also had appreciated that she had a left facial droop during this time  patient also noted that she had a episode of bowel incontinence prior to this  As a result of patient outside window of TNK and most symptoms resolving pt was determined to not be a candidate for thrombolysis (TNK)  Initial Exam on 02/04/23: R facial droop, slurred speech, 5/5 strength in all extremities although in LE had some pain  Found to have a stroke in L PLIC/thalamus on MRI  • Vascular risk factors: active smoker, prediabetic, age, hypertension  • Home meds: cymbalta     Workup:  Lab Results   Component Value Date    HGBA1C 6 4 (H) 02/05/2023    CHOLESTEROL 240 (H) 02/05/2023    LDLCALC 171 (H) 02/05/2023    TRIG 132 02/05/2023    INR 0 92 02/04/2023      • CTH: negative for any bleeds or recent stroke  • CTA: negative for LVOs, aneurysms, dissection or AVMs  • MRI: Left posterior limb internal capsule/thalamic acute to subacute ischemia  Echocardiogram: left ventricular ejection fraction is 60%   Systolic function is normal  Wall motion is normal  Diastolic function is normal   Left atrium normal in size     Pertinent scores:  - NIHSS: 5  Stroke Modified [place] Score: 2 (Unable to carry out all previous activities, but able to look after own affairs without assistance)    Impression: stroke appreciated in L internal capsule and thalamus likely secondary to hypertension and other vascular risk factors but cannot rule out other factors such as a hypercoagulable state given that patient is young and has been a longtime smoker      Plan:  - Stroke pathway  - Discussed plan with neurology attending, Dr Padma Santacruz  - BP: Allow for normotension and started on verapamil 121 mg daily  - atorvastatin 40mg qhs  - Maintain glucose <180, SSI for coverage if indicated  - Medical management as per primary team appreciated  - Antiplatelet agents: DAPT w/ ASA 81mg and plavix 75mg for 21 days and then monotherapy (02/26/2023)  - Ordered CT chest abdomen pelvis with and without contrast to evaluate for any underlying malignancy that be contributing to a hypercoagulable state  -Order additional D-dimer and thrombosis panel in the a m  with morning labs  - DVT ppx and SCDs  - Monitor on telemetry  - PT/OT/Speech recommended rehab w/ [facility]  - Speech recommends soft/level 3 diet and thin liquids   - Stroke education      Depression  Assessment & Plan  Continue home cymbalta    Hypertension  Assessment & Plan  Verapamil 120 mg daily    Restlessness  Assessment & Plan  Restarted home trazodone and seroquel (was initially not ordered as patient was not regularly taking and taking prn)  Trazodone scheduled nightly and Seroquel as needed    Headache  Assessment & Plan  Patient had noted a headache over the past few months that has been intermittent but over the past 1-1/2 months has been progressively getting worse  She states that is all over her head and has been on and off getting worse every day    She has been taking Tylenol every day for it and it was explained that she likely has some component of medication overuse headache  Plan  Will order a MRI brain with contrast  Given migraine cocktail: Phenergan, Toradol, Benadryl   -Avoided steroids given patient's restlessness/anxiety  Given additional Pepcid for noted Toradol  Will give additional IV mag 2mg for 2 days  Verapamil was also added 120 mg daily as prevention              SUBJECTIVE     Patient was seen and examined  She noted some chest pain last evening and got an EKG that was negative and troponin  Chest pain was reproducible on palpation  She reported restlessness, anxiety, headache and chest pain  She was given seroquel and improved while on the seroquel for restlessness and sleep  She noted R shoulder pain that was occurring although she had bumped that shoulder earlier  Her headache also improved with the tylenol  IN addition, son noted that he was concerned for patient's cognition in terms of appropriately answering questions  In addition, he is surprised she is not more emotional  Son does note slurring of speech is still there and slightly worsened  Patient also noted that her headache has been starting to worsen again later in afternoon  She noted that she has had migraines for quite some time that have been occurring about monthly  She used to get them more frequently with her menses but stopped once she went through menopause  She stated that over the past few months they have gotten a little more worse and that she had a recent MRI brain at [facility] that was negative  But over the past 1/2 months it has become daily and continue to worsen and she notes that she takes Tylenol every day 2-3 times a day for it  Explained that patient likely has a component of medication overuse headache but we will also get additional imaging for her to evaluate since this seems to be quite worse  She will also be getting a migraine cocktail for additional relief        She denies any fevers/chill, shortness of breath, abdominal pain, nausea, vomiting, diarrhea, problems urinating, problems  With bowel movements, and is eating/drinking without problem      She denies any syncope, paresthesias, diplopia, visual changes, tinnitus,   Loss of bowel or bladder        Review of Systems    See above    OBJECTIVE     Patient ID: Esmer Ferguson is a 62 y o  female  Vitals:    23 1327 23 1427 23 1548 23 1628   BP: (!) 156/107  (!) 160/115 (!) 164/113   Pulse: 82   (!) 110   Resp:       Temp:    97 7 °F (36 5 °C)   TempSrc:       SpO2: 96%   99%   Weight:       Height:  5' (1 524 m)        Temperature:   Temp (24hrs), Av 7 °F (36 5 °C), Min:97 7 °F (36 5 °C), Max:97 7 °F (36 5 °C)    Temperature: 97 7 °F (36 5 °C)      Physical Exam  Eyes:      Extraocular Movements: EOM normal       Pupils: Pupils are equal, round, and reactive to light  Neurological:      Mental Status: She is oriented to person, place, and time  Motor: Motor strength is normal       Coordination: Finger-Nose-Finger Test abnormal  Heel to Shin Test normal       Deep Tendon Reflexes:      Reflex Scores:       Tricep reflexes are 2+ on the right side and 2+ on the left side  Bicep reflexes are 2+ on the right side and 2+ on the left side  Brachioradialis reflexes are 2+ on the right side and 2+ on the left side  Patellar reflexes are 2+ on the right side and 2+ on the left side  Achilles reflexes are 2+ on the right side and 2+ on the left side  Psychiatric:         Speech: Speech is slurred  Neurologic Exam     Mental Status   Oriented to person, place, and time  Speech: slurred   Able to name object  Able to repeat  Normal comprehension  Cranial Nerves     CN II   Visual fields full to confrontation  CN III, IV, VI   Pupils are equal, round, and reactive to light    Extraocular motions are normal    CN III: no CN III palsy  CN VI: no CN VI palsy  Nystagmus: none     CN V   Facial sensation intact  CN VII   Facial expression full, symmetric  CN IX, X   CN IX normal    CN X normal      CN XI   CN XI normal      CN XII   Tongue deviation: left    R facial droop appreciated and not moving/more flattened nasolabial fold compared to L  L tongue deviation     Motor Exam   Right arm pronator drift: absent  Left arm pronator drift: absent    Strength   Strength 5/5 throughout  LUE (deltoids, biceps/triceps, wrist extensors, finger ) seems weaker but able to give a burst of strength 5/5 and likely to be pain limited       Sensory Exam   Right arm light touch: decreased from wrist  Vibration normal        Cold sensation intact in all 4 extremities     Gait, Coordination, and Reflexes     Coordination   Finger to nose coordination: abnormal  Heel to shin coordination: normal    Reflexes   Right brachioradialis: 2+  Left brachioradialis: 2+  Right biceps: 2+  Left biceps: 2+  Right triceps: 2+  Left triceps: 2+  Right patellar: 2+  Left patellar: 2+  Right achilles: 2+  Left achilles: 2+  Right plantar: normal  Left plantar: normal    L FNT ataxic and slower but some component of possible functional status as well/effort dependence    Gait deferred            LABORATORY DATA     Labs: I have personally reviewed pertinent reports     and I have personally reviewed pertinent films in PACS  Results from last 7 days   Lab Units 02/05/23  0615 02/04/23  1314   WBC Thousand/uL 8 16 9 32   HEMOGLOBIN g/dL 13 7 15 1   HEMATOCRIT % 42 8 46 5*   PLATELETS Thousands/uL 319 352   NEUTROS PCT % 41*  --    MONOS PCT % 9  --       Results from last 7 days   Lab Units 02/05/23  0615 02/04/23  1314   SODIUM mmol/L 140 138   POTASSIUM mmol/L 3 8 4 0   CHLORIDE mmol/L 110* 108   CO2 mmol/L 25 26   BUN mg/dL 9 11   CREATININE mg/dL 0 64 0 88   CALCIUM mg/dL 9 2 9 6   ALK PHOS U/L 88  --    ALT U/L 27  --    AST U/L 20  --               Results from last 7 days   Lab Units 02/04/23  1314   INR  0 92 PTT seconds 29               IMAGING & DIAGNOSTIC TESTING     Radiology Results: I have personally reviewed pertinent reports  and I have personally reviewed pertinent films in PACS    MRI brain wo contrast   Final Result by Marissa Campbell MD (02/04 9323)      Left posterior limb internal capsule/thalamic acute to subacute ischemia  Findings were marked as "immediate"in Epic and will now be related to the ordering physician or covering clinical team by the radiology liaison  Workstation performed: [id number]         CT stroke alert brain   Final Result by Mejia Landaverde DO (02/04 1342)      No acute intracranial abnormality  Findings were directly discussed with Qiana Nolasco at 1:40 PM       Workstation performed: [id number]         CTA stroke alert (head/neck)   Final Result by Mejia Landaverde DO (02/04 1348)      There are some anatomic variations of the intracranial circulation as described above with no stenosis, occlusion or thrombosis of the cervical or intracranial vasculature  Findings were directly discussed with Qiana Nolasco at approximately 1:40 PM                            Workstation performed: [id number]         MRI inpatient order    (Results Pending)   CT chest w ct abdomen pelvis w wo contrast    (Results Pending)       Other Diagnostic Testing: I have personally reviewed pertinent reports     and I have personally reviewed pertinent films in PACS    ACTIVE MEDICATIONS     Current Facility-Administered Medications   Medication Dose Route Frequency   • aspirin chewable tablet 81 mg  81 mg Oral Daily   • atorvastatin (LIPITOR) tablet 40 mg  40 mg Oral QPM   • clopidogrel (PLAVIX) tablet 75 mg  75 mg Oral Daily   • diphenhydrAMINE (BENADRYL) injection 25 mg  25 mg Intravenous Q8H PRN   • DULoxetine (CYMBALTA) delayed release capsule 60 mg  60 mg Oral Daily   • enoxaparin (LOVENOX) subcutaneous injection 40 mg  40 mg Subcutaneous Daily   • famotidine (PEPCID) tablet 20 mg  20 mg Oral BID   • ketorolac (TORADOL) injection 30 mg  30 mg Intravenous Q8H   • lidocaine (LIDODERM) 5 % patch 1 patch  1 patch Topical Daily   • magnesium sulfate 2 g/50 mL IVPB (premix) 2 g  2 g Intravenous Q24H [address]   • metoclopramide (REGLAN) injection 10 mg  10 mg Intravenous Q8H [address]   • nicotine (NICODERM CQ) 21 mg/24 hr TD 24 hr patch 21 mg  21 mg Transdermal Daily   • ondansetron (ZOFRAN) injection 4 mg  4 mg Intravenous Q6H PRN   • QUEtiapine (SEROquel) tablet 25 mg  25 mg Oral HS PRN   • traZODone (DESYREL) tablet 50 mg  50 mg Oral Once   • traZODone (DESYREL) tablet 50 mg  50 mg Oral HS   • [START ON 2/6/2023] verapamil (CALAN-SR) CR tablet 120 mg  120 mg Oral Daily       Prior to Admission medications    Medication Sig Start Date End Date Taking?  Authorizing Provider   atorvastatin (LIPITOR) 20 mg tablet Take 20 mg by mouth every evening   10/23/20  Yes Historical Provider, MD   DULoxetine (CYMBALTA) 60 mg delayed release capsule TAKE 2 CAPSULES (120 MG TOTAL) B MOUTH DAILY 12/13/22  Yes Historical Provider, MD   meclizine (ANTIVERT) 25 mg tablet 1 every 8 hours as needed 8/30/22  Yes Historical Provider, MD   naproxen (NAPROSYN) 500 mg tablet Take 500 mg by mouth 2 (two) times a day as needed Take with food 1/2/23  Yes Historical Provider, MD   QUEtiapine (SEROquel) 25 mg tablet Take 25 mg by mouth every evening 6/30/22  Yes Historical Provider, MD   acetaminophen (TYLENOL) 650 mg CR tablet Take 650 mg by mouth every 8 (eight) hours as needed    Historical Provider, MD   aspirin (ECOTRIN) 325 mg EC tablet Take 1 tablet (325 mg total) by mouth daily  Patient not taking: Reported on 2/4/2023 11/12/21   Tanner Verma DPM   cyclobenzaprine (FLEXERIL) 10 mg tablet take 1 tablet by mouth three times a day for muscle spasm  Patient not taking: Reported on 2/4/2023 1/2/23   Historical Provider, MD   Diclofenac Sodium (VOLTAREN) 1 % Apply 2 g topically 4 (four) times a day  Patient not taking: Reported on 2/4/2023 7/14/22   Terri Prom, DPM   DULoxetine (CYMBALTA) 30 mg delayed release capsule Take 30 mg by mouth daily  Patient not taking: Reported on 2/4/2023 7/19/22   Historical Provider, MD   gabapentin (NEURONTIN) 300 mg capsule Take 1 capsule (300 mg total) by mouth 2 (two) times a day 3/31/22 5/30/22  Terri Prom, DPM   ibuprofen (MOTRIN) 600 mg tablet Take 1 tablet (600 mg total) by mouth every 6 (six) hours as needed for mild pain for up to 30 days 7/19/19 11/8/21  Hillary Guerrero DPM   lidocaine-prilocaine (EMLA) cream Apply topically as needed for mild pain or moderate pain  Patient not taking: Reported on 2/4/2023 7/14/22   Terri Prom, DPM   nicotine (NICODERM CQ) 14 mg/24hr TD 24 hr patch Place 1 patch on the skin every 24 hours  Patient not taking: Reported on 2/4/2023    Historical Provider, MD   oxyCODONE-acetaminophen (Percocet) 5-325 mg per tablet Take 1 tablet by mouth every 6 (six) hours as needed for severe pain for up to 30 doses Max Daily Amount: 4 tablets  Patient not taking: Reported on 2/15/2022 11/12/21   Miguelito Rajan DPM   oxyCODONE-acetaminophen (Percocet) 5-325 mg per tablet Take 1 tablet by mouth every 4 (four) hours as needed for moderate pain for up to 15 doses Max Daily Amount: 15 tablets  Patient not taking: Reported on 2/4/2023 1/6/23   Gustavo Jaquez MD   traZODone (DESYREL) 50 mg tablet take 1 tablet by mouth nightly - TAKE AT BEDTIME  Patient not taking: Reported on 2/4/2023 2/28/22   Historical Provider, MD         VTE Pharmacologic Prophylaxis: Enoxaparin (Lovenox)  VTE Mechanical Prophylaxis: sequential compression device    ==  [address]  Neurology Residency, PGY-2

## 2023-02-05 NOTE — CASE MANAGEMENT
Case Management Assessment & Discharge Planning Note    Patient name Rodger Das  Location [address] 708/PPHP 570-76 MRN [id number]  : 1965 Date 2023       Current Admission Date: 2023  Current Admission Diagnosis:Stroke [facility])   Patient Active Problem List    Diagnosis Date Noted   • Restlessness 2023   • Stroke ([address] ) 2023   • Depression 2023   • Obesity (BMI 35 0-39 9 without comorbidity) 03/10/2021   • Ex-smoker for less than 1 year 03/10/2021   • Headache 2020   • Preoperative clearance 2020   • Wound of right foot 2020   • Acquired hallux valgus of left foot 2019   • Other hammer toe(s) (acquired), left foot 2019   • Other acquired deformities of left foot 2019      LOS (days): 0  Geometric Mean LOS (GMLOS) (days):   Days to GMLOS:     OBJECTIVE:              Current admission status: Observation       Preferred Pharmacy:   [address] [place], [place] [address]  Phone: [phone] Fax: [phone]    Primary Care Provider: Loraine Pollard MD    Primary Insurance: [address] [place] [address]  Secondary Insurance:     ASSESSMENT:  Active Health Care Proxies    There are no active Health Care Proxies on file  Readmission Root Cause  30 Day Readmission: No    Patient Information  Admitted from[de-identified] Home  Mental Status: Alert  During Assessment patient was accompanied by:  Son  Assessment information provided by[de-identified] Son, Patient  Primary Caregiver: Self  Support Systems: Self, Spouse/significant other, [address] of Residence: [address] [place] [address] do you live in?: [place]  Type of Current Residence: 2 story home  Upon entering residence, is there a bedroom on the main floor (no further steps)?: Yes  Upon entering residence, is there a bathroom on the main floor (no further steps)?: Yes  In the last 12 months, was there a time when you were not able to pay the mortgage or rent on time?: No  In the last 12 months, how many places have you lived?: 1  In the last 12 months, was there a time when you did not have a steady place to sleep or slept in a shelter (including now)?: No  Homeless/housing insecurity resource given?: N/A  Living Arrangements: Lives w/ Spouse/significant other (Pt son will be moving in to assist with caregiving)  Is patient a ?: No    Activities of Daily Living Prior to Admission  Functional Status: Independent  Completes ADLs independently?: Yes  Ambulates independently?: Yes  Does patient use assisted devices?: Yes  Assisted Devices (DME) used: Straight Cane  Does patient currently own DME?: Yes  What DME does the patient currently own?: Straight Cane  Does patient have a history of Outpatient Therapy (PT/OT)?: Yes (Currently has OP PT)  Does the patient have a history of Short-Term Rehab?: No  Does patient have a history of HHC?: No  Does patient currently have [facility]?: No         Patient Information Continued  Income Source: Unemployed  Does patient have prescription coverage?: Yes  Within the past 12 months, you worried that your food would run out before you got the money to buy more : Never true  Within the past 12 months, the food you bought just didn't last and you didn't have money to get more : Never true  Food insecurity resource given?: N/A  Does patient receive dialysis treatments?: No  Does patient have a history of substance abuse?: No  Does patient have a history of Mental Health Diagnosis?: No         Means of Transportation  Means of Transport to Appts[de-identified] Family transport  In the past 12 months, has lack of transportation kept you from medical appointments or from getting medications?: No  In the past 12 months, has lack of transportation kept you from meetings, work, or from getting things needed for daily living?: No  Was application for public transport provided?: N/A        DISCHARGE DETAILS:    Discharge planning discussed with[de-identified] bedside with pt and pt son [address] of Choice: Yes  Comments - Freedom of Choice: Disussed FOC pt and family in agreement with [facility]  CM contacted family/caregiver?: No- see comments (son was bedside)             Contacts  Patient Contacts: Anushka Foley  Relationship to Patient[de-identified] Family (son)  Contact Method: Phone  Phone Number: [phone]  Reason/Outcome: Emergency Contact, Discharge Planning, Continuity of Care              Other Referral/Resources/Interventions Provided:  Interventions: Acute Rehab  Referral Comments: Referral to [facility]by is [facility]    Additional Comments: CM spoke with pt son Jackson Guerrero bedside  Jackson Guerrero will be moving in with pt and step-father to assist with caregiving   Pt attends OP PT for her foot

## 2023-02-05 NOTE — PLAN OF CARE
Problem: OCCUPATIONAL THERAPY ADULT  Goal: Performs self-care activities at highest level of function for planned discharge setting  See evaluation for individualized goals  Description: Treatment Interventions: ADL retraining, Functional transfer training, UE strengthening/ROM, Endurance training, Cognitive reorientation, Patient/family training, Equipment evaluation/education, Neuromuscular reeducation, Fine motor coordination activities, Compensatory technique education, Continued evaluation, Energy conservation, Activityengagement          See flowsheet documentation for full assessment, interventions and recommendations  2/5/2023 1341 by Jame Spence OT  Note: Limitation: Decreased ADL status, Decreased UE ROM, Decreased UE strength, Decreased Safe judgement during ADL, Decreased endurance, Decreased fine motor control, Decreased self-care trans  Prognosis: Fair  Assessment: Pt is 62 y o  female admitted to [facility] on 2/4/2023 w/ a stroke  Pt  has a past medical history of Acquired deformity of left foot, Anesthesia complication, Arthritis, Deaf, left, Depression, Hallux valgus of left foot, Hammer toe of left foot, Headache, Kidney stone, and Sciatic pain, right    Pt with active OT orders and activity orders  Pt resides in a a 3  with 9 DONNIE in the front and 3 DONNIE in the back  Pt is currently living with her son, but her son will be moving out shortly  Pt remains on one floor in her home and was using a RW at baseline  Pt was able to participate in a treatment session today as well  During the treatment session pt was educated on what a stroke is, her deficits, as well as her strengths  Pt was educated on the benefits of UE exercises and to complete exercises t/o the day  Pt has significant RUE deficits, decreased strength, AROM, fine and gross motor impaired  Pt also demonstrating increased processing time   Pt was educated on compensatory techniques such as using her L hand to A with her R hand during functional tasks  Pt was able to demonstrate toilet assistance and LB dressing with mod A  Pt is functioning at min A for UB ADLs and bed mobility  Pt is functioning at mod A for LB ADLs, transfers, and functional mobility  Pt is limited 2* pain, endurance, activity tolerance, functional mobility, balance, functional standing tolerance, decreased I w/ ADLS/IADLS, decreased safety awareness, decreased insight into deficits, slurred speech, impaired fine motor skills and coordination deficits  The Areas of Occupational Performance Areas to address w/ pt include: eating, grooming, bathing/shower, toilet hygiene, dressing, health maintenance and functional mobility  Based off of an OT evaulation, assessment, and performance functioning, pt is identified as a high complexity  OT recommendation for d/c includes post acute rehab  Pt would benefit from continued acute OT services for  3-5x/week to  w/in 10-14 days:  to address functional goals  OT Discharge Recommendation: Post acute rehabilitation services ([facility])       2023 1341 by Antoni Shelton OT  Note: Limitation: Decreased ADL status, Decreased UE ROM, Decreased UE strength, Decreased Safe judgement during ADL, Decreased endurance, Decreased fine motor control, Decreased self-care trans  Prognosis: Fair  Assessment: Pt is 62 y o  female admitted to [facility] on 2023 w/ a stroke  Pt  has a past medical history of Acquired deformity of left foot, Anesthesia complication, Arthritis, Deaf, left, Depression, Hallux valgus of left foot, Hammer toe of left foot, Headache, Kidney stone, and Sciatic pain, right    Pt with active OT orders and activity orders  Pt resides in a a 3  with 9 DONNIE in the front and 3 DONNIE in the back  Pt is currently living with her son, but her son will be moving out shortly  Pt remains on one floor in her home and was using a RW at baseline  Pt was able to participate in a treatment session today as well   During the treatment session pt was educated on what a stroke is, her deficits, as well as her strengths  Pt was educated on the benefits of UE exercises and to complete exercises t/o the day  Pt has significant RUE deficits, decreased strength, AROM, fine and gross motor impaired  Pt also demonstrating increased processing time  Pt was educated on compensatory techniques such as using her L hand to A with her R hand during functional tasks  Pt was able to demonstrate toilet assistance and LB dressing with mod A  Pt is functioning at min A for UB ADLs and bed mobility  Pt is functioning at mod A for LB ADLs, transfers, and functional mobility  Pt is limited 2* pain, endurance, activity tolerance, functional mobility, balance, functional standing tolerance, decreased I w/ ADLS/IADLS, decreased safety awareness, decreased insight into deficits, slurred speech, impaired fine motor skills and coordination deficits  The Areas of Occupational Performance Areas to address w/ pt include: eating, grooming, bathing/shower, toilet hygiene, dressing, health maintenance and functional mobility  Based off of an OT evaulation, assessment, and performance functioning, pt is identified as a high complexity  OT recommendation for d/c includes post acute rehab  Pt would benefit from continued acute OT services for  3-5x/week to  w/in 10-14 days:  to address functional goals       OT Discharge Recommendation: Post acute rehabilitation services [facility])

## 2023-02-05 NOTE — QUICK NOTE
· MRI done without contrast  · Showing a left posterior limb internal capsule/thalamic acute to subacute ischemia  · Discussed MRI results and potential etiologies with patient and son at bedside   · Patient reports restlessness, anxiety, headache and chest pain   · Ordered EKG   · Ordered home Seroquel which she takes prn as per son   · Tylenol for headache

## 2023-02-05 NOTE — PHYSICAL THERAPY NOTE
Physical Therapy Evaluation and Treatment    Patient's Name: Zuri Teixeira    Admitting Diagnosis  Speech disturbance [R47 9]  Facial droop [R29 810]  Symptoms of cerebrovascular accident (CVA) [R09 89]    Problem List  Patient Active Problem List   Diagnosis    Acquired hallux valgus of left foot    Other hammer toe(s) (acquired), left foot    Other acquired deformities of left foot    Wound of right foot    Preoperative clearance    Headache    Obesity (BMI 35 0-39 9 without comorbidity)    Ex-smoker for less than 1 year    Stroke [facility])    Depression    Restlessness       Past Medical History  Past Medical History:   Diagnosis Date    Acquired deformity of left foot     Anesthesia complication     difficulty awakening    Arthritis     Deaf, left     Depression     Hallux valgus of left foot     Hammer toe of left foot     Headache     Kidney stone     Sciatic pain, right     intermittent       Past Surgical History  Past Surgical History:   Procedure Laterality Date    BREAST BIOPSY Right 03/04/2021    BREAST BIOPSY Right 3/10/2021    Procedure: Excisional Breast debridement  7 o'clock position;  Surgeon: Maria Esther Madrigal MD;  [facility];  Service: General    CHOLECYSTECTOMY      CLOSURE DELAYED PRIMARY Right 7/5/2020    Procedure: CLOSURE DELAYED PRIMARY and application of skin graft substitute;  Surgeon: Reyna Golden DPM;  [facility];  Service: Podiatry    HERNIA REPAIR      INCISION AND DRAINAGE OF WOUND Right 7/1/2020    Procedure: INCISION AND DRAINAGE (I&D) EXTREMITY;  Surgeon: Reyna Golden DPM;  [facility];  Service: Podiatry    TN CORRJ HALLUX VALGUS W/SESMD[facility] [place] MEDIAL CNF Left 11/12/2021    Procedure: Cira Márquez;  Surgeon: Reyna Golden DPM;  [facility];  Service: Podiatry    TN OSTEOT W/ [facility] [address] SHRT/CORRJ METAR XCP 1ST EA Left 6/14/2019    Procedure: 3rd Metatarsal Osteotomy;  Surgeon: Karen Smith DPM;  [facility];  Service: Podiatry [place] OSTEOT W/WO LNGTH SHRT/CORRJ METAR XCP 1ST EA Left 11/12/2021    Procedure: OSTEOTOMY 2ND METATARSAL;  Surgeon: Brady Montanez DPM;  [facility];  Service: [address] [place] [address] BREAST BIOPSY RIGHT COMPLETE Right 3/4/2021    VAC DRESSING APPLICATION Right 6/1/1796    Procedure: APPLICATION VAC DRESSING;  Surgeon: Brady Montanez DPM;  [facility];  Service: Podiatry        02/05/23 0933   PT Last Visit   PT Visit Date 02/05/23   Note Type   Note type Evaluation  (and treatment)   Pain Assessment   Pain Assessment Tool FLACC   Pain Location/Orientation Location: Head  (L frontal; RN aware)   Effect of Pain on Daily Activities w/ associated dizziness; limits mobility   Patient's Stated Pain Goal No pain   Hospital Pain Intervention(s) Ambulation/increased activity;Repositioned   Restrictions/Precautions   Weight Bearing Precautions Per Order No   Braces or Orthoses   (none)   Other Precautions Pain; Fall Risk;Multiple lines;Telemetry; Bed Alarm; Chair Alarm   Home Living   Type of [address] [place] [address] Multi-level;Stairs to enter with rails; Able to live on main level with bedroom/bathroom; Performs ADLs on one level  (9 DONNIE)   Bathroom Shower/Tub Tub/shower unit   Bathroom Toilet Standard   Bathroom Accessibility Accessible   Home Equipment Walker;Cane   Additional Comments Patient and son reported the following: patient resides with s/o (per son- is not very supportive) and son (works during day) in a 3 story home w/ 1st floor set up  9 DONNIE through front verus 3 through garage (patient normally goes through front and has R HR)  At her baseline she is I with mobility (SPC), ADLS, and iADLS  + /cooks/cleans   Prior Function   Level of [place] Independent with ADLs   Lives With Son  (son moving out in 2 weeks; patient does have spouse however son present today reports limited support from spouse)   Receives Help From Family   IADLs Independent with driving; Independent with meal prep;Independent with medication management   Falls in the last 6 months 0   Vocational Unemployed   General   Additional Pertinent History 62year old female admitted to [facility] on 2/4/2023 with facial droop and difficulty speaking  Diagnosis this admission includes CVA  MRI of brain reports: "left posterior limb internal capsule/thalamic acute to subacute ischemia"  Family/Caregiver Present No   Cognition   Overall Cognitive Status Impaired   Attention Attends with cues to redirect   Orientation Level Oriented X4   Memory Decreased recall of recent events   Following Commands Follows one step commands with increased time or repetition   Comments patient with dysarthria; requires increased time to express thoughts; limited insight into her deficits   Subjective   Subjective "can I drive and do outpatient PT?"   RUE Assessment   RUE Assessment X  (deficits s/p CVA; please see OT evaluation)   LUE Assessment   LUE Assessment WFL   RLE Assessment   RLE Assessment WFL  (4-/5 grossly)   LLE Assessment   LLE Assessment WFL  (4+/5 grossly)   Light Touch   RLE Light Touch Grossly intact   LLE Light Touch Grossly intact   Bed Mobility   Supine to Sit 4  Minimal assistance   Additional items Assist x 1;HOB elevated; Increased time required;LE management   Sit to Supine Unable to assess   Additional Comments post evaluation and treatment patient OOB in chair w/ alarm active   Transfers   Sit to Stand 3  Moderate assistance   Additional items Assist x 1; Increased time required;Verbal cues   Stand to Sit 3  Moderate assistance   Additional items Assist x 1; Increased time required;Verbal cues   Toilet transfer 3  Moderate assistance   Additional items Assist x 1; Increased time required;Verbal cues   Additional Comments w/ HHA   Ambulation/Elevation   Gait pattern R Hemiparesis; Improper Weight shift;Decreased foot clearance;Decreased R stance; Excessively slow; Short stride   Gait Assistance 3  Moderate assist   Additional items Assist x 1   Assistive Device Other (Comment)  (HHA)   Distance 3 feet   Ambulation/Elevation Additional Comments limited assessment; patient reporting HA and dizziness/holding head   Balance   Static Sitting Fair +   Static Standing Fair -   Ambulatory Poor +   Endurance Deficit   Endurance Deficit Yes   Endurance Deficit Description R sided hemiparesis   Activity Tolerance   Activity Tolerance Patient limited by fatigue   Medical Staff Made Aware This high complexity evaluation was performed with an occupational therapist due to the patient's co-morbidities, clinically unstable presentation, and present impairments which are a regression from the patient's baseline  Nurse Made Aware vahid to see per YVES Riddle   Assessment   Prognosis Good   Problem List Decreased strength;Decreased endurance; Impaired balance;Decreased mobility; Impaired tone; Impaired sensation;Decreased safety awareness; Impaired judgement   Assessment PT completed evaluation of 62year old female admitted to [facility] on 2/4/2023 with facial droop and difficulty speaking  Diagnosis this admission includes CVA  MRI of brain reports: "left posterior limb internal capsule/thalamic acute to subacute ischemia"  Current status instabilities include continuous O2/HR monitoring, falls risk, bed/chair alarms, and a regression in functional status from baseline  PMH is significant for vertigo, HTN, smoker, deaf in L ear, and bunion surgery  Patient and son reported the following: patient resides with s/o (per son- is not very supportive) and son (works during day) in a 3 story home w/ 1st floor set up  9 DONNIE through front verus 3 through garage (patient normally goes through front and has R HR)  At her baseline she is I with mobility (SPC), ADLS, and iADLS  + /cooks/cleans  Current impairments include R sided hemiparesis, dysarthria deficits, decreased balance and gait deviations   During PT evaluation patient required min-AX1 for supine-->sit transfer and mod-AX1 for sit<-->stand transfer and short distance ambulation (3 feet) bed to chair with hand held assist  Please see treatment note below for ongoing mobility and patient/caregiver education  In setting of acute CVA with functional deficits limiting patient's ability to d/c home safely with limited support, PT d/c recommendation is for rehab (PM&R referral)  Patient will continue to benefit from continued skilled PT this admission to achieve maximal function and safety  Goals   Patient Goals to go home   LTG Expiration Date 02/19/23   Long Term Goal #1 1) Perform bed mobility mod-I to participate in frequent repositioning and improve skin integrity; 2) Perform functional transfers mod-I to promote I with toileting and OOB mobility; 3) Ambulate 200 feet mod-I with least restrictive device to participate in household and community level mobility; 4) Improve R LE strength by 1/2 grade in order to improve efficiency of tranfers; 5) Improve balance by 1 grade to reduce risk for falls; 6) Navigate 9 steps S level in order to safely navigate multiple floors at home   PT Treatment Day 1   Plan   Treatment/Interventions Functional transfer training;LE strengthening/ROM; Elevations; Patient/family training;Equipment eval/education;Gait training;OT;Spoke to nursing   PT Frequency 3-5x/wk   Recommendation   PT Discharge Recommendation (S)  Post acute rehabilitation services  (PM&R referral)   Equipment Recommended   (will cont to assess)   AM-PAC Basic Mobility Inpatient   Turning in Flat Bed Without Bedrails 3   Lying on Back to Sitting on Edge of Flat Bed Without Bedrails 3   Moving Bed to Chair 3   Standing Up From Chair Using Arms 3   Walk in Room 2   Climb 3-5 Stairs With Railing 2   Basic Mobility Inpatient Raw Score 16   Basic Mobility Standardized Score 38 32   Highest Level Of Mobility   JH-HLM Goal 5: Stand one or more mins   JH-HLM Achieved 4: Move to chair/commode   Additional Treatment Session Start Time 0909   End Time 0933   Treatment Assessment Patient received in chair and requested to use bathroom  She participated in gait training to ambulate 5 feet x 2 with hand held assist and mod-AX1  VC provided to encourage increased clearance of L LE and to reduce gait speed for improved safety  Patient required min-AX1 for sit-->stand transfer from both chair and toilet and was in chair w/ alarm active post treatment  Patient and son participated in extensive education regarding d/c recommendation as son presented with multiple questions  Post acute d/c recommendation is for rehab however addressed questions regarding why patient is not suited for home PT or outpatient PT at this time  Dicussed patient's current deficits from CVA and prior level of function  Patient with very limited insight into her deficits- requesting to drive and go to outpatient PT  At end of education patient and son denied further questions  Equipment Use HHA   Additional Treatment Day 1   End of Consult   Patient Position at End of Consult Bed/Chair alarm activated; Bedside chair; All needs within reach     The patient's AM-PAC Basic Mobility Inpatient Standardized Score is less than 42 9, suggesting this patient may benefit from discharge to post-acute rehabilitation services  Please also refer to the recommendation of the Physical Therapist for safe discharge planning          Ivette Treviño, PT, DPT

## 2023-02-05 NOTE — PLAN OF CARE
Problem: MOBILITY - ADULT  Goal: Maintain or return to baseline ADL function  Description: INTERVENTIONS:  -  Assess patient's ability to carry out ADLs; assess patient's baseline for ADL function and identify physical deficits which impact ability to perform ADLs (bathing, care of mouth/teeth, toileting, grooming, dressing, etc )  - Assess/evaluate cause of self-care deficits   - Assess range of motion  - Assess patient's mobility; develop plan if impaired  - Assess patient's need for assistive devices and provide as appropriate  - Encourage maximum independence but intervene and supervise when necessary  - Involve family in performance of ADLs  - Assess for home care needs following discharge   - Consider OT consult to assist with ADL evaluation and planning for discharge  - Provide patient education as appropriate  Outcome: Progressing  Goal: Maintains/Returns to pre admission functional level  Description: INTERVENTIONS:  - Perform BMAT or MOVE assessment daily    - Set and communicate daily mobility goal to care team and patient/family/caregiver  - Collaborate with rehabilitation services on mobility goals if consulted  - Perform Range of Motion  times a day  - Reposition patient every  hours    - Dangle patient  times a day  - Stand patient  times a day  - Ambulate patient  times a day  - Out of bed to chair  times a day   - Out of bed for meals  times a day  - Out of bed for toileting  - Record patient progress and toleration of activity level   Outcome: Progressing     Problem: PAIN - ADULT  Goal: Verbalizes/displays adequate comfort level or baseline comfort level  Description: Interventions:  - Encourage patient to monitor pain and request assistance  - Assess pain using appropriate pain scale  - Administer analgesics based on type and severity of pain and evaluate response  - Implement non-pharmacological measures as appropriate and evaluate response  - Consider cultural and social influences on pain and pain management  - Notify physician/advanced practitioner if interventions unsuccessful or patient reports new pain  Outcome: Progressing     Problem: INFECTION - ADULT  Goal: Absence or prevention of progression during hospitalization  Description: INTERVENTIONS:  - Assess and monitor for signs and symptoms of infection  - Monitor lab/diagnostic results  - Monitor all insertion sites, i e  indwelling lines, tubes, and drains  - Monitor endotracheal if appropriate and nasal secretions for changes in amount and color  - [place] appropriate cooling/warming therapies per order  - Administer medications as ordered  - Instruct and encourage patient and family to use good hand hygiene technique  - Identify and instruct in appropriate isolation precautions for identified infection/condition  Outcome: Progressing  Goal: Absence of fever/infection during neutropenic period  Description: INTERVENTIONS:  - Monitor WBC    Outcome: Progressing     Problem: SAFETY ADULT  Goal: Maintain or return to baseline ADL function  Description: INTERVENTIONS:  -  Assess patient's ability to carry out ADLs; assess patient's baseline for ADL function and identify physical deficits which impact ability to perform ADLs (bathing, care of mouth/teeth, toileting, grooming, dressing, etc )  - Assess/evaluate cause of self-care deficits   - Assess range of motion  - Assess patient's mobility; develop plan if impaired  - Assess patient's need for assistive devices and provide as appropriate  - Encourage maximum independence but intervene and supervise when necessary  - Involve family in performance of ADLs  - Assess for home care needs following discharge   - Consider OT consult to assist with ADL evaluation and planning for discharge  - Provide patient education as appropriate  Outcome: Progressing  Goal: Maintains/Returns to pre admission functional level  Description: INTERVENTIONS:  - Perform BMAT or MOVE assessment daily    - Set and communicate daily mobility goal to care team and patient/family/caregiver  - Collaborate with rehabilitation services on mobility goals if consulted  - Perform Range of Motion  times a day  - Reposition patient every  hours    - Dangle patient  times a day  - Stand patient  times a day  - Ambulate patient  times a day  - Out of bed to chair  times a day   - Out of bed for meals  times a day  - Out of bed for toileting  - Record patient progress and toleration of activity level   Outcome: Progressing  Goal: Patient will remain free of falls  Description: INTERVENTIONS:  - Educate patient/family on patient safety including physical limitations  - Instruct patient to call for assistance with activity   - Consult OT/PT to assist with strengthening/mobility   - Keep Call bell within reach  - Keep bed low and locked with side rails adjusted as appropriate  - Keep care items and personal belongings within reach  - Initiate and maintain comfort rounds  - Make Fall Risk Sign visible to staff  - Offer Toileting every  Hours, in advance of need  - Initiate/Maintain alarm  - Obtain necessary fall risk management equipment:   - Apply yellow socks and bracelet for high fall risk patients  - Consider moving patient to room near nurses station  Outcome: Progressing     Problem: DISCHARGE PLANNING  Goal: Discharge to home or other facility with appropriate resources  Description: INTERVENTIONS:  - Identify barriers to discharge w/patient and caregiver  - Arrange for needed discharge resources and transportation as appropriate  - Identify discharge learning needs (meds, wound care, etc )  - Arrange for interpretive services to assist at discharge as needed  - Refer to Case Management Department for coordinating discharge planning if the patient needs post-hospital services based on physician/advanced practitioner order or complex needs related to functional status, cognitive ability, or social support system  Outcome: Progressing Problem: Knowledge Deficit  Goal: Patient/family/caregiver demonstrates understanding of disease process, treatment plan, medications, and discharge instructions  Description: Complete learning assessment and assess knowledge base  Interventions:  - Provide teaching at level of understanding  - Provide teaching via preferred learning methods  Outcome: Progressing     Problem: Neurological Deficit  Goal: Neurological status is stable or improving  Description: Interventions:  - Monitor and assess patient's level of consciousness, motor function, sensory function, and level of assistance needed for ADLs  - Monitor and report changes from baseline  Collaborate with interdisciplinary team to initiate plan and implement interventions as ordered  - Provide and maintain a safe environment  - Consider seizure precautions  - Consider fall precautions  - Consider aspiration precautions  - Consider bleeding precautions  Outcome: Progressing     Problem: Activity Intolerance/Impaired Mobility  Goal: Mobility/activity is maintained at optimum level for patient  Description: Interventions:  - Assess and monitor patient  barriers to mobility and need for assistive/adaptive devices  - Assess patient's emotional response to limitations  - Collaborate with interdisciplinary team and initiate plans and interventions as ordered  - Encourage independent activity per ability   - Maintain proper body alignment  - Perform active/passive rom as tolerated/ordered  - Plan activities to conserve energy   - Turn patient as appropriate  Outcome: Progressing     Problem: Communication Impairment  Goal: Ability to express needs and understand communication  Description: Assess patient's communication skills and ability to understand information  Patient will demonstrate use of effective communication techniques, alternative methods of communication and understanding even if not able to speak       - Encourage communication and provide alternate methods of communication as needed  - Collaborate with case management/ for discharge needs  - Include patient/family/caregiver in decisions related to communication  Outcome: Progressing     Problem: Potential for Aspiration  Goal: Non-ventilated patient's risk of aspiration is minimized  Description: Assess and monitor vital signs, respiratory status, and labs (WBC)  Monitor for signs of aspiration (tachypnea, cough, rales, wheezing, cyanosis, fever)  - Assess and monitor patient's ability to swallow  - Place patient up in chair to eat if possible  - HOB up at 90 degrees to eat if unable to get patient up into chair   - Supervise patient during oral intake  - Instruct patient/ family to take small bites  - Instruct patient/ family to take small single sips when taking liquids  - Follow patient-specific strategies generated by speech pathologist   Outcome: Progressing  Goal: Ventilated patient's risk of aspiration is minimized  Description: Assess and monitor vital signs, respiratory status, airway cuff pressure, and labs (WBC)  Monitor for signs of aspiration (tachypnea, cough, rales, wheezing, cyanosis, fever)  - Elevate head of bed 30 degrees if patient has tube feeding   - Monitor tube feeding  Outcome: Progressing     Problem: Nutrition  Goal: Nutrition/Hydration status is improving  Description: Monitor and assess patient's nutrition/hydration status for malnutrition (ex- brittle hair, bruises, dry skin, pale skin and conjunctiva, muscle wasting, smooth red tongue, and disorientation)  Collaborate with interdisciplinary team and initiate plan and interventions as ordered  Monitor patient's weight and dietary intake as ordered or per policy  Utilize nutrition screening tool and intervene per policy  Determine patient's food preferences and provide high-protein, high-caloric foods as appropriate       - Assist patient with eating   - Allow adequate time for meals   - Encourage patient to take dietary supplement as ordered  - Collaborate with clinical nutritionist   - Include patient/family/caregiver in decisions related to nutrition    Outcome: Progressing

## 2023-02-05 NOTE — ED ATTENDING ATTESTATION
2/4/2023   IAvinash MD, saw and evaluated the patient  I have discussed the patient with the resident/non-physician practitioner and agree with the resident's/non-physician practitioner's findings, Plan of Care, and MDM as documented in the resident's/non-physician practitioner's note, except where noted  All available labs and Radiology studies were reviewed  I was present for key portions of any procedure(s) performed by the resident/non-physician practitioner and I was immediately available to provide assistance  At this point I agree with the current assessment done in the Emergency Department  I have conducted an independent evaluation of this patient a history and physical is as follows:    Chief Complaint   Patient presents with   • CVA/TIA-like Symptoms     1 hour PTA, pt reported sudden facial droop and difficulty speaking               Physical Exam  BP (!) 179/99   Pulse 79   Temp 97 9 °F (36 6 °C)   Resp 22   Ht 5' (1 524 m)   Wt 87 1 kg (192 lb)   SpO2 92%   BMI 37 50 kg/m²      Vital signs and nursing notes reviewed    ** IF YOU ARE READING THIS, THE EXAM TEMPLATE BELOW HAS NOT BEEN UPDATED**    CONSTITUTIONAL: female appearing stated age resting in bed, in no acute distress  HEENT: atraumatic, normocephalic  Sclera anicteric, conjunctiva are not injected  Moist oral mucosa  CARDIOVASCULAR/CHEST: RRR, no M/R/G  2+ radial pulses  PULMONARY: Breathing comfortably on RA  Breath sounds are equal and clear to auscultation  ABDOMEN: non-distended  BS present, normoactive  Non-tender  MSK: moves all extremities, no deformities, no peripheral edema, no calf asymmetry  NEURO: Awake, alert, and oriented x 3  Face symmetric  Moves all extremities spontaneously   No focal neurologic deficits  SKIN: Warm, appears well-perfused  MENTAL STATUS: Normal affect     Stroke Assessment     Row Name 02/04/23 1442             NIH Stroke Scale    Interval --      Level of Consciousness (1a ) 0      LOC Questions (1b ) 0      LOC Commands (1c ) 0      Best Gaze (2 ) 0      Visual (3 ) 0      Facial Palsy (4 ) 2      Motor Arm, Left (5a ) 0      Motor Arm, Right (5b ) 0      Motor Leg, Left (6a ) 0      Motor Leg, Right (6b ) 1      Limb Ataxia (7 ) 0      Sensory (8 ) 0      Best Language (9 ) 0      Dysarthria (10 ) 0      Extinction and Inattention (11 ) (Formerly Neglect) 0      Total 3                    Labs and Imaging  Labs Reviewed   BASIC METABOLIC PANEL - Abnormal       Result Value Ref Range Status    Sodium 138  135 - 147 mmol/L Final    Potassium 4 0  3 5 - 5 3 mmol/L Final    Chloride 108  96 - 108 mmol/L Final    CO2 26  21 - 32 mmol/L Final    ANION GAP 4  4 - 13 mmol/L Final    BUN 11  5 - 25 mg/dL Final    Creatinine 0 88  0 60 - 1 30 mg/dL Final    Comment: Standardized to IDMS reference method    Glucose 143 (*) 65 - 140 mg/dL Final    Comment: If the patient is fasting, the ADA then defines impaired fasting glucose as > 100 mg/dL and diabetes as > or equal to 123 mg/dL  Specimen collection should occur prior to Sulfasalazine administration due to the potential for falsely depressed results  Specimen collection should occur prior to Sulfapyridine administration due to the potential for falsely elevated results      Calcium 9 6  8 3 - 10 1 mg/dL Final    eGFR 73  ml/min/1 73sq m Final    Narrative:     [place] guidelines for Chronic Kidney Disease (CKD):   •  Stage 1 with normal or high GFR (GFR > 90 mL/min/1 73 square meters)  •  Stage 2 Mild CKD (GFR = 60-89 mL/min/1 73 square meters)  •  Stage 3A Moderate CKD (GFR = 45-59 mL/min/1 73 square meters)  •  Stage 3B Moderate CKD (GFR = 30-44 mL/min/1 73 square meters)  •  Stage 4 Severe CKD (GFR = 15-29 mL/min/1 73 square meters)  •  Stage 5 End Stage CKD (GFR <15 mL/min/1 73 square meters)  Note: GFR calculation is accurate only with a steady state creatinine   CBC AND PLATELET - Abnormal    WBC 9 32  4 31 - 10 16 Thousand/uL Final    RBC 4 72  3 81 - 5 12 Million/uL Final    Hemoglobin 15 1  11 5 - 15 4 g/dL Final    Hematocrit 46 5 (*) 34 8 - 46 1 % Final    MCV 99 (*) 82 - 98 fL Final    MCH 32 0  26 8 - 34 3 pg Final    MCHC 32 5  31 4 - 37 4 g/dL Final    RDW 14 8  11 6 - 15 1 % Final    Platelets 872  076 - 390 Thousands/uL Final    MPV 9 4  8 9 - 12 7 fL Final   PROTIME-INR - Normal    Protime 12 6  11 6 - 14 5 seconds Final    INR 0 92  0 84 - 1 19 Final   APTT - Normal    PTT 29  23 - 37 seconds Final    Comment: Therapeutic Heparin Range =  60-90 seconds   HS TROPONIN I 0HR - Normal    hs TnI 0hr 3  "Refer to ACS Flowchart"- see link ng/L Final    Comment:                                              Initial (time 0) result  If >=50 ng/L, Myocardial injury suggested ;  Type of myocardial injury and treatment strategy  to be determined  If 5-49 ng/L, a delta result at 2 hours and or 4 hours will be needed to further evaluate  If <4 ng/L, and chest pain has been >3 hours since onset, patient may qualify for discharge based on the HEART score in the ED  If <5 ng/L and <3hours since onset of chest pain, a delta result at 2 hours will be needed to further evaluate  HS Troponin 99th Percentile URL of a Health Population=12 ng/L with a 95% Confidence Interval of 8-18 ng/L  Second Troponin (time 2 hours)  If calculated delta >= 20 ng/L,  Myocardial injury suggested ; Type of myocardial injury and treatment strategy to be determined  If 5-49 ng/L and the calculated delta is 5-19 ng/L, consult medical service for evaluation  Continue evaluation for ischemia on ecg and other possible etiology and repeat hs troponin at 4 hours  If delta is <5 ng/L at 2 hours, consider discharge based on risk stratification via the HEART score (if in ED), or HEYDI risk score in IP/Observation  HS Troponin 99th Percentile URL of a Health Population=12 ng/L with a 95% Confidence Interval of 8-18 ng/L         MRI brain wo contrast Final Result      Left posterior limb internal capsule/thalamic acute to subacute ischemia  Findings were marked as "immediate"in Epic and will now be related to the ordering physician or covering clinical team by the radiology liaison  Workstation performed: [id number]         CT stroke alert brain   Final Result      No acute intracranial abnormality  Findings were directly discussed with Gabriela Reyes at 1:40 PM       Workstation performed: [id number]         CTA stroke alert (head/neck)   Final Result      There are some anatomic variations of the intracranial circulation as described above with no stenosis, occlusion or thrombosis of the cervical or intracranial vasculature  Findings were directly discussed with Gabriela Reyes at approximately 1:40 PM                            Workstation performed: [id number]               Procedures  ECG 12 Lead Documentation Only    Date/Time: 2/4/2023 2:26 PM  Performed by: Damion King MD  Authorized by: Damion King MD     Comments:      Normal sinus rhythm, ventricular rate 91, MT interval 206 consistent with first-degree AV block, QRS 76, QTc 408, normal axis, no ST/T wave changes to suggest ischemia, no significant change from prior EKG dated 6/30/2020              ED Course  Medications   DULoxetine (CYMBALTA) delayed release capsule 60 mg (60 mg Oral Given 2/5/23 0827)   acetaminophen (TYLENOL) tablet 650 mg (650 mg Oral Given 2/5/23 0021)   ondansetron (ZOFRAN) injection 4 mg (has no administration in time range)   atorvastatin (LIPITOR) tablet 40 mg (40 mg Oral Given 2/4/23 1805)   enoxaparin (LOVENOX) subcutaneous injection 40 mg (40 mg Subcutaneous Given 2/5/23 0827)   clopidogrel (PLAVIX) tablet 75 mg (75 mg Oral Given 2/5/23 0827)   aspirin chewable tablet 81 mg (81 mg Oral Given 2/5/23 0827)   sodium chloride 0 9 % infusion (75 mL/hr Intravenous New Bag 2/4/23 1619)   QUEtiapine (SEROquel) tablet 25 mg (25 mg Oral Given 2/5/23 0022)   nicotine (NICODERM CQ) 21 mg/24 hr TD 24 hr patch 21 mg (21 mg Transdermal Medication Applied 2/5/23 0845)   traZODone (DESYREL) tablet 50 mg (0 mg Oral Hold 2/5/23 0249)   iohexol (OMNIPAQUE) 350 MG/ML injection (SINGLE-DOSE) 85 mL (85 mL Intravenous Given 2/4/23 1323)   aspirin tablet 325 mg (325 mg Oral Given 2/4/23 1805)   clopidogrel (PLAVIX) tablet 300 mg (300 mg Oral Given 2/4/23 1805)   ketorolac (TORADOL) injection 15 mg (15 mg Intravenous Given 2/4/23 1617)

## 2023-02-05 NOTE — PLAN OF CARE
Problem: PHYSICAL THERAPY ADULT  Goal: Performs mobility at highest level of function for planned discharge setting  See evaluation for individualized goals  Description: Treatment/Interventions: Functional transfer training, LE strengthening/ROM, Elevations, Patient/family training, Equipment eval/education, Gait training, OT, Spoke to nursing  Equipment Recommended:  (will cont to assess)       See flowsheet documentation for full assessment, interventions and recommendations  Outcome: Progressing  Note: Prognosis: Good  Problem List: Decreased strength, Decreased endurance, Impaired balance, Decreased mobility, Impaired tone, Impaired sensation, Decreased safety awareness, Impaired judgement  Assessment: PT completed evaluation of 62year old female admitted to [facility] on [facility]/4/2023 with facial droop and difficulty speaking  Diagnosis this admission includes CVA  MRI of brain reports: "left posterior limb internal capsule/thalamic acute to subacute ischemia"  Current status instabilities include continuous O2/HR monitoring, falls risk, bed/chair alarms, and a regression in functional status from baseline  PMH is significant for vertigo, HTN, smoker, deaf in L ear, and bunion surgery  Patient and son reported the following: patient resides with s/o (per son- is not very supportive) and son (works during day) in a 3 story home w/ 1st floor set up  9 DONNIE through front verus 3 through garage (patient normally goes through front and has R HR)  At her baseline she is I with mobility (SPC), ADLS, and iADLS  + /cooks/cleans  Current impairments include R sided hemiparesis, dysarthria deficits, decreased balance and gait deviations  During PT evaluation patient required min-AX1 for supine-->sit transfer and mod-AX1 for sit<-->stand transfer and short distance ambulation (3 feet) bed to chair with hand held assist  Please see treatment note below for ongoing mobility and patient/caregiver education   In setting of acute CVA with functional deficits limiting patient's ability to d/c home safely with limited support, PT d/c recommendation is for rehab (PM&R referral)  Patient will continue to benefit from continued skilled PT this admission to achieve maximal function and safety  PT Discharge Recommendation: (S) Post acute rehabilitation services (PM&R referral)    See flowsheet documentation for full assessment

## 2023-02-06 ENCOUNTER — APPOINTMENT (INPATIENT)
Dept: RADIOLOGY | Facility: HOSPITAL | Age: 58
End: 2023-02-06

## 2023-02-06 PROBLEM — R73.03 PREDIABETES: Status: ACTIVE | Noted: 2023-02-06

## 2023-02-06 PROBLEM — R07.89 OTHER CHEST PAIN: Status: ACTIVE | Noted: 2023-02-06

## 2023-02-06 PROBLEM — E11.9 DIABETES (HCC): Status: ACTIVE | Noted: 2023-02-06

## 2023-02-06 LAB
ANION GAP SERPL CALCULATED.3IONS-SCNC: 6 MMOL/L (ref 4–13)
BUN SERPL-MCNC: 13 MG/DL (ref 5–25)
CALCIUM SERPL-MCNC: 9.2 MG/DL (ref 8.3–10.1)
CHLORIDE SERPL-SCNC: 110 MMOL/L (ref 96–108)
CO2 SERPL-SCNC: 24 MMOL/L (ref 21–32)
CREAT SERPL-MCNC: 0.7 MG/DL (ref 0.6–1.3)
D DIMER PPP FEU-MCNC: 0.3 UG/ML FEU
DEPRECATED AT III PPP: 100 % OF NORMAL (ref 92–136)
ERYTHROCYTE [DISTWIDTH] IN BLOOD BY AUTOMATED COUNT: 14.6 % (ref 11.6–15.1)
GFR SERPL CREATININE-BSD FRML MDRD: 96 ML/MIN/1.73SQ M
GLUCOSE SERPL-MCNC: 127 MG/DL (ref 65–140)
HCT VFR BLD AUTO: 44.2 % (ref 34.8–46.1)
HGB BLD-MCNC: 14.4 G/DL (ref 11.5–15.4)
MCH RBC QN AUTO: 31.9 PG (ref 26.8–34.3)
MCHC RBC AUTO-ENTMCNC: 32.6 G/DL (ref 31.4–37.4)
MCV RBC AUTO: 98 FL (ref 82–98)
PLATELET # BLD AUTO: 340 THOUSANDS/UL (ref 149–390)
PMV BLD AUTO: 9.6 FL (ref 8.9–12.7)
POTASSIUM SERPL-SCNC: 3.6 MMOL/L (ref 3.5–5.3)
PROT S ACT/NOR PPP: 141 % (ref 68–108)
RBC # BLD AUTO: 4.51 MILLION/UL (ref 3.81–5.12)
SODIUM SERPL-SCNC: 140 MMOL/L (ref 135–147)
WBC # BLD AUTO: 8.65 THOUSAND/UL (ref 4.31–10.16)

## 2023-02-06 RX ADMIN — DULOXETINE HYDROCHLORIDE 60 MG: 60 CAPSULE, DELAYED RELEASE ORAL at 11:10

## 2023-02-06 RX ADMIN — DIPHENHYDRAMINE HYDROCHLORIDE 25 MG: 50 INJECTION, SOLUTION INTRAMUSCULAR; INTRAVENOUS at 14:34

## 2023-02-06 RX ADMIN — DIPHENHYDRAMINE HYDROCHLORIDE 25 MG: 50 INJECTION, SOLUTION INTRAMUSCULAR; INTRAVENOUS at 23:02

## 2023-02-06 RX ADMIN — ATORVASTATIN CALCIUM 40 MG: 40 TABLET, FILM COATED ORAL at 19:46

## 2023-02-06 RX ADMIN — KETOROLAC TROMETHAMINE 30 MG: 30 INJECTION, SOLUTION INTRAMUSCULAR at 11:10

## 2023-02-06 RX ADMIN — METOCLOPRAMIDE HYDROCHLORIDE 10 MG: 5 INJECTION INTRAMUSCULAR; INTRAVENOUS at 14:34

## 2023-02-06 RX ADMIN — VERAPAMIL HYDROCHLORIDE 120 MG: 120 TABLET, FILM COATED, EXTENDED RELEASE ORAL at 14:35

## 2023-02-06 RX ADMIN — METOCLOPRAMIDE HYDROCHLORIDE 10 MG: 5 INJECTION INTRAMUSCULAR; INTRAVENOUS at 23:02

## 2023-02-06 RX ADMIN — ASPIRIN 81 MG: 81 TABLET, CHEWABLE ORAL at 11:09

## 2023-02-06 RX ADMIN — MAGNESIUM SULFATE HEPTAHYDRATE 2 G: 40 INJECTION, SOLUTION INTRAVENOUS at 11:11

## 2023-02-06 RX ADMIN — NICOTINE 21 MG: 21 PATCH, EXTENDED RELEASE TRANSDERMAL at 11:12

## 2023-02-06 RX ADMIN — LIDOCAINE 5% 1 PATCH: 700 PATCH TOPICAL at 11:10

## 2023-02-06 RX ADMIN — METOCLOPRAMIDE HYDROCHLORIDE 10 MG: 5 INJECTION INTRAMUSCULAR; INTRAVENOUS at 06:48

## 2023-02-06 RX ADMIN — FAMOTIDINE 20 MG: 20 TABLET, FILM COATED ORAL at 19:46

## 2023-02-06 RX ADMIN — KETOROLAC TROMETHAMINE 30 MG: 30 INJECTION, SOLUTION INTRAMUSCULAR at 23:02

## 2023-02-06 RX ADMIN — TRAZODONE HYDROCHLORIDE 50 MG: 50 TABLET ORAL at 23:01

## 2023-02-06 RX ADMIN — KETOROLAC TROMETHAMINE 30 MG: 30 INJECTION, SOLUTION INTRAMUSCULAR at 15:45

## 2023-02-06 RX ADMIN — CLOPIDOGREL BISULFATE 75 MG: 75 TABLET ORAL at 11:09

## 2023-02-06 RX ADMIN — DIPHENHYDRAMINE HYDROCHLORIDE 25 MG: 50 INJECTION, SOLUTION INTRAMUSCULAR; INTRAVENOUS at 06:47

## 2023-02-06 RX ADMIN — IOHEXOL 90 ML: 350 INJECTION, SOLUTION INTRAVENOUS at 12:46

## 2023-02-06 NOTE — UTILIZATION REVIEW
NOTIFICATION OF INPATIENT ADMISSION   AUTHORIZATION REQUEST   SERVICING FACILITY:   [facility]  Address: [address] [place] [address], [address] [place] [address]  Tax ID: [id number]  NPI: [id number] ATTENDING PROVIDER:  Attending Name and NPI#: Yolanda Mcdaniel Md [[id number]]  Address: [address] [place] [address], [address] [place] [address]  Phone: [phone]   ADMISSION INFORMATION:  Place of Service: Inpatient [address] [facility]  [address]  Place of Service Code: 21  Inpatient Admission Date/Time: 2/5/23  4:18 PM  Discharge Date/Time: No discharge date for patient encounter  Admitting Diagnosis Code/Description:  Speech disturbance [R47 9]  Facial droop [R29 810]  Symptoms of cerebrovascular accident (CVA) [R09 89]     UTILIZATION REVIEW CONTACT:  [facility] Utilization   Network Utilization Review Department  Phone: [phone]  Fax: [phone]  Email: [email or web address]  Contact for approvals/pending authorizations, clinical reviews, and discharge  PHYSICIAN ADVISORY SERVICES:  Medical Necessity Denial & [id number]-Qsil Review  Phone: [phone]  Fax: [phone]  Email: [email or web address]

## 2023-02-06 NOTE — ASSESSMENT & PLAN NOTE
Lab Results   Component Value Date    HGBA1C 6 4 (H) 02/05/2023       No results for input(s): POCGLU in the last 72 hours  Blood Sugar Average: Last 72 hrs:     Patient with pre diabetes  Recommend aggressive lifestyle modification/weight loss with close outpatient follow up  If lifestyle changes not effective, could consider Metformin  D/W patient

## 2023-02-06 NOTE — ASSESSMENT & PLAN NOTE
· Left PLIC/thalamic CVA  · ASA and Plavix per Neurology  · Permissive HTN  · Statin  · CT C/A/P ordered to rule out underlying malignancy - which was unremarkable   as well as d-dimer and thrombosis panel to evaluate for hypercoagulable state - per Neurology   · PT/OT - acute rehab

## 2023-02-06 NOTE — CASE MANAGEMENT
Case Management Discharge Planning Note    Patient name Chrissy Nino  Location [facility] 708/[facility] 564-25 MRN [id number]  : 1965 Date 2023       Current Admission Date: 2023  Current Admission Diagnosis:Stroke [facility])   Patient Active Problem List    Diagnosis Date Noted   • Other chest pain 2023   • Prediabetes 2023   • Restlessness 2023   • Hypertension 2023   • Stroke ([address] ) 2023   • Depression 2023   • Obesity (BMI 35 0-39 9 without comorbidity) 03/10/2021   • Ex-smoker for less than 1 year 03/10/2021   • Headache 2020   • Preoperative clearance 2020   • Wound of right foot 2020   • Acquired hallux valgus of left foot 2019   • Other hammer toe(s) (acquired), left foot 2019   • Other acquired deformities of left foot 2019      LOS (days): 1  Geometric Mean LOS (GMLOS) (days):   Days to GMLOS:     OBJECTIVE:  Risk of Unplanned Readmission Score: 8 82         Current admission status: Inpatient   Preferred Pharmacy:   [address] [place], [place] [id number]  Phone: [phone] Fax: [phone]    Primary Care Provider: Herber Cui MD    Primary Insurance: [address] [place] [address]  Secondary Insurance:     DISCHARGE DETAILS:        Additional Comments: CM conferred with neuro provider and is aware medical clearance is anticipated for   Outreach to [facility] admissions occured and CM confirmed the pt is medically approved for admission  Insurance authorization is pending and will be initiated once medical clearance is finalized  [facility] is reserved in [address] [place] [address] and the admission office will initiate insurance approval pending medical stability  CM will contiue to follow

## 2023-02-06 NOTE — [facility] ADMISSION
Referral received for consideration of patient for inpatient acute rehab  Will review patient's case with [facility] physician and update CM as able  Reviewed patient's case with [facility] physician - patient is approved for [facility] pending medical stability, functional progress, insurance authorization and bed availability  CM has been updated  Will continue to follow at this time

## 2023-02-06 NOTE — PHYSICAL THERAPY NOTE
Physical Therapy Treatment Note    Patient's Name: Eb Howard  : 23 0931   PT Last Visit   PT Visit Date 23   Note Type   Note Type Treatment   Pain Assessment   Pain Assessment Tool FLACC   Pain Location/Orientation Location: Back   Pain Radiating Towards LLE   Pain Rating: FLACC (Rest) - Face 0   Pain Rating: FLACC (Rest) - Legs 0   Pain Rating: FLACC (Rest) - Activity 0   Pain Rating: FLACC (Rest) - Cry 0   Pain Rating: FLACC (Rest) - Consolability 0   Score: FLACC (Rest) 0   Pain Rating: FLACC (Activity) - Face 1   Pain Rating: FLACC (Activity) - Legs 0   Pain Rating: FLACC (Activity) - Activity 0   Pain Rating: FLACC (Activity) - Cry 1   Pain Rating: FLACC (Activity) - Consolability 1   Score: FLACC (Activity) 3   Restrictions/Precautions   Weight Bearing Precautions Per Order No   Other Precautions Pain; Fall Risk   General   Chart Reviewed Yes   Response to Previous Treatment Patient with no complaints from previous session  Family/Caregiver Present Yes  (2 sons)   Subjective   Subjective "I need to do this so I can go home "   Bed Mobility   Supine to Sit 4  Minimal assistance   Additional items Increased time required;Verbal cues; Assist x 1   Sit to Supine Unable to assess   Additional Comments Pt greeted in supine  Transfers   Sit to Stand   (cga)   Additional items Assist x 1; Increased time required;Verbal cues;Armrests   Stand to Sit   (cga)   Additional items Assist x 1; Increased time required;Verbal cues   Additional Comments RW   Ambulation/Elevation   Gait pattern Excessively slow; Short stride  (R lateral lean)   Gait Assistance 4  Minimal assist   Additional items Assist x 1;Verbal cues; Tactile cues   Assistive Device Rolling walker   Distance 90'x2 w/ seated rest between   Stair Management Assistance Not tested   Ambulation/Elevation Additional Comments Walking w/ head turns to left + right (pt able to take only a few steps before needing to turn head back to midline again to keep balance )   Balance   Static Sitting Fair +   Dynamic Sitting Fair   Static Standing Fair -   Dynamic Standing Poor +   Ambulatory Poor +  (RW)   Endurance Deficit   Endurance Deficit Yes   Endurance Deficit Description weakness, fatigue   Activity Tolerance   Activity Tolerance Patient limited by fatigue;Patient limited by pain   Medical Staff Made Aware restorative C/ [address] 23 yes - cleared for PT   Exercises   Knee AROM [employer]; Right;20 reps  (10xAROM + 10x w/ manual resistance)   Squat   (attempted squats, however increased sciatic pain down LLE so deferred, 5x sit<>stands performed instead)   Marching Sitting;20 reps;AROM; Right  (10xAROM + 10x w/ manual resistance)   Assessment   Prognosis Good   Problem List Decreased strength;Decreased endurance; Impaired balance;Decreased mobility; Impaired tone; Impaired sensation;Decreased safety awareness; Impaired judgement;Pain   Assessment Pt seen for PT treatment session w/ focus on bed mobility training, t/f training,  gait training, + ther ex instruction  Pt demonstrated good progress this session w/ decreased assistance required for t/f + ambulation, as well as increased distance covered during ambulation  Pt motivated to progress as her ultimate goal is to go home  Pt still demonstrates slight right lateral lean, requiring MinAx1 for ambulation  Pt is a good candidate for acute medical rehab  Barriers to Discharge Inaccessible home environment   Goals   Patient Goals go home   PT Treatment Day 2   Plan   Treatment/Interventions Functional transfer training;LE strengthening/ROM; Therapeutic exercise; Endurance training;Equipment eval/education;Patient/family training;Bed mobility;Gait training; Compensatory technique education;Spoke to nursing;Family  (balance training)   Progress Progressing toward goals   PT Frequency 3-5x/wk   Recommendation   PT Discharge Recommendation Post acute rehabilitation services  (acute medical rehab)   Equipment Recommended [facility] Recommended Wheeled walker   Change/add to [employer]? No   AM-PAC Basic Mobility Inpatient   Turning in Flat Bed Without Bedrails 3   Lying on Back to Sitting on Edge of Flat Bed Without Bedrails 3   Moving Bed to Chair 3   Standing Up From Chair Using Arms 3   Walk in Room 3   Climb 3-5 Stairs With Railing 2   Basic Mobility Inpatient Raw Score 17   Basic Mobility Standardized Score 39 67   Highest Level Of Mobility   JH-HLM Goal 5: Stand one or more mins   JH-HLM Achieved 7: Walk 25 feet or more   Education   Education Provided Mobility training;Home exercise program;Assistive device   Patient Demonstrates acceptance/verbal understanding;Reinforcement needed   End of Consult   Patient Position at End of Consult Seated edge of bed; All needs within reach  (family at bedside, CP to L buttocks)     Alina Guillen, PT, DPT

## 2023-02-06 NOTE — DISCHARGE SUMMARY
NEUROLOGY RESIDENCY - DISCHARGE SUMMARY     Name: Chrissy Nino   Age & Sex: 62 y o  female   MRN: [id number]  [facility]   Encounter: [id number]    Discharging Resident Physician: Justina Sanchez DO  Attending: Rian Rivera MD  PCP: Herber Cui MD  Admission Date: 2/4/2023  Discharge Date: 02/09/23       Chrissy Nino will need follow up in 6-8 weeks  with neurovascular attending/ap/resident (myself Deb Blanc)  She will not require outpatient neurological testing  ASSESSMENT & PLAN     * Stroke [facility])  Assessment & Plan  63 yo F w/ a PMH of Current smoker (1ppd, 25+ years), past bunion surgery, pre-diabetic, vertigo, hypertension coming in as a stroke alert on 2/4/2023  1:06 PM with initial NIHSS of 5 and LKW 8:30am, initial Blood Pressure: 143/99  Initial presenting deficits were slurred speech, RUE and RLE weakness, and some sensory neglect  Patient had went up around 8:30 AM in the morning that was known at that time  She then stated she took a nap and then woke up around 1130am and then talk to her daughter who noticed that she was slurring her speech  Patient also had appreciated that she had a left facial droop during this time  patient also noted that she had a episode of bowel incontinence prior to this  As a result of patient outside window of TNK and most symptoms resolving pt was determined to not be a candidate for thrombolysis (TNK)  Initial Exam on 02/04/23: R facial droop, slurred speech, 5/5 strength in all extremities although in LE had some pain  Found to have a stroke in L PLIC/thalamus on MRI    • Vascular risk factors: active smoker, prediabetic, age, hypertension  • Home meds: cymbalta     Workup:  Lab Results   Component Value Date    HGBA1C 6 4 (H) 02/05/2023    CHOLESTEROL 240 (H) 02/05/2023    LDLCALC 171 (H) 02/05/2023    TRIG 132 02/05/2023    INR 0 92 02/04/2023      • CTH: negative for any bleeds or recent stroke  • CTA: negative for LVOs, aneurysms, dissection or AVMs  • MRI w/o contrast: Left posterior limb internal capsule/thalamic acute to subacute ischemia  • MRI w/ contrast showed: No abnormal enhancement, no acute changes for MRI w/o contrast  • Echocardiogram: left ventricular ejection fraction is 60%  Systolic function is normal  Wall motion is normal  Diastolic function is normal   Left atrium normal in size   • CT CAP w/ contrast:  No CT evidence for malignancy in the chest, abdomen and pelvis    Thrombosis panel:Anticardiolipin antibody negative, protein S antigen free slightly elevated, beta-2 paraproteins negative, protein S activity elevated, Antithrombin III activity 100 within normal limits,    Pertinent scores:  - NIHSS: 5  Stroke Modified Kelli Score: 2 (Unable to carry out all previous activities, but able to look after own affairs without assistance)    Impression: stroke appreciated in L internal capsule and thalamus likely secondary to hypertension and other vascular risk factors but cannot rule out other factors such as a hypercoagulable state given that patient is young and has been a longtime smoker      Plan:  - Stroke pathway  - Discussed plan with neurology attending, Dr Zo Casanova  - BP: Allow for normotension and continue verapamil 121 mg daily  - rosuvastatin 40mg qhs  - Maintain glucose <180, SSI for coverage if indicated  - Medical management as per primary team appreciated  - Antiplatelet agents: DAPT w/ ASA 81mg and plavix 75mg for 21 days and then monotherapy (02/26/2023) w/ ASA 81mg  - Ordered CT chest abdomen pelvis with and without contrast that was negative for any malignancy  - d-dimer negative and f/u thrombosis labs  - DVT ppx and SCDs  - Monitor on telemetry  - PT/OT/Speech recommended rehab w/ [facility] pending placement  - Speech recommends regular house  - Stroke education      Depression  Assessment & Plan  Continue home cymbalta    Prediabetes  Assessment & Plan  SLIM recommended lifestyle changes at this time, metformin if unable to make changes w/ PCP  Consulted nutrition for diabetic diet and diet to help reduce cholesterol  Chest pain  Assessment & Plan  Assessment; Neurology was alerted by nursing staff that the patient has been having intermittent chest pain  The patient did not want to tell anyone because she thought that the chest pain might self resolve  When interviewing the patient she reported that these episodes of chest pain started occurring this afternoon with first episode occurring at 1330, the second episode occurring at 1445, and the third episode occurring at 80  The patient reports that the pain is characterized as a tight and heavy feeling in the center of the chest, with associated SOB  The patient reports that she does not have any other associated symptoms  Patient reports that each episode lasted around 10 minutes with complete resolution  Plan:  - trops, CXR, and EKG were all WNL  - SLIM evaluated and concerned for possible reactive airway disease patient has chronically and ordered prn inhaler  - card evaluated and recommend outpt f/u given unconcerning lab markers   - Started losartan 50 mg daily and HCTZ 12 5 mg daily optimize BP  - continue pepcid at this time    Hypertension  Assessment & Plan  Verapamil 120 mg daily    Restlessness  Assessment & Plan  Restarted home trazodone and seroquel (was initially not ordered as patient was not regularly taking and taking prn)  Trazodone scheduled nightly and Seroquel as needed    Headache  Assessment & Plan  Patient had noted a headache over the past few months that has been intermittent but over the past 1-1/2 months has been progressively getting worse  She states that is all over her head and has been on and off getting worse every day  She has been taking Tylenol every day for it and it was explained that she likely has some component of medication overuse headache    MRI brain w/ contrast negative for any enhancement     Plan  s/p migraine cocktail: Phenergan, Toradol, Benadryl for 2 days   -Avoided steroids given patient's restlessness/anxiety  Continue Verapamil 120 mg daily as prevention             Disposition:     Other: Rehab at [facility]    Reason for Admission: stroke    Consultations During Hospital Stay:  · Internal medicine     Procedures Performed:     · None    Significant Findings / Test Results:     XR chest portable   Final Result by Shira Sherman MD (02/09 1103)      No acute cardiopulmonary disease  Workstation performed: [id number]         CT chest abdomen pelvis w contrast   Final Result by Hernan Delgado MD (02/06 1543)      No CT evidence for malignancy in the chest, abdomen and pelvis  Workstation performed: [id number]         MRI brain w contrast   Final Result by Skyler Vincent MD (02/06 3005)      No abnormal enhancement  No acute change compared to yesterday  Workstation performed: [id number]         MRI brain wo contrast   Final Result by Christina Morrissey MD (02/04 0003)      Left posterior limb internal capsule/thalamic acute to subacute ischemia  Findings were marked as "immediate"in Epic and will now be related to the ordering physician or covering clinical team by the radiology liaison  Workstation performed: [id number]         CT stroke alert brain   Final Result by Mejia Tiwari DO (02/04 1342)      No acute intracranial abnormality  Findings were directly discussed with Elis Tse at 1:40 PM       Workstation performed: [id number]         CTA stroke alert (head/neck)   Final Result by Mejia Tiwari DO (02/04 1348)      There are some anatomic variations of the intracranial circulation as described above with no stenosis, occlusion or thrombosis of the cervical or intracranial vasculature              Findings were directly discussed with Elis Tse at approximately 1:40 PM                            Workstation performed: [id number]           ·     Incidental Findings:   · none     Test Results Pending at Discharge (will require follow up):   · none     Outpatient Tests Requested:  · none    Complications:  None     Hospital Course:     Oren Wolf is a 62 y o  female patient with past medical history of 25-pack-year history of smoking, prediabetes, hypertension who originally presented to the hospital on 2/4/2023 as a stroke alert due to slurred speech and right upper and right lower extremity weakness, right facial droop and somnolence or neglect  Patient was not a TNK candidate due to being outside the window of TNK time  Patient was loaded with aspirin and Plavix  Patient was put on stroke pathway and initial CT head and CTA head and neck were negative  MRI did end up showing a left posterior limb internal capsule/thalamic acute stroke  Patient's stroke is likely due to her multiple risk factors that is hypertension, diabetes, hyperlipidemia, smoking  Initial thought was to do further work-up such as a thrombosis panel as well as a malignancy screening with a CT chest abdomen pelvis  Patient will be continued on doing insulin therapy with aspirin and Plavix for 21 days until about 2/26 2023 and then monotherapy with aspirin  Patient to continue Lipitor given her elevated hyperlipidemia  Patient was evaluated by PT/OT and they recommended at this time for rehab possibly with [facility]  For patient's noted headaches that she was suffering from, patient was started on a migraine cocktail for about 2 days and given additional magnesium  Verapamil was also started as a preventative in addition to treating her noted hypertension  Headaches resolved and Patient will head to [facility] to rehab  Chest pain was evaluated and likely noncardiogenic given EKG, troponins and chest x-ray were all normal   Cards and plan evaluated and at this time patient will continue that pressure medication and continue Pepcid  Additionally will start outpatient follow-up with patient once discharged and after rehab  Condition at Discharge: good     Discharge Day Visit / Exam:     Subjective: Patient had no complaints this morning and was eager to go to rehab  She stated that she did have a chest pain yesterday but was reassured that it was noncardiac and pointed to the middle of her chest where it was hurting  She stated it was very intermittent and I stated that we will restart her Pepcid to see if that will help and she will follow-up with outpatient cardiology  She denies any fevers/chill,  abdominal pain, nausea, vomiting, diarrhea, problems urinating, problems  With bowel movements, and is eating/drinking without problem      SHe denies any headaches, syncope, paresthesias, diplopia, visual changes, tinnitus, sudden onset weakness, garbled speech, dysarthria/slurring of words,  Loss of bowel or bladder      Vitals: Blood Pressure: 157/99 (02/09/23 1158)  Pulse: 100 (02/09/23 1158)  Temperature: 98 2 °F (36 8 °C) (02/09/23 1158)  Temp Source: Oral (02/04/23 1446)  Respirations: 17 (02/09/23 1158)  Height: 5' (152 4 cm) (02/05/23 1427)  Weight - Scale: 87 1 kg (192 lb) (02/05/23 1006)  SpO2: 96 % (02/09/23 1158)    Exam:     Physical Exam    Neurologic Exam     Physical Exam  Eyes:      Extraocular Movements: EOM normal       Pupils: Pupils are equal, round, and reactive to light  Neurological:      Mental Status: She is oriented to person, place, and time  Motor: Motor strength is normal       Coordination: Finger-Nose-Finger Test abnormal  Heel to Shin Test normal       Deep Tendon Reflexes:      Reflex Scores:       Tricep reflexes are 2+ on the right side and 2+ on the left side  Bicep reflexes are 2+ on the right side and 2+ on the left side  Brachioradialis reflexes are 2+ on the right side and 2+ on the left side  Patellar reflexes are 2+ on the right side and 2+ on the left side  Achilles reflexes are 2+ on the right side and 2+ on the left side  Psychiatric:         Speech: Speech is slurred             Neurologic Exam      Mental Status   Oriented to person, place, and time  Speech: slurred   Able to name object  Able to repeat  Normal comprehension       Cranial Nerves      CN II   Visual fields full to confrontation       CN III, IV, VI   Pupils are equal, round, and reactive to light  Extraocular motions are normal    CN III: no CN III palsy  CN VI: no CN VI palsy  Nystagmus: none      CN V   Facial sensation intact       CN VII   Facial expression full, symmetric       CN IX, X   CN IX normal    CN X normal       CN XI   CN XI normal       CN XII   Tongue deviation: left    R facial droop appreciated and not moving/more flattened nasolabial fold compared to L (slowly improving)        Motor Exam   Right arm pronator drift: absent  Left arm pronator drift: absent     Strength   Strength 5/5 throughout  LUE (deltoids, biceps/triceps, wrist extensors, finger ) seems weaker at 4/5 but slowly improving         Sensory Exam   Right arm light touch: decreased from wrist  Vibration normal        Cold sensation intact in all 4 extremities      Gait, Coordination, and Reflexes      Coordination   Finger to nose coordination: abnormal  Heel to shin coordination: normal     Reflexes   Right brachioradialis: 2+  Left brachioradialis: 2+  Right biceps: 2+  Left biceps: 2+  Right triceps: 2+  Left triceps: 2+  Right patellar: 2+  Left patellar: 2+  Right achilles: 2+  Left achilles: 2+  Right plantar: normal  Left plantar: normal    L FNT ataxic and slower                           Discussion with Family: Family (sons)    Discharge instructions/Information to patient and family:   See after visit summary for information provided to patient and family        Provisions for Follow-Up Care:  See after visit summary for information related to follow-up care and any pertinent home health orders  Planned Readmission: None    Discharge Statement:  I spent 30 minutes discharging the patient  This time was spent on the day of discharge  I had direct contact with the patient on the day of discharge  Greater than 50% of the total time was spent examining patient, answering all patient questions, arranging and discussing plan of care with patient as well as directly providing post-discharge instructions  Additional time then spent on discharge activities  Discharge Medications:  See after visit summary for reconciled discharge medications provided to patient and family        ** Please Note: This note has been constructed using a voice recognition system **      ==  DO Candice Kaur [facility] Neurology Residency, PGY-2

## 2023-02-06 NOTE — ASSESSMENT & PLAN NOTE
· Chronic daily headache - possibly rebound headache  · Abortive meds given by Neurology with improvement  · Verapamil started for prevention

## 2023-02-06 NOTE — PLAN OF CARE
Problem: MOBILITY - ADULT  Goal: Maintains/Returns to pre admission functional level  Description: INTERVENTIONS:  - Perform BMAT or MOVE assessment daily    - Set and communicate daily mobility goal to care team and patient/family/caregiver  - Collaborate with rehabilitation services on mobility goals if consulted  - Perform Range of Motion   Problem: MOBILITY - ADULT  Goal: Maintain or return to baseline ADL function  Description: INTERVENTIONS:  -  Assess patient's ability to carry out ADLs; assess patient's baseline for ADL function and identify physical deficits which impact ability to perform ADLs (bathing, care of mouth/teeth, toileting, grooming, dressing, etc )  - Assess/evaluate cause of self-care deficits   - Assess range of motion  - Assess patient's mobility; develop plan if impaired  - Assess patient's need for assistive devices and provide as appropriate  - Encourage maximum independence but intervene and supervise when necessary  - Involve family in performance of ADLs  - Assess for home care needs following discharge   - Consider OT consult to assist with ADL evaluation and planning for discharge  - Provide patient education as appropriate  Outcome: Progressing  Goal: Maintains/Returns to pre admission functional level  Description: INTERVENTIONS:  - Perform BMAT or MOVE assessment daily    - Set and communicate daily mobility goal to care team and patient/family/caregiver  - Collaborate with rehabilitation services on mobility goals if consulted  - Perform Range of Motion  times a day  - Reposition patient every  hours    - Dangle patient  times a day  - Stand patient  times a day  - Ambulate patient  times a day  - Out of bed to chair  times a day   - Out of bed for meals  times a day  - Out of bed for toileting  - Record patient progress and toleration of activity level   Outcome: Progressing     Problem: PAIN - ADULT  Goal: Verbalizes/displays adequate comfort level or baseline comfort level  Description: Interventions:  - Encourage patient to monitor pain and request assistance  - Assess pain using appropriate pain scale  - Administer analgesics based on type and severity of pain and evaluate response  - Implement non-pharmacological measures as appropriate and evaluate response  - Consider cultural and social influences on pain and pain management  - Notify physician/advanced practitioner if interventions unsuccessful or patient reports new pain  Outcome: Progressing     Problem: INFECTION - ADULT  Goal: Absence or prevention of progression during hospitalization  Description: INTERVENTIONS:  - Assess and monitor for signs and symptoms of infection  - Monitor lab/diagnostic results  - Monitor all insertion sites, i e  indwelling lines, tubes, and drains  - Monitor endotracheal if appropriate and nasal secretions for changes in amount and color  - [place] appropriate cooling/warming therapies per order  - Administer medications as ordered  - Instruct and encourage patient and family to use good hand hygiene technique  - Identify and instruct in appropriate isolation precautions for identified infection/condition  Outcome: Progressing  Goal: Absence of fever/infection during neutropenic period  Description: INTERVENTIONS:  - Monitor WBC    Outcome: Progressing     Problem: SAFETY ADULT  Goal: Maintain or return to baseline ADL function  Description: INTERVENTIONS:  -  Assess patient's ability to carry out ADLs; assess patient's baseline for ADL function and identify physical deficits which impact ability to perform ADLs (bathing, care of mouth/teeth, toileting, grooming, dressing, etc )  - Assess/evaluate cause of self-care deficits   - Assess range of motion  - Assess patient's mobility; develop plan if impaired  - Assess patient's need for assistive devices and provide as appropriate  - Encourage maximum independence but intervene and supervise when necessary  - Involve family in performance of ADLs  - Assess for home care needs following discharge   - Consider OT consult to assist with ADL evaluation and planning for discharge  - Provide patient education as appropriate  Outcome: Progressing  Goal: Maintains/Returns to pre admission functional level  Description: INTERVENTIONS:  - Perform BMAT or MOVE assessment daily    - Set and communicate daily mobility goal to care team and patient/family/caregiver  - Collaborate with rehabilitation services on mobility goals if consulted  - Perform Range of Motion  times a day  - Reposition patient every  hours    - Dangle patient  times a day  - Stand patient  times a day  - Ambulate patient  times a day  - Out of bed to chair  times a day   - Out of bed for meals  times a day  - Out of bed for toileting  - Record patient progress and toleration of activity level   Outcome: Progressing  Goal: Patient will remain free of falls  Description: INTERVENTIONS:  - Educate patient/family on patient safety including physical limitations  - Instruct patient to call for assistance with activity   - Consult OT/PT to assist with strengthening/mobility   - Keep Call bell within reach  - Keep bed low and locked with side rails adjusted as appropriate  - Keep care items and personal belongings within reach  - Initiate and maintain comfort rounds  - Make Fall Risk Sign visible to staff  - Offer Toileting every  Hours, in advance of need  - Initiate/Maintain alarm  - Obtain necessary fall risk management equipment:   - Apply yellow socks and bracelet for high fall risk patients  - Consider moving patient to room near nurses station  Outcome: Progressing     Problem: DISCHARGE PLANNING  Goal: Discharge to home or other facility with appropriate resources  Description: INTERVENTIONS:  - Identify barriers to discharge w/patient and caregiver  - Arrange for needed discharge resources and transportation as appropriate  - Identify discharge learning needs (meds, wound care, etc )  - Arrange for interpretive services to assist at discharge as needed  - Refer to Case Management Department for coordinating discharge planning if the patient needs post-hospital services based on physician/advanced practitioner order or complex needs related to functional status, cognitive ability, or social support system  Outcome: Progressing     Problem: Knowledge Deficit  Goal: Patient/family/caregiver demonstrates understanding of disease process, treatment plan, medications, and discharge instructions  Description: Complete learning assessment and assess knowledge base  Interventions:  - Provide teaching at level of understanding  - Provide teaching via preferred learning methods  Outcome: Progressing     Problem: Neurological Deficit  Goal: Neurological status is stable or improving  Description: Interventions:  - Monitor and assess patient's level of consciousness, motor function, sensory function, and level of assistance needed for ADLs  - Monitor and report changes from baseline  Collaborate with interdisciplinary team to initiate plan and implement interventions as ordered  - Provide and maintain a safe environment  - Consider seizure precautions  - Consider fall precautions  - Consider aspiration precautions  - Consider bleeding precautions  Outcome: Progressing     Problem: Activity Intolerance/Impaired Mobility  Goal: Mobility/activity is maintained at optimum level for patient  Description: Interventions:  - Assess and monitor patient  barriers to mobility and need for assistive/adaptive devices  - Assess patient's emotional response to limitations  - Collaborate with interdisciplinary team and initiate plans and interventions as ordered  - Encourage independent activity per ability   - Maintain proper body alignment  - Perform active/passive rom as tolerated/ordered    - Plan activities to conserve energy   - Turn patient as appropriate  Outcome: Progressing     Problem: Communication Impairment  Goal: Ability to express needs and understand communication  Description: Assess patient's communication skills and ability to understand information  Patient will demonstrate use of effective communication techniques, alternative methods of communication and understanding even if not able to speak  - Encourage communication and provide alternate methods of communication as needed  - Collaborate with case management/ for discharge needs  - Include patient/family/caregiver in decisions related to communication  Outcome: Progressing     Problem: Potential for Aspiration  Goal: Non-ventilated patient's risk of aspiration is minimized  Description: Assess and monitor vital signs, respiratory status, and labs (WBC)  Monitor for signs of aspiration (tachypnea, cough, rales, wheezing, cyanosis, fever)  - Assess and monitor patient's ability to swallow  - Place patient up in chair to eat if possible  - HOB up at 90 degrees to eat if unable to get patient up into chair   - Supervise patient during oral intake  - Instruct patient/ family to take small bites  - Instruct patient/ family to take small single sips when taking liquids  - Follow patient-specific strategies generated by speech pathologist   Outcome: Progressing  Goal: Ventilated patient's risk of aspiration is minimized  Description: Assess and monitor vital signs, respiratory status, airway cuff pressure, and labs (WBC)  Monitor for signs of aspiration (tachypnea, cough, rales, wheezing, cyanosis, fever)  - Elevate head of bed 30 degrees if patient has tube feeding   - Monitor tube feeding  Outcome: Progressing     Problem: Nutrition  Goal: Nutrition/Hydration status is improving  Description: Monitor and assess patient's nutrition/hydration status for malnutrition (ex- brittle hair, bruises, dry skin, pale skin and conjunctiva, muscle wasting, smooth red tongue, and disorientation)   Collaborate with interdisciplinary team and initiate plan and interventions as ordered  Monitor patient's weight and dietary intake as ordered or per policy  Utilize nutrition screening tool and intervene per policy  Determine patient's food preferences and provide high-protein, high-caloric foods as appropriate  - Assist patient with eating   - Allow adequate time for meals   - Encourage patient to take dietary supplement as ordered  - Collaborate with clinical nutritionist   - Include patient/family/caregiver in decisions related to nutrition  Outcome: Progressing    times a day  - Reposition patient every  hours    - Dangle patient  times a day  - Stand patient  times a day  - Ambulate patient  times a day  - Out of bed to chair  times a day   - Out of bed for meals  times a day  - Out of bed for toileting  - Record patient progress and toleration of activity level   Outcome: Progressing

## 2023-02-06 NOTE — ASSESSMENT & PLAN NOTE
RAD recommended lifestyle changes at this time, metformin if unable to make changes w/ PCP  Consulted nutrition for diabetic diet and diet to help reduce cholesterol

## 2023-02-06 NOTE — ASSESSMENT & PLAN NOTE
· Patient states she had chest pain 2/5  States it may have started after taking a medication     · Troponins negative x 3, EKG without changes, Echo without wall motion abnormality  · Suspect chest pain may have been secondary to elevated BP's, anxiety, or reflux  · Chest pain currently resolved

## 2023-02-06 NOTE — ASSESSMENT & PLAN NOTE
· Left PLIC/thalamic CVA  · ASA and Plavix per Neurology  · Permissive HTN  · Statin  · CT C/A/P ordered to rule out underlying malignancy as well as d-dimer and thrombosis panel to evaluate for hypercoagulable state     · PT/OT - acute rehab

## 2023-02-06 NOTE — RESTORATIVE TECHNICIAN NOTE
Restorative Technician Note      Patient Name: Scott Peguero     Note Type: Mobility  Patient Position Upon Consult: Supine  Activity Performed: Ambulated; DAPXPRH; Stood  Assistive Device: Roller walker; Other (Comment) (Assist x1 with chair follow  Assisted PT Rachna)  Education Provided: Yes  Patient Position at End of Consult: Bedside chair;  All needs within reach; Bed/Chair alarm activated  [place] CHRISTINE, Restorative Technician, [employer]

## 2023-02-06 NOTE — ASSESSMENT & PLAN NOTE
· Patient states she had chest pain yesterday  States it may have started after taking a medication     · Troponins negative x 3, EKG without changes, Echo without wall motion abnormality  · Suspect chest pain may have been secondary to elevated BP's, anxiety, or reflux  · Chest pain currently resolved  · Monitor

## 2023-02-06 NOTE — CONSULTS
Tex [birth year], 62 y o  female MRN: [id number]  Unit/Bed#: [facility] 708-01 Encounter: [id number]  Primary Care Provider: Tamra Cabrera MD   Date and time admitted to hospital: 2/4/2023  1:06 PM    Inpatient consult to Internal Medicine  Consult performed by: Myrtle Saxena PA-C  Consult ordered by: Scottie Sawyer MD          * Stroke [facility])  Assessment & Plan  · Left PLIC/thalamic CVA  · ASA and Plavix per Neurology  · Permissive HTN  · Statin  · CT C/A/P ordered to rule out underlying malignancy as well as d-dimer and thrombosis panel to evaluate for hypercoagulable state  · PT/OT - acute rehab    Other chest pain  Assessment & Plan  · Patient states she had chest pain yesterday  States it may have started after taking a medication  · Troponins negative x 3, EKG without changes, Echo without wall motion abnormality  · Suspect chest pain may have been secondary to elevated BP's, anxiety, or reflux  · Chest pain currently resolved  · Monitor    Headache  Assessment & Plan  · Chronic daily headache - possibly rebound headache  · Abortive meds given by Neurology with improvement  · Verapamil started for prevention    Restlessness  Assessment & Plan  · Improved  · Continue trazadone and Seroquel     Hypertension  Assessment & Plan  · Continue Verapamil  · Amlodipine discontinued    Depression  Assessment & Plan  · Continue Cymbalta    Prediabetes  Assessment & Plan  Lab Results   Component Value Date    HGBA1C 6 4 (H) 02/05/2023       No results for input(s): POCGLU in the last 72 hours  Blood Sugar Average: Last 72 hrs:     Patient with pre diabetes  Recommend aggressive lifestyle modification/weight loss with close outpatient follow up  If lifestyle changes not effective, could consider Metformin  VTE Prophylaxis:   High Risk (Score >/= 5) - Pharmacological DVT Prophylaxis Ordered: enoxaparin (Lovenox)   Sequential Compression Devices Ordered  Recommendations for Discharge:  · Agree with using Verapamil for headache management and BP management  · Lifestyle modification/ weight loss  PCP follow up to monitor HbA1c     Counseling / Coordination of Care Time: 30 minutes Greater than 50% of total time spent on patient counseling and coordination of care  Collaboration of Care: Were Recommendations Directly Discussed with Primary Treatment Team? Yes    History of Present Illness:  Christie Vila is a 62 y o  female who is originally admitted to the Neurology service due to stroke  We are consulted for chest pain  Patient states she had chest pain yesterday  Patient states that it may have started after taking a medication, but she is unsure  Patient states the pain is now resolved  She denies any shortness of breath  Review of Systems:  Review of Systems   Constitutional: Negative for chills and fever  HENT: Negative for ear pain and sore throat  Eyes: Negative for pain and visual disturbance  Respiratory: Negative for cough and shortness of breath  Cardiovascular: Negative for chest pain and palpitations  Gastrointestinal: Negative for abdominal pain and vomiting  Genitourinary: Negative for dysuria and hematuria  Musculoskeletal: Negative for arthralgias and back pain  Skin: Negative for color change and rash  Neurological: Negative for seizures and syncope  All other systems reviewed and are negative        Past Medical and Surgical History:   Past Medical History:   Diagnosis Date   • Acquired deformity of left foot    • Anesthesia complication     difficulty awakening   • Arthritis    • Deaf, left    • Depression    • Hallux valgus of left foot    • Hammer toe of left foot    • Headache    • Kidney stone    • Sciatic pain, right     intermittent       Past Surgical History:   Procedure Laterality Date   • BREAST BIOPSY Right 03/04/2021   • BREAST BIOPSY Right 3/10/2021    Procedure: Excisional Breast debridement  7 o'clock position;  Surgeon: Karyna Torres MD;  [facility];  Service: General   • CHOLECYSTECTOMY     • CLOSURE DELAYED PRIMARY Right 2020    Procedure: CLOSURE DELAYED PRIMARY and application of skin graft substitute;  Surgeon: Lorena Cash DPM;  [facility]: [facility];  Service: Podiatry   • HERNIA REPAIR     • INCISION AND DRAINAGE OF WOUND Right 2020    Procedure: INCISION AND DRAINAGE (I&D) EXTREMITY;  Surgeon: Lorena Cash DPM;  Location: [facility];  Service: Podiatry   • Piroska U  97  W/SESMDC W/1METAR MEDIAL CNF Left 2021    Procedure: Malen Handsome;  Surgeon: Lorena Cash DPM;  [facility];  Service: Podiatry   • MI OSTEOT W/ [employer] SHRT/CORRJ METAR XCP 1ST EA Left 2019    Procedure: 3rd Metatarsal Osteotomy;  Surgeon: Amy Aguilar DPM;  [facility];  Service: Podiatry   • MI OSTEOT W/ [employer] SHRT/CORRJ METAR XCP 1ST EA Left 2021    Procedure: OSTEOTOMY 2ND METATARSAL;  Surgeon: Lorena Cash DPM;  [facility];  Service: Podiatry   • TUBAL LIGATION     • US GUIDED BREAST BIOPSY RIGHT COMPLETE Right 3/4/2021   • VAC DRESSING APPLICATION Right 2153    Procedure: APPLICATION VAC DRESSING;  Surgeon: Lorena Cash DPM;  [facility];  Service: Podiatry       Meds/Allergies:  all medications and allergies reviewed    Allergies:    Allergies   Allergen Reactions   • Sertraline Other (See Comments)     Hand swelling, itching       Social History:  Marital Status: Single  Substance Use History:   Social History     Substance and Sexual Activity   Alcohol Use Not Currently     Social History     Tobacco Use   Smoking Status Some Days   • Packs/day: 1 00   • Years: 15 00   • Pack years: 15    • Types: Cigarettes   • Last attempt to quit: 2021   • Years since quittin 2   Smokeless Tobacco Never   Tobacco Comments    trying to quit - using judson  patch     Social History     Substance and Sexual Activity   Drug Use No       Family History:  Family History   Problem Relation Age of Onset   • No Known Problems Mother    • No Known Problems Father    • No Known Problems Sister    • No Known Problems Daughter    • No Known Problems Maternal Grandmother    • Colon cancer Maternal Grandfather    • No Known Problems Paternal Grandmother    • No Known Problems Paternal Grandfather        Physical Exam:   Vitals:   Blood Pressure: 152/92 (02/06/23 1145)  Pulse: 89 (02/06/23 1145)  Temperature: 98 2 °F (36 8 °C) (02/06/23 1145)  Temp Source: Oral (02/04/23 1446)  Respirations: 18 (02/06/23 1145)  Height: 5' (152 4 cm) (02/05/23 1427)  Weight - Scale: 87 1 kg (192 lb) (02/05/23 1006)  SpO2: 95 % (02/06/23 1145)    Physical Exam  Vitals and nursing note reviewed  Constitutional:       General: She is not in acute distress  Appearance: She is well-developed  HENT:      Head: Normocephalic and atraumatic  Eyes:      Conjunctiva/sclera: Conjunctivae normal    Cardiovascular:      Rate and Rhythm: Normal rate and regular rhythm  Heart sounds: No murmur heard  Pulmonary:      Effort: Pulmonary effort is normal  No respiratory distress  Breath sounds: Normal breath sounds  Abdominal:      Palpations: Abdomen is soft  Tenderness: There is no abdominal tenderness  Musculoskeletal:         General: No swelling  Cervical back: Neck supple  Skin:     General: Skin is warm and dry  Capillary Refill: Capillary refill takes less than 2 seconds  Neurological:      Mental Status: She is alert  Motor: Weakness (right arm/ weakness) present        Comments: Right facial droop   Psychiatric:         Mood and Affect: Mood normal           Additional Data:   Lab Results:    Results from last 7 days   Lab Units 02/06/23  0639 02/05/23  0615   WBC Thousand/uL 8 65 8 16   HEMOGLOBIN g/dL 14 4 13 7   HEMATOCRIT % 44 2 42 8   PLATELETS Thousands/uL 340 319   NEUTROS PCT %  --  41*   LYMPHS PCT %  --  47*   MONOS PCT %  --  9   EOS PCT %  --  2     Results from last 7 days   Lab Units 02/06/23  0639 02/05/23  0615   SODIUM mmol/L 140 140   POTASSIUM mmol/L 3 6 3 8   CHLORIDE mmol/L 110* 110*   CO2 mmol/L 24 25   BUN mg/dL 13 9   CREATININE mg/dL 0 70 0 64   ANION GAP mmol/L 6 5   CALCIUM mg/dL 9 2 9 2   ALBUMIN g/dL  --  3 6   TOTAL BILIRUBIN mg/dL  --  0 35   ALK PHOS U/L  --  88   ALT U/L  --  27   AST U/L  --  20   GLUCOSE RANDOM mg/dL 127 114     Results from last 7 days   Lab Units 02/04/23  1314   INR  0 92         Lab Results   Component Value Date/Time    HGBA1C 6 4 (H) 02/05/2023 06:15 AM    HGBA1C 6 4 (H) 08/10/2022 11:05 AM    HGBA1C 6 4 (H) 10/27/2021 09:41 AM               Imaging: Reviewed radiology reports from this admission including: MRI brain and ECHO  MRI brain w contrast   Final Result by Lashawn Noriega MD (02/06 1418)      No abnormal enhancement  No acute change compared to yesterday  Workstation performed: [id number]         MRI brain wo contrast   Final Result by Zak Andrews MD (02/04 3304)      Left posterior limb internal capsule/thalamic acute to subacute ischemia  Findings were marked as "immediate"in Epic and will now be related to the ordering physician or covering clinical team by the radiology liaison  Workstation performed: [id number]         CT stroke alert brain   Final Result by Mejia Montiel DO (02/04 1342)      No acute intracranial abnormality  Findings were directly discussed with Baron Green at 1:40 PM       Workstation performed: [id number]         CTA stroke alert (head/neck)   Final Result by Mejia Montiel DO (02/04 1348)      There are some anatomic variations of the intracranial circulation as described above with no stenosis, occlusion or thrombosis of the cervical or intracranial vasculature              Findings were directly discussed with Baron Green at approximately 1:40 PM                            Workstation performed: [id number]         CT chest abdomen pelvis w contrast    (Results Pending)       EKG, Pathology, and Other Studies Reviewed on Admission:   · EKG: NSR     ** Please Note: This note may have been constructed using a voice recognition system   **

## 2023-02-06 NOTE — PROGRESS NOTES
[facility] NEUROLOGY RESIDENCY PROGRESS NOTE     Name: Tiffany Bowser   Age & Sex: 62 y o  female   MRN: [id number]  Unit/Bed#: [facility] 708-01   Encounter: [id number]    Tiffany Bowser will need follow up in 6-8 weeks  with neurovascular attending/ap/resident  She will not require outpatient neurological testing  ASSESSMENT & PLAN     * Stroke [facility])  Assessment & Plan  63 yo F w/ a PMH of Current smoker (1ppd, 25+ years), past bunion surgery, pre-diabetic, vertigo, hypertension coming in as a stroke alert on 2/4/2023  1:06 PM with initial NIHSS of 5 and LKW 8:30am, initial Blood Pressure: 143/99  Initial presenting deficits were slurred speech, RUE and RLE weakness, and some sensory neglect  Patient had went up around 8:30 AM in the morning that was known at that time  She then stated she took a nap and then woke up around 1130am and then talk to her daughter who noticed that she was slurring her speech  Patient also had appreciated that she had a left facial droop during this time  patient also noted that she had a episode of bowel incontinence prior to this  As a result of patient outside window of TNK and most symptoms resolving pt was determined to not be a candidate for thrombolysis (TNK)  Initial Exam on 02/04/23: R facial droop, slurred speech, 5/5 strength in all extremities although in LE had some pain  Found to have a stroke in L PLIC/thalamus on MRI  • Vascular risk factors: active smoker, prediabetic, age, hypertension  • Home meds: cymbalta     Workup:  Lab Results   Component Value Date    HGBA1C 6 4 (H) 02/05/2023    CHOLESTEROL 240 (H) 02/05/2023    LDLCALC 171 (H) 02/05/2023    TRIG 132 02/05/2023    INR 0 92 02/04/2023      • CTH: negative for any bleeds or recent stroke  • CTA: negative for LVOs, aneurysms, dissection or AVMs  • MRI w/o contrast: Left posterior limb internal capsule/thalamic acute to subacute ischemia    • MRI w/ contrast showed: No abnormal enhancement, no acute changes for MRI w/o contrast  Echocardiogram: left ventricular ejection fraction is 60%  Systolic function is normal  Wall motion is normal  Diastolic function is normal   Left atrium normal in size     Pertinent scores:  - NIHSS: 5  Stroke Modified Kelli Score: 2 (Unable to carry out all previous activities, but able to look after own affairs without assistance)    Impression: stroke appreciated in L internal capsule and thalamus likely secondary to hypertension and other vascular risk factors but cannot rule out other factors such as a hypercoagulable state given that patient is young and has been a longtime smoker      Plan:  - Stroke pathway  - Discussed plan with neurology attending, Dr Elizabeth Sheets  - BP: Allow for normotension and continue verapamil 121 mg daily  - atorvastatin 40mg qhs  - Maintain glucose <180, SSI for coverage if indicated  - Medical management as per primary team appreciated  - Antiplatelet agents: DAPT w/ ASA 81mg and plavix 75mg for 21 days and then monotherapy (02/26/2023)  - Ordered CT chest abdomen pelvis with and without contrast to evaluate for any underlying malignancy that be contributing to a hypercoagulable state and pending result  - d-dimer negatinge and f/u thrombosis labs  - DVT ppx and SCDs  - Monitor on telemetry  - PT/OT/Speech recommended rehab w/ [facility]  - Speech recommends soft/level 3 diet and thin liquids   - Stroke education      Depression  Assessment & Plan  Continue home cymbalta    Prediabetes  Assessment & Plan  SLIM recommended lifestyle changes at this time, metformin if unable to make changes w/ PCP  Consulted nutrition for diabetic diet and diet to help reduce cholesterol      Hypertension  Assessment & Plan  Verapamil 120 mg daily    Restlessness  Assessment & Plan  Restarted home trazodone and seroquel (was initially not ordered as patient was not regularly taking and taking prn)  Trazodone scheduled nightly and Seroquel as needed    Headache  Assessment & Plan  Patient had noted a headache over the past few months that has been intermittent but over the past 1-1/2 months has been progressively getting worse  She states that is all over her head and has been on and off getting worse every day  She has been taking Tylenol every day for it and it was explained that she likely has some component of medication overuse headache  MRI brain w/ contrast negative for any enhancement     Plan  Given migraine cocktail: Phenergan, Toradol, Benadryl for 2 days    -Avoided steroids given patient's restlessness/anxiety  Given additional Pepcid for noted Toradol  Will give additional IV mag 2mg for 2 days  Verapamil was also added 120 mg daily as prevention              SUBJECTIVE     Patient was seen and examined  Headache is improved 4/10 compared to yesterday and patient noted that the medications seemed to help  She was very eager to return to rehab or go home  Discussed about smoking cessation at this time and need for patient as well as  to quit smoking to decrease stroke risk factor  Stated that additional nutrition management will be by to give diabetes education  She denies any fevers/chill, shortness of breath, abdominal pain, nausea, vomiting, diarrhea, problems urinating, problems  With bowel movements, and is eating/drinking without problem      She denies any syncope, paresthesias, diplopia, visual changes, tinnitus,   Loss of bowel or bladder        Review of Systems    See above    OBJECTIVE     Patient ID: Gio Jernigan is a 62 y o  female      Vitals:    23 1815 23 2234 23 0618 23 1145   BP: (!) 163/113 (!) 168/117 122/99 152/92   Pulse: (!) 112 102 96 89   Resp:    18   Temp: 98 7 °F (37 1 °C)   98 2 °F (36 8 °C)   TempSrc:       SpO2: 93% 96% 95% 95%   Weight:       Height:          Temperature:   Temp (24hrs), Av 2 °F (36 8 °C), Min:97 7 °F (36 5 °C), Max:98 7 °F (37 1 °C)    Temperature: 98 2 °F (36 8 °C)      Physical Exam  Eyes:      Extraocular Movements: EOM normal       Pupils: Pupils are equal, round, and reactive to light  Neurological:      Mental Status: She is oriented to person, place, and time  Motor: Motor strength is normal       Coordination: Finger-Nose-Finger Test abnormal  Heel to Shin Test normal       Deep Tendon Reflexes:      Reflex Scores:       Tricep reflexes are 2+ on the right side and 2+ on the left side  Bicep reflexes are 2+ on the right side and 2+ on the left side  Brachioradialis reflexes are 2+ on the right side and 2+ on the left side  Patellar reflexes are 2+ on the right side and 2+ on the left side  Achilles reflexes are 2+ on the right side and 2+ on the left side  Psychiatric:         Speech: Speech is slurred  Neurologic Exam     Mental Status   Oriented to person, place, and time  Speech: slurred   Able to name object  Able to repeat  Normal comprehension  Cranial Nerves     CN II   Visual fields full to confrontation  CN III, IV, VI   Pupils are equal, round, and reactive to light  Extraocular motions are normal    CN III: no CN III palsy  CN VI: no CN VI palsy  Nystagmus: none     CN V   Facial sensation intact  CN VII   Facial expression full, symmetric  CN IX, X   CN IX normal    CN X normal      CN XI   CN XI normal      CN XII   Tongue deviation: left    R facial droop appreciated and not moving/more flattened nasolabial fold compared to L  L tongue deviation     Motor Exam   Right arm pronator drift: absent  Left arm pronator drift: absent    Strength   Strength 5/5 throughout       LUE (deltoids, biceps/triceps, wrist extensors, finger ) seems weaker at 4/5       Sensory Exam   Right arm light touch: decreased from wrist  Vibration normal        Cold sensation intact in all 4 extremities     Gait, Coordination, and Reflexes     Coordination   Finger to nose coordination: abnormal  Heel to shin coordination: normal    Reflexes   Right brachioradialis: 2+  Left brachioradialis: 2+  Right biceps: 2+  Left biceps: 2+  Right triceps: 2+  Left triceps: 2+  Right patellar: 2+  Left patellar: 2+  Right achilles: 2+  Left achilles: 2+  Right plantar: normal  Left plantar: normal    L FNT ataxic and slower    Gait deferred            LABORATORY DATA     Labs: I have personally reviewed pertinent reports  and I have personally reviewed pertinent films in PACS  Results from last 7 days   Lab Units 02/06/23  0639 02/05/23  0615 02/04/23  1314   WBC Thousand/uL 8 65 8 16 9 32   HEMOGLOBIN g/dL 14 4 13 7 15 1   HEMATOCRIT % 44 2 42 8 46 5*   PLATELETS Thousands/uL 340 319 352   NEUTROS PCT %  --  41*  --    MONOS PCT %  --  9  --       Results from last 7 days   Lab Units 02/06/23  0639 02/05/23  0615 02/04/23  1314   SODIUM mmol/L 140 140 138   POTASSIUM mmol/L 3 6 3 8 4 0   CHLORIDE mmol/L 110* 110* 108   CO2 mmol/L 24 25 26   BUN mg/dL 13 9 11   CREATININE mg/dL 0 70 0 64 0 88   CALCIUM mg/dL 9 2 9 2 9 6   ALK PHOS U/L  --  88  --    ALT U/L  --  27  --    AST U/L  --  20  --               Results from last 7 days   Lab Units 02/04/23  1314   INR  0 92   PTT seconds 29               IMAGING & DIAGNOSTIC TESTING     Radiology Results: I have personally reviewed pertinent reports  and I have personally reviewed pertinent films in PACS    MRI brain w contrast   Final Result by Leo Negro MD (02/06 6402)      No abnormal enhancement  No acute change compared to yesterday  Workstation performed: [id number]         MRI brain wo contrast   Final Result by Jimmie Scott MD (02/04 8357)      Left posterior limb internal capsule/thalamic acute to subacute ischemia  Findings were marked as "immediate"in Epic and will now be related to the ordering physician or covering clinical team by the radiology liaison           Workstation performed: [id number]         CT stroke alert brain   Final Result by Gene Ramos Morris DO (02/04 1342)      No acute intracranial abnormality  Findings were directly discussed with Billy Reddy at 1:40 PM       Workstation performed: [id number]         CTA stroke alert (head/neck)   Final Result by Mejia Morris DO (02/04 1348)      There are some anatomic variations of the intracranial circulation as described above with no stenosis, occlusion or thrombosis of the cervical or intracranial vasculature  Findings were directly discussed with Billy Reddy at approximately 1:40 PM                            Workstation performed: [id number]         CT chest abdomen pelvis w contrast    (Results Pending)       Other Diagnostic Testing: I have personally reviewed pertinent reports     and I have personally reviewed pertinent films in PACS    ACTIVE MEDICATIONS     Current Facility-Administered Medications   Medication Dose Route Frequency   • aspirin chewable tablet 81 mg  81 mg Oral Daily   • atorvastatin (LIPITOR) tablet 40 mg  40 mg Oral QPM   • clopidogrel (PLAVIX) tablet 75 mg  75 mg Oral Daily   • diphenhydrAMINE (BENADRYL) injection 25 mg  25 mg Intravenous Q8H   • DULoxetine (CYMBALTA) delayed release capsule 60 mg  60 mg Oral Daily   • enoxaparin (LOVENOX) subcutaneous injection 40 mg  40 mg Subcutaneous Daily   • famotidine (PEPCID) tablet 20 mg  20 mg Oral BID   • ketorolac (TORADOL) injection 30 mg  30 mg Intravenous Q8H   • lidocaine (LIDODERM) 5 % patch 1 patch  1 patch Topical Daily   • metoclopramide (REGLAN) injection 10 mg  10 mg Intravenous Q8H [address]   • nicotine (NICODERM CQ) 21 mg/24 hr TD 24 hr patch 21 mg  21 mg Transdermal Daily   • ondansetron (ZOFRAN) injection 4 mg  4 mg Intravenous Q6H PRN   • QUEtiapine (SEROquel) tablet 25 mg  25 mg Oral HS PRN   • traZODone (DESYREL) tablet 50 mg  50 mg Oral Once   • traZODone (DESYREL) tablet 50 mg  50 mg Oral HS   • verapamil (CALAN-SR) CR tablet 120 mg  120 mg Oral Daily Prior to Admission medications    Medication Sig Start Date End Date Taking?  Authorizing Provider   atorvastatin (LIPITOR) 20 mg tablet Take 20 mg by mouth every evening   10/23/20  Yes Historical Provider, MD   DULoxetine (CYMBALTA) 60 mg delayed release capsule TAKE 2 CAPSULES (120 MG TOTAL) B MOUTH DAILY 12/13/22  Yes Historical Provider, MD   meclizine (ANTIVERT) 25 mg tablet 1 every 8 hours as needed 8/30/22  Yes Historical Provider, MD   naproxen (NAPROSYN) 500 mg tablet Take 500 mg by mouth 2 (two) times a day as needed Take with food 1/2/23  Yes Historical Provider, MD   QUEtiapine (SEROquel) 25 mg tablet Take 25 mg by mouth every evening 6/30/22  Yes Historical Provider, MD   acetaminophen (TYLENOL) 650 mg CR tablet Take 650 mg by mouth every 8 (eight) hours as needed    Historical Provider, MD   aspirin (ECOTRIN) 325 mg EC tablet Take 1 tablet (325 mg total) by mouth daily  Patient not taking: Reported on 2/4/2023 11/12/21   Sandra Ortiz DPM   cyclobenzaprine (FLEXERIL) 10 mg tablet take 1 tablet by mouth three times a day for muscle spasm  Patient not taking: Reported on 2/4/2023 1/2/23   Historical Provider, MD   Diclofenac Sodium (VOLTAREN) 1 % Apply 2 g topically 4 (four) times a day  Patient not taking: Reported on 2/4/2023 7/14/22   Hollis Chinchilla DPM   DULoxetine (CYMBALTA) 30 mg delayed release capsule Take 30 mg by mouth daily  Patient not taking: Reported on 2/4/2023 7/19/22   Historical Provider, MD   gabapentin (NEURONTIN) 300 mg capsule Take 1 capsule (300 mg total) by mouth 2 (two) times a day 3/31/22 5/30/22  Hollis Chinchilla DPM   ibuprofen (MOTRIN) 600 mg tablet Take 1 tablet (600 mg total) by mouth every 6 (six) hours as needed for mild pain for up to 30 days 7/19/19 11/8/21  Sherren Byes, DPM   lidocaine-prilocaine (EMLA) cream Apply topically as needed for mild pain or moderate pain  Patient not taking: Reported on 2/4/2023 7/14/22   Hollis Simpers, DPM   nicotine (NICODERM CQ) 14 mg/24hr TD 24 hr patch Place 1 patch on the skin every 24 hours  Patient not taking: Reported on 2/4/2023    Historical Provider, MD   oxyCODONE-acetaminophen (Percocet) 5-325 mg per tablet Take 1 tablet by mouth every 6 (six) hours as needed for severe pain for up to 30 doses Max Daily Amount: 4 tablets  Patient not taking: Reported on 2/15/2022 11/12/21   Seth Carranza, DPM   oxyCODONE-acetaminophen (Percocet) 5-325 mg per tablet Take 1 tablet by mouth every 4 (four) hours as needed for moderate pain for up to 15 doses Max Daily Amount: 15 tablets  Patient not taking: Reported on 2/4/2023 1/6/23   Thomas De La Torre MD   traZODone (DESYREL) 50 mg tablet take 1 tablet by mouth nightly - TAKE AT BEDTIME  Patient not taking: Reported on 2/4/2023 2/28/22   Historical Provider, MD         VTE Pharmacologic Prophylaxis: Enoxaparin (Lovenox)  VTE Mechanical Prophylaxis: sequential compression device    ==  Baldemar Lion 28  Neurology Residency, PGY-2

## 2023-02-07 ENCOUNTER — APPOINTMENT (OUTPATIENT)
Dept: PHYSICAL THERAPY | Facility: REHABILITATION | Age: 58
End: 2023-02-07

## 2023-02-07 LAB
ANION GAP SERPL CALCULATED.3IONS-SCNC: 4 MMOL/L (ref 4–13)
B2 GLYCOPROT1 IGA SERPL IA-ACNC: 3.2
B2 GLYCOPROT1 IGG SERPL IA-ACNC: 0.8
B2 GLYCOPROT1 IGM SERPL IA-ACNC: <2.4
BUN SERPL-MCNC: 20 MG/DL (ref 5–25)
CALCIUM SERPL-MCNC: 8.9 MG/DL (ref 8.3–10.1)
CARDIOLIPIN IGA SER IA-ACNC: 4.6
CARDIOLIPIN IGG SER IA-ACNC: 0.7
CARDIOLIPIN IGM SER IA-ACNC: 1.2
CHLORIDE SERPL-SCNC: 109 MMOL/L (ref 96–108)
CO2 SERPL-SCNC: 25 MMOL/L (ref 21–32)
CREAT SERPL-MCNC: 0.84 MG/DL (ref 0.6–1.3)
ERYTHROCYTE [DISTWIDTH] IN BLOOD BY AUTOMATED COUNT: 14.8 % (ref 11.6–15.1)
GFR SERPL CREATININE-BSD FRML MDRD: 77 ML/MIN/1.73SQ M
GLUCOSE SERPL-MCNC: 122 MG/DL (ref 65–140)
HCT VFR BLD AUTO: 42.2 % (ref 34.8–46.1)
HGB BLD-MCNC: 13.3 G/DL (ref 11.5–15.4)
MCH RBC QN AUTO: 32.1 PG (ref 26.8–34.3)
MCHC RBC AUTO-ENTMCNC: 31.5 G/DL (ref 31.4–37.4)
MCV RBC AUTO: 102 FL (ref 82–98)
PLATELET # BLD AUTO: 304 THOUSANDS/UL (ref 149–390)
PMV BLD AUTO: 9.4 FL (ref 8.9–12.7)
POTASSIUM SERPL-SCNC: 4.1 MMOL/L (ref 3.5–5.3)
PROT S ACT/NOR PPP: 140 % (ref 61–136)
PROT S PPP-ACNC: 137 % (ref 60–150)
RBC # BLD AUTO: 4.14 MILLION/UL (ref 3.81–5.12)
SODIUM SERPL-SCNC: 138 MMOL/L (ref 135–147)
WBC # BLD AUTO: 8.39 THOUSAND/UL (ref 4.31–10.16)

## 2023-02-07 RX ADMIN — NICOTINE 21 MG: 21 PATCH, EXTENDED RELEASE TRANSDERMAL at 09:20

## 2023-02-07 RX ADMIN — ATORVASTATIN CALCIUM 40 MG: 40 TABLET, FILM COATED ORAL at 19:07

## 2023-02-07 RX ADMIN — METOCLOPRAMIDE HYDROCHLORIDE 10 MG: 5 INJECTION INTRAMUSCULAR; INTRAVENOUS at 22:57

## 2023-02-07 RX ADMIN — LIDOCAINE 5% 1 PATCH: 700 PATCH TOPICAL at 09:09

## 2023-02-07 RX ADMIN — FAMOTIDINE 20 MG: 20 TABLET, FILM COATED ORAL at 19:07

## 2023-02-07 RX ADMIN — ASPIRIN 81 MG: 81 TABLET, CHEWABLE ORAL at 09:09

## 2023-02-07 RX ADMIN — DIPHENHYDRAMINE HYDROCHLORIDE 25 MG: 50 INJECTION, SOLUTION INTRAMUSCULAR; INTRAVENOUS at 06:14

## 2023-02-07 RX ADMIN — VERAPAMIL HYDROCHLORIDE 120 MG: 120 TABLET, FILM COATED, EXTENDED RELEASE ORAL at 09:23

## 2023-02-07 RX ADMIN — CLOPIDOGREL BISULFATE 75 MG: 75 TABLET ORAL at 09:13

## 2023-02-07 RX ADMIN — DIPHENHYDRAMINE HYDROCHLORIDE 25 MG: 50 INJECTION, SOLUTION INTRAMUSCULAR; INTRAVENOUS at 16:08

## 2023-02-07 RX ADMIN — FAMOTIDINE 20 MG: 20 TABLET, FILM COATED ORAL at 09:09

## 2023-02-07 RX ADMIN — METOCLOPRAMIDE HYDROCHLORIDE 10 MG: 5 INJECTION INTRAMUSCULAR; INTRAVENOUS at 16:08

## 2023-02-07 RX ADMIN — DULOXETINE HYDROCHLORIDE 60 MG: 60 CAPSULE, DELAYED RELEASE ORAL at 09:09

## 2023-02-07 RX ADMIN — KETOROLAC TROMETHAMINE 30 MG: 30 INJECTION, SOLUTION INTRAMUSCULAR at 09:09

## 2023-02-07 RX ADMIN — TRAZODONE HYDROCHLORIDE 50 MG: 50 TABLET ORAL at 22:57

## 2023-02-07 RX ADMIN — METOCLOPRAMIDE HYDROCHLORIDE 10 MG: 5 INJECTION INTRAMUSCULAR; INTRAVENOUS at 06:14

## 2023-02-07 NOTE — RESTORATIVE TECHNICIAN NOTE
Restorative Technician Note      Patient Name: Herrera Callejas     Note Type: Mobility  Patient Position Upon Consult: Supine  Activity Performed: Ambulated; XUJODIS; Stood  Assistive Device: Roller walker; Other (Comment) (Assist x1 with chair follow)  Education Provided: Yes  Patient Position at End of Consult: Bedside chair;  All needs within reach; Bed/Chair alarm activated    Melisa CHRISTINE, Restorative Technician, [employer]

## 2023-02-07 NOTE — OCCUPATIONAL THERAPY NOTE
Occupational Therapy Progress Note     Patient Name: Dylan Encarnacion  ETQUQ'O Date: 2/7/2023  Problem List  Principal Problem:    Stroke [facility])  Active Problems:    Headache    Depression    Restlessness    Hypertension    Other chest pain    Prediabetes        02/07/23 1115   OT Last Visit   OT Visit Date 02/07/23   Note Type   Note Type Treatment   Pain Assessment   Pain Rating: FLACC (Rest) - Face 0   Pain Rating: FLACC (Rest) - Legs 0   Pain Rating: FLACC (Rest) - Activity 0   Pain Rating: FLACC (Rest) - Cry 0   Pain Rating: FLACC (Rest) - Consolability 0   Score: FLACC (Rest) 0   Pain Rating: FLACC (Activity) - Face 0   Pain Rating: FLACC (Activity) - Legs 0   Pain Rating: FLACC (Activity) - Activity 0   Pain Rating: FLACC (Activity) - Cry 0   Pain Rating: FLACC (Activity) - Consolability 0   Score: FLACC (Activity) 0   Restrictions/Precautions   Other Precautions Fall Risk   Lifestyle   Autonomy Pt was I in ADLs, IADLs, and had some A with IADLs  Reciprocal Relationships Pt is currently living with her son but her son will no longer be living with her in about 2 weeks  Service to Others Pt is currently not working  Intrinsic Gratification Pt enjoys being with her grandkids  ADL   Where Assessed Edge of bed   Eating Assistance 4  Minimal Assistance   Eating Deficit Setup;Verbal cueing;Supervision/safety; Increased time to complete;Bringing food to mouth assist;Adaptive utensils (Comment)  (applesauce with built up handle)   Grooming Assistance 5  Supervision/Setup   Grooming Deficit Setup;Supervision/safety; Increased time to complete;Brushing hair   Grooming Comments Pt brushed the end of her braided hair   Bed Mobility   Additional Comments Greeted at EOB upon arrival with all needs within reach  Left in bedside chair with all needs within reach  Transfers   Sit to Stand 4  Minimal assistance   Additional items Assist x 1;HOB elevated; Increased time required  (bed>bedside chair)   Stand to Sit 4 Minimal assistance   Additional items Assist x 1; Armrests; Increased time required   Stand pivot 4  Minimal assistance   Additional items Assist x 1;Bedrails;Armrests; Increased time required  (bed>chair)   Additional Comments No AD   Therapeutic Excerise-Strength   UE Strength Yes   Right Upper Extremity- Strength   R Elbow Elbow flexion;Elbow extension  (Practiced bringing spoon to her mouth while using plastic spoon)   R Hand Thumb; Index finger; Long finger;Ring finger;Little finger  ( strength exercises to promote functional use)   Equipment Theraputty; Other (Comment)  (Extra-soft theraputty to increase hand strength)   R Weight/Reps/Sets extra-soft theraputty/ 5 reps/ 3 sets   RUE Strength Comment Pt provided with education handouts focusing on fine motor, gross motor, and UE strengthening to encourage RUE usage   Coordination   In Hand Manipulation Pt participated in In Hand Manipulation exercise with pen to increase finger coordination   Subjective   Subjective Pt became frusterated while eating applesauce, but was reassured and felt better   Cognition   Overall Cognitive Status Impaired   Arousal/Participation Alert; Responsive; Cooperative   Attention Within functional limits   Orientation Level Oriented X4   Memory Within functional limits   Following Commands Follows one step commands without difficulty   Comments Pt was alert, responsive and cooperative  Presented with R side facial droop, speech dysarthria, and slow motor processing  Pt had typical affect and smiled multiple times throughout session  She recalled recent events and events from 10 years ago which were agreed on by her   She was motivated to participate in OT session  Activity Tolerance   Activity Tolerance Patient tolerated treatment well   Assessment   Assessment Pt was greeted for OT session while seated EOB on 2/7/2023   Treatment session to address RUE weakness, and neuromuscular reeducation, ADL retraining, functional transfer training, endurance training, and fine motor coordination  Patient participated while seated EOB in ADL eating activity with Xiao with built up handle, hair brushing with supervision/setup, functional transfers sit<>stand with Xiao, stand pivot bed>chair with Xiao  She did not use any DME for transfers  Patient limited by slow processing, RUE weakness, and speech dysarthria  Pt participated in education packets focusing on fine motor, gross motor, and strengthening handouts to encourage RUE usage  Pt would benefit from continued skilled OT services as she is functioning below baseline  Pt to be discharged to inpatient rehab   The patient's raw score on the AM-PAC Daily Activity Inpatient Short Form is 16  A raw score of less than 19 suggests the patient may benefit from discharge to post-acute rehabilitation services  Please refer to the recommendation of the Occupational Therapist for safe discharge planning  Pt would benefit from continued OT plan of services for 10-14 days 3-5x/wk to address functional goals  Plan   Treatment Interventions ADL retraining;Functional transfer training;UE strengthening/ROM; Endurance training;Patient/family training;Equipment evaluation/education; Fine motor coordination activities; Compensatory technique education; Energy conservation; Neuromuscular reeducation   Goal Expiration Date 02/19/23   OT Frequency 3-5x/wk   Recommendation   OT Discharge Recommendation Post acute rehabilitation services   [facility] Daily Activity Inpatient   Lower Body Dressing 2   Bathing 2   Toileting 3   Upper Body Dressing 3   Grooming 3   Eating 3   Daily Activity Raw Score 16   Daily Activity Standardized Score (Calc for Raw Score >=11) 35 96   AM-PAC Applied Cognition Inpatient   Following a Speech/Presentation 3   Understanding Ordinary Conversation 4   Taking Medications 4   Remembering Where Things Are Placed or Put Away 4   Remembering List of 4-5 Errands 3   Taking Care of Complicated Tasks 2 Applied Cognition Raw Score 20   Applied Cognition Standardized Score 41 76   Barthel Index   Feeding 5   Bathing 0   Grooming Score 0   Dressing Score 5   Bladder Score 10   Bowels Score 10   Toilet Use Score 5   Transfers (Bed/Chair) Score 10   Mobility (Level Surface) Score 10   Stairs Score 5   Barthel Index Score 60   Modified Penitas Scale   Modified Kelli Scale 4   End of Consult   Education Provided Yes;Family or social support of family present for education by provider   Patient Position at End of Consult Bedside chair; All needs within reach   Nurse Communication Nurse aware of consult     KARLO Londono

## 2023-02-07 NOTE — PROGRESS NOTES
[address] [facility]  Progress Note - Dorathy Plants 1965, 62 y o  female MRN: [id number]  Unit/Bed#: [facility] 708-01 Encounter: [id number]  Primary Care Provider: Jna Noriega MD   Date and time admitted to hospital: 2/4/2023  1:06 PM    * Stroke [facility])  Assessment & Plan  · Left PLIC/thalamic CVA  · ASA and Plavix per Neurology  · Permissive HTN  · Statin  · CT C/A/P ordered to rule out underlying malignancy - which was unremarkable  as well as d-dimer and thrombosis panel to evaluate for hypercoagulable state - per Neurology   · PT/OT - acute rehab    Other chest pain  Assessment & Plan  · Patient states she had chest pain 2/5  States it may have started after taking a medication  · Troponins negative x 3, EKG without changes, Echo without wall motion abnormality  · Suspect chest pain may have been secondary to elevated BP's, anxiety, or reflux  · Chest pain currently resolved  · Will sign off, please call with questions  Headache  Assessment & Plan  · Chronic daily headache - possibly rebound headache  · Abortive meds given by Neurology with improvement  · Verapamil started for prevention    Restlessness  Assessment & Plan  · Improved  · Continue trazadone and Seroquel     Hypertension  Assessment & Plan  · Continue Verapamil  · BP stable    Depression  Assessment & Plan  · Continue Cymbalta    Prediabetes  Assessment & Plan  Lab Results   Component Value Date    HGBA1C 6 4 (H) 02/05/2023       No results for input(s): POCGLU in the last 72 hours  Blood Sugar Average: Last 72 hrs:     Patient with pre diabetes  Recommend aggressive lifestyle modification/weight loss with close outpatient follow up  If lifestyle changes not effective, could consider Metformin  D/W patient  VTE Pharmacologic Prophylaxis:   Moderate Risk (Score 3-4) - Pharmacological DVT Prophylaxis Ordered: enoxaparin (Lovenox)      Patient Centered Rounds: I performed bedside rounds with nursing staff today  Education and Discussions with Family / Patient: Updated  ( and son) at bedside  Time Spent for Care: 30 minutes  More than 50% of total time spent on counseling and coordination of care as described above  Current Length of Stay: 2 day(s)  Current Patient Status: Inpatient   Discharge Plan: [facility] is following this patient on consult  They are medically stable for discharge when deemed appropriate by primary service  Will sign off  Please call with concerns or questions  Code Status: Level 1 - Full Code    Subjective:   Feels pretty good today  Denies any chest pain  C/O right hand being swollen    Objective:     Vitals:   Temp (24hrs), Av 9 °F (36 6 °C), Min:97 5 °F (36 4 °C), Max:98 2 °F (36 8 °C)    Temp:  [97 5 °F (36 4 °C)-98 2 °F (36 8 °C)] 98 °F (36 7 °C)  HR:  [92-98] 92  Resp:  [11-12] 11  BP: (138-163)/() 163/111  SpO2:  [93 %-95 %] 95 %  Body mass index is 37 5 kg/m²  Input and Output Summary (last 24 hours):   No intake or output data in the 24 hours ending 23 1527    Physical Exam:   Physical Exam  Vitals and nursing note reviewed  Constitutional:       General: She is not in acute distress  Appearance: She is well-developed  HENT:      Head: Normocephalic and atraumatic  Eyes:      Conjunctiva/sclera: Conjunctivae normal    Cardiovascular:      Rate and Rhythm: Normal rate and regular rhythm  Heart sounds: No murmur heard  Pulmonary:      Effort: Pulmonary effort is normal  No respiratory distress  Breath sounds: Normal breath sounds  Abdominal:      Palpations: Abdomen is soft  Tenderness: There is no abdominal tenderness  Musculoskeletal:         General: Swelling (right hand) present  Cervical back: Neck supple  Skin:     General: Skin is warm and dry  Capillary Refill: Capillary refill takes less than 2 seconds  Neurological:      Mental Status: She is alert        Comments: Right sided facial droop  Right arm weakness   Psychiatric:         Mood and Affect: Mood normal           Additional Data:     Labs:  Results from last 7 days   Lab Units 23  0607 23  0639 23  0615   WBC Thousand/uL 8 39   < > 8 16   HEMOGLOBIN g/dL 13 3   < > 13 7   HEMATOCRIT % 42 2   < > 42 8   PLATELETS Thousands/uL 304   < > 319   NEUTROS PCT %  --   --  41*   LYMPHS PCT %  --   --  47*   MONOS PCT %  --   --  9   EOS PCT %  --   --  2    < > = values in this interval not displayed  Results from last 7 days   Lab Units 23  0607 23  0639 23  0615   SODIUM mmol/L 138   < > 140   POTASSIUM mmol/L 4 1   < > 3 8   CHLORIDE mmol/L 109*   < > 110*   CO2 mmol/L 25   < > 25   BUN mg/dL 20   < > 9   CREATININE mg/dL 0 84   < > 0 64   ANION GAP mmol/L 4   < > 5   CALCIUM mg/dL 8 9   < > 9 2   ALBUMIN g/dL  --   --  3 6   TOTAL BILIRUBIN mg/dL  --   --  0 35   ALK PHOS U/L  --   --  88   ALT U/L  --   --  27   AST U/L  --   --  20   GLUCOSE RANDOM mg/dL 122   < > 114    < > = values in this interval not displayed  Results from last 7 days   Lab Units 23  1314   INR  0 92         Results from last 7 days   Lab Units 23  0615   HEMOGLOBIN A1C % 6 4*           Lines/Drains:  Invasive Devices     Peripheral Intravenous Line  Duration           Peripheral IV 23 Right Antecubital 1 day                  Telemetry:  Telemetry Orders (From admission, onward)             48 Hour Telemetry Monitoring  Continuous x 48 hours           References:    Telemetry Guidelines   Question:  Reason for 48 Hour Telemetry  Answer:  Acute CVA (<24 hrs old, hemispheric strokes, selected brainstem strokes, cardiac arrhythmias)                 Telemetry Reviewed: Normal Sinus Rhythm  Indication for Continued Telemetry Use: No indication for continued use  Will discontinue  Imaging: No pertinent imaging reviewed      Recent Cultures (last 7 days):         Last 24 Hours Medication List:   Current Facility-Administered Medications   Medication Dose Route Frequency Provider Last Rate   • aspirin  81 mg Oral Daily [facility], DO     • atorvastatin  40 mg Oral QPM Baldemarjosephine Smith, DO     • clopidogrel  75 mg Oral Daily [facility], DO     • diphenhydrAMINE  25 mg Intravenous Q8H Baldemarjosephine Smith, DO     • DULoxetine  60 mg Oral Daily [facility], DO     • enoxaparin  40 mg Subcutaneous Daily Baldemarjosephine Smith, DO     • famotidine  20 mg Oral BID [facility], DO     • lidocaine  1 patch Topical Daily Baldemarjosephine Smith, DO     • metoclopramide  10 mg Intravenous Q8H [address], DO     • nicotine  21 mg Transdermal Daily Holly Grimes MD     • ondansetron  4 mg Intravenous Q6H PRN [facility] Susana Luis, DO     • QUEtiapine  25 mg Oral HS PRN [facility] Luis, DO     • traZODone  50 mg Oral Once Holly Grimes MD     • traZODone  50 mg Oral HS Baldemarjosephine Smith, DO     • verapamil  120 mg Oral Daily [facility], DO          Today, Patient Was Seen By: Ernie Astorga PA-C    **Please Note: This note may have been constructed using a voice recognition system  **

## 2023-02-07 NOTE — PHYSICAL THERAPY NOTE
PHYSICAL THERAPY NOTE          Patient Name: Zuri Teixeira  [id number] Date: 2/7/2023 02/07/23 1448   PT Last Visit   PT Visit Date 02/07/23   Note Type   Note Type Treatment   Pain Assessment   Pain Assessment Tool 0-10   Pain Score No Pain   Restrictions/Precautions   Weight Bearing Precautions Per Order No   Other Precautions Chair Alarm; Bed Alarm; Fall Risk  (R hemiparesis)   General   Chart Reviewed Yes   Response to Previous Treatment Patient with no complaints from previous session  Family/Caregiver Present Yes  (son)   Cognition   Overall Cognitive Status Impaired   Arousal/Participation Cooperative   Attention Attends with cues to redirect   Orientation Level Oriented X4   Memory Within functional limits   Following Commands Follows one step commands without difficulty   Comments very pleasant to work with   Subjective   Subjective agreeable to participate   Bed Mobility   Supine to Sit Unable to assess   Sit to Supine Unable to assess   Additional Comments presented seated EOB with son upon entry and returned to chair with alarm upon conclusion   Transfers   Sit to Stand 4  Minimal assistance   Additional items Assist x 1; Increased time required;Verbal cues  (CGA)   Stand to Sit 4  Minimal assistance   Additional items Assist x 1; Increased time required;Verbal cues; Other  (CGA)   Stand pivot 4  Minimal assistance   Additional items Assist x 1; Increased time required;Verbal cues   Additional Comments c RW; x4  STS throughout: +5 consecutive STS with feet parallel and +5 consecutive STS with feet staggered to promote R WB   Ambulation/Elevation   Gait pattern Improper Weight shift;R Hemiparesis; Decreased foot clearance;R Knee Lacey;Decreased R stance; Short stride; Excessively slow   Gait Assistance 4  Minimal assist   Additional items Assist x 1;Verbal cues   Assistive Device Rolling walker   Distance 90'+95'+67'+67'  (seated vs standing rest break bw)   Stair Management Assistance 4  Minimal assist   Additional items Assist x 1;Verbal cues; Increased time required; Tactile cues   Stair Management Technique Two rails; Step to pattern; Foreward;Nonreciprocal   Number of Stairs 7   Ambulation/Elevation Additional Comments pt with LLE injury  able to negotiate stairs leading with RLE and descending with RLE first however required TC for R quad control   Balance   Static Sitting Fair +   Dynamic Sitting Fair   Static Standing Fair -   Dynamic Standing Poor +   Ambulatory Poor +   Endurance Deficit   Endurance Deficit Yes   Endurance Deficit Description R hemiparesis, fatigue   Activity Tolerance   Activity Tolerance Patient tolerated treatment well   Nurse Made Aware yes-cleared to mobilize   Exercises   Neuro re-ed at HR with b/l UE support and minAx1: lateral walking 12' L and R each  with L HR and R HHA (minAx1):fwd marching/bwd walking 12' each   Assessment   Prognosis Good   Problem List Decreased strength;Decreased endurance; Impaired balance;Decreased mobility; Decreased coordination   Assessment Pt agreeable to participate in PT session  Pt performed functional mobility and therex as outlined above  Demonstrates R quad weakness evident by R knee buckling with ambulation and WB as well as decreased RLE stance  Exercises to promote RLE WS and strengthening utilized throughout  With lateral walking, R knee buckling vs hyperextension noted and VC for neutral knee stability with improvement noted with repetition  Pt left seated in chair with chair alarm, call bell, phone, and all personal needs within reach  Pt will continue to benefit from skilled acute care PT to further address their functional mobility limitations  D/C recommendations remain rehab (PMR)     Barriers to Discharge Inaccessible home environment;Decreased caregiver support   Goals   Patient Goals to go to rehab   LTG Expiration Date 02/19/23   PT Treatment Day 3   Plan   Treatment/Interventions Functional transfer training;LE strengthening/ROM; Elevations; Therapeutic exercise; Endurance training;Patient/family training;Equipment eval/education; Bed mobility;Gait training;Spoke to nursing;Spoke to case management;OT   Progress Progressing toward goals   PT Frequency 3-5x/wk   Recommendation   PT Discharge Recommendation Post acute rehabilitation services  (PMR)   Equipment Recommended [address] [facility] Recommended Wheeled walker   AM-PAC Basic Mobility Inpatient   Turning in Flat Bed Without Bedrails 3   Lying on Back to Sitting on Edge of Flat Bed Without Bedrails 3   Moving Bed to Chair 3   Standing Up From Chair Using Arms 3   Walk in Room 3   Climb 3-5 Stairs With Railing 3   Basic Mobility Inpatient Raw Score 18   Basic Mobility Standardized Score 41 05   Highest Level Of Mobility   JH-HLM Goal 6: Walk 10 steps or more   JH-HLM Achieved 7: Walk 25 feet or more   Susie Gómez, PT, DPT

## 2023-02-07 NOTE — PLAN OF CARE
Problem: OCCUPATIONAL THERAPY ADULT  Goal: Performs self-care activities at highest level of function for planned discharge setting  See evaluation for individualized goals  Description: Treatment Interventions: ADL retraining, Functional transfer training, UE strengthening/ROM, Endurance training, Cognitive reorientation, Patient/family training, Equipment evaluation/education, Neuromuscular reeducation, Fine motor coordination activities, Compensatory technique education, Continued evaluation, Energy conservation, Activityengagement          See flowsheet documentation for full assessment, interventions and recommendations  Outcome: Progressing  Note: Limitation: Decreased ADL status, Decreased UE ROM, Decreased UE strength, Decreased Safe judgement during ADL, Decreased endurance, Decreased fine motor control, Decreased self-care trans  Prognosis: Fair  Assessment: Pt was greeted for OT session while seated EOB on 2/7/2023  Treatment session to address RUE weakness, and neuromuscular reeducation, ADL retraining, functional transfer training, endurance training, and fine motor coordination  Patient participated while seated EOB in ADL eating activity with Xiao with built up handle, hair brushing with supervision/setup, functional transfers sit<>stand with Xiao, stand pivot bed>chair with Xiao  She did not use any DME for transfers  Patient limited by slow processing, RUE weakness, and speech dysarthria  Pt participated in education packets focusing on fine motor, gross motor, and strengthening handouts to encourage RUE usage  Pt would benefit from continued skilled OT services as she is functioning below baseline  Pt to be discharged to inpatient rehab   The patient's raw score on the -PAC Daily Activity Inpatient Short Form is 16  A raw score of less than 19 suggests the patient may benefit from discharge to post-acute rehabilitation services   Please refer to the recommendation of the Occupational Therapist for safe discharge planning  Pt would benefit from continued OT plan of services for 10-14 days 3-5x/wk to address functional goals       OT Discharge Recommendation: Post acute rehabilitation services

## 2023-02-07 NOTE — PLAN OF CARE
Problem: PAIN - ADULT  Goal: Verbalizes/displays adequate comfort level or baseline comfort level  Description: Interventions:  - Encourage patient to monitor pain and request assistance  - Assess pain using appropriate pain scale  - Administer analgesics based on type and severity of pain and evaluate response  - Implement non-pharmacological measures as appropriate and evaluate response  - Consider cultural and social influences on pain and pain management  - Notify physician/advanced practitioner if interventions unsuccessful or patient reports new pain  Outcome: Progressing     Problem: INFECTION - ADULT  Goal: Absence or prevention of progression during hospitalization  Description: INTERVENTIONS:  - Assess and monitor for signs and symptoms of infection  - Monitor lab/diagnostic results  - Monitor all insertion sites, i e  indwelling lines, tubes, and drains  - Monitor endotracheal if appropriate and nasal secretions for changes in amount and color  - [place] appropriate cooling/warming therapies per order  - Administer medications as ordered  - Instruct and encourage patient and family to use good hand hygiene technique  - Identify and instruct in appropriate isolation precautions for identified infection/condition  Outcome: Progressing  Goal: Absence of fever/infection during neutropenic period  Description: INTERVENTIONS:  - Monitor WBC    Outcome: Progressing

## 2023-02-07 NOTE — PROGRESS NOTES
[facility] NEUROLOGY RESIDENCY PROGRESS NOTE     Name: Hesham Guerrier   Age & Sex: 62 y o  female   MRN: [id number]  Unit/Bed#: [facility] 708-01   Encounter: [id number]    Hesham Guerrier will need follow up in 6-8 weeks  with neurovascular attending/ap/resident (myself Holly Suero)  She will not require outpatient neurological testing  ASSESSMENT & PLAN     * Stroke [facility])  Assessment & Plan  61 yo F w/ a PMH of Current smoker (1ppd, 25+ years), past bunion surgery, pre-diabetic, vertigo, hypertension coming in as a stroke alert on 2/4/2023  1:06 PM with initial NIHSS of 5 and LKW 8:30am, initial Blood Pressure: 143/99  Initial presenting deficits were slurred speech, RUE and RLE weakness, and some sensory neglect  Patient had went up around 8:30 AM in the morning that was known at that time  She then stated she took a nap and then woke up around 1130am and then talk to her daughter who noticed that she was slurring her speech  Patient also had appreciated that she had a left facial droop during this time  patient also noted that she had a episode of bowel incontinence prior to this  As a result of patient outside window of TNK and most symptoms resolving pt was determined to not be a candidate for thrombolysis (TNK)  Initial Exam on 02/04/23: R facial droop, slurred speech, 5/5 strength in all extremities although in LE had some pain  Found to have a stroke in L PLIC/thalamus on MRI  • Vascular risk factors: active smoker, prediabetic, age, hypertension  • Home meds: cymbalta     Workup:  Lab Results   Component Value Date    HGBA1C 6 4 (H) 02/05/2023    CHOLESTEROL 240 (H) 02/05/2023    LDLCALC 171 (H) 02/05/2023    TRIG 132 02/05/2023    INR 0 92 02/04/2023      • CTH: negative for any bleeds or recent stroke  • CTA: negative for LVOs, aneurysms, dissection or AVMs  • MRI w/o contrast: Left posterior limb internal capsule/thalamic acute to subacute ischemia    • MRI w/ contrast showed: No abnormal enhancement, no acute changes for MRI w/o contrast  • Echocardiogram: left ventricular ejection fraction is 60%  Systolic function is normal  Wall motion is normal  Diastolic function is normal   Left atrium normal in size   • CT CAP w/ contrast:  No CT evidence for malignancy in the chest, abdomen and pelvis  Pertinent scores:  - NIHSS: 5  Stroke Modified Braham Score: 2 (Unable to carry out all previous activities, but able to look after own affairs without assistance)    Impression: stroke appreciated in L internal capsule and thalamus likely secondary to hypertension and other vascular risk factors but cannot rule out other factors such as a hypercoagulable state given that patient is young and has been a longtime smoker      Plan:  - Stroke pathway  - Discussed plan with neurology attending, Dr Mindy Velásquez  - BP: Allow for normotension and continue verapamil 121 mg daily  - atorvastatin 40mg qhs  - Maintain glucose <180, SSI for coverage if indicated  - Medical management as per primary team appreciated  - Antiplatelet agents: DAPT w/ ASA 81mg and plavix 75mg for 21 days and then monotherapy (02/26/2023) w/ ASA 81mg  - Ordered CT chest abdomen pelvis with and without contrast that was negative for any malignancy  - d-dimer negative and f/u thrombosis labs  - DVT ppx and SCDs  - Monitor on telemetry  - PT/OT/Speech recommended rehab w/ [facility]C pending placement  - Speech recommends soft/level 3 diet and thin liquids   - Stroke education      Depression  Assessment & Plan  Continue home cymbalta    Prediabetes  Assessment & Plan  SLIM recommended lifestyle changes at this time, metformin if unable to make changes w/ PCP  Consulted nutrition for diabetic diet and diet to help reduce cholesterol      Hypertension  Assessment & Plan  Verapamil 120 mg daily    Restlessness  Assessment & Plan  Restarted home trazodone and seroquel (was initially not ordered as patient was not regularly taking and taking prn)  Trazodone scheduled nightly and Seroquel as needed    Headache  Assessment & Plan  Patient had noted a headache over the past few months that has been intermittent but over the past 1-1/2 months has been progressively getting worse  She states that is all over her head and has been on and off getting worse every day  She has been taking Tylenol every day for it and it was explained that she likely has some component of medication overuse headache  MRI brain w/ contrast negative for any enhancement     Plan  Given migraine cocktail: Phenergan, Toradol, Benadryl for just today as last day given resolution of headache    -Avoided steroids given patient's restlessness/anxiety  Given additional Pepcid for noted Toradol  Verapamil was also added 120 mg daily as prevention              SUBJECTIVE     Patient was seen and examined  Headache is improved and resolved  Only complained about her RUE and RLE that has been weaker fom her and explained that her stroke likely is expected to have those symptoms       She denies any fevers/chill, shortness of breath, abdominal pain, nausea, vomiting, diarrhea, problems urinating, problems  With bowel movements, and is eating/drinking without problem      She denies any syncope, paresthesias, diplopia, visual changes, tinnitus,   Loss of bowel or bladder        Review of Systems    See above    OBJECTIVE     Patient ID: Hesham Guerrier is a 62 y o  female  Vitals:    23 0618 23 1145 23 2236 23 0752   BP: 122/99 152/92 151/93 138/91   Pulse: 96 89 98 98   Resp:  18     Temp:  98 2 °F (36 8 °C) 98 2 °F (36 8 °C) 97 5 °F (36 4 °C)   TempSrc:       SpO2: 95% 95% 94% 94%   Weight:       Height:          Temperature:   Temp (24hrs), Av 9 °F (36 6 °C), Min:97 5 °F (36 4 °C), Max:98 2 °F (36 8 °C)    Temperature: 97 5 °F (36 4 °C)      Physical Exam  Eyes:      Extraocular Movements: EOM normal       Pupils: Pupils are equal, round, and reactive to light  Neurological:      Mental Status: She is oriented to person, place, and time  Motor: Motor strength is normal       Coordination: Finger-Nose-Finger Test abnormal  Heel to Shin Test normal       Deep Tendon Reflexes:      Reflex Scores:       Tricep reflexes are 2+ on the right side and 2+ on the left side  Bicep reflexes are 2+ on the right side and 2+ on the left side  Brachioradialis reflexes are 2+ on the right side and 2+ on the left side  Patellar reflexes are 2+ on the right side and 2+ on the left side  Achilles reflexes are 2+ on the right side and 2+ on the left side  Psychiatric:         Speech: Speech is slurred  Neurologic Exam     Mental Status   Oriented to person, place, and time  Speech: slurred   Able to name object  Able to repeat  Normal comprehension  Cranial Nerves     CN II   Visual fields full to confrontation  CN III, IV, VI   Pupils are equal, round, and reactive to light  Extraocular motions are normal    CN III: no CN III palsy  CN VI: no CN VI palsy  Nystagmus: none     CN V   Facial sensation intact  CN VII   Facial expression full, symmetric  CN IX, X   CN IX normal    CN X normal      CN XI   CN XI normal      CN XII   Tongue deviation: left    R facial droop appreciated and not moving/more flattened nasolabial fold compared to L  L tongue deviation     Motor Exam   Right arm pronator drift: absent  Left arm pronator drift: absent    Strength   Strength 5/5 throughout       LUE (deltoids, biceps/triceps, wrist extensors, finger ) seems weaker at 4/5       Sensory Exam   Right arm light touch: decreased from wrist  Vibration normal        Cold sensation intact in all 4 extremities     Gait, Coordination, and Reflexes     Coordination   Finger to nose coordination: abnormal  Heel to shin coordination: normal    Reflexes   Right brachioradialis: 2+  Left brachioradialis: 2+  Right biceps: 2+  Left biceps: 2+  Right triceps: 2+  Left triceps: 2+  Right patellar: 2+  Left patellar: 2+  Right achilles: 2+  Left achilles: 2+  Right plantar: normal  Left plantar: normal    L FNT ataxic and slower    Gait deferred             LABORATORY DATA     Labs: I have personally reviewed pertinent reports  and I have personally reviewed pertinent films in PACS  Results from last 7 days   Lab Units 02/07/23  0607 02/06/23  0639 02/05/23  0615   WBC Thousand/uL 8 39 8 65 8 16   HEMOGLOBIN g/dL 13 3 14 4 13 7   HEMATOCRIT % 42 2 44 2 42 8   PLATELETS Thousands/uL 304 340 319   NEUTROS PCT %  --   --  41*   MONOS PCT %  --   --  9      Results from last 7 days   Lab Units 02/07/23  0607 02/06/23  0639 02/05/23  0615   SODIUM mmol/L 138 140 140   POTASSIUM mmol/L 4 1 3 6 3 8   CHLORIDE mmol/L 109* 110* 110*   CO2 mmol/L 25 24 25   BUN mg/dL 20 13 9   CREATININE mg/dL 0 84 0 70 0 64   CALCIUM mg/dL 8 9 9 2 9 2   ALK PHOS U/L  --   --  88   ALT U/L  --   --  27   AST U/L  --   --  20              Results from last 7 days   Lab Units 02/04/23  1314   INR  0 92   PTT seconds 29               IMAGING & DIAGNOSTIC TESTING     Radiology Results: I have personally reviewed pertinent reports  and I have personally reviewed pertinent films in PACS    CT chest abdomen pelvis w contrast   Final Result by Butch Tucker MD (02/06 2356)      No CT evidence for malignancy in the chest, abdomen and pelvis  Workstation performed: [id number]         MRI brain w contrast   Final Result by Richi Ho MD (02/06 7929)      No abnormal enhancement  No acute change compared to yesterday  Workstation performed: [id number]         MRI brain wo contrast   Final Result by Sukhdev Burgos MD (02/04 2756)      Left posterior limb internal capsule/thalamic acute to subacute ischemia  Findings were marked as "immediate"in Epic and will now be related to the ordering physician or covering clinical team by the radiology liaison           Workstation performed: [id number]         CT stroke alert brain   Final Result by Mejia Montero DO (02/04 1342)      No acute intracranial abnormality  Findings were directly discussed with Estella Adkins at 1:40 PM       Workstation performed: [id number]         CTA stroke alert (head/neck)   Final Result by Mejia Montero DO (02/04 1348)      There are some anatomic variations of the intracranial circulation as described above with no stenosis, occlusion or thrombosis of the cervical or intracranial vasculature  Findings were directly discussed with Estella Adkins at approximately 1:40 PM                            Workstation performed: [id number]             Other Diagnostic Testing: I have personally reviewed pertinent reports     and I have personally reviewed pertinent films in PACS    ACTIVE MEDICATIONS     Current Facility-Administered Medications   Medication Dose Route Frequency   • aspirin chewable tablet 81 mg  81 mg Oral Daily   • atorvastatin (LIPITOR) tablet 40 mg  40 mg Oral QPM   • clopidogrel (PLAVIX) tablet 75 mg  75 mg Oral Daily   • diphenhydrAMINE (BENADRYL) injection 25 mg  25 mg Intravenous Q8H   • DULoxetine (CYMBALTA) delayed release capsule 60 mg  60 mg Oral Daily   • enoxaparin (LOVENOX) subcutaneous injection 40 mg  40 mg Subcutaneous Daily   • famotidine (PEPCID) tablet 20 mg  20 mg Oral BID   • lidocaine (LIDODERM) 5 % patch 1 patch  1 patch Topical Daily   • metoclopramide (REGLAN) injection 10 mg  10 mg Intravenous Q8H [address]   • nicotine (NICODERM CQ) 21 mg/24 hr TD 24 hr patch 21 mg  21 mg Transdermal Daily   • ondansetron (ZOFRAN) injection 4 mg  4 mg Intravenous Q6H PRN   • QUEtiapine (SEROquel) tablet 25 mg  25 mg Oral HS PRN   • traZODone (DESYREL) tablet 50 mg  50 mg Oral Once   • traZODone (DESYREL) tablet 50 mg  50 mg Oral HS   • verapamil (CALAN-SR) CR tablet 120 mg  120 mg Oral Daily       Prior to Admission medications    Medication Sig Start Date End Date Taking?  Authorizing Provider   atorvastatin (LIPITOR) 20 mg tablet Take 20 mg by mouth every evening   10/23/20  Yes Historical Provider, MD   DULoxetine (CYMBALTA) 60 mg delayed release capsule TAKE 2 CAPSULES (120 MG TOTAL) B MOUTH DAILY 12/13/22  Yes Historical Provider, MD   meclizine (ANTIVERT) 25 mg tablet 1 every 8 hours as needed 8/30/22  Yes Historical Provider, MD   naproxen (NAPROSYN) 500 mg tablet Take 500 mg by mouth 2 (two) times a day as needed Take with food 1/2/23  Yes Historical Provider, MD   QUEtiapine (SEROquel) 25 mg tablet Take 25 mg by mouth every evening 6/30/22  Yes Historical Provider, MD   acetaminophen (TYLENOL) 650 mg CR tablet Take 650 mg by mouth every 8 (eight) hours as needed    Historical Provider, MD   aspirin (ECOTRIN) 325 mg EC tablet Take 1 tablet (325 mg total) by mouth daily  Patient not taking: Reported on 2/4/2023 11/12/21   Sameera Ricketts DPM   cyclobenzaprine (FLEXERIL) 10 mg tablet take 1 tablet by mouth three times a day for muscle spasm  Patient not taking: Reported on 2/4/2023 1/2/23   Historical Provider, MD   Diclofenac Sodium (VOLTAREN) 1 % Apply 2 g topically 4 (four) times a day  Patient not taking: Reported on 2/4/2023 7/14/22   Bari Freeman DPM   DULoxetine (CYMBALTA) 30 mg delayed release capsule Take 30 mg by mouth daily  Patient not taking: Reported on 2/4/2023 7/19/22   Historical Provider, MD   gabapentin (NEURONTIN) 300 mg capsule Take 1 capsule (300 mg total) by mouth 2 (two) times a day 3/31/22 5/30/22  Bari Freeman DPM   ibuprofen (MOTRIN) 600 mg tablet Take 1 tablet (600 mg total) by mouth every 6 (six) hours as needed for mild pain for up to 30 days 7/19/19 11/8/21  Zuri Frost DPM   lidocaine-prilocaine (EMLA) cream Apply topically as needed for mild pain or moderate pain  Patient not taking: Reported on 2/4/2023 7/14/22   Kathlean Freeman, DPM   nicotine (NICODERM CQ) 14 mg/24hr TD 24 hr patch Place 1 patch on the skin every 24 hours  Patient not taking: Reported on 2/4/2023    Historical Provider, MD   oxyCODONE-acetaminophen (Percocet) 5-325 mg per tablet Take 1 tablet by mouth every 6 (six) hours as needed for severe pain for up to 30 doses Max Daily Amount: 4 tablets  Patient not taking: Reported on 2/15/2022 11/12/21   Gilmar Thomas DPM   oxyCODONE-acetaminophen (Percocet) 5-325 mg per tablet Take 1 tablet by mouth every 4 (four) hours as needed for moderate pain for up to 15 doses Max Daily Amount: 15 tablets  Patient not taking: Reported on 2/4/2023 1/6/23   Jonathan Washington MD   traZODone (DESYREL) 50 mg tablet take 1 tablet by mouth nightly - TAKE AT BEDTIME  Patient not taking: Reported on 2/4/2023 2/28/22   Historical Provider, MD         VTE Pharmacologic Prophylaxis: Enoxaparin (Lovenox)  VTE Mechanical Prophylaxis: sequential compression device    ==  Baldemar Lion 28  Neurology Residency, PGY-2

## 2023-02-07 NOTE — SPEECH THERAPY NOTE
Speech Language/Pathology    Speech/Language Pathology Progress Note    Patient Name: Kailey Umanzor  IIPYQ'A Date: 2/7/2023     Problem List  Principal Problem:    Stroke [facility])  Active Problems:    Headache    Depression    Restlessness    Hypertension    Other chest pain    Prediabetes       Past Medical History  Past Medical History:   Diagnosis Date   • Acquired deformity of left foot    • Anesthesia complication     difficulty awakening   • Arthritis    • Deaf, left    • Depression    • Hallux valgus of left foot    • Hammer toe of left foot    • Headache    • Kidney stone    • Sciatic pain, right     intermittent        Past Surgical History  Past Surgical History:   Procedure Laterality Date   • BREAST BIOPSY Right 03/04/2021   • BREAST BIOPSY Right 3/10/2021    Procedure: Excisional Breast debridement  7 o'clock position;  Surgeon: John Remy MD;  [facility];  Service: General   • CHOLECYSTECTOMY     • CLOSURE DELAYED PRIMARY Right 7/5/2020    Procedure: CLOSURE DELAYED PRIMARY and application of skin graft substitute;  Surgeon: Cristine Cage DPM;  [facility];  Service: Podiatry   • HERNIA REPAIR     • INCISION AND DRAINAGE OF WOUND Right 7/1/2020    Procedure: INCISION AND DRAINAGE (I&D) EXTREMITY;  Surgeon: Cristine Cage DPM;  [facility];  Service: Podiatry   • NE CORRJ HALLUX VALGUS W/SESMDC W/1METAR MEDIAL CNF Left 11/12/2021    Procedure: Leonardo Corbett;  Surgeon: Cristine Cage DPM;  [facility];  Service: Podiatry   • NE OSTEOT W/ [address] SHRT/CORRJ METAR XCP 1ST EA Left 6/14/2019    Procedure: 3rd Metatarsal Osteotomy;  Surgeon: Brisa Dumont DPM;  [facility];  Service: Podiatry   • NE OSTEOT W/ [address] SHRT/CORRJ METAR XCP 1ST EA Left 11/12/2021    Procedure: OSTEOTOMY 2ND METATARSAL;  Surgeon: Cristine Cage DPM;  [facility];  Service: Podiatry   • TUBAL LIGATION     • US GUIDED BREAST BIOPSY RIGHT COMPLETE Right 3/4/2021   • VAC DRESSING APPLICATION Right 2/2/7058    Procedure: APPLICATION VAC DRESSING;  Surgeon: Se Brady DPM;  [facility];  Service: Podiatry         Subjective:  Pt awake and alert, lunch tray at bedside  "I'm kind of sick of the soft food "    Current Diet:  Dysphagia 3 w/ thin     Objective:  Pt seen for dx dysphagia tx w/ to assess diet tolerance/potential upgrades  Pt independent for feeding  Labial seal is fair for all  Inconsistent anterior spill to R side though pt aware and attempts to prevent  Mastication of level 3 material min prolonged, ultimately functional  No oral residue or pocketing  Pt takes cup sips of thin liquid throughout meal without difficulty  Swallows suspected timely w/ adequate hyo-laryngeal elevation to palpation  No overt s/s aspiration across trials  Trials of hard/regular texture solid pretzels given  Bite strength is adequate  Mastication again min prolonged but functional  No oral residue  At this time, pt requesting upgrade to regular w/ awareness of choosing softer selections  Pt w/ good understanding of strategies to optimize swallow safety and s/s aspiration to notify medical team of should they arise  Assessment:  Min oral dysphagia 2/2 R sided oral weakness and prolonged mastication  Pt appropriate for upgrade to regular textures, continuation of thin liquids       Plan/Recommendations:  Regular w/ thin, choose soft as needed   Frequent/thorough oral care  ST to f/u as able and appropriate

## 2023-02-07 NOTE — ARC ADMISSION
Reviewed updates with [facility] physician - patient remains acute rehab appropriate  Notified by CM that patient is medically stable for discharge  Insurance authorization has been initiated, with pending ref #: [id number], and we await their determination at this time  CM has been updated  Will continue to follow

## 2023-02-07 NOTE — PLAN OF CARE
Problem: PHYSICAL THERAPY ADULT  Goal: Performs mobility at highest level of function for planned discharge setting  See evaluation for individualized goals  Description: Treatment/Interventions: Functional transfer training, LE strengthening/ROM, Elevations, Patient/family training, Equipment eval/education, Gait training, OT, Spoke to nursing  Equipment Recommended:  (will cont to assess)       See flowsheet documentation for full assessment, interventions and recommendations  Outcome: Progressing  Note: Prognosis: Good  Problem List: Decreased strength, Decreased endurance, Impaired balance, Decreased mobility, Decreased coordination  Assessment: Pt agreeable to participate in PT session  Pt performed functional mobility and therex as outlined above  Demonstrates R quad weakness evident by R knee buckling with ambulation and WB as well as decreased RLE stance  Exercises to promote RLE WS and strengthening utilized throughout  With lateral walking, R knee buckling vs hyperextension noted and VC for neutral knee stability with improvement noted with repetition  Pt left seated in chair with chair alarm, call bell, phone, and all personal needs within reach  Pt will continue to benefit from skilled acute care PT to further address their functional mobility limitations  D/C recommendations remain rehab (PMR)  Barriers to Discharge: Inaccessible home environment, Decreased caregiver support     PT Discharge Recommendation: Post acute rehabilitation services (PMR)    See flowsheet documentation for full assessment

## 2023-02-08 ENCOUNTER — APPOINTMENT (INPATIENT)
Dept: RADIOLOGY | Facility: HOSPITAL | Age: 58
End: 2023-02-08

## 2023-02-08 PROBLEM — R07.9 CHEST PAIN: Status: ACTIVE | Noted: 2023-02-06

## 2023-02-08 LAB
ANION GAP SERPL CALCULATED.3IONS-SCNC: 4 MMOL/L (ref 4–13)
BUN SERPL-MCNC: 16 MG/DL (ref 5–25)
CALCIUM SERPL-MCNC: 9.2 MG/DL (ref 8.3–10.1)
CARDIAC TROPONIN I PNL SERPL HS: 3 NG/L
CHLORIDE SERPL-SCNC: 108 MMOL/L (ref 96–108)
CO2 SERPL-SCNC: 27 MMOL/L (ref 21–32)
CREAT SERPL-MCNC: 0.81 MG/DL (ref 0.6–1.3)
ERYTHROCYTE [DISTWIDTH] IN BLOOD BY AUTOMATED COUNT: 14.7 % (ref 11.6–15.1)
GFR SERPL CREATININE-BSD FRML MDRD: 80 ML/MIN/1.73SQ M
GLUCOSE SERPL-MCNC: 112 MG/DL (ref 65–140)
HCT VFR BLD AUTO: 41.8 % (ref 34.8–46.1)
HGB BLD-MCNC: 13.4 G/DL (ref 11.5–15.4)
MCH RBC QN AUTO: 32.1 PG (ref 26.8–34.3)
MCHC RBC AUTO-ENTMCNC: 32.1 G/DL (ref 31.4–37.4)
MCV RBC AUTO: 100 FL (ref 82–98)
PLATELET # BLD AUTO: 322 THOUSANDS/UL (ref 149–390)
PMV BLD AUTO: 9.5 FL (ref 8.9–12.7)
POTASSIUM SERPL-SCNC: 4.1 MMOL/L (ref 3.5–5.3)
RBC # BLD AUTO: 4.18 MILLION/UL (ref 3.81–5.12)
SODIUM SERPL-SCNC: 139 MMOL/L (ref 135–147)
WBC # BLD AUTO: 7.33 THOUSAND/UL (ref 4.31–10.16)

## 2023-02-08 RX ORDER — FAMOTIDINE 20 MG/1
20 TABLET, FILM COATED ORAL 2 TIMES DAILY
Status: DISCONTINUED | OUTPATIENT
Start: 2023-02-08 | End: 2023-02-09 | Stop reason: HOSPADM

## 2023-02-08 RX ORDER — ALBUTEROL SULFATE 90 UG/1
2 AEROSOL, METERED RESPIRATORY (INHALATION) EVERY 4 HOURS PRN
Status: DISCONTINUED | OUTPATIENT
Start: 2023-02-08 | End: 2023-02-09 | Stop reason: HOSPADM

## 2023-02-08 RX ORDER — ACETAMINOPHEN 325 MG/1
650 TABLET ORAL EVERY 6 HOURS PRN
Status: DISCONTINUED | OUTPATIENT
Start: 2023-02-08 | End: 2023-02-09 | Stop reason: HOSPADM

## 2023-02-08 RX ADMIN — ENOXAPARIN SODIUM 40 MG: 40 INJECTION SUBCUTANEOUS at 10:45

## 2023-02-08 RX ADMIN — LIDOCAINE 5% 1 PATCH: 700 PATCH TOPICAL at 10:45

## 2023-02-08 RX ADMIN — FAMOTIDINE 20 MG: 20 TABLET, FILM COATED ORAL at 22:53

## 2023-02-08 RX ADMIN — ATORVASTATIN CALCIUM 40 MG: 40 TABLET, FILM COATED ORAL at 22:52

## 2023-02-08 RX ADMIN — FAMOTIDINE 20 MG: 20 TABLET, FILM COATED ORAL at 10:45

## 2023-02-08 RX ADMIN — DULOXETINE HYDROCHLORIDE 60 MG: 60 CAPSULE, DELAYED RELEASE ORAL at 10:45

## 2023-02-08 RX ADMIN — METOCLOPRAMIDE HYDROCHLORIDE 10 MG: 5 INJECTION INTRAMUSCULAR; INTRAVENOUS at 05:03

## 2023-02-08 RX ADMIN — ACETAMINOPHEN 650 MG: 325 TABLET ORAL at 11:23

## 2023-02-08 RX ADMIN — VERAPAMIL HYDROCHLORIDE 120 MG: 120 TABLET, FILM COATED, EXTENDED RELEASE ORAL at 10:47

## 2023-02-08 RX ADMIN — NICOTINE 21 MG: 21 PATCH, EXTENDED RELEASE TRANSDERMAL at 10:45

## 2023-02-08 RX ADMIN — TRAZODONE HYDROCHLORIDE 50 MG: 50 TABLET ORAL at 22:53

## 2023-02-08 RX ADMIN — CLOPIDOGREL BISULFATE 75 MG: 75 TABLET ORAL at 10:45

## 2023-02-08 RX ADMIN — ASPIRIN 81 MG: 81 TABLET, CHEWABLE ORAL at 10:45

## 2023-02-08 NOTE — ARC ADMISSION
Insurance authorization request for inpatient acute rehab remains pending at this time  Will continue to follow patient's case and update as able  Received phone call from [facility] at [facility] - patient is approved for [facility] admission pending bed availability  Anticipated bed available at [facility] tomorrow, 2/8  Patient will require COVID testing  CM has been updated  Will touch base tomorrow morning

## 2023-02-08 NOTE — ASSESSMENT & PLAN NOTE
Assessment; Neurology was alerted by nursing staff that the patient has been having intermittent chest pain  The patient did not want to tell anyone because she thought that the chest pain might self resolve  When interviewing the patient she reported that these episodes of chest pain started occurring this afternoon with first episode occurring at 1330, the second episode occurring at 1445, and the third episode occurring at 80  The patient reports that the pain is characterized as a tight and heavy feeling in the center of the chest, with associated SOB  The patient reports that she does not have any other associated symptoms  Patient reports that each episode lasted around 10 minutes with complete resolution      Plan:  - trops, CXR, and EKG were all WNL  - SLIM evaluated and concerned for possible reactive airway disease patient has chronically and ordered prn inhaler  - card evaluated and recommend outpt f/u given unconcerning lab markers   - Started losartan 50 mg daily and HCTZ 12 5 mg daily optimize BP  - continue pepcid at this time

## 2023-02-08 NOTE — QUICK NOTE
I was alerted by nursing staff that the patient has been having intermittent chest pain  The patient did not want to tell anyone because she thought that the chest pain might self resolve  When interviewing the patient she reported that these episodes of chest pain started occurring this afternoon with first episode occurring at 1330, the second episode occurring at 1445, and the third episode occurring at 80  The patient reports that the pain is characterized as a tight and heavy feeling in the center of the chest, with associated SOB  The patient reports that she does not have any other associated symptoms  Patient reports that each episode lasted around 10 minutes with complete resolution  Plan:  - STAT 12-Lead EKG  - STAT hsTN reflex protocol  - CXR  - Cardiology consultation

## 2023-02-08 NOTE — CASE MANAGEMENT
Case Management Discharge Planning Note    Patient name Rosaura Homans  Location [facility] 708/[facility] 775-70 MRN [id number]  : 1965 Date 2023       Current Admission Date: 2023  Current Admission Diagnosis:Stroke [facility])   Patient Active Problem List    Diagnosis Date Noted   • Other chest pain 2023   • Prediabetes 2023   • Restlessness 2023   • Hypertension 2023   • Stroke ([address] ) 2023   • Depression 2023   • Obesity (BMI 35 0-39 9 without comorbidity) 03/10/2021   • Ex-smoker for less than 1 year 03/10/2021   • Headache 2020   • Preoperative clearance 2020   • Wound of right foot 2020   • Acquired hallux valgus of left foot 2019   • Other hammer toe(s) (acquired), left foot 2019   • Other acquired deformities of left foot 2019      LOS (days): 3  Geometric Mean LOS (GMLOS) (days):   Days to GMLOS:     OBJECTIVE:  Risk of Unplanned Readmission Score: 8 84         Current admission status: Inpatient   Preferred Pharmacy:   [address] [place] [id number]  Phone: [phone] Fax: [phone]    Primary Care Provider: Homa Schultz MD    Primary Insurance: [address] [place] [address]  Secondary Insurance:     DISCHARGE DETAILS:           Additional Comments: CM conferred with neurology teaching service and is aware the pt is medically stable for readmission to [facility]  Outreach to [facility] admissions occured and CM is aware [facility] authorization request was initiated on this date  Admission to [facility] is anticipated for   CM will continue to follow

## 2023-02-08 NOTE — PROGRESS NOTES
[facility]  NEUROLOGY RESIDENCY PROGRESS NOTE     Name: Oren Wolf   Age & Sex: 62 y o  female   MRN: [id number]  Unit/Bed#: [facility] 708-01   Encounter: [id number]    Oren Wolf will need follow up in 6-8 weeks  with neurovascular attending/ap/resident (myself Wilfrid Rivera)  She will not require outpatient neurological testing  ASSESSMENT & PLAN     * Stroke [facility])  Assessment & Plan  63 yo F w/ a PMH of Current smoker (1ppd, 25+ years), past bunion surgery, pre-diabetic, vertigo, hypertension coming in as a stroke alert on 2/4/2023  1:06 PM with initial NIHSS of 5 and LKW 8:30am, initial Blood Pressure: 143/99  Initial presenting deficits were slurred speech, RUE and RLE weakness, and some sensory neglect  Patient had went up around 8:30 AM in the morning that was known at that time  She then stated she took a nap and then woke up around 1130am and then talk to her daughter who noticed that she was slurring her speech  Patient also had appreciated that she had a left facial droop during this time  patient also noted that she had a episode of bowel incontinence prior to this  As a result of patient outside window of TNK and most symptoms resolving pt was determined to not be a candidate for thrombolysis (TNK)  Initial Exam on 02/04/23: R facial droop, slurred speech, 5/5 strength in all extremities although in LE had some pain  Found to have a stroke in L PLIC/thalamus on MRI  • Vascular risk factors: active smoker, prediabetic, age, hypertension  • Home meds: cymbalta     Workup:  Lab Results   Component Value Date    HGBA1C 6 4 (H) 02/05/2023    CHOLESTEROL 240 (H) 02/05/2023    LDLCALC 171 (H) 02/05/2023    TRIG 132 02/05/2023    INR 0 92 02/04/2023      • CTH: negative for any bleeds or recent stroke  • CTA: negative for LVOs, aneurysms, dissection or AVMs  • MRI w/o contrast: Left posterior limb internal capsule/thalamic acute to subacute ischemia    • MRI w/ contrast showed: No abnormal enhancement, no acute changes for MRI w/o contrast  • Echocardiogram: left ventricular ejection fraction is 60%  Systolic function is normal  Wall motion is normal  Diastolic function is normal   Left atrium normal in size   • CT CAP w/ contrast:  No CT evidence for malignancy in the chest, abdomen and pelvis  Thrombosis panel:Anticardiolipin antibody negative, protein S antigen free slightly elevated, beta-2 paraproteins negative, protein S activity elevated, Antithrombin III activity 100 within normal limits,    Pertinent scores:  - NIHSS: 5  Stroke Modified [place] Score: 2 (Unable to carry out all previous activities, but able to look after own affairs without assistance)    Impression: stroke appreciated in L internal capsule and thalamus likely secondary to hypertension and other vascular risk factors but cannot rule out other factors such as a hypercoagulable state given that patient is young and has been a longtime smoker      Plan:  - Stroke pathway  - Discussed plan with neurology attending, Dr Ken Riddle  - BP: Allow for normotension and continue verapamil 121 mg daily  - atorvastatin 40mg qhs  - Maintain glucose <180, SSI for coverage if indicated  - Medical management as per primary team appreciated  - Antiplatelet agents: DAPT w/ ASA 81mg and plavix 75mg for 21 days and then monotherapy (02/26/2023) w/ ASA 81mg  - Ordered CT chest abdomen pelvis with and without contrast that was negative for any malignancy  - d-dimer negative and f/u thrombosis labs  - DVT ppx and SCDs  - Monitor on telemetry  - PT/OT/Speech recommended rehab w/ [facility] pending placement  - Speech recommends regular house  - Stroke education      Depression  Assessment & Plan  Continue home cymbalta    Prediabetes  Assessment & Plan  SLIM recommended lifestyle changes at this time, metformin if unable to make changes w/ PCP  Consulted nutrition for diabetic diet and diet to help reduce cholesterol      Hypertension  Assessment & Plan  Verapamil 120 mg daily    Restlessness  Assessment & Plan  Restarted home trazodone and seroquel (was initially not ordered as patient was not regularly taking and taking prn)  Trazodone scheduled nightly and Seroquel as needed    Headache  Assessment & Plan  Patient had noted a headache over the past few months that has been intermittent but over the past 1-1/2 months has been progressively getting worse  She states that is all over her head and has been on and off getting worse every day  She has been taking Tylenol every day for it and it was explained that she likely has some component of medication overuse headache  MRI brain w/ contrast negative for any enhancement     Plan  s/p migraine cocktail: Phenergan, Toradol, Benadryl for 2 days   -Avoided steroids given patient's restlessness/anxiety  Continue Verapamil 120 mg daily as prevention              SUBJECTIVE     Patient was seen and examined  Headache is resolved  No complaints at this time and states that her right upper and right lower extremity are still having weakness which is expected for her  She did try to walk on her own with a walker without her son helping her going to the bathroom and was successful  She denies any fevers/chill, shortness of breath, abdominal pain, nausea, vomiting, diarrhea, problems urinating, problems  With bowel movements, and is eating/drinking without problem      She denies any syncope, paresthesias, diplopia, visual changes, tinnitus,   Loss of bowel or bladder        Review of Systems    See above    OBJECTIVE     Patient ID: Rodger Das is a 62 y o  female      Vitals:    23 1514 23 2100 23 2102 23 0807   BP: (!) 163/111  154/99 139/91   Pulse: 92  103 88   Resp: (!) 11  18 16   Temp: 98 °F (36 7 °C)   98 7 °F (37 1 °C)   TempSrc:       SpO2: 95% 95% 96% 100%   Weight:       Height:          Temperature:   Temp (24hrs), Av 2 °F (36 8 °C), Min:98 °F (36 7 °C), Max:98 7 °F (37 1 °C)    Temperature: 98 7 °F (37 1 °C)      Physical Exam  Eyes:      Extraocular Movements: EOM normal       Pupils: Pupils are equal, round, and reactive to light  Neurological:      Mental Status: She is oriented to person, place, and time  Motor: Motor strength is normal       Coordination: Finger-Nose-Finger Test abnormal  Heel to Shin Test normal       Deep Tendon Reflexes:      Reflex Scores:       Tricep reflexes are 2+ on the right side and 2+ on the left side  Bicep reflexes are 2+ on the right side and 2+ on the left side  Brachioradialis reflexes are 2+ on the right side and 2+ on the left side  Patellar reflexes are 2+ on the right side and 2+ on the left side  Achilles reflexes are 2+ on the right side and 2+ on the left side  Psychiatric:         Speech: Speech is slurred  Neurologic Exam     Mental Status   Oriented to person, place, and time  Speech: slurred   Able to name object  Able to repeat  Normal comprehension  Cranial Nerves     CN II   Visual fields full to confrontation  CN III, IV, VI   Pupils are equal, round, and reactive to light  Extraocular motions are normal    CN III: no CN III palsy  CN VI: no CN VI palsy  Nystagmus: none     CN V   Facial sensation intact  CN VII   Facial expression full, symmetric  CN IX, X   CN IX normal    CN X normal      CN XI   CN XI normal      CN XII   Tongue deviation: left    R facial droop appreciated and not moving/more flattened nasolabial fold compared to L  L tongue deviation     Motor Exam   Right arm pronator drift: absent  Left arm pronator drift: absent    Strength   Strength 5/5 throughout       LUE (deltoids, biceps/triceps, wrist extensors, finger ) seems weaker at 4/5       Sensory Exam   Right arm light touch: decreased from wrist  Vibration normal        Cold sensation intact in all 4 extremities     Gait, Coordination, and Reflexes     Coordination   Finger to nose coordination: abnormal  Heel to shin coordination: normal    Reflexes   Right brachioradialis: 2+  Left brachioradialis: 2+  Right biceps: 2+  Left biceps: 2+  Right triceps: 2+  Left triceps: 2+  Right patellar: 2+  Left patellar: 2+  Right achilles: 2+  Left achilles: 2+  Right plantar: normal  Left plantar: normal    L FNT ataxic and slower    Gait deferred             LABORATORY DATA     Labs: I have personally reviewed pertinent reports  and I have personally reviewed pertinent films in PACS  Results from last 7 days   Lab Units 02/08/23 0454 02/07/23 0607 02/06/23 0639 02/05/23  0615   WBC Thousand/uL 7 33 8 39 8 65 8 16   HEMOGLOBIN g/dL 13 4 13 3 14 4 13 7   HEMATOCRIT % 41 8 42 2 44 2 42 8   PLATELETS Thousands/uL 322 304 340 319   NEUTROS PCT %  --   --   --  41*   MONOS PCT %  --   --   --  9      Results from last 7 days   Lab Units 02/08/23 0454 02/07/23 0607 02/06/23 0639 02/05/23  0615   SODIUM mmol/L 139 138 140 140   POTASSIUM mmol/L 4 1 4 1 3 6 3 8   CHLORIDE mmol/L 108 109* 110* 110*   CO2 mmol/L 27 25 24 25   BUN mg/dL 16 20 13 9   CREATININE mg/dL 0 81 0 84 0 70 0 64   CALCIUM mg/dL 9 2 8 9 9 2 9 2   ALK PHOS U/L  --   --   --  88   ALT U/L  --   --   --  27   AST U/L  --   --   --  20              Results from last 7 days   Lab Units 02/04/23  1314   INR  0 92   PTT seconds 29               IMAGING & DIAGNOSTIC TESTING     Radiology Results: I have personally reviewed pertinent reports  and I have personally reviewed pertinent films in PACS    CT chest abdomen pelvis w contrast   Final Result by Crispin Otero MD (02/06 6543)      No CT evidence for malignancy in the chest, abdomen and pelvis  Workstation performed: [id number]         MRI brain w contrast   Final Result by Luis Duran MD (02/06 0818)      No abnormal enhancement  No acute change compared to yesterday           Workstation performed: [id number]         MRI brain wo contrast   Final Result by [facility] Kim Ramirez MD (02/04 9957)      Left posterior limb internal capsule/thalamic acute to subacute ischemia  Findings were marked as "immediate"in Epic and will now be related to the ordering physician or covering clinical team by the radiology liaison  Workstation performed: [id number]         CT stroke alert brain   Final Result by Mejia Mendoza DO (02/04 1342)      No acute intracranial abnormality  Findings were directly discussed with Radha Gillespie at 1:40 PM       Workstation performed: [id number]         CTA stroke alert (head/neck)   Final Result by Mejia Mendoza DO (02/04 1348)      There are some anatomic variations of the intracranial circulation as described above with no stenosis, occlusion or thrombosis of the cervical or intracranial vasculature  Findings were directly discussed with Radha Gillespie at approximately 1:40 PM                            Workstation performed: [id number]             Other Diagnostic Testing: I have personally reviewed pertinent reports     and I have personally reviewed pertinent films in PACS    ACTIVE MEDICATIONS     Current Facility-Administered Medications   Medication Dose Route Frequency   • acetaminophen (TYLENOL) tablet 650 mg  650 mg Oral Q6H PRN   • aspirin chewable tablet 81 mg  81 mg Oral Daily   • atorvastatin (LIPITOR) tablet 40 mg  40 mg Oral QPM   • clopidogrel (PLAVIX) tablet 75 mg  75 mg Oral Daily   • DULoxetine (CYMBALTA) delayed release capsule 60 mg  60 mg Oral Daily   • enoxaparin (LOVENOX) subcutaneous injection 40 mg  40 mg Subcutaneous Daily   • lidocaine (LIDODERM) 5 % patch 1 patch  1 patch Topical Daily   • nicotine (NICODERM CQ) 21 mg/24 hr TD 24 hr patch 21 mg  21 mg Transdermal Daily   • ondansetron (ZOFRAN) injection 4 mg  4 mg Intravenous Q6H PRN   • QUEtiapine (SEROquel) tablet 25 mg  25 mg Oral HS PRN   • traZODone (DESYREL) tablet 50 mg  50 mg Oral Once   • traZODone (DESYREL) tablet 50 mg  50 mg Oral HS   • verapamil (CALAN-SR) CR tablet 120 mg  120 mg Oral Daily       Prior to Admission medications    Medication Sig Start Date End Date Taking?  Authorizing Provider   atorvastatin (LIPITOR) 20 mg tablet Take 20 mg by mouth every evening   10/23/20  Yes Historical Provider, MD   DULoxetine (CYMBALTA) 60 mg delayed release capsule TAKE 2 CAPSULES (120 MG TOTAL) B MOUTH DAILY 12/13/22  Yes Historical Provider, MD   meclizine (ANTIVERT) 25 mg tablet 1 every 8 hours as needed 8/30/22  Yes Historical Provider, MD   naproxen (NAPROSYN) 500 mg tablet Take 500 mg by mouth 2 (two) times a day as needed Take with food 1/2/23  Yes Historical Provider, MD   QUEtiapine (SEROquel) 25 mg tablet Take 25 mg by mouth every evening 6/30/22  Yes Historical Provider, MD   acetaminophen (TYLENOL) 650 mg CR tablet Take 650 mg by mouth every 8 (eight) hours as needed    Historical Provider, MD   aspirin (ECOTRIN) 325 mg EC tablet Take 1 tablet (325 mg total) by mouth daily  Patient not taking: Reported on 2/4/2023 11/12/21   Storm Alberto DPM   cyclobenzaprine (FLEXERIL) 10 mg tablet take 1 tablet by mouth three times a day for muscle spasm  Patient not taking: Reported on 2/4/2023 1/2/23   Historical Provider, MD   Diclofenac Sodium (VOLTAREN) 1 % Apply 2 g topically 4 (four) times a day  Patient not taking: Reported on 2/4/2023 7/14/22   Michelle Bay DPM   DULoxetine (CYMBALTA) 30 mg delayed release capsule Take 30 mg by mouth daily  Patient not taking: Reported on 2/4/2023 7/19/22   Historical Provider, MD   gabapentin (NEURONTIN) 300 mg capsule Take 1 capsule (300 mg total) by mouth 2 (two) times a day 3/31/22 5/30/22  Michelle Bay DPM   ibuprofen (MOTRIN) 600 mg tablet Take 1 tablet (600 mg total) by mouth every 6 (six) hours as needed for mild pain for up to 30 days 7/19/19 11/8/21  Pippa Schmid DPM   lidocaine-prilocaine (EMLA) cream Apply topically as needed for mild pain or moderate pain  Patient not taking: Reported on 2/4/2023 7/14/22   Liborio Mohs, DPM   nicotine (NICODERM CQ) 14 mg/24hr TD 24 hr patch Place 1 patch on the skin every 24 hours  Patient not taking: Reported on 2/4/2023    Historical Provider, MD   oxyCODONE-acetaminophen (Percocet) 5-325 mg per tablet Take 1 tablet by mouth every 6 (six) hours as needed for severe pain for up to 30 doses Max Daily Amount: 4 tablets  Patient not taking: Reported on 2/15/2022 11/12/21   Susie Machado DPM   oxyCODONE-acetaminophen (Percocet) 5-325 mg per tablet Take 1 tablet by mouth every 4 (four) hours as needed for moderate pain for up to 15 doses Max Daily Amount: 15 tablets  Patient not taking: Reported on 2/4/2023 1/6/23   Naeem Bhandari MD   traZODone (DESYREL) 50 mg tablet take 1 tablet by mouth nightly - TAKE AT BEDTIME  Patient not taking: Reported on 2/4/2023 2/28/22   Historical Provider, MD         VTE Pharmacologic Prophylaxis: Enoxaparin (Lovenox)  VTE Mechanical Prophylaxis: sequential compression device    ==  Baldemar Lion 28  Neurology Residency, PGY-2

## 2023-02-08 NOTE — PROGRESS NOTES
PHYSICAL MEDICINE AND REHABILITATION   PREADMISSION ASSESSMENT     Projected [facility] Diagnoses:  Impairment of mobility, safety, Activities of Daily Living (ADLs), and cognitive/communication skills due to Stroke:  01 2  Right Body Involvement (Left Brain)  Etiologic Dx: Left Posterior Limb Internal Capsule/Thalamic Acute to Subacute Infarct  Date of Onset: 2/4/2023   Date of surgery: N/A    PATIENT INFORMATION  Name: Shahzad Blanchard Phone #: [phone] (home)   Address: [place] [address]  YOB: 1965 Age: 62 y o  SS#   Marital Status: Single  Ethnicity:  or   Employment Status: unemployed  Extended Emergency Contact Information  Primary Emergency Contact: [address] Phone: [phone]  Relation: Son  Secondary Emergency Contact: [address]  Mobile Phone: [phone]  Relation: Son  Advance Directive: Level 1 Full Code - unknown advanced directive    INSURANCE/COVERAGE:     Primary Payor: Héctor Eaton / Plan: Héctor Eaton / Product Type: Medicaid HMO /   Secondary Payer: Private Pay   Payer Contact: Glendy Payer Contact:   Contact Phone: [phone]  Contact Fax: [phone] Contact Phone:     Authorization #: [id number]  Coverage Dates: 2/9 - 2/15 (7 days)  LCD: 2/15 with review on 2/16  MEDICARE #: N/A  Medicare Days: N/A  Medical Record #: [id number]     **Confirmed patient's benefits for inpatient acute rehab via [address] [place] [address] as follows: $3 copay per day  REFERRAL SOURCE:   Referring provider: Anup Gallardo MD  Referring facility: [address] [place] [address]  Room: [place] [address]/[facility]  PCP: Ella Rodriguez MD PCP phone number: [phone]    MEDICAL INFORMATION  HPI: Patient is a 62year old female that presented to [place] [address] on 2/4/2023 for evaluation of strokelike symptoms  Patient with noted difficulty speaking and right sided facial droop   Exam also revealed RLE weakness  Stroke alert was called  Patient with an NIHSS of 3-5  CT brain was negative for acute findings  CTA of head/neck showed some anatomic variations of intracranial circulation; no stenosis, occlusion or thrombosis  She was admitted to Neurology services and was not a TNK candidate as was outside window timeframe  Patient was initiated on stroke pathway  MRI of brain showed left posterior limb internal capsule/thalamic acute to subacute ischemia  ECHO showed an EF of 60%  Per Neurology, CVA likely due to hypertensive urgency on presentation  As of 2/5, she was allowed for normotension and was initiated on Verapamil  Patient is to continue DAPT with ASA and Plavix for 21 days, followed by ASA monotherapy  CT of chest/abdomen/pelvis was obtained to evaluate for underlying malignancy, and was negative for acute findings  Throughout patient's hospital course, patient had complaints of intermittent chest pain  Troponins were negative and EKG remained without changes  Suspected possible musculoskeletal vs reflux, and patient was initiated on Pepcid  Cardiology was consulted, with patient's CP atypical without evidence of ACS  Patient was initiated on Losartan and HCTZ to optimize BP, with recommendations to continue on high intensity statin  Patient is overall hemodynamically stable and medically cleared for discharge to [facility]  PT/OT/ST therapies were consulted, as well as patient's case reviewed with [facility] physician, and they are recommending patient for inpatient Acute Rehab  She has demonstrated that she can tolerate and participate in 3 hours of therapy per day  Received both Pfizer vaccines; COVID test resulted negative on 2/4/2023 and 2/9/2023  Past Medical History:   Past Surgical History:    Allergies:     Past Medical History:   Diagnosis Date   • Acquired deformity of left foot    • Anesthesia complication     difficulty awakening   • Arthritis    • Deaf, left    • Depression    • Hallux valgus of left foot    • Hammer toe of left foot    • Headache    • Kidney stone    • Sciatic pain, right     intermittent    Past Surgical History:   Procedure Laterality Date   • BREAST BIOPSY Right 03/04/2021   • BREAST BIOPSY Right 3/10/2021    Procedure: Excisional Breast debridement  7 o'clock position;  Surgeon: John Remy MD;  [facility];  Service: General   • CHOLECYSTECTOMY     • CLOSURE DELAYED PRIMARY Right 7/5/2020    Procedure: CLOSURE DELAYED PRIMARY and application of skin graft substitute;  Surgeon: Cristine Cage DPM;  [facility];  Service: Podiatry   • HERNIA REPAIR     • INCISION AND DRAINAGE OF WOUND Right 7/1/2020    Procedure: INCISION AND DRAINAGE (I&D) EXTREMITY;  Surgeon: Cristine Cage DPM;  [facility];  Service: Podiatry   • Piroska U  97  W/SESMDC W/1METAR MEDIAL CNF Left 11/12/2021    Procedure: [facility];  Surgeon: Cristine Cage DPM;  [facility];  Service: Podiatry   • AZ OSTEOT W/ [email or web address] SHRT/CORRJ METAR XCP 1ST EA Left 6/14/2019    Procedure: 3rd Metatarsal Osteotomy;  Surgeon: Brisa Dumont DPM;  [facility];  Service: Podiatry   • AZ OSTEOT W/ [email or web address] SHRT/CORRJ METAR XCP 1ST EA Left 11/12/2021    Procedure: OSTEOTOMY 2ND METATARSAL;  Surgeon: Cristine Cage DPM;  [facility];  Service: Podiatry   • TUBAL LIGATION     • US GUIDED BREAST BIOPSY RIGHT COMPLETE Right 3/4/2021   • VAC DRESSING APPLICATION Right 1/3/3913    Procedure: APPLICATION VAC DRESSING;  Surgeon: Cristine Cage DPM;  [facility];  Service: Podiatry     Allergies   Allergen Reactions   • Sertraline Other (See Comments)     Hand swelling, itching         Medical/functional conditions requiring inpatient rehabilitation: left posterior limb internal capsule/thalamic acute to subacute infarct, dysarthria, right facial droop, right sided weakness, dysphagia, HTN, chest pain/reflux, impaired mobility and self care    Risk for medical/clinical complications: risk for falls, risk for aspiration, risk for skin breakdown, risk for infection, risk for DVT/PE, risk for hypo/hypertensive episodes    Comorbidities/Surgeries in the last 100 days: HLD, depression, deaf left ear, vertigo, HTN    CURRENT VITAL SIGNS:   Temp:  [98 1 °F (36 7 °C)-98 2 °F (36 8 °C)] 98 2 °F (36 8 °C)  HR:  [] 100  Resp:  [16-17] 17  BP: (140-157)/() 157/99   Intake/Output Summary (Last 24 hours) at 2/9/2023 1225  Last data filed at 2/9/2023 6460  Gross per 24 hour   Intake 240 ml   Output --   Net 240 ml        LABORATORY RESULTS:      Lab Results   Component Value Date    HGB 13 4 02/08/2023    HGB 13 4 09/08/2014    HCT 41 8 02/08/2023    HCT 39 9 09/08/2014    WBC 7 33 02/08/2023    WBC 11 73 (H) 09/08/2014     Lab Results   Component Value Date    BUN 16 02/08/2023    BUN 11 09/08/2014     (L) 09/08/2014    K 4 1 02/08/2023    K 4 6 09/08/2014     02/08/2023     09/08/2014    GLUCOSE 130 09/08/2014    CREATININE 0 81 02/08/2023    CREATININE 0 70 09/08/2014     Lab Results   Component Value Date    PROTIME 12 6 02/04/2023    INR 0 92 02/04/2023        DIAGNOSTIC STUDIES:  MRI brain wo contrast    Result Date: 2/4/2023  Impression: Left posterior limb internal capsule/thalamic acute to subacute ischemia  Findings were marked as "immediate"in Epic and will now be related to the ordering physician or covering clinical team by the radiology liaison  Workstation performed: [id number]     MRI brain w contrast    Result Date: 2/6/2023  Impression: No abnormal enhancement  No acute change compared to yesterday  Workstation performed: [id number]     CT chest abdomen pelvis w contrast    Result Date: 2/6/2023  Impression: No CT evidence for malignancy in the chest, abdomen and pelvis  Workstation performed: [id number]     CT stroke alert brain    Result Date: 2/4/2023  Impression: No acute intracranial abnormality   Findings were directly discussed with Breann Cabrera at 1:40 PM  Workstation performed: [id number]     CTA stroke alert (head/neck)    Result Date: 2023  Impression: There are some anatomic variations of the intracranial circulation as described above with no stenosis, occlusion or thrombosis of the cervical or intracranial vasculature  Findings were directly discussed with Yamila Sandoval at approximately 1:40 PM  Workstation performed: [id number]       PRECAUTIONS/SPECIAL NEEDS:  Tobacco:   Social History     Tobacco Use   Smoking Status Some Days   • Packs/day: 1 00   • Years: 15    • Pack years: 15    • Types: Cigarettes   • Last attempt to quit: 2021   • Years since quittin 2   Smokeless Tobacco Never   Tobacco Comments    trying to quit - using judson  patch   , Alcohol:    Social History     Substance and Sexual Activity   Alcohol Use Not Currently   , Anticoagulation:  aspirin 81 mg orally every day, clopidogrel 75 mg orally every day and Lovenox SQ, Edema Management, Safety Concerns, Pain Management, Aspiration Risk/Precautions, Hard of Hearing, Language Preference: English and Fall Precautions      MEDICATIONS:     Current Facility-Administered Medications:   •  acetaminophen (TYLENOL) tablet 650 mg, 650 mg, Oral, Q6H PRN, Baldemar Smith DO, 650 mg at 23 1123  •  albuterol (PROVENTIL HFA,VENTOLIN HFA) inhaler 2 puff, 2 puff, Inhalation, Q4H PRN, Giselle Schaffer  •  aspirin chewable tablet 81 mg, 81 mg, Oral, Daily, Arthur Rogers DO, 81 mg at 23 0620  •  atorvastatin (LIPITOR) tablet 40 mg, 40 mg, Oral, QPM, Baldemar Smith DO, 40 mg at 23 2252  •  clopidogrel (PLAVIX) tablet 75 mg, 75 mg, Oral, Daily, Arthur Rogers DO, 75 mg at 23 5761  •  DULoxetine (CYMBALTA) delayed release capsule 60 mg, 60 mg, Oral, Daily, Baldemar Smith DO, 60 mg at 23 0951  •  enoxaparin (LOVENOX) subcutaneous injection 40 mg, 40 mg, Subcutaneous, Daily, Baldemar Smith DO, 40 mg at 23 0950  • famotidine (PEPCID) tablet 20 mg, 20 mg, Oral, BID, Larangel Maude, 20 mg at 02/09/23 6224  •  hydrochlorothiazide (HYDRODIURIL) tablet 12 5 mg, 12 5 mg, Oral, Daily, Maurice Merritt PA-C, 12 5 mg at 02/09/23 1217  •  lidocaine (LIDODERM) 5 % patch 1 patch, 1 patch, Topical, Daily, Baldemar Smith DO, 1 patch at 02/09/23 7628  •  losartan (COZAAR) tablet 50 mg, 50 mg, Oral, Daily, Maurice Merritt PA-C, 50 mg at 02/09/23 1217  •  nicotine (NICODERM CQ) 21 mg/24 hr TD 24 hr patch 21 mg, 21 mg, Transdermal, Daily, Shireen Dave MD, 21 mg at 02/09/23 1001  •  ondansetron (ZOFRAN) injection 4 mg, 4 mg, Intravenous, Q6H PRN, Delvin Smith DO  •  QUEtiapine (SEROquel) tablet 25 mg, 25 mg, Oral, HS PRN, Delvin Smith DO  •  traZODone (DESYREL) tablet 50 mg, 50 mg, Oral, Once, Shireen Dave MD  •  traZODone (DESYREL) tablet 50 mg, 50 mg, Oral, HS, Baldemar Smith DO, 50 mg at 02/08/23 2253  •  verapamil (CALAN-SR) CR tablet 120 mg, 120 mg, Oral, Daily, Baldemar Smith DO, 120 mg at 02/09/23 1002    SKIN INTEGRITY:   no rashes, no erythema, no peripheral edema    PRIOR LEVEL OF FUNCTION:  She lives in a(n) single family home  Chrissy Nino is  and lives with their spouse  Self Care: Independent, Indoor Mobility: Modified Independent, Stairs (in/outdoor): Modified Independent and Cognition: Independent  Prior to patient's admission, patient was fully Independent with ADLs and received assistance with IADLs  Family provides transportation  She was Modified Independent with use of RW for mobility  FALLS IN THE LAST 6 MONTHS: none    HOME ENVIRONMENT:  The living area: can live on one level  There are 3 steps to enter the home via the garage vs 9 DONNIE via the front entrance, and full flight of stairs to 2nd floor of home  The patient will not have 24 hour supervision/physical assistance available upon discharge  Patient's spouse is able to provide limited assistance   Patient's son is currently staying in their home for the next few weeks  She is current with outpatient PT  PREVIOUS DME:  Equipment in home (previous DME): Rolling Walker and [employer]    FUNCTIONAL STATUS:  Physical Therapy Occupational Therapy Speech Therapy   2/7/2023, per PT    Cognition   Overall Cognitive Status Impaired   Arousal/Participation Cooperative   Attention Attends with cues to redirect   Orientation Level Oriented X4   Memory Within functional limits   Following Commands Follows one step commands without difficulty   Comments very pleasant to work with   Subjective   Subjective agreeable to participate   Bed Mobility   Supine to Sit Unable to assess   Sit to Supine Unable to assess   Additional Comments presented seated EOB with son upon entry and returned to chair with alarm upon conclusion   Transfers   Sit to Stand 4  Minimal assistance   Additional items Assist x 1; Increased time required;Verbal cues  (CGA)   Stand to Sit 4  Minimal assistance   Additional items Assist x 1; Increased time required;Verbal cues; Other  (CGA)   Stand pivot 4  Minimal assistance   Additional items Assist x 1; Increased time required;Verbal cues   Additional Comments c RW; x4  STS throughout: +5 consecutive STS with feet parallel and +5 consecutive STS with feet staggered to promote R WB   Ambulation/Elevation   Gait pattern Improper Weight shift;R Hemiparesis; Decreased foot clearance;R Knee Lacey;Decreased R stance; Short stride; Excessively slow   Gait Assistance 4  Minimal assist   Additional items Assist x 1;Verbal cues   Assistive Device Rolling walker   Distance 71'+79'+03'+14'  (seated vs standing rest break bw)   Stair Management Assistance 4  Minimal assist   Additional items Assist x 1;Verbal cues; Increased time required; Tactile cues   Stair Management Technique Two rails; Step to pattern; Foreward;Nonreciprocal   Number of Stairs 7   Ambulation/Elevation Additional Comments pt with LLE injury   able to negotiate stairs leading with RLE and descending with RLE first however required TC for R quad control   Balance   Static Sitting Fair +   Dynamic Sitting Fair   Static Standing Fair -   Dynamic Standing Poor +   Ambulatory Poor +   Endurance Deficit   Endurance Deficit Yes   Endurance Deficit Description R hemiparesis, fatigue   Activity Tolerance   Activity Tolerance Patient tolerated treatment well   Nurse Made Aware yes-cleared to mobilize   Exercises   Neuro re-ed at HR with b/l UE support and minAx1: lateral walking 12' L and R each  with L HR and R HHA (minAx1):fwd marching/bwd walking 12' each   Assessment   Prognosis Good   Problem List Decreased strength;Decreased endurance; Impaired balance;Decreased mobility; Decreased coordination   Assessment Pt agreeable to participate in PT session  Pt performed functional mobility and therex as outlined above  Demonstrates R quad weakness evident by R knee buckling with ambulation and WB as well as decreased RLE stance  Exercises to promote RLE WS and strengthening utilized throughout  With lateral walking, R knee buckling vs hyperextension noted and VC for neutral knee stability with improvement noted with repetition  Pt left seated in chair with chair alarm, call bell, phone, and all personal needs within reach  Pt will continue to benefit from skilled acute care PT to further address their functional mobility limitations  D/C recommendations remain rehab (PMR)  2/7/2023, per OT    ADL   Where Assessed Edge of bed   Eating Assistance 4  Minimal Assistance   Eating Deficit Setup;Verbal cueing;Supervision/safety; Increased time to complete;Bringing food to mouth assist;Adaptive utensils (Comment)  (applesauce with built up handle)   Grooming Assistance 5  Supervision/Setup   Grooming Deficit Setup;Supervision/safety; Increased time to complete;Brushing hair   Grooming Comments Pt brushed the end of her braided hair   Bed Mobility   Additional Comments Greeted at EOB upon arrival with all needs within reach  Left in bedside chair with all needs within reach  Transfers   Sit to Stand 4  Minimal assistance   Additional items Assist x 1;HOB elevated; Increased time required  (bed>bedside chair)   Stand to Sit 4  Minimal assistance   Additional items Assist x 1; Armrests; Increased time required   Stand pivot 4  Minimal assistance   Additional items Assist x 1;Bedrails;Armrests; Increased time required  (bed>chair)   Additional Comments No AD   Therapeutic Excerise-Strength   UE Strength Yes   Right Upper Extremity- Strength   R Elbow Elbow flexion;Elbow extension  (Practiced bringing spoon to her mouth while using plastic spoon)   R Hand Thumb; Index finger; Long finger;Ring finger;Little finger  ( strength exercises to promote functional use)   Equipment Theraputty; Other (Comment)  (Extra-soft theraputty to increase hand strength)   R Weight/Reps/Sets extra-soft theraputty/ 5 reps/ 3 sets   RUE Strength Comment Pt provided with education handouts focusing on fine motor, gross motor, and UE strengthening to encourage RUE usage   Coordination   In Hand Manipulation Pt participated in In Hand Manipulation exercise with pen to increase finger coordination   Subjective   Subjective Pt became frusterated while eating applesauce, but was reassured and felt better   Cognition   Overall Cognitive Status Impaired   Arousal/Participation Alert; Responsive; Cooperative   Attention Within functional limits   Orientation Level Oriented X4   Memory Within functional limits   Following Commands Follows one step commands without difficulty   Comments Pt was alert, responsive and cooperative  Presented with R side facial droop, speech dysarthria, and slow motor processing  Pt had typical affect and smiled multiple times throughout session  She recalled recent events and events from 10 years ago which were agreed on by her   She was motivated to participate in OT session     Activity Tolerance Activity Tolerance Patient tolerated treatment well   Assessment   Assessment Pt was greeted for OT session while seated EOB on 2/7/2023  Treatment session to address RUE weakness, and neuromuscular reeducation, ADL retraining, functional transfer training, endurance training, and fine motor coordination  Patient participated while seated EOB in ADL eating activity with Xiao with built up handle, hair brushing with supervision/setup, functional transfers sit<>stand with Xiao, stand pivot bed>chair with Xiao  She did not use any DME for transfers  Patient limited by slow processing, RUE weakness, and speech dysarthria  Pt participated in education packets focusing on fine motor, gross motor, and strengthening handouts to encourage RUE usage  Pt would benefit from continued skilled OT services as she is functioning below baseline  Pt to be discharged to inpatient rehab   The patient's raw score on the -PAC Daily Activity Inpatient Short Form is 16  A raw score of less than 19 suggests the patient may benefit from discharge to post-acute rehabilitation services  Please refer to the recommendation of the Occupational Therapist for safe discharge planning  Pt would benefit from continued OT plan of services for 10-14 days 3-5x/wk to address functional goals       2/9/2023, per OT    Patient participated in Skilled OT session this date with interventions consisting of ADL re training with the use of correct body mechnaics, Energy Conservation techniques, safety awareness and fall prevention techniques, therapeutic exercise to: increase functional use of BUEs, increase BUE muscle strength , increase standing tolerance time with unilateral UE support to complete sink level ADLs, neuromuscular re education to: facilitate volitional use of limbs, increase dynamic sit/ stand balance during functional activity  and increase OOB/ sitting tolerance        Patient agreeable to OT treatment session, upon arrival patient was found supine in bed and alert  In comparison to previous session, patient with improvements in RUE ROM functional use during ADLS (drinking, UB/LB dressing and bathing, grooming, and toileting)  Patient requiring one step directives  Patient continues to be functioning below baseline level, occupational performance remains limited secondary to factors listed above and increased risk for falls and injury  Pt requires supervision for eating/drinking, Xiao for grooming, Xiao for UB/LB bathing and dressing, and supervision for toileting  Pt requires supervision for bed mobility (supine>sit), Min A for sit<> stand functional transfers w/ RW, Xiao for toilet transfers w/ RW, and Min A for functional mobility w/ RW  From OT standpoint, recommendation at time of d/c would be Short Term Rehab  Patient to benefit from continued Occupational Therapy treatment while in the hospital to address deficits as defined above and maximize level of functional independence with ADLs and functional mobility  2/7/2023, per SLP    Subjective:  Pt awake and alert, lunch tray at bedside  "I'm kind of sick of the soft food "     Current Diet:  Dysphagia 3 w/ thin      Objective:  Pt seen for dx dysphagia tx w/ to assess diet tolerance/potential upgrades  Pt independent for feeding  Labial seal is fair for all  Inconsistent anterior spill to R side though pt aware and attempts to prevent  Mastication of level 3 material min prolonged, ultimately functional  No oral residue or pocketing  Pt takes cup sips of thin liquid throughout meal without difficulty  Swallows suspected timely w/ adequate hyo-laryngeal elevation to palpation  No overt s/s aspiration across trials  Trials of hard/regular texture solid pretzels given  Bite strength is adequate  Mastication again min prolonged but functional  No oral residue  At this time, pt requesting upgrade to regular w/ awareness of choosing softer selections   Pt w/ good understanding of strategies to optimize swallow safety and s/s aspiration to notify medical team of should they arise       Assessment:  Min oral dysphagia 2/2 R sided oral weakness and prolonged mastication  Pt appropriate for upgrade to regular textures, continuation of thin liquids       Plan/Recommendations:  Regular w/ thin, choose soft as needed   Frequent/thorough oral care  ST to f/u as able and appropriate      CARE SCORES:  Self Care:  Eatin: Supervision or touching  assistance  Oral hygiene: 04: Supervision or touching  assistance  Toilet hygiene: 04: Supervision or touching  assistance  Shower/bathing self: 04: Supervision or touching  assistance  Upper body dressin: Supervision or touching  assistance  Lower body dressin: Supervision or touching  assistance  Putting on/taking off footwear: 09: Not applicable  Transfers:  Roll left and right: 09: Not applicable  Sit to lyin: Not applicable  Lying to sitting on side of bed: 09: Not applicable  Sit to stand: 04: Supervision or touching  assistance  Chair/bed to chair transfer: 04: Supervision or touching  assistance  Toilet transfer: 09: Not applicable  Mobility:  Walk 10 ft: 04: Supervision or touching  assistance  Walk 50 ft with two turns: 10: Not attempted due to environmental limitations  Walk 150ft: 88: Not attempted due to medical conditions or safety concerns    CURRENT GAP IN FUNCTION  Prior to Admission: Functional Status: Patient was not independent with mobility/ambulation, transfers, ADL's, IADL's  Estimated length of stay: 10 to 14 days    Anticipated Post-Discharge Disposition/Treatment  Disposition: Return to previous home/apartment    Outpatient Services: Physical Therapy (PT), Occupational Therapy (OT) and Speech Therapy    BARRIERS TO DISCHARGE  Lovenox, Weakness, Pain, Balance Difficulty, Fatigue, Home Accessibility, Caregiver Accessibility, Financial Resources, Equipment Needs and Resource Availability    INTERVENTIONS FOR DISCHARGE  Adaptive equipment, Patient/Family/Caregiver Education, [employer], Support Group, Financial Assistance, Arrange DME needs, Medication Changes as per MD recommendations, Therapy exercises, Center of balance support  and Energy conservation education     REQUIRED THERAPY:  Patient will require PT, OT and ST 60 minutes each per day, five days per week to achieve rehab goals  REQUIRED FUNCTIONAL AND MEDICAL MANAGEMENT FOR INPATIENT REHABILITATION:  Skin:  There are no pressure sores currently, Pain Management: Overall pain is well controlled, Deep Vein Thrombosis (DVT) Prophylaxis:  ASA, Plavix & Lovenox, further IM management of additional medical conditions while on ARC, she needs PT/OT/ST intervention, patient/family education and training, possible Neuropsych and Neurology consults with any other needed consults prn, nursing medication review and management of bowel/bladder function  Patient/family can participate in unit based stroke education while on [facility]  RECOMMENDED LEVEL OF CARE:   Patient is a 62year old female that presented to [place] [id number] on 2/4/2023 for evaluation of strokelike symptoms  Patient with noted difficulty speaking and right sided facial droop  Exam also revealed RLE weakness  Stroke alert was called  Patient with an NIHSS of 3-5  CT brain was negative for acute findings  CTA of head/neck showed some anatomic variations of intracranial circulation; no stenosis, occlusion or thrombosis  She was admitted to Neurology services and was not a TNK candidate as was outside window timeframe  Patient was initiated on stroke pathway  MRI of brain showed left posterior limb internal capsule/thalamic acute to subacute ischemia  ECHO showed an EF of 60%  Per Neurology, CVA likely due to hypertensive urgency on presentation  As of 2/5, she was allowed for normotension and was initiated on Verapamil   Patient is to continue DAPT with ASA and Plavix for 21 days, followed by ASA monotherapy  CT of chest/abdomen/pelvis was obtained to evaluate for underlying malignancy, and was negative for acute findings  Throughout patient's hospital course, patient had complaints of intermittent chest pain  Troponins were negative and EKG remained without changes  Suspected possible musculoskeletal vs reflux, and patient was initiated on Pepcid  Cardiology was consulted, with patient's CP atypical without evidence of ACS  Patient was initiated on Losartan and HCTZ to optimize BP, with recommendations to continue on high intensity statin  Prior to patient's admission, patient was fully Independent with ADLs and received assistance with IADLs  Family provides transportation  She was Modified Independent with use of RW for mobility  Currently, patient is Min assist with use of RW for gait and transfers, and Min assist for both UB and LB ADLs  Close medical management and PM&R management is recommended at this time while patient is on the [facility]  Inpatient acute rehab is recommended for patient to maximize overall strength and mobility upon discharge to home with support of family

## 2023-02-09 ENCOUNTER — HOSPITAL ENCOUNTER (INPATIENT)
Facility: HOSPITAL | Age: 58
LOS: 11 days | Discharge: HOME | End: 2023-02-20
Attending: PHYSICAL MEDICINE & REHABILITATION | Admitting: PHYSICAL MEDICINE & REHABILITATION

## 2023-02-09 VITALS
OXYGEN SATURATION: 96 % | RESPIRATION RATE: 17 BRPM | DIASTOLIC BLOOD PRESSURE: 99 MMHG | BODY MASS INDEX: 37.69 KG/M2 | HEIGHT: 60 IN | TEMPERATURE: 98.2 F | WEIGHT: 192 LBS | HEART RATE: 100 BPM | SYSTOLIC BLOOD PRESSURE: 157 MMHG

## 2023-02-09 DIAGNOSIS — R47.1 DYSARTHRIA: ICD-10-CM

## 2023-02-09 DIAGNOSIS — H04.129 DRY EYE: ICD-10-CM

## 2023-02-09 DIAGNOSIS — I63.9 STROKE (HCC): ICD-10-CM

## 2023-02-09 DIAGNOSIS — I63.9 CEREBROVASCULAR ACCIDENT (CVA), UNSPECIFIED MECHANISM (HCC): Primary | ICD-10-CM

## 2023-02-09 DIAGNOSIS — I10 HYPERTENSION: ICD-10-CM

## 2023-02-09 DIAGNOSIS — Z01.818 PREOPERATIVE CLEARANCE: ICD-10-CM

## 2023-02-09 DIAGNOSIS — F32.A DEPRESSION: ICD-10-CM

## 2023-02-09 DIAGNOSIS — Z74.09 IMPAIRED MOBILITY AND ACTIVITIES OF DAILY LIVING: ICD-10-CM

## 2023-02-09 DIAGNOSIS — Z78.9 IMPAIRED MOBILITY AND ACTIVITIES OF DAILY LIVING: ICD-10-CM

## 2023-02-09 DIAGNOSIS — Z78.9 EX-SMOKER FOR LESS THAN 1 YEAR: ICD-10-CM

## 2023-02-09 DIAGNOSIS — R00.0 TACHYCARDIA: ICD-10-CM

## 2023-02-09 DIAGNOSIS — R73.03 PREDIABETES: ICD-10-CM

## 2023-02-09 DIAGNOSIS — F43.20 ADJUSTMENT DISORDER: ICD-10-CM

## 2023-02-09 DIAGNOSIS — R73.03 PRE-DIABETES: ICD-10-CM

## 2023-02-09 DIAGNOSIS — M20.12 ACQUIRED HALLUX VALGUS OF LEFT FOOT: ICD-10-CM

## 2023-02-09 DIAGNOSIS — Z72.0 TOBACCO USE: ICD-10-CM

## 2023-02-09 DIAGNOSIS — E11.9 DIABETES (HCC): ICD-10-CM

## 2023-02-09 DIAGNOSIS — M21.6X2 OTHER ACQUIRED DEFORMITIES OF LEFT FOOT: ICD-10-CM

## 2023-02-09 DIAGNOSIS — M20.42 OTHER HAMMER TOE(S) (ACQUIRED), LEFT FOOT: ICD-10-CM

## 2023-02-09 DIAGNOSIS — S91.301A WOUND OF RIGHT FOOT: ICD-10-CM

## 2023-02-09 DIAGNOSIS — R51.9 HEADACHE: ICD-10-CM

## 2023-02-09 DIAGNOSIS — E66.9 OBESITY (BMI 35.0-39.9 WITHOUT COMORBIDITY): ICD-10-CM

## 2023-02-09 DIAGNOSIS — M79.672 PAIN IN LEFT FOOT: ICD-10-CM

## 2023-02-09 PROBLEM — G47.00 INSOMNIA: Status: ACTIVE | Noted: 2023-02-05

## 2023-02-09 LAB
APTT SCREEN TO CONFIRM RATIO: 0.97 RATIO (ref 0–1.34)
CONFIRM APTT/NORMAL: 34.6 SEC (ref 0–47.6)
F5 GENE MUT ANL BLD/T: NORMAL
FLUAV RNA RESP QL NAA+PROBE: NEGATIVE
FLUBV RNA RESP QL NAA+PROBE: NEGATIVE
GLUCOSE SERPL-MCNC: 110 MG/DL (ref 65–140)
GLUCOSE SERPL-MCNC: 144 MG/DL (ref 65–140)
LA PPP-IMP: NORMAL
RSV RNA RESP QL NAA+PROBE: NEGATIVE
SARS-COV-2 RNA RESP QL NAA+PROBE: NEGATIVE
SCREEN APTT: 37.7 SEC (ref 0–43.5)
SCREEN DRVVT: 33.8 SEC (ref 0–47)
THROMBIN TIME: 17.5 SEC (ref 0–23)

## 2023-02-09 RX ORDER — DULOXETIN HYDROCHLORIDE 60 MG/1
60 CAPSULE, DELAYED RELEASE ORAL DAILY
Status: DISCONTINUED | OUTPATIENT
Start: 2023-02-10 | End: 2023-02-20 | Stop reason: HOSPADM

## 2023-02-09 RX ORDER — FAMOTIDINE 20 MG/1
20 TABLET, FILM COATED ORAL 2 TIMES DAILY
Status: DISCONTINUED | OUTPATIENT
Start: 2023-02-09 | End: 2023-02-09

## 2023-02-09 RX ORDER — CLOPIDOGREL BISULFATE 75 MG/1
75 TABLET ORAL DAILY
Qty: 17 TABLET | Refills: 0 | Status: ON HOLD | OUTPATIENT
Start: 2023-02-09 | End: 2023-02-20 | Stop reason: SDUPTHER

## 2023-02-09 RX ORDER — ROSUVASTATIN CALCIUM 40 MG/1
40 TABLET, COATED ORAL DAILY
Qty: 90 TABLET | Refills: 0 | Status: SHIPPED | OUTPATIENT
Start: 2023-02-09 | End: 2023-02-20

## 2023-02-09 RX ORDER — LOSARTAN POTASSIUM 50 MG/1
50 TABLET ORAL DAILY
Status: DISCONTINUED | OUTPATIENT
Start: 2023-02-10 | End: 2023-02-16

## 2023-02-09 RX ORDER — ONDANSETRON 2 MG/ML
4 INJECTION INTRAMUSCULAR; INTRAVENOUS EVERY 6 HOURS PRN
Status: DISCONTINUED | OUTPATIENT
Start: 2023-02-09 | End: 2023-02-09

## 2023-02-09 RX ORDER — PANTOPRAZOLE SODIUM 40 MG/1
40 TABLET, DELAYED RELEASE ORAL
Status: DISCONTINUED | OUTPATIENT
Start: 2023-02-10 | End: 2023-02-20 | Stop reason: HOSPADM

## 2023-02-09 RX ORDER — HYDROCHLOROTHIAZIDE 12.5 MG/1
12.5 TABLET ORAL DAILY
Status: DISCONTINUED | OUTPATIENT
Start: 2023-02-10 | End: 2023-02-16

## 2023-02-09 RX ORDER — TRAZODONE HYDROCHLORIDE 50 MG/1
50 TABLET ORAL
Status: DISCONTINUED | OUTPATIENT
Start: 2023-02-09 | End: 2023-02-15

## 2023-02-09 RX ORDER — QUETIAPINE FUMARATE 25 MG/1
25 TABLET, FILM COATED ORAL
Status: DISCONTINUED | OUTPATIENT
Start: 2023-02-09 | End: 2023-02-15

## 2023-02-09 RX ORDER — FAMOTIDINE 20 MG/1
20 TABLET, FILM COATED ORAL 2 TIMES DAILY
Qty: 60 TABLET | Refills: 0 | Status: SHIPPED | OUTPATIENT
Start: 2023-02-09 | End: 2023-03-11

## 2023-02-09 RX ORDER — ATORVASTATIN CALCIUM 40 MG/1
40 TABLET, FILM COATED ORAL EVERY EVENING
Status: DISCONTINUED | OUTPATIENT
Start: 2023-02-09 | End: 2023-02-20 | Stop reason: HOSPADM

## 2023-02-09 RX ORDER — POLYETHYLENE GLYCOL 3350 17 G/17G
17 POWDER, FOR SOLUTION ORAL DAILY PRN
Status: DISCONTINUED | OUTPATIENT
Start: 2023-02-09 | End: 2023-02-20 | Stop reason: HOSPADM

## 2023-02-09 RX ORDER — ONDANSETRON 4 MG/1
4 TABLET, ORALLY DISINTEGRATING ORAL EVERY 6 HOURS PRN
Status: DISCONTINUED | OUTPATIENT
Start: 2023-02-09 | End: 2023-02-20 | Stop reason: HOSPADM

## 2023-02-09 RX ORDER — CLOPIDOGREL BISULFATE 75 MG/1
75 TABLET ORAL DAILY
Status: DISCONTINUED | OUTPATIENT
Start: 2023-02-10 | End: 2023-02-20 | Stop reason: HOSPADM

## 2023-02-09 RX ORDER — ALBUTEROL SULFATE 90 UG/1
2 AEROSOL, METERED RESPIRATORY (INHALATION) EVERY 4 HOURS PRN
Qty: 6.7 G | Refills: 0 | Status: SHIPPED | OUTPATIENT
Start: 2023-02-09

## 2023-02-09 RX ORDER — BISACODYL 10 MG
10 SUPPOSITORY, RECTAL RECTAL DAILY PRN
Status: DISCONTINUED | OUTPATIENT
Start: 2023-02-09 | End: 2023-02-10

## 2023-02-09 RX ORDER — ATORVASTATIN CALCIUM 40 MG/1
40 TABLET, FILM COATED ORAL EVERY EVENING
Qty: 90 TABLET | Refills: 0 | Status: SHIPPED | OUTPATIENT
Start: 2023-02-09 | End: 2023-02-09

## 2023-02-09 RX ORDER — ACETAMINOPHEN 325 MG/1
650 TABLET ORAL EVERY 6 HOURS PRN
Status: DISCONTINUED | OUTPATIENT
Start: 2023-02-09 | End: 2023-02-20 | Stop reason: HOSPADM

## 2023-02-09 RX ORDER — ASPIRIN 81 MG/1
81 TABLET, CHEWABLE ORAL DAILY
Status: DISCONTINUED | OUTPATIENT
Start: 2023-02-10 | End: 2023-02-20 | Stop reason: HOSPADM

## 2023-02-09 RX ORDER — ENOXAPARIN SODIUM 100 MG/ML
40 INJECTION SUBCUTANEOUS DAILY
Status: DISCONTINUED | OUTPATIENT
Start: 2023-02-10 | End: 2023-02-15

## 2023-02-09 RX ORDER — NICOTINE 21 MG/24HR
1 PATCH, TRANSDERMAL 24 HOURS TRANSDERMAL DAILY
Qty: 28 PATCH | Refills: 0 | Status: SHIPPED | OUTPATIENT
Start: 2023-02-09 | End: 2023-02-20

## 2023-02-09 RX ORDER — NICOTINE 21 MG/24HR
21 PATCH, TRANSDERMAL 24 HOURS TRANSDERMAL DAILY
Status: DISCONTINUED | OUTPATIENT
Start: 2023-02-10 | End: 2023-02-16

## 2023-02-09 RX ORDER — HYDROCHLOROTHIAZIDE 12.5 MG/1
12.5 TABLET ORAL DAILY
Status: DISCONTINUED | OUTPATIENT
Start: 2023-02-09 | End: 2023-02-09 | Stop reason: HOSPADM

## 2023-02-09 RX ORDER — LIDOCAINE 50 MG/G
1 PATCH TOPICAL DAILY
Status: DISCONTINUED | OUTPATIENT
Start: 2023-02-10 | End: 2023-02-20 | Stop reason: HOSPADM

## 2023-02-09 RX ORDER — LOSARTAN POTASSIUM 50 MG/1
50 TABLET ORAL DAILY
Status: DISCONTINUED | OUTPATIENT
Start: 2023-02-09 | End: 2023-02-09 | Stop reason: HOSPADM

## 2023-02-09 RX ORDER — LIDOCAINE 40 MG/G
CREAM TOPICAL 4 TIMES DAILY PRN
Status: DISCONTINUED | OUTPATIENT
Start: 2023-02-09 | End: 2023-02-20 | Stop reason: HOSPADM

## 2023-02-09 RX ORDER — LOSARTAN POTASSIUM 50 MG/1
50 TABLET ORAL DAILY
Qty: 90 TABLET | Refills: 0 | Status: ON HOLD | OUTPATIENT
Start: 2023-02-10 | End: 2023-02-20 | Stop reason: SDUPTHER

## 2023-02-09 RX ORDER — ASPIRIN 81 MG/1
81 TABLET, CHEWABLE ORAL DAILY
Qty: 90 TABLET | Refills: 0 | Status: ON HOLD | OUTPATIENT
Start: 2023-02-09 | End: 2023-02-20 | Stop reason: SDUPTHER

## 2023-02-09 RX ORDER — HYDROCHLOROTHIAZIDE 12.5 MG/1
12.5 TABLET ORAL DAILY
Qty: 90 TABLET | Refills: 0 | Status: SHIPPED | OUTPATIENT
Start: 2023-02-10 | End: 2023-02-20

## 2023-02-09 RX ORDER — ALBUTEROL SULFATE 90 UG/1
2 AEROSOL, METERED RESPIRATORY (INHALATION) EVERY 4 HOURS PRN
Status: DISCONTINUED | OUTPATIENT
Start: 2023-02-09 | End: 2023-02-20 | Stop reason: HOSPADM

## 2023-02-09 RX ADMIN — NICOTINE 21 MG: 21 PATCH, EXTENDED RELEASE TRANSDERMAL at 10:01

## 2023-02-09 RX ADMIN — ATORVASTATIN CALCIUM 40 MG: 40 TABLET, FILM COATED ORAL at 17:33

## 2023-02-09 RX ADMIN — VERAPAMIL HYDROCHLORIDE 120 MG: 120 TABLET, FILM COATED, EXTENDED RELEASE ORAL at 10:02

## 2023-02-09 RX ADMIN — ASPIRIN 81 MG: 81 TABLET, CHEWABLE ORAL at 09:51

## 2023-02-09 RX ADMIN — QUETIAPINE FUMARATE 25 MG: 25 TABLET ORAL at 21:25

## 2023-02-09 RX ADMIN — ACETAMINOPHEN 650 MG: 325 TABLET, FILM COATED ORAL at 18:47

## 2023-02-09 RX ADMIN — FAMOTIDINE 20 MG: 20 TABLET, FILM COATED ORAL at 17:33

## 2023-02-09 RX ADMIN — HYDROCHLOROTHIAZIDE 12.5 MG: 12.5 TABLET ORAL at 12:17

## 2023-02-09 RX ADMIN — ENOXAPARIN SODIUM 40 MG: 40 INJECTION SUBCUTANEOUS at 09:50

## 2023-02-09 RX ADMIN — CLOPIDOGREL BISULFATE 75 MG: 75 TABLET ORAL at 09:51

## 2023-02-09 RX ADMIN — DULOXETINE HYDROCHLORIDE 60 MG: 60 CAPSULE, DELAYED RELEASE ORAL at 09:51

## 2023-02-09 RX ADMIN — TRAZODONE HYDROCHLORIDE 50 MG: 50 TABLET ORAL at 21:25

## 2023-02-09 RX ADMIN — FAMOTIDINE 20 MG: 20 TABLET, FILM COATED ORAL at 09:51

## 2023-02-09 RX ADMIN — LOSARTAN POTASSIUM 50 MG: 50 TABLET, FILM COATED ORAL at 12:17

## 2023-02-09 RX ADMIN — LIDOCAINE 5% 1 PATCH: 700 PATCH TOPICAL at 09:51

## 2023-02-09 NOTE — DISCHARGE INSTR - AVS FIRST PAGE
Please follow up w/ Spine and Pain Doctor  Please follow-up with your PCP in 1 week after discharge  Please come back to the ED if your symptoms severely worsen, you experience chest pain, or shortness of breath

## 2023-02-09 NOTE — H&P
PHYSICAL MEDICINE AND REHABILITATION H&P/ADMISSION NOTE  Briana Chen 62 y o  female MRN: [id number]  Unit/Bed#: [facility] 962-01 Encounter: [id number]     Rehab Diagnosis: Impairment of mobility, safety, Activities of Daily Living (ADLs), and cognitive/communication skills due to Stroke:    Right Body Involvement (Left Brain) Left posterior limb internal capsule acute ischemic CVA    History of Present Illness:   Briana Chen is a 62 y o  female with HTN, HLD, tobacco use, anxiety/depression, insomnia, sciatica, chronic left foot pain after bunion surgery, pre-diabetes who presented to the [address] on 23 with dysarthria and right sided weakness  Stroke alert initiated  TNK was NOT administered as outside treatment window  CTH with acute abnormalities  CTA head and neck: Negative for LVO  MRI Brain both with and without contrast confirming a left posterior limb internal capsule acute ischemic CVA  Echocardiogram with EF 60% and normal function  Patient seen by Neurology  Etiology of CVA related to hypertensive disease, thrombosis panel pending  CT CAP completed and not showing signs of malignancies  Placed on DAPT with Aspirin 81 mg daily and Plavix 75 mg daily x 21 days then Aspirin 81 mg daily monotherapy  Medically developed headache - was treated with Migraine cocktail (Phenergan, Torodol, Benadryl, Magnesium)  Patient also started on Verapamil for prevention  Patient with transient chest pain at rest - troponin negative, EKG without changes  Cardiology consulted, recommended losartan and HCTZ for HTN  They also recommended a change to Crestor instead of Lipitor due to being on Verapamil (CY interaction)  Outpatient follow-up recommended with Dr Robby Gaitan       After medical stabilization, patient was found to have acute functional deficits in mobility, self care, speech, swallow, cognition, and therefore admitted to [facility] for acute inpatient rehabilitation  Plan:     Rehabilitation  • Functional deficits: right hemiparesis, dysarthria, dysphagia, cognitive deficits, impaired mobility, self care  • Begin PT/OT/SLP  Rehabilitation goals are to achieve a Supervision - Mod I level with mobility and self care  Prognosis is good  ELOS is 3 weeks  Estimated discharge is home  DVT prophylaxis  • Managed on SQ Lovenox 40 mg daily    GI ppx:  · Starting Protonix 40 mg daily instead of Pepcid as patient high risk for stress ulcer given her history of smoking and now CVA    Bladder plan  • Continent  • Timed Toilet    Bowel plan  • Continent  • +BM on 2/7/23    Code Status  • Full Code       * Stroke - Left posterior limb internal capsule ischemic stroke  Assessment & Plan  Presented 2/4/23 with right sided weakness, difficulty speaking  MRI confirmed left posterior limb internal capsule acute ischemic stroke  Etiology: likely related to hypertensive disease  Work-up for malignancy negative  Echo with EF 60%  Per Neurology placed on DAPT with ASA/Plavix x 21 days, then ASA monotherapy    Plan:  Begin acute rehab program  Secondary CVA ppx: Crestor 40 mg daily  Aspirin 81 mg daily and Plavix 75 mg daily x 21 days (around 2/27/23) then Aspirin 81 mg daily monotherapy  Continue CVA education and reduction of modifiable risk factors naomi HTN, smoking cession  Follow-up with Neurology as outpatient    Pain in left foot, chronic  Assessment & Plan  Continue Tylenol PRN  Also if needed can add back Gabapentin - previously tolerated as outpatient    Prediabetes  Assessment & Plan  HA1C = 6 4  Encourage diabetic diet as stroke risk factor - will switch tomorrow  Consult nutrition to review with patient during ARC stay    Chest pain  Assessment & Plan  In acute care, patient with transient chest pain at rest - troponin negative, EKG without changes  Cardiology consulted, recommended losartan and HCTZ for HTN    Also recommended a change to Crestor instead of Lipitor due to being on Verapamil (CY interaction)  · Outpatient follow-up recommended with Dr Jorje Hinton for further work-up  · While in [facility] monitor VS and any chest pains      Hypertension  Assessment & Plan  Here: managed on Losartan 100 mg daily, HCTZ 12 5 mg daily and Verapamil 120 mg CR daily  Plan per Cardiology  Monitor BP at rest and during activity in [facility] program  Optimize diet (limit salt)  Outpatient follow-up with Cardiology - Dr Jb Shore  At home: Seroquel 25 mg QHS  Here: Seroquel 25 mg QHS + Trazodone 50 mg QHS  Plan to wean down Trazodone over time    Depression  Assessment & Plan  Managed on Cymbalta 60 mg daily (home dose)  Consult placed to Neuropsychology - patient tearful and having difficulty coping with stroke diagnosis  Team to additionally provide emotional support    Obesity (BMI 35 0-39 9 without comorbidity)  Assessment & Plan  Lifestyle and diet modifications when able  Consult nutrition     Headache  Assessment & Plan  Developed in acute care  Treated with Migraine cocktail  Started on Verapamil for prevention by Neurology  Headaches have improved    Plan:  Continue Verapamil 120 mg daily   Monitor for headaches  Follow-up with headache Neurologist as outpatient if persists    Tobacco use  Assessment & Plan  Smoking cessation education frequently during ARC stay  Nicotine patch for cravings - wean dose weekly    Other acquired deformities of left foot  Assessment & Plan  Left hallux valgus and hammertoe surgery by Dr Nidia Umanzor (podiatry)  Chronic hypersensitivity/neuritis  Is able to wear shoes  Patient received injection by Dr Nidia Umanzor in past (2022)  Follow-up with Dr Nidia Umanzor      Subjective:   Patient seen face to face  Patient seen on arrival to [facility]  States an improving headache  Denies chest pain or SOB  Feeling weakness on her right side, but no numbness or tingling  Fearful of having a stroke and tearful during conversation    No other acute issues  Mother at bedside  Later family arrived and answered several questions  Review of Systems   Constitutional: Negative  HENT: Negative  Eyes: Negative  Respiratory: Negative  Cardiovascular: Negative  Gastrointestinal: Negative  Endocrine: Negative  Genitourinary: Negative  Musculoskeletal: Negative  Skin: Negative  Allergic/Immunologic: Negative  Neurological: Negative  Hematological: Negative  Psychiatric/Behavioral: Negative  Function:  Prior level of function and living situation:  Patient resides with her spouse in a [facility] with 3STE home and 9STE and garage  PLOF: Independent with mobility and self care  Was on Disability related to her left foot     Current level of function:  Physical Therapy: Transfers are Min A, Ambulation 70-80 feet with RW needing Min A,   Occupational Therapy: Supervision with grooming, Min A with eating, Min A with UB ADLs, Min A with LB ADLs  Speech Therapy: +Dysarthria     Physical Exam:  /87 (BP Location: Right arm)   Pulse 87   Temp 98 °F (36 7 °C) (Oral)   Resp 20   Ht 5' (1 524 m)   Wt 88 9 kg (196 lb)   SpO2 96%   BMI 38 28 kg/m²      No intake or output data in the 24 hours ending 02/09/23 1946    Body mass index is 38 28 kg/m²  Physical Exam  Vitals and nursing note reviewed  Constitutional:       General: She is not in acute distress  HENT:      Head: Normocephalic and atraumatic  Nose: Nose normal       Mouth/Throat:      Mouth: Mucous membranes are moist    Eyes:      Conjunctiva/sclera: Conjunctivae normal    Cardiovascular:      Rate and Rhythm: Normal rate and regular rhythm  Pulses: Normal pulses  Pulmonary:      Effort: Pulmonary effort is normal       Breath sounds: Normal breath sounds  No wheezing or rales  Comments: Clear  Abdominal:      General: Bowel sounds are normal  There is no distension  Palpations: Abdomen is soft  Tenderness:  There is no abdominal tenderness  Musculoskeletal:         General: No swelling  Cervical back: Neck supple  Right lower leg: No edema  Left lower leg: No edema  Skin:     General: Skin is warm  Neurological:      Mental Status: She is alert and oriented to person, place, and time  Comments: Right facial weakness  Dysarthria  Word finding difficulty  Impaired immediate recall  Sensation to light touch intact x 4 extremities, with the exception of some hypersensitivity on left foot (chronic)  Motor exam:   RUE 3/5 SAB, SF, 3+/5 EE, EF, 4-/5 WE,   LUE 4+/5 throughout  RLE: 3-/5 HF, KE, KF, 4/5 DF, EHL, PF  LLE: 4+/5 throughou     Psychiatric:         Mood and Affect: Mood normal             Labs, medications, and imaging personally reviewed      Laboratory:    Lab Results   Component Value Date    SODIUM 139 02/08/2023    K 4 1 02/08/2023     02/08/2023    CO2 27 02/08/2023    BUN 16 02/08/2023    CREATININE 0 81 02/08/2023    GLUC 112 02/08/2023    CALCIUM 9 2 02/08/2023     Lab Results   Component Value Date    WBC 7 33 02/08/2023    HGB 13 4 02/08/2023    HCT 41 8 02/08/2023     (H) 02/08/2023     02/08/2023     Lab Results   Component Value Date    INR 0 92 02/04/2023    PROTIME 12 6 02/04/2023         Current Facility-Administered Medications:   •  acetaminophen (TYLENOL) tablet 650 mg, 650 mg, Oral, Q6H PRN, Sravani Osullivan MD, 650 mg at 02/09/23 1847  •  albuterol (PROVENTIL HFA,VENTOLIN HFA) inhaler 2 puff, 2 puff, Inhalation, Q4H PRN, Sravani Osullivan MD  •  [START ON 2/10/2023] aspirin chewable tablet 81 mg, 81 mg, Oral, Daily, Sravani Osullivan MD  •  atorvastatin (LIPITOR) tablet 40 mg, 40 mg, Oral, QPM, Sravani Osullivan MD, 40 mg at 02/09/23 1733  •  bisacodyl (DULCOLAX) rectal suppository 10 mg, 10 mg, Rectal, Daily PRN, Sravani Osullivan MD  •  [START ON 2/10/2023] clopidogrel (PLAVIX) tablet 75 mg, 75 mg, Oral, Daily, Sravani Osullivan MD  •  Violette Alberto ON 2/10/2023] DULoxetine (CYMBALTA) delayed release capsule 60 mg, 60 mg, Oral, Daily, Eliane Morales MD  •  [START ON 2/10/2023] enoxaparin (LOVENOX) subcutaneous injection 40 mg, 40 mg, Subcutaneous, Daily, Eliane Morales MD  •  [START ON 2/10/2023] hydrochlorothiazide (HYDRODIURIL) tablet 12 5 mg, 12 5 mg, Oral, Daily, Eliane Morales MD  •  [START ON 2/10/2023] lidocaine (LIDODERM) 5 % patch 1 patch, 1 patch, Topical, Daily, Eliane Morales MD  •  lidocaine (LMX) 4 % cream, , Topical, 4x Daily PRN, SHAILA Felix  •  [START ON 2/10/2023] losartan (COZAAR) tablet 50 mg, 50 mg, Oral, Daily, Eliane Morales MD  •  [START ON 2/10/2023] nicotine (NICODERM CQ) 21 mg/24 hr TD 24 hr patch 21 mg, 21 mg, Transdermal, Daily, Eliane Morales MD  •  ondansetron (ZOFRAN-ODT) dispersible tablet 4 mg, 4 mg, Oral, Q6H PRN, Eliane Morales MD  •  [START ON 2/10/2023] pantoprazole (PROTONIX) EC tablet 40 mg, 40 mg, Oral, Early Morning, Emmy Marshall MD  •  polyethylene glycol (MIRALAX) packet 17 g, 17 g, Oral, Daily PRN, Eliane Morales MD  •  QUEtiapine (SEROquel) tablet 25 mg, 25 mg, Oral, HS PRN, Eliane Morales MD  •  traZODone (DESYREL) tablet 50 mg, 50 mg, Oral, HS, Eliane Morales MD  •  [START ON 2/10/2023] verapamil (CALAN-SR) CR tablet 120 mg, 120 mg, Oral, Daily, Eliane Morales MD  Allergies   Allergen Reactions   • Sertraline Other (See Comments)     Hand swelling, itching      Patient Active Problem List    Diagnosis Date Noted   • Stroke - Left posterior limb internal capsule ischemic stroke 02/04/2023   • Pain in left foot, chronic 02/09/2023   • Chest pain 02/06/2023   • Prediabetes 02/06/2023   • Insomnia 02/05/2023   • Hypertension 02/05/2023   • Depression 02/04/2023   • Obesity (BMI 35 0-39 9 without comorbidity) 03/10/2021   • Ex-smoker for less than 1 year 03/10/2021   • Headache 07/03/2020   • Tobacco use 07/02/2020   • Preoperative clearance 06/30/2020   • Wound of right foot 06/29/2020   • Acquired hallux valgus of left foot 05/06/2019   • Other hammer toe(s) (acquired), left foot 05/06/2019   • Other acquired deformities of left foot 05/06/2019     Past Medical History:   Diagnosis Date   • Acquired deformity of left foot    • Anesthesia complication     difficulty awakening   • Arthritis    • Deaf, left    • Depression    • Hallux valgus of left foot    • Hammer toe of left foot    • Headache    • Kidney stone    • Sciatic pain, right     intermittent     Past Surgical History:   Procedure Laterality Date   • BREAST BIOPSY Right 03/04/2021   • BREAST BIOPSY Right 3/10/2021    Procedure: Excisional Breast debridement  7 o'clock position;  Surgeon: Petey Mcgarry MD;  [facility];  Service: General   • CHOLECYSTECTOMY     • CLOSURE DELAYED PRIMARY Right 7/5/2020    Procedure: CLOSURE DELAYED PRIMARY and application of skin graft substitute;  Surgeon: Esther Kohli DPM;  [facility];  Service: Podiatry   • HERNIA REPAIR     • INCISION AND DRAINAGE OF WOUND Right 7/1/2020    Procedure: INCISION AND DRAINAGE (I&D) EXTREMITY;  Surgeon: Esther Kohli DPM;  [facility];  Service: Podiatry   • W[place] CORRJ HALLUX VALGUS W/SESMDC W/1METAR MEDIAL CNF Left 11/12/2021    Procedure: Madhuri Goodenin;  Surgeon: Esther Kohli DPM;  [facility];  Service: Podiatry   • WY OSTEOT W/ [address] SHRT/CORRJ METAR XCP 1ST EA Left 6/14/2019    Procedure: 3rd Metatarsal Osteotomy;  Surgeon: Yajaira Dow DPM;  [facility];  Service: Podiatry   • W[place] OSTEOT W/ [address] SHRT/CORRJ METAR XCP 1ST EA Left 11/12/2021    Procedure: OSTEOTOMY 2ND METATARSAL;  Surgeon: Esther Kohli DPM;  [facility];  Service: Podiatry   • TUBAL LIGATION     • US GUIDED BREAST BIOPSY RIGHT COMPLETE Right 3/4/2021   • VAC DRESSING APPLICATION Right 6/4/0671    Procedure: APPLICATION VAC DRESSING;  Surgeon: Esther Kohli DPM;  [facility];  Service: Podiatry     Social History     Socioeconomic History   • Marital status: Single     Spouse name: Not on file   • Number of children: Not on file   • Years of education: Not on file   • Highest education level: Not on file   Occupational History   • Not on file   Tobacco Use   • Smoking status: Some Days     Packs/day: 1 00     Years: 15 00     Pack years: 15 00     Types: Cigarettes     Last attempt to quit: 2021     Years since quittin 2   • Smokeless tobacco: Never   • Tobacco comments:     trying to quit - using judson  patch   Vaping Use   • Vaping Use: Never used   Substance and Sexual Activity   • Alcohol use: Not Currently   • Drug use: Not Currently   • Sexual activity: Yes   Other Topics Concern   • Not on file   Social History Narrative   • Not on file     Social Determinants of Health     Financial Resource Strain: Not on file   Food Insecurity: No Food Insecurity   • Worried About Running Out of Food in the Last Year: Never true   • Ran Out of Food in the Last Year: Never true   Transportation Needs: No Transportation Needs   • Lack of Transportation (Medical): No   • Lack of Transportation (Non-Medical):  No   Physical Activity: Not on file   Stress: Not on file   Social Connections: Not on file   Intimate Partner Violence: Not on file   Housing Stability: Low Risk    • Unable to Pay for Housing in the Last Year: No   • Number of Places Lived in the Last Year: 1   • Unstable Housing in the Last Year: No     Social History     Tobacco Use   Smoking Status Some Days   • Packs/day: 1 00   • Years: 15 00   • Pack years: 15 00   • Types: Cigarettes   • Last attempt to quit: 2021   • Years since quittin 2   Smokeless Tobacco Never   Tobacco Comments    trying to quit - using judson  patch     Social History     Substance and Sexual Activity   Alcohol Use Not Currently     Family History   Problem Relation Age of Onset   • No Known Problems Mother    • No Known Problems Father    • No Known Problems Sister    • No Known Problems Daughter    • No Known Problems Maternal Grandmother    • Colon cancer Maternal Grandfather    • No Known Problems Paternal Grandmother    • No Known Problems Paternal Grandfather          Medical Necessity Criteria for [facility] Admission: Hypertension, Bowel/Bladder Management and Stroke ppx, stroke education, mood management, headaches, smoking cession, HLD, insomnia  In addition, the preadmission screen, post-admission physical evaluation, overall plan of care and admissions order demonstrate a reasonable expectation that the following criteria were met at the time of admission to the [facility]  1  The patient requires active and ongoing therapeutic intervention of multiple therapy disciplines (physical therapy, occupational therapy, speech-language pathology, or prosthetics/orthotics), one of which is physical or occupational therapy  2  Patient requires an intensive rehabilitation therapy program, as defined in Chapter 1, section 110 2 2 of the CMS Medicare Policy Manual  This intensive rehabilitation therapy program will consist of at least 3 hours of therapy per day at least 5 days per week or at least 15 hours of intensive rehabilitation therapy within a 7 consecutive day period, beginning with the date of admission to the [facility]  3  The patient is reasonably expected to actively participate in, and benefit significantly from, the intensive rehabilitation therapy program as defined in Chapter 1, section 110 2 2 of the CMS Medicare Policy Manual at this time of admission to the [facility]  She can reasonably be expected to make measurable improvement (that will be of practical value to improve the patient’s functional capacity or adaptation to impairments) as a result of the rehabilitation treatment, as defined in section 110 3, and such improvement can be expected to be made within the prescribed period of time   As noted in the CMS Medicare Policy Manual, the patient need not be expected to achieve complete independence in the domain of self-care nor be expected to return to his or her prior level of functioning in order to meet this standard  4  The patient must require physician supervision by a rehabilitation physician  As such, a rehabilitation physician will conduct face-to-face visits with the patient at least 3 days per week throughout the patient’s stay in the [facility] to assess the patient both medically and functionally, as well as to modify the course of treatment as needed to maximize the patient’s capacity to benefit from the rehabilitation process  5  The patient requires an intensive and coordinated interdisciplinary approach to providing rehabilitation, as defined in Chapter 1, section 110 2 5 of the CMS Medicare Policy Manual  This will be achieved through periodic team conferences, conducted at least once in a 7-day period, and comprising of an interdisciplinary team of medical professionals consisting of: a rehabilitation physician, registered nurse,  and/or , and a licensed/certified therapist from each therapy discipline involved in treating the patient  Changes Since Pre-admission Assessment: None -This patient's participation in rehab continues to be reasonable, necessary and appropriate  CMS Required Post-Admission Physician Evaluation Elements  History and Physical, including medical history, functional history and active comorbidities as in above text  Post-Admission Physician Evaluation:  The patient has the potential to make improvement and is in need of physical, occupational, and/or therapy services  The patient may also need nutritional services  Given the patient's complex medical condition and risk of further medical complications, rehabilitative services cannot be safely provided at a lower level of care, such as a skilled nursing facility   I have reviewed the patient's functional and medical status at the time of the preadmission screening and they are the same as on the day of this admission  I acknowledge that I have personally performed a full physical examination on this patient within 24 hours of admission  The patient and/or family demonstrated understanding the rehabilitation program and the discharge process after we discussed them  Agree in entirety: yes  Minor adaptions: none    Major changes: none    Leander Sage MD    ** Please Note: Fluency Direct voice to text software may have been used in the creation of this document  **      Total time spent:  90 minutes with more than 50% spent counseling/coordinating care  Counseling includes extended discussion with patient (+/- family/relevant historian)  re: history, symptoms, medications, functional issues, mood, medical and rehabilitation management plan, and disposition  Additional time spent with thorough chart review in EMR, reviewing recent medications, labs, imaging, and management plan  This was followed by formulating a comprehensive inpatient medical/rehabilitation management plan, and coordinating with pertinent specialists as indicated

## 2023-02-09 NOTE — CONSULTS
Consultation - Cardiology Team Evie Parson Dec 62 y o  female MRN: [id number]  Unit/Bed#: [facility] 708-01 Encounter: [id number]    Inpatient consult to Cardiology  Consult performed by: Benigno Canseco PA-C  Consult ordered by: Ovidio Robbins DO          Physician Requesting Consult: Alie William MD  Reason for Consult / Principal Problem: Chest pain    Assessment:    1  Atypical chest pain: Reported substernal chest pain on 2/5 with negative troponin and no ischemic changes noted on EKG  Had intermittent resting substernal chest pain on 2/7 with shortness of breath lasting 1-2 minutes that occurred 3 times and resolved with repositioning in bed  He denies any exertional chest pain  Troponin is negative x1  CTA with no significant coronary calcifications  Denies any recurrent chest pain  2   Preserved biventricular systolic function: EF 98% with no WMA, normal diastolic function, normal RV function, no significant valvular abnormality  Volume status appears stable  3   CVA: Presented with right-sided weakness and slurred speech  MRI with left posterior limb internal capsule/thalamic acute to subacute ischemia  Patient was outside window for tPA  Currently on aspirin, Plavix and statin per neurology  CT C/A/P was negative for malignancy  Thrombosis panel pending  4   Headache: Reports chronic daily headache  Verapamil 120 mg daily initiated by neurology  5  Hyperlipidemia: , , HDL 43,  on atorvastatin 20 mg daily increased to 40 mg this admission  Xanthelasma noted on physical exam   6   Depression: Maintained on Cymbalta  7   Tobacco abuse: 20-pack-year history of tobacco abuse smoking 1 PPD prior to admission    Complete smoking cessation advised    Plan/Recommendations:  · Patient chest pain atypical without evidence of ACS  · Repeat EKG now as last EKG is from 2/5  · Start losartan 50 mg daily and HCTZ 12 5 mg daily optimize BP  · Continue high intensity statin  · DAPT per neurology  · Patient stable for discharge to rehab  · We will plan for outpatient follow-up with Dr Afia Ortega and consider ischemic evaluation at that time  __________________________________________________________    CC: CVA/TIA symptoms      History of Present Illness   HPI: Kenia Ro is a 62y o  year old female who has hyperlipidemia, depression, tobacco abuse presented to the emergency room at [place] [address] on 2/4/2023 as a stroke alert with sudden facial droop and difficulty speaking  Patient reported to have right-sided weakness and slurred speech  CTh revealed no acute intracranial abnormality  CTA head and neck revealed no major stenosis, occlusion or thrombosis  Was evaluated by neurology and noticed that the symptoms were improving  Given onset of symptoms patient was outside the window for tPA  MRI revealed a left posterior limb internal capsule/thalamic acute to subacute ischemia  She was started on DAPT with aspirin and Plavix  Echocardiogram was done that revealed preserved biventricular systolic function  CT chest/abdomen/pelvis was negative for malignancy  CVA was felt likely secondary to hypertension  Patient complained of chest pain on 2/5  Troponin was negative x3 and EKG was negative for acute ischemic change  Patient had recurrent chest pain on 2/8 described as a heavy tight feeling in the chest with associated shortness of breath  Repeat troponin was negative  Cardiology was consulted for further evaluation and management  Medication regimen includes atorvastatin 20 mg daily, aspirin 325 mg daily  Patient resting in a chair during consultation and denies any current chest pain, shortness of breath, palpitations, lightheadedness, dizziness, syncope, near syncope, orthopnea, PND or lower extremity edema  She complains of left hand tremor that started today    Patient reports that when she had her 3 episodes of chest pain they were substernal lasting 1-2 minutes that occurred while she was laying in bed  With movement and sitting up her pain improved  Denies any exertional chest pain  Prior to the stroke she was ambulating stairs carrying laundry and denied any chest pain or unusual dyspnea with this level of exertion  Echocardiogram 2/5/2023: EF 60% with no WMA, normal diastolic function, normal RV function, no significant valvular abnormality  EKG reviewed personally: 2/5/2023- Normal sinus rhythm at a rate of 90 bpm with normal axis and intervals with no acute ischemic change  No significant change compared to the EKG from 2/4/2023  Telemetry reviewed personally: Sinus rhythm with heart rates in the 90s        Review of Systems   Constitutional: Negative  Negative for chills  Cardiovascular: Negative for chest pain, dyspnea on exertion, leg swelling, near-syncope, orthopnea, palpitations, paroxysmal nocturnal dyspnea and syncope  Respiratory: Negative  Negative for cough, shortness of breath and wheezing  Endocrine: Negative  Hematologic/Lymphatic: Negative  Skin: Negative  Musculoskeletal: Negative  Left hand is shaking which is new today   Gastrointestinal: Negative  Negative for diarrhea, nausea and vomiting  Neurological: Negative for dizziness, light-headedness and weakness  Right-sided weakness   Psychiatric/Behavioral: Negative  Negative for altered mental status  All other systems reviewed and are negative      Historical Information   Past Medical History:   Diagnosis Date   • Acquired deformity of left foot    • Anesthesia complication     difficulty awakening   • Arthritis    • Deaf, left    • Depression    • Hallux valgus of left foot    • Hammer toe of left foot    • Headache    • Kidney stone    • Sciatic pain, right     intermittent     Past Surgical History:   Procedure Laterality Date   • BREAST BIOPSY Right 03/04/2021   • BREAST BIOPSY Right 3/10/2021    Procedure: Excisional Breast debridement  7 o'clock position;  Surgeon: Jose Guadalupe Restrepo MD;  [facility];  Service: General   • CHOLECYSTECTOMY     • CLOSURE DELAYED PRIMARY Right 2020    Procedure: CLOSURE DELAYED PRIMARY and application of skin graft substitute;  Surgeon: Jyoti Chen DPM;  Location[facility];  Service: Podiatry   • HERNIA REPAIR     • INCISION AND DRAINAGE OF WOUND Right 2020    Procedure: INCISION AND DRAINAGE (I&D) EXTREMITY;  Surgeon: Jyoti Chen DPM;  Location[facility];  Service: Podiatry   • Piroska U  97  W/SESMDC W/1METAR MEDIAL CNF Left 2021    Procedure: Therese Chimes;  Surgeon: Jyoti Chen DPM;  [facility];  Service: Podiatry   • OK OSTEOT W/ [employer] SHRT/CORRJ METAR XCP 1ST EA Left 2019    Procedure: 3rd Metatarsal Osteotomy;  Surgeon: Dorita Galloway DPM;  [facility];  Service: Podiatry   • OK OSTEOT W/ [employer] SHRT/CORRJ METAR XCP 1ST EA Left 2021    Procedure: OSTEOTOMY 2ND METATARSAL;  Surgeon: Jyoti Chen DPM;  [facility];  Service: Podiatry   • TUBAL LIGATION     • US GUIDED BREAST BIOPSY RIGHT COMPLETE Right 3/4/2021   • VAC DRESSING APPLICATION Right     Procedure: APPLICATION VAC DRESSING;  Surgeon: Jyoti Chen DPM;  [facility];  Service: Podiatry     Social History     Substance and Sexual Activity   Alcohol Use Not Currently     Social History     Substance and Sexual Activity   Drug Use No     Social History     Tobacco Use   Smoking Status Some Days   • Packs/day: 1 00   • Years: 15 00   • Pack years: 15 00   • Types: Cigarettes   • Last attempt to quit: 2021   • Years since quittin 2   Smokeless Tobacco Never   Tobacco Comments    trying to quit - using judson  patch     Family History:   Family History   Problem Relation Age of Onset   • No Known Problems Mother    • No Known Problems Father    • No Known Problems Sister    • No Known Problems Daughter    • No Known Problems Maternal Grandmother    • Colon cancer Maternal Grandfather    • No Known Problems Paternal Grandmother    • No Known Problems Paternal Grandfather        Meds/Allergies   all current active meds have been reviewed, current meds:   Current Facility-Administered Medications   Medication Dose Route Frequency   • acetaminophen (TYLENOL) tablet 650 mg  650 mg Oral Q6H PRN   • albuterol (PROVENTIL HFA,VENTOLIN HFA) inhaler 2 puff  2 puff Inhalation Q4H PRN   • aspirin chewable tablet 81 mg  81 mg Oral Daily   • atorvastatin (LIPITOR) tablet 40 mg  40 mg Oral QPM   • clopidogrel (PLAVIX) tablet 75 mg  75 mg Oral Daily   • DULoxetine (CYMBALTA) delayed release capsule 60 mg  60 mg Oral Daily   • enoxaparin (LOVENOX) subcutaneous injection 40 mg  40 mg Subcutaneous Daily   • famotidine (PEPCID) tablet 20 mg  20 mg Oral BID   • lidocaine (LIDODERM) 5 % patch 1 patch  1 patch Topical Daily   • nicotine (NICODERM CQ) 21 mg/24 hr TD 24 hr patch 21 mg  21 mg Transdermal Daily   • ondansetron (ZOFRAN) injection 4 mg  4 mg Intravenous Q6H PRN   • QUEtiapine (SEROquel) tablet 25 mg  25 mg Oral HS PRN   • traZODone (DESYREL) tablet 50 mg  50 mg Oral Once   • traZODone (DESYREL) tablet 50 mg  50 mg Oral HS   • verapamil (CALAN-SR) CR tablet 120 mg  120 mg Oral Daily    and PTA meds:   Prior to Admission Medications   Prescriptions Last Dose Informant Patient Reported? Taking?    DULoxetine (CYMBALTA) 30 mg delayed release capsule Not Taking  Yes No   Sig: Take 30 mg by mouth daily   Patient not taking: Reported on 2/4/2023   DULoxetine (CYMBALTA) 60 mg delayed release capsule 2/3/2023  Yes Yes   Sig: TAKE 2 CAPSULES (120 MG TOTAL) B MOUTH DAILY   Diclofenac Sodium (VOLTAREN) 1 % Not Taking  No No   Sig: Apply 2 g topically 4 (four) times a day   Patient not taking: Reported on 2/4/2023   QUEtiapine (SEROquel) 25 mg tablet Past Week  Yes Yes   Sig: Take 25 mg by mouth every evening   acetaminophen (TYLENOL) 650 mg CR tablet   Yes No   Sig: Take 650 mg by mouth every 8 (eight) hours as needed   aspirin (ECOTRIN) 325 mg EC tablet Not Taking  No No   Sig: Take 1 tablet (325 mg total) by mouth daily   Patient not taking: Reported on 2023   atorvastatin (LIPITOR) 20 mg tablet Past Week  Yes Yes   Sig: Take 20 mg by mouth every evening     cyclobenzaprine (FLEXERIL) 10 mg tablet Not Taking  Yes No   Sig: take 1 tablet by mouth three times a day for muscle spasm   Patient not taking: Reported on 2023   gabapentin (NEURONTIN) 300 mg capsule   No No   Sig: Take 1 capsule (300 mg total) by mouth 2 (two) times a day   ibuprofen (MOTRIN) 600 mg tablet   No No   Sig: Take 1 tablet (600 mg total) by mouth every 6 (six) hours as needed for mild pain for up to 30 days   lidocaine-prilocaine (EMLA) cream Not Taking  No No   Sig: Apply topically as needed for mild pain or moderate pain   Patient not taking: Reported on 2023   meclizine (ANTIVERT) 25 mg tablet Past Month  Yes Yes   Si every 8 hours as needed   naproxen (NAPROSYN) 500 mg tablet Past Month  Yes Yes   Sig: Take 500 mg by mouth 2 (two) times a day as needed Take with food   nicotine (NICODERM CQ) 14 mg/24hr TD 24 hr patch Not Taking  Yes No   Sig: Place 1 patch on the skin every 24 hours   Patient not taking: Reported on 2023   oxyCODONE-acetaminophen (Percocet) 5-325 mg per tablet Not Taking  No No   Sig: Take 1 tablet by mouth every 6 (six) hours as needed for severe pain for up to 30 doses Max Daily Amount: 4 tablets   Patient not taking: Reported on 2/15/2022   oxyCODONE-acetaminophen (Percocet) 5-325 mg per tablet Not Taking  No No   Sig: Take 1 tablet by mouth every 4 (four) hours as needed for moderate pain for up to 15 doses Max Daily Amount: 15 tablets   Patient not taking: Reported on 2023   traZODone (DESYREL) 50 mg tablet Not Taking  Yes No   Sig: take 1 tablet by mouth nightly - TAKE AT BEDTIME   Patient not taking: Reported on 2023      Facility-Administered Medications: None Allergies   Allergen Reactions   • Sertraline Other (See Comments)     Hand swelling, itching       Objective   Vitals: Blood pressure 152/84, pulse (!) 109, temperature 98 2 °F (36 8 °C), resp  rate 16, height 5' (1 524 m), weight 87 1 kg (192 lb), SpO2 93 %, not currently breastfeeding ,     Body mass index is 37 5 kg/m²  ,     Systolic (87XJZ), SUV:272 , Min:140 , UVV:315     Diastolic (77JCD), CCV:24, Min:84, Max:108    Wt Readings from Last 3 Encounters:   02/05/23 87 1 kg (192 lb)   02/04/23 87 1 kg (192 lb)   05/13/22 88 5 kg (195 lb)      Lab Results   Component Value Date    CREATININE 0 81 02/08/2023    CREATININE 0 84 02/07/2023    CREATININE 0 70 02/06/2023             Intake/Output Summary (Last 24 hours) at 2/9/2023 0841  Last data filed at 2/9/2023 2706  Gross per 24 hour   Intake 1120 ml   Output --   Net 1120 ml     Weight (last 2 days)     None        Invasive Devices     Peripheral Intravenous Line  Duration           Peripheral IV 02/06/23 Right Antecubital 3 days                  Physical Exam  Vitals and nursing note reviewed  Constitutional:       General: She is not in acute distress  Appearance: She is well-developed  She is obese  Comments: On RA in NAD   HENT:      Head: Normocephalic and atraumatic  Eyes:      Comments: Xanthelasma noted bilateral eyelids   Neck:      Vascular: No JVD  Cardiovascular:      Rate and Rhythm: Normal rate and regular rhythm  Heart sounds: Normal heart sounds  No murmur heard  No friction rub  Pulmonary:      Effort: Pulmonary effort is normal  No respiratory distress  Breath sounds: Normal breath sounds  No wheezing or rales  Abdominal:      General: Bowel sounds are normal  There is no distension  Palpations: Abdomen is soft  Tenderness: There is no abdominal tenderness  Musculoskeletal:         General: No tenderness  Normal range of motion  Cervical back: Normal range of motion and neck supple        Right lower leg: No edema  Left lower leg: No edema  Skin:     General: Skin is warm and dry  Findings: No erythema  Neurological:      Mental Status: She is alert and oriented to person, place, and time  Comments: Right facial droop   Psychiatric:         Mood and Affect: Mood normal          Behavior: Behavior normal          Thought Content:  Thought content normal          Judgment: Judgment normal            LABORATORY RESULTS:      CBC with diff:   Results from last 7 days   Lab Units 02/08/23 0454 02/07/23  0607 02/06/23  0639 02/05/23  0615 02/04/23  1314   WBC Thousand/uL 7 33 8 39 8 65 8 16 9 32   HEMOGLOBIN g/dL 13 4 13 3 14 4 13 7 15 1   HEMATOCRIT % 41 8 42 2 44 2 42 8 46 5*   MCV fL 100* 102* 98 100* 99*   PLATELETS Thousands/uL 322 304 340 319 352   MCH pg 32 1 32 1 31 9 31 9 32 0   MCHC g/dL 32 1 31 5 32 6 32 0 32 5   RDW % 14 7 14 8 14 6 14 9 14 8   MPV fL 9 5 9 4 9 6 9 4 9 4   NRBC AUTO /100 WBCs  --   --   --  0  --        CMP:  Results from last 7 days   Lab Units 02/08/23 0454 02/07/23  0607 02/06/23  0639 02/05/23  0615 02/04/23  1314   POTASSIUM mmol/L 4 1 4 1 3 6 3 8 4 0   CHLORIDE mmol/L 108 109* 110* 110* 108   CO2 mmol/L 27 25 24 25 26   BUN mg/dL 16 20 13 9 11   CREATININE mg/dL 0 81 0 84 0 70 0 64 0 88   CALCIUM mg/dL 9 2 8 9 9 2 9 2 9 6   AST U/L  --   --   --  20  --    ALT U/L  --   --   --  27  --    ALK PHOS U/L  --   --   --  88  --    EGFR ml/min/1 73sq m 80 77 96 99 73       BMP:  Results from last 7 days   Lab Units 02/08/23 0454 02/07/23  0607 02/06/23  0639 02/05/23  0615 02/04/23  1314   POTASSIUM mmol/L 4 1 4 1 3 6 3 8 4 0   CHLORIDE mmol/L 108 109* 110* 110* 108   CO2 mmol/L 27 25 24 25 26   BUN mg/dL 16 20 13 9 11   CREATININE mg/dL 0 81 0 84 0 70 0 64 0 88   CALCIUM mg/dL 9 2 8 9 9 2 9 2 9 6          No results found for: NTBNP                Results from last 7 days   Lab Units 02/05/23  0615   HEMOGLOBIN A1C % 6 4*              Results from last 7 days Lab Units 02/04/23  1314   INR  0 92     Lipid Profile:   No results found for: CHOL  Lab Results   Component Value Date    HDL 43 (L) 02/05/2023    HDL 39 (L) 10/27/2021     Lab Results   Component Value Date    LDLCALC 171 (H) 02/05/2023    LDLCALC 180 (H) 10/27/2021     Lab Results   Component Value Date    TRIG 132 02/05/2023    TRIG 185 (H) 10/27/2021         Cardiac testing:   No results found for this or any previous visit  No results found for this or any previous visit  No valid procedures specified  No results found for this or any previous visit  Imaging: I have personally reviewed pertinent reports  MRI brain wo contrast    Result Date: 2/4/2023  Narrative: MRI BRAIN WITHOUT CONTRAST INDICATION: slurring of speech and L facial droop  COMPARISON:   None  TECHNIQUE:  Multiplanar, multisequence imaging of the brain was performed  IMAGE QUALITY:  Diagnostic  FINDINGS: BRAIN PARENCHYMA:  There is no discrete mass, mass effect or midline shift  There is no intracranial hemorrhage  Left posterior limb internal capsule/thalamic diffusion restriction with T-2/flair C abnormality consistent with acute ischemia  Small scattered hyperintensities on T2/FLAIR imaging are noted in the periventricular and subcortical white matter demonstrating an appearance that is statistically most likely to represent mild microangiopathic change  VENTRICLES:  Normal for the patient's age  SELLA AND PITUITARY GLAND:  Normal  ORBITS:  Normal  PARANASAL SINUSES:  Normal  VASCULATURE:  Evaluation of the major intracranial vasculature demonstrates appropriate flow voids  CALVARIUM AND SKULL BASE:  Normal  EXTRACRANIAL SOFT TISSUES:  Normal      Impression: Left posterior limb internal capsule/thalamic acute to subacute ischemia  Findings were marked as "immediate"in Epic and will now be related to the ordering physician or covering clinical team by the radiology liaison   Workstation performed: [id number]     MRI brain w contrast    Result Date: 2/6/2023  Narrative: MRI BRAIN WITHOUT AND WITH CONTRAST INDICATION:  worsening headaches that is new since menopause  Recent brain MR with findings of ischemia  COMPARISON:  2/4/2023 TECHNIQUE: Axial T2 and multiplanar postcontrast T1-weighted images obtained    IV Contrast:  8 mL of Gadobutrol injection (SINGLE-DOSE) IMAGE QUALITY:  Diagnostic  FINDINGS: BRAIN PARENCHYMA:  Persistent T2 hyperintensity in the left thalamus/posterior limb of the internal capsule  No hemorrhage, extra-axial fluid collections, mass effect or midline shift  No abnormal enhancement  VENTRICLES:  Stable size and configuration, within normal limits for age  SELLA AND PITUITARY GLAND:  Within normal limits  ORBITS:  Within normal limits  PARANASAL SINUSES:  Well aerated  VASCULATURE:  Preserved skull base flow voids  Normal vascular enhancement  CALVARIUM AND SKULL BASE:  Within normal limits  Mastoid air cells clear  EXTRACRANIAL SOFT TISSUES:  Within normal limits  Impression: No abnormal enhancement  No acute change compared to yesterday  Workstation performed: [id number]     CT chest abdomen pelvis w contrast    Result Date: 2/6/2023  Narrative: CT CHEST, ABDOMEN AND PELVIS WITH IV CONTRAST INDICATION:   stroke in young, r/o hypercoagulable state/malignancy  COMPARISON:  Prior renal stone CT examinations of the abdomen and pelvis most recently March 26, 2017 TECHNIQUE: CT examination of the chest, abdomen and pelvis was performed  Axial, sagittal, and coronal 2D reformatted images were created from the source data and submitted for interpretation  Radiation dose length product (DLP) for this visit:  1154 06 mGy-cm   This examination, like all CT scans performed in the [facility], was performed utilizing techniques to minimize radiation dose exposure, including the use of iterative reconstruction and automated exposure control   IV Contrast:  90 mL of iohexol (OMNIPAQUE) Enteric Contrast: Enteric contrast was administered  FINDINGS: CHEST LUNGS:  Hypoventilatory changes in the dependant lung zones  No focal consolidative airspace opacity  There is no tracheal or endobronchial lesion  PLEURA:  Unremarkable  HEART/GREAT VESSELS: Heart is unremarkable for patient's age  No thoracic aortic aneurysm  MEDIASTINUM AND JOSH:  Unremarkable  CHEST WALL AND LOWER NECK:  Unremarkable  ABDOMEN LIVER/BILIARY TREE:  There is a 6 mm circumscribed hypodensity in the periphery of segment 7 of the right hepatic lobe, statistically most likely to represent subcentimeter hepatic cyst   No suspicious solid hepatic lesion is identified  Hepatic contours are normal   No biliary dilatation  GALLBLADDER:  Gallbladder is surgically absent  SPLEEN:  Unremarkable  PANCREAS:  Pancreas is somewhat decreased in overall density probably representing mild fatty replacement replacement, not significantly changed from March 26, 2017  No suspicious pancreatic mass  No pancreatic ductal enlargement  ADRENAL GLANDS:  Unremarkable  KIDNEYS/URETERS:  Unremarkable  No hydronephrosis  STOMACH AND BOWEL:  Unremarkable  APPENDIX:  No findings to suggest appendicitis  ABDOMINOPELVIC CAVITY:  No ascites  No pneumoperitoneum  No lymphadenopathy  VESSELS:  Unremarkable for patient's age  PELVIS REPRODUCTIVE ORGANS:  Unremarkable for patient's age  URINARY BLADDER:  Unremarkable  ABDOMINAL WALL/INGUINAL REGIONS:  There is a small fat-containing umbilical hernia  Small fat containing right inguinal hernia  OSSEOUS STRUCTURES:  No acute fracture or destructive osseous lesion  Impression: No CT evidence for malignancy in the chest, abdomen and pelvis  Workstation performed: [id number]     CT stroke alert brain    Result Date: 2/4/2023  Narrative: CT BRAIN - STROKE ALERT PROTOCOL INDICATION:   Stroke Alert  COMPARISON:  None  TECHNIQUE:  CT examination of the brain was performed    In addition to axial images, coronal reformatted images were created and submitted for interpretation  Radiation dose length product (DLP) for this visit:  813 89 mGy-cm   This examination, like all CT scans performed in the [facility], was performed utilizing techniques to minimize radiation dose exposure, including the use of iterative  reconstruction and automated exposure control  IMAGE QUALITY:  Diagnostic  FINDINGS:  PARENCHYMA:  No intracranial mass, mass effect or midline shift  No CT signs of acute infarction  No acute parenchymal hemorrhage  Normal intracranial vasculature  VENTRICLES AND EXTRA-AXIAL SPACES:  Normal for the patient's age  VISUALIZED ORBITS: Normal visualized orbits  PARANASAL SINUSES: Small amount of fluid is seen layering within the sphenoid sinus CALVARIUM AND EXTRACRANIAL SOFT TISSUES:   Normal      Impression: No acute intracranial abnormality  Findings were directly discussed with Frida Rees at 1:40 PM  Workstation performed: [id number]     CTA stroke alert (head/neck)    Result Date: 2/4/2023  Narrative: CTA NECK AND BRAIN WITH CONTRAST INDICATION: Stroke Alert   Right-sided weakness and slurred speech  COMPARISON:   None  TECHNIQUE:   Post contrast imaging was performed after administration of iodinated contrast through the neck and brain  Post contrast axial 0 625 mm images timed to opacify the arterial system  3D rendering was performed on an independent workstation  MIP reconstructions performed  Coronal reconstructions were performed of the noncontrast portion of the brain  Radiation dose length product (DLP) for this visit:  464 54 mGy-cm   This examination, like all CT scans performed in the [facility], was performed utilizing techniques to minimize radiation dose exposure, including the use of iterative  reconstruction and automated exposure control     IV Contrast:  85 mL of iohexol (OMNIPAQUE)  IMAGE QUALITY:   Diagnostic FINDINGS: CERVICAL VASCULATURE AORTIC ARCH AND GREAT VESSELS:  Normal aortic arch and great vessel origins  Normal visualized subclavian vessels  RIGHT VERTEBRAL ARTERY CERVICAL SEGMENT:  Normal origin  The vessel is normal in caliber throughout the neck  LEFT VERTEBRAL ARTERY CERVICAL SEGMENT:  Normal origin  The vessel is normal in caliber throughout the neck  There is a portion of the V1 segment which is obscured by beam hardening artifact from contrast refluxing into adjacent veins  RIGHT EXTRACRANIAL CAROTID SEGMENT:  Normal caliber common carotid artery  Normal bifurcation and cervical internal carotid artery  No stenosis or dissection  LEFT EXTRACRANIAL CAROTID SEGMENT:  Normal caliber common carotid artery  Normal bifurcation and cervical internal carotid artery  No stenosis or dissection  NASCET criteria was used to determine the degree of internal carotid artery diameter stenosis  INTRACRANIAL VASCULATURE INTERNAL CAROTID ARTERIES:  Normal enhancement of the intracranial portions of the internal carotid arteries  Normal ophthalmic artery origins  Normal ICA terminus  ANTERIOR CIRCULATION:  There is a markedly dominant right A1 segment with an absent or severely hypoplastic left A1 segment  There is a single A2 branch bifurcating anterior to the corpus callosum into 2 separate A3 branches  MIDDLE CEREBRAL ARTERY CIRCULATION:  M1 segment and middle cerebral artery branches demonstrate normal enhancement bilaterally  DISTAL VERTEBRAL ARTERIES:  Normal distal vertebral arteries  Posterior inferior cerebellar artery origins are normal  Normal vertebral basilar junction  BASILAR ARTERY:  Basilar artery is normal in caliber  Normal superior cerebellar arteries  POSTERIOR CEREBRAL ARTERIES: The right posterior cerebral artery arises from the basilar tip  There is fetal origin of the left posterior cerebral artery  Both demonstrate no focal stenosis  Absent or severely hypoplastic left P1 segment   VENOUS STRUCTURES:  Normal  NON VASCULAR ANATOMY BONY STRUCTURES:  No acute osseous abnormality  SOFT TISSUES OF THE NECK:  Normal  THORACIC INLET:  Unremarkable  Impression: There are some anatomic variations of the intracranial circulation as described above with no stenosis, occlusion or thrombosis of the cervical or intracranial vasculature  Findings were directly discussed with Billy Reddy at approximately 1:40 PM  Workstation performed: [id number]     Echo complete w/ contrast if indicated    Result Date: 2/5/2023  Narrative: •  Left Ventricle: Left ventricular cavity size is normal  Wall thickness is normal  The left ventricular ejection fraction is 60%  Systolic function is normal  Wall motion is normal  Diastolic function is normal  •  Technically difficult study  Counseling / Coordination of Care  Total floor / unit time spent today 45 minutes  Greater than 50% of total time was spent with the patient and / or family counseling and / or coordination of care  A description of the counseling / coordination of care: Review of history, current assessment, development of a plan  Code Status: Level 1 - Full Code    ** Please Note: Dragon 360 Dictation voice to text software may have been used in the creation of this document   **

## 2023-02-09 NOTE — OCCUPATIONAL THERAPY NOTE
Occupational Therapy Progress Note     Patient Name: Hesham Guerrier  MSKHQ'C Date: 2/9/2023  Problem List  Principal Problem:    Stroke [facility])  Active Problems:    Headache    Depression    Restlessness    Hypertension    Chest pain    Prediabetes       02/09/23 0831   OT Last Visit   OT Visit Date 02/09/23   Note Type   Note Type Treatment   Pain Assessment   Pain Location/Orientation Orientation: Right;Location: Shoulder   Pain Onset/Description Onset: Gradual  (w/ activity)   Hospital Pain Intervention(s) Repositioned   Pain Rating: FLACC (Rest) - Face 0   Pain Rating: FLACC (Rest) - Legs 0   Pain Rating: FLACC (Rest) - Activity 0   Pain Rating: FLACC (Rest) - Cry 0   Pain Rating: FLACC (Rest) - Consolability 0   Score: FLACC (Rest) 0   Pain Rating: FLACC (Activity) - Face 0   Pain Rating: FLACC (Activity) - Legs 0   Pain Rating: FLACC (Activity) - Activity 0   Pain Rating: FLACC (Activity) - Cry 0   Pain Rating: FLACC (Activity) - Consolability 0   Score: FLACC (Activity) 0   Restrictions/Precautions   Weight Bearing Precautions Per Order No   Other Precautions Chair Alarm; Bed Alarm; Fall Risk   Lifestyle   Autonomy Pt was I in ADLs, IADLs, and had some A with IADLs  Reciprocal Relationships Pt is currently living with her son but her son will no longer be living with her in about 2 weeks  Service to Others Pt is currently not working  Intrinsic Gratification Pt enjoys being with her grandkids  ADL   Where Assessed Chair   Eating Assistance 5  Supervision/Setup   Eating Deficit Increased time to complete   Eating Comments Pt used RUE to bring drink w/ straw to mouth  She placed cup on IV pole computer across body  Grooming Assistance 4  Minimal Assistance   Grooming Deficit Wash/dry hands; Wash/dry face; Teeth care; Increased time to complete   Grooming Comments Pt performed teeth care (mouthwash) while standing at sink  Used both R UE and L UE to turn sink handles on/off   Min A for contact guard while standing, slight sway as standing   UB Bathing Assistance 4  Minimal Assistance   UB Bathing Deficit Setup;Steadying;Supervision/safety; Increased time to complete   LB Bathing Assistance 4  Minimal Assistance   LB Bathing Deficit Steadying;Setup;Supervision/safety; Increased time to complete  (Requires Min A for dynamic standing balance with bathing task )   UB Dressing Assistance 4  Minimal Assistance   UB Dressing Deficit Setup;Steadying;Supervision/safety; Increased time to complete;Pull around back; Fasteners   UB Dressing Comments Pt required Min A for snap management on gown while seated  Pt performed UB dressing while standing  Required Min A for set up and steadying due to slight sway while standing  LB Dressing Assistance 4  Minimal Assistance   LB Dressing Deficit Setup;Don/doff R sock; Don/doff L sock; Don/doff R shoe;Don/doff L shoe;Supervision/safety; Increased time to complete  (Performed LB dressing while seated with supervision  Requires Min A for dynamic standing balance with dressing task )   Toileting Assistance  5  Supervision/Setup   Toileting Deficit Setup;Supervison/safety; Increased time to complete  (seated)   Bed Mobility   Supine to Sit 5  Supervision   Additional items HOB elevated; Bedrails; Increased time required   Sit to Supine Unable to assess   Additional Comments Presented supine in bed w/ HOB elevated and all needs within reach  Pt returned to bedside chair and all needs within reach and RN and son present  Transfers   Sit to Stand 4  Minimal assistance   Additional items Increased time required; Bedrails;HOB elevated;Assist x 1   Stand to Sit 4  Minimal assistance   Additional items Armrests; Increased time required;Assist x 1   Toilet transfer 4  Minimal assistance   Additional items Assist x 1; Increased time required  (Low toilet in room and used b/l UE to hoist herself up with grabbars)   Additional Comments Used RW for sit<> stand, and toilet transferts   Functional Mobility Functional Mobility 4  Minimal assistance   Additional Comments Ax1  Performed functional mobility within typical household distance using RW  At end of session pt used RW to perform in room distance to the bathroom and back to chair  Additional items Rolling walker   Toilet Transfers   Toilet Transfer From Rolling walker   Toilet Transfer Type To   Toilet Transfer to Standard toilet  [facility] room toilet with grabbar on R side)   Toilet Transfer Technique Ambulating   Toilet Transfers Contact guard;Minimal assistance  (Contact guard stand> sit, Min A sit> stand)   Therapeutic Exercise - ROM   UE-ROM Yes   ROM- Right Upper Extremities   R Shoulder Flexion;AROM   R Elbow AROM;Elbow flexion   R Wrist AROM; Wrist flexion;Wrist extension   R Hand AROM; Thumb; Index finger; Long finger;Ring finger;Little finger   R Position Seated   R Weight/Reps/Sets Against gravity 5 reps/ 1 set each   Coordination   Gross Motor Performed bathroom routine and turned on/off faucet with b/l UE   Fine Motor Pt snapped up her gown with pincher grasp on R UE and occassional assist from L UE  Used R UE to spray herself with perfume   Subjective   Subjective Pt reports some things are getting easier and some things are the same  Cognition   Overall Cognitive Status WFL   Arousal/Participation Alert; Responsive; Cooperative   Attention Within functional limits   Orientation Level Oriented X4   Memory Within functional limits   Following Commands Follows one step commands without difficulty   Comments Pt is very pleasant to work with  She is A&Ox4 and participated in all activities with good attention, good safety awareness and judgement, and good memory  Her safety awareness has improved to good     Activity Tolerance   Activity Tolerance Patient tolerated treatment well   Assessment   Assessment Patient participated in Skilled OT session this date with interventions consisting of ADL re training with the use of correct body mechnaics, Energy Conservation techniques, safety awareness and fall prevention techniques, therapeutic exercise to: increase functional use of BUEs, increase BUE muscle strength , increase standing tolerance time with unilateral UE support to complete sink level ADLs, neuromuscular re education to: facilitate volitional use of limbs, increase dynamic sit/ stand balance during functional activity  and increase OOB/ sitting tolerance  Patient agreeable to OT treatment session, upon arrival patient was found supine in bed and alert  In comparison to previous session, patient with improvements in RUE ROM functional use during ADLS (drinking, UB/LB dressing and bathing, grooming, and toileting)  Patient continues to be functioning below baseline level, occupational performance remains limited secondary to factors listed above and increased risk for falls and injury  Pt requires supervision for eating/drinking, Xiao for grooming, Xiao for UB/LB bathing and dressing, and supervision for toileting  Pt requires supervision for bed mobility (supine>sit), Min A for sit<> stand functional transfers w/ RW, Xiao for toilet transfers w/ RW, and Min A for functional mobility w/ RW  From OT standpoint, recommendation at time of d/c would be Short Term Rehab  Patient to benefit from continued Occupational Therapy treatment while in the hospital to address deficits as defined above and maximize level of functional independence with ADLs and functional mobility     Recommendation   OT Discharge Recommendation Post acute rehabilitation services   AM-PAC Daily Activity Inpatient   Lower Body Dressing 3   Bathing 3   Toileting 3   Upper Body Dressing 3   Grooming 3   Eating 3   Daily Activity Raw Score 18   Daily Activity Standardized Score (Calc for Raw Score >=11) 38 66   AM-PAC Applied Cognition Inpatient   Following a Speech/Presentation 4   Understanding Ordinary Conversation 4   Taking Medications 4   Remembering Where Things Are Placed or Put Away 4   Remembering List of 4-5 Errands 4   Taking Care of Complicated Tasks 3   Applied Cognition Raw Score 23   Applied Cognition Standardized Score 53 08   Barthel Index   Feeding 5   Bathing 0   Grooming Score 0   Dressing Score 5   Bladder Score 10   Bowels Score 10   Toilet Use Score 5   Transfers (Bed/Chair) Score 10   Mobility (Level Surface) Score 10   Stairs Score 0   Barthel Index Score 55   Modified [place] Scale   Modified [place] Scale 4   End of Consult   Education Provided Yes;Family or social support of family present for education by provider   Patient Position at End of Consult Bedside chair; All needs within reach  (Chair alarm not on because Nurse was coming to help her, son present in room upon departure )     Irving Mathews, OTS

## 2023-02-09 NOTE — PROGRESS NOTES
[address] [facility]  Progress Note - Abi Hudson 1965, 62 y o  female MRN: [id number]  Unit/Bed#: [facility] 708-01 Encounter: [id number]  Primary Care Provider: Td Crowe MD   Date and time admitted to hospital: 2/4/2023  1:06 PM    Chest pain  Assessment & Plan  · Primary service requested SL reevaluate the patient for return of chest pain this afternoon  · Clinically seems atypical or noncardiac, as it seems to occur mostly when patient is laying flat, is not reliably related to exertion  It improves when sitting up which may indicate a MSK-related issue  Low suspicion for pericarditis given normal ekg and flat trop tend  Can start Pepcid for reflux  · Patient is also a pack-a-day smoker, will order albuterol PRN as this could be a component of reactive airway disease  · Consider outpatient spirometry  · Troponins negative x 5 this visit, EKG without changes, Echo without wall motion abnormality  · Other medical issues per primary service, if concern persists, could reach out to the cardiology team in the morning, otherwise can consider outpatient stress testing  Please reach out to SL for further medical questions or issues        VTE Pharmacologic Prophylaxis:   Moderate Risk (Score 3-4) - Pharmacological DVT Prophylaxis Ordered: enoxaparin (Lovenox)  Patient Centered Rounds: I performed bedside rounds with nursing staff today  Discussions with Specialists or Other Care Team Provider: Neurology    Education and Discussions with Family / Patient: Updated  (son) at bedside  Time Spent for Care: 45 minutes  More than 50% of total time spent on counseling and coordination of care as described above  Current Length of Stay: 3 day(s)  Current Patient Status: Inpatient   Certification Statement: SLIM is on consult  Discharge Plan: SLIM is following this patient on consult   They are medically stable for discharge when deemed appropriate by primary service  Code Status: Level 1 - Full Code    Subjective:   Seen and examined at bedside  Neurology team requested IM see patient for episodes of chest pain that occurred this afternoon  Patient admitted for CVA and has had multiple episodes of chest pain that seems atypical or noncardiac  She has had mutliple troponins measured which are unremarkable and multiple EKGs during this visit do not appear to demonstrate any acute dynamic changes  Echocardiogram shows preserved EF with no wall motion abnormalities  Patient had previously been on telemetry but completed monitoring so is not currently on telemetry  At the time my examination she is resting comfortably with her son  She describes that these episodes usually occur when she is lying flat on her back  First episode this afternoon seem to be at around 1300, however patient states it was not related to lunchtime because she ate lunch around 1100  These episodes are associated with some apparent shortness of breath, pain seems to improve after sitting up  No abdominal pain, nausea, vomiting, diaphoresis  Objective:     Vitals:   Temp (24hrs), Av 4 °F (36 9 °C), Min:98 1 °F (36 7 °C), Max:98 7 °F (37 1 °C)    Temp:  [98 1 °F (36 7 °C)-98 7 °F (37 1 °C)] 98 1 °F (36 7 °C)  HR:  [88-90] 90  Resp:  [16] 16  BP: (139-140)/(91) 140/91  SpO2:  [94 %-100 %] 94 %  Body mass index is 37 5 kg/m²  Input and Output Summary (last 24 hours): Intake/Output Summary (Last 24 hours) at 2023  Last data filed at 2023 1123  Gross per 24 hour   Intake 880 ml   Output --   Net 880 ml       Physical Exam:   Physical Exam  Vitals reviewed  Constitutional:       General: She is not in acute distress  HENT:      Head: Normocephalic and atraumatic  Mouth/Throat:      Mouth: Mucous membranes are moist    Eyes:      General: No scleral icterus  Cardiovascular:      Rate and Rhythm: Normal rate and regular rhythm  Pulses: Normal pulses  Heart sounds: No murmur heard  No friction rub  No gallop  Pulmonary:      Effort: Pulmonary effort is normal  No respiratory distress  Breath sounds: No wheezing, rhonchi or rales  Abdominal:      General: Abdomen is flat  There is no distension  Palpations: Abdomen is soft  Tenderness: There is no abdominal tenderness  There is no guarding  Hernia: No hernia is present  Musculoskeletal:      Right lower leg: No edema  Left lower leg: No edema  Skin:     General: Skin is warm  Capillary Refill: Capillary refill takes less than 2 seconds  Findings: No rash  Neurological:      Mental Status: She is alert and oriented to person, place, and time  Mental status is at baseline  Motor: Weakness present  Comments: R facial droop   Psychiatric:         Mood and Affect: Mood normal          Behavior: Behavior normal           Additional Data:     Labs:  Results from last 7 days   Lab Units 02/08/23 0454 02/06/23 0639 02/05/23  0615   WBC Thousand/uL 7 33   < > 8 16   HEMOGLOBIN g/dL 13 4   < > 13 7   HEMATOCRIT % 41 8   < > 42 8   PLATELETS Thousands/uL 322   < > 319   NEUTROS PCT %  --   --  41*   LYMPHS PCT %  --   --  47*   MONOS PCT %  --   --  9   EOS PCT %  --   --  2    < > = values in this interval not displayed  Results from last 7 days   Lab Units 02/08/23 0454 02/06/23 0639 02/05/23  0615   SODIUM mmol/L 139   < > 140   POTASSIUM mmol/L 4 1   < > 3 8   CHLORIDE mmol/L 108   < > 110*   CO2 mmol/L 27   < > 25   BUN mg/dL 16   < > 9   CREATININE mg/dL 0 81   < > 0 64   ANION GAP mmol/L 4   < > 5   CALCIUM mg/dL 9 2   < > 9 2   ALBUMIN g/dL  --   --  3 6   TOTAL BILIRUBIN mg/dL  --   --  0 35   ALK PHOS U/L  --   --  88   ALT U/L  --   --  27   AST U/L  --   --  20   GLUCOSE RANDOM mg/dL 112   < > 114    < > = values in this interval not displayed       Results from last 7 days   Lab Units 02/04/23  1314   INR  0 92         Results from last 7 days   Lab Units 02/05/23  0615   HEMOGLOBIN A1C % 6 4*           Lines/Drains:  Invasive Devices     Peripheral Intravenous Line  Duration           Peripheral IV 02/06/23 Right Antecubital 2 days                  Telemetry:  Telemetry Orders (From admission, onward)             48 Hour Telemetry Monitoring  Continuous x 48 hours        References:    Telemetry Guidelines   Question:  Reason for 48 Hour Telemetry  Answer:  Acute MI, chest pain - R/O MI, or unstable angina                 Telemetry Reviewed: Not on rele, reorder  Indication for Continued Telemetry Use: Russell/john/endocarditis             Imaging: Reviewed radiology reports from this admission including: chest xray    Recent Cultures (last 7 days):         Last 24 Hours Medication List:   Current Facility-Administered Medications   Medication Dose Route Frequency Provider Last Rate   • acetaminophen  650 mg Oral Q6H PRN Jessica Smith DO     • albuterol  2 puff Inhalation Q4H PRN Halima Meza     • aspirin  81 mg Oral Daily [employer] DO     • atorvastatin  40 mg Oral QPM Baldemar Smith DO     • clopidogrel  75 mg Oral Daily [employer] DO     • DULoxetine  60 mg Oral Daily Baldemar Smith DO     • enoxaparin  40 mg Subcutaneous Daily Baldemar Smith DO     • famotidine  20 mg Oral BID Halima Meza     • lidocaine  1 patch Topical Daily Baldemar Smith DO     • nicotine  21 mg Transdermal Daily Arti Mac MD     • ondansetron  4 mg Intravenous Q6H PRN Baldemar Smith DO     • QUEtiapine  25 mg Oral HS PRN Jessica Smith DO     • traZODone  50 mg Oral Once Arti Mac MD     • traZODone  50 mg Oral HS Baldemar Smith DO     • verapamil  120 mg Oral Daily [employer], DO          Today, Patient Was Seen By: Halmia Meza    **Please Note: This note may have been constructed using a voice recognition system  **

## 2023-02-09 NOTE — PLAN OF CARE
Problem: OCCUPATIONAL THERAPY ADULT  Goal: Performs self-care activities at highest level of function for planned discharge setting  See evaluation for individualized goals  Description: Treatment Interventions: ADL retraining, Functional transfer training, UE strengthening/ROM, Endurance training, Cognitive reorientation, Patient/family training, Equipment evaluation/education, Neuromuscular reeducation, Fine motor coordination activities, Compensatory technique education, Continued evaluation, Energy conservation, Activityengagement          See flowsheet documentation for full assessment, interventions and recommendations  Outcome: Progressing  Note: Limitation: Decreased ADL status, Decreased UE ROM, Decreased UE strength, Decreased Safe judgement during ADL, Decreased endurance, Decreased fine motor control, Decreased self-care trans  Prognosis: Fair  Assessment: Patient participated in Skilled OT session this date with interventions consisting of ADL re training with the use of correct body mechnaics, Energy Conservation techniques, safety awareness and fall prevention techniques, therapeutic exercise to: increase functional use of BUEs, increase BUE muscle strength , increase standing tolerance time with unilateral UE support to complete sink level ADLs, neuromuscular re education to: facilitate volitional use of limbs, increase dynamic sit/ stand balance during functional activity  and increase OOB/ sitting tolerance  Patient agreeable to OT treatment session, upon arrival patient was found supine in bed and alert  In comparison to previous session, patient with improvements in RUE ROM functional use during ADLS (drinking, UB/LB dressing and bathing, grooming, and toileting)  Patient continues to be functioning below baseline level, occupational performance remains limited secondary to factors listed above and increased risk for falls and injury   Pt requires supervision for eating/drinking, Xiao for grooming, Xiao for UB/LB bathing and dressing, and supervision for toileting  Pt requires supervision for bed mobility (supine>sit), Min A for sit<> stand functional transfers w/ RW, Xiao for toilet transfers w/ RW, and Min A for functional mobility w/ RW  From OT standpoint, recommendation at time of d/c would be Short Term Rehab  Patient to benefit from continued Occupational Therapy treatment while in the hospital to address deficits as defined above and maximize level of functional independence with ADLs and functional mobility       OT Discharge Recommendation: Post acute rehabilitation services

## 2023-02-09 NOTE — ARC ADMISSION
Notified by CM and Neurology that patient is medically stable for discharge  Reviewed updates with [facility] physician - patient remains acute rehab appropriate  She will admit to [facility] room 962 with pickup at 2:30pm  Report can be called to   CM has been updated

## 2023-02-09 NOTE — CONSULTS
Internal Medicine Consultation Note    Patient: Merry Umanzor  Age/sex: 62 y o  female  Medical Record #: [id number]      Assessment/Plan:    Acute CVA  · Left posterior limb internal capsule/thalamic  · For ASA/Plavix a 21 days then to ASA alone on 23  · Continue Lipitor but Cards advised switch to Crestor 40mg qd at discharge to avoid interaction of the Lipitor with the Verapamil (CY interaction)  · CT C/A/P was negative for malignancy; thrombosis panel sent both to rule out hypercoag state    Headaches  · MRI with contrast was unrevealing  · Neuro placed her on Verapamil for preventive tx and she got IV magnesium/Phenergan/Toradol/Benadryl    Chest pain  · Had occurrences on  and  naomi with lying flat  · Trops and EKGs were negative  · Cards saw and felt atypical and will see her back in the office; no w/u for now    HTN  · Home:  no meds  · Here:  Verapamil 120mg qd/Losartan 50mg qd/HCTZ 12 5mg qd    Depression  · Continue Cymbalta  · Takes Seroquel and Trazodone at home    Pre-DM  · HbA1C was 6 4  · Will discuss DM diet with her tomorrow but not order for now  · DM diet teaching  · Will watch FBS but not to do Accuchecks    Nicotine abuse  · Offer patch if needed  · Advise cessation      Subjective/HPI:     Merry Umanzor is a 62year old patient with a history of nicotine abuse, pre-DM, vertigo, depression and frequent headaches who presented to the hospital with right facial droop, slurred speech, RUE/RLE weakness/sensory neglect  She did not receive TNK since was outside the time window  CTH/CTA were negative  MRI showed left posterior limb internal capsule/thalamic ischemia  She was placed on ASA/Plavix which is to continue x 21 days then to ASA 81mg daily on 23  Neurology felt etiology of the stroke was likely due to HTN/other vascular risk factors but could not rule out other factors such as hypercoagulable state given she is young and has a history of long term smoking  CT of the chest/abdomen/pelvis was done to rule out malignancy and was negative  A D-dimer done was negative and a thrombosis panel was sent  During her hospital stay, she complained of headaches  She had been having frequent headaches and was taking Tylenol 2-3 times per day  She had a MRI done with contrast which was negative  She was placed on Verapamil for preventive therapy and then treated with IV magnesium, Phenergan, Toradol and Benadryl  On 2/4/23, she complained of chest pain which was reproducible with palpation  EKG and troponins were negative  SLIM saw in consult  ECHO had shown no RWMA  Etiology was felt to be due to stress, HTN or reflux  She was placed on Pepcid  On 2/8/23, she had chest pain again  She described it as a tightness or heaviness associated with shortness of breath  The chest pain seemed to occur when lying flat  SLIM saw her again and restarted the Pepcid which had been stopped and due to the fact that she is a long time smoker, they started an inhaler with the thought that she may have some reactive airway disease  Cardiology saw in consult and felt the chest pain was atypical  They said no workup for now but they will see back in the office  They added Losartan 50mg daily/HCTZ 12 5mg daily to help with BP control  They advised that she switch to Crestor 40mg daily upon discharge to avoid interaction of the Lipitor with the verapamil  Patient is now in [facility] for inpatient acute rehabilitation and we are ask to assist with medical management  Currently there are no complaints of CP, SOB, dizziness, N/V/D        ROS:   A 10 point ROS was performed; negative except as noted above       Social History:    Substance Use History:   Social History     Substance and Sexual Activity   Alcohol Use Not Currently     Social History     Tobacco Use   Smoking Status Some Days   • Packs/day: 1 00   • Years: 15 00   • Pack years: 15 00   • Types: Cigarettes   • Last attempt to quit: 2021   • Years since quittin 2   Smokeless Tobacco Never   Tobacco Comments    trying to quit - using judson  patch     Social History     Substance and Sexual Activity   Drug Use Not Currently       Family History:    Family History   Problem Relation Age of Onset   • No Known Problems Mother    • No Known Problems Father    • No Known Problems Sister    • No Known Problems Daughter    • No Known Problems Maternal Grandmother    • Colon cancer Maternal Grandfather    • No Known Problems Paternal Grandmother    • No Known Problems Paternal Grandfather          Review of Scheduled Meds:  Current Facility-Administered Medications   Medication Dose Route Frequency Provider Last Rate   • acetaminophen  650 mg Oral Q6H PRN Brittney Cisneros MD     • albuterol  2 puff Inhalation Q4H PRN Brittney Cisneros MD     • [START ON 2/10/2023] aspirin  81 mg Oral Daily Brittney Cisneros MD     • atorvastatin  40 mg Oral QPM Brittney Cisneros MD     • bisacodyl  10 mg Rectal Daily PRN Brittney Cisneros MD     • [START ON 2/10/2023] clopidogrel  75 mg Oral Daily Brittney Cisneros MD     • [START ON 2/10/2023] DULoxetine  60 mg Oral Daily Brittney Cisneros MD     • [START ON 2/10/2023] enoxaparin  40 mg Subcutaneous Daily Brittney Cisneros MD     • famotidine  20 mg Oral BID Brittney Cisneros MD     • [START ON 2/10/2023] hydrochlorothiazide  12 5 mg Oral Daily Brittney Cisneros MD     • [START ON 2/10/2023] lidocaine  1 patch Topical Daily Brittney Cisneros MD     • [START ON 2/10/2023] losartan  50 mg Oral Daily Brittney Cisneros MD     • [START ON 2/10/2023] nicotine  21 mg Transdermal Daily Brittney Cisneros MD     • ondansetron  4 mg Oral Q6H PRN Brittney Cisneros MD     • polyethylene glycol  17 g Oral Daily PRN Brittney Cisneros MD     • QUEtiapine  25 mg Oral HS PRN Brittney Cisneros MD     • traZODone  50 mg Oral HS Brittney Cisneros MD     • [START ON 2/10/2023] verapamil  120 mg Oral Daily Malcolm Gorman MD         Labs:     Results from last 7 days   Lab Units 23  0454 23  0607   WBC Thousand/uL 7 33 8 39   HEMOGLOBIN g/dL 13 4 13 3   HEMATOCRIT % 41 8 42 2   PLATELETS Thousands/uL 322 304     Results from last 7 days   Lab Units 23  0454 23  0607   SODIUM mmol/L 139 138   POTASSIUM mmol/L 4 1 4 1   CHLORIDE mmol/L 108 109*   CO2 mmol/L 27 25   BUN mg/dL 16 20   CREATININE mg/dL 0 81 0 84   CALCIUM mg/dL 9 2 8 9     Results from last 7 days   Lab Units 23  0615   HEMOGLOBIN A1C % 6 4*     Results from last 7 days   Lab Units 23  1314   INR  0 92              Lab Results   Component Value Date    BLOODCX No Growth After 5 Days  2020    URINECX 60,000-69,000 cfu/ml Mixed Contaminants X3 2017    URINECX No Growth <1000 cfu/mL 2017    URINECX >100,000 cfu/ml Mixed Contaminants X6 2017    WOUNDCULT 1+ Growth of 2020       Input and Output Summary (last 24 hours):     No intake or output data in the 24 hours ending 23 1628    Imaging:     No orders to display       *Labs /Radiology studies reviewed  *Medications reviewed and reconciled as needed  *Please refer to order section for additional ordered labs studies  *Case discussed with primary attending during morning huddle case rounds    Vitals:   Temp (24hrs), Av 1 °F (36 7 °C), Min:98 °F (36 7 °C), Max:98 2 °F (36 8 °C)    Temp:  [98 °F (36 7 °C)-98 2 °F (36 8 °C)] 98 °F (36 7 °C)  HR:  [] 87  Resp:  [16-20] 20  BP: (140-157)/() 142/87  SpO2:  [93 %-96 %] 96 %  Body mass index is 38 28 kg/m²       Physical Exam:   General Appearance: no distress, conversive  HEENT: PERRLA, conjuctiva normal; oropharynx clear; mucous membranes moist   Neck:  Supple, normal ROM  Lungs: CTA, normal respiratory effort, no retractions, expiratory effort normal  CV: regular rate and rhythm; no rubs/murmurs/gallops, PMI normal   ABD: soft; ND/NT; +BS  EXT: no edema  Skin: normal turgor, normal texture, no rashes  Psych: affect normal, mood normal  Neuro: AAO; mild RUE/RLE weakness 4-/5; +right facial droop; abnormal coordination of  RUE  Speech is mildly slurred  Invasive Devices     None                  VTE Pharmacologic Prophylaxis: Enoxaparin (Lovenox)  Code Status: Level 1 - Full Code  Current Length of Stay: 0 day(s)    Total floor / unit time spent today 1 hour with more than 50% spent counseling/coordinating care  Counseling includes discussion with patient re: progress  and discussion with patient of his/her current medical state/information  Coordination of patient's care was performed in conjunction with primary service  Time invested included review of patient's labs, vitals, and management of their comorbidities with continued monitoring  In addition, this patient was discussed with medical team including physician and advanced extenders  The care of the patient was extensively discussed and appropriate treatment plan was formulated unique for this patient by supervising physician unless stated otherwise in their attestation statement  ** Please Note: voice to text software may have been used in the creation of this document   Audio transcription errors may occur**

## 2023-02-09 NOTE — CASE MANAGEMENT
Case Management Discharge Planning Note    Patient name Hilaria Turner  Location [address]70 MRN [id number]  : 1965 Date 2023       Current Admission Date: 2023  Current Admission Diagnosis:Stroke [facility])   Patient Active Problem List    Diagnosis Date Noted   • Chest pain 2023   • Prediabetes 2023   • Restlessness 2023   • Hypertension 2023   • Stroke ([address] ) 2023   • Depression 2023   • Obesity (BMI 35 0-39 9 without comorbidity) 03/10/2021   • Ex-smoker for less than 1 year 03/10/2021   • Headache 2020   • Preoperative clearance 2020   • Wound of right foot 2020   • Acquired hallux valgus of left foot 2019   • Other hammer toe(s) (acquired), left foot 2019   • Other acquired deformities of left foot 2019      LOS (days): 4  Geometric Mean LOS (GMLOS) (days):   Days to GMLOS:     OBJECTIVE:  Risk of Unplanned Readmission Score: 9 33         Current admission status: Inpatient   Preferred Pharmacy:   [address] [place] [id number]  Phone: [phone] Fax: [phone]    Primary Care Provider: Jefe Olea MD    Primary Insurance: [address] [place] [address]  Secondary Insurance:     DISCHARGE DETAILS:    Discharge planning discussed with[de-identified] CM spoke with the pt  Freedom of Choice: Yes  Comments - Freedom of Choice: FOC explored and admisison to [facility] is preferred  CM contacted family/caregiver?: Yes  Were Treatment Team discharge recommendations reviewed with patient/caregiver?: Yes  Did patient/caregiver verbalize understanding of patient care needs?: Yes  Were patient/caregiver advised of the risks associated with not following Treatment Team discharge recommendations?: Yes    Contacts  Patient Contacts: Melissa Stephenson  Relationship to Patient[de-identified] Family  Contact Method:  In Person  Reason/Outcome: Continuity of Care, Discharge Planning    Requested Home Health Care         Is the patient interested in [address] at discharge?: No    DME Referral Provided  Referral made for DME?: No    Other Referral/Resources/Interventions Provided:  Interventions: Acute Rehab  Referral Comments: Pt referred to [facility]    Would you like to participate in our [facility] service program?  : No - Declined    Treatment Team Recommendation: Acute Rehab  Discharge Destination Plan[de-identified] Acute Rehab                  Additional Comments: The pt is medically stable for hospital discharge and post acute care planning is finalized  CM confirmed with [facility] admission dept that authorization was obtained from [employer]  An IRF bed is available at [facility] and admission will occur this afternoon

## 2023-02-09 NOTE — DISCHARGE SUMMARY
NEUROLOGY RESIDENCY - DISCHARGE SUMMARY     Name: Ananya Reveles   Age & Sex: 62 y o  female   MRN: [id number]  [facility]   Encounter: [id number]    Discharging Resident Physician: [facility] DO IFRAH  Attending: Rell Saini MD  PCP: Mckenzie Rodriguez MD  Admission Date: 2/4/2023  Discharge Date: 02/09/23       Ananya Reveles will need follow up in 6-8 weeks  with neurovascular attending/ap/resident (myself Chandana Akhilnathalie)  She will not require outpatient neurological testing  ASSESSMENT & PLAN     * Stroke [facility])  Assessment & Plan  61 yo F w/ a PMH of Current smoker (1ppd, 25+ years), past bunion surgery, pre-diabetic, vertigo, hypertension coming in as a stroke alert on 2/4/2023  1:06 PM with initial NIHSS of 5 and LKW 8:30am, initial Blood Pressure: 143/99  Initial presenting deficits were slurred speech, RUE and RLE weakness, and some sensory neglect  Patient had went up around 8:30 AM in the morning that was known at that time  She then stated she took a nap and then woke up around 1130am and then talk to her daughter who noticed that she was slurring her speech  Patient also had appreciated that she had a left facial droop during this time  patient also noted that she had a episode of bowel incontinence prior to this  As a result of patient outside window of TNK and most symptoms resolving pt was determined to not be a candidate for thrombolysis (TNK)  Initial Exam on 02/04/23: R facial droop, slurred speech, 5/5 strength in all extremities although in LE had some pain  Found to have a stroke in L PLIC/thalamus on MRI    • Vascular risk factors: active smoker, prediabetic, age, hypertension  • Home meds: cymbalta     Workup:  Lab Results   Component Value Date    HGBA1C 6 4 (H) 02/05/2023    CHOLESTEROL 240 (H) 02/05/2023    LDLCALC 171 (H) 02/05/2023    TRIG 132 02/05/2023    INR 0 92 02/04/2023      • CTH: negative for any bleeds or recent stroke  • CTA: negative for LVOs, aneurysms, dissection or AVMs  • MRI w/o contrast: Left posterior limb internal capsule/thalamic acute to subacute ischemia  • MRI w/ contrast showed: No abnormal enhancement, no acute changes for MRI w/o contrast  • Echocardiogram: left ventricular ejection fraction is 60%  Systolic function is normal  Wall motion is normal  Diastolic function is normal   Left atrium normal in size   • CT CAP w/ contrast:  No CT evidence for malignancy in the chest, abdomen and pelvis    Thrombosis panel:Anticardiolipin antibody negative, protein S antigen free slightly elevated, beta-2 paraproteins negative, protein S activity elevated, Antithrombin III activity 100 within normal limits,    Pertinent scores:  - NIHSS: 5  Stroke Modified Kelli Score: 2 (Unable to carry out all previous activities, but able to look after own affairs without assistance)    Impression: stroke appreciated in L internal capsule and thalamus likely secondary to hypertension and other vascular risk factors but cannot rule out other factors such as a hypercoagulable state given that patient is young and has been a longtime smoker      Plan:  - Stroke pathway  - Discussed plan with neurology attending, Dr Rishabh White  - BP: Allow for normotension and continue verapamil 121 mg daily  - atorvastatin 40mg qhs  - Maintain glucose <180, SSI for coverage if indicated  - Medical management as per primary team appreciated  - Antiplatelet agents: DAPT w/ ASA 81mg and plavix 75mg for 21 days and then monotherapy (02/26/2023) w/ ASA 81mg  - Ordered CT chest abdomen pelvis with and without contrast that was negative for any malignancy  - d-dimer negative and f/u thrombosis labs  - DVT ppx and SCDs  - Monitor on telemetry  - PT/OT/Speech recommended rehab w/ [facility] pending placement  - Speech recommends regular house  - Stroke education      Depression  Assessment & Plan  Continue home cymbalta    Prediabetes  Assessment & Plan  SLIM recommended lifestyle changes at this time, metformin if unable to make changes w/ PCP  Consulted nutrition for diabetic diet and diet to help reduce cholesterol  Chest pain  Assessment & Plan  Assessment; Neurology was alerted by nursing staff that the patient has been having intermittent chest pain  The patient did not want to tell anyone because she thought that the chest pain might self resolve  When interviewing the patient she reported that these episodes of chest pain started occurring this afternoon with first episode occurring at 1330, the second episode occurring at 1445, and the third episode occurring at 80  The patient reports that the pain is characterized as a tight and heavy feeling in the center of the chest, with associated SOB  The patient reports that she does not have any other associated symptoms  Patient reports that each episode lasted around 10 minutes with complete resolution  Plan:  - trops, CXR, and EKG were all WNL  - SLIM evaluated and concerned for possible reactive airway disease patient has chronically and ordered prn inhaler  - card evaluated and recommend outpt f/u given unconcerning lab markers   - Started losartan 50 mg daily and HCTZ 12 5 mg daily optimize BP  - continue pepcid at this time    Hypertension  Assessment & Plan  Verapamil 120 mg daily    Restlessness  Assessment & Plan  Restarted home trazodone and seroquel (was initially not ordered as patient was not regularly taking and taking prn)  Trazodone scheduled nightly and Seroquel as needed    Headache  Assessment & Plan  Patient had noted a headache over the past few months that has been intermittent but over the past 1-1/2 months has been progressively getting worse  She states that is all over her head and has been on and off getting worse every day  She has been taking Tylenol every day for it and it was explained that she likely has some component of medication overuse headache    MRI brain w/ contrast negative for any enhancement     Plan  s/p migraine cocktail: Phenergan, Toradol, Benadryl for 2 days   -Avoided steroids given patient's restlessness/anxiety  Continue Verapamil 120 mg daily as prevention             Disposition:     Other: Rehab at [facility]    Reason for Admission: stroke    Consultations During Hospital Stay:  · Internal medicine     Procedures Performed:     · None    Significant Findings / Test Results:     CT chest abdomen pelvis w contrast   Final Result by Skyler Perry MD (02/06 4743)      No CT evidence for malignancy in the chest, abdomen and pelvis  Workstation performed: [id number]         MRI brain w contrast   Final Result by Marielos Santiago MD (02/06 3508)      No abnormal enhancement  No acute change compared to yesterday  Workstation performed: [id number]         MRI brain wo contrast   Final Result by Rk Geller MD (02/04 8776)      Left posterior limb internal capsule/thalamic acute to subacute ischemia  Findings were marked as "immediate"in Epic and will now be related to the ordering physician or covering clinical team by the radiology liaison  Workstation performed: [id number]         CT stroke alert brain   Final Result by Mejia Doshi DO (02/04 1342)      No acute intracranial abnormality  Findings were directly discussed with Natividad Mars at 1:40 PM       Workstation performed: [id number]         CTA stroke alert (head/neck)   Final Result by Mejia Doshi DO (02/04 1348)      There are some anatomic variations of the intracranial circulation as described above with no stenosis, occlusion or thrombosis of the cervical or intracranial vasculature              Findings were directly discussed with Natividad Mars at approximately 1:40 PM                            Workstation performed: [id number]         XR chest portable    (Results Pending)     ·     Incidental Findings:   · none     Test Results Pending at Discharge (will require follow up):   · none     Outpatient Tests Requested:  · none    Complications:  None     Hospital Course:     Scott Peguero is a 62 y o  female patient with past medical history of 25-pack-year history of smoking, prediabetes, hypertension who originally presented to the hospital on 2/4/2023 as a stroke alert due to slurred speech and right upper and right lower extremity weakness, right facial droop and somnolence or neglect  Patient was not a TNK candidate due to being outside the window of TNK time  Patient was loaded with aspirin and Plavix  Patient was put on stroke pathway and initial CT head and CTA head and neck were negative  MRI did end up showing a left posterior limb internal capsule/thalamic acute stroke  Patient's stroke is likely due to her multiple risk factors that is hypertension, diabetes, hyperlipidemia, smoking  Initial thought was to do further work-up such as a thrombosis panel as well as a malignancy screening with a CT chest abdomen pelvis  Patient will be continued on doing insulin therapy with aspirin and Plavix for 21 days until about 2/26 2023 and then monotherapy with aspirin  Patient to continue Lipitor given her elevated hyperlipidemia  Patient was evaluated by PT/OT and they recommended at this time for rehab possibly with [facility]  For patient's noted headaches that she was suffering from, patient was started on a migraine cocktail for about 2 days and given additional magnesium  Verapamil was also started as a preventative in addition to treating her noted hypertension  Headaches resolved and Patient will head to [facility] to rehab  Chest pain was evaluated and likely noncardiogenic given EKG, troponins and chest x-ray were all normal   Cards and plan evaluated and at this time patient will continue that pressure medication and continue Pepcid  Additionally will start outpatient follow-up with patient once discharged and after rehab          Condition at Discharge: good Discharge Day Visit / Exam:     Subjective: Patient had no complaints this morning and was eager to go to rehab  She stated that she did have a chest pain yesterday but was reassured that it was noncardiac and pointed to the middle of her chest where it was hurting  She stated it was very intermittent and I stated that we will restart her Pepcid to see if that will help and she will follow-up with outpatient cardiology  She denies any fevers/chill,  abdominal pain, nausea, vomiting, diarrhea, problems urinating, problems  With bowel movements, and is eating/drinking without problem      SHe denies any headaches, syncope, paresthesias, diplopia, visual changes, tinnitus, sudden onset weakness, garbled speech, dysarthria/slurring of words,  Loss of bowel or bladder      Vitals: Blood Pressure: 152/84 (02/09/23 0811)  Pulse: (!) 109 (02/09/23 0803)  Temperature: 98 2 °F (36 8 °C) (02/09/23 0803)  Temp Source: Oral (02/04/23 1446)  Respirations: 16 (02/09/23 0336)  Height: 5' (152 4 cm) (02/05/23 1427)  Weight - Scale: 87 1 kg (192 lb) (02/05/23 1006)  SpO2: 93 % (02/09/23 0803)    Exam:     Physical Exam    Neurologic Exam     Physical Exam  Eyes:      Extraocular Movements: EOM normal       Pupils: Pupils are equal, round, and reactive to light  Neurological:      Mental Status: She is oriented to person, place, and time  Motor: Motor strength is normal       Coordination: Finger-Nose-Finger Test abnormal  Heel to Shin Test normal       Deep Tendon Reflexes:      Reflex Scores:       Tricep reflexes are 2+ on the right side and 2+ on the left side  Bicep reflexes are 2+ on the right side and 2+ on the left side  Brachioradialis reflexes are 2+ on the right side and 2+ on the left side  Patellar reflexes are 2+ on the right side and 2+ on the left side  Achilles reflexes are 2+ on the right side and 2+ on the left side    Psychiatric:         Speech: Speech is slurred         Neurologic Exam      Mental Status   Oriented to person, place, and time  Speech: slurred   Able to name object  Able to repeat  Normal comprehension       Cranial Nerves      CN II   Visual fields full to confrontation       CN III, IV, VI   Pupils are equal, round, and reactive to light  Extraocular motions are normal    CN III: no CN III palsy  CN VI: no CN VI palsy  Nystagmus: none      CN V   Facial sensation intact       CN VII   Facial expression full, symmetric       CN IX, X   CN IX normal    CN X normal       CN XI   CN XI normal       CN XII   Tongue deviation: left    R facial droop appreciated and not moving/more flattened nasolabial fold compared to L (slowly improving)        Motor Exam   Right arm pronator drift: absent  Left arm pronator drift: absent     Strength   Strength 5/5 throughout  LUE (deltoids, biceps/triceps, wrist extensors, finger ) seems weaker at 4/5 but slowly improving         Sensory Exam   Right arm light touch: decreased from wrist  Vibration normal        Cold sensation intact in all 4 extremities      Gait, Coordination, and Reflexes      Coordination   Finger to nose coordination: abnormal  Heel to shin coordination: normal     Reflexes   Right brachioradialis: 2+  Left brachioradialis: 2+  Right biceps: 2+  Left biceps: 2+  Right triceps: 2+  Left triceps: 2+  Right patellar: 2+  Left patellar: 2+  Right achilles: 2+  Left achilles: 2+  Right plantar: normal  Left plantar: normal    L FNT ataxic and slower                           Discussion with Family: Family (sons)    Discharge instructions/Information to patient and family:   See after visit summary for information provided to patient and family  Provisions for Follow-Up Care:  See after visit summary for information related to follow-up care and any pertinent home health orders  Planned Readmission: None    Discharge Statement:  I spent 30 minutes discharging the patient   This time was spent on the day of discharge  I had direct contact with the patient on the day of discharge  Greater than 50% of the total time was spent examining patient, answering all patient questions, arranging and discussing plan of care with patient as well as directly providing post-discharge instructions  Additional time then spent on discharge activities  Discharge Medications:  See after visit summary for reconciled discharge medications provided to patient and family        ** Please Note: This note has been constructed using a voice recognition system **      ==  DO Candice Craig [facility] Neurology Residency, PGY-2

## 2023-02-09 NOTE — ASSESSMENT & PLAN NOTE
· Primary service requested SL reevaluate the patient for return of chest pain this afternoon  · Clinically seems atypical or noncardiac, as it seems to occur mostly when patient is laying flat, is not reliably related to exertion  It improves when sitting up which may indicate a MSK-related issue  Low suspicion for pericarditis given normal ekg and flat trop tend  Can start Pepcid for reflux  · Patient is also a pack-a-day smoker, will order albuterol PRN as this could be a component of reactive airway disease  · Consider outpatient spirometry  · Troponins negative x 5 this visit, EKG without changes, Echo without wall motion abnormality  · Other medical issues per primary service, if concern persists, could reach out to the cardiology team in the morning, otherwise can consider outpatient stress testing   Please reach out to [facility] for further medical questions or issues

## 2023-02-09 NOTE — LETTER
[address] [address]  Dept: [phone]    February 17, 2023     Patient: Justin Watts   YOB: 1965   Date of Visit: 2/9/2023       To Whom it May Concern:    Justin Watts is under my professional care  She was seen in the hospital from2/4/23 -  2/9/2023 and then 2/9/23 through 02/20/23  Please excuse her for her missed appointments  Thank you for your understanding         Sincerely,      Maria Mckeon MD  PM&R

## 2023-02-09 NOTE — NURSING NOTE
SonLucy would like a call if pt is being discharged somewhere or if the insurance was approved     [phone]

## 2023-02-09 NOTE — TREATMENT PLAN
Individualized Plan of [address]  62 y o  female MRN: [id number]  Unit/Bed#: -01 Encounter: [id number]     PATIENT INFORMATION  ADMISSION DATE: 2/9/2023  2:53 PM HITESH CATEGORY: Stroke   ADMISSION DIAGNOSIS: Stroke ([facility] [address] ) [I63 [id number]]  EXPECTED LOS: 2-3 weeks     MEDICAL/FUNCTIONAL PROGNOSIS  Based on my assessment of the patient's medical conditions and current functional status, the prognosis for attaining medical and functional goals or the IRF stay is:  Fair    Medical Goals: Patient will be medically stable for discharge to [facility] upon completion of rehab program and Patient will be able to manage medical conditions and comorbid conditions with medications and follow up upon completion of rehab program    [address]: Home - Assistance    ANTICIPATED FOLLOW-UP SERVICE:   Outpatient Therapy Services: PT, OT and SLP          DISCIPLINE SPECIFIC PLANS:  Required Disciplines & Services: Rehabillitation Nursing, Case Management and Psychology    REQUIRED THERAPY:  Therapy Hours per Day Days per Week Total Days   Physical Therapy 1 5-6 7   Occupational Therapy 1 5-6 7   Speech/Language Therapy 1 5-6 7     ANTICIPATED FUNCTIONAL OUTCOMES:  ADL:  Supervision - Mod I with LRAD   Bladder/Bowel:   Supervision - Mod I with LRAD   Transfers:   Supervision - Mod I with LRAD   Locomotion:   Supervision - Mod I with LRAD   Cognitive:   Supervision - Mod I      DISCHARGE PLANNING NEEDS  Equipment needs: Discharge needs to be reviewed with team      REHAB ANTICIPATED PARTICIPATION RESTRICTIONS:  None

## 2023-02-09 NOTE — RESTORATIVE TECHNICIAN NOTE
Restorative Technician Note      Patient Name: Darci Rico     Note Type: Mobility  Patient Position Upon Consult: Supine  Activity Performed: Ambulated; [id number]; Stood  Assistive Device: Roller walker  Education Provided: Yes  Patient Position at [employer] of Consult: Bedside chair;  All needs within reach; Bed/Chair alarm activated    [facility] KOBE CHRISTINE, Restorative Technician, [employer]

## 2023-02-10 LAB — F2 GENE MUT ANL BLD/T: NORMAL

## 2023-02-10 RX ORDER — BISACODYL 10 MG
10 SUPPOSITORY, RECTAL RECTAL DAILY PRN
Status: DISCONTINUED | OUTPATIENT
Start: 2023-02-10 | End: 2023-02-20 | Stop reason: HOSPADM

## 2023-02-10 RX ADMIN — LOSARTAN POTASSIUM 50 MG: 50 TABLET, FILM COATED ORAL at 08:55

## 2023-02-10 RX ADMIN — CLOPIDOGREL BISULFATE 75 MG: 75 TABLET ORAL at 08:55

## 2023-02-10 RX ADMIN — PANTOPRAZOLE SODIUM 40 MG: 40 TABLET, DELAYED RELEASE ORAL at 05:28

## 2023-02-10 RX ADMIN — VERAPAMIL HYDROCHLORIDE 120 MG: 120 TABLET, FILM COATED, EXTENDED RELEASE ORAL at 08:56

## 2023-02-10 RX ADMIN — QUETIAPINE FUMARATE 25 MG: 25 TABLET ORAL at 23:37

## 2023-02-10 RX ADMIN — ATORVASTATIN CALCIUM 40 MG: 40 TABLET, FILM COATED ORAL at 17:08

## 2023-02-10 RX ADMIN — ENOXAPARIN SODIUM 40 MG: 40 INJECTION SUBCUTANEOUS at 08:56

## 2023-02-10 RX ADMIN — ASPIRIN 81 MG CHEWABLE TABLET 81 MG: 81 TABLET CHEWABLE at 08:55

## 2023-02-10 RX ADMIN — LIDOCAINE 5% 1 PATCH: 700 PATCH TOPICAL at 08:56

## 2023-02-10 RX ADMIN — TRAZODONE HYDROCHLORIDE 50 MG: 50 TABLET ORAL at 21:29

## 2023-02-10 RX ADMIN — DULOXETINE HYDROCHLORIDE 60 MG: 60 CAPSULE, DELAYED RELEASE ORAL at 08:55

## 2023-02-10 RX ADMIN — NICOTINE 21 MG: 21 PATCH, EXTENDED RELEASE TRANSDERMAL at 08:55

## 2023-02-10 RX ADMIN — HYDROCHLOROTHIAZIDE 12.5 MG: 12.5 TABLET ORAL at 08:55

## 2023-02-10 NOTE — ASSESSMENT & PLAN NOTE
In acute care, patient with transient chest pain at rest - troponin negative, EKG without changes  Cardiology consulted, recommended losartan and HCTZ for HTN  Also recommended a change to Crestor instead of Lipitor due to being on Verapamil (CY interaction)    Discontinue Verapamil 23 as headaches improved  · Outpatient follow-up recommended with Dr Rose Shi for further work-up  · While in [facility] monitor VS and any chest pains

## 2023-02-10 NOTE — ASSESSMENT & PLAN NOTE
Developed in acute care  Treated with Migraine cocktail  Started on Verapamil for prevention by Neurology  Headaches have improved    Plan:  Discontinue Verapamil 2/13/23 as headaches much improved and would like to avoid interaction with Lipitor  If patient has a severe headache utilize gabapentin 100 mg PRN    Has not needed any gabapentin yesterday or today  Monitor for headaches  Follow-up with headache Neurologist as outpatient if persists

## 2023-02-10 NOTE — PROGRESS NOTES
PT MARISSA EVAL     02/10/23 1230   Patient Data   Rehab Impairment Impairment of mobility, safety, Activities of Daily Living (ADLs), and cognitive/communication skills due to Stroke:  01 2  Right Body Involvement (Left Brain)   Etiologic Diagnosis Left Posterior Limb Internal Capsule/Thalamic Acute to Subacute Infarct   Date of Onset 02/04/23   Support System   Name Pt lives with spouse and also has two supportive sons that live nearby and are able to assist prn  Home Setup   Type of Home Multi Level   Method of Entry Stairs;Hand Rail Right   Number of Stairs 9  (on preferred entrance, pt has another entrance with less stairs but no handrail)   Number of Stairs in Home 12  (two full flights but pt resides on first floor)   In Home Hand Rail Right   First Floor Bathroom Full;Tub; Shower   First Floor Bathroom Accessibility Shower chair;Commode   First Floor Setup Available Yes   Available Equipment Roller Walker;Rollator;Single Point Cane;Handheld Shower; Shower Chair   Baseline Information   Outpatient Services Received Prior to Admission Physical Therapy  (s/p L bunion foot surgery and for LLE/LBP with dx sciatica)   Outpatient Provider "somewhere in [facility]" 2xs/week   Vocation   (not working)   Transportation    Prior Device(s) Used [employer]   Prior Level of Function   Self-Care 3  Independent - Patient completed the activities by him/herself, with or without an assistive device, with no assistance from a helper  Indoor-Mobility (Ambulation) 3  Independent - Patient completed the activities by him/herself, with or without an assistive device, with no assistance from a helper  Stairs 3  Independent - Patient completed the activities by him/herself, with or without an assistive device, with no assistance from a helper  Functional Cognition 3  Independent - Patient completed the activities by him/herself, with or without an assistive device, with no assistance from a helper     Prior Device Used Z  None of the above   Falls in the Last Year   Number of falls in the past 12 months 0   Psychosocial   Psychosocial (WDL) WDL   Restrictions/Precautions   Precautions Fall Risk;Supervision on toilet/commode;Bed/chair alarms   Weight Bearing Restrictions No   ROM Restrictions No   Pain Assessment   Pain Assessment Tool 0-10   Pain Score No Pain   Transfer Bed/Chair/Wheelchair   Limitations Noted In Balance; Coordination;LE Strength   Adaptive Equipment Cane   Type of Assistance Needed Physical assistance   Physical Assistance Level 25% or less   Comment performed with SPC to simulate PLOF   performed with min A  then trialed with RW and able to perform with CGA   Chair/Bed-to-Chair Transfer CARE Score 3   Roll Left and Right   Type of Assistance Needed Independent   Roll Left and Right CARE Score 6   Sit to Lying   Type of Assistance Needed Incidental touching;Verbal cues   Sit to Lying CARE Score 4   Lying to Sitting on Side of Bed   Type of Assistance Needed Incidental touching;Verbal cues  (increased time required to complete  performed on flat hospital bed without railings to simulate home set up)   Lying to Sitting on Side of Bed CARE Score 4   Sit to Stand   Type of Assistance Needed Physical assistance;Verbal cues   Physical Assistance Level 25% or less   Comment performed with SPC to simulate PLOF   performed with min A  then trialed with RW and able to perform with CGA  cues for hand placement to reach back prior to sitting   Sit to Stand CARE Score 3   Picking Up Object   Type of Assistance Needed Physical assistance   Physical Assistance Level 51%-75%   Comment one UE support on cane   assist to stabilize to prevent LOB   Picking Up Object CARE Score 2   Car Transfer   Type of Assistance Needed Physical assistance   Physical Assistance Level 25% or less   Car Transfer CARE Score 3   Ambulation   Primary Mode of Locomotion Prior to Admission Walk   Distance Walked (feet) 100 ft  (x 3, 25 ft x 2, 20 ft x 1  trialed with both SPC and min A and RW with CGA, Cueing for increased CHE, improved RLE heel strike)   Assist Device Walker;Cane   Gait Pattern Inconsistant Chiquita;Decreased foot clearance;Narrow CHE; Improper weight shift   Limitations Noted In Balance; Coordination; Endurance; Heel Strike; Safety;Speed;Strength   Provided Assistance with: Trunk Support;Weight Shift;Balance   Walk Assist Level Minimum Assist   Walk 10 Feet   Type of Assistance Needed Physical assistance;Verbal cues; Adaptive equipment   Physical Assistance Level 25% or less   Comment SPC   Walk 10 Feet CARE Score 3   Walk 50 Feet with Two Turns   Type of Assistance Needed Physical assistance; Adaptive equipment;Verbal cues   Physical Assistance Level 25% or less   Comment trialed with SPC (AD used at baseline) with min A  then trialed with RW and pt able to perform with CGA with improved stbaility noted   Walk 50 Feet with Two Turns CARE Score 3   Walk 150 Feet   Reason if not Attempted Safety concerns   Walk 150 Feet CARE Score 88   Walking 10 Feet on Uneven Surfaces   Reason if not Attempted Safety concerns   Walking 10 Feet on Uneven Surfaces CARE Score 88   Wheel 50 Feet with Two Turns   Reason if not Attempted Activity not applicable   Wheel 50 Feet with Two Turns CARE Score 9   Wheel 150 Feet   Reason if not Attempted Activity not applicable   Wheel 103 Feet CARE Score 9   Curb or Single Stair   Style negotiated Single stair   Type of Assistance Needed Physical assistance;Verbal cues; Adaptive equipment   Physical Assistance Level 26%-50%   Comment single railing and cane per PLOF  step to pattern   1 Step (Curb) CARE Score 3   4 Steps   Type of Assistance Needed Physical assistance;Verbal cues; Adaptive equipment   Physical Assistance Level 26%-50%   Comment single railing and cane per PLOF   step to pattern   4 Steps CARE Score 3   12 Steps   Reason if not Attempted Safety concerns   12 Steps CARE Score 88   Stairs   Type Trainer Steps  (6 inch)   # of Steps 6   Assist Devices Single Rail;Cane   Findings pt prefers to descend with LLE as she feels it is her more impaired leg from hx of sciatic and bunion sx   Comprehension   QI: Comprehension 4  Undestands: Clear comprehension without cues or repetitions   Expression   QI: Expression 3  Exhibits some difficulty with expressing needs and ideas (e g , some words or finishing thoughts) or speech is not clear   Strength RLE   R Hip Flexion 4-/5   R Hip ABduction 4/5   R Hip ADduction 4/5   R Knee Flexion 4-/5   R Knee Extension 4-/5   R Ankle Dorsiflexion 4-/5   R Ankle Plantar Flexion 4/5   LLE Assessment   LLE Assessment WFL  (strength and ROM WFL although pt has reports of pain and numbness due to sciatica/bunion sx)   Coordination   Coordination and Movement Description intact BLE Isai, mild dysmetria with heel to shin RLE   Sensation   Light Touch Partial deficits in the RLE  (grossly intact with exception of R heel absent to light touch)   Propioception No apparent deficits   Cognition   Arousal/Participation Cooperative; Alert   Orientation Level Oriented X4   Objective Measure   PT Measure(s) TU 32 seconds using SPC with min A  resulting in self selected gait speed of 0 20 m/s   Therapeutic Exercise   Therapeutic Exercise/Activity educated pt on seated hip flexion, knee ext, and ankle pf/df AROM to perform when sitting in room  pt performed x 10 reps each BLEs  educated her on signs/symptoms of CVA   Discharge Information   Vocational Plan Retired/not working   Patient's Discharge Plan return home with assist prn from spouse and 2 supportive sons   Patient's Rehab Expectations to walk independently with cane   Barriers to Discharge Home Decreased Strength;Decreased Endurance; Safety Considerations; Unsafe Home Setup  (stairs to enter)   Impressions Pt is a 58yo female admitted to B on 22 with R sided weakness and slurred speech; MRI (+) left posterior limb internal capsule ischemia, pt outside of window for TNK  She is now admitted to [facility] and presents to PT eval with impairments in RLE strength and coordination, standing static/dynamic balance, and endurance resulting in decreased (I) with functional mobility skills  Pt previously used [facility] for mobility but currently is noted to have improved stability and reduced gait deficits when using RW  Pts self selected gait speed with SPC and min A (as assessed by TUG) is 0 20m/s placing her at increased risk for falls  Pt has hx of LLE bunion sx (2021) and LLE and LBP pain from sciatica which also impacts her mobility  She will benefit from skilled PT interventions to address deficits, reduce fall risk, and maximize functional independence  Anticipate intermittent supervision/assist from family for IADLs with possible need for supervision for stair negotiaition and outpatient PT upon DC  Pending progress, pt may benefit from use of RW for mobility  Son Anushka Goss) is requesting formal caregiver training to be cleared to assist pt for in room mobility, bed mobility, and transfer as soon as possible  Communicated this plan to pt and son as well as therapists scheduled for pt care tomorrow  Damaris Wiley stated he will coordinate with his brother to complete training as well     PT Therapy Minutes   PT Time In 1230   PT Time Out 1330   PT Total Time (minutes) 60   PT Mode of treatment - Individual (minutes) 60   PT Mode of treatment - Concurrent (minutes) 0   PT Mode of treatment - Group (minutes) 0   PT Mode of treatment - Co-treat (minutes) 0   PT Mode of Treatment - Total time(minutes) 60 minutes   PT Cumulative Minutes 60   Cumulative Minutes   Cumulative therapy minutes 165

## 2023-02-10 NOTE — ASSESSMENT & PLAN NOTE
Left hallux valgus and hammertoe surgery by Dr Denise Tong (podiatry)  Chronic hypersensitivity/neuritis  Is able to wear shoes  Patient received injection by Dr Denise Tong in past (Sept 2022)  Follow-up with Dr Denise Tong

## 2023-02-10 NOTE — PROGRESS NOTES
Internal Medicine Progress Note  Patient: Neymar Dickerson  Age/sex: 62 y o  female  Medical Record #: [id number]      ASSESSMENT/PLAN: (Interval History)  Neymar Dickerson is seen and examined and management for following issues:    Acute CVA  • Left posterior limb internal capsule/thalamic  • For ASA/Plavix a 21 days then to ASA alone on 23  • Continue Lipitor but Cards advised switch to Crestor 40mg qd at discharge to avoid interaction of the Lipitor with the Verapamil (CY interaction)  • CT C/A/P was negative for malignancy; thrombosis panel sent both to rule out hypercoag state     Headaches  • MRI with contrast was unrevealing  • Neuro placed her on Verapamil for preventive tx and she got IV magnesium/Phenergan/Toradol/Benadryl     Chest pain  • Had occurrences on  and  naomi with lying flat  • Trops and EKGs were negative  • Cards saw and felt atypical and will see her back in the office; no w/u for now     HTN  • Home:  no meds  • Here:  Verapamil 120mg qd/Losartan 50mg qd/HCTZ 12 5mg qd  • Stable; no changes today     Depression  • Continue Cymbalta  • Takes Seroquel and Trazodone at home     Pre-DM  • HbA1C was 6 4  • Will discuss placing on DM diet with her   • DM diet teaching  • Will watch FBS but not to do Accuchecks     Nicotine abuse  • Offer patch if needed  • Will advise cessation      Discharge date:  Team      The above assessment and plan was reviewed and updated as determined by my evaluation of the patient on 2/10/2023      Labs:   Results from last 7 days   Lab Units 23  0454 23  0607   WBC Thousand/uL 7 33 8 39   HEMOGLOBIN g/dL 13 4 13 3   HEMATOCRIT % 41 8 42 2   PLATELETS Thousands/uL 322 304     Results from last 7 days   Lab Units 23  0454 23  0607   SODIUM mmol/L 139 138   POTASSIUM mmol/L 4 1 4 1   CHLORIDE mmol/L 108 109*   CO2 mmol/L 27 25   BUN mg/dL 16 20   CREATININE mg/dL 0 81 0 84   CALCIUM mg/dL 9 2 8 9     Results from last 7 days   Lab Units 02/05/23  0615   HEMOGLOBIN A1C % 6 4*     Results from last 7 days   Lab Units 02/04/23  1314   INR  0 92     Results from last 7 days   Lab Units 02/09/23  2101 02/09/23  1639   POC GLUCOSE mg/dl 144* 110       Review of Scheduled Meds:  Current Facility-Administered Medications   Medication Dose Route Frequency Provider Last Rate   • acetaminophen  650 mg Oral Q6H PRN Laura Ernst MD     • albuterol  2 puff Inhalation Q4H PRN Laura Ernst MD     • aspirin  81 mg Oral Daily Laura Ernst MD     • atorvastatin  40 mg Oral QPM Laura Ernst MD     • bisacodyl  10 mg Rectal Daily PRN Laura Ernst MD     • clopidogrel  75 mg Oral Daily Laura Ernst MD     • DULoxetine  60 mg Oral Daily Laura Ernst MD     • enoxaparin  40 mg Subcutaneous Daily Laura Ernts MD     • hydrochlorothiazide  12 5 mg Oral Daily Laura Ernst MD     • lidocaine  1 patch Topical Daily Laura Ernst MD     • lidocaine   Topical 4x Daily PRN SHAILA Sanchez     • losartan  50 mg Oral Daily Laura Ernst MD     • nicotine  21 mg Transdermal Daily Laura Ernst MD     • ondansetron  4 mg Oral Q6H PRN Laura Ernst MD     • pantoprazole  40 mg Oral Early Morning Laura Ernst MD     • polyethylene glycol  17 g Oral Daily PRN Laura Ernst MD     • QUEtiapine  25 mg Oral HS PRN Laura Ernst MD     • traZODone  50 mg Oral HS Laura Ernst MD     • verapamil  120 mg Oral Daily Laura Ernst MD         Subjective/ HPI: Patient seen and examined  Patients overnight issues or events were reviewed with nursing or staff during rounds or morning huddle session  New or overnight issues include the following:     No new or overnight issues  Offers no complaints    ROS:   A 10 point ROS was performed; negative except as noted above         Imaging:     No orders to display       *Labs /Radiology studies reviewed  *Medications reviewed and reconciled as needed  *Please refer to order section for additional ordered labs studies  *Case discussed with primary attending during morning huddle case rounds    Physical Examination:  Vitals:   Vitals:    02/09/23 1540 02/09/23 2046 02/10/23 0500 02/10/23 0856   BP: 142/87 128/87 111/72 119/86   BP Location: Right arm Right arm Right arm    Pulse: 87 98 89    Resp: 20 18 18    Temp: 98 °F (36 7 °C) 98 5 °F (36 9 °C) 97 8 °F (36 6 °C)    TempSrc: Oral Oral Oral    SpO2: 96% 98% 96%    Weight: 88 9 kg (196 lb)      Height: 5' (1 524 m)          General Appearance: no distress, conversive  HEENT:  External ear normal   Nose normal w/o drainage  Mucous membranes are moist  Oropharynx is clear  Conjunctiva clear w/o icterus or redness  Neck:  Supple, normal ROM  Lungs: BBS without crackles/wheeze/rhonchi; respirations unlabored with normal inspiratory/expiratory effort  No retractions noted  On RA  CV: regular rate and rhythm; no rubs/murmurs/gallops, PMI normal   ABD: Abdomen is soft  Bowel sounds all quadrants  Nontender with no distention  EXT: no edema  Skin: normal turgor, normal texture, no rashes  Psych: affect normal, mood normal  Neuro: AAO; mild right sided weakness is unchanged      The above physical exam was reviewed and updated as determined by my evaluation of the patient on 2/10/2023  Invasive Devices     Peripheral Intravenous Line  Duration           Peripheral IV 02/06/23 Right Antecubital 4 days                   VTE Pharmacologic Prophylaxis: Enoxaparin  Code Status: Level 1 - Full Code  Current Length of Stay: 1 day(s)      Total time spent:  30 minutes with more than 50% spent counseling/coordinating care  Counseling includes discussion with patient re: progress  and discussion with patient of his/her current medical state/information  Coordination of patient's care was performed in conjunction with primary service   Time invested included review of patient's labs, vitals, and management of their comorbidities with continued monitoring  In addition, this patient was discussed with medical team including physician and advanced extenders  The care of the patient was extensively discussed and appropriate treatment plan was formulated unique for this patient  Medical decision making for the day was made by supervising physician unless otherwise noted in their attestation statement  ** Please Note:  voice to text software may have been used in the creation of this document   Although proof errors in transcription or interpretation are a potential of such software**

## 2023-02-10 NOTE — UTILIZATION REVIEW
NOTIFICATION OF ADMISSION DISCHARGE   This is a Notification of Discharge from [address]  Please be advised that this patient has been discharge from our facility  Below you will find the admission and discharge date and time including the patient’s disposition  UTILIZATION REVIEW CONTACT:  Joseluis De Dios  Utilization   Network Utilization Review Department  Phone: [phone] x carefully listen to the prompts  All voicemails are confidential   Email: [email or web address]     ADMISSION INFORMATION  PRESENTATION DATE: 2/4/2023  1:06 PM  OBERVATION ADMISSION DATE:   INPATIENT ADMISSION DATE: 2/5/23  4:18 PM   DISCHARGE DATE: 2/9/2023  2:50 PM   [facility]    IMPORTANT INFORMATION:  Send all requests for admission clinical reviews, approved or denied determinations and any other requests to dedicated fax number below belonging to the campus where the patient is receiving treatment   List of dedicated fax numbers:  [address] DENIALS (Administrative/Medical Necessity) [phone]   [address] (Maternity/NICU/Pediatrics) [phone]   [facility] [phone]   [place] [address] [phone]   [address]  [phone]   [address] [facility] [phone]   [address] [place] [address] [phone]   [facility] [place] [address] [phone]   [facility]  [phone]   [address] [facility] [phone]   [address] [facility] [address] [facility] [phone]

## 2023-02-10 NOTE — PROGRESS NOTES
Occupational Therapy Long Term Goals      02/10/23 0700   Rehab Team Goals   ADL Team Goal Patient will be independent with ADLs with least restrictive device upon completion of rehab program   Rehab Team Interventions   OT Interventions Self Care;Home Management   Eating Goal   Eating Goal 06  Independent - Patient completes the activity by him/herself with no assistance from a helper  Status Ongoing; Target goal - one week; Target goal - two weeks   Grooming Goal   Oral Hygiene Goal 06  Independent - Patient completes the activity by him/herself with no assistance from a helper  Status Ongoing; Target goal - one week; Target goal - two weeks   Tub/Shower Transfer Goal   Method Tub Shower  (IND)   Assist Device Seat with Back   Status Ongoing; Target goal - one week; Target goal - two weeks   Bathing Goal   Shower/bathe self Goal 06  Independent - Patient completes the activity by him/herself with no assistance from a helper  Status Ongoing; Target goal - one week; Target goal - two weeks   Upper Body Dressing Goal   Upper body dressing Goal 06  Independent - Patient completes the activity by him/herself with no assistance from a helper  Status Ongoing; Target goal - one week; Target goal - two weeks   Lower Body Dressing Goal   Lower body dressing Goal 06  Independent - Patient completes the activity by him/herself with no assistance from a helper  Putting on/taking off footwear Goal 06  Independent - Patient completes the activity by him/herself with no assistance from a helper  Status Ongoing; Target goal - two weeks; Target goal - one week   Toileting Transfer Goal   Toilet transfer Goal 06  Independent - Patient completes the activity by him/herself with no assistance from a helper  Status Ongoing; Target goal - two weeks   Toileting Goal   Toileting hygiene Goal 06  Independent - Patient completes the activity by him/herself with no assistance from a helper  Status Ongoing; Target goal - one week; Target goal - two weeks   Meal Prep and Kitchen Mobility   Assist Level Independent   Status Ongoing; Target goal - one week; Target goal - two weeks   Medication Management   Assist Level Independent   Status Ongoing; Target goal - one week; Target goal - two weeks   Laundry   Assist Level Independent   Status Ongoing; Target goal - one week; Target goal - two weeks

## 2023-02-10 NOTE — PROGRESS NOTES
SLP TAA       02/10/23 0845   Patient Data   Rehab Impairment Impairment of mobility, safety, Activities of Daily Living (ADLs), and cognitive/communication skills due to Stroke:  01 2  Right Body Involvement (Left Brain)   Etiologic Diagnosis Left Posterior Limb Internal Capsule/Thalamic Acute to Subacute Infarct   Date of Onset 02/04/23   Support System   Name Pt reported living w/ spouse which son Luz Maria Cousins was present during assessment and assisted in providing additional information   Prior Level of Function   Functional Cognition 3  Independent - Patient completed the activities by him/herself, with or without an assistive device, with no assistance from a helper  Restrictions/Precautions   Precautions Fall Risk;Cognitive;Aspiration;Bed/chair alarms;Supervision on toilet/commode   Pain Assessment   Pain Assessment Tool 0-10   Pain Score No Pain   Eating Assessment   Swallow Precautions Yes   Bedside Swallow Results Yes  (was able to be advanced in acute care to regular w/ thin liquids prior to admission to the [facility])   VBS Study Results No   Adaptive Equipment Adaptive Utensil   Food To Mouth Yes   Able To Cut Yes   Noted Coughing   Positioning Upright;Out of Bed   Safety Needs Increase Time   Meal Assessed Breakfast   QI: Swallowing/Nutritional Status   (Regular w/ thins)   Current Diet Regular; Thin   Intake Mode PO;Self   Dentures Other  (natural dentition)   Opens Packages Yes   Findings Refer to SLP Rehab note for full details   Type of Assistance Needed Set-up / clean-up;Supervision   Physical Assistance Level No physical assistance   Eating CARE Score 4   Comprehension   Assist Devices Glasses   Auditory Basic   Visual Basic   Findings Pt only completing Motor Speech Assessment today in session  Will plan to assess cognition in future sessions  QI: Comprehension 3  Usually Understands: Understands most conversations, but misses some part/intent of message  Requires cues at times to understand  Comprehension (FIM) 4 - Understands basic info/conversation 75-90% of time   Expression   Verbal Basic   Non-Verbal Basic   Intelligibility Phrase   Findings Pt only completing Motor Speech Assessment today in session  Will plan to assess cognition in future sessions  QI: Expression 3  Exhibits some difficulty with expressing needs and ideas (e g , some words or finishing thoughts) or speech is not clear   Expression (FIM) 4 - Expresses basic info/needs 75-90% of time   Social Interaction   Cooperation with staff;with family   Participation Individual   Behaviors observed Confused; Appropriate   Findings Pt only completing Motor Speech Assessment today in session  Will plan to assess cognition in future sessions  Social Interaction (FIM) 5 - Requires redirection but less than 10% of the time  Problem Solving   Complex Manages finances;Manages medications   Routine Manages call bell   Findings Pt only completing Motor Speech Assessment today in session  Will plan to assess cognition in future sessions  Problem solving (FIM) 4 - Solves basic problems 75-89% of time   Memory   Recognize People Yes   Remember Routine Yes   Initiates Tasks Yes   Short-Term Impaired   Long Term Intact   Findings Pt only completing Motor Speech Assessment today in session  Will plan to assess cognition in future sessions  Memory (FIM) 4 - Recognizes/recalls/performs 75-89%   Discharge Information   Vocational Plan Retired/not working   Patient's Discharge Plan home w/ family support   Patient's Rehab Expectations 'to speak better again'   Barriers to Discharge Home Decreased Cognitive Function;Decreased Strength;Decreased Endurance; Safety Considerations; Other  (dysarthria and aspiration risk)   Impressions Pt is a 62 y o  female with HTN, HLD, tobacco use, anxiety/depression, insomnia, sciatica, chronic left foot pain after bunion surgery, pre-diabetes who presented to the [facility] on 2/4/23 with dysarthria and right sided weakness  Stroke alert initiated  TNK was NOT administered as outside treatment window  CTH with acute abnormalities  CTA head and neck: Negative for LVO  MRI Brain both with and without contrast confirming a left posterior limb internal capsule acute ischemic CVA  Echocardiogram with EF 60% and normal function  Patient seen by Neurology  Etiology of CVA related to hypertensive disease, thrombosis panel pending  CT CAP completed and not showing signs of malignancies  Placed on DAPT with Aspirin 81 mg daily and Plavix 75 mg daily x 21 days then Aspirin 81 mg daily monotherapy  Currently, she is a good rehab candidate to achieve supervision level goals for cognitive linguistic skills as well as speech output while on the acute rehab center  Additionally, pt will be briefly followed for dysphagia tx sessions to ensure tolerance of least restrictive diet w/o increased oropharyngeal or aspiration sxs  Current barrier which present include aspiration risk, decreased communication skills due to mild dysarthria of speech, decreased attentions, decreased ST/working memory, decreased executive function skills which currently impacts overall safety and functional speech/cognitive/mobility  ELOS ~ 2 weeks  At this time, pt will benefit from skilled SLP services targeting speech/cognitive linguistic skills to maximize overall communication abilities, independence given cognitive skills as well as demonstrating tolerance of safest least restrictive diet w/o increased aspiration sxs monitoring carryover of swallow strategies throughout pt's stay on the acute rehab center with anticipate discharge home w/ family support/supervision     SLP Therapy Minutes   SLP Time In 46   SLP Time Out 1000   SLP Total Time (minutes) 75   SLP Mode of treatment - Individual (minutes) 75   SLP Mode of treatment - Concurrent (minutes) 0   SLP Mode of treatment - Group (minutes) 0   SLP Mode of treatment - Co-treat (minutes) 0   SLP Mode of Treatment - Total time(minutes) 75 minutes   SLP Cumulative Minutes 75   Cumulative Minutes   Cumulative therapy minutes 180

## 2023-02-10 NOTE — ASSESSMENT & PLAN NOTE
HA1C = 6 4  Diabetic diet   Consult nutrition to review with patient during [facility] stay

## 2023-02-10 NOTE — PROGRESS NOTES
OCCUPATIONAL THERAPY   ACUTE REHAB EVALUATION TAA    Active Problem List:   Patient Active Problem List   Diagnosis    Acquired hallux valgus of left foot    Other hammer toe(s) (acquired), left foot    Other acquired deformities of left foot    Wound of right foot    Preoperative clearance    Tobacco use    Headache    Obesity (BMI 35 0-39 9 without comorbidity)    Ex-smoker for less than 1 year    Stroke - Left posterior limb internal capsule ischemic stroke    Depression    Insomnia    Hypertension    Chest pain    Prediabetes    Pain in left foot, chronic     Past Medical Hx:   Past Medical History:   Diagnosis Date    Acquired deformity of left foot     Anesthesia complication     difficulty awakening    Arthritis     Deaf, left     Depression     Hallux valgus of left foot     Hammer toe of left foot     Headache     Kidney stone     Sciatic pain, right     intermittent     Past Surgical Hx:   Past Surgical History:   Procedure Laterality Date    BREAST BIOPSY Right 03/04/2021    BREAST BIOPSY Right 3/10/2021    Procedure: Excisional Breast debridement  7 o'clock position;  Surgeon: Zbigniew Shanks MD;  [facility];  Service: General    CHOLECYSTECTOMY      CLOSURE DELAYED PRIMARY Right 7/5/2020    Procedure: CLOSURE DELAYED PRIMARY and application of skin graft substitute;  Surgeon: Naomi Mccurdy DPM;  [facility];  Service: Podiatry    HERNIA REPAIR      INCISION AND DRAINAGE OF WOUND Right 7/1/2020    Procedure: INCISION AND DRAINAGE (I&D) EXTREMITY;  Surgeon: Naomi Mccurdy DPM;  [facility];  Service: Podiatry    CT CORRJ HALLUX VALGUS W/SESMDC Susie العراقي MEDIAL CNF Left 11/12/2021    Procedure: Aline Kruse;  Surgeon: Naomi Mccurdy DPM;  [facility];  Service: Podiatry    CT OSTEOT W/ [address] SHRT/CORRJ METAR XCP 1ST EA Left 6/14/2019    Procedure: 3rd Metatarsal Osteotomy;  Surgeon: Royal Longoria DPM;  [facility];  Service: Podiatry    CT OSTEOT W/ [address] SHRT/CORRJ METAR XCP 1ST EA Left 11/12/2021    Procedure: OSTEOTOMY 2ND METATARSAL;  Surgeon: Ruchi Steiner DPM;  [facility];  Service: Podiatry    TUBAL LIGATION      US GUIDED BREAST BIOPSY RIGHT COMPLETE Right 3/4/2021    VAC DRESSING APPLICATION Right 7/1/1306    Procedure: APPLICATION VAC DRESSING;  Surgeon: Ruchi Steiner DPM;  [facility];  Service: Podiatry        02/10/23 0700   Support System   Name Pt reports residing with supportive  and son  Pt reports that  is limited and con not provide assistance  Pt reports that she has 4 children and 3 grand kids  Pt reports that she is currently not working but 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  pt had to stop working 2* needed foot surgery on the L foot  Pt reports that she enjoys decorating her house, loves butterflies, and watching her soap shows  Pt endorses feeling of depression recently with the inability to return to work, stating stress with finances  Pt reports she would like to return to work  Home Setup   Type of Home Multi Level   Method of Entry Stairs   First Floor Bathroom Full;Tub; Shower   First Floor Bathroom Accessibility Shower chair;Commode   First Floor Setup Available Yes   Available Equipment Roller Walker;Single SARCELLES; [address] Received Prior to Admission Physical Therapy  ([place])   Vocation   (not working now)   Transportation    Prior Device(s) Used Single Point Cane   Prior IADL Participation   Money Management Identify Money;Estimate Costs;Estimate Change;Combine Bills;Manage Checkbook   Meal Preparation Full Participation   Laundry Full Participation   Home Cleaning Full Participation   Prior Level of Function   Self-Care 3  Independent - Patient completed the activities by him/herself, with or without an assistive device, with no assistance from a helper  Indoor-Mobility (Ambulation) 3   Independent - Patient completed the activities by him/herself, with or without an assistive device, with no assistance from a helper  Stairs 3  Independent - Patient completed the activities by him/herself, with or without an assistive device, with no assistance from a helper  Functional Cognition 3  Independent - Patient completed the activities by him/herself, with or without an assistive device, with no assistance from a helper  Prior Device Used Z  None of the above   Falls in the Last Year   Number of falls in the past 12 months 0   Restrictions/Precautions   Precautions Bed/chair alarms; Fall Risk;Supervision on toilet/commode   Pain Assessment   Pain Assessment Tool 0-10   Pain Score 6   Pain Location/Orientation Orientation: Right;Location: Shoulder   Pain Onset/Description Onset: Sudden;Descriptor: Sharp   Effect of Pain on Daily Activities limited shoudler flexion, pain with rolling onto mshoulder in bed, pain with palpation to biceps tendon area  Hospital Pain Intervention(s) Repositioned; Shower/Bath   Oral Hygiene   Type of Assistance Needed Physical assistance   Physical Assistance Level 26%-50%   Comment MIN A for shoulder support to complete task entirly with R UE  poor abilty to maintain grasp on toothbrush when requiring pressure application   Oral Hygiene CARE Score 3   Tub/Shower Transfer   Findings MIN A lateral side step to chair  Shower/Bathe Self   Type of Assistance Needed Physical assistance   Physical Assistance Level 26%-50%   Comment bathing seated as she does at baseline   limited R UE reaching to complete buttocks bathing able to cross leg tech   Shower/Bathe Self CARE Score 3   Dressing/Undressing Clothing   Type of Assistance Needed Physical assistance   Physical Assistance Level 26%-50%   Comment assist for clasping bra, and donning shirt over R side trunk   Upper Body Dressing CARE Score 3   Type of Assistance Needed Physical assistance   Physical Assistance Level 25% or less   Comment CGA in stance with prolonged standing  reports feeling of light R LE weakness  Lower Body Dressing CARE Score 3   Putting On/Taking Off Footwear   Type of Assistance Needed Incidental touching   Comment cross leg tech for donning socks  able to don slip on crocs   Putting On/Taking Off Footwear CARE Score 4   Toileting Hygiene   Type of Assistance Needed Physical assistance   Physical Assistance Level 26%-50%   Comment limited abilty to complete rear hygiene with R UE  Toileting Hygiene CARE Score 3   Toilet Transfer   Type of Assistance Needed Physical assistance   Physical Assistance Level 26%-50%   Comment HHA on the RIght to simulate SPC cane use  Toilet Transfer CARE Score 3   Transfer Bed/Chair/Wheelchair   Adaptive Equipment Hand Hold   Type of Assistance Needed Physical assistance   Physical Assistance Level 26%-50%   Chair/Bed-to-Chair Transfer CARE Score 3   Lying to Sitting on Side of Bed   Type of Assistance Needed Supervision   Lying to Sitting on Side of Bed CARE Score 4   Sit to Stand   Type of Assistance Needed Supervision   Sit to Stand CARE Score 4   Comprehension   QI: Comprehension 4  Undestands: Clear comprehension without cues or repetitions   Expression   QI: Expression 4  Express complex messages without difficulty and with speech that is clear and easy to understan   RUE Assessment   RUE Assessment X   AROM - RUE   R Shoulder Flexion limited to 1/2 range shoudler flexion  see below for UE testing results  Coordination   Movements are Fluid and Coordinated 0   Coordination and Movement Description liliya kinetic, dysmetric   Sensation   Light Touch Partial deficits in the RUE;Partial deficits in the RLE   Cognition   Overall Cognitive Status WFL   Comments Pt follows all commands  indightful to deficits and safety   Vision   Vision Comments Reports L eye has been feeling blurry since stroke   will need to complete further visual screenings   Perception   Inattention/Neglect Appears intact   Discharge Information   Patient's Rehab Expectations "to get stronger "   Impressions Pt is a 62 y o  female seen for OT evaluation following [facility] [place] for Left Posterior Limb Internal Capsule/Thalamic Acute to Subacute Infarct  OT evaluation and assessment of strength, ADL function, and functional transfers completed  Prior to admission Pt was completing ADLs, IADLs at independent with use of SPC  Pt lives with  and and son and is unable to provide physical assistance at time of discharge  Refined functional assist  increased strength and fine control noted  able to complete 9 hole peg test in 35-70 seconds  both 3-jaw and lateral pinch patterns are present  SAFE SCORE (Dig EXT 5/5, Should ABD 5/5   10/10 Pathway indicates Remedial approach  Personal factors affecting the patient at this time include: co-morbidities, pain, fall risk and assist w/ ADL's  Pt is currently limited due to the following deficits impacting occupational performance, activity tolerance, endurance, standing balance/tolerance, sitting balance/tolerance, UE strength, UE ROM, FMC and GMC, limited R D1 full extension, decreased FM muscular endurance, distal/intrinsic weakness, prehension/in-hand manipulation speed and accuracy with distal motor delays present, decreased fluidity/legibility of handwriting with Max difficulties maintaining dynamic tripod grasp, decreased bilateral coordination/bimanual dexterity, delayed distal reaction times, decreased volitional grasp/release automaticity, inabilities to oppose thumb to D3-5  The patient has shown a decline from prior level of function  The patient participates in identification of personal goals to address in Occupational Therapy  Recommend skilled OT services for I/ADL retraining to support the ability to safely participate in daily occupations safely and independently in the most least restrictive way   Pt demonstrates Good rehab potential to meet LTG's, Anticipate  2week(s) length of stay    OT Treatment focus will include: ADL re-training, IADL re-training, fxnl xfers, fxnl cognition, R attention, fxnl attention, standing tolerance, standing balance, UE NMR, visual perceptual training, visual scanning, UE strengthening, UE endurance, FMC/GMC, FMS/GMS, DME training/education, family training/education, EC techniques/education, healthy coping education, leisure pursuits and formalized cognitive assessment     Neuro based treatments focusing on the following: Repetitive Task training, Trunk restrained Reaching, Functional Electrical Stimulation, Sensorimotor Training, Hand Edema Reduction Techniques, Mental Practice/Guided Imagery, Weight bearing, Mirror Therapy, Fine motor coordination, motor planning, Spatial awareness, Functional Attention , Joint protection training, Constraint Induced Movement Therapy (modified), ROM & Strength Training and HEP Educaiton      OT Therapy Minutes   OT Time In 0700   OT Time Out 0845   OT Total Time (minutes) 105   OT Mode of treatment - Individual (minutes) 105   OT Mode of treatment - Concurrent (minutes) 0   OT Mode of treatment - Group (minutes) 0   OT Mode of treatment - Co-treat (minutes) 0   OT Mode of Treatment - Total time(minutes) 105 minutes   OT Cumulative Minutes 105   Cumulative Minutes   Cumulative therapy minutes 105   OT Treatment focus will include: ADL re-training, IADL re-training, fxnl xfers, fxnl cognition, R attention, fxnl attention, standing tolerance, standing balance, UE NMR, visual perceptual training, visual scanning, UE strengthening, UE endurance, FMC/GMC, FMS/GMS, DME training/education, family training/education, EC techniques/education, healthy coping education, leisure pursuits and formalized cognitive assessment along with neuro based treatments focusing on the following: Repetitive Task training, Trunk restrained Reaching, Functional Electrical Stimulation, Sensorimotor Training, Hand Edema Reduction Techniques, Mental Practice/Guided Imagery, Weight bearing, Mirror Therapy, Fine motor coordination, motor planning, Spatial awareness, Functional Attention , Joint protection training, Constraint Induced Movement Therapy (modified), ROM & Strength Training and HEP Educaiton       UE Evaluation:       FELIZ Serra            UPPER EXTREMITY FUNCTION Impaired Intact Dominant Hand: RIGHT                           /Pinch Strength         Dynamometer      - Gross Grasp 10,15,10 lbs   30,30,30 lbs       Pinch Meter       - PINCER 1 3 lbs 3 8 lbs     - Lateral key 4 3 lbs 4 8 lbs    9 hole Peg Test  54 31  24 59     Box and Blocks  33   50               Fugl-Leon UE Assessment  Right     A: UPPER EXTREMITY    I: reflex activity 4/4   II:volitional movement within synergies 17/18   III: volitional movement mixing synergies 6/6   IV: volitional movement with little or no synergy 4/6   V: Normal Reflex activity 0/2   B: WRIST 10/10   C: HAND 10/14   D: Coordination and Speed   R:13 87 L:5 85 seconds 3/6   H: Sensation 12/12   J: PROM 24/24   J: Joint Pain 22/24   Score from motor sections A-D:     48/66

## 2023-02-10 NOTE — PLAN OF CARE
Problem: PAIN - ADULT  Goal: Verbalizes/displays adequate comfort level or baseline comfort level  Description: Interventions:  - Encourage patient to monitor pain and request assistance  - Assess pain using appropriate pain scale  - Administer analgesics based on type and severity of pain and evaluate response  - Implement non-pharmacological measures as appropriate and evaluate response  - Consider cultural and social influences on pain and pain management  - Notify physician/advanced practitioner if interventions unsuccessful or patient reports new pain  2/9/2023 2213 by Claudell Rossetti, RN  Outcome: Progressing  2/9/2023 2212 by Claudell Rossetti, RN  Outcome: Progressing     Problem: INFECTION - ADULT  Goal: Absence or prevention of progression during hospitalization  Description: INTERVENTIONS:  - Assess and monitor for signs and symptoms of infection  - Monitor lab/diagnostic results  - Monitor all insertion sites, i e  indwelling lines, tubes, and drains  - Monitor endotracheal if appropriate and nasal secretions for changes in amount and color  - [place] appropriate cooling/warming therapies per order  - Administer medications as ordered  - Instruct and encourage patient and family to use good hand hygiene technique  - Identify and instruct in appropriate isolation precautions for identified infection/condition  2/9/2023 2213 by Claudell Rossetti, RN  Outcome: Progressing  2/9/2023 2212 by Claudell Rossetti, RN  Outcome: Progressing     Problem: SAFETY ADULT  Goal: Patient will remain free of falls  Description: INTERVENTIONS:  - Educate patient/family on patient safety including physical limitations  - Instruct patient to call for assistance with activity   - Consult OT/PT to assist with strengthening/mobility   - Keep Call bell within reach  - Keep bed low and locked with side rails adjusted as appropriate  - Keep care items and personal belongings within reach  - Initiate and maintain comfort rounds  - Make Fall Risk Sign visible to staff  - Apply yellow socks and bracelet for high fall risk patients  - Consider moving patient to room near nurses station  2/9/2023 2213 by Donna Aguilera RN  Outcome: Progressing  2/9/2023 2212 by Donna Aguilera RN  Outcome: Progressing  Goal: Maintain or return to baseline ADL function  Description: INTERVENTIONS:  -  Assess patient's ability to carry out ADLs; assess patient's baseline for ADL function and identify physical deficits which impact ability to perform ADLs (bathing, care of mouth/teeth, toileting, grooming, dressing, etc )  - Assess/evaluate cause of self-care deficits   - Assess range of motion  - Assess patient's mobility; develop plan if impaired  - Assess patient's need for assistive devices and provide as appropriate  - Encourage maximum independence but intervene and supervise when necessary  - Involve family in performance of ADLs  - Assess for home care needs following discharge   - Consider OT consult to assist with ADL evaluation and planning for discharge  - Provide patient education as appropriate  2/9/2023 2213 by Donna Aguilera RN  Outcome: Progressing  2/9/2023 2212 by Donna Aguilera RN  Outcome: Progressing  Goal: Maintains/Returns to pre admission functional level  Description: INTERVENTIONS:  - Perform BMAT or MOVE assessment daily    - Set and communicate daily mobility goal to care team and patient/family/caregiver     - Collaborate with rehabilitation services on mobility goals if consulted  - Out of bed for toileting  - Record patient progress and toleration of activity level   2/9/2023 2213 by Donna Aguilera RN  Outcome: Progressing  2/9/2023 2212 by Donna Aguilera RN  Outcome: Progressing     Problem: DISCHARGE PLANNING  Goal: Discharge to home or other facility with appropriate resources  Description: INTERVENTIONS:  - Identify barriers to discharge w/patient and caregiver  - Arrange for needed discharge resources and transportation as appropriate  - Identify discharge learning needs (meds, wound care, etc )  - Arrange for interpretive services to assist at discharge as needed  - Refer to Case Management Department for coordinating discharge planning if the patient needs post-hospital services based on physician/advanced practitioner order or complex needs related to functional status, cognitive ability, or social support system  2/9/2023 2213 by Katy Forman RN  Outcome: Progressing  2/9/2023 2212 by Katy Forman RN  Outcome: Progressing

## 2023-02-10 NOTE — ASSESSMENT & PLAN NOTE
Smoking cessation education frequently during [facility] stay  Nicotine patch for cravings - wean dose weekly -reduce to 14 mg

## 2023-02-10 NOTE — ASSESSMENT & PLAN NOTE
Managed on Cymbalta 60 mg daily (home dose)  Consult placed to Neuropsychology - patient tearful and having difficulty coping with stroke diagnosis  Team to additionally provide emotional support

## 2023-02-10 NOTE — CASE MANAGEMENT
Attempted to meet with pt but she was sleeping, family not present  Per chart review pt resides with her spouse who will not be able to assist her  pts son Aimee Kaur reported that he is moving in with pt and step father to assist on dc  Pt has a two story home with a first flr set up  Pt owns a spc and was receiving outpt physical therapy for her foot  Pt uses [facility] in [place] for rx needs  Family provides transportation  Cm to follow up with family to review team mtg process and insurance contd stay review process

## 2023-02-10 NOTE — ASSESSMENT & PLAN NOTE
Here: Due to at rest tachycardia (chronic), after discussion with internal medicine consultants: Start Lopressor 25 mg every 12 hours, reduce losartan to 25 mg daily, discontinue hydrochlorothiazide  We will monitor BP and heart rate with new regimen  -overall stable  Plan per Cardiology  Optimize diet (limit salt)  Outpatient follow-up with Cardiology - Dr Luis Angel Gonsales

## 2023-02-10 NOTE — PROGRESS NOTES
02/10/23 0845   Pain Assessment   Pain Assessment Tool 0-10   Pain Score No Pain   Restrictions/Precautions   Precautions Fall Risk;Cognitive  (Simultaneous filing  User may not have seen previous data )   Comprehension   Assist Devices Glasses   Auditory Basic   Visual Basic   Findings Pt only completing Motor Speech Assessment today in session  Will plan to assess cognition in future sessions  QI: Comprehension 3  Usually Understands: Understands most conversations, but misses some part/intent of message  Requires cues at times to understand  Comprehension (FIM) 4 - Understands basic info/conversation 75-90% of time   Expression   Verbal Basic   Non-Verbal Basic   Intelligibility Phrase   Findings Pt only completing Motor Speech Assessment today in session  Will plan to assess cognition in future sessions  QI: Expression 3  Exhibits some difficulty with expressing needs and ideas (e g , some words or finishing thoughts) or speech is not clear   Expression (FIM) 4 - Expresses basic info/needs 75-90% of time   Social Interaction   Cooperation with staff;with family   Participation Individual   Behaviors observed Confused; Appropriate   Findings Pt only completing Motor Speech Assessment today in session  Will plan to assess cognition in future sessions  Social Interaction (FIM) 5 - Requires redirection but less than 10% of the time  Problem Solving   Complex Manages finances;Manages medications   Routine Manages call bell   Findings Pt only completing Motor Speech Assessment today in session  Will plan to assess cognition in future sessions  Problem solving (FIM) 4 - Solves basic problems 75-89% of time   Memory   Recognize People Yes   Remember Routine Yes   Initiates Tasks Yes   Short-Term Impaired   Long Term Intact   Findings Pt only completing Motor Speech Assessment today in session  Will plan to assess cognition in future sessions     Memory (FIM) 4 - Recognizes/recalls/performs 75-89%   Language Assessments   Informal Speech Language Assessment MOTOR SPEECH EVALUATION    Impressions:    Pt completed part of motor speech eval and presents w/ mild dysarthia cristian by articulatory groping, imprecise articultion, distorted speech sounds, faster/ slower rate of speech dependent on the speech task  Pt spontaneous speech is mildly dysarthric/ intelligible, however pt speech in structured repetition tasks ( monosyllabic and multisyllabic words) cristian by stopping, prolongations, blocks, articulatory groping and distortions noted in [place] more than [place]  It was noted that in [place] faster rate of speech was noted which pt demo imprecise articulation  Oral motor exercises were provided for pt to review and complete as a home exercise program but will plan to review w/ pt in future sessions  Therapy Prognosis: Good   Prognosis considerations: Age, family support, pt motivation    Due to time constraints unable to initiate cognitive assessment per pts son who does rx noticing dec memory, processing, problem solving  Will plan to initiate cognitive assessment as able/ as appropriate  H&P/Admit info, pertinent provider notes:(pmh noted above)  Per H&P 2/9/23: Arvind Quintero is a 62 y o  female with HTN, HLD, tobacco use, anxiety/depression, insomnia, sciatica, chronic left foot pain after bunion surgery, pre-diabetes who presented to the [facility] on 2/4/23 with dysarthria and right sided weakness  Stroke alert initiated  TNK was NOT administered as outside treatment window  CTH with acute abnormalities  CTA head and neck: Negative for LVO  MRI Brain both with and without contrast confirming a left posterior limb internal capsule acute ischemic CVA  Echocardiogram with EF 60% and normal function  Patient seen by Neurology  Etiology of CVA related to hypertensive disease, thrombosis panel pending  CT CAP completed and not showing signs of malignancies    Placed on DAPT with Aspirin 81 mg daily and Plavix 75 mg daily x 21 days then Aspirin 81 mg daily monotherapy  Medically developed headache - was treated with Migraine cocktail (Phenergan, Torodol, Benadryl, Magnesium)  Patient also started on Verapamil for prevention  Patient with transient chest pain at rest - troponin negative, EKG without changes  Cardiology consulted, recommended losartan and HCTZ for HTN  They also recommended a change to Crestor instead of Lipitor due to being on Verapamil (CY interaction)  Outpatient follow-up recommended with Dr Keira Benavides  After medical stabilization, patient was found to have acute functional deficits in mobility, self care, speech, swallow, cognition, and therefore admitted to [facility] for acute inpatient rehabilitation  Special Studies:  XR Chest 23: No acute cardiopulmonary disease  CT Chest 23: No CT evidence for malignancy in the chest, abdomen and pelvis  Did the pt report pain? No   If yes, was nursing notified/was it addressed?  N/A     Precautions:   Fall risk, supervision, bed/chair alarms, supervision on commode      Evaluation:    Vowel prolongation: /a/ (Normal 15-20 seconds) : 9 sec       Diadochokinesis:    puh puh puh (normal 3-5 5 repetitions/second): 1 r/s     tuh tuh tuh (normal should exceed 25 reps in 10 seconds): 7 reps in 10 sec     kuh kuh kuh (normal should exceed 25 reps in 10 seconds): 8 reps in 10 sec     puh tuh kuh (or buttercup)- (normal should exceed 8 reps in 10 seconds): 7 reps in 10 sec     Articulation:  Single words:5/16     Multisyllabic words: 0/7    Repetition of multisyllabic words:    Count 1-20: 15/20 in English, 20/20 German (slurred, prolonged in both languages)     Oral Motor Skills:  Facial symmetry: R side weakness  Labial: R sided weakess  Lingual: R sided weakness   Palatal function: WFL   Gag reflex:WFL   Voice: Slurred, dysarthric     Conversation:  Articulatory groping  Imprecise articulation  Decreased breath support for speech  Slow rate     Motor Speech Evaluation   Dysarthria Yes   Mild Dystharia Impaired articulation  (Impaired rate control, distortions, articulatory groping)   Intelligibility Intelligibility reduced   Intelligibility Rating Word level intelligibility; Sentence level intelligibility;Conversation level intelligibility;75%-89%   Eating   Type of Assistance Needed Set-up / clean-up;Supervision   Physical Assistance Level No physical assistance   Eating CARE Score 4   Swallow Assessment   Swallow Treatment Assessment   Bedside Swallow Evaluation      Patient Name: Michael Morocho    EQRQC'Z Date: 2/10/2023     Problem List  Principal Problem:    Stroke - Left posterior limb internal capsule ischemic stroke  Active Problems:    Other acquired deformities of left foot    Tobacco use    Headache    Obesity (BMI 35 0-39 9 without comorbidity)    Depression    Insomnia    Hypertension    Chest pain    Prediabetes    Pain in left foot, chronic      Past Medical History  Past Medical History:   Diagnosis Date    Acquired deformity of left foot     Anesthesia complication     difficulty awakening    Arthritis     Deaf, left     Depression     Hallux valgus of left foot     Hammer toe of left foot     Headache     Kidney stone     Sciatic pain, right     intermittent       Past Surgical History  Past Surgical History:   Procedure Laterality Date    BREAST BIOPSY Right 03/04/2021    BREAST BIOPSY Right 3/10/2021    Procedure: Excisional Breast debridement  7 o'clock position;  Surgeon: Santo Perry MD;  [facility];  Service: General    CHOLECYSTECTOMY      CLOSURE DELAYED PRIMARY Right 7/5/2020    Procedure: CLOSURE DELAYED PRIMARY and application of skin graft substitute;  Surgeon: Kizzy Sanchez DPM;  [facility];  Service: Podiatry    HERNIA REPAIR      INCISION AND DRAINAGE OF WOUND Right 7/1/2020    Procedure: INCISION AND DRAINAGE (I&D) EXTREMITY;  Surgeon: Kizzy Sanchez DPM;  [facility];  Service: Podiatry    TX [place] W/SESMDC W/1METAR MEDIAL CNF Left 11/12/2021    Procedure: Jessica Boswellxi;  Surgeon: Kizzy Sanchez DPM;  [facility];  Service: Podiatry    TX OSTEOT W/ [address] SHRT/CORRJ METAR XCP 1ST EA Left 6/14/2019    Procedure: 3rd Metatarsal Osteotomy;  Surgeon: Neda Dougherty DPM;  [facility];  Service: Podiatry    TX OSTEOT W/ [employer] [facility] SHRT/CORRJ METAR XCP 1ST EA Left 11/12/2021    Procedure: OSTEOTOMY 2ND METATARSAL;  Surgeon: Kizzy Sanchez DPM;  [facility];  Service: [address] [place] [address] BREAST BIOPSY RIGHT COMPLETE Right 3/4/2021    VAC DRESSING APPLICATION Right 9/6/3638    Procedure: APPLICATION VAC DRESSING;  Surgeon: Kizzy Sanchez DPM;  [facility];  Service: Podiatry           Reason for consult: New nuero event, R sided weakness    Swallow Information   Current Risks for Dysphagia & Aspiration: dysarthria and new nuero event, R sided weakness  Current Symptoms/Concerns: pocketing food  Current Diet: regular diet and thin liquids   Baseline Diet: regular diet and thin liquids      Baseline Assessment   Behavior/Cognition: alert  Speech/Language Status: able to participate in conversation and able to follow commands  Patient Positioning: upright in recliner  Pain Status/Interventions/Response to Interventions: No report of or nonverbal indications of pain        Swallow Mechanism Exam  Facial: right facial droop  Labial: decreased ROM right side  Lingual: decreased strength right side  Velum: symmetrical  Mandible: adequate ROM  Dentition: adequate  Vocal quality:clear/adequate   Volitional Cough: strong/productive   Respiratory Status: on RA     Tracheostomy: n/a       Consistencies Assessed and Total Amount Consumed:   Consistencies Administered: thin liquids, soft solids and hard solids  Materials administered included Toast w/ butter, ham and cheese omelet, potatoes, pears, coffee and milk     Oral stage: WFL- mild  Lip closure: adequate   Anterior spillage: none noted  Mastication: WFL   Bolus formation: WFL   Bolus control: WFL   Transfer: WFL   Oral residue: noted w/ eggs on lingual surface, cleared w/ liquid wash   Pocketing: R sided pocketing cleared w/ lingual/ finger sweep      Pharyngeal stage: WFL   Swallow promptness:  Prompt   Hyolaryngeal elevation: WFL   Wet voice: none noted   Throat clear: none noted   Cough: x1 w/ toast   Secondary swallows: none noted   Audible swallows: none noted      Esophageal stage:No s/s present     Summary:     Pt presenting with WFL and minimal oral dysphagia characterized by R-sided weakness resulting in min R sided pocketing  Pt is aware of pocketing and independently clears w/ lingual or finger sweep  Pt also w/ min lingual residue, cleared effectively w/ liquid wash  Pt able to adequately take cup or straw sips utilizing compensatory strategy of L sided placement  Oral containment and manipulation all WFL, swallow initiation prompt and hyolaryngeal rise WFL upon palpation  Pt w/ cough x1 after toast suspect d/t pt talking during intake  Pt able to self-feed w/ utensils on R side however, requested built up utensil for ease of feeding  Pt w/ increased appetite per son and pt rx, consumed 100% of meal       Recommendations:  Diet: regular diet and thin liquids  Meds: whole with liquid  Strategies: upright posture, only feed when fully alert, slow rate of feeding, small bites/sips, quiet environment (tv off, limit talking, door closed, etc ) and alternating bites and sips lingual or finger sweep to clear R sided pocketing     DISTANT supervision w/ meals  Therapy Prognosis: Good   Prognosis considerations: Prior level of functioning, age, motivation, family support     Results reviewed with:  patient, RN and family   Aspiration precautions posted      F/u ST tx: Pt will continue to benefit from ongoing skilled dysphagia tx sessions to establish safest least restrictive diet w/o increased oropharyngeal or aspiration sxs as well as monitor ability to carryover swallow strategies independently     SLP Therapy Minutes   SLP Time In 0845   SLP Time Out 1000   SLP Total Time (minutes) 75   SLP Mode of treatment - Individual (minutes) 75   SLP Mode of treatment - Concurrent (minutes) 0   SLP Mode of treatment - Group (minutes) 0   SLP Mode of treatment - Co-treat (minutes) 0   SLP Mode of Treatment - Total time(minutes) 75 minutes   SLP Cumulative Minutes 75

## 2023-02-10 NOTE — PROGRESS NOTES
PM&R PROGRESS NOTE:  Nelly Ferrer 62 y o  female MRN: [id number]  Unit/Bed#: -01 Encounter: [id number]        Rehabilitation Diagnosis: Impairment of mobility, safety, Activities of Daily Living (ADLs), and cognitive/communication skills due to Stroke:  01 2  Right Body Involvement (Left Brain)  Left posterior limb internal capsule acute ischemic CVA    SUBJECTIVE: Patient seen face to face  No acute issues  Patient had a good first day  States her headache today is much better overall  Feeling optimistic about her rehab after she was evaluated today  ASSESSMENT: Stable, progressing      PLAN:    Rehabilitation  • Functional deficits: right hemiparesis, dysarthria, dysphagia, cognitive deficits, impaired mobility, self care   • Continue current rehabilitation plan of care to maximize function  • Functional update:   neil miranda in progress  • Estimated Discharge: ANURADHA COLEMAN 2-3 weeks     DVT prophylaxis  • Managed on SQ Lovenox 40 mg daily     GI ppx:  • Starting Protonix 40 mg daily instead of Pepcid as patient high risk for stress ulcer given her history of smoking and now CVA     Bladder plan  • Continent  • Timed Toilet     Bowel plan  • Continent  • +BM on 2/7/23      * Stroke - Left posterior limb internal capsule ischemic stroke  Assessment & Plan  Presented 2/4/23 with right sided weakness, difficulty speaking  MRI confirmed left posterior limb internal capsule acute ischemic stroke  Etiology: likely related to hypertensive disease  Work-up for malignancy negative  Echo with EF 60%  Per Neurology placed on DAPT with ASA/Plavix x 21 days, then ASA monotherapy    Plan:  Begin acute rehab program  Secondary CVA ppx: Crestor 40 mg daily  Aspirin 81 mg daily and Plavix 75 mg daily x 21 days (around 2/27/23) then Aspirin 81 mg daily monotherapy      Continue CVA education and reduction of modifiable risk factors naomi HTN, smoking cession  Follow-up with Neurology as outpatient    Pain in left foot, chronic  Assessment & Plan  Continue Tylenol PRN  Also if needed can add back Gabapentin - previously tolerated as outpatient    Prediabetes  Assessment & Plan  HA1C = 6 4  Encourage diabetic diet as stroke risk factor - will switch tomorrow  Consult nutrition to review with patient during [facility] stay    Chest pain  Assessment & Plan  In acute care, patient with transient chest pain at rest - troponin negative, EKG without changes  Cardiology consulted, recommended losartan and HCTZ for HTN  Also recommended a change to Crestor instead of Lipitor due to being on Verapamil (CY interaction)  · Outpatient follow-up recommended with Dr Quyen Godwin for further work-up  · While in [facility] monitor VS and any chest pains      Hypertension  Assessment & Plan  Here: managed on Losartan 100 mg daily, HCTZ 12 5 mg daily and Verapamil 120 mg CR daily    Plan per Cardiology  Monitor BP at rest and during activity in [facility] program  Optimize diet (limit salt)  Outpatient follow-up with Cardiology - Dr Johanna Felix  At home: Seroquel 25 mg QHS  Here: Seroquel 25 mg QHS + Trazodone 50 mg QHS  Plan to wean down Trazodone over time    Depression  Assessment & Plan  Managed on Cymbalta 60 mg daily (home dose)  Consult placed to Neuropsychology - patient tearful and having difficulty coping with stroke diagnosis  Team to additionally provide emotional support    Obesity (BMI 35 0-39 9 without comorbidity)  Assessment & Plan  Lifestyle and diet modifications when able  Consult nutrition     Headache  Assessment & Plan  Developed in acute care  Treated with Migraine cocktail  Started on Verapamil for prevention by Neurology  Headaches have improved    Plan:  Continue Verapamil 120 mg daily   Monitor for headaches  Follow-up with headache Neurologist as outpatient if persists    Tobacco use  Assessment & Plan  Smoking cessation education frequently during [facility] stay  Nicotine patch for cravings - wean dose weekly    Other acquired deformities of left foot  Assessment & Plan  Left hallux valgus and hammertoe surgery by Dr Kev Escobar (podiatry)  Chronic hypersensitivity/neuritis  Is able to wear shoes  Patient received injection by Dr Kev Escobar in past (Sept 2022)  Follow-up with Dr Max Fitzgerald  Labs, medications, and imaging personally reviewed  ROS:  A ten point review of systems was completed on 02/10/23 and pertinent positives are listed in subjective section  All other systems reviewed were negative  OBJECTIVE:   /86   Pulse 89   Temp 97 8 °F (36 6 °C) (Oral)   Resp 18   Ht 5' (1 524 m)   Wt 88 9 kg (196 lb)   SpO2 96%   BMI 38 28 kg/m²       Physical Exam  Vitals and nursing note reviewed  Constitutional:       General: She is not in acute distress  HENT:      Head: Normocephalic and atraumatic  Nose: Nose normal       Mouth/Throat:      Mouth: Mucous membranes are moist    Eyes:      Conjunctiva/sclera: Conjunctivae normal    Cardiovascular:      Rate and Rhythm: Normal rate and regular rhythm  Pulses: Normal pulses  Pulmonary:      Effort: Pulmonary effort is normal       Breath sounds: Normal breath sounds  No wheezing or rales  Abdominal:      General: Bowel sounds are normal  There is no distension  Palpations: Abdomen is soft  Tenderness: There is no abdominal tenderness  Musculoskeletal:         General: No swelling  Cervical back: Neck supple  Skin:     General: Skin is warm  Neurological:      Mental Status: She is alert and oriented to person, place, and time     Psychiatric:         Mood and Affect: Mood normal           Lab Results   Component Value Date    WBC 7 33 02/08/2023    HGB 13 4 02/08/2023    HCT 41 8 02/08/2023     (H) 02/08/2023     02/08/2023     Lab Results   Component Value Date    SODIUM 139 02/08/2023    K 4 1 02/08/2023     02/08/2023    CO2 27 02/08/2023    BUN 16 02/08/2023    CREATININE 0 81 02/08/2023    GLUC 112 02/08/2023    CALCIUM 9 2 02/08/2023     Lab Results   Component Value Date    INR 0 92 02/04/2023    PROTIME 12 6 02/04/2023           Current Facility-Administered Medications:   •  acetaminophen (TYLENOL) tablet 650 mg, 650 mg, Oral, Q6H PRN, Brandi Betancourt MD, 650 mg at 02/09/23 1847  •  albuterol (PROVENTIL HFA,VENTOLIN HFA) inhaler 2 puff, 2 puff, Inhalation, Q4H PRN, Brandi Betancourt MD  •  aspirin chewable tablet 81 mg, 81 mg, Oral, Daily, Brandi Betancourt MD, 81 mg at 02/10/23 0855  •  atorvastatin (LIPITOR) tablet 40 mg, 40 mg, Oral, QPM, Brandi Betancourt MD, 40 mg at 02/09/23 1733  •  bisacodyl (DULCOLAX) rectal suppository 10 mg, 10 mg, Rectal, Daily PRN, Brandi Betancourt MD  •  clopidogrel (PLAVIX) tablet 75 mg, 75 mg, Oral, Daily, Brandi Betancourt MD, 75 mg at 02/10/23 0855  •  DULoxetine (CYMBALTA) delayed release capsule 60 mg, 60 mg, Oral, Daily, Brandi Betancourt MD, 60 mg at 02/10/23 0855  •  enoxaparin (LOVENOX) subcutaneous injection 40 mg, 40 mg, Subcutaneous, Daily, Brandi Betancourt MD, 40 mg at 02/10/23 0856  •  hydrochlorothiazide (HYDRODIURIL) tablet 12 5 mg, 12 5 mg, Oral, Daily, Brandi Betancourt MD, 12 5 mg at 02/10/23 0855  •  lidocaine (LIDODERM) 5 % patch 1 patch, 1 patch, Topical, Daily, Brandi Betancourt MD, 1 patch at 02/10/23 0856  •  lidocaine (LMX) 4 % cream, , Topical, 4x Daily PRN, SHAILA Alarcon  •  losartan (COZAAR) tablet 50 mg, 50 mg, Oral, Daily, Brandi Betancourt MD, 50 mg at 02/10/23 0855  •  nicotine (NICODERM CQ) 21 mg/24 hr TD 24 hr patch 21 mg, 21 mg, Transdermal, Daily, Brandi Betancourt MD, 21 mg at 02/10/23 0855  •  ondansetron (ZOFRAN-ODT) dispersible tablet 4 mg, 4 mg, Oral, Q6H PRN, Brandi Betancourt MD  •  pantoprazole (PROTONIX) EC tablet 40 mg, 40 mg, Oral, Early Morning, Brandi Betancourt MD, 40 mg at 02/10/23 0528  •  polyethylene glycol (MIRALAX) packet 17 g, 17 g, Oral, Daily PRN, aSde Asencio MD  •  QUEtiapine (SEROquel) tablet 25 mg, 25 mg, Oral, HS PRN, Sade Asencio MD, 25 mg at 02/09/23 2125  •  traZODone (DESYREL) tablet 50 mg, 50 mg, Oral, HS, Sade Asencio MD, 50 mg at 02/09/23 2125  •  verapamil (CALAN-SR) CR tablet 120 mg, 120 mg, Oral, Daily, Sade Asencio MD, 120 mg at 02/10/23 7712      Past Medical History:   Diagnosis Date   • Acquired deformity of left foot    • Anesthesia complication     difficulty awakening   • Arthritis    • Deaf, left    • Depression    • Hallux valgus of left foot    • Hammer toe of left foot    • Headache    • Kidney stone    • Sciatic pain, right     intermittent       Patient Active Problem List    Diagnosis Date Noted   • Stroke - Left posterior limb internal capsule ischemic stroke 02/04/2023   • Pain in left foot, chronic 02/09/2023   • Chest pain 02/06/2023   • Prediabetes 02/06/2023   • Insomnia 02/05/2023   • Hypertension 02/05/2023   • Depression 02/04/2023   • Obesity (BMI 35 0-39 9 without comorbidity) 03/10/2021   • Ex-smoker for less than 1 year 03/10/2021   • Headache 07/03/2020   • Tobacco use 07/02/2020   • Preoperative clearance 06/30/2020   • Wound of right foot 06/29/2020   • Acquired hallux valgus of left foot 05/06/2019   • Other hammer toe(s) (acquired), left foot 05/06/2019   • Other acquired deformities of left foot 05/06/2019          Sade Asencio MD    I have spent 35 minutes with Patient and family today in which greater than 50% of this time was spent in counseling/coordination of care regarding Intructions for management, Patient and family education and Impressions  ** Please Note:  voice to text software may have been used in the creation of this document   Although proof errors in transcription or interpretation are a potential of such software**

## 2023-02-10 NOTE — ASSESSMENT & PLAN NOTE
Presented 2/4/23 with right sided weakness, difficulty speaking  MRI confirmed left posterior limb internal capsule acute ischemic stroke  Etiology: likely related to hypertensive disease  Work-up for malignancy negative  Echo with EF 60%  Per Neurology placed on DAPT with ASA/Plavix x 21 days, then ASA monotherapy    Plan:  Begin acute rehab program  Secondary CVA ppx: Lipitor 40 mg daily  Aspirin 81 mg daily and Plavix 75 mg daily x 21 days (around 2/27/23) then Aspirin 81 mg daily monotherapy      Continue CVA education and reduction of modifiable risk factors naomi HTN, smoking cession  Follow-up with Neurology as outpatient

## 2023-02-11 LAB
ATRIAL RATE: 102 BPM
P AXIS: 54 DEGREES
PR INTERVAL: 190 MS
QRS AXIS: 66 DEGREES
QRSD INTERVAL: 84 MS
QT INTERVAL: 364 MS
QTC INTERVAL: 474 MS
T WAVE AXIS: 68 DEGREES
VENTRICULAR RATE: 102 BPM

## 2023-02-11 RX ORDER — DOCUSATE SODIUM 100 MG/1
100 CAPSULE, LIQUID FILLED ORAL 2 TIMES DAILY
Status: DISCONTINUED | OUTPATIENT
Start: 2023-02-11 | End: 2023-02-20 | Stop reason: HOSPADM

## 2023-02-11 RX ORDER — SENNOSIDES 8.6 MG
1 TABLET ORAL 2 TIMES DAILY
Status: DISCONTINUED | OUTPATIENT
Start: 2023-02-11 | End: 2023-02-20 | Stop reason: HOSPADM

## 2023-02-11 RX ORDER — POLYETHYLENE GLYCOL 3350 17 G/17G
17 POWDER, FOR SOLUTION ORAL ONCE
Status: COMPLETED | OUTPATIENT
Start: 2023-02-11 | End: 2023-02-11

## 2023-02-11 RX ADMIN — SENNOSIDES 8.6 MG: 8.6 TABLET, FILM COATED ORAL at 17:26

## 2023-02-11 RX ADMIN — QUETIAPINE FUMARATE 25 MG: 25 TABLET ORAL at 20:52

## 2023-02-11 RX ADMIN — NICOTINE 21 MG: 21 PATCH, EXTENDED RELEASE TRANSDERMAL at 08:34

## 2023-02-11 RX ADMIN — DOCUSATE SODIUM 100 MG: 100 CAPSULE, LIQUID FILLED ORAL at 11:32

## 2023-02-11 RX ADMIN — LIDOCAINE 5% 1 PATCH: 700 PATCH TOPICAL at 08:33

## 2023-02-11 RX ADMIN — HYDROCHLOROTHIAZIDE 12.5 MG: 12.5 TABLET ORAL at 08:27

## 2023-02-11 RX ADMIN — PANTOPRAZOLE SODIUM 40 MG: 40 TABLET, DELAYED RELEASE ORAL at 05:19

## 2023-02-11 RX ADMIN — SENNOSIDES 8.6 MG: 8.6 TABLET, FILM COATED ORAL at 11:32

## 2023-02-11 RX ADMIN — CLOPIDOGREL BISULFATE 75 MG: 75 TABLET ORAL at 08:27

## 2023-02-11 RX ADMIN — ASPIRIN 81 MG CHEWABLE TABLET 81 MG: 81 TABLET CHEWABLE at 08:27

## 2023-02-11 RX ADMIN — ATORVASTATIN CALCIUM 40 MG: 40 TABLET, FILM COATED ORAL at 17:26

## 2023-02-11 RX ADMIN — ENOXAPARIN SODIUM 40 MG: 40 INJECTION SUBCUTANEOUS at 08:27

## 2023-02-11 RX ADMIN — DULOXETINE HYDROCHLORIDE 60 MG: 60 CAPSULE, DELAYED RELEASE ORAL at 08:27

## 2023-02-11 RX ADMIN — LOSARTAN POTASSIUM 50 MG: 50 TABLET, FILM COATED ORAL at 08:27

## 2023-02-11 RX ADMIN — VERAPAMIL HYDROCHLORIDE 120 MG: 120 TABLET, FILM COATED, EXTENDED RELEASE ORAL at 08:33

## 2023-02-11 RX ADMIN — POLYETHYLENE GLYCOL 3350 17 G: 17 POWDER, FOR SOLUTION ORAL at 17:26

## 2023-02-11 RX ADMIN — ACETAMINOPHEN 650 MG: 325 TABLET, FILM COATED ORAL at 14:50

## 2023-02-11 RX ADMIN — DOCUSATE SODIUM 100 MG: 100 CAPSULE, LIQUID FILLED ORAL at 17:26

## 2023-02-11 NOTE — PROGRESS NOTES
02/11/23 0830   Pain Assessment   Pain Assessment Tool 0-10   Pain Score No Pain   Restrictions/Precautions   Precautions Bed/chair alarms; Fall Risk;Supervision on toilet/commode   Weight Bearing Restrictions No   ROM Restrictions No   Cognition   Overall Cognitive Status WFL   Arousal/Participation Cooperative; Alert   Attention Within functional limits   Orientation Level Oriented X4   Memory Within functional limits   Following Commands Follows one step commands without difficulty   Comments Pt insightful to deficits & able to repeat procedures for safety  Subjective   Subjective pt agreeable to patricipate in skilled PT session   Sit to Lying   Type of Assistance Needed Incidental touching   Sit to Lying CARE Score 4   Lying to Sitting on Side of Bed   Type of Assistance Needed Incidental touching   Lying to Sitting on Side of Bed CARE Score 4   Sit to Stand   Type of Assistance Needed Physical assistance;Verbal cues   Physical Assistance Level 25% or less   Comment [facility]  Pt required minimal VC for hand placement   Sit to Stand CARE Score 3   Bed-Chair Transfer   Type of Assistance Needed Physical assistance   Physical Assistance Level 25% or less   Comment [facility]  Pt family was educated on transfer & able to safely perform in room     Chair/Bed-to-Chair Transfer CARE Score 3   Walk 10 Feet   Type of Assistance Needed Physical assistance   Physical Assistance Level 25% or less   Comment [facility]   Walk 10 Feet CARE Score 3   Walk 50 Feet with Two Turns   Type of Assistance Needed Physical assistance   Physical Assistance Level 25% or less   Comment [facility]   Walk 50 Feet with Two Turns CARE Score 3   Walk 150 Feet   Type of Assistance Needed Physical assistance   Physical Assistance Level 25% or less   Comment [facility]   Walk 150 Feet CARE Score 3   Walking 10 Feet on Uneven Surfaces   Reason if not Attempted Safety concerns   Walking 10 Feet on Uneven Surfaces CARE Score 88   Ambulation   Primary Mode of Locomotion Prior to Admission Walk   Distance Walked (feet) 300 ft   Assist Device Hand Hold   Gait Pattern Inconsistant Chiquita;Decreased foot clearance;Narrow CHE; Improper weight shift   Limitations Noted In Balance; Coordination; Endurance; Heel Strike; Safety;Speed;Strength   Provided Assistance with: Trunk Support;Weight Shift;Balance   Walk Assist Level Minimum Assist   Does the patient walk? 2  Yes   Wheel 50 Feet with Two Turns   Reason if not Attempted Activity not applicable   Wheel 50 Feet with Two Turns CARE Score 9   Wheel 150 Feet   Reason if not Attempted Activity not applicable   Wheel 067 Feet CARE Score 9   Wheelchair mobility   Does the patient use a wheelchair? 0  No   Curb or Single Stair   Style negotiated Single stair   Type of Assistance Needed Physical assistance;Verbal cues; Adaptive equipment   Physical Assistance Level 26%-50%   Comment FF using LHR ascending & descending reciprocating gate   1 Step (Curb) CARE Score 3   4 Steps   Type of Assistance Needed Physical assistance   Physical Assistance Level 26%-50%   Comment FF using LHR ascending & descending reciprocating gate   4 Steps CARE Score 3   12 Steps   Type of Assistance Needed Physical assistance   Physical Assistance Level 26%-50%   Comment FF using LHR ascending & descending reciprocating gate   12 Steps CARE Score 3   Stairs   Type Stairs   # of Steps 12   Therapeutic Interventions   Strengthening LAQ with 2# 2x10; Ball ADD 2x10; yellow TB ABD 2x10; ankle pump 2x10; sit to stand x5 hands on knees   Flexibility manual hamstring & gastroc stretch x30sec each   Balance toe tap onto cup   Assessment   Treatment Assessment Pt engaged in 90 min skilled PT session focused on NPP & family education  Pt BP on /57  Pt amb 300' HHA to inc endurance  Pt asceneded & descended FF Xiao using LHR & HHA with a recipricol gate pattern to inc weight shifting & endurance  Pt performed sit to stand x5 Xiao with hands on knees to inc strength   Pt amb 150ft holding coffee mug with water in RUE Xiao to inc dual motor function  Pt performed weight shift with step through on  steps Xiao to inc dynamic balancing & strength  Pt performed BLE exercises (see above) to inc strength  Manually stretched B hamstring & gastroc to inc mobility  Pt performed toe tap on cup Xiao to inc weight shifting  Pt performed tap on 4'' box with commands to tap A or B CgA to inc dynamic balance  Pt children, Darek Montanez, were educated on proper transfer techniques for bed to chair  Children only are allowed to transfer pt  Any other family memebers must be educated before being allowed to transfer pt  Pt is to still notify nursing when restroom is needed  Pt children informed to notify nursing before they leave so the bed/chair alarms can be turned on  Monitored pt HR & O2 throughout treatment  O2 96-99%; 115-133bpm  Pt HR remained tacky throughout & needs to be monitored  HR was checked 1 hour after session lying 91bpm  Pt continues to be limited by impaired balance & decreased endurance  In next session continue with NPP  Pt returned to recliner with all needs in reach & 2 children present  Family/Caregiver Present yes   Problem List Decreased strength;Decreased endurance; Impaired balance;Decreased mobility; Decreased coordination   Barriers to Discharge Inaccessible home environment;Decreased caregiver support   Plan   Treatment/Interventions Functional transfer training;LE strengthening/ROM; Elevations; Therapeutic exercise; Endurance training;Patient/family training;Equipment eval/education; Bed mobility;Gait training;Spoke to nursing   Progress Progressing toward goals   Recommendation   PT Discharge Recommendation Post acute rehabilitation services   PT Therapy Minutes   PT Time In 0830   PT Time Out 1000   PT Total Time (minutes) 90   PT Mode of treatment - Individual (minutes) 90   PT Mode of treatment - Concurrent (minutes) 0   PT Mode of treatment - Group (minutes) 0   PT Mode of treatment - Co-treat (minutes) 0   PT Mode of Treatment - Total time(minutes) 90 minutes   PT Cumulative Minutes 150   Therapy Time missed   Time missed?  No

## 2023-02-11 NOTE — PROGRESS NOTES
Internal Medicine Progress Note  Patient: Laurel Rogers  Age/sex: 62 y o  female  Medical Record #: [id number]      ASSESSMENT/PLAN: (Interval History)  Laurel Rogers is seen and examined and management for following issues:    Acute CVA  • Left posterior limb internal capsule/thalamic  • For ASA/Plavix a 21 days then to ASA alone on 23  • Continue Lipitor but Cards advised switch to Crestor 40mg qd at discharge to avoid interaction of the Lipitor with the Verapamil (CY interaction)  • CT C/A/P was negative for malignancy; thrombosis panel sent both to rule out hypercoag state     Headaches  • MRI with contrast was unrevealing  • Neuro placed her on Verapamil for preventive tx and she got IV magnesium/Phenergan/Toradol/Benadryl     Chest pain  • Had occurrences on  and  naomi with lying flat  • Trops and EKGs were negative  • Cards saw and felt atypical and will see her back in the office; no w/u for now     HTN  • Home:  no meds  • Here:  Verapamil 120mg qd/Losartan 50mg qd/HCTZ 12 5mg qd  • Stable; no changes today     Depression  • Continue Cymbalta  • Takes Seroquel and Trazodone at home     Pre-DM  • HbA1C was 6 4  • Will watch FBS but not to do Accuchecks     Nicotine abuse  • Offer patch if needed  • Will advise cessation        Discharge date:  Team    The above assessment and plan was reviewed and updated as determined by my evaluation of the patient on 2023      Labs:   Results from last 7 days   Lab Units 23  0454 23  0607   WBC Thousand/uL 7 33 8 39   HEMOGLOBIN g/dL 13 4 13 3   HEMATOCRIT % 41 8 42 2   PLATELETS Thousands/uL 322 304     Results from last 7 days   Lab Units 23  0454 23  0607   SODIUM mmol/L 139 138   POTASSIUM mmol/L 4 1 4 1   CHLORIDE mmol/L 108 109*   CO2 mmol/L 27 25   BUN mg/dL 16 20   CREATININE mg/dL 0 81 0 84   CALCIUM mg/dL 9 2 8 9     Results from last 7 days   Lab Units 23  0615   HEMOGLOBIN A1C % 6 4*     Results from last 7 days   Lab Units 02/04/23  1314   INR  0 92     Results from last 7 days   Lab Units 02/09/23  2101 02/09/23  1639   POC GLUCOSE mg/dl 144* 110       Review of Scheduled Meds:  Current Facility-Administered Medications   Medication Dose Route Frequency Provider Last Rate   • acetaminophen  650 mg Oral Q6H PRN Beauregard Night, MD     • albuterol  2 puff Inhalation Q4H PRN Luz Elena Night, MD     • aspirin  81 mg Oral Daily Beauregard Night, MD     • atorvastatin  40 mg Oral QPM Beauregard Night, MD     • bisacodyl  10 mg Rectal Daily PRN Luz Elena Night, MD     • clopidogrel  75 mg Oral Daily Beauregard Night, MD     • docusate sodium  100 mg Oral BID Mike Gerber MD     • DULoxetine  60 mg Oral Daily Luz Elena Night, MD     • enoxaparin  40 mg Subcutaneous Daily Luz Elena Night, MD     • hydrochlorothiazide  12 5 mg Oral Daily Luz Elena Night, MD     • lidocaine  1 patch Topical Daily Luz Elena Night, MD     • lidocaine   Topical 4x Daily PRN SHAILA Collins     • losartan  50 mg Oral Daily Beauregard Night, MD     • nicotine  21 mg Transdermal Daily Ulz Elena Night, MD     • ondansetron  4 mg Oral Q6H PRN Luz Elena Night, MD     • pantoprazole  40 mg Oral Early Morning Beauregard Night, MD     • polyethylene glycol  17 g Oral Daily PRN Beauregard Night, MD     • polyethylene glycol  17 g Oral Once Mike Gerber MD     • QUEtiapine  25 mg Oral HS PRN Beauregard Night, MD     • senna  1 tablet Oral BID Mike Gerber MD     • traZODone  50 mg Oral HS Beauregard Night, MD     • verapamil  120 mg Oral Daily Beauregard Night, MD         Subjective/ HPI: Patient seen and examined  Patients overnight issues or events were reviewed with nursing or staff during rounds or morning huddle session  New or overnight issues include the following:     No new or overnight issues    Offers no complaints    ROS:   A 10 point ROS was performed; negative except as noted above  Imaging:     No orders to display       *Labs /Radiology studies reviewed  *Medications reviewed and reconciled as needed  *Please refer to order section for additional ordered labs studies  *Case discussed with primary attending during morning huddle case rounds    Physical Examination:  Vitals:   Vitals:    02/10/23 2028 02/11/23 0505 02/11/23 0833 02/11/23 0844   BP: 127/93 114/59 114/72 119/57   BP Location: Right arm Right arm  Left arm   Pulse: 101 98     Resp: 18 16     Temp: 98 6 °F (37 °C) 98 6 °F (37 °C)     TempSrc: Oral Oral     SpO2: 96% 96%     Weight:       Height:           General Appearance: no distress, conversive  HEENT: PERRLA, conjuctiva normal; oropharynx clear; mucous membranes moist   Neck:  Supple, normal ROM  Lungs: CTA, normal respiratory effort, no retractions, expiratory effort normal  CV: regular rate and rhythm; no rubs/murmurs/gallops, PMI normal   ABD: soft; ND/NT; +BS  EXT: no edema  Skin: normal turgor, normal texture, no rashes  Psych: affect normal, mood normal  Neuro: AAO; right sided weakness; right facial droop with slight slurred speech    The above physical exam was reviewed and updated as determined by my evaluation of the patient on 2/11/2023  Invasive Devices     None                    VTE Pharmacologic Prophylaxis: Enoxaparin  Code Status: Level 1 - Full Code  Current Length of Stay: 2 day(s)      Total time spent:  30 minutes with more than 50% spent counseling/coordinating care  Counseling includes discussion with patient re: progress  and discussion with patient of his/her current medical state/information  Coordination of patient's care was performed in conjunction with primary service  Time invested included review of patient's labs, vitals, and management of their comorbidities with continued monitoring  In addition, this patient was discussed with medical team including physician and advanced extenders   The care of the patient was extensively discussed and appropriate treatment plan was formulated unique for this patient  Medical decision making for the day was made by supervising physician unless otherwise noted in their attestation statement  ** Please Note:  voice to text software may have been used in the creation of this document   Although proof errors in transcription or interpretation are a potential of such software**

## 2023-02-11 NOTE — PLAN OF CARE
Problem: PAIN - ADULT  Goal: Verbalizes/displays adequate comfort level or baseline comfort level  Description: Interventions:  - Encourage patient to monitor pain and request assistance  - Assess pain using appropriate pain scale  - Administer analgesics based on type and severity of pain and evaluate response  - Implement non-pharmacological measures as appropriate and evaluate response  - Consider cultural and social influences on pain and pain management  - Notify physician/advanced practitioner if interventions unsuccessful or patient reports new pain  Outcome: Progressing     Problem: INFECTION - ADULT  Goal: Absence or prevention of progression during hospitalization  Description: INTERVENTIONS:  - Assess and monitor for signs and symptoms of infection  - Monitor lab/diagnostic results  - Monitor all insertion sites, i e  indwelling lines, tubes, and drains  - Monitor endotracheal if appropriate and nasal secretions for changes in amount and color  - Ayr appropriate cooling/warming therapies per order  - Administer medications as ordered  - Instruct and encourage patient and family to use good hand hygiene technique  - Identify and instruct in appropriate isolation precautions for identified infection/condition  Outcome: Progressing     Problem: SAFETY ADULT  Goal: Patient will remain free of falls  Description: INTERVENTIONS:  - Educate patient/family on patient safety including physical limitations  - Instruct patient to call for assistance with activity   - Consult OT/PT to assist with strengthening/mobility   - Keep Call bell within reach  - Keep bed low and locked with side rails adjusted as appropriate  - Keep care items and personal belongings within reach  - Initiate and maintain comfort rounds  - Make Fall Risk Sign visible to staff  - Offer Toileting every 2 Hours, in advance of need  - Initiate/Maintain bed/chair alarm  - Obtain necessary fall risk management equipment: non skid footwear  - Apply yellow socks and bracelet for high fall risk patients  - Consider moving patient to room near nurses station  Outcome: Progressing  Goal: Maintain or return to baseline ADL function  Description: INTERVENTIONS:  -  Assess patient's ability to carry out ADLs; assess patient's baseline for ADL function and identify physical deficits which impact ability to perform ADLs (bathing, care of mouth/teeth, toileting, grooming, dressing, etc )  - Assess/evaluate cause of self-care deficits   - Assess range of motion  - Assess patient's mobility; develop plan if impaired  - Assess patient's need for assistive devices and provide as appropriate  - Encourage maximum independence but intervene and supervise when necessary  - Involve family in performance of ADLs  - Assess for home care needs following discharge   - Consider OT consult to assist with ADL evaluation and planning for discharge  - Provide patient education as appropriate  Outcome: Progressing  Goal: Maintains/Returns to pre admission functional level  Description: INTERVENTIONS:  - Perform BMAT or MOVE assessment daily    - Set and communicate daily mobility goal to care team and patient/family/caregiver  - Collaborate with rehabilitation services on mobility goals if consulted  - Perform Range of Motion 3 times a day  - Reposition patient every 2 hours    - Dangle patient 3 times a day  - Stand patient 3 times a day  - Ambulate patient 3 times a day  - Out of bed to chair 3 times a day   - Out of bed for meals 3 times a day  - Out of bed for toileting  - Record patient progress and toleration of activity level   Outcome: Progressing     Problem: DISCHARGE PLANNING  Goal: Discharge to home or other facility with appropriate resources  Description: INTERVENTIONS:  - Identify barriers to discharge w/patient and caregiver  - Arrange for needed discharge resources and transportation as appropriate  - Identify discharge learning needs (meds, wound care, etc )  - Arrange for interpretive services to assist at discharge as needed  - Refer to Case Management Department for coordinating discharge planning if the patient needs post-hospital services based on physician/advanced practitioner order or complex needs related to functional status, cognitive ability, or social support system  Outcome: Progressing

## 2023-02-11 NOTE — PROGRESS NOTES
02/11/23 1230   Pain Assessment   Pain Assessment Tool 0-10   Pain Score 8   Pain Location/Orientation Orientation: Right;Location: Shoulder   Pain Onset/Description Onset: Sudden;Frequency: Intermittent; Descriptor: Chrissie Bravo  (during R shoulder ROM)   Hospital Pain Intervention(s) Repositioned; Rest   Restrictions/Precautions   Precautions Bed/chair alarms; Fall Risk;Supervision on toilet/commode   Weight Bearing Restrictions No   ROM Restrictions No   Sit to Lying   Type of Assistance Needed Incidental touching   Physical Assistance Level No physical assistance   Sit to Lying CARE Score 4   Lying to Sitting on Side of Bed   Type of Assistance Needed Incidental touching   Physical Assistance Level No physical assistance   Lying to Sitting on Side of Bed CARE Score 4   Sit to Stand   Type of Assistance Needed Physical assistance;Verbal cues   Physical Assistance Level 25% or less   Comment [facility], min vc's for hand placement   Sit to Stand CARE Score 3   Bed-Chair Transfer   Type of Assistance Needed Physical assistance;Verbal cues   Physical Assistance Level 25% or less   Comment short-distance fxl mob w/HHA, pt's family was present (her 2 sons) and received education during PT this morning on transfers in-room and demonstrated ability to perform safely in-room  Pt does require some vc's for attention to obstacles on R during fxl mob   Therapist used gait belt during fxl mob   Chair/Bed-to-Chair Transfer CARE Score 3   Toileting Hygiene   Type of Assistance Needed Physical assistance;Verbal cues   Physical Assistance Level 26%-50%   Comment Xiao in stance for balance while pt completed CM up/down, no LOB, hygiene seated   Toileting Hygiene CARE Score 3   Toilet Transfer   Type of Assistance Needed Physical assistance;Verbal cues   Physical Assistance Level 26%-50%   Comment HHA on R for fxl mob   Toilet Transfer CARE Score 3   Therapeutic Exercise - ROM   UE-ROM Yes   ROM- Right Upper Extremities   R Shoulder AAROM; Flexion;ABduction; Extension;Horizontal ABduction; External rotation; Internal rotation   R Elbow AROM;Elbow flexion;Elbow extension   R Position Supine   R Weight/Reps/Sets 2x10   RUE ROM Comment Pt engaged in supine neuro re-ed, [id number] each of AAROM to all planes of R shoulder and AROM R elbow flexion/ext, with focus on RUE neuro re-ed, maintaining joint mobility, proximal strengthening/stabilization, and contracture prevention  Pt tolerated but reported 8-9/10 pain in R shoulder despite 2 points of control provided during active assistive ROM as well as slow pace and frequent rest breaks  Pt able to achieve slightly past 90* shoulder flexion today  Neuromuscular Education   Weight Bearing Technique Yes   RUE Weight Bearing Extended arm seated   Response to Weight Bearing Technique Seated EOM with CGA, [id number] of WBing through B/L hands (arms extended) onto large physioball on floor ahead, with focus on increasing proprioceptive input through RUE for neuro re-ed  Pt tolerated well with no pain reported, no LOB  Cognition   Overall Cognitive Status WFL   Arousal/Participation Alert; Cooperative   Attention Within functional limits   Orientation Level Oriented X4   Memory Within functional limits   Following Commands Follows one step commands without difficulty   Vision   Vision Comments Seated w/Sup, pt engaged in visual scanning letter worksheet with focus on visual scanning/tracking and sustained near convergence, as pt reports L eye blurriness constantly t/o day and pt observed to squint L eye t/o activities  Pt tolerated at slow pace but with 100% accuracy  No headache initiated from completing activity  Pt would benefit from formal visual screening to address further     Activity Tolerance   Activity Tolerance Patient tolerated treatment well   Assessment   Treatment Assessment Pt seen for 90min skilled OT session focused on toileting, fxl transfers/mobility w/HHA, visual scanning, supine neuro re-ed (RUE AAROM/AROM), RUE Wbing, for increased independence w/ADLs/IADLs and decreased caregiver burden  See detailed descriptions of fxl performance above  Pt tolerated session well although R shoulder ROM continues to be significantly limited by pain  Pt also continues to be limited by decreased standing balance/tolerance, UE strength/ROM, FMC/GMC, and reports of L visual blurriness  Pt would benefit from continued skilled OT focused on formal vision screening, ADL retraining, IADL retraining, fxl transfer training, R attention, standing balance/tolerance, RUE neuro re-ed, , visual scanning, UE strengthening/endurance, FMC/GMC, DME edu, family training, ECT, and formal cog assessment  Prognosis Good   Problem List Decreased strength;Decreased range of motion;Decreased endurance; Impaired balance;Decreased mobility; Decreased coordination; Impaired vision; Impaired tone;Pain   Barriers to Discharge Inaccessible home environment;Decreased caregiver support   Plan   Treatment/Interventions ADL retraining;Functional transfer training; Therapeutic exercise; Endurance training;Patient/family training;Equipment eval/education; Bed mobility; Compensatory technique education   Progress Progressing toward goals   Recommendation   OT Discharge Recommendation   (pending)   OT Therapy Minutes   OT Time In 1230   OT Time Out 1400   OT Total Time (minutes) 90   OT Mode of treatment - Individual (minutes) 90   OT Mode of treatment - Concurrent (minutes) 0   OT Mode of treatment - Group (minutes) 0   OT Mode of treatment - Co-treat (minutes) 0   OT Mode of Treatment - Total time(minutes) 90 minutes   OT Cumulative Minutes 195   Therapy Time missed   Time missed?  No

## 2023-02-12 RX ORDER — POLYETHYLENE GLYCOL 3350 17 G/17G
17 POWDER, FOR SOLUTION ORAL ONCE
Status: COMPLETED | OUTPATIENT
Start: 2023-02-12 | End: 2023-02-12

## 2023-02-12 RX ADMIN — VERAPAMIL HYDROCHLORIDE 120 MG: 120 TABLET, FILM COATED, EXTENDED RELEASE ORAL at 10:03

## 2023-02-12 RX ADMIN — ASPIRIN 81 MG CHEWABLE TABLET 81 MG: 81 TABLET CHEWABLE at 09:48

## 2023-02-12 RX ADMIN — QUETIAPINE FUMARATE 25 MG: 25 TABLET ORAL at 21:11

## 2023-02-12 RX ADMIN — SENNOSIDES 8.6 MG: 8.6 TABLET, FILM COATED ORAL at 09:48

## 2023-02-12 RX ADMIN — POLYETHYLENE GLYCOL 3350 17 G: 17 POWDER, FOR SOLUTION ORAL at 09:48

## 2023-02-12 RX ADMIN — NICOTINE 21 MG: 21 PATCH, EXTENDED RELEASE TRANSDERMAL at 09:47

## 2023-02-12 RX ADMIN — CLOPIDOGREL BISULFATE 75 MG: 75 TABLET ORAL at 09:49

## 2023-02-12 RX ADMIN — HYDROCHLOROTHIAZIDE 12.5 MG: 12.5 TABLET ORAL at 09:49

## 2023-02-12 RX ADMIN — ATORVASTATIN CALCIUM 40 MG: 40 TABLET, FILM COATED ORAL at 16:49

## 2023-02-12 RX ADMIN — LIDOCAINE 5% 1 PATCH: 700 PATCH TOPICAL at 09:46

## 2023-02-12 RX ADMIN — LOSARTAN POTASSIUM 50 MG: 50 TABLET, FILM COATED ORAL at 09:49

## 2023-02-12 RX ADMIN — PANTOPRAZOLE SODIUM 40 MG: 40 TABLET, DELAYED RELEASE ORAL at 05:33

## 2023-02-12 RX ADMIN — ENOXAPARIN SODIUM 40 MG: 40 INJECTION SUBCUTANEOUS at 09:47

## 2023-02-12 RX ADMIN — DULOXETINE HYDROCHLORIDE 60 MG: 60 CAPSULE, DELAYED RELEASE ORAL at 09:48

## 2023-02-12 NOTE — PROGRESS NOTES
02/12/23 6955   Pain Assessment   Pain Assessment Tool 0-10   Pain Score No Pain   Restrictions/Precautions   Precautions Bed/chair alarms; Fall Risk;Supervision on toilet/commode   Weight Bearing Restrictions No   ROM Restrictions No   Cognitive Linguisitic Assessments   Cognitive Linquistic Quick Test (CLQT) Pt completed the CLQT+ on initial evaluation with a Composite Severity Rating score of 3 6 out of 4 0, correlating to overall MILD cognitive linguistic impairments at time of evaluation and in comparison to age matched peers ranging from 22-72 y/o  Pt scored at or above criterion cut score for 7 out of 10 tasks completed  Pt's Cognitive Domain Scores are as follows:      Cognitive Domain: Score:  Severity Rating:   Attention    183 WNL   Memory 170 WNL       Executive Functions 23 Mild      Language 27 Mild   Visuospatial Skills  85 WNL      Clock Drawing Screen    13 WNL   Overall Composite Severity Rating Score 3 6 out of 4 0 WNL      Given time constraints, SLP was unable to provide education on assessment results/performance  Plan to review in subsequent session  Recommend skilled SLP services during acute rehab stay to maximize overall cognitive linguistic skills, specifically executive functioning  Comprehension   QI: Comprehension 4  Undestands: Clear comprehension without cues or repetitions   Comprehension (FIM) 5 - Needs help/cues, repetition only RARELY ( < 10% of the time)   Expression   QI: Expression 3   Exhibits some difficulty with expressing needs and ideas (e g , some words or finishing thoughts) or speech is not clear   Expression (FIM) 4 - Expresses basic info/needs 75-90% of time   Social Interaction   Social Interaction (FIM) 5 - Interacts appropriately with others 90% of time   Problem Solving   Problem solving (FIM) 4 - Solves basic problems 75-89% of time   Memory   Memory (FIM) 3 - Recognizes, recalls/performs 50-74%   Speech/Language/Cognition Assessmetn   Treatment Assessment Pt was still in bed when SLP arrived, but agreeable to session  She reported she was given a sleeping pill and it worked really well, but she was more tired this morning  She requested to use the bathroom and complete oral hygiene prior to start session  Pt required min verbal cues for safe transfer from bed to RW (e g  'use one hand to push up from bed and one hand on RW")  She was able to complete toileting and verbalized the need for hand hygiene  Pt was observed to rinse hands in the water, but verbal cues were needed to wash with soap  Pt was able to sequence steps for oral hygiene interdependently  Pt then completed formalized cognitive linguistic assessment via the CLQT+ this date  Pt denies significant changes with her cognitive skills  Pt's , Surjit Snell, was present throughout session  He also reported pt's cognitive skills appear to be at baseline, but noted concerns for her speech production  SLP provided education that SLP previously completed motor speech evaluation and will continue to target OME and overall speech intelligibility  She scored below average for generative naming, mazes, and design generation- indicating mild deficits with executive functioning/planning skills  Please note min difficulty with language; however, pt occasionally reported she would do better in [place] for these subtests  She noted "I had to listen and then translate the story" before she was able to retell story  For the more complex maze, she did not received credit as she did not complete within time constraint; however, given increased time, she was able to correctly complete the maze  Recommend skilled SLP services during acute rehab stay to maximize overall cognitive linguistic skills, specifically executive functioning and to improve speech production     SLP Therapy Minutes   SLP Time In 0930   SLP Time Out 1030   SLP Total Time (minutes) 60   SLP Mode of treatment - Individual (minutes) 60   SLP Mode of treatment - Concurrent (minutes) 0   SLP Mode of treatment - Group (minutes) 0   SLP Mode of treatment - Co-treat (minutes) 0   SLP Mode of Treatment - Total time(minutes) 60 minutes   SLP Cumulative Minutes 135   Therapy Time missed   Time missed?  No

## 2023-02-12 NOTE — [facility] NURSING
Acute CVA  Left posterior limb internal capsule/thalamic  For ASA/Plavix a 21 days then to ASA alone on 23  Continue Lipitor but Cards had advised switch to Crestor 40mg qd at discharge to avoid interaction of the Lipitor with the Verapamil (CY interaction)  CT C/A/P was negative for malignancy; thrombosis panel sent both to rule out hypercoag state     Headaches  MRI with contrast was unrevealing  Neuro placed her on Verapamil for preventive tx and she got IV magnesium/Phenergan/Toradol/Benadryl  D/w Dr Nahomy Leggett --> trial off  Verapamil (considering interaction with Lipitor) and see how she does with headaches  Today, will be first day off Verapamil     Chest pain  Had occurrences on  and  naomi with lying flat  Trops and EKGs were negative  Cards saw and felt atypical and will see her back in the office; no w/u for now     HTN  Home:  no meds  Here:  Losartan 50mg qd/HCTZ 12 5mg qd  Stable  Stopped the Verapamil as above  May need to increase the Losartan or HCTZ  Will watch today     Depression  Continue Cymbalta  Takes Seroquel and Trazodone at home     Pre-DM  HbA1C was 6 4  Watching FBS,  No Accuchecks for now  D/w pt 23 about starting a DM diet  She was agreeable  Will have Nutrition see today for DM diet teaching  FBS AM 23 was 109    Nicotine abuse  Continue patch  Advised cessation 23    This week we will encourage independence with ADLs  We will monitor labs and vital signs  We will educate pt/family about repositioning to prevent skin breakdown  We will assist with repositioning and performs routine skin checks  We will monitor for constipation and medicate as ordered  We will monitor for adequate pain control  We will increase safety awareness with transfers and keep pt free from falls

## 2023-02-12 NOTE — PLAN OF CARE
Reviewed    Problem: PAIN - ADULT  Goal: Verbalizes/displays adequate comfort level or baseline comfort level  Description: Interventions:  - Encourage patient to monitor pain and request assistance  - Assess pain using appropriate pain scale  - Administer analgesics based on type and severity of pain and evaluate response  - Implement non-pharmacological measures as appropriate and evaluate response  - Consider cultural and social influences on pain and pain management  - Notify physician/advanced practitioner if interventions unsuccessful or patient reports new pain  Outcome: Progressing     Problem: INFECTION - ADULT  Goal: Absence or prevention of progression during hospitalization  Description: INTERVENTIONS:  - Assess and monitor for signs and symptoms of infection  - Monitor lab/diagnostic results  - Monitor all insertion sites, i e  indwelling lines, tubes, and drains  - Monitor endotracheal if appropriate and nasal secretions for changes in amount and color  - [place] appropriate cooling/warming therapies per order  - Administer medications as ordered  - Instruct and encourage patient and family to use good hand hygiene technique  - Identify and instruct in appropriate isolation precautions for identified infection/condition  Outcome: Progressing     Problem: SAFETY ADULT  Goal: Patient will remain free of falls  Description: INTERVENTIONS:  - Educate patient/family on patient safety including physical limitations  - Instruct patient to call for assistance with activity   - Consult OT/PT to assist with strengthening/mobility   - Keep Call bell within reach  - Keep bed low and locked with side rails adjusted as appropriate  - Keep care items and personal belongings within reach  - Initiate and maintain comfort rounds  - Make Fall Risk Sign visible to staff  - Offer Toileting every 4 Hours, in advance of need  - Initiate/Maintain bed alarm  - Apply yellow socks and bracelet for high fall risk patients  - Consider moving patient to room near nurses station  Outcome: Progressing  Goal: Maintain or return to baseline ADL function  Description: INTERVENTIONS:  -  Assess patient's ability to carry out ADLs; assess patient's baseline for ADL function and identify physical deficits which impact ability to perform ADLs (bathing, care of mouth/teeth, toileting, grooming, dressing, etc )  - Assess/evaluate cause of self-care deficits   - Assess range of motion  - Assess patient's mobility; develop plan if impaired  - Assess patient's need for assistive devices and provide as appropriate  - Encourage maximum independence but intervene and supervise when necessary  - Involve family in performance of ADLs  - Assess for home care needs following discharge   - Consider OT consult to assist with ADL evaluation and planning for discharge  - Provide patient education as appropriate  Outcome: Progressing  Goal: Maintains/Returns to pre admission functional level  Description: INTERVENTIONS:  - Set and communicate daily mobility goal to care team and patient/family/caregiver     - Collaborate with rehabilitation services on mobility goals if consulted  - Out of bed for toileting  - Record patient progress and toleration of activity level   Outcome: Progressing     Problem: DISCHARGE PLANNING  Goal: Discharge to home or other facility with appropriate resources  Description: INTERVENTIONS:  - Identify barriers to discharge w/patient and caregiver  - Arrange for needed discharge resources and transportation as appropriate  - Identify discharge learning needs (meds, wound care, etc )  - Arrange for interpretive services to assist at discharge as needed  - Refer to Case Management Department for coordinating discharge planning if the patient needs post-hospital services based on physician/advanced practitioner order or complex needs related to functional status, cognitive ability, or social support system  Outcome: Progressing

## 2023-02-12 NOTE — PROGRESS NOTES
Internal Medicine Progress Note  Patient: Laurel Rogers  Age/sex: 62 y o  female  Medical Record #: [id number]      ASSESSMENT/PLAN: (Interval History)  Laurel Rogers is seen and examined and management for following issues:    Acute CVA  • Left posterior limb internal capsule/thalamic  • For ASA/Plavix a 21 days then to ASA alone on 23  • Continue Lipitor but Cards advised switch to Crestor 40mg qd at discharge to avoid interaction of the Lipitor with the Verapamil (CY interaction)  • CT C/A/P was negative for malignancy; thrombosis panel sent both to rule out hypercoag state     Headaches  • MRI with contrast was unrevealing  • Neuro placed her on Verapamil for preventive tx and she got IV magnesium/Phenergan/Toradol/Benadryl  • Had a very mild headache yesterday  • Will d/w Dr Delmis Zheng tomorrow about holding off on the Verapamil considering interaction with Lipitor  For now, will retime for 1300 tomorrow in order to discuss this with her     Chest pain  • Had occurrences on  and  naomi with lying flat  • Trops and EKGs were negative  • Cards saw and felt atypical and will see her back in the office; no w/u for now     HTN  • Home:  no meds  • Here:  Verapamil 120mg qd/Losartan 50mg qd/HCTZ 12 5mg qd  • Stable; no changes today     Depression  • Continue Cymbalta  • Takes Seroquel and Trazodone at home     Pre-DM  • HbA1C was 6 4  • Will watch FBS but not to do Accuchecks  • D/w pt today about starting a DM diet  She is agreeable  • Will have Nutrition see tomorrow for DM diet teaching     Nicotine abuse  • Continue patch  • Advised cessation today        Discharge date:  Team    The above assessment and plan was reviewed and updated as determined by my evaluation of the patient on 2023      Labs:   Results from last 7 days   Lab Units 23  0454 23  0607   WBC Thousand/uL 7 33 8 39   HEMOGLOBIN g/dL 13 4 13 3   HEMATOCRIT % 41 8 42 2   PLATELETS Thousands/uL 322 304     Results from last 7 days   Lab Units 02/08/23  0454 02/07/23  0607   SODIUM mmol/L 139 138   POTASSIUM mmol/L 4 1 4 1   CHLORIDE mmol/L 108 109*   CO2 mmol/L 27 25   BUN mg/dL 16 20   CREATININE mg/dL 0 81 0 84   CALCIUM mg/dL 9 2 8 9             Results from last 7 days   Lab Units 02/09/23  2101 02/09/23  1639   POC GLUCOSE mg/dl 144* 110       Review of Scheduled Meds:  Current Facility-Administered Medications   Medication Dose Route Frequency Provider Last Rate   • acetaminophen  650 mg Oral Q6H PRN Amando Anaya MD     • albuterol  2 puff Inhalation Q4H PRN Amando Anaya MD     • aspirin  81 mg Oral Daily Amando Anaya MD     • atorvastatin  40 mg Oral QPM Amando Anaya MD     • bisacodyl  10 mg Rectal Daily PRN Amando Anaya MD     • clopidogrel  75 mg Oral Daily Amando Anaya MD     • docusate sodium  100 mg Oral BID Ann Marie Mccauley MD     • DULoxetine  60 mg Oral Daily mAando Anaya MD     • enoxaparin  40 mg Subcutaneous Daily Amando Anaya MD     • hydrochlorothiazide  12 5 mg Oral Daily Amando Anaya MD     • lidocaine  1 patch Topical Daily Amando Anaya MD     • lidocaine   Topical 4x Daily PRN SHAILA Lemons     • losartan  50 mg Oral Daily Amando Anaya MD     • nicotine  21 mg Transdermal Daily Amando Anaya MD     • ondansetron  4 mg Oral Q6H PRN Amando Anaya MD     • pantoprazole  40 mg Oral Early Morning Amando Anaya MD     • polyethylene glycol  17 g Oral Daily PRN Amando Anaya MD     • QUEtiapine  25 mg Oral HS PRN Amando Anaya MD     • senna  1 tablet Oral BID Ann Marie Mccauley MD     • traZODone  50 mg Oral HS Amando Anaya MD     • verapamil  120 mg Oral Daily Amando Anaya MD         Subjective/ HPI: Patient seen and examined  Patients overnight issues or events were reviewed with nursing or staff during rounds or morning huddle session   New or overnight issues include the following:     No new or overnight issues  Offers no complaints    ROS:   A 10 point ROS was performed; negative except as noted above  Imaging:     No orders to display       *Labs /Radiology studies reviewed  *Medications reviewed and reconciled as needed  *Please refer to order section for additional ordered labs studies  *Case discussed with primary attending during morning huddle case rounds    Physical Examination:  Vitals:   Vitals:    02/11/23 1354 02/11/23 1951 02/12/23 0449 02/12/23 1003   BP: 117/73 118/74 124/65 112/84   BP Location: Right arm Right arm Right arm    Pulse: 105 100 64    Resp: 18 18 18    Temp: (!) 97 3 °F (36 3 °C) 98 3 °F (36 8 °C) 97 7 °F (36 5 °C)    TempSrc: Oral Oral Oral    SpO2:  93% 95%    Weight:       Height:           General Appearance: no distress, conversive  HEENT:  External ear normal   Nose normal w/o drainage  Mucous membranes are moist  Oropharynx is clear  Conjunctiva clear w/o icterus or redness  Neck:  Supple, normal ROM  Lungs: BBS without crackles/wheeze/rhonchi; respirations unlabored with normal inspiratory/expiratory effort  No retractions noted  On RA  CV: regular rate and rhythm; no rubs/murmurs/gallops, PMI normal   ABD: Abdomen is soft  Bowel sounds all quadrants  Nontender with no distention  EXT: no edema  Skin: normal turgor, normal texture, no rashes  Psych: affect normal, mood normal  Neuro: AAO      The above physical exam was reviewed and updated as determined by my evaluation of the patient on 2/12/2023  Invasive Devices     None                    VTE Pharmacologic Prophylaxis: Enoxaparin  Code Status: Level 1 - Full Code  Current Length of Stay: 3 day(s)      Total time spent:  30 minutes with more than 50% spent counseling/coordinating care  Counseling includes discussion with patient re: progress  and discussion with patient of his/her current medical state/information   Coordination of patient's care was performed in conjunction with primary service  Time invested included review of patient's labs, vitals, and management of their comorbidities with continued monitoring  In addition, this patient was discussed with medical team including physician and advanced extenders  The care of the patient was extensively discussed and appropriate treatment plan was formulated unique for this patient  Medical decision making for the day was made by supervising physician unless otherwise noted in their attestation statement  ** Please Note:  voice to text software may have been used in the creation of this document   Although proof errors in transcription or interpretation are a potential of such software**

## 2023-02-13 ENCOUNTER — APPOINTMENT (OUTPATIENT)
Dept: PHYSICAL THERAPY | Facility: REHABILITATION | Age: 58
End: 2023-02-13

## 2023-02-13 LAB
ANION GAP SERPL CALCULATED.3IONS-SCNC: 3 MMOL/L (ref 4–13)
BASOPHILS # BLD AUTO: 0.05 THOUSANDS/ÂΜL (ref 0–0.1)
BASOPHILS NFR BLD AUTO: 1 % (ref 0–1)
BUN SERPL-MCNC: 16 MG/DL (ref 5–25)
CALCIUM SERPL-MCNC: 9.3 MG/DL (ref 8.3–10.1)
CHLORIDE SERPL-SCNC: 105 MMOL/L (ref 96–108)
CO2 SERPL-SCNC: 28 MMOL/L (ref 21–32)
CREAT SERPL-MCNC: 0.76 MG/DL (ref 0.6–1.3)
EOSINOPHIL # BLD AUTO: 0.15 THOUSAND/ÂΜL (ref 0–0.61)
EOSINOPHIL NFR BLD AUTO: 2 % (ref 0–6)
ERYTHROCYTE [DISTWIDTH] IN BLOOD BY AUTOMATED COUNT: 14.6 % (ref 11.6–15.1)
GFR SERPL CREATININE-BSD FRML MDRD: 87 ML/MIN/1.73SQ M
GLUCOSE SERPL-MCNC: 109 MG/DL (ref 65–140)
HCT VFR BLD AUTO: 41.6 % (ref 34.8–46.1)
HGB BLD-MCNC: 13.8 G/DL (ref 11.5–15.4)
IMM GRANULOCYTES # BLD AUTO: 0.01 THOUSAND/UL (ref 0–0.2)
IMM GRANULOCYTES NFR BLD AUTO: 0 % (ref 0–2)
LYMPHOCYTES # BLD AUTO: 3.79 THOUSANDS/ÂΜL (ref 0.6–4.47)
LYMPHOCYTES NFR BLD AUTO: 42 % (ref 14–44)
MCH RBC QN AUTO: 33 PG (ref 26.8–34.3)
MCHC RBC AUTO-ENTMCNC: 33.2 G/DL (ref 31.4–37.4)
MCV RBC AUTO: 100 FL (ref 82–98)
MONOCYTES # BLD AUTO: 0.73 THOUSAND/ÂΜL (ref 0.17–1.22)
MONOCYTES NFR BLD AUTO: 8 % (ref 4–12)
NEUTROPHILS # BLD AUTO: 4.2 THOUSANDS/ÂΜL (ref 1.85–7.62)
NEUTS SEG NFR BLD AUTO: 47 % (ref 43–75)
NRBC BLD AUTO-RTO: 0 /100 WBCS
PLATELET # BLD AUTO: 318 THOUSANDS/UL (ref 149–390)
PMV BLD AUTO: 9.9 FL (ref 8.9–12.7)
POTASSIUM SERPL-SCNC: 4.2 MMOL/L (ref 3.5–5.3)
RBC # BLD AUTO: 4.18 MILLION/UL (ref 3.81–5.12)
SODIUM SERPL-SCNC: 136 MMOL/L (ref 135–147)
WBC # BLD AUTO: 8.93 THOUSAND/UL (ref 4.31–10.16)

## 2023-02-13 RX ADMIN — LOSARTAN POTASSIUM 50 MG: 50 TABLET, FILM COATED ORAL at 08:22

## 2023-02-13 RX ADMIN — DOCUSATE SODIUM 100 MG: 100 CAPSULE, LIQUID FILLED ORAL at 17:11

## 2023-02-13 RX ADMIN — QUETIAPINE FUMARATE 25 MG: 25 TABLET ORAL at 21:02

## 2023-02-13 RX ADMIN — LIDOCAINE 5% 1 PATCH: 700 PATCH TOPICAL at 08:22

## 2023-02-13 RX ADMIN — DOCUSATE SODIUM 100 MG: 100 CAPSULE, LIQUID FILLED ORAL at 08:22

## 2023-02-13 RX ADMIN — VERAPAMIL HYDROCHLORIDE 120 MG: 120 TABLET, FILM COATED, EXTENDED RELEASE ORAL at 12:13

## 2023-02-13 RX ADMIN — SENNOSIDES 8.6 MG: 8.6 TABLET, FILM COATED ORAL at 17:11

## 2023-02-13 RX ADMIN — GLYCERIN 1 DROP: .002; .002; .01 SOLUTION/ DROPS OPHTHALMIC at 21:01

## 2023-02-13 RX ADMIN — HYDROCHLOROTHIAZIDE 12.5 MG: 12.5 TABLET ORAL at 08:22

## 2023-02-13 RX ADMIN — CLOPIDOGREL BISULFATE 75 MG: 75 TABLET ORAL at 08:22

## 2023-02-13 RX ADMIN — ASPIRIN 81 MG CHEWABLE TABLET 81 MG: 81 TABLET CHEWABLE at 08:22

## 2023-02-13 RX ADMIN — ATORVASTATIN CALCIUM 40 MG: 40 TABLET, FILM COATED ORAL at 17:11

## 2023-02-13 RX ADMIN — SENNOSIDES 8.6 MG: 8.6 TABLET, FILM COATED ORAL at 08:22

## 2023-02-13 RX ADMIN — DULOXETINE HYDROCHLORIDE 60 MG: 60 CAPSULE, DELAYED RELEASE ORAL at 08:22

## 2023-02-13 RX ADMIN — PANTOPRAZOLE SODIUM 40 MG: 40 TABLET, DELAYED RELEASE ORAL at 05:45

## 2023-02-13 RX ADMIN — ENOXAPARIN SODIUM 40 MG: 40 INJECTION SUBCUTANEOUS at 08:22

## 2023-02-13 RX ADMIN — NICOTINE 21 MG: 21 PATCH, EXTENDED RELEASE TRANSDERMAL at 08:24

## 2023-02-13 NOTE — PLAN OF CARE
Problem: PAIN - ADULT  Goal: Verbalizes/displays adequate comfort level or baseline comfort level  Description: Interventions:  - Encourage patient to monitor pain and request assistance  - Assess pain using appropriate pain scale  - Administer analgesics based on type and severity of pain and evaluate response  - Implement non-pharmacological measures as appropriate and evaluate response  - Consider cultural and social influences on pain and pain management  - Notify physician/advanced practitioner if interventions unsuccessful or patient reports new pain  Outcome: Progressing     Problem: INFECTION - ADULT  Goal: Absence or prevention of progression during hospitalization  Description: INTERVENTIONS:  - Assess and monitor for signs and symptoms of infection  - Monitor lab/diagnostic results  - Monitor all insertion sites, i e  indwelling lines, tubes, and drains  - Monitor endotracheal if appropriate and nasal secretions for changes in amount and color  - [place] appropriate cooling/warming therapies per order  - Administer medications as ordered  - Instruct and encourage patient and family to use good hand hygiene technique  - Identify and instruct in appropriate isolation precautions for identified infection/condition  Outcome: Progressing     Problem: SAFETY ADULT  Goal: Patient will remain free of falls  Description: INTERVENTIONS:  - Educate patient/family on patient safety including physical limitations  - Instruct patient to call for assistance with activity   - Consult OT/PT to assist with strengthening/mobility   - Keep Call bell within reach  - Keep bed low and locked with side rails adjusted as appropriate  - Keep care items and personal belongings within reach  - Initiate and maintain comfort rounds  - Make Fall Risk Sign visible to staff  - Offer Toileting every 2 Hours, in advance of need  - Initiate/Maintain bed/chair alarm  - Obtain necessary fall risk management equipment: non skid footwear  - Apply yellow socks and bracelet for high fall risk patients  - Consider moving patient to room near nurses station  Outcome: Progressing  Goal: Maintain or return to baseline ADL function  Description: INTERVENTIONS:  -  Assess patient's ability to carry out ADLs; assess patient's baseline for ADL function and identify physical deficits which impact ability to perform ADLs (bathing, care of mouth/teeth, toileting, grooming, dressing, etc )  - Assess/evaluate cause of self-care deficits   - Assess range of motion  - Assess patient's mobility; develop plan if impaired  - Assess patient's need for assistive devices and provide as appropriate  - Encourage maximum independence but intervene and supervise when necessary  - Involve family in performance of ADLs  - Assess for home care needs following discharge   - Consider OT consult to assist with ADL evaluation and planning for discharge  - Provide patient education as appropriate  Outcome: Progressing  Goal: Maintains/Returns to pre admission functional level  Description: INTERVENTIONS:  - Perform BMAT or MOVE assessment daily    - Set and communicate daily mobility goal to care team and patient/family/caregiver  - Collaborate with rehabilitation services on mobility goals if consulted  - Perform Range of Motion 3 times a day  - Reposition patient every 2 hours    - Dangle patient 3 times a day  - Stand patient 3 times a day  - Ambulate patient 3 times a day  - Out of bed to chair 3 times a day   - Out of bed for meals 3 times a day  - Out of bed for toileting  - Record patient progress and toleration of activity level   Outcome: Progressing     Problem: DISCHARGE PLANNING  Goal: Discharge to home or other facility with appropriate resources  Description: INTERVENTIONS:  - Identify barriers to discharge w/patient and caregiver  - Arrange for needed discharge resources and transportation as appropriate  - Identify discharge learning needs (meds, wound care, etc )  - Arrange for interpretive services to assist at discharge as needed  - Refer to Case Management Department for coordinating discharge planning if the patient needs post-hospital services based on physician/advanced practitioner order or complex needs related to functional status, cognitive ability, or social support system  Outcome: Progressing

## 2023-02-13 NOTE — PROGRESS NOTES
[facility] Occupational Therapy Daily Note  Patient Active Problem List   Diagnosis    Acquired hallux valgus of left foot    Other hammer toe(s) (acquired), left foot    Other acquired deformities of left foot    Wound of right foot    Preoperative clearance    Tobacco use    Headache    Obesity (BMI 35 0-39 9 without comorbidity)    Ex-smoker for less than 1 year    Stroke - Left posterior limb internal capsule ischemic stroke    Depression    Insomnia    Hypertension    Chest pain    Prediabetes    Pain in left foot, chronic       Past Medical History:   Diagnosis Date    Acquired deformity of left foot     Anesthesia complication     difficulty awakening    Arthritis     Deaf, left     Depression     Hallux valgus of left foot     Hammer toe of left foot     Headache     Kidney stone     Sciatic pain, right     intermittent     Etiologic Diagnosis: left psterior limb internal capsule/thalamic acute to subacute infarct  Restrictions/Precautions  Precautions: Bed/chair alarms, Fall Risk, Supervision on toilet/commode  Weight Bearing Restrictions: No  ROM Restrictions: No  ADL Team Goal: Patient will be independent with ADLs with least restrictive device upon completion of rehab program  Occupational Therapy LTG's  Eating Oral care Bathing LB dress UB dress   Eating Goal: 06  Independent - Patient completes the activity by him/herself with no assistance from a helper  Oral Hygiene Goal: 06  Independent - Patient completes the activity by him/herself with no assistance from a helper  Shower/bathe self Goal: 06  Independent - Patient completes the activity by him/herself with no assistance from a helper  Lower body dressing Goal: 06  Independent - Patient completes the activity by him/herself with no assistance from a helper  Upper body dressing Goal: 06  Independent - Patient completes the activity by him/herself with no assistance from a helper  Toileting Toilet txf Func txf IADL Med    Toileting hygiene Goal: 06  Independent - Patient completes the activity by him/herself with no assistance from a helper  Toilet transfer Goal: 06  Independent - Patient completes the activity by him/herself with no assistance from a helper  Assist Level: Independent Assist Level: Independent   OT interventions: Treatment/Interventions: ADL retraining, Functional transfer training, Therapeutic exercise, Endurance training, Patient/family training, Equipment eval/education, Bed mobility, Compensatory technique education  Discharge Plan:  OT Discharge Recommendation: (P)  (Home with family support)   DME: Equipment Recommended: (P)  (TBD),  ,       02/13/23 0930   Pain Assessment   Pain Assessment Tool FLACC   Pain Location/Orientation Orientation: Right;Location: Shoulder  (lower deltoid muscular)   Pain Onset/Description Descriptor: Sore;Descriptor: Sharp  (to the touch, )   Lifestyle   Autonomy "I need a shower "   Oral Hygiene   Type of Assistance Needed Independent   Comment improved R UE shoudler elevation today  Oral Hygiene CARE Score 6   Grooming   Findings pt engages in hair care routine  slight difficulties with motor planning movements such as scooping gel from a jar  able to complete dynamic in hand dexterity with hair brush  Requires proximal support at times on elbow for sustained overhead reaching to top of head with brush  inc time req to manage grasp on hair clip  Shower/Bathe Self   Type of Assistance Needed Supervision   Comment seated/standing with grab bars support  good indight into safety with use of grab bars and alerting therapist prior to standing  Shower/Bathe Self CARE Score 4   Tub/Shower Transfer   Findings CS lateral side step to chair   Upper Body Dressing   Type of Assistance Needed Physical assistance   Physical Assistance Level 25% or less   Comment able to unclasp bra today in rear  educated on mehtods of front clasping  able to initiate, some difficulty with sustained distal grasp on bra   able to complete with slight [place] assist for R UE   Upper Body Dressing CARE Score 3   Lower Body Dressing   Type of Assistance Needed Incidental touching   Comment Slight CGA in stance to don shorts and underwear  Lower Body Dressing CARE Score 4   Putting On/Taking Off Footwear   Type of Assistance Needed Set-up / clean-up   Putting On/Taking Off Footwear CARE Score 5   Sit to Stand   Type of Assistance Needed Incidental touching   Comment HHA   Sit to Stand CARE Score 4   Bed-Chair Transfer   Type of Assistance Needed Physical assistance   Physical Assistance Level 25% or less   Comment R UE HHA   Chair/Bed-to-Chair Transfer CARE Score 3   Neuromuscular Education   Comments Pt set up with R UE HEP in room for use of rubber bands for active extension training for all digits  pt set up with coloring tasks for visual fixation training, but also R UE intrinsic hand strengthening  Cognition   Orientation Level Oriented X4   Vision   Vision Comments Completed vision screening as Pt has been bringing up visual disturbance in L eye  Pt w/ cc/o  L eye "cobwebs," feeling like the eye is heavy, "tired," inability to "focus " pt reports that he didn't say anything because she wants to drive  Vision Screen pt has prescription glasses for reading  Binocularity Visual acuity, near 16" 20/160without glasses, 20/100 with glasses on  Convergence: 12" w/ marbella string  [place] string: X, indicating no suppression  (however does demo difficulty with near point work)  Pursuits: L EYE slightly jerky in all planes  Smooth pursuits to L eye noted with drooping/dropping down along with eye lid to near closure  Very slight endpoint nystagmus on L  Pt with severely limited with maintaining visual fixation on target in L visual field w/ L eye  Saccades: slightly jerky  in horizontal planes, with increased reports of "dizziness" with L  Extra involuntary eye movements/pauses during vertical/horizonal tracking   dysmetric targeting with saccades in non horizontal field  Cover test/cross cover test no misalignment  VOR testing with severe difficulty maintaining gaze, visual bluring/ vertical diplopia is induced after testing, quickly resolves  No induced HA throughout testing, just eye strain  Pt functionally demonstrates difficulty in depth perception impacting safety with transfers, impaired balance in func mobility, reading tasks, using her phone, and completing sustained visual tasks  Extensive education regarding compensation techniques, remedial approaches, introduction to HEP, safety concerns  Pt set up with basic table top visual focusing task with coloring page  Assessment   Treatment Assessment Pt participated in skilled OT treatment session with treatment focus on ADL re-training, fxnl xfers and visual screening, initiation of HEP for R UE  Pt tolerated session well, with ability to complete ADL tasks at CGA- MIN A for UB dressing  Pt endorses visual disturbances which she wasn't highlighting for fear of not being able to drive  Pt was agreeable to visual screening, see above for details  Pt will cont to benefit from focus on visual perceptual skills with dynamic movements, head turns, and focus on visual fixation     Prognosis Good   Recommendation   OT Discharge Recommendation   (Home with family support)   Equipment Recommended   (TBD)   OT Therapy Minutes   OT Time In 0930   OT Time Out 1130   OT Total Time (minutes) 120   OT Mode of treatment - Individual (minutes) 120   OT Mode of treatment - Concurrent (minutes) 0   OT Mode of treatment - Group (minutes) 0   OT Mode of treatment - Co-treat (minutes) 0   OT Mode of Treatment - Total time(minutes) 120 minutes   OT Cumulative Minutes 315

## 2023-02-13 NOTE — PROGRESS NOTES
Internal Medicine Progress Note  Patient: Khris Melendez  Age/sex: 62 y o  female  Medical Record #: [id number]      ASSESSMENT/PLAN: (Interval History)  Khris Melendez is seen and examined and management for following issues:    Acute CVA  • Left posterior limb internal capsule/thalamic  • For ASA/Plavix a 21 days then to ASA alone on 23  • Continue Lipitor but Cards advised switch to Crestor 40mg qd at discharge to avoid interaction of the Lipitor with the Verapamil (CY interaction)  • CT C/A/P was negative for malignancy; thrombosis panel sent both to rule out hypercoag state     Headaches  • MRI with contrast was unrevealing  • Neuro placed her on Verapamil for preventive tx and she got IV magnesium/Phenergan/Toradol/Benadryl  • Had a very mild headache yesterday  • D/w Dr Dheeraj Gomez today try to trial off  Verapamil (considering interaction with Lipitor) and see how she does with headaches        Chest pain  • Had occurrences on  and  naomi with lying flat  • Trops and EKGs were negative  • Cards saw and felt atypical and will see her back in the office; no w/u for now     HTN  • Home:  no meds  • Here:  Verapamil 120mg qd/Losartan 50mg qd/HCTZ 12 5mg qd  • Stable  • Stopping Verapamil as above but may need to increase the Losartan or HCTZ     Depression  • Continue Cymbalta  • Takes Seroquel and Trazodone at home     Pre-DM  • HbA1C was 6 4  • Watching FBS,  No Accuchecks for now  • D/w pt 23 about starting a DM diet  She was agreeable  • Will have Nutrition see today for DM diet teaching  • FBS this AM was 109     Nicotine abuse  • Continue patch  • Advised cessation 23        Discharge date:  Team    The above assessment and plan was reviewed and updated as determined by my evaluation of the patient on 2023      Labs:   Results from last 7 days   Lab Units 23  0545 23  0454   WBC Thousand/uL 8 93 7 33   HEMOGLOBIN g/dL 13 8 13 4   HEMATOCRIT % 41 6 41 8   PLATELETS Thousands/uL 318 322     Results from last 7 days   Lab Units 02/13/23  0545 02/08/23  0454   SODIUM mmol/L 136 139   POTASSIUM mmol/L 4 2 4 1   CHLORIDE mmol/L 105 108   CO2 mmol/L 28 27   BUN mg/dL 16 16   CREATININE mg/dL 0 76 0 81   CALCIUM mg/dL 9 3 9 2             Results from last 7 days   Lab Units 02/09/23  2101 02/09/23  1639   POC GLUCOSE mg/dl 144* 110       Review of Scheduled Meds:  Current Facility-Administered Medications   Medication Dose Route Frequency Provider Last Rate   • acetaminophen  650 mg Oral Q6H PRN Eliane Morales MD     • albuterol  2 puff Inhalation Q4H PRN Eliane Morales MD     • aspirin  81 mg Oral Daily Eliane Morales MD     • atorvastatin  40 mg Oral QPM Eliane Morales MD     • bisacodyl  10 mg Rectal Daily PRN Eliane Morales MD     • clopidogrel  75 mg Oral Daily Eliane Morales MD     • docusate sodium  100 mg Oral BID Peggy Martins MD     • DULoxetine  60 mg Oral Daily Eliane Morales MD     • enoxaparin  40 mg Subcutaneous Daily Eliane Morales MD     • hydrochlorothiazide  12 5 mg Oral Daily Eliane Morales MD     • lidocaine  1 patch Topical Daily Eliane Morales MD     • lidocaine   Topical 4x Daily PRN SHAILA Felix     • losartan  50 mg Oral Daily Eliane Morales MD     • nicotine  21 mg Transdermal Daily Eliane Morales MD     • ondansetron  4 mg Oral Q6H PRN Eliane Morales MD     • pantoprazole  40 mg Oral Early Morning Eliane Morales MD     • polyethylene glycol  17 g Oral Daily PRN Eliane Morales MD     • QUEtiapine  25 mg Oral HS PRN Eliane Morales MD     • senna  1 tablet Oral BID Peggy Martins MD     • traZODone  50 mg Oral HS Eliane Morales MD     • verapamil  120 mg Oral Daily SHAILA Felix         Subjective/ HPI: Patient seen and examined   Patients overnight issues or events were reviewed with nursing or staff during rounds or morning huddle session  New or overnight issues include the following:     No new or overnight issues  Offers no complaints except right shoulder pain  ROS:   A 10 point ROS was performed; negative except as noted above  Imaging:     No orders to display       *Labs /Radiology studies reviewed  *Medications reviewed and reconciled as needed  *Please refer to order section for additional ordered labs studies  *Case discussed with primary attending during morning huddle case rounds    Physical Examination:  Vitals:   Vitals:    02/12/23 1003 02/12/23 1405 02/12/23 2100 02/13/23 0539   BP: 112/84 126/64 117/64 116/80   BP Location:  Right arm Left arm Right arm   Pulse:  68 94 104   Resp:  20 16 16   Temp:  97 9 °F (36 6 °C) 98 3 °F (36 8 °C) 98 2 °F (36 8 °C)   TempSrc:  Oral Oral Oral   SpO2:  96% 94% 94%   Weight:       Height:           General Appearance: no distress, conversive  HEENT: PERRLA, conjuctiva normal; oropharynx clear; mucous membranes moist   Neck:  Supple, normal ROM  Lungs: CTA, normal respiratory effort, no retractions, expiratory effort normal  CV: regular rate and rhythm; no rubs/murmurs/gallops, PMI normal   ABD: soft; ND/NT; +BS  EXT: no edema  Skin: normal turgor, normal texture, no rashes  Psych: affect normal, mood normal  Neuro: AAO      The above physical exam was reviewed and updated as determined by my evaluation of the patient on 2/13/2023  Invasive Devices     None                    VTE Pharmacologic Prophylaxis: Enoxaparin  Code Status: Level 1 - Full Code  Current Length of Stay: 4 day(s)      Total time spent:  30 minutes with more than 50% spent counseling/coordinating care  Counseling includes discussion with patient re: progress  and discussion with patient of his/her current medical state/information  Coordination of patient's care was performed in conjunction with primary service   Time invested included review of patient's labs, vitals, and management of their comorbidities with continued monitoring  In addition, this patient was discussed with medical team including physician and advanced extenders  The care of the patient was extensively discussed and appropriate treatment plan was formulated unique for this patient  Medical decision making for the day was made by supervising physician unless otherwise noted in their attestation statement  ** Please Note:  voice to text software may have been used in the creation of this document   Although proof errors in transcription or interpretation are a potential of such software**

## 2023-02-13 NOTE — PROGRESS NOTES
02/13/23 0830   Pain Assessment   Pain Assessment Tool 0-10   Pain Score 6   Pain Location/Orientation Orientation: Right;Location: Shoulder   Hospital Pain Intervention(s) Medication (See MAR); Other (Comment)  (pt reports receiving pain patch on arm this AM)   Restrictions/Precautions   Precautions Aspiration;Bed/chair alarms;Cognitive; Fall Risk;Pain;Visual deficit;Supervision on toilet/commode   Weight Bearing Restrictions No   ROM Restrictions No   Comprehension   Comprehension (FIM) 5 - Needs slowed speech to understand   Expression   Expression (FIM) 4 - Needs to repeat single words   Social Interaction   Social Interaction (FIM) 5 - Interacts appropriately with others 90% of time   Problem Solving   Problem solving (FIM) 4 - Solves basic problems 75-89% of time   Memory   Memory (FIM) 3 - Recognizes, recalls/performs 50-74%   Speech/Language/Cognition Assessmetn   Treatment Assessment Pt seen for skilled speech therapy session targeting cognitive linguistic communication skills  Pt sleeping upon arrival, easily awoken and repositioned self upright in bed for session  Engaged in rapport conversation recalling events leading up to hospitalization as well as discussion of current deficits and goals of rehab  Pt oriented x4, good recall of events  Pt able to ID some of her deficits (speech, processing speed, right side deficits, vision)  Pt stated she lives at home with Clayton Lewis who is in good health but is older than her and has a birthday this week  Pt has 4 local children (Clara Zeferino, Chengromy, and Roseline Nikky)  Pt reports being independent with all ADL/IADLs prior, voiced concern about not being able to drive but then stated "[employer] or my sons can get me where I need to go"  Pt recalled completing motor speech assessment, provided with Speech Strategies handout and reviewed in details areas of focus- slower speech and over articulating   Pt did comment that her processing speed for translating from English to [place] now takes her longer than before when she could often co-switch quicker  It was observed some delay in responses which caused some dysfluencies in regards to prolongation or syllable to word repetition  Pt also reports being slurred in both languages, emphasizing on specific phonemes like /s, l, r/ as well as clusters/blends in words  Pt asking questions regarding stroke education and prevention as she was very concerned about this happening again to her  Retrieved Stroke Education Booklet- [place] version per pt request  Initiated Stroke 101 section on education with review of stroke dx, location of stroke, type of stroke (ischemic) and risk factors  Pt has a hx HTN, smoking, and is pre-diabetic  Pt reports being non compliant with medications as she felt they messed with her stomach and bowels therefore should would take her Lipitor every other day or when she remember it  Emphasized the importance of medication compliance for stroke prevention  Pt benefitted from use of pictures in booklet to better comprehend as suspect there may be some reading difficulties given pt reported visual deficits- see OT note/assessment of vision  Pt will benefit from further skilled therapy sessions targeting motor speech, cognition, and stroke education as well as brief follow up for swallow as pt was assessed and requires follow up  Pt overall is improving with skilled SLP services and will cont to benefit to maximize overall cognitive linguistic communication abilities at this time  SLP Therapy Minutes   SLP Time In 0830   SLP Time Out 0930   SLP Total Time (minutes) 60   SLP Mode of treatment - Individual (minutes) 60   SLP Mode of treatment - Concurrent (minutes) 0   SLP Mode of treatment - Group (minutes) 0   SLP Mode of treatment - Co-treat (minutes) 0   SLP Mode of Treatment - Total time(minutes) 60 minutes   SLP Cumulative Minutes 195   Therapy Time missed   Time missed?  No

## 2023-02-13 NOTE — PROGRESS NOTES
PM&R PROGRESS NOTE:  Kevin Saavedra 62 y o  female MRN: [id number]  Unit/Bed#: -01 Encounter: [id number]        Rehabilitation Diagnosis: Impairment of mobility, safety, Activities of Daily Living (ADLs), and cognitive/communication skills due to Stroke:  01 2  Right Body Involvement (Left Brain)  Left posterior limb internal capsule acute ischemic CVA    SUBJECTIVE: Patient seen face-to-face today in PT  Doing well with transfers and progressing with ambulation  Denies headaches and is agreeable to discontinuation of verapamil as a trial   Has sensation in her left eye that a spiderweb is in it, OT to assess vision/eye movements  ASSESSMENT: Stable, progressing      PLAN:    Rehabilitation  • Functional deficits: right hemiparesis, dysarthria, dysphagia, cognitive deficits, impaired mobility, self care   • Continue current rehabilitation plan of care to maximize function  • Functional update:   niel miranda in progress  • Estimated Discharge: TBD, ELOS 2-3 weeks     DVT prophylaxis  • Managed on SQ Lovenox 40 mg daily     GI ppx:  • Starting Protonix 40 mg daily instead of Pepcid as patient high risk for stress ulcer given her history of smoking and now CVA     Bladder plan  • Continent  • Timed Toilet     Bowel plan  • Continent  • +BM on 2/7/23      * Stroke - Left posterior limb internal capsule ischemic stroke  Assessment & Plan  Presented 2/4/23 with right sided weakness, difficulty speaking  MRI confirmed left posterior limb internal capsule acute ischemic stroke  Etiology: likely related to hypertensive disease  Work-up for malignancy negative  Echo with EF 60%  Per Neurology placed on DAPT with ASA/Plavix x 21 days, then ASA monotherapy    Plan:  Begin acute rehab program  Secondary CVA ppx: Lipitor 40 mg daily  Aspirin 81 mg daily and Plavix 75 mg daily x 21 days (around 2/27/23) then Aspirin 81 mg daily monotherapy      Continue CVA education and reduction of modifiable risk factors naomi HTN, smoking cession  Follow-up with Neurology as outpatient    Pain in left foot, chronic  Assessment & Plan  Continue Tylenol PRN  Also if needed can add back Gabapentin - previously tolerated as outpatient    Prediabetes  Assessment & Plan  HA1C = 6 4  Diabetic diet   Consult nutrition to review with patient during [facility] stay    Chest pain  Assessment & Plan  In acute care, patient with transient chest pain at rest - troponin negative, EKG without changes  Cardiology consulted, recommended losartan and HCTZ for HTN  Also recommended a change to Crestor instead of Lipitor due to being on Verapamil (CY interaction)  Discontinue Verapamil 23 as headaches improved  · Outpatient follow-up recommended with Dr Pascual Murillo for further work-up  · While in [facility] monitor VS and any chest pains      Hypertension  Assessment & Plan  Here: managed on Losartan 100 mg daily, HCTZ 12 5 mg daily   Plan per Cardiology  Monitor BP at rest and during activity in [facility] program  Optimize diet (limit salt)  Outpatient follow-up with Cardiology - Dr Nayana Colin  At home: Seroquel 25 mg QHS  Here: Seroquel 25 mg QHS PRN + Trazodone 50 mg QHS  Plan to wean down Trazodone over time - for now will continue as sleeping well       Depression  Assessment & Plan  Managed on Cymbalta 60 mg daily (home dose)  Consult placed to Neuropsychology - patient tearful and having difficulty coping with stroke diagnosis  Team to additionally provide emotional support    Obesity (BMI 35 0-39 9 without comorbidity)  Assessment & Plan  Lifestyle and diet modifications when able  Consult nutrition     Headache  Assessment & Plan  Developed in acute care  Treated with Migraine cocktail  Started on Verapamil for prevention by Neurology  Headaches have improved    Plan:  Discontinue Verapamil 23 as headaches much improved  Monitor for headaches  Follow-up with headache Neurologist as outpatient if persists    Tobacco use  Assessment & Plan  Smoking cessation education frequently during [facility] stay  Nicotine patch for cravings - wean dose weekly    Other acquired deformities of left foot  Assessment & Plan  Left hallux valgus and hammertoe surgery by Dr Laura Miranda (podiatry)  Chronic hypersensitivity/neuritis  Is able to wear shoes  Patient received injection by Dr Laura Miranda in past (Sept 2022)  Follow-up with Dr Rhiannon Colby  Labs, medications, and imaging personally reviewed  ROS:  A ten point review of systems was completed on 02/13/23 and pertinent positives are listed in subjective section  All other systems reviewed were negative  OBJECTIVE:   /82 (BP Location: Left arm)   Pulse (!) 107   Temp 97 8 °F (36 6 °C) (Oral)   Resp 18   Ht 5' (1 524 m)   Wt 88 9 kg (196 lb)   SpO2 97%   BMI 38 28 kg/m²       Physical Exam  Vitals and nursing note reviewed  Constitutional:       General: She is not in acute distress  HENT:      Head: Normocephalic and atraumatic  Nose: Nose normal       Mouth/Throat:      Mouth: Mucous membranes are moist    Eyes:      Conjunctiva/sclera: Conjunctivae normal       Comments: EOM intact static, but with VORs patient with left eye movements    Cardiovascular:      Rate and Rhythm: Normal rate and regular rhythm  Pulses: Normal pulses  Pulmonary:      Effort: Pulmonary effort is normal       Breath sounds: Normal breath sounds  No wheezing or rales  Abdominal:      General: Bowel sounds are normal  There is no distension  Palpations: Abdomen is soft  Tenderness: There is no abdominal tenderness  Musculoskeletal:         General: No swelling  Cervical back: Neck supple  Skin:     General: Skin is warm  Neurological:      Mental Status: She is alert and oriented to person, place, and time  Comments: Seen ambulating with PT improved posture and balance  No AD at this time    Needs CGA   Psychiatric:         Mood and Affect: Mood normal           Lab Results   Component Value Date    WBC 8 93 02/13/2023    HGB 13 8 02/13/2023    HCT 41 6 02/13/2023     (H) 02/13/2023     02/13/2023     Lab Results   Component Value Date    SODIUM 136 02/13/2023    K 4 2 02/13/2023     02/13/2023    CO2 28 02/13/2023    BUN 16 02/13/2023    CREATININE 0 76 02/13/2023    GLUC 109 02/13/2023    CALCIUM 9 3 02/13/2023     Lab Results   Component Value Date    INR 0 92 02/04/2023    PROTIME 12 6 02/04/2023           Current Facility-Administered Medications:   •  acetaminophen (TYLENOL) tablet 650 mg, 650 mg, Oral, Q6H PRN, Amando Anaya MD, 650 mg at 02/11/23 1450  •  albuterol (PROVENTIL HFA,VENTOLIN HFA) inhaler 2 puff, 2 puff, Inhalation, Q4H PRN, Amando Anaya MD  •  aspirin chewable tablet 81 mg, 81 mg, Oral, Daily, Amando Anaya MD, 81 mg at 02/13/23 5452  •  atorvastatin (LIPITOR) tablet 40 mg, 40 mg, Oral, QPM, Amando Anaya MD, 40 mg at 02/12/23 1649  •  bisacodyl (DULCOLAX) rectal suppository 10 mg, 10 mg, Rectal, Daily PRN, Amando Anaya MD  •  clopidogrel (PLAVIX) tablet 75 mg, 75 mg, Oral, Daily, Amando Anaya MD, 75 mg at 02/13/23 3870  •  docusate sodium (COLACE) capsule 100 mg, 100 mg, Oral, BID, Ann Marie Mccauley MD, 100 mg at 02/13/23 2021  •  DULoxetine (CYMBALTA) delayed release capsule 60 mg, 60 mg, Oral, Daily, Amando Anaya MD, 60 mg at 02/13/23 4529  •  enoxaparin (LOVENOX) subcutaneous injection 40 mg, 40 mg, Subcutaneous, Daily, Amando Anaya MD, 40 mg at 02/13/23 1982  •  glycerin-hypromellose- (ARTIFICIAL TEARS) ophthalmic solution 1 drop, 1 drop, Left Eye, TID, Amando Anaya MD  •  hydrochlorothiazide (HYDRODIURIL) tablet 12 5 mg, 12 5 mg, Oral, Daily, Amando Anaya MD, 12 5 mg at 02/13/23 5087  •  lidocaine (LIDODERM) 5 % patch 1 patch, 1 patch, Topical, Daily, Amando Anaya MD, 1 patch at 02/13/23 2877  •  lidocaine (LMX) 4 % cream, , Topical, 4x Daily PRN, SHAILA oMore  •  losartan (COZAAR) tablet 50 mg, 50 mg, Oral, Daily, Mounika Hooper MD, 50 mg at 02/13/23 1148  •  nicotine (NICODERM CQ) 21 mg/24 hr TD 24 hr patch 21 mg, 21 mg, Transdermal, Daily, Mounika Hooper MD, 21 mg at 02/13/23 9376  •  ondansetron (ZOFRAN-ODT) dispersible tablet 4 mg, 4 mg, Oral, Q6H PRN, Mounika Hooper MD  •  pantoprazole (PROTONIX) EC tablet 40 mg, 40 mg, Oral, Early Morning, Mounika Hooper MD, 40 mg at 02/13/23 0545  •  polyethylene glycol (MIRALAX) packet 17 g, 17 g, Oral, Daily PRN, Mounika Hooper MD  •  QUEtiapine (SEROquel) tablet 25 mg, 25 mg, Oral, HS PRN, Mounika Hooper MD, 25 mg at 02/12/23 2111  •  senna (SENOKOT) tablet 8 6 mg, 1 tablet, Oral, BID, Monica Pedro MD, 8 6 mg at 02/13/23 9749  •  traZODone (DESYREL) tablet 50 mg, 50 mg, Oral, HS, Mounika Hooper MD, 50 mg at 02/10/23 2129      Past Medical History:   Diagnosis Date   • Acquired deformity of left foot    • Anesthesia complication     difficulty awakening   • Arthritis    • Deaf, left    • Depression    • Hallux valgus of left foot    • Hammer toe of left foot    • Headache    • Kidney stone    • Sciatic pain, right     intermittent       Patient Active Problem List    Diagnosis Date Noted   • Stroke - Left posterior limb internal capsule ischemic stroke 02/04/2023   • Pain in left foot, chronic 02/09/2023   • Chest pain 02/06/2023   • Prediabetes 02/06/2023   • Insomnia 02/05/2023   • Hypertension 02/05/2023   • Depression 02/04/2023   • Obesity (BMI 35 0-39 9 without comorbidity) 03/10/2021   • Ex-smoker for less than 1 year 03/10/2021   • Headache 07/03/2020   • Tobacco use 07/02/2020   • Preoperative clearance 06/30/2020   • Wound of right foot 06/29/2020   • Acquired hallux valgus of left foot 05/06/2019   • Other hammer toe(s) (acquired), left foot 05/06/2019   • Other acquired deformities of left foot 05/06/2019 Randy Alvares MD    I have spent 35 minutes with Patient and family today in which greater than 50% of this time was spent in counseling/coordination of care regarding Risks and benefits of tx options, Risk factor reductions and Impressions  ** Please Note:  voice to text software may have been used in the creation of this document   Although proof errors in transcription or interpretation are a potential of such software**

## 2023-02-13 NOTE — PROGRESS NOTES
Completed vision screening as Pt has been bringing up visual disturbance in L eye  Pt w/ cc/o  L eye "cobwebs," feeling like the eye is heavy, "tired," inability to "focus " pt reports that he didn't say anything because she wants to drive  Vision Screen pt has prescription glasses for reading  Binocularity Visual acuity, near 16" 20/160without glasses, 20/100 with glasses on  Convergence: 12"  Errol string: X,indicating no suppression  Pursuits: L EYE slightly jerky in all planes  Smooth pursuits to L eye noted with drooping/dropping down along with eye lid to near closure  Pt with severely limited visual fixation  Saccades: slightly jerky  in horizontal planes, with increased reports of "dizziness" with L   dysmetric targeting with saccades in non horizontal field    Pt w/ c/o diplopia, headaches  Hx of diplopia prior to recent stroke, reports going to the Ophthalmologist, but didn't help  Hx of Cataract extraction, bilateral   Diplopia targeting reveals ability to maintain single vision in front on central vision in near and up to 10'  vertical/diagonal stacking in peripheral areas left more than R  Diplopia in near vision in low and upper vision  Overlapping and spacing has reportedly been getting closer  Pt functionally demonstrates difficulty in depth perception impacting safety with transfers, impaired balance in func mobility, reading tasks, watching television  Extensive education regarding compensation techniques, remedial approaches, introduction to HEP, safety concerns  Pt has endorsed driving with double vision

## 2023-02-13 NOTE — SPEECH THERAPY NOTE
Stroke Education Series    Pt participated in skilled Stroke Education Series in an individual setting to address the topic of Stroke 101: Understanding the Basics of Stroke in both verbal and written formats  Education within this session reviewed the basic structural and functional components of the brain and included information on the causes of stroke, related signs/symptoms, risk factors, and the process of stroke rehabilitation  The goal of this education was to provide the patient with general understanding of how the brain functions and how a stroke can impact his/her functional mobility and independence  In addition, the intention of this education is to provide the patient with the information to reduce the risk of a second stroke  Following education, pt's response to education is: verbalizes understanding and needs reinforcment   To individualize the education, the following topics were included based upon the patients' past and current medical history: diabetes mellitus, hyperlipidemia, hypertension and smoking  Additional topics that were covered include decreased sensation, decreased coordination, dysarthria, dysphagia, visual deficits, cognitive changes, depression, anxiety, headches, pain, fall prevention and prevention of new conditions and/or worsening condition         Start Time: 900    End Time: 930

## 2023-02-13 NOTE — PROGRESS NOTES
02/13/23 1400   Pain Assessment   Pain Assessment Tool 0-10   Pain Score No Pain   Restrictions/Precautions   Precautions Aspiration;Bed/chair alarms; Fall Risk;Cognitive;Supervision on toilet/commode;Visual deficit   Cognition   Overall Cognitive Status WFL   Arousal/Participation Alert; Cooperative   Attention Within functional limits   Orientation Level Oriented X4   Memory Within functional limits   Following Commands Follows one step commands without difficulty   Subjective   Subjective "My eye sometimes hurts and feels like there is a hair in there"   Sit to Stand   Type of Assistance Needed Incidental touching   Physical Assistance Level No physical assistance   Sit to Stand CARE Score 4   Bed-Chair Transfer   Type of Assistance Needed Incidental touching   Physical Assistance Level No physical assistance   Chair/Bed-to-Chair Transfer CARE Score 4   Transfer Bed/Chair/Wheelchair   Limitations Noted In Balance; Coordination;Problem Solving; Sequencing;Vision   Stand Pivot Contact Guard   Sit to [facility]   Stand to [facility]   Walk 10 Feet   Type of Assistance Needed Incidental touching   Physical Assistance Level No physical assistance   Walk 10 Feet CARE Score 4   Walk 50 Feet with Two Turns   Type of Assistance Needed Incidental touching   Physical Assistance Level No physical assistance   Walk 50 Feet with Two Turns CARE Score 4   Walk 150 Feet   Type of Assistance Needed Incidental touching   Physical Assistance Level No physical assistance   Walk 150 Feet CARE Score 4   Ambulation   Primary Mode of Locomotion Prior to Admission Walk   Distance Walked (feet)   (300'x2, 150'x4, 100'x2)   Gait Pattern Inconsistant [place];Narrow CHE;Step to; Step through; Improper weight shift   Limitations Noted In Balance; Coordination; Endurance;Speed;Strength;Swing   Does the patient walk? 2   Yes   Curb or Single Stair   Style negotiated Single stair   Type of Assistance Needed Physical assistance Physical Assistance Level 26%-50%   1 Step (Curb) CARE Score 3   4 Steps   Type of Assistance Needed Physical assistance   Physical Assistance Level 26%-50%   4 Steps CARE Score 3   12 Steps   Type of Assistance Needed Physical assistance   Physical Assistance Level 26%-50%   12 Steps CARE Score 3   Stairs   Type Stairs   # of Steps 21  (x2)   Weight Bearing Precautions Fall Risk   Assist Devices Single Rail   Findings reciprocal pattern up and down, antalgic with LLE   Toilet Transfer   Type of Assistance Needed Incidental touching   Physical Assistance Level No physical assistance   Toilet Transfer CARE Score 4   Toilet Transfer   Surface Assessed Raised Toilet   Limitations Noted In Balance; Safety   Therapeutic Interventions   Balance retro amb 30'x3, lateral amb to L and R 30'x2 each   Neuromuscular Re-Education amb with increased speed, amb with head turns to L and R, amb while moving head up and down, visual exercises to observe ability to track L and R and perform VOR, amb while carrying 4# bolster with bilateral UE, circuit of walking 150' and then FF of stairs x2   Other Comments   Comments Spoke with physician about discomfort in her L eye  Assessment   Treatment Assessment Pt has decreased safety awareness and does not accurately assess her need for assistance  Her balance is impaired but she believes she does not need physical support  She is not consistent with the use of her hands for transfers and this causes her to not  the correct position of the chair  OTR was present when observing ocular movements and there were abnormalities observed by the OTR, which she said she will assess further at a later time  Dizziness does occur with head movements up and down while walking but not L and R  The head turns to L and R while walking causes the vision in her L eye to become blurry but this is transient   She can benefit from skilled PT intervention to increase her strength, coordination, balance, endurance, and overall safety with a focus on using NPP to promote motor re-learning and recovery to maximize function  Continue POC as per PT  Problem List Decreased strength;Decreased range of motion;Decreased endurance; Impaired balance;Decreased mobility; Decreased coordination; Impaired vision;Decreased cognition; Impaired judgement;Decreased safety awareness   Barriers to Discharge Inaccessible home environment;Decreased caregiver support   PT Barriers   Physical Impairment Decreased strength;Decreased range of motion;Decreased endurance; Impaired balance;Decreased mobility; Decreased coordination;Decreased cognition; Impaired judgement;Decreased safety awareness   Functional Limitation Car transfers;Stair negotiation;Standing;Transfers; Walking   Plan   Treatment/Interventions ADL retraining;Functional transfer training;LE strengthening/ROM; Elevations; Therapeutic exercise; Endurance training;Cognitive reorientation;Patient/family training;Bed mobility;Gait training   Progress Progressing toward goals   Recommendation   PT Discharge Recommendation   (TBD)   Equipment Recommended   (TBD)   PT Therapy Minutes   PT Time In 1400   PT Time Out 1500   PT Total Time (minutes) 60   PT Mode of treatment - Individual (minutes) 60   PT Mode of treatment - Concurrent (minutes) 0   PT Mode of treatment - Group (minutes) 0   PT Mode of treatment - Co-treat (minutes) 0   PT Mode of Treatment - Total time(minutes) 60 minutes   PT Cumulative Minutes 210   Therapy Time missed   Time missed?  No

## 2023-02-14 PROBLEM — H04.129 DRY EYE: Status: ACTIVE | Noted: 2023-02-14

## 2023-02-14 RX ORDER — GABAPENTIN 100 MG/1
100 CAPSULE ORAL 3 TIMES DAILY PRN
Status: DISCONTINUED | OUTPATIENT
Start: 2023-02-14 | End: 2023-02-20 | Stop reason: HOSPADM

## 2023-02-14 RX ADMIN — DOCUSATE SODIUM 100 MG: 100 CAPSULE, LIQUID FILLED ORAL at 17:09

## 2023-02-14 RX ADMIN — GLYCERIN 1 DROP: .002; .002; .01 SOLUTION/ DROPS OPHTHALMIC at 08:22

## 2023-02-14 RX ADMIN — PANTOPRAZOLE SODIUM 40 MG: 40 TABLET, DELAYED RELEASE ORAL at 05:57

## 2023-02-14 RX ADMIN — DULOXETINE HYDROCHLORIDE 60 MG: 60 CAPSULE, DELAYED RELEASE ORAL at 08:18

## 2023-02-14 RX ADMIN — GABAPENTIN 100 MG: 100 CAPSULE ORAL at 18:00

## 2023-02-14 RX ADMIN — NICOTINE 21 MG: 21 PATCH, EXTENDED RELEASE TRANSDERMAL at 08:18

## 2023-02-14 RX ADMIN — HYDROCHLOROTHIAZIDE 12.5 MG: 12.5 TABLET ORAL at 08:18

## 2023-02-14 RX ADMIN — QUETIAPINE FUMARATE 25 MG: 25 TABLET ORAL at 21:06

## 2023-02-14 RX ADMIN — SENNOSIDES 8.6 MG: 8.6 TABLET, FILM COATED ORAL at 08:19

## 2023-02-14 RX ADMIN — ENOXAPARIN SODIUM 40 MG: 40 INJECTION SUBCUTANEOUS at 08:19

## 2023-02-14 RX ADMIN — ACETAMINOPHEN 650 MG: 325 TABLET, FILM COATED ORAL at 16:31

## 2023-02-14 RX ADMIN — GLYCERIN 1 DROP: .002; .002; .01 SOLUTION/ DROPS OPHTHALMIC at 21:10

## 2023-02-14 RX ADMIN — ATORVASTATIN CALCIUM 40 MG: 40 TABLET, FILM COATED ORAL at 17:09

## 2023-02-14 RX ADMIN — LOSARTAN POTASSIUM 50 MG: 50 TABLET, FILM COATED ORAL at 08:18

## 2023-02-14 RX ADMIN — ASPIRIN 81 MG CHEWABLE TABLET 81 MG: 81 TABLET CHEWABLE at 08:18

## 2023-02-14 RX ADMIN — DOCUSATE SODIUM 100 MG: 100 CAPSULE, LIQUID FILLED ORAL at 08:19

## 2023-02-14 RX ADMIN — GLYCERIN 1 DROP: .002; .002; .01 SOLUTION/ DROPS OPHTHALMIC at 16:31

## 2023-02-14 RX ADMIN — LIDOCAINE 5% 1 PATCH: 700 PATCH TOPICAL at 08:17

## 2023-02-14 RX ADMIN — SENNOSIDES 8.6 MG: 8.6 TABLET, FILM COATED ORAL at 17:09

## 2023-02-14 RX ADMIN — CLOPIDOGREL BISULFATE 75 MG: 75 TABLET ORAL at 08:19

## 2023-02-14 NOTE — PCC SPEECH THERAPY
Pt currently being followed for dysphagia, speech and cognitive tx sessions  In regards to dysphagia, diet upon admission to the [facility] was regular w/ thin liquids, to where pt completed dysphagia bedside assessment and was f/u briefly for f/u tx sessions in which overall swallow function was noted to be [facility] and minimal oropharyngeal dysphagia characterized by R-sided weakness resulting in min R sided pocketing  Pt is aware of pocketing and independently clears w/ lingual or finger sweep  Pt also w/ min lingual residue, cleared effectively w/ liquid wash  Pt able to adequately take cup or straw sips utilizing compensatory strategy of L sided placement  Oral containment and manipulation all WFL, swallow initiation prompt and hyolaryngeal rise WFL upon palpation  Pt w/ cough x1 after toast suspect d/t pt talking during intake  Overall upon f/u, pt did not exhibit overt aspiration sxs given meal and continues to demonstrate independence in swallow strategies given meals to decrease any possible oropharyngeal sxs  Continue to recommend regular diet w/ thin liquids at this time to where no further dysphagia tx sessions warranted but reconsult as needed  Focus of tx sessions will be towards speech and cognitive skills  For cognitive skills, pt did complete formalized assessment, CLQT+,  with a Composite Severity Rating score of 3 6 out of 4 0, correlating to overall MILD cognitive linguistic impairments at time of evaluation and in comparison to age matched peers ranging from 22-74 y/o  While  denies noticing over deficits given cognitive linguistic skills, pt's son, Bethanie Bernard did report that he has noticed still decreased memory and likely can exhibit difficulty in new learning given stroke, medications,etc  Plan to focus on higher level cognitive skills to maximize pt's attention, recall, executive function skills in attempts to decrease caregiver burden overt time   As for dysarthria, pt completed Motor Speech Eval, which mild dysarthia cristian by articulatory groping, imprecise articultion, distorted speech sounds, faster/ slower rate of speech dependent on the speech task  Pt spontaneous speech is mildly dysarthric/ intelligible, however pt speech in structured repetition tasks ( monosyllabic and multisyllabic words) cristian by stopping, prolongations, blocks, articulatory groping and distortions noted in [place] more than [place]  It was noted that in Serbian faster rate of speech was noted which pt demo imprecise articulation  Oral motor exercises were provided for pt to review and complete as a home exercise program but will plan to review w/ pt in future sessions  Current barriers which present include aspiration risk, decreased communication skills due to mild dysarthria of speech, decreased attentions, decreased ST/working memory, decreased executive function skills which currently impacts overall safety and functional speech/cognitive/mobility  Pt is functioning at min a for comprehension, expression, supervision for social interaction, min-mod A for executive functions and memory    At this time, pt will benefit from skilled SLP services targeting speech/cognitive linguistic skills to maximize overall communication abilities, independence given cognitive skills throughout pt's stay on the acute rehab center with anticipate discharge home w/ family

## 2023-02-14 NOTE — PCC OCCUPATIONAL THERAPY
Occupational Therapy Weekly Team Note    Pt continues to make good progress with skilled occupational therapy intervention and is progressing toward long term goals for ADL, IADL's, and functional transfers/mobility  Pts long term goals for ADLs are Independent with no assistive device  Pt continues to present with impairments in activity tolerance, endurance, standing balance/tolerance, sitting balance/tolerance, UE strength, UE ROM, FMC, and GMC  Occupational performance remains limited by (R) UE dysmetria, diplopia, nystagmus, and (L) visual deficits   Family training/education will not be required prior to D/C  Pt will continue to benefit from skilled acute rehab OT services to address above mentioned barriers and maximize functional independence in baseline areas of occupation to meet established treatment goals with overall decreased burden of care  Plan of care to continue to focus on ADL Retraining , LB Dressing, Kitchen Mobilty, Meal preparation, Medication management , Functional Transfers, Functional Cognition, Functional Attention, Standing tolerance, Standing balance , UE NMR right, Fine motor coordination, Gross motor coordination, Fine motor strengthening , Gross motor strengthening , R attention, Visual perceptual skills, Visual scanning, DME training/education, Family training/education, Energy conservation training/education, healthy coping education, and Leisure and social pursuits  Goals for the upcoming week are: establish DME needs, focus on IADL management, establish/complete family caregiver training, and continue to progress assess for progression to independent goals in the room  Anticipate Discharge date to be set  Dionne Neville

## 2023-02-14 NOTE — ASSESSMENT & PLAN NOTE
On the left side  Artificial tears 3 times daily  +VOR testing on left especially during movement - referral to neuro-optometry also recommended as outpatient

## 2023-02-14 NOTE — PCC CARE MANAGEMENT
Pt admitted s/p stroke and resides with supportive family   Pt expects to return home and has been made aware of the potential for contd services on dc  Following to assist w/dc planning needs and contd stay review due Wednesday

## 2023-02-14 NOTE — PLAN OF CARE
Problem: PAIN - ADULT  Goal: Verbalizes/displays adequate comfort level or baseline comfort level  Description: Interventions:  - Encourage patient to monitor pain and request assistance  - Assess pain using appropriate pain scale  - Administer analgesics based on type and severity of pain and evaluate response  - Implement non-pharmacological measures as appropriate and evaluate response  - Consider cultural and social influences on pain and pain management  - Notify physician/advanced practitioner if interventions unsuccessful or patient reports new pain  Outcome: Progressing     Problem: INFECTION - ADULT  Goal: Absence or prevention of progression during hospitalization  Description: INTERVENTIONS:  - Assess and monitor for signs and symptoms of infection  - Monitor lab/diagnostic results  - Monitor all insertion sites, i e  indwelling lines, tubes, and drains  - Monitor endotracheal if appropriate and nasal secretions for changes in amount and color  - [place] appropriate cooling/warming therapies per order  - Administer medications as ordered  - Instruct and encourage patient and family to use good hand hygiene technique  - Identify and instruct in appropriate isolation precautions for identified infection/condition  Outcome: Progressing     Problem: SAFETY ADULT  Goal: Patient will remain free of falls  Description: INTERVENTIONS:  - Educate patient/family on patient safety including physical limitations  - Instruct patient to call for assistance with activity   - Consult OT/PT to assist with strengthening/mobility   - Keep Call bell within reach  - Keep bed low and locked with side rails adjusted as appropriate  - Keep care items and personal belongings within reach  - Initiate and maintain comfort rounds  - Make Fall Risk Sign visible to staff  - Apply yellow socks and bracelet for high fall risk patients  - Consider moving patient to room near nurses station  Outcome: Progressing  Goal: Maintain or return to baseline ADL function  Description: INTERVENTIONS:  -  Assess patient's ability to carry out ADLs; assess patient's baseline for ADL function and identify physical deficits which impact ability to perform ADLs (bathing, care of mouth/teeth, toileting, grooming, dressing, etc )  - Assess/evaluate cause of self-care deficits   - Assess range of motion  - Assess patient's mobility; develop plan if impaired  - Assess patient's need for assistive devices and provide as appropriate  - Encourage maximum independence but intervene and supervise when necessary  - Involve family in performance of ADLs  - Assess for home care needs following discharge   - Consider OT consult to assist with ADL evaluation and planning for discharge  - Provide patient education as appropriate  Outcome: Progressing  Goal: Maintains/Returns to pre admission functional level  Description: INTERVENTIONS:  - Perform BMAT or MOVE assessment daily    - Set and communicate daily mobility goal to care team and patient/family/caregiver     - Collaborate with rehabilitation services on mobility goals if consulted  - Out of bed for toileting  - Record patient progress and toleration of activity level   Outcome: Progressing     Problem: DISCHARGE PLANNING  Goal: Discharge to home or other facility with appropriate resources  Description: INTERVENTIONS:  - Identify barriers to discharge w/patient and caregiver  - Arrange for needed discharge resources and transportation as appropriate  - Identify discharge learning needs (meds, wound care, etc )  - Arrange for interpretive services to assist at discharge as needed  - Refer to Case Management Department for coordinating discharge planning if the patient needs post-hospital services based on physician/advanced practitioner order or complex needs related to functional status, cognitive ability, or social support system  Outcome: Progressing

## 2023-02-14 NOTE — PROGRESS NOTES
[facility] Occupational Therapy Daily Note  Patient Active Problem List   Diagnosis    Acquired hallux valgus of left foot    Other hammer toe(s) (acquired), left foot    Other acquired deformities of left foot    Wound of right foot    Preoperative clearance    Tobacco use    Headache    Obesity (BMI 35 0-39 9 without comorbidity)    Ex-smoker for less than 1 year    Stroke - Left posterior limb internal capsule ischemic stroke    Depression    Insomnia    Hypertension    Chest pain    Prediabetes    Pain in left foot, chronic    Dry eye       Past Medical History:   Diagnosis Date    Acquired deformity of left foot     Anesthesia complication     difficulty awakening    Arthritis     Deaf, left     Depression     Hallux valgus of left foot     Hammer toe of left foot     Headache     Kidney stone     Sciatic pain, right     intermittent     Etiologic Diagnosis: left psterior limb internal capsule/thalamic acute to subacute infarct  Restrictions/Precautions  Precautions: Bed/chair alarms, Fall Risk, Supervision on toilet/commode  Weight Bearing Restrictions: No  ROM Restrictions: No  ADL Team Goal: Patient will be independent with ADLs with least restrictive device upon completion of rehab program  Occupational Therapy LTG's  Eating Oral care Bathing LB dress UB dress   Eating Goal: 06  Independent - Patient completes the activity by him/herself with no assistance from a helper  Oral Hygiene Goal: 06  Independent - Patient completes the activity by him/herself with no assistance from a helper  Shower/bathe self Goal: 06  Independent - Patient completes the activity by him/herself with no assistance from a helper  Lower body dressing Goal: 06  Independent - Patient completes the activity by him/herself with no assistance from a helper  Upper body dressing Goal: 06  Independent - Patient completes the activity by him/herself with no assistance from a helper     Toileting Toilet txf Func txf IADL Med    Toileting hygiene Goal: 06  Independent - Patient completes the activity by him/herself with no assistance from a helper  Toilet transfer Goal: 06  Independent - Patient completes the activity by him/herself with no assistance from a helper  Assist Level: Independent Assist Level: Independent   OT interventions: Treatment/Interventions: ADL retraining, Functional transfer training, Therapeutic exercise, Endurance training, Patient/family training, Equipment eval/education, Bed mobility, Compensatory technique education  Discharge Plan:  OT Discharge Recommendation:  (Home with family support)   DME: Equipment Recommended:  (TBD),  ,       02/14/23 4655   Pain Assessment   Pain Assessment Tool 0-10   Pain Score No Pain   Lifestyle   Autonomy "I put my hair in a bun "   Sit to Stand   Type of Assistance Needed Supervision   Sit to Stand CARE Score 4   Bed-Chair Transfer   Type of Assistance Needed Supervision   Comment no device  no balance loss, no deviations, smooth step pattens  does have fast rate  Chair/Bed-to-Chair Transfer CARE Score 4   Neuromuscular Education   Comments Pt engages in visual perceptual/ visual-motor integration, training/balance activity with use of infinity walk  Activity focusing on visual gaze stability/focus with functional mobility in  figure-eight circuit with forced head/eye movements  Completed 1 min circuits x 4 reps  Added increased component of alternating visual focus on dropping items into cups while completing figure-eight circuit  Pt HR after tasks upards to 120-124BPM with limited recovery to 118 BPM after 5 min  Session ending with seated head turning in various patterns with visual fixation on L with varying levels  Targets made to move, and challenge depth perception  Pt still cont to c/o pain in R UE biceps area, with soft tissue painful to palpation     Assessment   Treatment Assessment Pt participated in skilled OT treatment session with treatment focus on visual perceptual/ visual-motor integration and balance training  Pt tolerated session well, with reporting of her L eye "feeling better, less sticky " Pt cont to demo rapid improvement in functional mobility, and  RUE functional use  Pt will be prepared to focus on progression to IRP pending progress     Prognosis Good   OT Therapy Minutes   OT Time In 1345   OT Time Out 1430   OT Total Time (minutes) 45   OT Mode of treatment - Individual (minutes) 45   OT Mode of treatment - Concurrent (minutes) 0   OT Mode of treatment - Group (minutes) 0   OT Mode of treatment - Co-treat (minutes) 0   OT Mode of Treatment - Total time(minutes) 45 minutes   OT Cumulative Minutes 360

## 2023-02-14 NOTE — PCC PHYSICAL THERAPY
The pt is a 58yo female admitted to [facility] with dx of CVA  Her PLOF is mod I with SPC, lives with spouse in a multi level home, (+) driving  She is progressing well towards goals of mod I as she currently performs bed mobility independently and requires supervision for functional transfers, gait, and stair negotiation  Pt has intermittent complaints of L eye blurriness and intermittent dizziness with head turns, however during tx on 2/14 pt denies dizziness or L eye blurry vision t/o session including when trialing head turns during gait  Plan to monitor and tx as needed during future sessions  Pt progressed from requiring RW for mobility, recommend SPC or no AD at this time with supervision  pt is very motivated to return home, reports  Sarah Erps is going to assist with IADLs if needed  Pt will continue to benefit from skilled PT interventions to address deficits, reduce fall risk, and maximize functional independence  Anticipate DC home with  assisting with IADLs; pt will likely achieve mod I/I for household mobility  Recommend outpatient PT if  can provide transport

## 2023-02-14 NOTE — PROGRESS NOTES
Internal Medicine Progress Note  Patient: Dom Pride  Age/sex: 62 y o  female  Medical Record #: [id number]      ASSESSMENT/PLAN: (Interval History)  Dom Pride is seen and examined and management for following issues:    Acute CVA  • Left posterior limb internal capsule/thalamic  • For ASA/Plavix a 21 days then to ASA alone on 23  • Continue Lipitor but Cards had advised switch to Crestor 40mg qd at discharge to avoid interaction of the Lipitor with the Verapamil (CY interaction)  • CT C/A/P was negative for malignancy; thrombosis panel sent both to rule out hypercoag state     Headaches  • MRI with contrast was unrevealing  • Neuro placed her on Verapamil for preventive tx and she got IV magnesium/Phenergan/Toradol/Benadryl  • D/w Dr Brit Falcon --> trial off  Verapamil (considering interaction with Lipitor) and see how she does with headaches     • Today, will be first day off Verapamil     Chest pain  • Had occurrences on  and  naomi with lying flat  • Trops and EKGs were negative  • Cards saw and felt atypical and will see her back in the office; no w/u for now     HTN  • Home:  no meds  • Here:  Losartan 50mg qd/HCTZ 12 5mg qd  • Stable  • Stopped the Verapamil as above  May need to increase the Losartan or HCTZ  Will watch today     Depression  • Continue Cymbalta  • Takes Seroquel and Trazodone at home     Pre-DM  • HbA1C was 6 4  • Watching FBS,  No Accuchecks for now  • D/w pt 23 about starting a DM diet  Finn Michael was agreeable  • Will have Nutrition see today for DM diet teaching  • FBS AM 23 was 109     Nicotine abuse  • Continue patch  • Advised cessation 23        Discharge date:  Team       The above assessment and plan was reviewed and updated as determined by my evaluation of the patient on 2023      Labs:   Results from last 7 days   Lab Units 23  0545 23  0454   WBC Thousand/uL 8 93 7 33   HEMOGLOBIN g/dL 13 8 13 4   HEMATOCRIT % 41 6 41 8   PLATELETS Thousands/uL 318 322     Results from last 7 days   Lab Units 02/13/23  0545 02/08/23  0454   SODIUM mmol/L 136 139   POTASSIUM mmol/L 4 2 4 1   CHLORIDE mmol/L 105 108   CO2 mmol/L 28 27   BUN mg/dL 16 16   CREATININE mg/dL 0 76 0 81   CALCIUM mg/dL 9 3 9 2             Results from last 7 days   Lab Units 02/09/23  2101 02/09/23  1639   POC GLUCOSE mg/dl 144* 110       Review of Scheduled Meds:  Current Facility-Administered Medications   Medication Dose Route Frequency Provider Last Rate   • acetaminophen  650 mg Oral Q6H PRN Luz Elena Night, MD     • albuterol  2 puff Inhalation Q4H PRN Luz Elena Night, MD     • aspirin  81 mg Oral Daily Luz Elena Night, MD     • atorvastatin  40 mg Oral QPM Pettus Night, MD     • bisacodyl  10 mg Rectal Daily PRN Luz Elena Night, MD     • clopidogrel  75 mg Oral Daily Luz Elena Night, MD     • docusate sodium  100 mg Oral BID Mike Gerber MD     • DULoxetine  60 mg Oral Daily Luz Elena Night, MD     • enoxaparin  40 mg Subcutaneous Daily Luz Elena Night, MD     • glycerin-hypromellose-  1 drop Left Eye TID Luz Elena Night, MD     • hydrochlorothiazide  12 5 mg Oral Daily Pettus Night, MD     • lidocaine  1 patch Topical Daily Luz Elena Night, MD     • lidocaine   Topical 4x Daily PRN SHAILA Collins     • losartan  50 mg Oral Daily Luz Elena Night, MD     • nicotine  21 mg Transdermal Daily Luz Elena Night, MD     • ondansetron  4 mg Oral Q6H PRN Pettus Night, MD     • pantoprazole  40 mg Oral Early Morning Pettus Night, MD     • polyethylene glycol  17 g Oral Daily PRN Luz Elena Night, MD     • QUEtiapine  25 mg Oral HS PRN Luz Elena Night, MD     • senna  1 tablet Oral BID Mike Gerber MD     • traZODone  50 mg Oral HS Luz Elena Night, MD         Subjective/ HPI: Patient seen and examined   Patients overnight issues or events were reviewed with nursing or staff during rounds or morning huddle session  New or overnight issues include the following:     No new or overnight issues  Offers no complaints except right shoulder pain    ROS:   A 10 point ROS was performed; negative except as noted above  Imaging:     No orders to display       *Labs /Radiology studies reviewed  *Medications reviewed and reconciled as needed  *Please refer to order section for additional ordered labs studies  *Case discussed with primary attending during morning huddle case rounds    Physical Examination:  Vitals:   Vitals:    02/13/23 1335 02/13/23 2103 02/14/23 0549 02/14/23 0819   BP: 135/82 126/77 110/76 123/82   BP Location: Left arm Left arm Left arm Left arm   Pulse: (!) 107 76 89 98   Resp: 18 18 18    Temp: 97 8 °F (36 6 °C) 97 6 °F (36 4 °C) 98 4 °F (36 9 °C)    TempSrc: Oral Oral Oral    SpO2: 97% 97% 94%    Weight:       Height:           General Appearance: no distress, conversive  HEENT:  External ear normal   Nose normal w/o drainage  Mucous membranes are moist  Oropharynx is clear  Conjunctiva clear w/o icterus or redness  Neck:  Supple, normal ROM  Lungs: BBS without crackles/wheeze/rhonchi; respirations unlabored with normal inspiratory/expiratory effort  No retractions noted  On RA  CV: regular rate and rhythm; no rubs/murmurs/gallops, PMI normal   ABD: Abdomen is soft  Bowel sounds all quadrants  Nontender with no distention  EXT: no edema  Skin: normal turgor, normal texture, no rashes  Psych: affect normal, mood normal  Neuro: AAO      The above physical exam was reviewed and updated as determined by my evaluation of the patient on 2/14/2023  Invasive Devices     None                    VTE Pharmacologic Prophylaxis: Enoxaparin  Code Status: Level 1 - Full Code  Current Length of Stay: 5 day(s)      Total time spent:  30 minutes with more than 50% spent counseling/coordinating care   Counseling includes discussion with patient re: progress  and discussion with patient of his/her current medical state/information  Coordination of patient's care was performed in conjunction with primary service  Time invested included review of patient's labs, vitals, and management of their comorbidities with continued monitoring  In addition, this patient was discussed with medical team including physician and advanced extenders  The care of the patient was extensively discussed and appropriate treatment plan was formulated unique for this patient  Medical decision making for the day was made by supervising physician unless otherwise noted in their attestation statement  ** Please Note:  voice to text software may have been used in the creation of this document   Although proof errors in transcription or interpretation are a potential of such software**

## 2023-02-14 NOTE — PROGRESS NOTES
PM&R quick note    Patient complaining of 7/10 Headache    Given Tylenol and will monitor HA  VSS    Personally called inpatient pharmacist to look into obtaining Crestor again if potentially needing to restart verapamil      Priscilla Tang MD  PM&R

## 2023-02-14 NOTE — PROGRESS NOTES
02/14/23 0830   Pain Assessment   Pain Assessment Tool 0-10   Pain Score No Pain   Cognition   Arousal/Participation Alert; Cooperative   Attention Within functional limits   Subjective   Subjective pt agreeable to participate in PT tx  Roll Left and Right   Type of Assistance Needed Independent   Roll Left and Right CARE Score 6   Sit to Lying   Type of Assistance Needed Independent   Sit to Lying CARE Score 6   Lying to Sitting on Side of Bed   Type of Assistance Needed Independent   Lying to Sitting on Side of Bed CARE Score 6   Sit to Stand   Type of Assistance Needed Supervision;Verbal cues   Sit to Stand CARE Score 4   Bed-Chair Transfer   Type of Assistance Needed Supervision;Verbal cues   Comment intermittent cues for increased safety   Chair/Bed-to-Chair Transfer CARE Score 4   Car Transfer   Type of Assistance Needed Supervision;Verbal cues   Comment cues for improved technique for increased safety, educated to enter first with buttock then swing BLEs into car   Car Transfer CARE Score 4   Walk 10 Feet   Type of Assistance Needed Supervision;Verbal cues   Walk 10 Feet CARE Score 4   Walk 50 Feet with Two Turns   Type of Assistance Needed Verbal cues; Supervision   Walk 50 Feet with Two Turns CARE Score 4   Walk 150 Feet   Type of Assistance Needed Supervision;Verbal cues   Walk 150 Feet CARE Score 4   Walking 10 Feet on Uneven Surfaces   Type of Assistance Needed Incidental touching;Verbal cues   Comment x 4 trials over floor mat, cues for increased safety when stepping on/off mat   Walking 10 Feet on Uneven Surfaces CARE Score 4   Ambulation   Distance Walked (feet) 500 ft  (x multiple trials, intermittent cueing for increased CHE and LUE swing  first trial with RW then progressed to no AD with gait belt)   Gait Pattern Narrow CHE; Inconsistant [place]   Findings see 'balance' section for additional gait activities   Does the patient walk? 2   Yes   12 Steps   Type of Assistance Needed Supervision;Verbal cues   Comment R handrail descending, L ascending  cues to perform step to pattern when ascending for increased safety as BLEs fatigue   12 Steps CARE Score 4   Picking Up Object   Type of Assistance Needed Supervision;Verbal cues   Comment pt able to  6 cones from floor without AD and supervision, cueing for improved body mechanics   Picking Up Object CARE Score 4   Toilet Transfer   Type of Assistance Needed Supervision   Comment on/off standard toilet   Toilet Transfer CARE Score 4   Therapeutic Interventions   Strengthening supine: straight leg raises, bridges 3 sets of 10BLEs  Sidestepping at wallrail 20 ft x 6 each direction   Balance gait activites in hallway including: holding cup of water 150 ft with L hand 150 ft with R hand; vertical head turns 150 ft horizontal head turns 150 ft  tandem gait x 50 ft  heel walking and toe walking x 50 ft each  Obstacle course requiring pt to step over quad canes and weave around cones 100 ft x 6 total trials  Incidental touching t/o all ambualtory activities  Standing on blue foam pad, pt performed heel raises and marches x 30reps each with one UE support on parallel bar   Assessment   Treatment Assessment Pt participated in 90 minute PT tx session focusing on re-assessing functional CARE scores and increased challenge gait activities  pt denies dizziness or L eye blurry vision t/o session including when trialing head turns during gait  Reports new eyes drops are helping with said symptoms  Pt progressed from requiring RW for mobility, recommend SPC or no AD at this time with supervision  pt is very motivated to return home, reports  Fifi Evans is going to assist with IADLs if needed  Pt will continue to benefit from skilled PT interventions to address deficits, reduce fall risk, and maximize functional independence  Problem List Decreased endurance; Impaired balance;Decreased strength;Decreased safety awareness;Decreased mobility; Decreased coordination   Plan Treatment/Interventions Functional transfer training;LE strengthening/ROM; Therapeutic exercise; Endurance training;Patient/family training;Elevations;Gait training;Bed mobility   Progress Progressing toward goals   PT Therapy Minutes   PT Time In 0830   PT Time Out 1000   PT Total Time (minutes) 90   PT Mode of treatment - Individual (minutes) 90   PT Mode of treatment - Concurrent (minutes) 0   PT Mode of treatment - Group (minutes) 0   PT Mode of treatment - Co-treat (minutes) 0   PT Mode of Treatment - Total time(minutes) 90 minutes   PT Cumulative Minutes 300   Therapy Time missed   Time missed?  No

## 2023-02-14 NOTE — PROGRESS NOTES
PM&R PROGRESS NOTE:  Mirella Pereira 62 y o  female MRN: [id number]  Unit/Bed#: -01 Encounter: [id number]        Rehabilitation Diagnosis: Impairment of mobility, safety, Activities of Daily Living (ADLs), and cognitive/communication skills due to Stroke:  01 2  Right Body Involvement (Left Brain)  Left posterior limb internal capsule acute ischemic CVA    SUBJECTIVE: Patient seen face-to-face today in therapies  Reports her headache is much improved  Full that her subcutaneous Lovenox can be discontinued due to bruising all over  Denies pain  Left eye much better with eyedrops  Explained to her that with movement her left eye has difficulty focusing on VOR testing  ASSESSMENT: Stable, progressing      PLAN:    Rehabilitation  • Functional deficits: right hemiparesis, dysarthria, dysphagia, cognitive deficits, impaired mobility, self care   • Continue current rehabilitation plan of care to maximize function  • Progressing ambulating over 300 feet seen personally today in therapies requiring contact-guard assist  • Estimated Discharge: TBD, ELOS 2-3 weeks     DVT prophylaxis  • Managed on SQ Lovenox 40 mg daily -discontinue in a m      GI ppx:  • Starting Protonix 40 mg daily instead of Pepcid as patient high risk for stress ulcer given her history of smoking and now CVA     Bladder plan  • Continent  • Timed Toilet     Bowel plan  • Continent  • +BM on 2/7/23      * Stroke - Left posterior limb internal capsule ischemic stroke  Assessment & Plan  Presented 2/4/23 with right sided weakness, difficulty speaking  MRI confirmed left posterior limb internal capsule acute ischemic stroke  Etiology: likely related to hypertensive disease  Work-up for malignancy negative  Echo with EF 60%  Per Neurology placed on DAPT with ASA/Plavix x 21 days, then ASA monotherapy    Plan:  Begin acute rehab program  Secondary CVA ppx: Lipitor 40 mg daily   Aspirin 81 mg daily and Plavix 75 mg daily x 21 days (around 23) then Aspirin 81 mg daily monotherapy  Continue CVA education and reduction of modifiable risk factors naomi HTN, smoking cession  Follow-up with Neurology as outpatient    Dry eye  Assessment & Plan  On the left side  Artificial tears 3 times daily    Pain in left foot, chronic  Assessment & Plan  Continue Tylenol PRN  Also if needed can add back Gabapentin - previously tolerated as outpatient    Prediabetes  Assessment & Plan  HA1C = 6 4  Diabetic diet   Consult nutrition to review with patient during [facility] stay    Chest pain  Assessment & Plan  In acute care, patient with transient chest pain at rest - troponin negative, EKG without changes  Cardiology consulted, recommended losartan and HCTZ for HTN  Also recommended a change to Crestor instead of Lipitor due to being on Verapamil (CY interaction)  Discontinue Verapamil 23 as headaches improved  · Outpatient follow-up recommended with Dr Rose Shi for further work-up  · While in [facility] monitor VS and any chest pains      Hypertension  Assessment & Plan  Here: managed on Losartan 100 mg daily, HCTZ 12 5 mg daily   Plan per Cardiology  Monitor BP at rest and during activity in [facility] program  Optimize diet (limit salt)  Outpatient follow-up with Cardiology - Dr Ying Venegas  At home: Seroquel 25 mg QHS  Here: Seroquel 25 mg QHS PRN + Trazodone 50 mg QHS  Plan to wean down Trazodone over time - for now will continue as sleeping well       Depression  Assessment & Plan  Managed on Cymbalta 60 mg daily (home dose)  Consult placed to Neuropsychology - patient tearful and having difficulty coping with stroke diagnosis  Team to additionally provide emotional support    Obesity (BMI 35 0-39 9 without comorbidity)  Assessment & Plan  Lifestyle and diet modifications when able  Consult nutrition     Headache  Assessment & Plan  Developed in acute care  Treated with Migraine cocktail  Started on Verapamil for prevention by Neurology  Headaches have improved    Plan:  Discontinue Verapamil 2/13/23 as headaches much improved  Monitor for headaches  Follow-up with headache Neurologist as outpatient if persists    Tobacco use  Assessment & Plan  Smoking cessation education frequently during [facility] stay  Nicotine patch for cravings - wean dose weekly    Other acquired deformities of left foot  Assessment & Plan  Left hallux valgus and hammertoe surgery by Dr Artur Quevedo (podiatry)  Chronic hypersensitivity/neuritis  Is able to wear shoes  Patient received injection by Dr Artur Quevedo in past (Sept 2022)  Follow-up with Dr Shireen Carballo  Labs, medications, and imaging personally reviewed  ROS:  A ten point review of systems was completed on 02/14/23 and pertinent positives are listed in subjective section  All other systems reviewed were negative  OBJECTIVE:   /74 (BP Location: Right arm)   Pulse (!) 118 Comment: 5 min rest  Temp 98 °F (36 7 °C) (Oral)   Resp 19   Ht 5' (1 524 m)   Wt 88 9 kg (196 lb)   SpO2 95%   BMI 38 28 kg/m²       Physical Exam  Vitals and nursing note reviewed  Constitutional:       General: She is not in acute distress  HENT:      Head: Normocephalic and atraumatic  Nose: Nose normal       Mouth/Throat:      Mouth: Mucous membranes are moist    Eyes:      Conjunctiva/sclera: Conjunctivae normal    Cardiovascular:      Rate and Rhythm: Normal rate and regular rhythm  Pulses: Normal pulses  Pulmonary:      Effort: Pulmonary effort is normal       Breath sounds: Normal breath sounds  No wheezing or rales  Abdominal:      General: Bowel sounds are normal  There is no distension  Palpations: Abdomen is soft  Tenderness: There is no abdominal tenderness  Musculoskeletal:         General: No swelling  Cervical back: Neck supple  Skin:     General: Skin is warm     Neurological:      Mental Status: She is alert and oriented to person, place, and time  Motor: Weakness (mild right hemparesis) present     Psychiatric:         Mood and Affect: Mood normal           Lab Results   Component Value Date    WBC 8 93 02/13/2023    HGB 13 8 02/13/2023    HCT 41 6 02/13/2023     (H) 02/13/2023     02/13/2023     Lab Results   Component Value Date    SODIUM 136 02/13/2023    K 4 2 02/13/2023     02/13/2023    CO2 28 02/13/2023    BUN 16 02/13/2023    CREATININE 0 76 02/13/2023    GLUC 109 02/13/2023    CALCIUM 9 3 02/13/2023     Lab Results   Component Value Date    INR 0 92 02/04/2023    PROTIME 12 6 02/04/2023           Current Facility-Administered Medications:   •  acetaminophen (TYLENOL) tablet 650 mg, 650 mg, Oral, Q6H PRN, Malcolm Gorman MD, 650 mg at 02/14/23 1631  •  albuterol (PROVENTIL HFA,VENTOLIN HFA) inhaler 2 puff, 2 puff, Inhalation, Q4H PRN, Malcolm Gorman MD  •  aspirin chewable tablet 81 mg, 81 mg, Oral, Daily, Malcolm Gorman MD, 81 mg at 02/14/23 0818  •  atorvastatin (LIPITOR) tablet 40 mg, 40 mg, Oral, QPM, Malcolm Gorman MD, 40 mg at 02/13/23 1711  •  bisacodyl (DULCOLAX) rectal suppository 10 mg, 10 mg, Rectal, Daily PRN, Malcolm Gorman MD  •  clopidogrel (PLAVIX) tablet 75 mg, 75 mg, Oral, Daily, Malcolm Gorman MD, 75 mg at 02/14/23 3541  •  docusate sodium (COLACE) capsule 100 mg, 100 mg, Oral, BID, Lima Rojas MD, 100 mg at 02/14/23 6196  •  DULoxetine (CYMBALTA) delayed release capsule 60 mg, 60 mg, Oral, Daily, Malcolm Gorman MD, 60 mg at 02/14/23 0818  •  enoxaparin (LOVENOX) subcutaneous injection 40 mg, 40 mg, Subcutaneous, Daily, Malcolm Gorman MD, 40 mg at 02/14/23 0008  •  glycerin-hypromellose- (ARTIFICIAL TEARS) ophthalmic solution 1 drop, 1 drop, Left Eye, TID, Malcolm Gorman MD, 1 drop at 02/14/23 1631  •  hydrochlorothiazide (HYDRODIURIL) tablet 12 5 mg, 12 5 mg, Oral, Daily, Malcolm Gorman MD, 12 5 mg at 02/14/23 0818  • lidocaine (LIDODERM) 5 % patch 1 patch, 1 patch, Topical, Daily, Mike Jiménez MD, 1 patch at 02/14/23 0817  •  lidocaine (LMX) 4 % cream, , Topical, 4x Daily PRN, SHAILA David  •  losartan (COZAAR) tablet 50 mg, 50 mg, Oral, Daily, Mike Jiménez MD, 50 mg at 02/14/23 0818  •  nicotine (NICODERM CQ) 21 mg/24 hr TD 24 hr patch 21 mg, 21 mg, Transdermal, Daily, Mike Jiménez MD, 21 mg at 02/14/23 0818  •  ondansetron (ZOFRAN-ODT) dispersible tablet 4 mg, 4 mg, Oral, Q6H PRN, Mike Jiménez MD  •  pantoprazole (PROTONIX) EC tablet 40 mg, 40 mg, Oral, Early Morning, Mike Jiménez MD, 40 mg at 02/14/23 0557  •  polyethylene glycol (MIRALAX) packet 17 g, 17 g, Oral, Daily PRN, Mike Jiménez MD  •  QUEtiapine (SEROquel) tablet 25 mg, 25 mg, Oral, HS PRN, Mike Jiménez MD, 25 mg at 02/13/23 2102  •  senna (SENOKOT) tablet 8 6 mg, 1 tablet, Oral, BID, Jay Land MD, 8 6 mg at 02/14/23 5202  •  traZODone (DESYREL) tablet 50 mg, 50 mg, Oral, HS, Mike Jiménez MD, 50 mg at 02/10/23 2129      Past Medical History:   Diagnosis Date   • Acquired deformity of left foot    • Anesthesia complication     difficulty awakening   • Arthritis    • Deaf, left    • Depression    • Hallux valgus of left foot    • Hammer toe of left foot    • Headache    • Kidney stone    • Sciatic pain, right     intermittent       Patient Active Problem List    Diagnosis Date Noted   • Stroke - Left posterior limb internal capsule ischemic stroke 02/04/2023   • Dry eye 02/14/2023   • Pain in left foot, chronic 02/09/2023   • Chest pain 02/06/2023   • Prediabetes 02/06/2023   • Insomnia 02/05/2023   • Hypertension 02/05/2023   • Depression 02/04/2023   • Obesity (BMI 35 0-39 9 without comorbidity) 03/10/2021   • Ex-smoker for less than 1 year 03/10/2021   • Headache 07/03/2020   • Tobacco use 07/02/2020   • Preoperative clearance 06/30/2020   • Wound of right foot 06/29/2020   • Acquired hallux valgus of left foot 05/06/2019   • Other hammer toe(s) (acquired), left foot 05/06/2019   • Other acquired deformities of left foot 05/06/2019          Kandi Dallas MD    I have spent 35 minutes with Patient and family today in which greater than 50% of this time was spent in counseling/coordination of care regarding Instructions for management, Patient and family education, Counseling / Coordination of care, Documenting in the medical record and Communicating with other healthcare professionals   ** Please Note:  voice to text software may have been used in the creation of this document   Although proof errors in transcription or interpretation are a potential of such software**

## 2023-02-14 NOTE — PROGRESS NOTES
02/14/23 1230   Pain Assessment   Pain Assessment Tool 0-10   Pain Score No Pain   Restrictions/Precautions   Precautions Bed/chair alarms;Cognitive; Fall Risk;Supervision on toilet/commode   Comprehension   Comprehension (FIM) 5 - Understands basic directions and conversation   Expression   Expression (FIM) 4 - Expresses basic info/needs 75-90% of time  (Mod A in structured speech tasks )   Social Interaction   Social Interaction (FIM) 5 - Requires redirection but less than 10% of the time  Problem Solving   Problem solving (FIM) 3 - Solves basic problmes 50-74% of time   Memory   Memory (FIM) 4 - Recognizes/recalls/performs 75-89%   Speech/Language/Cognition Assessmetn   Treatment Assessment   Pt alert and cooperative for the duration of ST/ cognitive linguistic tx session today  Pt still w/ dysarthric speech however conversational speech is intelligible w/ minimal dysfluencies  Pt dysfluencies mainly in structured tx tasks or reading, consisting of stopping, prolongations, blocks, distortions and articulatory groping  Pt rx she has been practicing her oral motor exercises, but has the most difficulty w/ lingual strength/ ROM  To target pt rx lingual difficulties pt engaged in lingual ROM and resistance exercises  Pt stated lingual resistance task was the most challenging for her, student SLP provided education on using utensils or toothbrush to complete lingual resistance exercises daily to increase lingual strength  Pt stated understanding and agreeable to rx at this time  Pt also c/o frustration in pronouncing initial /s/ words and the word "exercise " To target initial /s/ production, pt repeated monosyllabic initial /s/ and initial /s/ cluster words (lesley, snail, slow)  Pt w/ prolongations, blocks, stopping and articulatory groping during this task improved w/ use of easy onset strategy  Student SLP provided model of easy onset w/ target word then visual cues (hand motions) during pt attempts   Pt speech fluency w/ notable improvement w/ use of easy onset strategy  Student SLP then verbally scaffolded production of "exercise" by breaking down the word into syllables, increasing the complexity by adding back in a syllable after pt successful fluent production  Pt utilized easy onset voicing and able to fluently produce "exercise" in 2/5 trials  To target cognitive linguistic skills, pt completed functional map reading activity where pt provided w/ hospital directory and verbally questioned about locations of departments/ directions  Pt w/ accuracy of 2/5 on this activity, increasing to 4/5 w/ verbal scaffolding and visual cues  Pt exhibited dec attention, processing and problem solving skills during this task req verbal scaffolding as complexity of questions increased  At this time pt continues to benefit from skilled ST services targeting speech and cognitive linguistic skills in order to minimize caregiver burden upon d/c      Eating   Type of Assistance Needed Independent   Physical Assistance Level No physical assistance   Eating CARE Score 6   Swallow Assessment   Swallow Treatment Assessment   Daily Dysphagia Tx Note     Patient Name: Julissa DODGEVG'I Date: 2/14/2023      Current Risks for Dysphagia & Aspiration: dysarthria and new nuero event and R sided weakness     Current Symptoms/Concerns: pocketing food    Current diet:regular diet and thin liquids     Premorbid diet::regular diet and thin liquids     Voice/Speech: Clear and intelligible, dysarthric     Follows commands: +     Cognitive Status: alert, awake and cooperative     Positioning: upright in bed    Items administered:Consistencies Administered: thin liquids and hard solids  Materials administered included BBQ pulled pork, potato wedges, rene slaw, salad, coffee (180cc), gingerale (240 cc)     Total amount of meal consumed:   75% of meal   Approximately 200 cc of thin via straw       Oral stage:WFL and minimal  Lip closure: funcitonal, dec on R side   Anterior spillage: None noted   Mastication: WFL   Bolus formation: WFL   Bolus control: WFL   Transfer: WFL   Oral residue: none noted   Pocketing: min R sided pocketing x1 w/ potato wedges pt aware and cleared independently w/ lingual sweep         Pharyngeal stage:WFL  Swallow promptness:   Hyolaryngeal elevation:    Wet voice:   Throat clear:   Cough:   Secondary swallows: Audible swallows:        Esophageal stage:No concerning s/s present         Summary:     Pt presenting with WFL and minimal oral and WFL pharyngeal dysphagia today  Symptoms or concerns included min pocketing in R buccal cavity x1 cleared independently w/ lingual sweep No pharyngeal s/s noted         Pt seen for f/u dysphagia tx w/ reg lunch tray  Pt still w/ R sided weakness, however pt aware of deficits and using compensatory strategies independently as needed during meal  Pt w/ adequate oral containment and manipulation of all materials, w/ no anterior spillage/ loss observed  R sided pocketing w/ potato wedges x1 pt aware and independently used lingual sweep to clear  Swallow initiation was prompt and hyolaryngeal excursion judged to be [facility]  No coughing, throat clearing or other pharyngeal s/s concerning of aspiration present  Student SLP reviewed strategies for safe oral intake w/ continued R sided weakness ie small sips/ bites, using lingual or finger sweep to cheek for R sided pocketing/ clear any pocketing, alternating bites/ sips throughout meal  Pt stated understanding and agreeable to all education/ strategies provided  At this time no f/u dysphagia tx warranted, pt appropriate to remain on current diet of regular and thin  ST to f/u if any new or concerning oropharyngeal s/s arise            Recommendations:  Diet: regular diet and thin liquids  Meds: whole with liquid  Strategies: upright posture, only feed when fully alert, slow rate of feeding, small bites/sips, quiet environment (tv off, limit talking, door closed, etc ), alternating bites and sips and check for pocketing and clear pocketing w/ lingual sweep as needed      DISTANT supervision w/ meals  Results reviewed with:  patient, RN and MD   Aspiration precautions posted  F/u ST tx: At this time no f/u dysphagia tx warranted, pt appropriate to remain on current diet of regular and thin  ST to f/u if any new or concerning oropharyngeal s/s arise  Swallow Assessment Prognosis   Prognosis Good   Prognosis Considerations Age; Family/community support;Previous level of function;New learning ability;Ability to carry over; Cooperation   SLP Therapy Minutes   SLP Time In 4248   SLP Time Out 1330   SLP Total Time (minutes) 60   SLP Mode of treatment - Individual (minutes) 60   SLP Mode of treatment - Concurrent (minutes) 0   SLP Mode of treatment - Group (minutes) 0   SLP Mode of treatment - Co-treat (minutes) 0   SLP Mode of Treatment - Total time(minutes) 60 minutes   SLP Cumulative Minutes 255   Therapy Time missed   Time missed?  No

## 2023-02-15 PROBLEM — R00.0 TACHYCARDIA: Status: ACTIVE | Noted: 2023-02-15

## 2023-02-15 RX ORDER — QUETIAPINE FUMARATE 25 MG/1
25 TABLET, FILM COATED ORAL
Status: DISCONTINUED | OUTPATIENT
Start: 2023-02-15 | End: 2023-02-20 | Stop reason: HOSPADM

## 2023-02-15 RX ADMIN — DULOXETINE HYDROCHLORIDE 60 MG: 60 CAPSULE, DELAYED RELEASE ORAL at 08:36

## 2023-02-15 RX ADMIN — HYDROCHLOROTHIAZIDE 12.5 MG: 12.5 TABLET ORAL at 08:36

## 2023-02-15 RX ADMIN — QUETIAPINE FUMARATE 25 MG: 25 TABLET ORAL at 21:22

## 2023-02-15 RX ADMIN — LOSARTAN POTASSIUM 50 MG: 50 TABLET, FILM COATED ORAL at 08:36

## 2023-02-15 RX ADMIN — ASPIRIN 81 MG CHEWABLE TABLET 81 MG: 81 TABLET CHEWABLE at 08:36

## 2023-02-15 RX ADMIN — ATORVASTATIN CALCIUM 40 MG: 40 TABLET, FILM COATED ORAL at 17:21

## 2023-02-15 RX ADMIN — ENOXAPARIN SODIUM 40 MG: 40 INJECTION SUBCUTANEOUS at 08:36

## 2023-02-15 RX ADMIN — GLYCERIN 1 DROP: .002; .002; .01 SOLUTION/ DROPS OPHTHALMIC at 08:40

## 2023-02-15 RX ADMIN — CLOPIDOGREL BISULFATE 75 MG: 75 TABLET ORAL at 08:36

## 2023-02-15 RX ADMIN — SENNOSIDES 8.6 MG: 8.6 TABLET, FILM COATED ORAL at 08:36

## 2023-02-15 RX ADMIN — LIDOCAINE 5% 1 PATCH: 700 PATCH TOPICAL at 08:37

## 2023-02-15 RX ADMIN — DOCUSATE SODIUM 100 MG: 100 CAPSULE, LIQUID FILLED ORAL at 08:36

## 2023-02-15 RX ADMIN — GLYCERIN 1 DROP: .002; .002; .01 SOLUTION/ DROPS OPHTHALMIC at 17:21

## 2023-02-15 RX ADMIN — NICOTINE 21 MG: 21 PATCH, EXTENDED RELEASE TRANSDERMAL at 08:38

## 2023-02-15 RX ADMIN — GLYCERIN 1 DROP: .002; .002; .01 SOLUTION/ DROPS OPHTHALMIC at 21:00

## 2023-02-15 RX ADMIN — PANTOPRAZOLE SODIUM 40 MG: 40 TABLET, DELAYED RELEASE ORAL at 06:25

## 2023-02-15 NOTE — CASE MANAGEMENT
Clinical update faxed to [facility] via [facility] to [phone], awaiting determination  Phone call placed to pts simone Garcia, reviewed team update and dc for Monday Jancande Garcia stated he had spoken with dr Vijay Echavarria earlier today and was made aware  Janece Distance will be onsite at noon for d c cm reivewed dc recommendations for contd outpt pt ot and slp and was aware of the day program  Cm explained program and offered to reach out to the coordinator for scheduling  Alyssa Distance is in agreement so there is a set schedule and the family can set up transportation  Cm to follow up when additional info is known  Cm received therapy times for next week of 2/21 at 2 15 and 3 15 for pt and ot, 2/23 speech 11am  Info placed on dc instructions  Per Raul Dash day  they will work on the schedule for the following two weeks to have pts appts together  Son  Alyssa Garcia made aware

## 2023-02-15 NOTE — ASSESSMENT & PLAN NOTE
HR sometimes 100 at rest -historically has had tachycardia even reviewing outpatient records  EKG reviewed from 2/9: Sinus tachycardia with occ PVCs  Asymptomatic  Discussed with IM consultants -patient to be started on Lopressor 25 mg every 12 hours  Hydrochlorothiazide to be discontinued    Losartan to be reduced 25 mg daily  ·  Improvement in her resting heart rate now ranging from 80-90  Continue to monitor and rest breaks around HR max = 130  Patient to follow with Cardiology as outpatient

## 2023-02-15 NOTE — PROGRESS NOTES
02/15/23 0910   Pain Assessment   Pain Assessment Tool 0-10   Pain Score No Pain   Restrictions/Precautions   Precautions Fall Risk   Comprehension   Comprehension (FIM) 4 - Understands basic info/conversation 75-90% of time   Expression   Expression (FIM) 3 - Expresses basic info/needs 50-74% of time  (Mod A in structured speech tasks)   Social Interaction   Social Interaction (FIM) 5 - Interacts appropriately with others 90% of time   Problem Solving   Problem solving (FIM) 4 - Solves basic problems 75-89% of time   Memory   Memory (FIM) 4 - Recognizes/recalls/performs 75-89%   Speech/Language/Cognition Assessmetn   Treatment Assessment   Pt alert and participatory for the duration of ST session today  Pt seen sitting up in chair pulled up to table in PT gym  Pt spontaneous speech mildly dysarthric however, speech in structured repetition activities and reading continues w/ prolongations, blocks, stopping and articulatory groping  Pt rx she practiced the exercises completed in prior tx session except the lingual resistance exercises  Pt completed oral motor exercises targeting labial and lingual strength/ ROM  Pt still w/ R sided labial and lingual weakness, however improved lingual retraction on R side noted from session last week  When pt engaged in lingual resistance exercises w/ tongue depressor, pt observed to tense jaw during R sided reps suspect d/t compensating for dec lingual strength on R side  Pt then completed monosyllabic, bisyllabic and phrases containing fricative phonemes in all positions of the word  Pt w/ continued prolongations, stops, blocks, articulatory groping and slurring during word and phrase repetition  Pt accuracy dec w/ production of bisyllabic words, production in monosyllabic and connected phrases approximately 75% accurate/ intelligible   Student SLP reviewed easy onset voicing w/ pt, pt stated understanding and articulatory accuracy improved w/ use of strategy throughout activities  Pt req verbal reminders to use easy onset during tx activities, specifically initial /s/ words which continue to be the most difficult for pt to produce  Pt then engaged in initial medication review, able to recall the names of her two home medications ( Lipitor and cymbalta), and able to state the reason for medication when provided with the name of medicine for 5/12 of new medications  Pt explained that her medication management prior to hospitalization was just to take her two medications and tylenol as needed from the bottle, in the morning and at bedtime  Pt stated that she was not consistent in taking Lipitor and she understands she needs to be "better about it " Pt education provided on the importance of medication management and to reduce her chances of another stroke, pt receptive to education and agreeable to using a pill box in the future  At this time pt continues to benefit from skilled ST services targeting speech and cognitive linguistic skills in attempt to decrease caregiver burden upon d/c  SLP Therapy Minutes   SLP Time In 4633   SLP Time Out 1000   SLP Total Time (minutes) 50   SLP Mode of treatment - Individual (minutes) 20   SLP Mode of treatment - Concurrent (minutes) 30   SLP Mode of treatment - Group (minutes) 0   SLP Mode of treatment - Co-treat (minutes) 0   SLP Mode of Treatment - Total time(minutes) 50 minutes   SLP Cumulative Minutes 305   Therapy Time missed   Time missed?  No

## 2023-02-15 NOTE — PROGRESS NOTES
PM&R PROGRESS NOTE:  Laurel Rogers 62 y o  female MRN: [id number]  Unit/Bed#: -01 Encounter: [id number]        Rehabilitation Diagnosis: Impairment of mobility, safety, Activities of Daily Living (ADLs), and cognitive/communication skills due to Stroke:  01 2  Right Body Involvement (Left Brain)  Left posterior limb internal capsule acute ischemic CVA    SUBJECTIVE: Patient seen face-to-face today in her room and then again in physical therapy  Doing well overall relayed her discharge date to her personally and she is extremely happy about this  Patient denies headache today  She thinks the gabapentin that she was given last night on an as-needed basis did assist her headache  Discussed multiple medication changes including discontinuation of Lovenox for DVT prophylaxis as she is ambulating consistently a great amount  Patient also reports no headache since using a small dose of gabapentin last night  Verapamil was discontinued to avoid interaction with Lipitor  Additionally, patient has been sleeping fairly well we will trial her off trazodone starting tonight and transition to Seroquel  Patient and her family are understanding of all of these changes and aware  ASSESSMENT: Stable, progressing      PLAN:    Rehabilitation  • Functional deficits: right hemiparesis, dysarthria, dysphagia, cognitive deficits, impaired mobility, self care   • Continue current rehabilitation plan of care to maximize function  I personally attended, reviewed, and discussed medical and functional updates in team conference today  Please refer to advance care planning note for details    • Expected Discharge: Monday, 2/28/2023 with outpatient neuro day program PT, OT, SLP    Physical Therapy Occupational Therapy Speech Therapy   Weight Bearing Status: Full Weight Bearing  Transfers: Supervision  Bed Mobility: Independent  Amulation Distance (ft): 500 feet  Ambulation: Supervision  Assistive Device for Ambulation: Single [employer] (or no AD)  Wheelchair Mobility Distance:  (N/A)  Number of Stairs: 12  Assistive Device for Stairs: Right Hand Rail (descnding, L handrail ascending)  Stair Assistance: Supervision  Ramp:  (not needed for entry to home)  Discharge Recommendations: Home with:  76 [address] with[de-identified] Family Support, Outpatient Physical Therapy   Eating: Supervision  Grooming: Supervision  Bathing: Supervision  Bathing: Supervision  Upper Body Dressing: Supervision  Lower Body Dressing: Supervision  Toileting: Supervision  Tub/Shower Transfer: Supervision  Toilet Transfer: Supervision  Cognition: Within Defined Limits  Orientation: Person, Place, Time, Situation   Mode of Communication: Verbal  Speech/Language: Dysarthia  Cognition: Exceptions to WNL  Cognition: Decreased Memory, Decreased Executive Functions, Decreased Attention  Orientation: Person, Place, Time, Situation  Swallowing: Within Defined Limits  Diet Recommendations: Regular Diet, Thin  Discharge Recommendations: Home with:  76 [address] with[de-identified] Family Support, Outpatient Speech Therapy         DVT prophylaxis  • Discontinue subcutaneous Lovenox as ambulating consistently greater than 300 feet per day     GI ppx:  • Starting Protonix 40 mg daily instead of Pepcid as patient high risk for stress ulcer given her history of smoking and now CVA     Bladder plan  • Continent     Bowel plan  • Continent      * Stroke - Left posterior limb internal capsule ischemic stroke  Assessment & Plan  Presented 2/4/23 with right sided weakness, difficulty speaking  MRI confirmed left posterior limb internal capsule acute ischemic stroke  Etiology: likely related to hypertensive disease  Work-up for malignancy negative  Echo with EF 60%  Per Neurology placed on DAPT with ASA/Plavix x 21 days, then ASA monotherapy    Plan:  Begin acute rehab program  Secondary CVA ppx: Lipitor 40 mg daily   Aspirin 81 mg daily and Plavix 75 mg daily x 21 days (around 2/27/23) then Aspirin 81 mg daily monotherapy  Continue CVA education and reduction of modifiable risk factors naomi HTN, smoking cession  Follow-up with Neurology as outpatient    Tachycardia  Assessment & Plan  HR sometimes 100 at rest   EKG reviewed from : Sinus tachycardia with occ PVCs  Asymptomatic  Discussed with IM consultants - it has been chronic seen in outpatient notes in records as well  Continue to monitor and rest breaks around HR max = 130  Patient to follow with Cardiology as outpatient    Dry eye  Assessment & Plan  On the left side  Artificial tears 3 times daily  +VOR testing on left especially during movement - referral to neuro-optometry also recommended as outpatient     Pain in left foot, chronic  Assessment & Plan  Continue Tylenol PRN  Also if needed can add back Gabapentin - previously tolerated as outpatient    Prediabetes  Assessment & Plan  HA1C = 6 4  Diabetic diet   Consult nutrition to review with patient during [facility] stay    Chest pain  Assessment & Plan  In acute care, patient with transient chest pain at rest - troponin negative, EKG without changes  Cardiology consulted, recommended losartan and HCTZ for HTN  Also recommended a change to Crestor instead of Lipitor due to being on Verapamil (CY interaction)    Discontinue Verapamil 23 as headaches improved  · Outpatient follow-up recommended with Dr Robby Gaitan for further work-up  · While in [place] monitor VS and any chest pains      Hypertension  Assessment & Plan  Here: managed on Losartan 100 mg daily, HCTZ 12 5 mg daily   Plan per Cardiology  Monitor BP at rest and during activity in [facility] program  Optimize diet (limit salt)  Outpatient follow-up with Cardiology - Dr Peter Scott  At home: Seroquel 25 mg QHS  Here: Seroquel 25 mg QHS  Discontinued trazodone 2/15/2023    Depression  Assessment & Plan  Managed on Cymbalta 60 mg daily (home dose)  Consult placed to Neuropsychology - patient tearful and having difficulty coping with stroke diagnosis  Team to additionally provide emotional support    Obesity (BMI 35 0-39 9 without comorbidity)  Assessment & Plan  Lifestyle and diet modifications when able  Consult nutrition     Headache  Assessment & Plan  Developed in acute care  Treated with Migraine cocktail  Started on Verapamil for prevention by Neurology  Headaches have improved    Plan:  Discontinue Verapamil 2/13/23 as headaches much improved and would like to avoid interaction with Lipitor  If patient has a severe headache utilize gabapentin 100 mg PRN  Received 1 dose last night with resolution of headache  Monitor for headaches  Follow-up with headache Neurologist as outpatient if persists    Tobacco use  Assessment & Plan  Smoking cessation education frequently during [facility] stay  Nicotine patch for cravings - wean dose weekly    Other acquired deformities of left foot  Assessment & Plan  Left hallux valgus and hammertoe surgery by Dr Chely Welch (podiatry)  Chronic hypersensitivity/neuritis  Is able to wear shoes  Patient received injection by Dr Chely Welch in past (Sept 2022)  Follow-up with Dr Whit Patel  Labs, medications, and imaging personally reviewed  ROS:  A ten point review of systems was completed on 02/15/23 and pertinent positives are listed in subjective section  All other systems reviewed were negative  OBJECTIVE:   /78 (BP Location: Right arm)   Pulse (!) 110   Temp 97 8 °F (36 6 °C) (Oral)   Resp 19   Ht 5' (1 524 m)   Wt 85 8 kg (189 lb 3 2 oz)   SpO2 91%   BMI 36 95 kg/m²       Physical Exam  Vitals and nursing note reviewed  Constitutional:       General: She is not in acute distress  HENT:      Head: Normocephalic and atraumatic  Nose: Nose normal       Mouth/Throat:      Mouth: Mucous membranes are moist    Eyes:      Conjunctiva/sclera: Conjunctivae normal    Cardiovascular:      Rate and Rhythm: Normal rate and regular rhythm  Pulses: Normal pulses  Pulmonary:      Effort: Pulmonary effort is normal       Breath sounds: Normal breath sounds  No wheezing or rales  Abdominal:      General: Bowel sounds are normal  There is no distension  Palpations: Abdomen is soft  Tenderness: There is no abdominal tenderness  Musculoskeletal:         General: No swelling  Cervical back: Neck supple  Skin:     General: Skin is warm  Neurological:      Mental Status: She is alert and oriented to person, place, and time        Comments: Patient seen today in therapies ambulating with a step through gait pattern working on balance, fluency and speed of gait  Occupational Therapy working on visual perceptual skills especially involving the left eye   Psychiatric:         Mood and Affect: Mood normal           Lab Results   Component Value Date    WBC 8 93 02/13/2023    HGB 13 8 02/13/2023    HCT 41 6 02/13/2023     (H) 02/13/2023     02/13/2023     Lab Results   Component Value Date    SODIUM 136 02/13/2023    K 4 2 02/13/2023     02/13/2023    CO2 28 02/13/2023    BUN 16 02/13/2023    CREATININE 0 76 02/13/2023    GLUC 109 02/13/2023    CALCIUM 9 3 02/13/2023     Lab Results   Component Value Date    INR 0 92 02/04/2023    PROTIME 12 6 02/04/2023           Current Facility-Administered Medications:   •  acetaminophen (TYLENOL) tablet 650 mg, 650 mg, Oral, Q6H PRN, Liz Arguello MD, 650 mg at 02/14/23 1631  •  albuterol (PROVENTIL HFA,VENTOLIN HFA) inhaler 2 puff, 2 puff, Inhalation, Q4H PRN, Liz Arguello MD  •  aspirin chewable tablet 81 mg, 81 mg, Oral, Daily, Liz Arguello MD, 81 mg at 02/15/23 0836  •  atorvastatin (LIPITOR) tablet 40 mg, 40 mg, Oral, QPM, Liz Arguello MD, 40 mg at 02/14/23 1709  •  bisacodyl (DULCOLAX) rectal suppository 10 mg, 10 mg, Rectal, Daily PRN, Liz Arguello MD  •  clopidogrel (PLAVIX) tablet 75 mg, 75 mg, Oral, Daily, Liz Arguello MD, 75 mg at 02/15/23 0836  •  docusate sodium (COLACE) capsule 100 mg, 100 mg, Oral, BID, Vincent Mariano MD, 100 mg at 02/15/23 0836  •  DULoxetine (CYMBALTA) delayed release capsule 60 mg, 60 mg, Oral, Daily, Sade Asencio MD, 60 mg at 02/15/23 1746  •  gabapentin (NEURONTIN) capsule 100 mg, 100 mg, Oral, TID PRN, Sade Asencio MD, 100 mg at 02/14/23 1800  •  glycerin-hypromellose- (ARTIFICIAL TEARS) ophthalmic solution 1 drop, 1 drop, Left Eye, TID, Sade Asencio MD, 1 drop at 02/15/23 0840  •  hydrochlorothiazide (HYDRODIURIL) tablet 12 5 mg, 12 5 mg, Oral, Daily, Sade Asencio MD, 12 5 mg at 02/15/23 0836  •  lidocaine (LIDODERM) 5 % patch 1 patch, 1 patch, Topical, Daily, Sade Asencio MD, 1 patch at 02/15/23 0837  •  lidocaine (LMX) 4 % cream, , Topical, 4x Daily PRN, SHAILA Ríos  •  losartan (COZAAR) tablet 50 mg, 50 mg, Oral, Daily, Sade Asencio MD, 50 mg at 02/15/23 0836  •  nicotine (NICODERM CQ) 21 mg/24 hr TD 24 hr patch 21 mg, 21 mg, Transdermal, Daily, Sade Asencio MD, 21 mg at 02/15/23 8306  •  ondansetron (ZOFRAN-ODT) dispersible tablet 4 mg, 4 mg, Oral, Q6H PRN, Sade Asencio MD  •  pantoprazole (PROTONIX) EC tablet 40 mg, 40 mg, Oral, Early Morning, Sade Asencio MD, 40 mg at 02/15/23 5127  •  polyethylene glycol (MIRALAX) packet 17 g, 17 g, Oral, Daily PRN, Sade Asencio MD  •  QUEtiapine (SEROquel) tablet 25 mg, 25 mg, Oral, HS, Sade Asencio MD  •  senna (SENOKOT) tablet 8 6 mg, 1 tablet, Oral, BID, Vincent Mariano MD, 8 6 mg at 02/15/23 6669      Past Medical History:   Diagnosis Date   • Acquired deformity of left foot    • Anesthesia complication     difficulty awakening   • Arthritis    • Deaf, left    • Depression    • Hallux valgus of left foot    • Hammer toe of left foot    • Headache    • Kidney stone    • Sciatic pain, right     intermittent       Patient Active Problem List    Diagnosis Date Noted   • Stroke - Left posterior limb internal capsule ischemic stroke 02/04/2023   • Tachycardia 02/15/2023   • Dry eye 02/14/2023   • Pain in left foot, chronic 02/09/2023   • Chest pain 02/06/2023   • Prediabetes 02/06/2023   • Insomnia 02/05/2023   • Hypertension 02/05/2023   • Depression 02/04/2023   • Obesity (BMI 35 0-39 9 without comorbidity) 03/10/2021   • Ex-smoker for less than 1 year 03/10/2021   • Headache 07/03/2020   • Tobacco use 07/02/2020   • Preoperative clearance 06/30/2020   • Wound of right foot 06/29/2020   • Acquired hallux valgus of left foot 05/06/2019   • Other hammer toe(s) (acquired), left foot 05/06/2019   • Other acquired deformities of left foot 05/06/2019          Raheel Guzman MD    I have spent a total time of 55 minutes on 02/15/23 in caring for this patient including Patient and family education, Impressions, Counseling / Coordination of care, Communicating with other healthcare professionals  and changing orders of medications, discussion with family on phone, weekly team conference  ** Please Note:  voice to text software may have been used in the creation of this document   Although proof errors in transcription or interpretation are a potential of such software**

## 2023-02-15 NOTE — PROGRESS NOTES
02/15/23 1300   Pain Assessment   Pain Assessment Tool 0-10   Pain Score No Pain   Cognition   Arousal/Participation Alert; Cooperative   Orientation Level Oriented X4   Subjective   Subjective pt agreeable to participate in PT tx  "I'm going home Monday!"   Roll Left and Right   Type of Assistance Needed Independent   Roll Left and Right CARE Score 6   Sit to Lying   Type of Assistance Needed Independent   Sit to Lying CARE Score 6   Lying to Sitting on Side of Bed   Type of Assistance Needed Independent   Lying to Sitting on Side of Bed CARE Score 6   Sit to Stand   Type of Assistance Needed Independent   Sit to Stand CARE Score 6   Bed-Chair Transfer   Type of Assistance Needed Independent   Chair/Bed-to-Chair Transfer CARE Score 6   Walk 10 Feet   Type of Assistance Needed Independent   Walk 10 Feet CARE Score 6   Walk 50 Feet with Two Turns   Type of Assistance Needed Supervision;Verbal cues   Walk 50 Feet with Two Turns CARE Score 4   Walk 150 Feet   Type of Assistance Needed Verbal cues; Supervision   Walk 150 Feet CARE Score 4   Ambulation   Distance Walked (feet) 500 ft   Assist Device   (none)   Walk Assist Level Supervision  (indpendent in room)   Findings multiple bouts in hallway, practiced quickly changing gait speed , coming to quick stops, and stopping quickly then turning 180 degrees  pt able to perform all with close supervision  250 ft x 1 with 2lb ankle weight on RLE followed by 250ft x 1 with weight on LLE , cues for increased step height when weight on RLE to avoid toe drag  Does the patient walk? 2  Yes   12 Steps   Type of Assistance Needed Supervision;Verbal cues   Comment 2 full flights with single railing, cues to perform step to pattern as BLEs fatigued at end of second flight   12 Steps CARE Score 4   Picking Up Object   Type of Assistance Needed Supervision   Comment picked up 4 quad canes from floor that were set up for obstacle course  cues for improved body mechanics     Picking Up Object CARE Score 4   Toilet Transfer   Type of Assistance Needed Independent   Toilet Transfer CARE Score 6   Therapeutic Interventions   Balance sidestepping over quad canes placed on floor, 25 ft x 4 leading with LLE, 25 ft x 4 leading with RLE  no AD, supervision for safety   Equipment Use   NuStep level 2 x 10 minutes with BUEs and BLEs   Assessment   Treatment Assessment Pt participated in 45 minute PT tx session focusing on dynamic balance and endurance activities, and functional mobility training   Pt assessed for in room privileges this AM with OT; pt demosntrates ability to perform all in room mobility independently without AD  PT re-iterated strategies for increased safety in the room  pt will continue to benefit from skilled PT interventions to address deficits, reduce fall risk, and maximize functional independence  Problem List Decreased strength;Decreased endurance; Impaired balance   Barriers to Discharge None   Plan   Treatment/Interventions Functional transfer training;LE strengthening/ROM; Elevations; Therapeutic exercise; Endurance training;Bed mobility;Gait training   Progress Progressing toward goals   PT Therapy Minutes   PT Time In 1300   PT Time Out 1345   PT Total Time (minutes) 45   PT Mode of treatment - Individual (minutes) 45   PT Mode of treatment - Concurrent (minutes) 0   PT Mode of treatment - Group (minutes) 0   PT Mode of treatment - Co-treat (minutes) 0   PT Mode of Treatment - Total time(minutes) 45 minutes   PT Cumulative Minutes 345   Therapy Time missed   Time missed?  No

## 2023-02-15 NOTE — PROGRESS NOTES
Internal Medicine Progress Note  Patient: Jenni Cantu  Age/sex: 62 y o  female  Medical Record #: [id number]      ASSESSMENT/PLAN: (Interval History)  Jenni Cantu is seen and examined and management for following issues:    Acute CVA  • Left posterior limb internal capsule/thalamic  • For ASA/Plavix a 21 days then to ASA alone on 23  • Continue Lipitor but Cards had advised switch to Crestor 40mg qd at discharge to avoid interaction of the Lipitor with the Verapamil (CY interaction)  • CT C/A/P was negative for malignancy; thrombosis panel sent both to rule out hypercoag state     Headaches  • MRI with contrast was unrevealing  • Neuro placed her on Verapamil for preventive tx and she got IV magnesium/Phenergan/Toradol/Benadryl  • D/w Dr Lei Swenson --> trial off Verapamil (considering interaction with Lipitor) and see how she does with headaches     • Had a headache last evening and Dr Tiffany Martin started Neurontin 100mg TID prn      Chest pain  • Had occurrences on  and  naomi with lying flat  • Trops and EKGs were negative  • Cards saw and felt atypical and will see her back in the office; no w/u for now     HTN  • Home:  no meds  • Here:  Losartan 50mg qd/HCTZ 12 5mg qd  • Stable  • Stopped the Verapamil as above  May need to increase the Losartan or HCTZ   Will watch      Depression  • Continue Cymbalta/Seroquel  • Takes Seroquel and Trazodone at home     Pre-DM  • HbA1C was 6 4  • Watching FBS; no Accuchecks for now  • D/w pt 23 about starting a DM diet   She was agreeable  • Ordered Nutrition see on 23 for DM diet teaching  • FBS AM 23 was 109     Nicotine abuse  • Continue patch  • Advised cessation 23     Tachycardia  · HR has increased after stopping the Verapamil into low 100s to 110s  · Last EKG  = ST  · Will check TSH  · No sx  · Hemoglobin, O2 sats are normal   She is not dry      Discharge date:  Team    The above assessment and plan was reviewed and updated as determined by my evaluation of the patient on 2/15/2023      Labs:   Results from last 7 days   Lab Units 02/13/23  0545   WBC Thousand/uL 8 93   HEMOGLOBIN g/dL 13 8   HEMATOCRIT % 41 6   PLATELETS Thousands/uL 318     Results from last 7 days   Lab Units 02/13/23  0545   SODIUM mmol/L 136   POTASSIUM mmol/L 4 2   CHLORIDE mmol/L 105   CO2 mmol/L 28   BUN mg/dL 16   CREATININE mg/dL 0 76   CALCIUM mg/dL 9 3             Results from last 7 days   Lab Units 02/09/23  2101 02/09/23  1639   POC GLUCOSE mg/dl 144* 110       Review of Scheduled Meds:  Current Facility-Administered Medications   Medication Dose Route Frequency Provider Last Rate   • acetaminophen  650 mg Oral Q6H PRN Leander Sage MD     • albuterol  2 puff Inhalation Q4H PRN Leander Sage MD     • aspirin  81 mg Oral Daily Leander Sage MD     • atorvastatin  40 mg Oral QPM Leander Sage MD     • bisacodyl  10 mg Rectal Daily PRN Leander Sage MD     • clopidogrel  75 mg Oral Daily Leander Sage MD     • docusate sodium  100 mg Oral BID Víctor Hale MD     • DULoxetine  60 mg Oral Daily Leander Sage MD     • enoxaparin  40 mg Subcutaneous Daily Leander Sage MD     • gabapentin  100 mg Oral TID PRN Leander Sage MD     • glycerin-hypromellose-  1 drop Left Eye TID Leander Sage MD     • hydrochlorothiazide  12 5 mg Oral Daily Leander Sage MD     • lidocaine  1 patch Topical Daily Leander Sage MD     • lidocaine   Topical 4x Daily PRN SHAILA Chapman     • losartan  50 mg Oral Daily Leander Sage MD     • nicotine  21 mg Transdermal Daily Leander Sage MD     • ondansetron  4 mg Oral Q6H PRN Leander Sage MD     • pantoprazole  40 mg Oral Early Morning Leander Sage MD     • polyethylene glycol  17 g Oral Daily PRN Leander Sage MD     • QUEtiapine  25 mg Oral HS PRN Leander Sage MD     • senna  1 tablet Oral BID Lisa Feng Torres Campbell MD     • traZODone  50 mg Oral HS Eliane Morales MD         Subjective/ HPI: Patient seen and examined  Patients overnight issues or events were reviewed with nursing or staff during rounds or morning huddle session  New or overnight issues include the following:     No new or overnight issues  Offers no complaints    ROS:   A 10 point ROS was performed; negative except as noted above  Imaging:     No orders to display       *Labs /Radiology studies reviewed  *Medications reviewed and reconciled as needed  *Please refer to order section for additional ordered labs studies  *Case discussed with primary attending during morning huddle case rounds    Physical Examination:  Vitals:   Vitals:    02/14/23 1405 02/14/23 1749 02/14/23 1900 02/15/23 0630   BP:  110/60 135/90 115/89   BP Location:  Right arm Right arm Right arm   Pulse: (!) 118  (!) 118 (!) 111   Resp:   20 18   Temp:   98 °F (36 7 °C) 98 1 °F (36 7 °C)   TempSrc:   Oral Oral   SpO2:   94% 94%   Weight:    85 8 kg (189 lb 3 2 oz)   Height:           General Appearance: no distress, conversive  HEENT: PERRLA, conjuctiva normal; oropharynx clear; mucous membranes moist   Neck:  Supple, normal ROM  Lungs: CTA, normal respiratory effort, no retractions, expiratory effort normal  CV: regular rate and rhythm; no rubs/murmurs/gallops, PMI normal   ABD: soft; ND/NT; +BS  EXT: no edema  Skin: normal turgor, normal texture, no rashes  Psych: affect normal, mood normal  Neuro: AAO      The above physical exam was reviewed and updated as determined by my evaluation of the patient on 2/15/2023  Invasive Devices     None                    VTE Pharmacologic Prophylaxis: Enoxaparin  Code Status: Level 1 - Full Code  Current Length of Stay: 6 day(s)      Total time spent:  30 minutes with more than 50% spent counseling/coordinating care   Counseling includes discussion with patient re: progress  and discussion with patient of his/her current medical state/information  Coordination of patient's care was performed in conjunction with primary service  Time invested included review of patient's labs, vitals, and management of their comorbidities with continued monitoring  In addition, this patient was discussed with medical team including physician and advanced extenders  The care of the patient was extensively discussed and appropriate treatment plan was formulated unique for this patient  Medical decision making for the day was made by supervising physician unless otherwise noted in their attestation statement  ** Please Note:  voice to text software may have been used in the creation of this document   Although proof errors in transcription or interpretation are a potential of such software**

## 2023-02-15 NOTE — TEAM CONFERENCE
Acute RehabilitationTeam Conference Note  Date: 2/15/2023   Time: 10:56 AM       Patient Name:  Susan King       Medical Record Number: [id number]   YOB: 1965  Sex: Female          Room[facility]Bed:  [facility]  Payor Info:  Payor: Devi Black / Plan: Devi Black / Product Type: Medicaid HMO /      Admitting Diagnosis: Stroke ([address] ) [I63 9]   Admit Date/Time:  2/9/2023  2:53 PM  Admission Comments: No comment available     Primary Diagnosis:  Stroke [facility])  Principal Problem: Stroke [facility])    Patient Active Problem List    Diagnosis Date Noted   • Dry eye 02/14/2023   • Pain in left foot, chronic 02/09/2023   • Chest pain 02/06/2023   • Prediabetes 02/06/2023   • Insomnia 02/05/2023   • Hypertension 02/05/2023   • Stroke - Left posterior limb internal capsule ischemic stroke 02/04/2023   • Depression 02/04/2023   • Obesity (BMI 35 0-39 9 without comorbidity) 03/10/2021   • Ex-smoker for less than 1 year 03/10/2021   • Headache 07/03/2020   • Tobacco use 07/02/2020   • Preoperative clearance 06/30/2020   • Wound of right foot 06/29/2020   • Acquired hallux valgus of left foot 05/06/2019   • Other hammer toe(s) (acquired), left foot 05/06/2019   • Other acquired deformities of left foot 05/06/2019       Physical Therapy:    Weight Bearing Status: Full Weight Bearing  Transfers: Supervision  Bed Mobility: Independent  Amulation Distance (ft): 500 feet  Ambulation: Supervision  Assistive Device for Ambulation: Single [employer] (or no AD)  Wheelchair Mobility Distance:  (N/A)  Number of Stairs: 12  Assistive Device for Stairs: Right Hand Rail (descnding, L handrail ascending)  Stair Assistance: Supervision  Ramp:  (not needed for entry to home)  Discharge Recommendations: Home with:  [address] with[de-identified] Family Support, Outpatient Physical Therapy    The pt is a 58yo female admitted to [facility] with dx of CVA  Her PLOF is mod I with SPC, lives with spouse in a multi level home, (+) driving  She is progressing well towards goals of mod I as she currently performs bed mobility independently and requires supervision for functional transfers, gait, and stair negotiation  Pt has intermittent complaints of L eye blurriness and intermittent dizziness with head turns, however during tx on 2/14 pt denies dizziness or L eye blurry vision t/o session including when trialing head turns during gait  Plan to monitor and tx as needed during future sessions  Pt progressed from requiring RW for mobility, recommend SPC or no AD at this time with supervision  pt is very motivated to return home, reports  Brook Mcardle is going to assist with IADLs if needed  Pt will continue to benefit from skilled PT interventions to address deficits, reduce fall risk, and maximize functional independence  Anticipate DC home with  assisting with IADLs; pt will likely achieve mod I/I for household mobility  Recommend outpatient PT if  can provide transport  Occupational Therapy:  Eating: Supervision  Grooming: Supervision  Bathing: Supervision  Bathing: Supervision  Upper Body Dressing: Supervision  Lower Body Dressing: Supervision  Toileting: Supervision  Tub/Shower Transfer: Supervision  Toilet Transfer: Supervision  Cognition: Within Defined Limits  Orientation: Person, Place, Time, Situation  Discharge Recommendations: Home with:       Occupational Therapy Weekly Team Note    Pt continues to make good progress with skilled occupational therapy intervention and is progressing toward long term goals for ADL, IADL's, and functional transfers/mobility  Pts long term goals for ADLs are Independent with no assistive device  Pt continues to present with impairments in activity tolerance, endurance, standing balance/tolerance, sitting balance/tolerance, UE strength, UE ROM, FMC, and GMC  Occupational performance remains limited by (R) UE dysmetria, diplopia, nystagmus, and (L) visual deficits    Family training/education will not be required prior to D/C  Pt will continue to benefit from skilled acute rehab OT services to address above mentioned barriers and maximize functional independence in baseline areas of occupation to meet established treatment goals with overall decreased burden of care  Plan of care to continue to focus on ADL Retraining , LB Dressing, Kitchen Mobilty, Meal preparation, Medication management , Functional Transfers, Functional Cognition, Functional Attention, Standing tolerance, Standing balance , UE NMR right, Fine motor coordination, Gross motor coordination, Fine motor strengthening , Gross motor strengthening , R attention, Visual perceptual skills, Visual scanning, DME training/education, Family training/education, Energy conservation training/education, healthy coping education, and Leisure and social pursuits  Goals for the upcoming week are: establish DME needs, focus on IADL management, establish/complete family caregiver training, and continue to progress assess for progression to independent goals in the room  Anticipate Discharge date to be set  [employer] Speech Therapy:  Mode of Communication: Verbal  Speech/Language: Dysarthia  Cognition: Exceptions to WNL  Cognition: Decreased Memory, Decreased Executive Functions, Decreased Attention  Orientation: Person, Place, Time, Situation  Swallowing: Within Defined Limits  Diet Recommendations: Regular Diet, Thin  Discharge Recommendations: Home with:  [address] with[de-identified] Family Support, Outpatient Speech Therapy  Pt currently being followed for dysphagia, speech and cognitive tx sessions  In regards to dysphagia, diet upon admission to the [facility] was regular w/ thin liquids, to where pt completed dysphagia bedside assessment and was f/u briefly for f/u tx sessions in which overall swallow function was noted to be [facility] and minimal oropharyngeal dysphagia characterized by R-sided weakness resulting in min R sided pocketing   Pt is aware of pocketing and independently clears w/ lingual or finger sweep  Pt also w/ min lingual residue, cleared effectively w/ liquid wash  Pt able to adequately take cup or straw sips utilizing compensatory strategy of L sided placement  Oral containment and manipulation all WFL, swallow initiation prompt and hyolaryngeal rise WFL upon palpation  Pt w/ cough x1 after toast suspect d/t pt talking during intake  Overall upon f/u, pt did not exhibit overt aspiration sxs given meal and continues to demonstrate independence in swallow strategies given meals to decrease any possible oropharyngeal sxs  Continue to recommend regular diet w/ thin liquids at this time to where no further dysphagia tx sessions warranted but reconsult as needed  Focus of tx sessions will be towards speech and cognitive skills  For cognitive skills, pt did complete formalized assessment, CLQT+,  with a Composite Severity Rating score of 3 6 out of 4 0, correlating to overall MILD cognitive linguistic impairments at time of evaluation and in comparison to age matched peers ranging from 22-74 y/o  While  denies noticing over deficits given cognitive linguistic skills, pt's son, Loly Song did report that he has noticed still decreased memory and likely can exhibit difficulty in new learning given stroke, medications,etc  Plan to focus on higher level cognitive skills to maximize pt's attention, recall, executive function skills in attempts to decrease caregiver burden overt time  As for dysarthria, pt completed Motor Speech Eval, which mild dysarthia cristian by articulatory groping, imprecise articultion, distorted speech sounds, faster/ slower rate of speech dependent on the speech task  Pt spontaneous speech is mildly dysarthric/ intelligible, however pt speech in structured repetition tasks ( monosyllabic and multisyllabic words) cristian by stopping, prolongations, blocks, articulatory groping and distortions noted in [place] more than [address]    It was noted that in [place] faster rate of speech was noted which pt demo imprecise articulation  Oral motor exercises were provided for pt to review and complete as a home exercise program but will plan to review w/ pt in future sessions  Current barriers which present include aspiration risk, decreased communication skills due to mild dysarthria of speech, decreased attentions, decreased ST/working memory, decreased executive function skills which currently impacts overall safety and functional speech/cognitive/mobility  Pt is functioning at min a for comprehension, expression, supervision for social interaction, min-mod A for executive functions and memory  At this time, pt will benefit from skilled SLP services targeting speech/cognitive linguistic skills to maximize overall communication abilities, independence given cognitive skills throughout pt's stay on the acute rehab center with anticipate discharge home w/ family       Nursing Notes:     Diet Type: Other (Specify) (Michael CHO)                      Diet Patient/Family Education Complete: No                         Type of Wound Patient/Family Education: Yes  Bladder: Continent     Bladder Patient/Family Education: Yes  Bowel: Continent     Bowel Patient/Family Education: Yes  Pain Location/Orientation: Orientation: Bilateral  Pain Score: 0                       Hospital Pain Intervention(s): Medication (See MAR)  Pain Patient/Family Education: Yes  Medication Management/Safety  Injectable: Lovenox  Safe Administration: Yes (by staff)  Medication Patient/Family Education Complete: No (ongoing)    Acute CVA  • Left posterior limb internal capsule/thalamic  • For ASA/Plavix a 21 days then to ASA alone on 23  • Continue Lipitor but Cards had advised switch to Crestor 40mg qd at discharge to avoid interaction of the Lipitor with the Verapamil (CY interaction)    • CT C/A/P was negative for malignancy; thrombosis panel sent both to rule out hypercoag state Headaches  • MRI with contrast was unrevealing  • Neuro placed her on Verapamil for preventive tx and she got IV magnesium/Phenergan/Toradol/Benadryl  • D/w Dr Delmis Zheng --> trial off  Verapamil (considering interaction with Lipitor) and see how she does with headaches  • Today, will be first day off Verapamil     Chest pain  • Had occurrences on 2/4 and 2/8 naomi with lying flat  • Trops and EKGs were negative  • Cards saw and felt atypical and will see her back in the office; no w/u for now     HTN  • Home:  no meds  • Here:  Losartan 50mg qd/HCTZ 12 5mg qd  • Stable  • Stopped the Verapamil as above  May need to increase the Losartan or HCTZ  Will watch today     Depression  • Continue Cymbalta  • Takes Seroquel and Trazodone at home     Pre-DM  • HbA1C was 6 4  • Watching FBS,  No Accuchecks for now  • D/w pt 2/12/23 about starting a DM diet  She was agreeable  • Will have Nutrition see today for DM diet teaching  • FBS AM 2/13/23 was 109    Nicotine abuse  • Continue patch  • Advised cessation 2/12/23    This week we will encourage independence with ADLs  We will monitor labs and vital signs  We will educate pt/family about repositioning to prevent skin breakdown  We will assist with repositioning and performs routine skin checks  We will monitor for constipation and medicate as ordered  We will monitor for adequate pain control  We will increase safety awareness with transfers and keep pt free from falls  Case Management:     Discharge Planning  Living Arrangements: Lives w/ Spouse/significant other  Support Systems: Self, Spouse/significant other, Children  Assistance Needed: tbd  Type of Current Residence: Private residence (2 story home)  Current Home Care Services: No  Pt admitted s/p stroke and resides with supportive family   Pt expects to return home and has been made aware of the potential for contd services on dc   Following to assist w/dc planning needs and contd stay review due Wednesday  Is the patient actively participating in therapies? yes  List any modifications to the treatment plan:     Barriers Interventions   Headache, mood disorder Medications initiated   Visual perceptual deficits Therapy exercises, cueing   ue strength Therapy exercises             Is the patient making expected progress toward goals? yes  List any update or changes to goals:     Medical Goals: [facility] program and Patient will be able to manage medical conditions and comorbid conditions with medications and follow up upon completion of rehab program    Weekly Team Goals:   Rehab Team Goals  ADL Team Goal: Patient will be independent with ADLs with least restrictive device upon completion of rehab program  Transfer Team Goal: Patient will be independent with transfers with least restrictive device upon completion of rehab program  Locomotion Team Goal: Patient will be independent with locomotion with least restrictive device upon completion of rehab program  Cognitive Team Goal: Patient will be independent for basic  tasks and require supervision for complex tasks upon completion of rehab program    Discussion: pt presents with the above barriers and including mild dysarthria  Speech reports pt is improving very well  Overall adls are supervision as of today  Pt is supervision for long distance ambulation and stairs without device  Pt is min a with expression, HEP from speech provided  Recommendations are for contd outpt pt ot speech day program [address]  Anticipated Discharge Date:  2/20/23  [facility] Team Members Present: The following team members are supervising care for this patient and were present during this Weekly Team Conference      Physician: Dr Lei Gifford MD  : JAMILAH Almaraz  Registered Nurse: Roland Norton RN  Physical Therapist: Reid Gruber DPT  Occupational Therapist: Indira Gunn [place] Monk MS, OTR/L  Speech Therapist: Adam Becerril MA, CCC-SLP

## 2023-02-15 NOTE — PLAN OF CARE
Problem: PAIN - ADULT  Goal: Verbalizes/displays adequate comfort level or baseline comfort level  Description: Interventions:  - Encourage patient to monitor pain and request assistance  - Assess pain using appropriate pain scale  - Administer analgesics based on type and severity of pain and evaluate response  - Implement non-pharmacological measures as appropriate and evaluate response  - Consider cultural and social influences on pain and pain management  - Notify physician/advanced practitioner if interventions unsuccessful or patient reports new pain  Outcome: Progressing     Problem: INFECTION - ADULT  Goal: Absence or prevention of progression during hospitalization  Description: INTERVENTIONS:  - Assess and monitor for signs and symptoms of infection  - Monitor lab/diagnostic results  - Monitor all insertion sites, i e  indwelling lines, tubes, and drains  - Monitor endotracheal if appropriate and nasal secretions for changes in amount and color  - [place] appropriate cooling/warming therapies per order  - Administer medications as ordered  - Instruct and encourage patient and family to use good hand hygiene technique  - Identify and instruct in appropriate isolation precautions for identified infection/condition  Outcome: Progressing     Problem: SAFETY ADULT  Goal: Patient will remain free of falls  Description: INTERVENTIONS:  - Educate patient/family on patient safety including physical limitations  - Instruct patient to call for assistance with activity   - Consult OT/PT to assist with strengthening/mobility   - Keep Call bell within reach  - Keep bed low and locked with side rails adjusted as appropriate  - Keep care items and personal belongings within reach  - Initiate and maintain comfort rounds  - Make Fall Risk Sign visible to staff  - Apply yellow socks and bracelet for high fall risk patients  - Consider moving patient to room near nurses station  Outcome: Progressing  Goal: Maintain or return to baseline ADL function  Description: INTERVENTIONS:  -  Assess patient's ability to carry out ADLs; assess patient's baseline for ADL function and identify physical deficits which impact ability to perform ADLs (bathing, care of mouth/teeth, toileting, grooming, dressing, etc )  - Assess/evaluate cause of self-care deficits   - Assess range of motion  - Assess patient's mobility; develop plan if impaired  - Assess patient's need for assistive devices and provide as appropriate  - Encourage maximum independence but intervene and supervise when necessary  - Involve family in performance of ADLs  - Assess for home care needs following discharge   - Consider OT consult to assist with ADL evaluation and planning for discharge  - Provide patient education as appropriate  Outcome: Progressing  Goal: Maintains/Returns to pre admission functional level  Description: INTERVENTIONS:  - Perform BMAT or MOVE assessment daily    - Set and communicate daily mobility goal to care team and patient/family/caregiver     - Collaborate with rehabilitation services on mobility goals if consulted  - Out of bed for toileting  - Record patient progress and toleration of activity level   Outcome: Progressing     Problem: DISCHARGE PLANNING  Goal: Discharge to home or other facility with appropriate resources  Description: INTERVENTIONS:  - Identify barriers to discharge w/patient and caregiver  - Arrange for needed discharge resources and transportation as appropriate  - Identify discharge learning needs (meds, wound care, etc )  - Arrange for interpretive services to assist at discharge as needed  - Refer to Case Management Department for coordinating discharge planning if the patient needs post-hospital services based on physician/advanced practitioner order or complex needs related to functional status, cognitive ability, or social support system  Outcome: Progressing

## 2023-02-15 NOTE — PROGRESS NOTES
[facility] Occupational Therapy Daily Note  Patient Active Problem List   Diagnosis   • Acquired hallux valgus of left foot   • Other hammer toe(s) (acquired), left foot   • Other acquired deformities of left foot   • Wound of right foot   • Preoperative clearance   • Tobacco use   • Headache   • Obesity (BMI 35 0-39 9 without comorbidity)   • Ex-smoker for less than 1 year   • Stroke - Left posterior limb internal capsule ischemic stroke   • Depression   • Insomnia   • Hypertension   • Chest pain   • Prediabetes   • Pain in left foot, chronic   • Dry eye       Past Medical History:   Diagnosis Date   • Acquired deformity of left foot    • Anesthesia complication     difficulty awakening   • Arthritis    • Deaf, left    • Depression    • Hallux valgus of left foot    • Hammer toe of left foot    • Headache    • Kidney stone    • Sciatic pain, right     intermittent     Etiologic Diagnosis: left psterior limb internal capsule/thalamic acute to subacute infarct  Restrictions/Precautions  Precautions: Bed/chair alarms, Fall Risk, Supervision on toilet/commode  Weight Bearing Restrictions: No  ROM Restrictions: No  ADL Team Goal: Patient will be independent with ADLs with least restrictive device upon completion of rehab program  Occupational Therapy LTG's  Eating Oral care Bathing LB dress UB dress   Eating Goal: 06  Independent - Patient completes the activity by him/herself with no assistance from a helper  Oral Hygiene Goal: 06  Independent - Patient completes the activity by him/herself with no assistance from a helper  Shower/bathe self Goal: 06  Independent - Patient completes the activity by him/herself with no assistance from a helper  Lower body dressing Goal: 06  Independent - Patient completes the activity by him/herself with no assistance from a helper  Upper body dressing Goal: 06  Independent - Patient completes the activity by him/herself with no assistance from a helper     Toileting Toilet txf Func txf IADL Med    Toileting hygiene Goal: 06  Independent - Patient completes the activity by him/herself with no assistance from a helper  Toilet transfer Goal: 06  Independent - Patient completes the activity by him/herself with no assistance from a helper  Assist Level: Independent Assist Level: Independent   OT interventions: Treatment/Interventions: ADL retraining, Functional transfer training, Therapeutic exercise, Endurance training, Patient/family training, Equipment eval/education, Bed mobility, Compensatory technique education  Discharge Plan:  OT Discharge Recommendation:  (Home with family support)   DME: Equipment Recommended:  (TBD),  ,       02/15/23 0710   Pain Assessment   Pain Assessment Tool 0-10   Pain Score No Pain   Lifestyle   Autonomy "I can just go? you trust me?"   Eating   Type of Assistance Needed Independent   Eating CARE Score 6   Oral Hygiene   Type of Assistance Needed Independent   Oral Hygiene CARE Score 6   Shower/Bathe Self   Type of Assistance Needed Supervision   Comment completes shower today, majority in stance  seated for LE bathing   Shower/Bathe Self CARE Score 4   Tub/Shower Transfer   Findings IND shower transfer  Upper Body Dressing   Type of Assistance Needed Independent   Comment pt gathering all items in room  able to clasp bra entirly today     Upper Body Dressing CARE Score 6   Lower Body Dressing   Type of Assistance Needed Independent   Lower Body Dressing CARE Score 6   Putting On/Taking Off Footwear   Type of Assistance Needed Independent   Putting On/Taking Off Footwear CARE Score 6   Sit to Lying   Type of Assistance Needed Independent   Sit to Lying CARE Score 6   Sit to Stand   Type of Assistance Needed Independent   Sit to Stand CARE Score 6   Bed-Chair Transfer   Type of Assistance Needed Independent   Comment no device   Chair/Bed-to-Chair Transfer CARE Score 6   [address]   Type of [address]  Hygiene CARE Score 6   Toilet Transfer   Type of Assistance Needed Independent   Comment pt is cleared to walk to Bathroom with staff in hallways to open bathroom  Toilet Transfer CARE Score 6   Cognition   Overall Cognitive Status WFL   Additional Activities   Additional Activities Comments Pt engages in discussion of in room privileges to complete activities independently in room  Pt engages in simulated tasks for environmental hazards and safety awareness  Pt demonstrates good understanding and Pt is progressed to in room privileges with no device  Team members notified at this time  Assessment   Treatment Assessment Pt participated in skilled OT treatment session with treatment focus on ADL retraining with progression to in room privileges, R UE objective measure re-testing ( see below)  Pt tolerated session well, with excitement about her progress with her  RUE  Pt overall making vast improvements in overall strength, coordination, and proprioception along with functional use  Pt's right UE function currently Functional  Strength and Gross/Fine motor coordination are within functional limits for task performance without influence of synergies  9 hole peg test completed in 35 seconds and a minimum of 20# gross  strength and pinch patterns    Continue to focus neurological treatment plan:Repetitive Task training, Gross motor coordination, Fine motor coordination and motor planning, visual perceptual skills with focus on ocular motor control, visual field training to L, sustained visual focus, visual scanning with accomodation     Prognosis Good   Plan   Progress Progressing toward goals   OT Therapy Minutes   OT Time In 0710   OT Time Out 0830   OT Total Time (minutes) 80   OT Mode of treatment - Individual (minutes) 80   OT Mode of treatment - Concurrent (minutes) 0   OT Mode of treatment - Group (minutes) 0   OT Mode of treatment - Co-treat (minutes) 0   OT Mode of Treatment - Total time(minutes) 80 minutes   OT Cumulative Minutes 440       UE Evaluation:       FELIZ Serra            UPPER EXTREMITY FUNCTION Impaired Intact Dominant Hand: RIGHT                            /Pinch Strength         Dynamometer         - Gross Grasp eval10,15,10   2/15- 37,35,35lbs eval-30,30,30   2/15-35,35,35lbs        Pinch Meter          - PINCER eval-1 3 lbs  2/15-5 5lbs eval-3 8 lbs   2/15-5 1lbs      - Lateral key eval-4 3 lbs  2/15- 8 8 lbs eval-4 8 lbs  2/15- 9 8 lbs     9 hole Peg Test eval- 54 31   2/15- 29 95 24 59      Box and Blocks eval- 33 blocks  2/15-44 blocks  50                      Fugl-Leon UE Assessment  Right      A: UPPER EXTREMITY     I: reflex activity 4/4   II:volitional movement within synergies 18/18   III: volitional movement mixing synergies 6/6   IV: volitional movement with little or no synergy 6/6   V: Normal Reflex activity 2/2   B: WRIST 10/10   C: HAND 14/14   D: Coordination and Speed   R:5 34 L:4 17 seconds 6/6   H: Sensation 12/12   J: PROM 24/24   J: Joint Pain 22/24   Score from motor sections A-D:      66/66

## 2023-02-15 NOTE — CONSULTS
Instructed patient on Heart Healthy Consistent CHO Nutrition Therapy with written information in both English and Hebrew from AND diet manual  Patient verbalized interest in diet modification and understanding of same to assist with gradual wt loss and improved A1C and lipid profile in future  Noted patient has lost 3#(1 5%) over past 10 days - patient pleased and even eating 3 meals versus was only eating 2 meals at home  Discussed portion control

## 2023-02-16 ENCOUNTER — APPOINTMENT (OUTPATIENT)
Dept: PHYSICAL THERAPY | Facility: REHABILITATION | Age: 58
End: 2023-02-16

## 2023-02-16 LAB
ANION GAP SERPL CALCULATED.3IONS-SCNC: 4 MMOL/L (ref 4–13)
BASOPHILS # BLD AUTO: 0.04 THOUSANDS/ÂΜL (ref 0–0.1)
BASOPHILS NFR BLD AUTO: 1 % (ref 0–1)
BUN SERPL-MCNC: 19 MG/DL (ref 5–25)
CALCIUM SERPL-MCNC: 9.1 MG/DL (ref 8.3–10.1)
CHLORIDE SERPL-SCNC: 105 MMOL/L (ref 96–108)
CO2 SERPL-SCNC: 28 MMOL/L (ref 21–32)
CREAT SERPL-MCNC: 0.78 MG/DL (ref 0.6–1.3)
EOSINOPHIL # BLD AUTO: 0.1 THOUSAND/ÂΜL (ref 0–0.61)
EOSINOPHIL NFR BLD AUTO: 1 % (ref 0–6)
ERYTHROCYTE [DISTWIDTH] IN BLOOD BY AUTOMATED COUNT: 14.6 % (ref 11.6–15.1)
GFR SERPL CREATININE-BSD FRML MDRD: 84 ML/MIN/1.73SQ M
GLUCOSE P FAST SERPL-MCNC: 108 MG/DL (ref 65–99)
GLUCOSE SERPL-MCNC: 108 MG/DL (ref 65–140)
HCT VFR BLD AUTO: 41.6 % (ref 34.8–46.1)
HGB BLD-MCNC: 13.2 G/DL (ref 11.5–15.4)
IMM GRANULOCYTES # BLD AUTO: 0.02 THOUSAND/UL (ref 0–0.2)
IMM GRANULOCYTES NFR BLD AUTO: 0 % (ref 0–2)
LYMPHOCYTES # BLD AUTO: 3.66 THOUSANDS/ÂΜL (ref 0.6–4.47)
LYMPHOCYTES NFR BLD AUTO: 41 % (ref 14–44)
MCH RBC QN AUTO: 32.4 PG (ref 26.8–34.3)
MCHC RBC AUTO-ENTMCNC: 31.7 G/DL (ref 31.4–37.4)
MCV RBC AUTO: 102 FL (ref 82–98)
MONOCYTES # BLD AUTO: 0.86 THOUSAND/ÂΜL (ref 0.17–1.22)
MONOCYTES NFR BLD AUTO: 10 % (ref 4–12)
NEUTROPHILS # BLD AUTO: 4.2 THOUSANDS/ÂΜL (ref 1.85–7.62)
NEUTS SEG NFR BLD AUTO: 47 % (ref 43–75)
NRBC BLD AUTO-RTO: 0 /100 WBCS
PLATELET # BLD AUTO: 310 THOUSANDS/UL (ref 149–390)
PMV BLD AUTO: 10.2 FL (ref 8.9–12.7)
POTASSIUM SERPL-SCNC: 3.6 MMOL/L (ref 3.5–5.3)
RBC # BLD AUTO: 4.08 MILLION/UL (ref 3.81–5.12)
SODIUM SERPL-SCNC: 137 MMOL/L (ref 135–147)
T4 FREE SERPL-MCNC: 0.88 NG/DL (ref 0.76–1.46)
TSH SERPL DL<=0.05 MIU/L-ACNC: 5.32 UIU/ML (ref 0.45–4.5)
WBC # BLD AUTO: 8.88 THOUSAND/UL (ref 4.31–10.16)

## 2023-02-16 RX ORDER — NICOTINE 21 MG/24HR
14 PATCH, TRANSDERMAL 24 HOURS TRANSDERMAL DAILY
Status: DISCONTINUED | OUTPATIENT
Start: 2023-02-17 | End: 2023-02-20 | Stop reason: HOSPADM

## 2023-02-16 RX ORDER — LOSARTAN POTASSIUM 25 MG/1
25 TABLET ORAL DAILY
Status: DISCONTINUED | OUTPATIENT
Start: 2023-02-17 | End: 2023-02-20 | Stop reason: HOSPADM

## 2023-02-16 RX ADMIN — GLYCERIN 1 DROP: .002; .002; .01 SOLUTION/ DROPS OPHTHALMIC at 17:22

## 2023-02-16 RX ADMIN — ATORVASTATIN CALCIUM 40 MG: 40 TABLET, FILM COATED ORAL at 17:22

## 2023-02-16 RX ADMIN — LOSARTAN POTASSIUM 50 MG: 50 TABLET, FILM COATED ORAL at 08:29

## 2023-02-16 RX ADMIN — DOCUSATE SODIUM 100 MG: 100 CAPSULE, LIQUID FILLED ORAL at 08:29

## 2023-02-16 RX ADMIN — ASPIRIN 81 MG CHEWABLE TABLET 81 MG: 81 TABLET CHEWABLE at 08:29

## 2023-02-16 RX ADMIN — DULOXETINE HYDROCHLORIDE 60 MG: 60 CAPSULE, DELAYED RELEASE ORAL at 08:29

## 2023-02-16 RX ADMIN — GLYCERIN 1 DROP: .002; .002; .01 SOLUTION/ DROPS OPHTHALMIC at 08:36

## 2023-02-16 RX ADMIN — HYDROCHLOROTHIAZIDE 12.5 MG: 12.5 TABLET ORAL at 08:29

## 2023-02-16 RX ADMIN — GLYCERIN 1 DROP: .002; .002; .01 SOLUTION/ DROPS OPHTHALMIC at 22:06

## 2023-02-16 RX ADMIN — CLOPIDOGREL BISULFATE 75 MG: 75 TABLET ORAL at 08:29

## 2023-02-16 RX ADMIN — PANTOPRAZOLE SODIUM 40 MG: 40 TABLET, DELAYED RELEASE ORAL at 05:04

## 2023-02-16 RX ADMIN — QUETIAPINE FUMARATE 25 MG: 25 TABLET ORAL at 21:29

## 2023-02-16 RX ADMIN — NICOTINE 21 MG: 21 PATCH, EXTENDED RELEASE TRANSDERMAL at 08:29

## 2023-02-16 RX ADMIN — LIDOCAINE 5% 1 PATCH: 700 PATCH TOPICAL at 08:29

## 2023-02-16 RX ADMIN — METOPROLOL TARTRATE 25 MG: 25 TABLET, FILM COATED ORAL at 21:29

## 2023-02-16 NOTE — PLAN OF CARE
Problem: PAIN - ADULT  Goal: Verbalizes/displays adequate comfort level or baseline comfort level  Description: Interventions:  - Encourage patient to monitor pain and request assistance  - Assess pain using appropriate pain scale  - Administer analgesics based on type and severity of pain and evaluate response  - Implement non-pharmacological measures as appropriate and evaluate response  - Consider cultural and social influences on pain and pain management  - Notify physician/advanced practitioner if interventions unsuccessful or patient reports new pain  Outcome: Progressing     Problem: INFECTION - ADULT  Goal: Absence or prevention of progression during hospitalization  Description: INTERVENTIONS:  - Assess and monitor for signs and symptoms of infection  - Monitor lab/diagnostic results  - Monitor all insertion sites, i e  indwelling lines, tubes, and drains  - Monitor endotracheal if appropriate and nasal secretions for changes in amount and color  - [place] appropriate cooling/warming therapies per order  - Administer medications as ordered  - Instruct and encourage patient and family to use good hand hygiene technique  - Identify and instruct in appropriate isolation precautions for identified infection/condition  Outcome: Progressing     Problem: SAFETY ADULT  Goal: Patient will remain free of falls  Description: INTERVENTIONS:  - Educate patient/family on patient safety including physical limitations  - Instruct patient to call for assistance with activity   - Consult OT/PT to assist with strengthening/mobility   - Keep Call bell within reach  - Keep bed low and locked with side rails adjusted as appropriate  - Keep care items and personal belongings within reach  - Initiate and maintain comfort rounds  - Make Fall Risk Sign visible to staff  - Apply yellow socks and bracelet for high fall risk patients  - Consider moving patient to room near nurses station  Outcome: Progressing  Goal: Maintain or return to baseline ADL function  Description: INTERVENTIONS:  -  Assess patient's ability to carry out ADLs; assess patient's baseline for ADL function and identify physical deficits which impact ability to perform ADLs (bathing, care of mouth/teeth, toileting, grooming, dressing, etc )  - Assess/evaluate cause of self-care deficits   - Assess range of motion  - Assess patient's mobility; develop plan if impaired  - Assess patient's need for assistive devices and provide as appropriate  - Encourage maximum independence but intervene and supervise when necessary  - Involve family in performance of ADLs  - Assess for home care needs following discharge   - Consider OT consult to assist with ADL evaluation and planning for discharge  - Provide patient education as appropriate  Outcome: Progressing  Goal: Maintains/Returns to pre admission functional level  Description: INTERVENTIONS:  - Perform BMAT or MOVE assessment daily    - Set and communicate daily mobility goal to care team and patient/family/caregiver     - Collaborate with rehabilitation services on mobility goals if consulted  - Out of bed for toileting  - Record patient progress and toleration of activity level   Outcome: Progressing     Problem: DISCHARGE PLANNING  Goal: Discharge to home or other facility with appropriate resources  Description: INTERVENTIONS:  - Identify barriers to discharge w/patient and caregiver  - Arrange for needed discharge resources and transportation as appropriate  - Identify discharge learning needs (meds, wound care, etc )  - Arrange for interpretive services to assist at discharge as needed  - Refer to Case Management Department for coordinating discharge planning if the patient needs post-hospital services based on physician/advanced practitioner order or complex needs related to functional status, cognitive ability, or social support system  Outcome: Progressing

## 2023-02-16 NOTE — PROGRESS NOTES
PM&R PROGRESS NOTE:  Nelly Ferrer 62 y o  female MRN: [id number]  Unit/Bed#: -01 Encounter: [id number]        Rehabilitation Diagnosis: Impairment of mobility, safety, Activities of Daily Living (ADLs), and cognitive/communication skills due to Stroke:  01 2  Right Body Involvement (Left Brain)  Left posterior limb internal capsule acute ischemic CVA    SUBJECTIVE: Patient seen face-to-face today  Slept well overnight  Reports no acute issues or headaches  Excited about her discharge home on Monday  No other acute issues reported by nursing  ASSESSMENT: Stable, progressing      PLAN:    Rehabilitation  • Functional deficits: right hemiparesis, dysarthria, dysphagia, cognitive deficits, impaired mobility, self care   • Continue current rehabilitation plan of care to maximize function      • Expected Discharge: Monday, 2/28/2023 with outpatient neuro day program PT, OT, SLP    Physical Therapy Occupational Therapy Speech Therapy   Weight Bearing Status: Full Weight Bearing  Transfers: Supervision  Bed Mobility: Independent  Amulation Distance (ft): 500 feet  Ambulation: Supervision  Assistive Device for Ambulation: Single [employer] (or no AD)  Wheelchair Mobility Distance:  (N/A)  Number of Stairs: 12  Assistive Device for Stairs: Right Hand Rail (descnding, L handrail ascending)  Stair Assistance: Supervision  Ramp:  (not needed for entry to home)  Discharge Recommendations: Home with:  [address] with[de-identified] Family Support, Outpatient Physical Therapy   Eating: Supervision  Grooming: Supervision  Bathing: Supervision  Bathing: Supervision  Upper Body Dressing: Supervision  Lower Body Dressing: Supervision  Toileting: Supervision  Tub/Shower Transfer: Supervision  Toilet Transfer: Supervision  Cognition: Within Defined Limits  Orientation: Person, Place, Time, Situation   Mode of Communication: Verbal  Speech/Language: Dysarthia  Cognition: Exceptions to WNL  Cognition: Decreased Memory, Decreased Executive Functions, Decreased Attention  Orientation: Person, Place, Time, Situation  Swallowing: Within Defined Limits  Diet Recommendations: Regular Diet, Thin  Discharge Recommendations: Home with:  [address] with[de-identified] Family Support, Outpatient Speech Therapy         DVT prophylaxis  • Discontinue subcutaneous Lovenox as ambulating consistently greater than 300 feet per day     GI ppx:  • Starting Protonix 40 mg daily instead of Pepcid as patient high risk for stress ulcer given her history of smoking and now CVA     Bladder plan  • Continent     Bowel plan  • Continent      * Stroke - Left posterior limb internal capsule ischemic stroke  Assessment & Plan  Presented 2/4/23 with right sided weakness, difficulty speaking  MRI confirmed left posterior limb internal capsule acute ischemic stroke  Etiology: likely related to hypertensive disease  Work-up for malignancy negative  Echo with EF 60%  Per Neurology placed on DAPT with ASA/Plavix x 21 days, then ASA monotherapy    Plan:  Begin acute rehab program  Secondary CVA ppx: Lipitor 40 mg daily  Aspirin 81 mg daily and Plavix 75 mg daily x 21 days (around 2/27/23) then Aspirin 81 mg daily monotherapy  Continue CVA education and reduction of modifiable risk factors naomi HTN, smoking cession  Follow-up with Neurology as outpatient    Tachycardia  Assessment & Plan  HR sometimes 100 at rest   EKG reviewed from 2/9: Sinus tachycardia with occ PVCs  Asymptomatic  Discussed with IM consultants -patient to be started on Lopressor  Hydrochlorothiazide to be discontinued    Losartan to be reduced  Continue to monitor and rest breaks around HR max = 130  Patient to follow with Cardiology as outpatient    Dry eye  Assessment & Plan  On the left side  Artificial tears 3 times daily  +VOR testing on left especially during movement - referral to neuro-optometry also recommended as outpatient     Pain in left foot, chronic  Assessment & Plan  Continue Tylenol PRN  Also if needed can add back Gabapentin - previously tolerated as outpatient    Prediabetes  Assessment & Plan  HA1C = 6 4  Diabetic diet   Consult nutrition to review with patient during [facility] stay    Chest pain  Assessment & Plan  In acute care, patient with transient chest pain at rest - troponin negative, EKG without changes  Cardiology consulted, recommended losartan and HCTZ for HTN  Also recommended a change to Crestor instead of Lipitor due to being on Verapamil (CY interaction)  Discontinue Verapamil 23 as headaches improved  · Outpatient follow-up recommended with Dr Skyler Hogan for further work-up  · While in [facility] monitor VS and any chest pains      Hypertension  Assessment & Plan  Here: Due to at rest tachycardia (chronic), after discussion with internal medicine consultants: Start Lopressor 25 mg every 12 hours, reduce losartan to 25 mg daily, discontinue hydrochlorothiazide  We will monitor BP and heart rate with new regimen  Plan per Cardiology  Optimize diet (limit salt)  Outpatient follow-up with Cardiology - Dr Roosevelt Sharma  At home: Seroquel 25 mg QHS  Here: Seroquel 25 mg QHS  Discontinued trazodone 2/15/2023    Depression  Assessment & Plan  Managed on Cymbalta 60 mg daily (home dose)  Consult placed to Neuropsychology - patient tearful and having difficulty coping with stroke diagnosis  Team to additionally provide emotional support    Obesity (BMI 35 0-39 9 without comorbidity)  Assessment & Plan  Lifestyle and diet modifications when able  Consult nutrition     Headache  Assessment & Plan  Developed in acute care  Treated with Migraine cocktail  Started on Verapamil for prevention by Neurology  Headaches have improved    Plan:  Discontinue Verapamil 23 as headaches much improved and would like to avoid interaction with Lipitor  If patient has a severe headache utilize gabapentin 100 mg PRN    Has not needed any gabapentin yesterday or today  Monitor for headaches  Follow-up with headache Neurologist as outpatient if persists    Tobacco use  Assessment & Plan  Smoking cessation education frequently during [facility] stay  Nicotine patch for cravings - wean dose weekly -reduce to 14 mg    Other acquired deformities of left foot  Assessment & Plan  Left hallux valgus and hammertoe surgery by Dr Tawanna Whipple (podiatry)  Chronic hypersensitivity/neuritis  Is able to wear shoes  Patient received injection by Dr Tawanna Whipple in past (Sept 2022)  Follow-up with Dr Aminah Dong  Labs, medications, and imaging personally reviewed  ROS:  A ten point review of systems was completed on 02/16/23 and pertinent positives are listed in subjective section  All other systems reviewed were negative  OBJECTIVE:   /83 (BP Location: Right arm)   Pulse (!) 112   Temp 97 7 °F (36 5 °C) (Oral)   Resp 18   Ht 5' (1 524 m)   Wt 85 8 kg (189 lb 3 2 oz)   SpO2 95%   BMI 36 95 kg/m²       Physical Exam  Vitals and nursing note reviewed  Constitutional:       General: She is not in acute distress  HENT:      Head: Normocephalic and atraumatic  Nose: Nose normal       Mouth/Throat:      Mouth: Mucous membranes are moist    Eyes:      Conjunctiva/sclera: Conjunctivae normal    Cardiovascular:      Rate and Rhythm: Normal rate and regular rhythm  Pulses: Normal pulses  Pulmonary:      Effort: Pulmonary effort is normal       Breath sounds: Normal breath sounds  No wheezing or rales  Abdominal:      General: Bowel sounds are normal  There is no distension  Palpations: Abdomen is soft  Tenderness: There is no abdominal tenderness  Musculoskeletal:         General: No swelling  Cervical back: Neck supple  Skin:     General: Skin is warm  Neurological:      Mental Status: She is alert and oriented to person, place, and time        Comments: Patient seen today in therapies ambulating with a step through gait pattern working on balance, fluency and speed of gait  Occupational Therapy working on visual perceptual skills especially involving the left eye   Psychiatric:         Mood and Affect: Mood normal           Lab Results   Component Value Date    WBC 8 88 02/16/2023    HGB 13 2 02/16/2023    HCT 41 6 02/16/2023     (H) 02/16/2023     02/16/2023     Lab Results   Component Value Date    SODIUM 137 02/16/2023    K 3 6 02/16/2023     02/16/2023    CO2 28 02/16/2023    BUN 19 02/16/2023    CREATININE 0 78 02/16/2023    GLUC 108 02/16/2023    CALCIUM 9 1 02/16/2023     Lab Results   Component Value Date    INR 0 92 02/04/2023    PROTIME 12 6 02/04/2023           Current Facility-Administered Medications:   •  acetaminophen (TYLENOL) tablet 650 mg, 650 mg, Oral, Q6H PRN, Mike Colindres MD, 650 mg at 02/14/23 1631  •  albuterol (PROVENTIL HFA,VENTOLIN HFA) inhaler 2 puff, 2 puff, Inhalation, Q4H PRN, Mike Colindres MD  •  aspirin chewable tablet 81 mg, 81 mg, Oral, Daily, Mike Colindres MD, 81 mg at 02/16/23 7719  •  atorvastatin (LIPITOR) tablet 40 mg, 40 mg, Oral, QPM, Mike Colindres MD, 40 mg at 02/15/23 1721  •  bisacodyl (DULCOLAX) rectal suppository 10 mg, 10 mg, Rectal, Daily PRN, Mike Colindres MD  •  clopidogrel (PLAVIX) tablet 75 mg, 75 mg, Oral, Daily, Mike Colindres MD, 75 mg at 02/16/23 9729  •  docusate sodium (COLACE) capsule 100 mg, 100 mg, Oral, BID, Chele Holder MD, 100 mg at 02/16/23 1690  •  DULoxetine (CYMBALTA) delayed release capsule 60 mg, 60 mg, Oral, Daily, Mike Colindres MD, 60 mg at 02/16/23 7808  •  gabapentin (NEURONTIN) capsule 100 mg, 100 mg, Oral, TID PRN, Mike Colindres MD, 100 mg at 02/14/23 1800  •  glycerin-hypromellose- (ARTIFICIAL TEARS) ophthalmic solution 1 drop, 1 drop, Left Eye, TID, Mike Colindres MD, 1 drop at 02/16/23 0836  •  lidocaine (LIDODERM) 5 % patch 1 patch, 1 patch, Topical, Daily, Mike Colindres MD, 1 patch at 02/16/23 0829  •  lidocaine (LMX) 4 % cream, , Topical, 4x Daily PRN, SHAILA Rivera  •  [START ON 2/17/2023] losartan (COZAAR) tablet 25 mg, 25 mg, Oral, Daily, SHAILA Mercado  •  metoprolol tartrate (LOPRESSOR) tablet 25 mg, 25 mg, Oral, Q12H [address], SHAILA Mercado  •  [START ON 2/17/2023] nicotine (NICODERM CQ) 14 mg/24hr TD 24 hr patch 14 mg, 14 mg, Transdermal, Daily, Joan Juarez MD  •  ondansetron (ZOFRAN-ODT) dispersible tablet 4 mg, 4 mg, Oral, Q6H PRN, Joan Juarez MD  •  pantoprazole (PROTONIX) EC tablet 40 mg, 40 mg, Oral, Early Morning, Joan Juarez MD, 40 mg at 02/16/23 2109  •  polyethylene glycol (MIRALAX) packet 17 g, 17 g, Oral, Daily PRN, Joan Juarez MD  •  QUEtiapine (SEROquel) tablet 25 mg, 25 mg, Oral, HS, Joan Juarez MD, 25 mg at 02/15/23 2122  •  senna (SENOKOT) tablet 8 6 mg, 1 tablet, Oral, BID, Markus Camargo MD, 8 6 mg at 02/15/23 1637      Past Medical History:   Diagnosis Date   • Acquired deformity of left foot    • Anesthesia complication     difficulty awakening   • Arthritis    • Deaf, left    • Depression    • Hallux valgus of left foot    • Hammer toe of left foot    • Headache    • Kidney stone    • Sciatic pain, right     intermittent       Patient Active Problem List    Diagnosis Date Noted   • Stroke - Left posterior limb internal capsule ischemic stroke 02/04/2023   • Tachycardia 02/15/2023   • Dry eye 02/14/2023   • Pain in left foot, chronic 02/09/2023   • Chest pain 02/06/2023   • Prediabetes 02/06/2023   • Insomnia 02/05/2023   • Hypertension 02/05/2023   • Depression 02/04/2023   • Obesity (BMI 35 0-39 9 without comorbidity) 03/10/2021   • Ex-smoker for less than 1 year 03/10/2021   • Headache 07/03/2020   • Tobacco use 07/02/2020   • Preoperative clearance 06/30/2020   • Wound of right foot 06/29/2020   • Acquired hallux valgus of left foot 05/06/2019   • Other hammer toe(s) (acquired), left foot 05/06/2019   • Other acquired deformities of left foot 05/06/2019          Jazmyne Galvan MD    I have spent a total time of 25 minutes on 02/16/23 in caring for this patient including Instructions for management, Impressions, Documenting in the medical record and Communicating with other healthcare professionals   ** Please Note:  voice to text software may have been used in the creation of this document   Although proof errors in transcription or interpretation are a potential of such software**

## 2023-02-16 NOTE — PROGRESS NOTES
02/16/23 1030   Pain Assessment   Pain Assessment Tool 0-10   Pain Score No Pain   Restrictions/Precautions   Precautions Cognitive; Fall Risk   Comprehension   Comprehension (FIM) 4 - Understands basic info/conversation 75-90% of time   Expression   Expression (FIM) 4 - Expresses basic info/needs 75-90% of time   Social Interaction   Social Interaction (FIM) 5 - Interacts appropriately with others 90% of time   Problem Solving   Problem solving (FIM) 4 - Solves basic problems 75-89% of time   Memory   Memory (FIM) 4 - Recognizes/recalls/performs 75-89%   Speech/Language/Cognition Assessmetn   Treatment Assessment Pt motivated and cooperative for ST session today, seen sitting upright in chair pulled up to table in PT gym  Pt R sided facial droop lessened today and conversational speech still min dysarthric w/ improved fluency, min distortions/ slurring mainly with /s/, /g/, or /sh/ phonemes  Pt also w/ improved speech in repetition and reading tasks only min prolongations, min articulatory groping and min blocks with words containing /s/ in all positions, initial and medial /g/ and /sh/ in all word positions  Pt remote/ short term memory intact able to recall visitors from last night and this AM, the contents of her breakfast, the ANGELY and date  Pt engaged in oral motor exercises targeting labial and lingual strength/ ROM w/ noted increased ROM/ strength on the R side today  Pt education provided on continuing these exercises using a mirror and toothbrush in place of tongue depressor to facilitate continued improvement in R sided labial and lingual ROM/ strength  Pt receptive and agreeable to the information provided  Pt then engaged in bisyllabic repetition task with words containing the /s/, /sh/ and /g/ phonemes in all positions of words  Pt required verbal cues and visual cues to use easy onset voicing, however when pt utilized this strategy pt w/ improved accuracy in production   Pt w/out any notable dysarthric speech characteristics in 6/11 productions initially, improved to 8/11 w/ use of easy onset voicing  Next, pt repeated 2-3 word phrases w/ articulatory accuracy of 4/6, self-correcting and using strategies (slow, over articulate, easy onset voicing) to improve accuracy to 5/6  Initial /st/ blend still unable to produce w/out min prolongation at the phrase level  Pt then read sentences w/ increased presence of min prolongation, articulatory groping, distortions and blocks demo pt continued difficulty w/ reading fluency  Student SLP then completed medication review w/ pt where pt was able to identify 10/12 (excluding two home medications Lipitor and Cymbalta) by their use, unable to recall medication names (delayed recall of aspirin)  When provided w/ names pt able to identify reason for 5/12 medications  Pt agreeable to introducing pill box next tx session for tangible medication task  Pt then w/ questions regarding if blood clot from stroke remained/ future stroke s/s, SLP provided brief stroke education to pt to be continued in greater details in future session  At this time, pt continues to benefit from skilled ST services targeting speech and cognitive linguistic skills to minimize caregiver burden upon d/c  SLP Therapy Minutes   SLP Time In 1030   SLP Time Out 1130   SLP Total Time (minutes) 60   SLP Mode of treatment - Individual (minutes) 60   SLP Mode of treatment - Concurrent (minutes) 0   SLP Mode of treatment - Group (minutes) 0   SLP Mode of treatment - Co-treat (minutes) 0   SLP Mode of Treatment - Total time(minutes) 60 minutes   SLP Cumulative Minutes 425   Therapy Time missed   Time missed?  No

## 2023-02-16 NOTE — PLAN OF CARE
Problem: PAIN - ADULT  Goal: Verbalizes/displays adequate comfort level or baseline comfort level  Description: Interventions:  - Encourage patient to monitor pain and request assistance  - Assess pain using appropriate pain scale  - Administer analgesics based on type and severity of pain and evaluate response  - Implement non-pharmacological measures as appropriate and evaluate response  - Consider cultural and social influences on pain and pain management  - Notify physician/advanced practitioner if interventions unsuccessful or patient reports new pain  Outcome: Progressing     Problem: INFECTION - ADULT  Goal: Absence or prevention of progression during hospitalization  Description: INTERVENTIONS:  - Assess and monitor for signs and symptoms of infection  - Monitor lab/diagnostic results  - Monitor all insertion sites, i e  indwelling lines, tubes, and drains  - Monitor endotracheal if appropriate and nasal secretions for changes in amount and color  - [place] appropriate cooling/warming therapies per order  - Administer medications as ordered  - Instruct and encourage patient and family to use good hand hygiene technique  - Identify and instruct in appropriate isolation precautions for identified infection/condition  Outcome: Progressing     Problem: SAFETY ADULT  Goal: Patient will remain free of falls  Description: INTERVENTIONS:  - Educate patient/family on patient safety including physical limitations  - Instruct patient to call for assistance with activity   - Consult OT/PT to assist with strengthening/mobility   - Keep Call bell within reach  - Keep bed low and locked with side rails adjusted as appropriate  - Keep care items and personal belongings within reach  - Initiate and maintain comfort rounds  - Make Fall Risk Sign visible to staff  - Offer Toileting every 2 Hours, in advance of need  - Initiate/Maintain bed/chair alarm  - Obtain necessary fall risk management equipment: non skid footwear  - Apply yellow socks and bracelet for high fall risk patients  - Consider moving patient to room near nurses station  Outcome: Progressing  Goal: Maintain or return to baseline ADL function  Description: INTERVENTIONS:  -  Assess patient's ability to carry out ADLs; assess patient's baseline for ADL function and identify physical deficits which impact ability to perform ADLs (bathing, care of mouth/teeth, toileting, grooming, dressing, etc )  - Assess/evaluate cause of self-care deficits   - Assess range of motion  - Assess patient's mobility; develop plan if impaired  - Assess patient's need for assistive devices and provide as appropriate  - Encourage maximum independence but intervene and supervise when necessary  - Involve family in performance of ADLs  - Assess for home care needs following discharge   - Consider OT consult to assist with ADL evaluation and planning for discharge  - Provide patient education as appropriate  Outcome: Progressing  Goal: Maintains/Returns to pre admission functional level  Description: INTERVENTIONS:  - Perform BMAT or MOVE assessment daily    - Set and communicate daily mobility goal to care team and patient/family/caregiver  - Collaborate with rehabilitation services on mobility goals if consulted  - Perform Range of Motion 3 times a day  - Reposition patient every 2 hours    - Dangle patient 3 times a day  - Stand patient 3 times a day  - Ambulate patient 3 times a day  - Out of bed to chair 3 times a day   - Out of bed for meals 3 times a day  - Out of bed for toileting  - Record patient progress and toleration of activity level   Outcome: Progressing     Problem: DISCHARGE PLANNING  Goal: Discharge to home or other facility with appropriate resources  Description: INTERVENTIONS:  - Identify barriers to discharge w/patient and caregiver  - Arrange for needed discharge resources and transportation as appropriate  - Identify discharge learning needs (meds, wound care, etc )  - Arrange for interpretive services to assist at discharge as needed  - Refer to Case Management Department for coordinating discharge planning if the patient needs post-hospital services based on physician/advanced practitioner order or complex needs related to functional status, cognitive ability, or social support system  Outcome: Progressing

## 2023-02-16 NOTE — PROGRESS NOTES
Internal Medicine Progress Note  Patient: Tevin Tyson  Age/sex: 62 y o  female  Medical Record #: [id number]      ASSESSMENT/PLAN: (Interval History)  Tevin Tyson is seen and examined and management for following issues:    Acute CVA  • Left posterior limb internal capsule/thalamic  • For ASA/Plavix a 21 days then to ASA alone on 23  • Continue Lipitor but Cards had advised switch to Crestor 40mg qd at discharge to avoid interaction of the Lipitor with the Verapamil (CY interaction)  • CT C/A/P was negative for malignancy; thrombosis panel sent both to rule out hypercoag state     Headaches  • MRI with contrast was unrevealing  • Neuro placed her on Verapamil for preventive tx and she got IV magnesium/Phenergan/Toradol/Benadryl  • D/w Dr Natalie Gomez (considering interaction with Lipitor) and see how she does with headaches     • Had a headache  and Dr Kizzy Lamas started Neurontin 100mg TID prn      Chest pain  • Had occurrences on  and  naomi with lying flat  • Trops and EKGs were negative  • Cards saw and felt atypical and will see her back in the office; no w/u for now     HTN  • Home:  no meds  • Here:  Losartan 50mg qd/HCTZ 12 5mg qd  • Stopped the Verapamil as above    • Since starting Lopressor for tachycardia, will stop HCTZ, cut Losartan to 25mg qd (starting )     Depression  • Continue Cymbalta/Seroquel  • Takes Seroquel and Trazodone at home     Pre-DM  • HbA1C was 6 4  • Watching FBS; no Accuchecks for now  • D/w pt 23 about starting a DM diet   She was agreeable  • Nutrition did see on 2/15/23 for DM diet teaching  • FBS AM 23 was 108     Nicotine abuse  • Continue patch  • Advised cessation 23     Tachycardia  • HR has increased after stopping the Verapamil into low 100s to 110s  • Last EKG  = ST and she has a few OV as OP with HRs in low 100s and some mild tachy in the hospital too before verapamil started  • TSH was 5 3  • No sx  • Hemoglobin, O2 sats are normal   She is not dry  • Will start Lopressor 25mg BID     Subclinical hypothyroidism  · TSH was 5 3, free T4 0 88  · No tx for now  · Repeat TFTs in 4 weeks as OP      Discharge date:  2/20/23    The above assessment and plan was reviewed and updated as determined by my evaluation of the patient on 2/16/2023      Labs:   Results from last 7 days   Lab Units 02/16/23  0521 02/13/23  0545   WBC Thousand/uL 8 88 8 93   HEMOGLOBIN g/dL 13 2 13 8   HEMATOCRIT % 41 6 41 6   PLATELETS Thousands/uL 310 318     Results from last 7 days   Lab Units 02/16/23  0521 02/13/23  0545   SODIUM mmol/L 137 136   POTASSIUM mmol/L 3 6 4 2   CHLORIDE mmol/L 105 105   CO2 mmol/L 28 28   BUN mg/dL 19 16   CREATININE mg/dL 0 78 0 76   CALCIUM mg/dL 9 1 9 3             Results from last 7 days   Lab Units 02/09/23  2101 02/09/23  1639   POC GLUCOSE mg/dl 144* 110       Review of Scheduled Meds:  Current Facility-Administered Medications   Medication Dose Route Frequency Provider Last Rate   • acetaminophen  650 mg Oral Q6H PRN Mike Jiménez MD     • albuterol  2 puff Inhalation Q4H PRN Mike Jiménez MD     • aspirin  81 mg Oral Daily Mike Jiménez MD     • atorvastatin  40 mg Oral QPM Mike Jiménez MD     • bisacodyl  10 mg Rectal Daily PRN Mike Jiménez MD     • clopidogrel  75 mg Oral Daily Mike Jiménez MD     • docusate sodium  100 mg Oral BID Jay Land MD     • DULoxetine  60 mg Oral Daily Mike Jiménez MD     • gabapentin  100 mg Oral TID PRN Mike Jiménez MD     • glycerin-hypromellose-  1 drop Left Eye TID Mike Jiménez MD     • hydrochlorothiazide  12 5 mg Oral Daily Mike Jiménez MD     • lidocaine  1 patch Topical Daily Mike Jiménez MD     • lidocaine   Topical 4x Daily PRN SHAILA Nuñez     • losartan  50 mg Oral Daily Mike Jiménez MD     • nicotine  21 mg Transdermal Daily [address] Ophel[address] Robert Jiang MD     • ondansetron  4 mg Oral Q6H PRN Mike Jiménez MD     • pantoprazole  40 mg Oral Early Morning Mike Jiménez MD     • polyethylene glycol  17 g Oral Daily PRN Mike Jiménez MD     • QUEtiapine  25 mg Oral HS Mike Jiménez MD     • senna  1 tablet Oral BID Jay Land MD         Subjective/ HPI: Patient seen and examined  Patients overnight issues or events were reviewed with nursing or staff during rounds or morning huddle session  New or overnight issues include the following:     No new or overnight issues  Offers no complaints    ROS:   A 10 point ROS was performed; negative except as noted above  Imaging:     No orders to display       *Labs /Radiology studies reviewed  *Medications reviewed and reconciled as needed  *Please refer to order section for additional ordered labs studies  *Case discussed with primary attending during morning huddle case rounds    Physical Examination:  Vitals:   Vitals:    02/15/23 0630 02/15/23 0948 02/15/23 1438 02/16/23 0500   BP: 115/89 118/84 118/78 112/68   BP Location: Right arm Right arm Right arm Left arm   Pulse: (!) 111 99 (!) 110 91   Resp: 18  19 16   Temp: 98 1 °F (36 7 °C)  97 8 °F (36 6 °C) 98 8 °F (37 1 °C)   TempSrc: Oral  Oral Oral   SpO2: 94%  91% 95%   Weight: 85 8 kg (189 lb 3 2 oz)      Height:           General Appearance: no distress, conversive  HEENT:  External ear normal   Nose normal w/o drainage  Mucous membranes are moist  Oropharynx is clear  Conjunctiva clear w/o icterus or redness  Neck:  Supple, normal ROM  Lungs: BBS without crackles/wheeze/rhonchi; respirations unlabored with normal inspiratory/expiratory effort  No retractions noted  On RA  CV: regular rate and rhythm; no rubs/murmurs/gallops, PMI normal   ABD: Abdomen is soft  Bowel sounds all quadrants  Nontender with no distention      EXT: no edema  Skin: normal turgor, normal texture, no rashes  Psych: affect normal, mood normal  Neuro: AAO The above physical exam was reviewed and updated as determined by my evaluation of the patient on 2/16/2023  Invasive Devices     None                    VTE Pharmacologic Prophylaxis: ambulating  Code Status: Level 1 - Full Code  Current Length of Stay: 7 day(s)      Total time spent:  30 minutes with more than 50% spent counseling/coordinating care  Counseling includes discussion with patient re: progress  and discussion with patient of his/her current medical state/information  Coordination of patient's care was performed in conjunction with primary service  Time invested included review of patient's labs, vitals, and management of their comorbidities with continued monitoring  In addition, this patient was discussed with medical team including physician and advanced extenders  The care of the patient was extensively discussed and appropriate treatment plan was formulated unique for this patient  Medical decision making for the day was made by supervising physician unless otherwise noted in their attestation statement  ** Please Note:  voice to text software may have been used in the creation of this document   Although proof errors in transcription or interpretation are a potential of such software**

## 2023-02-16 NOTE — PROGRESS NOTES
[facility] Occupational Therapy Daily Note  Patient Active Problem List   Diagnosis   • Acquired hallux valgus of left foot   • Other hammer toe(s) (acquired), left foot   • Other acquired deformities of left foot   • Wound of right foot   • Preoperative clearance   • Tobacco use   • Headache   • Obesity (BMI 35 0-39 9 without comorbidity)   • Ex-smoker for less than 1 year   • Stroke - Left posterior limb internal capsule ischemic stroke   • Depression   • Insomnia   • Hypertension   • Chest pain   • Prediabetes   • Pain in left foot, chronic   • Dry eye   • Tachycardia       Past Medical History:   Diagnosis Date   • Acquired deformity of left foot    • Anesthesia complication     difficulty awakening   • Arthritis    • Deaf, left    • Depression    • Hallux valgus of left foot    • Hammer toe of left foot    • Headache    • Kidney stone    • Sciatic pain, right     intermittent     Etiologic Diagnosis: left psterior limb internal capsule/thalamic acute to subacute infarct  Restrictions/Precautions  Precautions: Bed/chair alarms, Fall Risk, Supervision on toilet/commode  Weight Bearing Restrictions: No  ROM Restrictions: No  ADL Team Goal: Patient will be independent with ADLs with least restrictive device upon completion of rehab program  Occupational Therapy LTG's  Eating Oral care Bathing LB dress UB dress   Eating Goal: 06  Independent - Patient completes the activity by him/herself with no assistance from a helper  Oral Hygiene Goal: 06  Independent - Patient completes the activity by him/herself with no assistance from a helper  Shower/bathe self Goal: 06  Independent - Patient completes the activity by him/herself with no assistance from a helper  Lower body dressing Goal: 06  Independent - Patient completes the activity by him/herself with no assistance from a helper  Upper body dressing Goal: 06  Independent - Patient completes the activity by him/herself with no assistance from a helper     Toileting Toilet txf Func txf IADL Med    Toileting hygiene Goal: 06  Independent - Patient completes the activity by him/herself with no assistance from a helper  Toilet transfer Goal: 06  Independent - Patient completes the activity by him/herself with no assistance from a helper  Assist Level: Independent Assist Level: Independent   OT interventions: Treatment/Interventions: ADL retraining, Functional transfer training, Therapeutic exercise, Endurance training, Patient/family training, Equipment eval/education, Bed mobility, Compensatory technique education  Discharge Plan:  OT Discharge Recommendation:  (Home with family support)   DME: Equipment Recommended:  (TBD),  ,      02/15/23 0700   Pain Assessment   Pain Assessment Tool 0-10   Lifestyle   Autonomy "I can just go? you trust me?"   Eating   Type of Assistance Needed Independent   Eating CARE Score 6   Oral Hygiene   Type of Assistance Needed Independent   Oral Hygiene CARE Score 6   Shower/Bathe Self   Type of Assistance Needed Supervision   Comment completes shower today, majority in stance  seated for LE bathing   Shower/Bathe Self CARE Score 4   Tub/Shower Transfer   Findings IND shower transfer   Upper Body Dressing   Type of Assistance Needed Independent   Comment pt gathering all items in room  able to clasp bra entirly today     Upper Body Dressing CARE Score 6   Lower Body Dressing   Type of Assistance Needed Independent   Lower Body Dressing CARE Score 6   Putting On/Taking Off Footwear   Type of Assistance Needed Independent   Putting On/Taking Off Footwear CARE Score 6   Sit to Lying   Type of Assistance Needed Independent   Sit to Lying CARE Score 6   Sit to Stand   Type of Assistance Needed Independent   Sit to Stand CARE Score 6   Bed-Chair Transfer   Type of Assistance Needed Independent   Chair/Bed-to-Chair Transfer CARE Score 6   [place] Hygiene CARE Score 6   Toilet Transfer   Type of Assistance Needed Independent   Comment pt is cleared to walk to Bathroom with staff in hallways to open bathroom  Toilet Transfer CARE Score 6   Additional Activities   Additional Activities Comments Pt engages in discussion of in room privileges to complete activities independently in room  Pt engages in simulated tasks for environmental hazards and safety awareness  Pt demonstrates good understanding and Pt is progressed to in room privileges with no device  Team members notified at this time  Assessment   Treatment Assessment Pt participated in skilled OT treatment session with treatment focus on ADL retraining with progression to in room privileges, R UE objective measure re-testing  Pt tolerated session well, with excitement about her progress with her  RUE  Pt overall making vast improvements in overall strength, coordination, and proprioception along with functional use  Pt's right UE function currently Functional  Strength and Gross/Fine motor coordination are within functional limits for task performance without influence of synergies  9 hole peg test completed in 35 seconds and a minimum of 20# gross  strength and pinch patterns    Continue to focus neurological treatment plan:Repetitive Task training, Gross motor coordination, Fine motor coordination and motor planning, visual perceptual skills with focus on ocular motor control, visual field training to L, sustained visual focus, visual scanning with accomodation     Prognosis Good   Plan   Progress Progressing toward goals   OT Therapy Minutes   OT Time In 0700   OT Time Out 0830   OT Total Time (minutes) 90   OT Mode of treatment - Individual (minutes) 90   OT Mode of treatment - Concurrent (minutes) 0   OT Mode of treatment - Group (minutes) 0   OT Mode of treatment - Co-treat (minutes) 0   OT Mode of Treatment - Total time(minutes) 90 minutes   OT Cumulative Minutes 450    UE Evaluation:       FELIZ Serra            UPPER EXTREMITY FUNCTION Impaired Intact Dominant Hand: RIGHT                            /Pinch Strength         Dynamometer         - Gross Grasp eval10,15,10   2/15- 37,35,35lbs eval-30,30,30   2/15-35,35,35lbs        Pinch Meter          - PINCER eval-1 3 lbs  2/15-5 5lbs eval-3 8 lbs   2/15-5 1lbs      - Lateral key eval-4 3 lbs  2/15- 8 8 lbs eval-4 8 lbs  2/15- 9 8 lbs     9 hole Peg Test eval- 54 31   2/15- 29 95 24 59      Box and Blocks eval- 33 blocks  2/15-44 blocks  50                      Fugl-Leon UE Assessment  Right      A: UPPER EXTREMITY     I: reflex activity 4/4   II:volitional movement within synergies 18/18   III: volitional movement mixing synergies 6/6   IV: volitional movement with little or no synergy 6/6   V: Normal Reflex activity 2/2   B: WRIST 10/10   C: HAND 14/14   D: Coordination and Speed   R:5 34 L:4 17 seconds 6/6   H: Sensation 12/12   J: PROM 24/24   J: Joint Pain 22/24   Score from motor sections A-D:      66/66

## 2023-02-16 NOTE — PROGRESS NOTES
02/16/23 0830   Pain Assessment   Pain Assessment Tool 0-10   Pain Score No Pain   Restrictions/Precautions   Precautions Fall Risk   Weight Bearing Restrictions No   ROM Restrictions No   Cognition   Overall Cognitive Status WFL   Arousal/Participation Alert; Cooperative   Attention Within functional limits   Orientation Level Oriented X4   Memory Within functional limits   Following Commands Follows one step commands without difficulty   Roll Left and Right   Type of Assistance Needed Independent   Roll Left and Right CARE Score 6   Sit to Lying   Type of Assistance Needed Independent   Sit to Lying CARE Score 6   Lying to Sitting on Side of Bed   Type of Assistance Needed Independent   Lying to Sitting on Side of Bed CARE Score 6   Sit to Stand   Type of Assistance Needed Independent   Comment no AD   Sit to Stand CARE Score 6   Bed-Chair Transfer   Type of Assistance Needed Independent   Comment no AD   Chair/Bed-to-Chair Transfer CARE Score 6   Car Transfer   Type of Assistance Needed Supervision   Comment initially lifted LLE in but educated on sitting first for safety  Car Transfer CARE Score 4   Walk 10 Feet   Type of Assistance Needed Independent   Comment no AD   Walk 10 Feet CARE Score 6   Walk 50 Feet with Two Turns   Type of Assistance Needed Set-up / clean-up   Comment DS no AD   Walk 50 Feet with Two Turns CARE Score 5   Walk 150 Feet   Type of Assistance Needed Supervision   Comment no AD   Walk 150 Feet CARE Score 4   Walking 10 Feet on Uneven Surfaces   Type of Assistance Needed Supervision   Comment no AD   Walking 10 Feet on Uneven Surfaces CARE Score 4   Ambulation   Primary Mode of Locomotion Prior to Admission Walk   Distance Walked (feet) 500 ft  ((unlimited))   Gait Pattern Narrow CHE; Improper weight shift; Lateral deviation   Limitations Noted In Coordination; Safety;Speed;Strength   Provided Assistance with: Direction   Walk Assist Level Distant Supervision; Independent   Does the patient walk? 2  Yes   Wheel 50 Feet with Two Turns   Reason if not Attempted Activity not applicable   Wheel 50 Feet with Two Turns CARE Score 9   Wheel 150 Feet   Reason if not Attempted Activity not applicable   Wheel 907 Feet CARE Score 9   Wheelchair mobility   Does the patient use a wheelchair? 0  No   Curb or Single Stair   Style negotiated Curb   Type of Assistance Needed Supervision   Comment no AD   1 Step (Curb) CARE Score 4   4 Steps   Type of Assistance Needed Supervision; Adaptive equipment   Comment single HR   4 Steps CARE Score 4   12 Steps   Type of Assistance Needed Supervision; Adaptive equipment   Comment single HR   12 Steps CARE Score 4   Stairs   Type Stairs   # of Steps 12   Weight Bearing Precautions Fall Risk   Assist Devices Single Rail   Findings reciprocal gait pattern   Picking Up Object   Type of Assistance Needed Set-up / clean-up   Comment DS no AD   Picking Up Object CARE Score 5   Toilet Transfer   Type of Assistance Needed Independent   Comment no AD   Toilet Transfer CARE Score 6   Therapeutic Interventions   Strengthening (S)  HEP printed for standing and seated TE's  Please go over with her next session  Pt requesting TE's for sciatic pain  Discussed different stretches for piriformis and HEP for stretches given  Neuromuscular Re-Education amb while drbbling ball with R x100' then with L x100, CS no overt LOB, tandem gait x100' fwd/bwds with LYLA mild lateral deviation noted, amb while throwing ball in air 100' x2, amb while playing soccer x150' fwd then bwd's, LYLA mild LOB at times but able to recover, ball toss while standing on blue balance disc with feet apart then feet together, CGA no overt LOB  Pt then asked to name colors and animals while throwing ball for duel tasking  Weaving thu cones fwd/bwds x4 reps, alt toe taps and retreved cones from floor   Set up yellow and blue cone in front of pt and given quick commands on which foot to tap on what cone, 90% accuracy seen but delayed responses at times  Pt amb outside on log ramp, steps and over grass with S, no LOB but mild lateral deviations at times  Noted poor L ankle stability  Equipment Use   NuStep L2 x10 min   Assessment   Treatment Assessment Pt participated in 90 min skilled PT session with increased focus on gait, increased balance, coordination, improved reaction time, increased duel tasking, righting reactions and safety awareness  Pt noted delayed responses with act at times, discussed importance of quick responses especially as pt wants to eventually start driving again  Pt also noted decreased stability to L ankle with balance act often causing her to have lateral deviations with gait  Pt anticipates discharge home Monday 2/20/23 at I level and OPPT services, preferably neuro day program  Cont POC as tolerated with increased focus on on HEP, increased duel tasking, improved righting reactions, increased balance, increased L ankle stability and improved coordination  Problem List Impaired balance;Decreased coordination   Barriers to Discharge None   PT Barriers   Functional Limitation Stair negotiation   Plan   Treatment/Interventions LE strengthening/ROM; Therapeutic exercise;Gait training   Progress Progressing toward goals   Recommendation   PT Discharge Recommendation Home with outpatient rehabilitation   PT Therapy Minutes   PT Time In 0830   PT Time Out 1000   PT Total Time (minutes) 90   PT Mode of treatment - Individual (minutes) 90   PT Mode of treatment - Concurrent (minutes) 0   PT Mode of treatment - Group (minutes) 0   PT Mode of treatment - Co-treat (minutes) 0   PT Mode of Treatment - Total time(minutes) 90 minutes   PT Cumulative Minutes 435   Therapy Time missed   Time missed?  No

## 2023-02-16 NOTE — PROGRESS NOTES
[facility] Occupational Therapy Daily Note  Patient Active Problem List   Diagnosis    Acquired hallux valgus of left foot    Other hammer toe(s) (acquired), left foot    Other acquired deformities of left foot    Wound of right foot    Preoperative clearance    Tobacco use    Headache    Obesity (BMI 35 0-39 9 without comorbidity)    Ex-smoker for less than 1 year    Stroke - Left posterior limb internal capsule ischemic stroke    Depression    Insomnia    Hypertension    Chest pain    Prediabetes    Pain in left foot, chronic    Dry eye    Tachycardia       Past Medical History:   Diagnosis Date    Acquired deformity of left foot     Anesthesia complication     difficulty awakening    Arthritis     Deaf, left     Depression     Hallux valgus of left foot     Hammer toe of left foot     Headache     Kidney stone     Sciatic pain, right     intermittent     Etiologic Diagnosis: left psterior limb internal capsule/thalamic acute to subacute infarct  Restrictions/Precautions  Precautions: Bed/chair alarms, Fall Risk, Supervision on toilet/commode  Weight Bearing Restrictions: No  ROM Restrictions: No  ADL Team Goal: Patient will be independent with ADLs with least restrictive device upon completion of rehab program  Occupational Therapy LTG's  Eating Oral care Bathing LB dress UB dress   Eating Goal: 06  Independent - Patient completes the activity by him/herself with no assistance from a helper  Oral Hygiene Goal: 06  Independent - Patient completes the activity by him/herself with no assistance from a helper  Shower/bathe self Goal: 06  Independent - Patient completes the activity by him/herself with no assistance from a helper  Lower body dressing Goal: 06  Independent - Patient completes the activity by him/herself with no assistance from a helper  Upper body dressing Goal: 06  Independent - Patient completes the activity by him/herself with no assistance from a helper     Toileting Toilet txf Func txf IADL Med Toileting hygiene Goal: 06  Independent - Patient completes the activity by him/herself with no assistance from a helper  Toilet transfer Goal: 06  Independent - Patient completes the activity by him/herself with no assistance from a helper  Assist Level: Independent Assist Level: Independent   OT interventions: Treatment/Interventions: ADL retraining, Functional transfer training, Therapeutic exercise, Endurance training, Patient/family training, Equipment eval/education, Bed mobility, Compensatory technique education  Discharge Plan:  OT Discharge Recommendation:  (Home with family support)   DME: Equipment Recommended:  (TBD),  ,       02/16/23 1300   Pain Assessment   Pain Assessment Tool 0-10   Pain Score No Pain   Lifestyle   Autonomy "my  was here, 6:30!"   Tub/Shower Transfer   Findings completed dry tub transfers in tall socaking tub  Pt able to complete with Sup for step over with no UE support  persoanl tub at home is shorter and reports feeling confident with transfer  has shower chair in tub at home  Vision   Vision Comments Pt engages in a variety of visual perceptual tasks focusing on sustained visual attention/focus along with adding small space scanning on upright slant board ~15" from central focus  Pt reports no increase in HA symptoms with activities  Pt does require use of visual guide wither finger or pen to focus on large scale scanning tasks and work with clutter  Pt then focusing on tasks in lower visual field on table top  Focus on sustained visual focus and small visual scanning in lower field  Pt does demo increase difficulty in lower firld work w/ c/o blurriness occurring, able to focus but unable to sustain entirely  activity focus shift to LLQ work with sustained visual focus on pictures  L eye when looking in LLQ pt experiences ptosis and eye droop  Pt provided with in room activities to complete in her off time to focus on lower visual field activities     Assessment Treatment Assessment Pt participated in skilled OT treatment session with treatment focus on visual perceptual training and visual scanning  Pt tolerated session well reporting feeling well and having a good day, happy with her progress  Overall pt cont to still make progress with overall mobility, FMC, and ADLs  Cont to focus on visual perceptual training for L eye strengthening in Lower quadrants     Prognosis Good   Plan   Progress Progressing toward goals   OT Therapy Minutes   OT Time In 1300   OT Time Out 1430   OT Total Time (minutes) 90   OT Mode of treatment - Individual (minutes) 90   OT Mode of treatment - Concurrent (minutes) 0   OT Mode of treatment - Group (minutes) 0   OT Mode of treatment - Co-treat (minutes) 0   OT Mode of Treatment - Total time(minutes) 90 minutes   OT Cumulative Minutes 642

## 2023-02-17 RX ADMIN — SENNOSIDES 8.6 MG: 8.6 TABLET, FILM COATED ORAL at 08:31

## 2023-02-17 RX ADMIN — CLOPIDOGREL BISULFATE 75 MG: 75 TABLET ORAL at 08:31

## 2023-02-17 RX ADMIN — LOSARTAN POTASSIUM 25 MG: 25 TABLET, FILM COATED ORAL at 08:30

## 2023-02-17 RX ADMIN — NICOTINE 14 MG: 14 PATCH, EXTENDED RELEASE TRANSDERMAL at 08:31

## 2023-02-17 RX ADMIN — QUETIAPINE FUMARATE 25 MG: 25 TABLET ORAL at 21:58

## 2023-02-17 RX ADMIN — GLYCERIN 1 DROP: .002; .002; .01 SOLUTION/ DROPS OPHTHALMIC at 15:51

## 2023-02-17 RX ADMIN — METOPROLOL TARTRATE 25 MG: 25 TABLET, FILM COATED ORAL at 08:31

## 2023-02-17 RX ADMIN — ATORVASTATIN CALCIUM 40 MG: 40 TABLET, FILM COATED ORAL at 18:23

## 2023-02-17 RX ADMIN — DOCUSATE SODIUM 100 MG: 100 CAPSULE, LIQUID FILLED ORAL at 08:31

## 2023-02-17 RX ADMIN — LIDOCAINE 5% 1 PATCH: 700 PATCH TOPICAL at 08:30

## 2023-02-17 RX ADMIN — PANTOPRAZOLE SODIUM 40 MG: 40 TABLET, DELAYED RELEASE ORAL at 05:50

## 2023-02-17 RX ADMIN — GLYCERIN 1 DROP: .002; .002; .01 SOLUTION/ DROPS OPHTHALMIC at 21:58

## 2023-02-17 RX ADMIN — GLYCERIN 1 DROP: .002; .002; .01 SOLUTION/ DROPS OPHTHALMIC at 10:11

## 2023-02-17 RX ADMIN — ASPIRIN 81 MG CHEWABLE TABLET 81 MG: 81 TABLET CHEWABLE at 08:31

## 2023-02-17 RX ADMIN — METOPROLOL TARTRATE 25 MG: 25 TABLET, FILM COATED ORAL at 21:58

## 2023-02-17 RX ADMIN — DULOXETINE HYDROCHLORIDE 60 MG: 60 CAPSULE, DELAYED RELEASE ORAL at 08:31

## 2023-02-17 NOTE — CASE MANAGEMENT
Received mssg via teams from [facility] at [address]  pts outpt therapy schedule had to be changed  New schedule is 2/21 physical therapy at 3 15, 2/23 speech at 8am, occupational therapy at 9  Info changed on dc instructions  Phone call placed to pts son amanda and made aware

## 2023-02-17 NOTE — PROGRESS NOTES
PM&R PROGRESS NOTE:  Mirella Pereira 62 y o  female MRN: [id number]  Unit/Bed#: -01 Encounter: [id number]        Rehabilitation Diagnosis: Impairment of mobility, safety, Activities of Daily Living (ADLs), and cognitive/communication skills due to Stroke:  01 2  Right Body Involvement (Left Brain)  Left posterior limb internal capsule acute ischemic CVA    SUBJECTIVE: Patient seen face to face today in PT  Patient working on higher level activity goals today in physical therapy  She is able to keep up with all of these tasks without imbalance incoordination or other symptoms  She states no headaches for 3 days  She has not utilized any as needed gabapentin  She states she is sleeping well on the Seroquel additionally  Tolerating new blood pressure medicine Lopressor well with good heart rate this morning  Update provided to her son Gustabo Menard on the phone today    ASSESSMENT: Stable, progressing      PLAN:    Rehabilitation  • Functional deficits: right hemiparesis, dysarthria, dysphagia, cognitive deficits, impaired mobility, self care   • Continue current rehabilitation plan of care to maximize function  • Expected Discharge: Monday, 2/28/2023 with outpatient neuro day program PT, OT, SLP    • Patient to receive a home exercise program additionally    Physical Therapy Occupational Therapy Speech Therapy   Weight Bearing Status: Full Weight Bearing  Transfers: Supervision  Bed Mobility: Independent  Amulation Distance (ft): 500 feet  Ambulation: Supervision  Assistive Device for Ambulation: Single [employer] (or no AD)  Wheelchair Mobility Distance:  (N/A)  Number of Stairs: 12  Assistive Device for Stairs: Right Hand Rail (descnding, L handrail ascending)  Stair Assistance: Supervision  Ramp:  (not needed for entry to home)  Discharge Recommendations: Home with:  [address] with[de-identified] Family Support, Outpatient Physical Therapy   Eating: Supervision  Grooming: Supervision  Bathing: Supervision  Bathing: Supervision  Upper Body Dressing: Supervision  Lower Body Dressing: Supervision  Toileting: Supervision  Tub/Shower Transfer: Supervision  Toilet Transfer: Supervision  Cognition: Within Defined Limits  Orientation: Person, Place, Time, Situation   Mode of Communication: Verbal  Speech/Language: Dysarthia  Cognition: Exceptions to WNL  Cognition: Decreased Memory, Decreased Executive Functions, Decreased Attention  Orientation: Person, Place, Time, Situation  Swallowing: Within Defined Limits  Diet Recommendations: Regular Diet, Thin  Discharge Recommendations: Home with:  [address] with[de-identified] Family Support, Outpatient Speech Therapy       DVT prophylaxis  • Discontinue subcutaneous Lovenox as ambulating consistently greater than 300 feet per day     GI ppx:  • Starting Protonix 40 mg daily instead of Pepcid as patient high risk for stress ulcer given her history of smoking and now CVA     Bladder plan  • Continent     Bowel plan  • Continent      * Stroke - Left posterior limb internal capsule ischemic stroke  Assessment & Plan  Presented 2/4/23 with right sided weakness, difficulty speaking  MRI confirmed left posterior limb internal capsule acute ischemic stroke  Etiology: likely related to hypertensive disease  Work-up for malignancy negative  Echo with EF 60%  Per Neurology placed on DAPT with ASA/Plavix x 21 days, then ASA monotherapy    Plan:  Begin acute rehab program  Secondary CVA ppx: Lipitor 40 mg daily  Aspirin 81 mg daily and Plavix 75 mg daily x 21 days (around 2/27/23) then Aspirin 81 mg daily monotherapy      Continue CVA education and reduction of modifiable risk factors naomi HTN, smoking cession  Follow-up with Neurology as outpatient    Tachycardia  Assessment & Plan  HR sometimes 100 at rest -historically has had tachycardia even reviewing outpatient records  EKG reviewed from 2/9: Sinus tachycardia with occ PVCs  Asymptomatic  Discussed with IM consultants -patient to be started on Lopressor 25 mg every 12 hours  Hydrochlorothiazide to be discontinued  Losartan to be reduced 25 mg daily  ·  Improvement in her resting heart rate now ranging from 80-90  Continue to monitor and rest breaks around HR max = 130  Patient to follow with Cardiology as outpatient    Dry eye  Assessment & Plan  On the left side  Artificial tears 3 times daily  +VOR testing on left especially during movement - referral to neuro-optometry also recommended as outpatient     Pain in left foot, chronic  Assessment & Plan  Continue Tylenol PRN  Also if needed can add back Gabapentin - previously tolerated as outpatient    Prediabetes  Assessment & Plan  HA1C = 6 4  Diabetic diet   Consult nutrition to review with patient during [facility] stay    Chest pain  Assessment & Plan  In acute care, patient with transient chest pain at rest - troponin negative, EKG without changes  Cardiology consulted, recommended losartan and HCTZ for HTN  Also recommended a change to Crestor instead of Lipitor due to being on Verapamil (CY interaction)    Discontinue Verapamil 23 as headaches improved  · Outpatient follow-up recommended with Dr Jana Youssef for further work-up  · While in [facility] monitor VS and any chest pains      Hypertension  Assessment & Plan  Here: Due to at rest tachycardia (chronic), after discussion with internal medicine consultants: Start Lopressor 25 mg every 12 hours, reduce losartan to 25 mg daily, discontinue hydrochlorothiazide  We will monitor BP and heart rate with new regimen  -overall stable  Plan per Cardiology  Optimize diet (limit salt)  Outpatient follow-up with Cardiology - Dr Mariela Contreras  At home: Seroquel 25 mg QHS  Here: Seroquel 25 mg QHS  Discontinued trazodone 2/15/2023    Depression  Assessment & Plan  Managed on Cymbalta 60 mg daily (home dose)  Consult placed to Neuropsychology - patient tearful and having difficulty coping with stroke diagnosis  Team to additionally provide emotional support    Obesity (BMI 35 0-39 9 without comorbidity)  Assessment & Plan  Lifestyle and diet modifications when able  Consult nutrition     Headache  Assessment & Plan  Developed in acute care  Treated with Migraine cocktail  Started on Verapamil for prevention by Neurology  Headaches have improved    Plan:  Discontinue Verapamil 2/13/23 as headaches much improved and would like to avoid interaction with Lipitor  If patient has a severe headache utilize gabapentin 100 mg PRN  Has not needed any gabapentin yesterday or today  Monitor for headaches  Follow-up with headache Neurologist as outpatient if persists    Tobacco use  Assessment & Plan  Smoking cessation education frequently during [facility] stay  Nicotine patch for cravings - wean dose weekly -reduce to 14 mg    Other acquired deformities of left foot  Assessment & Plan  Left hallux valgus and hammertoe surgery by Dr Crystal Esqueda (podiatry)  Chronic hypersensitivity/neuritis  Is able to wear shoes  Patient received injection by Dr Crystal Esqueda in past (Sept 2022)  Follow-up with Dr Treviño Limb  Labs, medications, and imaging personally reviewed  ROS:  A ten point review of systems was completed on 02/17/23 and pertinent positives are listed in subjective section  All other systems reviewed were negative  OBJECTIVE:   /78   Pulse 97   Temp 98 3 °F (36 8 °C) (Oral)   Resp 17   Ht 5' (1 524 m)   Wt 85 8 kg (189 lb 3 2 oz)   SpO2 97%   BMI 36 95 kg/m²       Physical Exam  Vitals and nursing note reviewed  Constitutional:       General: She is not in acute distress  HENT:      Head: Normocephalic and atraumatic  Nose: Nose normal       Mouth/Throat:      Mouth: Mucous membranes are moist    Eyes:      Conjunctiva/sclera: Conjunctivae normal    Cardiovascular:      Rate and Rhythm: Normal rate and regular rhythm  Pulses: Normal pulses     Pulmonary:      Effort: Pulmonary effort is normal  Breath sounds: Normal breath sounds  No wheezing or rales  Abdominal:      General: Bowel sounds are normal  There is no distension  Palpations: Abdomen is soft  Tenderness: There is no abdominal tenderness  Musculoskeletal:         General: No swelling  Cervical back: Neck supple  Skin:     General: Skin is warm  Neurological:      Mental Status: She is alert and oriented to person, place, and time        Comments: Much improved strength right hemibody -rated as 5/5  Seen ambulating with good gait speed in hallway with no loss of balance step through pattern -no assistive device required   Psychiatric:         Mood and Affect: Mood normal           Lab Results   Component Value Date    WBC 8 88 02/16/2023    HGB 13 2 02/16/2023    HCT 41 6 02/16/2023     (H) 02/16/2023     02/16/2023     Lab Results   Component Value Date    SODIUM 137 02/16/2023    K 3 6 02/16/2023     02/16/2023    CO2 28 02/16/2023    BUN 19 02/16/2023    CREATININE 0 78 02/16/2023    GLUC 108 02/16/2023    CALCIUM 9 1 02/16/2023     Lab Results   Component Value Date    INR 0 92 02/04/2023    PROTIME 12 6 02/04/2023           Current Facility-Administered Medications:   •  acetaminophen (TYLENOL) tablet 650 mg, 650 mg, Oral, Q6H PRN, Norma Camargo MD, 650 mg at 02/14/23 1631  •  albuterol (PROVENTIL HFA,VENTOLIN HFA) inhaler 2 puff, 2 puff, Inhalation, Q4H PRN, Norma Camargo MD  •  aspirin chewable tablet 81 mg, 81 mg, Oral, Daily, Norma Camargo MD, 81 mg at 02/17/23 0831  •  atorvastatin (LIPITOR) tablet 40 mg, 40 mg, Oral, QPM, Norma Camargo MD, 40 mg at 02/16/23 1722  •  bisacodyl (DULCOLAX) rectal suppository 10 mg, 10 mg, Rectal, Daily PRN, Norma Camargo MD  •  clopidogrel (PLAVIX) tablet 75 mg, 75 mg, Oral, Daily, Norma Camargo MD, 75 mg at 02/17/23 0831  •  docusate sodium (COLACE) capsule 100 mg, 100 mg, Oral, BID, Maddie Ricks MD, 100 mg at 02/17/23 8771  •  DULoxetine (CYMBALTA) delayed release capsule 60 mg, 60 mg, Oral, Daily, Tariq Gallagher MD, 60 mg at 02/17/23 0831  •  gabapentin (NEURONTIN) capsule 100 mg, 100 mg, Oral, TID PRN, Tariq Gallagher MD, 100 mg at 02/14/23 1800  •  glycerin-hypromellose- (ARTIFICIAL TEARS) ophthalmic solution 1 drop, 1 drop, Left Eye, TID, Tariq Gallagher MD, 1 drop at 02/17/23 1011  •  lidocaine (LIDODERM) 5 % patch 1 patch, 1 patch, Topical, Daily, Tariq Gallagher MD, 1 patch at 02/17/23 0830  •  lidocaine (LMX) 4 % cream, , Topical, 4x Daily PRN, Jacqulynn Canavan Harleman, CRNP  •  losartan (COZAAR) tablet 25 mg, 25 mg, Oral, Daily, Jacqulynn Canavan Harleman, CRNP, 25 mg at 02/17/23 0830  •  metoprolol tartrate (LOPRESSOR) tablet 25 mg, 25 mg, Oral, Q12H [facility], Jacqulynn Canavan Harleman, CRNP, 25 mg at 02/17/23 0831  •  nicotine (NICODERM CQ) 14 mg/24hr TD 24 hr patch 14 mg, 14 mg, Transdermal, Daily, Tariq Gallagher MD, 14 mg at 02/17/23 0831  •  ondansetron (ZOFRAN-ODT) dispersible tablet 4 mg, 4 mg, Oral, Q6H PRN, aTriq Gallagher MD  •  pantoprazole (PROTONIX) EC tablet 40 mg, 40 mg, Oral, Early Morning, Tariq Gallagher MD, 40 mg at 02/17/23 0550  •  polyethylene glycol (MIRALAX) packet 17 g, 17 g, Oral, Daily PRN, Tariq Gallagher MD  •  QUEtiapine (SEROquel) tablet 25 mg, 25 mg, Oral, HS, Tariq Gallagher MD, 25 mg at 02/16/23 2129  •  senna (SENOKOT) tablet 8 6 mg, 1 tablet, Oral, BID, Mary Holm MD, 8 6 mg at 02/17/23 0831      Past Medical History:   Diagnosis Date   • Acquired deformity of left foot    • Anesthesia complication     difficulty awakening   • Arthritis    • Deaf, left    • Depression    • Hallux valgus of left foot    • Hammer toe of left foot    • Headache    • Kidney stone    • Sciatic pain, right     intermittent       Patient Active Problem List    Diagnosis Date Noted   • Stroke - Left posterior limb internal capsule ischemic stroke 02/04/2023   • Tachycardia 02/15/2023   • Dry eye 02/14/2023   • Pain in left foot, chronic 02/09/2023   • Chest pain 02/06/2023   • Prediabetes 02/06/2023   • Insomnia 02/05/2023   • Hypertension 02/05/2023   • Depression 02/04/2023   • Obesity (BMI 35 0-39 9 without comorbidity) 03/10/2021   • Ex-smoker for less than 1 year 03/10/2021   • Headache 07/03/2020   • Tobacco use 07/02/2020   • Preoperative clearance 06/30/2020   • Wound of right foot 06/29/2020   • Acquired hallux valgus of left foot 05/06/2019   • Other hammer toe(s) (acquired), left foot 05/06/2019   • Other acquired deformities of left foot 05/06/2019          Bubba Urban MD    I have spent a total time of 45 minutes on 02/17/23 in caring for this patient including Impressions, Counseling / Coordination of care, Documenting in the medical record, Obtaining or reviewing history  , Communicating with other healthcare professionals  and Updating her son Nhan Echols on the phone  ** Please Note:  voice to text software may have been used in the creation of this document   Although proof errors in transcription or interpretation are a potential of such software**

## 2023-02-17 NOTE — PROGRESS NOTES
CARDIOVASCULAR - ADULT    • Maintains optimal cardiac output and hemodynamic stability Progressing    • Absence of cardiac arrhythmias or at baseline Progressing    NSR w/ BBB on telemetry. VSS. No c/o cardiac symptoms this P.M.   Nitro gtt maintained as Internal Medicine Progress Note  Patient: Maddy Alexander  Age/sex: 62 y o  female  Medical Record #: [id number]      ASSESSMENT/PLAN: (Interval History)  Maddy Alexander is seen and examined and management for following issues:    Acute CVA  • Left posterior limb internal capsule/thalamic  • For ASA/Plavix a 21 days then to ASA alone on 23  • Continue Lipitor but Cards had advised switch to Crestor 40mg qd at discharge to avoid interaction of the Lipitor with the Verapamil (CY interaction)  • CT C/A/P was negative for malignancy; thrombosis panel sent both to rule out hypercoag state     Headaches  • MRI with contrast was unrevealing  • Neuro placed her on Verapamil for preventive tx and she got IV magnesium/Phenergan/Toradol/Benadryl  • D/w Dr Ben Zimmerman (considering interaction with Lipitor) and see how she does with headaches     • Had a headache  and Dr Van Gonzalez started Neurontin 100mg TID prn   • No headache today     Chest pain  • Had occurrences on  and  naomi with lying flat  • Trops and EKGs were negative  • Cards saw and felt atypical and will see her back in the office; no w/u for now     HTN  • Home:  no meds  • Here:  Losartan 25mg qd/Lopressor 25mg BID  • Stopped the Verapamil as above    • Since started Lopressor for tachycardia, stopped HCTZ and cut Losartan to 25mg qd (starting )     Depression  • Continue Cymbalta/Seroquel  • Takes Seroquel and Trazodone at home     Pre-DM  • HbA1C was 6 4  • Watching FBS; no Accuchecks for now  • D/w pt 23 about starting a DM diet   She was agreeable  • Nutrition did see on 2/15/23 for DM diet teaching  • FBS AM 23 was 108     Nicotine abuse  • Continue patch  • Advised cessation 23     Tachycardia  • HR had increased after stopping the Verapamil into low 100s to 110s  • Last EKG  = ST and she has a few OV as OP with HRs in low 100s and some mild tachy in the hospital too before was verapamil started  • TSH was 5 3  • No sx  • Hemoglobin, O2 sats are normal   She is not dry  • On 2/16/23, started Lopressor 25mg BID = improved today     Subclinical hypothyroidism  • TSH was 5 3, free T4 0 88  • No tx for now  • Repeat TFTs in 4 weeks as OP        Discharge date:  2/20/23       The above assessment and plan was reviewed and updated as determined by my evaluation of the patient on 2/17/2023      Labs:   Results from last 7 days   Lab Units 02/16/23  0521 02/13/23  0545   WBC Thousand/uL 8 88 8 93   HEMOGLOBIN g/dL 13 2 13 8   HEMATOCRIT % 41 6 41 6   PLATELETS Thousands/uL 310 318     Results from last 7 days   Lab Units 02/16/23  0521 02/13/23  0545   SODIUM mmol/L 137 136   POTASSIUM mmol/L 3 6 4 2   CHLORIDE mmol/L 105 105   CO2 mmol/L 28 28   BUN mg/dL 19 16   CREATININE mg/dL 0 78 0 76   CALCIUM mg/dL 9 1 9 3                   Review of Scheduled Meds:  Current Facility-Administered Medications   Medication Dose Route Frequency Provider Last Rate   • acetaminophen  650 mg Oral Q6H PRN Cole Boswell MD     • albuterol  2 puff Inhalation Q4H PRN Cole Boswell MD     • aspirin  81 mg Oral Daily Cole Boswell MD     • atorvastatin  40 mg Oral QPM Cole Boswell MD     • bisacodyl  10 mg Rectal Daily PRN Cole Boswell MD     • clopidogrel  75 mg Oral Daily Cole Boswell MD     • docusate sodium  100 mg Oral BID Collette Coma, MD     • DULoxetine  60 mg Oral Daily Cole Boswell MD     • gabapentin  100 mg Oral TID PRN Cole Boswell MD     • glycerin-hypromellose-  1 drop Left Eye TID Cole Boswell MD     • lidocaine  1 patch Topical Daily Cole Boswell MD     • lidocaine   Topical 4x Daily PRN SHAILA Scott     • losartan  25 mg Oral Daily SHAILA Scott     • metoprolol tartrate  25 mg Oral Q12H [address] SHAILA Scott     • nicotine  14 mg Transdermal Daily Cole Boswell MD     • ondansetron  4 mg Oral Q6H PRN Mounika Hooper MD     • pantoprazole  40 mg Oral Early Morning Mounika Hooper MD     • polyethylene glycol  17 g Oral Daily PRN Mounika Hooper MD     • QUEtiapine  25 mg Oral HS Mounika Hooper MD     • senna  1 tablet Oral BID Monica Pedro MD         Subjective/ HPI: Patient seen and examined  Patients overnight issues or events were reviewed with nursing or staff during rounds or morning huddle session  New or overnight issues include the following:     No new or overnight issues  Offers no complaints    ROS:   A 10 point ROS was performed; negative except as noted above  Imaging:     No orders to display       *Labs /Radiology studies reviewed  *Medications reviewed and reconciled as needed  *Please refer to order section for additional ordered labs studies  *Case discussed with primary attending during morning huddle case rounds    Physical Examination:  Vitals:   Vitals:    02/16/23 1350 02/16/23 2125 02/17/23 0602 02/17/23 0831   BP: 126/83 121/85 98/70 134/78   BP Location: Right arm Right arm Left arm    Pulse: (!) 112 102 82 97   Resp: 18 19 17    Temp: 97 7 °F (36 5 °C) 98 °F (36 7 °C) 98 3 °F (36 8 °C)    TempSrc: Oral Oral Oral    SpO2: 95% 94% 97%    Weight:       Height:           General Appearance: no distress, conversive  HEENT: PERRLA, conjuctiva normal; oropharynx clear; mucous membranes moist   Neck:  Supple, normal ROM  Lungs: CTA, normal respiratory effort, no retractions, expiratory effort normal  CV: regular rate and rhythm; no rubs/murmurs/gallops, PMI normal   ABD: soft; ND/NT; +BS  EXT: no edema  Skin: normal turgor, normal texture, no rashes  Psych: affect normal, mood normal  Neuro: AAO      The above physical exam was reviewed and updated as determined by my evaluation of the patient on 2/17/2023      Invasive Devices     None                    VTE Pharmacologic Prophylaxis: ambulatory  Code Status: Level 1 - Full Code  Current Length of Stay: 8 day(s)      Total time spent:  30 minutes with more than 50% spent counseling/coordinating care  Counseling includes discussion with patient re: progress  and discussion with patient of his/her current medical state/information  Coordination of patient's care was performed in conjunction with primary service  Time invested included review of patient's labs, vitals, and management of their comorbidities with continued monitoring  In addition, this patient was discussed with medical team including physician and advanced extenders  The care of the patient was extensively discussed and appropriate treatment plan was formulated unique for this patient  Medical decision making for the day was made by supervising physician unless otherwise noted in their attestation statement  ** Please Note:  voice to text software may have been used in the creation of this document   Although proof errors in transcription or interpretation are a potential of such software**

## 2023-02-17 NOTE — PROGRESS NOTES
02/17/23 1300   Pain Assessment   Pain Assessment Tool 0-10   Pain Score No Pain   Lifestyle   Autonomy "I didnt know I was having a stroke "   Eating   Type of Assistance Needed Independent   Eating CARE Score 6   Oral Hygiene   Type of Assistance Needed Independent   Oral Hygiene CARE Score 6   Shower/Bathe Self   Type of Assistance Needed Independent   Shower/Bathe Self CARE Score 6   Upper Body Dressing   Type of Assistance Needed Independent   Upper Body Dressing CARE Score 6   Lower Body Dressing   Type of Assistance Needed Independent   Lower Body Dressing CARE Score 6   Putting On/Taking Off Footwear   Type of Assistance Needed Independent   Putting On/Taking Off Footwear CARE Score 6   Roll Left and Right   Type of Assistance Needed Independent   Roll Left and Right CARE Score 6   Sit to Lying   Type of Assistance Needed Independent   Sit to Lying CARE Score 6   Lying to Sitting on Side of Bed   Type of Assistance Needed Independent   Lying to Sitting on Side of Bed CARE Score 6   Sit to Stand   Type of Assistance Needed Independent   Sit to Stand CARE Score 6   Bed-Chair Transfer   Type of Assistance Needed Independent   Chair/Bed-to-Chair Transfer CARE Score 6   [place] Hygiene CARE Score 6   Toilet Transfer   Type of Assistance Needed Independent   Toilet Transfer CARE Score 6   Vision   Vision Comments pt engages in large view visual scanning/tracking activites on computer  Followed activites for muscular endurance and sustained visual attention  completing 15 min with no rest period  Pt provided with visual feedback about L eye dysfunction with playback of video recording of eye  pt presenting with jerky tracking with cogwheel type action in various horizontal planes  still reports that the corner of the eye feels "sticky "   Additional Activities   Additional Activities Comments Stroke education series, see below     Assessment Treatment Assessment Pt participated in skilled OT treatment session with treatment focus on visual perceptual training skills, and stroke education  Pt tolerated session well, eager to learn about stroke  Pt provided with various hand outs to increase understanding to read on her own time  Pt set for d/c Monday, progressed in room  May benefit from light IADL kitchen task, despite pt not caring for cooking  Still focus on L EYE sustained attention/fixation in lower quad fields, and visual tracking  Prognosis Good   Plan   Progress Progressing toward goals   OT Therapy Minutes   OT Time In 1300   OT Time Out 1430   OT Total Time (minutes) 90   OT Mode of treatment - Individual (minutes) 90   OT Mode of treatment - Concurrent (minutes) 0   OT Mode of treatment - Group (minutes) 0   OT Mode of treatment - Co-treat (minutes) 0   OT Mode of Treatment - Total time(minutes) 90 minutes   OT Cumulative Minutes 710     Stroke Education Series    Pt participated in skilled Stroke Education Series in an individual setting to address the topic of Stroke 101: Understanding the Basics of Stroke in both verbal and written formats  Education within this session reviewed the basic structural and functional components of the brain and included information on the causes of stroke, related signs/symptoms, risk factors, and the process of stroke rehabilitation  The goal of this education was to provide the patient with general understanding of how the brain functions and how a stroke can impact his/her functional mobility and independence  In addition, the intention of this education is to provide the patient with the information to reduce the risk of a second stroke  Following education, pt's response to education is: verbalizes understanding  To individualize the education, the following topics were included based upon the patients' past and current medical history: hyperlipidemia, hypertension and smoking    Additional topics that were covered include Blood pressure, BFAST- stroke identification  Provided with handouts for blood pressure, monitoring blood pressure, BFAST, and information regarding her type of stroke  Start Time: 1300    End Time: 1330  Stroke Education Series    Pt participated in skilled Stroke Education Series in a group setting to address the topic of Purpose of Rehab post stroke in both verbal and written formats  Education within this session included a review of the individual roles of the rehab team, functions of the acute rehab center, and neuro rehabilitation treatment strategies  The goal of this education session was to provide the patient with understanding of the overall importance of the therapy process  Additionally, this series aimed to help the patient connect neuro rehabilitation treatment strategies to his or her individual therapy process  Overall, to increase his/her engagement and provide contextual meaning to daily treatment sessions  Following education, pt's response to education is: verbalizes understanding  Provided with visual demonstrations of neuroplastic principles with visuals online  Start Time: 1330    End Time: 5    Stroke Education Series    Pt participated in skilled Stroke Education Series in an individualized setting to address the topic of What Comes Next post stroke in both verbal and written formats  Education within this session included information on recommendations and resources after leaving the [facility]  This education session links together what has been covered in previous stroke education classes to improve the patient's understanding of how managing risk factors for stroke, participating in therapy, working with family and friends in the rehab process, and reviewing their role in the rehab process will maximize outcomes and their functional gains  This education also incorporates what the patient wants to achieve and helps them set realistic goals   To individualize this education the following topics were covered: driving programs  Following education, pt's response to education is: verbalizes understanding  Start Time: 1345    End Time: 1400    Stroke Education Series    Pt participated in skilled Stroke Education Series in an individualized setting to address the topic of Preparing To Go Home  This education was provided in both verbal and written formats  Education within this session focused on providing safety strategies including environmental and lifestyle changes that if made will support a safe discharge to his/her home setting  One goal of this session is to focus on ways to improve the patient's independence while maintaining safety and decreasing fall risk  Following education, pt's response to education is: verbalizes understanding  To individualize this session, the following topics were reviewed: ADL recommendations and healthcare literacy resources        Start Time: 1400    End Time: 1410

## 2023-02-17 NOTE — PROGRESS NOTES
02/17/23 0900   Pain Assessment   Pain Assessment Tool 0-10   Pain Score No Pain   Restrictions/Precautions   Precautions Cognitive; Fall Risk   Comprehension   Comprehension (FIM) 5 - Understands basic directions and conversation   Expression   Expression (FIM) 4 - Expresses basic info/needs 75-90% of time   Social Interaction   Social Interaction (FIM) 5 - Interacts appropriately with others 90% of time   Problem Solving   Problem solving (FIM) 4 - Solves basic problems 75-89% of time   Memory   Memory (FIM) 4 - Recognizes/recalls/performs 75-89%   Speech/Language/Cognition Assessmetn   Treatment Assessment Pt cooperative w/ pleasant affect for the duration of ST session today, pt seen sitting upright in chair pulled up to table in PT gym  Pt R sided asymmetry, present but much improved from initial encounter  Pt conversational speech w/ improve rate of speech and min dysarthric today, prolongations and slurring only present w/ certain words (exercise, speech, [place]) as well as the /s/ phoneme in all word positions  Pt continues w/ slower rate of speech, prolongations, blocks and min articulatory groping during repetition and reading fluency tasks  Pt engaged in tangible medication task w/ AM/PM pill box which is new for pt  Pt education provided on the benefits of using a pill box for medication management given the increase of daily medications since hospitalization, pt receptive and agreeable to use this system upon d/c " I like this organizer, I will have my son buy one for me " Pt stated she plans to create new routine of filling pill box Saturday evening/ Sunday AM every week  During task pt w/ accuracy of 8/10 independently reading prescription label and placing pills in the appropriate compartment  Accuracy increased to 10/10 w/ verbal cues   Pt initially w/ difficulty understanding the concept of medication management task, placing only one protonix pill in the "Sunday AM" compartment and closing the lid, after verbal re-direction pt placed the remainder of pills appropriately  Pt also confused w/ frequency instructions "every 12 hours" placing both pills to take daily in the AM compartment, corrected w/ verbal cue and placed the remainder of pills appropriately  Of note, pt difficulty initially understanding " daily" asking if that means "in the morning," following verbal cue pt placed the remainder of "daily" medications accordingly  Pt then completed oral motor exercises targeting labial and lingual strength/ ROM w/ mirror and pt holding tongue depressor for herself today pt education provided that this would simulate how she will complete oral motor exercises outside of tx session to facilitate continued improvement in R sided labial and lingual ROM/ strength  Pt able to independently complete all labial/ lingual ROM and strengthening exercises, required min verbal cues for correct placement of tongue depressor during lingual resistance exercise  Pt w/ accuracy of 80% in fluently repeating bisyllabic words, speech errors observed during this task were initial prolongations and slow rate of speech  Pt completed sentence repetition task w/ fluent productions in 7/13 trials, speech errors included slower rate of speech, blocking and initial/ medial prolongations  Pt aware of her speech errors and independently uses previously introduced strategy of easy onset voicing to correct dysfluency, able to correct productions w/ this strategy approximately 50% of time  In sentence reading fluency task, pt w/ noted increase in dysfluencies  Pt attempting to correct her dysfluent productions by re-reading and unsuccessful still w/ words containing the /s/ phoneme  At this time pt continues to benefit from skilled ST services targeting speech and cognitive linguistic skills to minimize caregiver burden upon d/c       SLP Therapy Minutes   SLP Time In 0900   SLP Time Out 1000   SLP Total Time (minutes) 60   SLP Mode of treatment - Individual (minutes) 60   SLP Mode of treatment - Concurrent (minutes) 0   SLP Mode of treatment - Group (minutes) 0   SLP Mode of treatment - Co-treat (minutes) 0   SLP Mode of Treatment - Total time(minutes) 60 minutes   SLP Cumulative Minutes 485   Therapy Time missed   Time missed?  No

## 2023-02-17 NOTE — PLAN OF CARE
Problem: PAIN - ADULT  Goal: Verbalizes/displays adequate comfort level or baseline comfort level  Description: Interventions:  - Encourage patient to monitor pain and request assistance  - Assess pain using appropriate pain scale  - Administer analgesics based on type and severity of pain and evaluate response  - Implement non-pharmacological measures as appropriate and evaluate response  - Consider cultural and social influences on pain and pain management  - Notify physician/advanced practitioner if interventions unsuccessful or patient reports new pain  Outcome: Progressing     Problem: INFECTION - ADULT  Goal: Absence or prevention of progression during hospitalization  Description: INTERVENTIONS:  - Assess and monitor for signs and symptoms of infection  - Monitor lab/diagnostic results  - Monitor all insertion sites, i e  indwelling lines, tubes, and drains  - Monitor endotracheal if appropriate and nasal secretions for changes in amount and color  - [place]da appropriate cooling/warming therapies per order  - Administer medications as ordered  - Instruct and encourage patient and family to use good hand hygiene technique  - Identify and instruct in appropriate isolation precautions for identified infection/condition  Outcome: Progressing     Problem: SAFETY ADULT  Goal: Patient will remain free of falls  Description: INTERVENTIONS:  - Educate patient/family on patient safety including physical limitations  - Instruct patient to call for assistance with activity   - Consult OT/PT to assist with strengthening/mobility   - Keep Call bell within reach  - Keep bed low and locked with side rails adjusted as appropriate  - Keep care items and personal belongings within reach  - Initiate and maintain comfort rounds  - Make Fall Risk Sign visible to staff  - Offer Toileting every 2 Hours, in advance of need  - Initiate/Maintain bed/chair alarm  - Obtain necessary fall risk management equipment: non skid footwear  - Apply yellow socks and bracelet for high fall risk patients  - Consider moving patient to room near nurses station  Outcome: Progressing  Goal: Maintain or return to baseline ADL function  Description: INTERVENTIONS:  -  Assess patient's ability to carry out ADLs; assess patient's baseline for ADL function and identify physical deficits which impact ability to perform ADLs (bathing, care of mouth/teeth, toileting, grooming, dressing, etc )  - Assess/evaluate cause of self-care deficits   - Assess range of motion  - Assess patient's mobility; develop plan if impaired  - Assess patient's need for assistive devices and provide as appropriate  - Encourage maximum independence but intervene and supervise when necessary  - Involve family in performance of ADLs  - Assess for home care needs following discharge   - Consider OT consult to assist with ADL evaluation and planning for discharge  - Provide patient education as appropriate  Outcome: Progressing  Goal: Maintains/Returns to pre admission functional level  Description: INTERVENTIONS:  - Perform BMAT or MOVE assessment daily    - Set and communicate daily mobility goal to care team and patient/family/caregiver  - Collaborate with rehabilitation services on mobility goals if consulted  - Perform Range of Motion 3 times a day  - Reposition patient every 2 hours    - Dangle patient 3 times a day  - Stand patient 3 times a day  - Ambulate patient 3 times a day  - Out of bed to chair 3 times a day   - Out of bed for meals 3 times a day  - Out of bed for toileting  - Record patient progress and toleration of activity level   Outcome: Progressing     Problem: DISCHARGE PLANNING  Goal: Discharge to home or other facility with appropriate resources  Description: INTERVENTIONS:  - Identify barriers to discharge w/patient and caregiver  - Arrange for needed discharge resources and transportation as appropriate  - Identify discharge learning needs (meds, wound care, etc )  - Arrange for interpretive services to assist at discharge as needed  - Refer to Case Management Department for coordinating discharge planning if the patient needs post-hospital services based on physician/advanced practitioner order or complex needs related to functional status, cognitive ability, or social support system  Outcome: Progressing

## 2023-02-17 NOTE — PLAN OF CARE
Problem: PAIN - ADULT  Goal: Verbalizes/displays adequate comfort level or baseline comfort level  Description: Interventions:  - Encourage patient to monitor pain and request assistance  - Assess pain using appropriate pain scale  - Administer analgesics based on type and severity of pain and evaluate response  - Implement non-pharmacological measures as appropriate and evaluate response  - Consider cultural and social influences on pain and pain management  - Notify physician/advanced practitioner if interventions unsuccessful or patient reports new pain  Outcome: Progressing     Problem: INFECTION - ADULT  Goal: Absence or prevention of progression during hospitalization  Description: INTERVENTIONS:  - Assess and monitor for signs and symptoms of infection  - Monitor lab/diagnostic results  - Monitor all insertion sites, i e  indwelling lines, tubes, and drains  - Monitor endotracheal if appropriate and nasal secretions for changes in amount and color  - [place] appropriate cooling/warming therapies per order  - Administer medications as ordered  - Instruct and encourage patient and family to use good hand hygiene technique  - Identify and instruct in appropriate isolation precautions for identified infection/condition  Outcome: Progressing     Problem: SAFETY ADULT  Goal: Patient will remain free of falls  Description: INTERVENTIONS:  - Educate patient/family on patient safety including physical limitations  - Instruct patient to call for assistance with activity   - Consult OT/PT to assist with strengthening/mobility   - Keep Call bell within reach  - Keep bed low and locked with side rails adjusted as appropriate  - Keep care items and personal belongings within reach  - Initiate and maintain comfort rounds  - Make Fall Risk Sign visible to staff  - Apply yellow socks and bracelet for high fall risk patients  - Consider moving patient to room near nurses station  Outcome: Progressing  Goal: Maintain or return to baseline ADL function  Description: INTERVENTIONS:  -  Assess patient's ability to carry out ADLs; assess patient's baseline for ADL function and identify physical deficits which impact ability to perform ADLs (bathing, care of mouth/teeth, toileting, grooming, dressing, etc )  - Assess/evaluate cause of self-care deficits   - Assess range of motion  - Assess patient's mobility; develop plan if impaired  - Assess patient's need for assistive devices and provide as appropriate  - Encourage maximum independence but intervene and supervise when necessary  - Involve family in performance of ADLs  - Assess for home care needs following discharge   - Consider OT consult to assist with ADL evaluation and planning for discharge  - Provide patient education as appropriate  Outcome: Progressing  Goal: Maintains/Returns to pre admission functional level  Description: INTERVENTIONS:  - Perform BMAT or MOVE assessment daily    - Set and communicate daily mobility goal to care team and patient/family/caregiver     - Collaborate with rehabilitation services on mobility goals if consulted  - Out of bed for toileting  - Record patient progress and toleration of activity level   Outcome: Progressing     Problem: DISCHARGE PLANNING  Goal: Discharge to home or other facility with appropriate resources  Description: INTERVENTIONS:  - Identify barriers to discharge w/patient and caregiver  - Arrange for needed discharge resources and transportation as appropriate  - Identify discharge learning needs (meds, wound care, etc )  - Arrange for interpretive services to assist at discharge as needed  - Refer to Case Management Department for coordinating discharge planning if the patient needs post-hospital services based on physician/advanced practitioner order or complex needs related to functional status, cognitive ability, or social support system  Outcome: Progressing

## 2023-02-17 NOTE — PROGRESS NOTES
02/17/23 1100   Pain Assessment   Pain Assessment Tool 0-10   Pain Score No Pain   General   Change In Medical/Functional Status (S)  progressed to hallway priviledges   Subjective   Subjective no complaints, just reports left eye "feels like it is scracthed or something is in it"   Roll Left and Right   Type of Assistance Needed Independent   Physical Assistance Level No physical assistance   Roll Left and Right CARE Score 6   Sit to Lying   Type of Assistance Needed Independent   Physical Assistance Level No physical assistance   Sit to Lying CARE Score 6   Lying to Sitting on Side of Bed   Type of Assistance Needed Independent   Physical Assistance Level No physical assistance   Lying to Sitting on Side of Bed CARE Score 6   Sit to Stand   Type of Assistance Needed Independent   Physical Assistance Level No physical assistance   Sit to Stand CARE Score 6   Bed-Chair Transfer   Type of Assistance Needed Independent   Physical Assistance Level No physical assistance   Chair/Bed-to-Chair Transfer CARE Score 6   Car Transfer   Type of Assistance Needed Independent   Physical Assistance Level No physical assistance   Car Transfer CARE Score 6   Walk 10 Feet   Type of Assistance Needed Independent   Physical Assistance Level No physical assistance   Walk 10 Feet CARE Score 6   Walk 50 Feet with Two Turns   Type of Assistance Needed Independent   Physical Assistance Level No physical assistance   Walk 50 Feet with Two Turns CARE Score 6   Walk 150 Feet   Type of Assistance Needed Independent   Physical Assistance Level No physical assistance   Walk 150 Feet CARE Score 6   Walking 10 Feet on Uneven Surfaces   Type of Assistance Needed Supervision   Walking 10 Feet on Uneven Surfaces CARE Score 4   Ambulation   Primary Mode of Locomotion Prior to Admission Walk   Distance Walked (feet) 500 ft   Assist Device   (no device)   Gait Pattern WNL   Does the patient walk? 2   Yes   Wheelchair mobility   Does the patient use a wheelchair? 0  No   Curb or Single Stair   Style negotiated Curb   Type of Assistance Needed Independent   Comment no device   1 Step (Curb) CARE Score 6   4 Steps   Type of Assistance Needed Supervision   Physical Assistance Level No physical assistance   Comment 1 HR   4 Steps CARE Score 4   12 Steps   Type of Assistance Needed Supervision   Physical Assistance Level No physical assistance   Comment 1 HR   12 Steps CARE Score 4   Stairs   # of Steps 12   Therapeutic Interventions   Strengthening HEP given, reviewed and practiced for seated and standing therex and stretching   Flexibility Seated HS, Standing IT band stretches   Equipment Use   NuStep lvl 3 x 10 min   Assessment   Treatment Assessment Pt progressing well  Now safe to ambulate in [place] on her own, no device  OTR and RN agree, safe for her to use bathrrom down the [place] from room on her own  Performed all therex well  Will continue to review HEp prior to d/c home Monday  Plan   Progress Progressing toward goals   Recommendation   PT Discharge Recommendation Home with outpatient rehabilitation   PT Therapy Minutes   PT Time In 1100   PT Time Out 1200   PT Total Time (minutes) 60   PT Mode of treatment - Individual (minutes) 60   PT Mode of treatment - Concurrent (minutes) 0   PT Mode of treatment - Group (minutes) 0   PT Mode of treatment - Co-treat (minutes) 0   PT Mode of Treatment - Total time(minutes) 60 minutes   PT Cumulative Minutes 495   Therapy Time missed   Time missed?  No

## 2023-02-17 NOTE — PHYSICAL THERAPY NOTE
Update to PT POC:    Pt d/c home on Monday  To perform TUG, cTUG, mTUG and gait speed as well as DGI on Sat/Sunday prior to dc on Monday  She has HEP, please review to ensure she doesn't have any questions

## 2023-02-18 RX ADMIN — GLYCERIN 1 DROP: .002; .002; .01 SOLUTION/ DROPS OPHTHALMIC at 17:44

## 2023-02-18 RX ADMIN — LIDOCAINE 5% 1 PATCH: 700 PATCH TOPICAL at 09:24

## 2023-02-18 RX ADMIN — METOPROLOL TARTRATE 25 MG: 25 TABLET, FILM COATED ORAL at 21:05

## 2023-02-18 RX ADMIN — NICOTINE 14 MG: 14 PATCH, EXTENDED RELEASE TRANSDERMAL at 09:23

## 2023-02-18 RX ADMIN — GLYCERIN 1 DROP: .002; .002; .01 SOLUTION/ DROPS OPHTHALMIC at 21:04

## 2023-02-18 RX ADMIN — METOPROLOL TARTRATE 25 MG: 25 TABLET, FILM COATED ORAL at 08:27

## 2023-02-18 RX ADMIN — LOSARTAN POTASSIUM 25 MG: 25 TABLET, FILM COATED ORAL at 08:27

## 2023-02-18 RX ADMIN — QUETIAPINE FUMARATE 25 MG: 25 TABLET ORAL at 21:05

## 2023-02-18 RX ADMIN — PANTOPRAZOLE SODIUM 40 MG: 40 TABLET, DELAYED RELEASE ORAL at 05:43

## 2023-02-18 RX ADMIN — ATORVASTATIN CALCIUM 40 MG: 40 TABLET, FILM COATED ORAL at 17:44

## 2023-02-18 RX ADMIN — DULOXETINE HYDROCHLORIDE 60 MG: 60 CAPSULE, DELAYED RELEASE ORAL at 08:26

## 2023-02-18 RX ADMIN — GLYCERIN 1 DROP: .002; .002; .01 SOLUTION/ DROPS OPHTHALMIC at 08:37

## 2023-02-18 RX ADMIN — GABAPENTIN 100 MG: 100 CAPSULE ORAL at 13:31

## 2023-02-18 RX ADMIN — CLOPIDOGREL BISULFATE 75 MG: 75 TABLET ORAL at 08:26

## 2023-02-18 RX ADMIN — ASPIRIN 81 MG CHEWABLE TABLET 81 MG: 81 TABLET CHEWABLE at 08:26

## 2023-02-18 NOTE — PROGRESS NOTES
02/18/23 1200   Pain Assessment   Pain Assessment Tool 0-10   Pain Score 4   Pain Location/Orientation Location: Head  (heache "over the eyes")   Pain Onset/Description Onset: Gradual;Descriptor: Pressure; Descriptor: Headache   Effect of Pain on Daily Activities pt requested pain med from RN at end of session   Patient's Stated Pain Goal No pain   Cognition   Arousal/Participation Alert; Cooperative   Orientation Level Oriented X4   Subjective   Subjective pt agreeable to participate in PT tx  Pt feels prepared for DC Monday  reports headache over both eyes today, notified RN  BP stable   Roll Left and Right   Type of Assistance Needed Independent   Roll Left and Right CARE Score 6   Sit to Lying   Type of Assistance Needed Independent   Sit to Lying CARE Score 6   Lying to Sitting on Side of Bed   Type of Assistance Needed Independent   Lying to Sitting on Side of Bed CARE Score 6   Sit to Stand   Type of Assistance Needed Independent   Sit to Stand CARE Score 6   Bed-Chair Transfer   Type of Assistance Needed Independent   Chair/Bed-to-Chair Transfer CARE Score 6   Transfer Bed/Chair/Wheelchair   Findings practiced floor to chair transfer with education on recommended techniques for fall recovery   pt able to demonstrate long sitting > tall kneel > half kneel > standing and pivoting to chair with supervision   Car Transfer   Type of Assistance Needed Independent   Car Transfer CARE Score 6   Walk 10 Feet   Type of Assistance Needed Independent   Walk 10 Feet CARE Score 6   Walk 50 Feet with Two Turns   Type of Assistance Needed Independent   Walk 50 Feet with Two Turns CARE Score 6   Walk 150 Feet   Type of Assistance Needed Independent   Walk 150 Feet CARE Score 6   Walking 10 Feet on Uneven Surfaces   Type of Assistance Needed Independent   Comment 10 ft x 4 trials over floor mat   Walking 10 Feet on Uneven Surfaces CARE Score 6   Ambulation   Distance Walked (feet) 1000 ft  (walked down [place] through gift shop, and returned to unit)   Findings pt able to navigate busy environment and narrow spaces in gift shop without LOB  Assessed Dynamic Gait Index (DGI) with score of 22/24  Also assessed TUG (9 91 seconds without AD), manual tug (14 57 seconds while transferring water back and forth between cups) and cognitive TUG (14 10 seconds while counting backward from 100 by 3s)   Does the patient walk? 2  Yes   Curb or Single Stair   Style negotiated Curb   Type of Assistance Needed Independent   1 Step (Curb) CARE Score 6   4 Steps   Type of Assistance Needed Independent; Adaptive equipment   Comment single rail   4 Steps CARE Score 6   12 Steps   Type of Assistance Needed Independent; Adaptive equipment   Comment single rail   12 Steps CARE Score 6   Stairs   Type Stairs   # of Steps 24   Findings 2 standard flights, first flight with railing to simulate home environment and recommendations to use railing for increased safety, pt performs mod I with reciprocal pattern  Then trialed without railing to assess for DGI score and pt able to negotiate flight without rail and supervision, step to pattern   Picking Up Object   Type of Assistance Needed Independent   Comment picking up glove box from floor   Picking Up Object CARE Score 6   Toilet Transfer   Type of Assistance Needed Independent   Toilet Transfer CARE Score 6   Therapeutic Interventions   Balance heel walking, toe walking, and walking with high knees x 50 ft each  sidestep/braiding 25 ft to L and R x 3 each direction  Standing ball toss activity: x 10reps of pt squatting to retrieve rolled ball and toss back to PT, x 10 reps of bouncing back and forth with PT  Equipment Use   NuStep level 3 x 10 minutes BUE/BLEs   Assessment   Treatment Assessment Pt participated in 90 minute PT tx session focusing on administering standardized assessments, ambulating down to [place]/gift shop, and higher level balance activities   Also educated pt on fall recovery strategies and pt able to perform floor to chair transfer  Pt scored a 22/24 on DGI, indicating "safe ambulator"  Adminstered standard TUG, cognitive TUG, and manual TUG and pt demonstrates gait speeds as followed: gait 0 61m/s,  0 43 m/s, 0 41m/s respectively  Pt significantly improved gait speed since initial eval, and is no longer reliant on AD  Pt will continue to benefit from skilled Pt interventions to maximize functional independence  pt is scheduled to begin outpatient neuro program following DC  Problem List Decreased endurance; Impaired balance;Decreased coordination   Plan   Treatment/Interventions Functional transfer training;LE strengthening/ROM; Elevations; Therapeutic exercise; Endurance training;Gait training   Progress Progressing toward goals   PT Therapy Minutes   PT Time In 1200   PT Time Out 1330   PT Total Time (minutes) 90   PT Mode of treatment - Individual (minutes) 90   PT Mode of treatment - Concurrent (minutes) 0   PT Mode of treatment - Group (minutes) 0   PT Mode of treatment - Co-treat (minutes) 0   PT Mode of Treatment - Total time(minutes) 90 minutes   PT Cumulative Minutes 585   Therapy Time missed   Time missed?  No

## 2023-02-18 NOTE — PROGRESS NOTES
02/18/23 1500   Pain Assessment   Pain Assessment Tool 0-10   Pain Score No Pain   Restrictions/Precautions   Precautions Cognitive; Fall Risk;Visual deficit   Weight Bearing Restrictions No   ROM Restrictions No   Comprehension   Comprehension (FIM) 5 - Understands basic directions and conversation   Expression   Expression (FIM) 4 - Needs to repeat single words   Social Interaction   Social Interaction (FIM) 5 - Interacts appropriately with others 90% of time   Problem Solving   Problem solving (FIM) 4 - Solves basic problems 75-89% of time   Memory   Memory (FIM) 4 - Recognizes/recalls/performs 75-89%      Speech/Language/Cognition Assessmetn   Treatment Assessment Family Education  Pt seen for skilled speech therapy session targeting cognitive linguistic communication skills  Pt's son Damaris Wiley present for session and engaged in education  Review of services and progress  Pt has been followed for speech/cognitive skills  Pt feels her speech has greatly improved but still has moments of slurring/stuttering speech which SLP educated will improve overtime with use of strategies and practice  In regards to cognitive skills, educated that pt is improving as well, currently functioning at min to supervision level for cognition  Son voices still some deficits in recall/memory  Educated that pt to have supervision with medication management at home to ensure accuracy and compliance as pt has been vocal about missing dosages of medications prior to hospitalization and stroke  Pt will not be able to drive as well, therefore family able to assist with getting to appointments  Recommended pt follow up with PCP at discharge for cont'd monitoring and management  Pt also has outpt therapy services set up at [facility] later this week (2/21 PT 3:15pm 2/23 ST 8am and OT 9am)  Explained that process and how they will recommend frequency of visits after their initial assessments   Son asking regarding pt's functional progress, as pt prior was using a cane given her Left side deficits from previous surgeries and now she is able to walk independently post having a stroke  Encouraged pt to cont to be mobile and active at home- cont to complete PT/OT HEP to maximize functional mobility levels  All questions answered, son and pt receptive in education  Plan for discharge is Monday 2/20  Pt overall is improving with skilled SLP services and will cont to benefit to maximize overall cognitive linguistic communication abilities at this time  SLP Therapy Minutes   SLP Time In 1500   SLP Time Out 0990   SLP Total Time (minutes) 30   SLP Mode of treatment - Individual (minutes) 30   SLP Mode of treatment - Concurrent (minutes) 0   SLP Mode of treatment - Group (minutes) 0   SLP Mode of treatment - Co-treat (minutes) 0   SLP Mode of Treatment - Total time(minutes) 30 minutes   SLP Cumulative Minutes 515   Therapy Time missed   Time missed?  No

## 2023-02-18 NOTE — PLAN OF CARE
Problem: PAIN - ADULT  Goal: Verbalizes/displays adequate comfort level or baseline comfort level  Description: Interventions:  - Encourage patient to monitor pain and request assistance  - Assess pain using appropriate pain scale  - Administer analgesics based on type and severity of pain and evaluate response  - Implement non-pharmacological measures as appropriate and evaluate response  - Consider cultural and social influences on pain and pain management  - Notify physician/advanced practitioner if interventions unsuccessful or patient reports new pain  Outcome: Progressing     Problem: INFECTION - ADULT  Goal: Absence or prevention of progression during hospitalization  Description: INTERVENTIONS:  - Assess and monitor for signs and symptoms of infection  - Monitor lab/diagnostic results  - Monitor all insertion sites, i e  indwelling lines, tubes, and drains  - Monitor endotracheal if appropriate and nasal secretions for changes in amount and color  - [place] appropriate cooling/warming therapies per order  - Administer medications as ordered  - Instruct and encourage patient and family to use good hand hygiene technique  - Identify and instruct in appropriate isolation precautions for identified infection/condition  Outcome: Progressing     Problem: SAFETY ADULT  Goal: Patient will remain free of falls  Description: INTERVENTIONS:  - Educate patient/family on patient safety including physical limitations  - Instruct patient to call for assistance with activity   - Consult OT/PT to assist with strengthening/mobility   - Keep Call bell within reach  - Keep bed low and locked with side rails adjusted as appropriate  - Keep care items and personal belongings within reach  - Initiate and maintain comfort rounds  - Make Fall Risk Sign visible to staff  - Offer Toileting every 2 Hours, in advance of need  - Initiate/Maintain bed alarm  - Obtain necessary fall risk management equipment: bed alarm  - Apply yellow socks and bracelet for high fall risk patients  - Consider moving patient to room near nurses station  Outcome: Progressing  Goal: Maintain or return to baseline ADL function  Description: INTERVENTIONS:  -  Assess patient's ability to carry out ADLs; assess patient's baseline for ADL function and identify physical deficits which impact ability to perform ADLs (bathing, care of mouth/teeth, toileting, grooming, dressing, etc )  - Assess/evaluate cause of self-care deficits   - Assess range of motion  - Assess patient's mobility; develop plan if impaired  - Assess patient's need for assistive devices and provide as appropriate  - Encourage maximum independence but intervene and supervise when necessary  - Involve family in performance of ADLs  - Assess for home care needs following discharge   - Consider OT consult to assist with ADL evaluation and planning for discharge  - Provide patient education as appropriate  Outcome: Progressing  Goal: Maintains/Returns to pre admission functional level  Description: INTERVENTIONS:  - Perform BMAT or MOVE assessment daily    - Set and communicate daily mobility goal to care team and patient/family/caregiver  - Collaborate with rehabilitation services on mobility goals if consulted  - Perform Range of Motion 3 times a day  - Reposition patient every 2 hours    - Dangle patient 3 times a day  - Stand patient 3 times a day  - Ambulate patient 3 times a day  - Out of bed to chair 3 times a day   - Out of bed for meals 3 times a day  - Out of bed for toileting  - Record patient progress and toleration of activity level   Outcome: Progressing     Problem: DISCHARGE PLANNING  Goal: Discharge to home or other facility with appropriate resources  Description: INTERVENTIONS:  - Identify barriers to discharge w/patient and caregiver  - Arrange for needed discharge resources and transportation as appropriate  - Identify discharge learning needs (meds, wound care, etc )  - Arrange for interpretive services to assist at discharge as needed  - Refer to Case Management Department for coordinating discharge planning if the patient needs post-hospital services based on physician/advanced practitioner order or complex needs related to functional status, cognitive ability, or social support system  Outcome: Progressing

## 2023-02-18 NOTE — PROGRESS NOTES
Internal Medicine Progress Note  Patient: Vandana Pacheco  Age/sex: 62 y o  female  Medical Record #: [id number]      ASSESSMENT/PLAN: (Interval History)  Vandana Pacheco is seen and examined and management for following issues:    Acute CVA  • Left posterior limb internal capsule/thalamic  • For ASA/Plavix a 21 days then to ASA alone on 23  • Continue Lipitor but Cards had advised switch to Crestor 40mg qd at discharge to avoid interaction of the Lipitor with the Verapamil (CY interaction)  • CT C/A/P was negative for malignancy; thrombosis panel sent both to rule out hypercoag state      Headaches  • MRI with contrast was unrevealing  • Neuro placed her on Verapamil for preventive tx and she got IV magnesium/Phenergan/Toradol/Benadryl  • D/w Dr David Goff (considering interaction with Lipitor) and see how she does with headaches     • Had a headache  and Dr Ailyn Vicente started Neurontin 100mg TID prn      Chest pain  • Had occurrences on  and  naomi with lying flat  • Trops and EKGs were negative  • Cards saw and felt atypical and will see her back in the office; no w/u for now     HTN  • Home:  no meds  • Here:  Losartan 25mg qd/Lopressor 25mg BID  • Stopped the Verapamil as above    • Since started Lopressor for tachycardia, stopped HCTZ and cut Losartan to 25mg qd (starting )     Depression  • Continue Cymbalta/Seroquel  • Takes Seroquel and Trazodone at home     Pre-DM  • HbA1C was 6 4  • Watching FBS; no Accuchecks for now  • D/w pt 23 about starting a DM diet   She was agreeable  • Nutrition did see on 2/15/23 for DM diet teaching  • FBS AM 23 was 108     Nicotine abuse  • Continue patch  • Advised cessation 23     Tachycardia  • HR had increased after stopping the Verapamil into low 100s to 110s  • Last EKG  = ST and she has a few OV as OP with HRs in low 100s and some mild tachy in the hospital too before was verapamil started  • TSH was 5 3  • No sx  • Hemoglobin, O2 sats are normal   She is not dry  • On 2/16/23, started Lopressor 25mg BID = improved     Subclinical hypothyroidism  • TSH was 5 3, free T4 0 88  • No tx for now  • Repeat TFTs in 4 weeks as OP        Discharge date:  2/20/23       The above assessment and plan was reviewed and updated as determined by my evaluation of the patient on 2/18/2023      Labs:   Results from last 7 days   Lab Units 02/16/23  0521 02/13/23  0545   WBC Thousand/uL 8 88 8 93   HEMOGLOBIN g/dL 13 2 13 8   HEMATOCRIT % 41 6 41 6   PLATELETS Thousands/uL 310 318     Results from last 7 days   Lab Units 02/16/23  0521 02/13/23  0545   SODIUM mmol/L 137 136   POTASSIUM mmol/L 3 6 4 2   CHLORIDE mmol/L 105 105   CO2 mmol/L 28 28   BUN mg/dL 19 16   CREATININE mg/dL 0 78 0 76   CALCIUM mg/dL 9 1 9 3                   Review of Scheduled Meds:  Current Facility-Administered Medications   Medication Dose Route Frequency Provider Last Rate   • acetaminophen  650 mg Oral Q6H PRN Trina Cleveland MD     • albuterol  2 puff Inhalation Q4H PRN Trina Cleveland MD     • aspirin  81 mg Oral Daily Trina Cleveland MD     • atorvastatin  40 mg Oral QPM Trina Cleveland MD     • bisacodyl  10 mg Rectal Daily PRN Trina Cleveland MD     • clopidogrel  75 mg Oral Daily Trina Cleveland MD     • docusate sodium  100 mg Oral BID Vince Castellanos MD     • DULoxetine  60 mg Oral Daily Trina Cleveland MD     • gabapentin  100 mg Oral TID PRN Trina Cleveland MD     • glycerin-hypromellose-  1 drop Left Eye TID Trina Cleveland MD     • lidocaine  1 patch Topical Daily Trina Cleveland MD     • lidocaine   Topical 4x Daily PRN Haylee Bumps, CRNP     • losartan  25 mg Oral Daily Haylee Bumps, CRNP     • metoprolol tartrate  25 mg Oral Q12H [address] Haylee Bumps, CRNP     • nicotine  14 mg Transdermal Daily Trina Cleveland MD     • ondansetron  4 mg Oral Q6H PRN Trina Cleveland MD     • pantoprazole  40 mg Oral Early Morning Randy Alvares MD     • polyethylene glycol  17 g Oral Daily PRN Randy Alvares MD     • QUEtiapine  25 mg Oral HS Randy Alvares MD     • senna  1 tablet Oral BID Emperatriz Mckeon MD         Subjective/ HPI: Patient seen and examined  Patients overnight issues or events were reviewed with nursing or staff during rounds or morning huddle session  New or overnight issues include the following:     Pt seen in her room  She states that she is doing well and denies complaints  ROS:   A 10 point ROS was performed; negative except as noted above  Imaging:     No orders to display       *Labs /Radiology studies reviewed  *Medications reviewed and reconciled as needed  *Please refer to order section for additional ordered labs studies  *Case discussed with primary attending during morning huddle case rounds    Physical Examination:  Vitals:   Vitals:    02/17/23 1543 02/17/23 2053 02/18/23 0500 02/18/23 0827   BP: 122/78 127/81 110/67 116/80   BP Location: Left arm Right arm Right arm Left arm   Pulse: 83 83 75 84   Resp: 18 17 20    Temp: 97 7 °F (36 5 °C) 97 5 °F (36 4 °C) 98 °F (36 7 °C)    TempSrc: Oral Oral Oral    SpO2: 94% 98% 97%    Weight:       Height:           General Appearance: no distress, conversive  HEENT: PERRLA, conjuctiva normal; oropharynx clear; mucous membranes moist   Neck:  Supple, normal ROM  Lungs: CTA, normal respiratory effort, no retractions, expiratory effort normal  CV: regular rate and rhythm; no rubs/murmurs/gallops, PMI normal   ABD: soft; ND/NT; +BS  EXT: No edema  Skin: normal turgor, normal texture, no rashes  Psych: affect normal, mood normal  Neuro: AAO      The above physical exam was reviewed and updated as determined by my evaluation of the patient on 2/18/2023      Invasive Devices     None                    VTE Pharmacologic Prophylaxis: ambulatory  Code Status: Level 1 - Full Code  Current Length of Stay: 9 day(s)      Total time spent:  30 minutes with more than 50% spent counseling/coordinating care  Counseling includes discussion with patient re: progress  and discussion with patient of his/her current medical state/information  Coordination of patient's care was performed in conjunction with primary service  Time invested included review of patient's labs, vitals, and management of their comorbidities with continued monitoring  In addition, this patient was discussed with medical team including physician and advanced extenders  The care of the patient was extensively discussed and appropriate treatment plan was formulated unique for this patient  Medical decision making for the day was made by supervising physician unless otherwise noted in their attestation statement  ** Please Note:  voice to text software may have been used in the creation of this document   Although proof errors in transcription or interpretation are a potential of such software**

## 2023-02-18 NOTE — PROGRESS NOTES
02/18/23 1000   Pain Assessment   Pain Assessment Tool 0-10   Pain Score No Pain   Restrictions/Precautions   Precautions Cognitive; Fall Risk;Visual deficit   Weight Bearing Restrictions No   ROM Restrictions No   Bed-Chair Transfer   Type of Assistance Needed Independent   Physical Assistance Level No physical assistance   Comment no AD   Chair/Bed-to-Chair Transfer CARE Score 6   Light Housekeeping   Light Housekeeping Level   (no AD)   Light Housekeeping Level of Assistance Independent   Light Housekeeping Pt engaged in simulated laundry management task  Pt completes laundry folding while in stance at tabletop at an overall independent level  Demos G incorporation and functional use of RUE during task and standing balance  Pt overall tolerates well  Health Management   Health Management Level of Assistance Distant supervision   Health Management F/u this session on previous practice with ST on medication mgmt to assess for carryover/independence  Of note pt does have difficulty opening pill containers, recommendation made for easy off caps with pt receptive to same, when simulated this method pt was able to complete  Pt utilized AM/PM weekly pill organizer, states son will purchase  Pt with G carryover of previous education  Was able to complete all medications with 100% accuracy with overall DS  Pt again seeks affirmation on "every 12 hours" and "daily" but was correct in her thinking  Recommended family initially supervise as she completes to assure accuracy  Pt receptive  Cognition   Overall Cognitive Status WFL   Arousal/Participation Alert; Cooperative   Attention Within functional limits   Orientation Level Oriented X4   Memory Within functional limits   Following Commands Follows one step commands without difficulty   Additional Activities   Additional Activities Comments Pt engaged in ball pass activity at tabletop where she was asked to track ball without moving head and only her eyes   Fatigues with extended time and continues to present with difficulty in L lower quadrant  Activity Tolerance   Activity Tolerance Patient tolerated treatment well   Assessment   Treatment Assessment Pt participated in skilled OT services with focus on medication mgmt, laundry, and visual attention/scanning  Pt continues to make G progress towards achieving OT goals  Overall functioning at an independent level with no AD  Pt reports inc fatigue today but does not endorse any other complaints  Pt will continue to benefit from skilled OT services following POC with focus on d/c planning  Prognosis Good   Problem List Impaired balance;Decreased coordination; Impaired vision   Plan   Treatment/Interventions ADL retraining;Functional transfer training; Therapeutic exercise; Endurance training;Patient/family training;Equipment eval/education; Bed mobility; Compensatory technique education   Progress Progressing toward goals   Recommendation   OT Discharge Recommendation   (home w family support)   OT Therapy Minutes   OT Time In 1000   OT Time Out 1100   OT Total Time (minutes) 60   OT Mode of treatment - Individual (minutes) 60   OT Mode of treatment - Concurrent (minutes) 0   OT Mode of treatment - Group (minutes) 0   OT Mode of treatment - Co-treat (minutes) 0   OT Mode of Treatment - Total time(minutes) 60 minutes   OT Cumulative Minutes 770   Therapy Time missed   Time missed?  No

## 2023-02-19 RX ADMIN — NICOTINE 14 MG: 14 PATCH, EXTENDED RELEASE TRANSDERMAL at 08:44

## 2023-02-19 RX ADMIN — PANTOPRAZOLE SODIUM 40 MG: 40 TABLET, DELAYED RELEASE ORAL at 06:14

## 2023-02-19 RX ADMIN — DULOXETINE HYDROCHLORIDE 60 MG: 60 CAPSULE, DELAYED RELEASE ORAL at 08:44

## 2023-02-19 RX ADMIN — GLYCERIN 1 DROP: .002; .002; .01 SOLUTION/ DROPS OPHTHALMIC at 21:26

## 2023-02-19 RX ADMIN — QUETIAPINE FUMARATE 25 MG: 25 TABLET ORAL at 21:26

## 2023-02-19 RX ADMIN — ATORVASTATIN CALCIUM 40 MG: 40 TABLET, FILM COATED ORAL at 17:06

## 2023-02-19 RX ADMIN — GLYCERIN 1 DROP: .002; .002; .01 SOLUTION/ DROPS OPHTHALMIC at 08:48

## 2023-02-19 RX ADMIN — ASPIRIN 81 MG CHEWABLE TABLET 81 MG: 81 TABLET CHEWABLE at 08:44

## 2023-02-19 RX ADMIN — LOSARTAN POTASSIUM 25 MG: 25 TABLET, FILM COATED ORAL at 08:43

## 2023-02-19 RX ADMIN — METOPROLOL TARTRATE 25 MG: 25 TABLET, FILM COATED ORAL at 21:26

## 2023-02-19 RX ADMIN — CLOPIDOGREL BISULFATE 75 MG: 75 TABLET ORAL at 08:44

## 2023-02-19 RX ADMIN — METOPROLOL TARTRATE 25 MG: 25 TABLET, FILM COATED ORAL at 08:45

## 2023-02-19 RX ADMIN — LIDOCAINE 5% 1 PATCH: 700 PATCH TOPICAL at 08:43

## 2023-02-19 RX ADMIN — GLYCERIN 1 DROP: .002; .002; .01 SOLUTION/ DROPS OPHTHALMIC at 17:07

## 2023-02-19 NOTE — PROGRESS NOTES
02/19/23 1230   Pain Assessment   Pain Score No Pain   Restrictions/Precautions   Precautions Fall Risk;Visual deficit   Cognition   Arousal/Participation Alert; Cooperative   Attention Within functional limits   Memory Within functional limits   Following Commands Follows multistep commands with increased time or repetition   Subjective   Subjective Pt  reported that "I am ready to go home"   Roll Left and Right   Type of Assistance Needed Independent   Roll Left and Right CARE Score 6   Sit to Lying   Type of Assistance Needed Independent   Sit to Lying CARE Score 6   Lying to Sitting on Side of Bed   Type of Assistance Needed Independent   Lying to Sitting on Side of Bed CARE Score 6   Sit to Stand   Type of Assistance Needed Independent   Comment no AD   Sit to Stand CARE Score 6   Bed-Chair Transfer   Type of Assistance Needed Independent   Comment no AD   Chair/Bed-to-Chair Transfer CARE Score 6   Transfer Bed/Chair/Wheelchair   Adaptive Equipment None   Car Transfer   Type of Assistance Needed Independent   Car Transfer CARE Score 6   Walk 10 Feet   Type of Assistance Needed Independent   Walk 10 Feet CARE Score 6   Walk 50 Feet with Two Turns   Type of Assistance Needed Independent   Walk 50 Feet with Two Turns CARE Score 6   Walk 150 Feet   Type of Assistance Needed Independent   Walk 150 Feet CARE Score 6   Walking 10 Feet on Uneven Surfaces   Type of Assistance Needed Independent   Comment 4X on foam mat   Walking 10 Feet on Uneven Surfaces CARE Score 6   Ambulation   Primary Mode of Locomotion Prior to Admission Walk   Distance Walked (feet) 700 ft  (shorter distances for task, also walked from [id number] via elevator)   Assist Device   (no AD)   Gait Pattern Inconsistant Chiquita   Walk Assist Level Independent   Does the patient walk? 2   Yes   Wheel 50 Feet with Two Turns   Reason if not Attempted Activity not applicable   Wheel 50 Feet with Two Turns CARE Score 9   Wheel 150 Feet   Reason if not Attempted Activity not applicable   Wheel 139 Feet CARE Score 9   Wheelchair mobility   Does the patient use a wheelchair? 0  No   Curb or Single Stair   Style negotiated Curb   Type of Assistance Needed Independent   1 Step (Curb) CARE Score 6   4 Steps   Type of Assistance Needed Independent; Adaptive equipment   Comment using L railing down and R HR up   4 Steps CARE Score 6   12 Steps   Type of Assistance Needed Independent; Adaptive equipment   Comment using L railing down and R HR up   12 Steps CARE Score 6   Stairs   Type Stairs   # of Steps 24   Assist Devices Single Rail   Findings reciprocal pattern   Picking Up Object   Type of Assistance Needed Independent   Comment picking up marker directly from the floor   Picking Up Object CARE Score 6   Therapeutic Interventions   Strengthening add squeeze with ball, hip abd with green TB   Flexibility B hamstring and gastroc stretching   Balance stepping over different hts of bolster forward and then sideways, paty pad walking on red, green and blue balance disc, standing on red balance disc with feet apart EC and EO and with feet together EC/EO with pt  able to maintain balance on both condition without physical assist    Other functional task (mimicking household chores): watering plants including getting water from sink  Sweeping PT gym with pt  able to move furnitures without diffiuclty, wiping/ cleaning nu step , washing hands and throwing garbage using step to open garbage can, going up and dwon steps while holding laundry basket  Equipment Use   NuStep Level 2 X 10 mins   Other Comments   Comments All of outcome measures were assessed yesterday  Please see yesterdays PT notes  Assessment   Treatment Assessment Pt  engaged in 90 mins session with no complaints reported   Although reports legs feels heavy after walking for awhile but not really painful  Tx focused in high level balance and functional task / mimicking light household chores at home   Pt  does not have any other concerns for d/c from PT perspective  Educated about energy conservation and taking her time, asking for assistance prn , to take her medications and to have her  supervised her on the steps  Pt  able to dmeonstrate understanding  Anticipate to be d/c tomorrow to home and to cont PT with out pt services  Problem List Decreased endurance; Impaired balance;Decreased coordination   Barriers to Discharge None   Plan   Treatment/Interventions   (d/c to home tomorrow with family support and cont PT through out patient services)   Recommendation   PT Discharge Recommendation Home with outpatient rehabilitation   Equipment Recommended   (no AD)   PT Therapy Minutes   PT Time In 1230   PT Time Out 1400   PT Total Time (minutes) 90   PT Mode of treatment - Individual (minutes) 90   PT Mode of treatment - Concurrent (minutes) 0   PT Mode of treatment - Group (minutes) 0   PT Mode of treatment - Co-treat (minutes) 0   PT Mode of Treatment - Total time(minutes) 90 minutes   PT Cumulative Minutes 675   Therapy Time missed   Time missed?  No

## 2023-02-19 NOTE — PROGRESS NOTES
Internal Medicine Progress Note  Patient: Mirella Pereira  Age/sex: 62 y o  female  Medical Record #: [id number]      ASSESSMENT/PLAN: (Interval History)  Mirella Pereira is seen and examined and management for following issues:    Acute CVA  • Left posterior limb internal capsule/thalamic  • For ASA/Plavix a 21 days then to ASA alone on 23  • Continue Lipitor but Cards had advised switch to Crestor 40mg qd at discharge to avoid interaction of the Lipitor with the Verapamil (CY interaction)  • CT C/A/P was negative for malignancy; thrombosis panel sent both to rule out hypercoag state      Headaches  • MRI with contrast was unrevealing  • Neuro placed her on Verapamil for preventive tx and she got IV magnesium/Phenergan/Toradol/Benadryl  • D/w Dr Karoline Christine (considering interaction with Lipitor) and see how she does with headaches     • Had a headache  and Dr Suly Llanos started Neurontin 100mg TID prn      Chest pain  • Had occurrences on  and  naomi with lying flat  • Trops and EKGs were negative  • Cards saw and felt atypical and will see her back in the office; no w/u for now     HTN  • Home:  no meds  • Here:  Losartan 25mg qd/Lopressor 25mg BID  • Stopped the Verapamil as above    • Since started Lopressor for tachycardia, stopped HCTZ and cut Losartan to 25mg qd (starting )     Depression  • Continue Cymbalta/Seroquel  • Takes Seroquel and Trazodone at home     Pre-DM  • HbA1C was 6 4  • Watching FBS; no Accuchecks for now  • D/w pt 23 about starting a DM diet   She was agreeable  • Nutrition did see on 2/15/23 for DM diet teaching  • FBS AM 23 was 108     Nicotine abuse  • Continue patch  • Advised cessation 23     Tachycardia  • HR had increased after stopping the Verapamil into low 100s to 110s  • Last EKG  = ST and she has a few OV as OP with HRs in low 100s and some mild tachy in the hospital too before was verapamil started  • TSH was 5 3  • No sx  • Hemoglobin, O2 sats are normal   She is not dry  • On 2/16/23, started Lopressor 25mg BID = improved     Subclinical hypothyroidism  • TSH was 5 3, free T4 0 88  • No tx for now  • Repeat TFTs in 4 weeks as OP        Discharge date:  2/20/23       The above assessment and plan was reviewed and updated as determined by my evaluation of the patient on 2/19/2023      Labs:   Results from last 7 days   Lab Units 02/16/23  0521 02/13/23  0545   WBC Thousand/uL 8 88 8 93   HEMOGLOBIN g/dL 13 2 13 8   HEMATOCRIT % 41 6 41 6   PLATELETS Thousands/uL 310 318     Results from last 7 days   Lab Units 02/16/23  0521 02/13/23  0545   SODIUM mmol/L 137 136   POTASSIUM mmol/L 3 6 4 2   CHLORIDE mmol/L 105 105   CO2 mmol/L 28 28   BUN mg/dL 19 16   CREATININE mg/dL 0 78 0 76   CALCIUM mg/dL 9 1 9 3                   Review of Scheduled Meds:  Current Facility-Administered Medications   Medication Dose Route Frequency Provider Last Rate   • acetaminophen  650 mg Oral Q6H PRN Dorothea Vidal MD     • albuterol  2 puff Inhalation Q4H PRN Dorothea Vidal MD     • aspirin  81 mg Oral Daily Dorothea Vidal MD     • atorvastatin  40 mg Oral QPM Dorothea Vidal MD     • bisacodyl  10 mg Rectal Daily PRN Dorothea Vidal MD     • clopidogrel  75 mg Oral Daily Dorothea Vidal MD     • docusate sodium  100 mg Oral BID Elizabeth Connolly MD     • DULoxetine  60 mg Oral Daily Dorothea Vidal MD     • gabapentin  100 mg Oral TID PRN Dorothea Vidal MD     • glycerin-hypromellose-  1 drop Left Eye TID Dorothea Vidal MD     • lidocaine  1 patch Topical Daily Dorothea Vidal MD     • lidocaine   Topical 4x Daily PRN SHAILA An     • losartan  25 mg Oral Daily SHAILA An     • metoprolol tartrate  25 mg Oral Q12H [address] SHAILA An     • nicotine  14 mg Transdermal Daily Dorothea Vidal MD     • ondansetron  4 mg Oral Q6H PRN Dorothea Vidal MD     • pantoprazole  40 mg Oral Early Morning Jazmyne Galvan MD     • polyethylene glycol  17 g Oral Daily PRN Jazmyne Galvan MD     • QUEtiapine  25 mg Oral HS Jazmyne Galvan MD     • senna  1 tablet Oral BID Sirena Carrera MD         Subjective/ HPI: Patient seen and examined  Patients overnight issues or events were reviewed with nursing or staff during rounds or morning huddle session  New or overnight issues include the following:     Pt seen in her room  She states that she is doing well and denies complaints  ROS:   A 10 point ROS was performed; negative except as noted above  Imaging:     No orders to display       *Labs /Radiology studies reviewed  *Medications reviewed and reconciled as needed  *Please refer to order section for additional ordered labs studies  *Case discussed with primary attending during morning huddle case rounds    Physical Examination:  Vitals:   Vitals:    02/18/23 1343 02/18/23 2006 02/19/23 0500 02/19/23 0845   BP: 130/87 135/86 126/73 138/85   BP Location: Right arm Right arm Right arm Right arm   Pulse: 84 84 80 89   Resp: 18 18 16    Temp: (!) 97 4 °F (36 3 °C) 98 3 °F (36 8 °C) 98 6 °F (37 °C)    TempSrc: Oral Oral Oral    SpO2: 95% 95% 96%    Weight:       Height:           General Appearance: no distress, conversive  HEENT: PERRLA, conjuctiva normal; oropharynx clear; mucous membranes moist   Neck:  Supple, normal ROM  Lungs: CTA, normal respiratory effort, no retractions, expiratory effort normal  CV: regular rate and rhythm; no rubs/murmurs/gallops, PMI normal   ABD: soft; ND/NT; +BS  EXT: No edema  Skin: normal turgor, normal texture, no rashes  Psych: affect normal, mood normal  Neuro: AAO      The above physical exam was reviewed and updated as determined by my evaluation of the patient on 2/19/2023      Invasive Devices     None                    VTE Pharmacologic Prophylaxis: ambulatory  Code Status: Level 1 - Full Code  Current Length of Stay: 10 day(s)      Total time spent:  30 minutes with more than 50% spent counseling/coordinating care  Counseling includes discussion with patient re: progress  and discussion with patient of his/her current medical state/information  Coordination of patient's care was performed in conjunction with primary service  Time invested included review of patient's labs, vitals, and management of their comorbidities with continued monitoring  In addition, this patient was discussed with medical team including physician and advanced extenders  The care of the patient was extensively discussed and appropriate treatment plan was formulated unique for this patient  Medical decision making for the day was made by supervising physician unless otherwise noted in their attestation statement  ** Please Note:  voice to text software may have been used in the creation of this document   Although proof errors in transcription or interpretation are a potential of such software**

## 2023-02-19 NOTE — PROGRESS NOTES
02/19/23 0701   Pain Assessment   Pain Assessment Tool 0-10   Pain Score No Pain   Restrictions/Precautions   Precautions Cognitive; Fall Risk;Visual deficit   Weight Bearing Restrictions No   ROM Restrictions No   Eating   Type of Assistance Needed Independent   Physical Assistance Level No physical assistance   Eating CARE Score 6   Oral Hygiene   Type of Assistance Needed Independent   Physical Assistance Level No physical assistance   Oral Hygiene CARE Score 6   Shower/Bathe Self   Type of Assistance Needed Independent   Physical Assistance Level No physical assistance   Shower/Bathe Self CARE Score 6   Upper Body Dressing   Type of Assistance Needed Independent   Physical Assistance Level No physical assistance   Upper Body Dressing CARE Score 6   Lower Body Dressing   Type of Assistance Needed Independent   Physical Assistance Level No physical assistance   Lower Body Dressing CARE Score 6   Putting On/Taking Off Footwear   Type of Assistance Needed Independent   Physical Assistance Level No physical assistance   Putting On/Taking Off Footwear CARE Score 6   Sit to Stand   Type of Assistance Needed Independent   Physical Assistance Level No physical assistance   Comment no AD   Sit to Stand CARE Score 6   Bed-Chair Transfer   Type of Assistance Needed Independent   Physical Assistance Level No physical assistance   Comment no AD   Chair/Bed-to-Chair Transfer CARE Score 6   Toileting Hygiene   Type of Assistance Needed Independent   Physical Assistance Level No physical assistance   Toileting Hygiene CARE Score 6   Toilet Transfer   Type of Assistance Needed Independent   Physical Assistance Level No physical assistance   Comment no AD   Toilet Transfer CARE Score 6   Cognition   Overall Cognitive Status WFL   Arousal/Participation Alert; Cooperative   Attention Within functional limits   Orientation Level Oriented X4   Memory Within functional limits   Following Commands Follows one step commands without difficulty   Vision   Vision Comments Pt engaged in visual fixation task of placing "noses" on lady bugs  Pt initially stating "my noses are more like eyes" but with repetition of task pt improves in accuracy of placement  Pt does not report any c/o visual fatigue or headache with task and overall tolerates well  Continues to compensate with use of index finger to keep place on line  Educated pt on various online vision HEP resources to be completed as HEP  Activity Tolerance   Activity Tolerance Patient tolerated treatment well   Assessment   Treatment Assessment Pt participated in skilled OT services with focus on d/c planning, finalizing HEP, and ADL retraining  Pt adia LUCAS carryover of previous education overall has progressed to independent with no AD for all ADL tasks and functional mobility  Plan for pt to d/c home with family support  No DME needs, has shower chair from PTA  Objective measures readministerred as listed below, showing improvement in both RUE strength and coordination from time of evaluation  Prognosis Good   Problem List Decreased endurance; Impaired balance;Decreased coordination; Impaired vision   Recommendation   OT Discharge Recommendation   (home w family support)   OT Therapy Minutes   OT Time In 0700   OT Time Out 0830   OT Total Time (minutes) 90   OT Mode of treatment - Individual (minutes) 90   OT Mode of treatment - Concurrent (minutes) 0   OT Mode of treatment - Group (minutes) 0   OT Mode of treatment - Co-treat (minutes) 0   OT Mode of Treatment - Total time(minutes) 90 minutes   OT Cumulative Minutes 860   Therapy Time missed   Time missed?  No     UE Evaluation Scores:       FELIZ ACOSTA Comments            UPPER EXTREMITY FUNCTION Impaired Intact Dominant Hand: RIGHT                            /Pinch Strength         Dynamometer         - Gross Grasp 10,15,10 lbs   30,30,30 lbs        Pinch Meter          - PINCER 1 3 lbs 3 8 lbs      - Lateral key 4 3 lbs 4 8 lbs     9 hole Peg Test  54 31  24 59      Box and Blocks  33   50                  Discharge scores: 2/19    FELIZ ACOSTA Comments            UPPER EXTREMITY FUNCTION Impaired Intact Dominant Hand: RIGHT                            /Pinch Strength         Dynamometer         - Gross Grasp 40, 35, 38 lbs   40, 35, 35 lbs        Pinch Meter          - PINCER 7 4 lbs 8 4 lbs      - Lateral key 8 lbs 11 4 lbs     9 hole Peg Test 28 58"   23 78     Box and Blocks  48  50                   Access Code: [email or web address]  URL: [email or web address]  Date: 02/19/2023  Prepared by: Jessi Ordoñez    Exercises  Putty Squeezes - 1 x daily - 5 x weekly - 3 sets - 10 reps  Tip Pinch with Putty - 1 x daily - 5 x weekly - 3 sets - 10 reps  Key Pinch with Putty - 1 x daily - 5 x weekly - 3 sets - 10 reps  3-Point Pinch with Putty - 1 x daily - 5 x weekly - 3 sets - 10 reps  Finger Lumbricals with Putty - 1 x daily - 5 x weekly - 3 sets - 10 reps  Finger Adduction with Putty - 1 x daily - 5 x weekly - 3 sets - 10 reps

## 2023-02-19 NOTE — PLAN OF CARE
Problem: PAIN - ADULT  Goal: Verbalizes/displays adequate comfort level or baseline comfort level  Description: Interventions:  - Encourage patient to monitor pain and request assistance  - Assess pain using appropriate pain scale  - Administer analgesics based on type and severity of pain and evaluate response  - Implement non-pharmacological measures as appropriate and evaluate response  - Consider cultural and social influences on pain and pain management  - Notify physician/advanced practitioner if interventions unsuccessful or patient reports new pain  Outcome: Progressing     Problem: INFECTION - ADULT  Goal: Absence or prevention of progression during hospitalization  Description: INTERVENTIONS:  - Assess and monitor for signs and symptoms of infection  - Monitor lab/diagnostic results  - Monitor all insertion sites, i e  indwelling lines, tubes, and drains  - Monitor endotracheal if appropriate and nasal secretions for changes in amount and color  - [place] appropriate cooling/warming therapies per order  - Administer medications as ordered  - Instruct and encourage patient and family to use good hand hygiene technique  - Identify and instruct in appropriate isolation precautions for identified infection/condition  Outcome: Progressing     Problem: SAFETY ADULT  Goal: Patient will remain free of falls  Description: INTERVENTIONS:  - Educate patient/family on patient safety including physical limitations  - Instruct patient to call for assistance with activity   - Consult OT/PT to assist with strengthening/mobility   - Keep Call bell within reach  - Keep bed low and locked with side rails adjusted as appropriate  - Keep care items and personal belongings within reach  - Initiate and maintain comfort rounds  - Make Fall Risk Sign visible to staff  - Apply yellow socks and bracelet for high fall risk patients  - Consider moving patient to room near nurses station  Outcome: Progressing  Goal: Maintain or return to baseline ADL function  Description: INTERVENTIONS:  -  Assess patient's ability to carry out ADLs; assess patient's baseline for ADL function and identify physical deficits which impact ability to perform ADLs (bathing, care of mouth/teeth, toileting, grooming, dressing, etc )  - Assess/evaluate cause of self-care deficits   - Assess range of motion  - Assess patient's mobility; develop plan if impaired  - Assess patient's need for assistive devices and provide as appropriate  - Encourage maximum independence but intervene and supervise when necessary  - Involve family in performance of ADLs  - Assess for home care needs following discharge   - Consider OT consult to assist with ADL evaluation and planning for discharge  - Provide patient education as appropriate  Outcome: Progressing  Goal: Maintains/Returns to pre admission functional level  Description: INTERVENTIONS:  - Perform BMAT or MOVE assessment daily    - Set and communicate daily mobility goal to care team and patient/family/caregiver  - Collaborate with rehabilitation services on mobility goals if consulted  - Perform Range of Motion 3 times a day  - Reposition patient every 2 hours    - Dangle patient 3 times a day  - Stand patient 3 times a day  - Ambulate patient 3 times a day  - Out of bed to chair 3 times a day   - Out of bed for meals 3 times a day  - Out of bed for toileting  - Record patient progress and toleration of activity level   Outcome: Progressing     Problem: DISCHARGE PLANNING  Goal: Discharge to home or other facility with appropriate resources  Description: INTERVENTIONS:  - Identify barriers to discharge w/patient and caregiver  - Arrange for needed discharge resources and transportation as appropriate  - Identify discharge learning needs (meds, wound care, etc )  - Arrange for interpretive services to assist at discharge as needed  - Refer to Case Management Department for coordinating discharge planning if the patient needs post-hospital services based on physician/advanced practitioner order or complex needs related to functional status, cognitive ability, or social support system  Outcome: Progressing

## 2023-02-19 NOTE — PLAN OF CARE
Problem: PAIN - ADULT  Goal: Verbalizes/displays adequate comfort level or baseline comfort level  Description: Interventions:  - Encourage patient to monitor pain and request assistance  - Assess pain using appropriate pain scale  - Administer analgesics based on type and severity of pain and evaluate response  - Implement non-pharmacological measures as appropriate and evaluate response  - Consider cultural and social influences on pain and pain management  - Notify physician/advanced practitioner if interventions unsuccessful or patient reports new pain  Outcome: Progressing     Problem: INFECTION - ADULT  Goal: Absence or prevention of progression during hospitalization  Description: INTERVENTIONS:  - Assess and monitor for signs and symptoms of infection  - Monitor lab/diagnostic results  - Monitor all insertion sites, i e  indwelling lines, tubes, and drains  - Monitor endotracheal if appropriate and nasal secretions for changes in amount and color  - [place] appropriate cooling/warming therapies per order  - Administer medications as ordered  - Instruct and encourage patient and family to use good hand hygiene technique  - Identify and instruct in appropriate isolation precautions for identified infection/condition  Outcome: Progressing     Problem: SAFETY ADULT  Goal: Patient will remain free of falls  Description: INTERVENTIONS:  - Educate patient/family on patient safety including physical limitations  - Instruct patient to call for assistance with activity   - Consult OT/PT to assist with strengthening/mobility   - Keep Call bell within reach  - Keep bed low and locked with side rails adjusted as appropriate  - Keep care items and personal belongings within reach  - Initiate and maintain comfort rounds  - Make Fall Risk Sign visible to staff  - Offer Toileting every 2 Hours, in advance of need  - Initiate/Maintain bed alarm  - Obtain necessary fall risk management equipment: bed alarm  - Apply yellow socks and bracelet for high fall risk patients  - Consider moving patient to room near nurses station  Outcome: Progressing  Goal: Maintain or return to baseline ADL function  Description: INTERVENTIONS:  -  Assess patient's ability to carry out ADLs; assess patient's baseline for ADL function and identify physical deficits which impact ability to perform ADLs (bathing, care of mouth/teeth, toileting, grooming, dressing, etc )  - Assess/evaluate cause of self-care deficits   - Assess range of motion  - Assess patient's mobility; develop plan if impaired  - Assess patient's need for assistive devices and provide as appropriate  - Encourage maximum independence but intervene and supervise when necessary  - Involve family in performance of ADLs  - Assess for home care needs following discharge   - Consider OT consult to assist with ADL evaluation and planning for discharge  - Provide patient education as appropriate  Outcome: Progressing  Goal: Maintains/Returns to pre admission functional level  Description: INTERVENTIONS:  - Perform BMAT or MOVE assessment daily    - Set and communicate daily mobility goal to care team and patient/family/caregiver  - Collaborate with rehabilitation services on mobility goals if consulted  - Perform Range of Motion 3 times a day  - Reposition patient every 2 hours    - Dangle patient 3 times a day  - Stand patient 3 times a day  - Ambulate patient 3 times a day  - Out of bed to chair 3 times a day   - Out of bed for meals 3 times a day  - Out of bed for toileting  - Record patient progress and toleration of activity level   Outcome: Progressing     Problem: DISCHARGE PLANNING  Goal: Discharge to home or other facility with appropriate resources  Description: INTERVENTIONS:  - Identify barriers to discharge w/patient and caregiver  - Arrange for needed discharge resources and transportation as appropriate  - Identify discharge learning needs (meds, wound care, etc )  - Arrange for interpretive services to assist at discharge as needed  - Refer to Case Management Department for coordinating discharge planning if the patient needs post-hospital services based on physician/advanced practitioner order or complex needs related to functional status, cognitive ability, or social support system  Outcome: Progressing

## 2023-02-20 VITALS
WEIGHT: 189.2 LBS | TEMPERATURE: 98.6 F | SYSTOLIC BLOOD PRESSURE: 128 MMHG | DIASTOLIC BLOOD PRESSURE: 81 MMHG | RESPIRATION RATE: 17 BRPM | BODY MASS INDEX: 37.15 KG/M2 | OXYGEN SATURATION: 95 % | HEIGHT: 60 IN | HEART RATE: 95 BPM

## 2023-02-20 PROBLEM — R94.6 THYROID FUNCTION TEST ABNORMAL: Status: ACTIVE | Noted: 2023-02-20

## 2023-02-20 RX ORDER — DULOXETIN HYDROCHLORIDE 60 MG/1
60 CAPSULE, DELAYED RELEASE ORAL DAILY
Refills: 0
Start: 2023-02-20

## 2023-02-20 RX ORDER — ASPIRIN 81 MG/1
81 TABLET, CHEWABLE ORAL DAILY
Qty: 30 TABLET | Refills: 0 | Status: SHIPPED | OUTPATIENT
Start: 2023-02-20

## 2023-02-20 RX ORDER — ATORVASTATIN CALCIUM 40 MG/1
40 TABLET, FILM COATED ORAL EVERY EVENING
Qty: 30 TABLET | Refills: 0 | Status: SHIPPED | OUTPATIENT
Start: 2023-02-20

## 2023-02-20 RX ORDER — LOSARTAN POTASSIUM 25 MG/1
25 TABLET ORAL DAILY
Qty: 30 TABLET | Refills: 0 | Status: SHIPPED | OUTPATIENT
Start: 2023-02-20

## 2023-02-20 RX ORDER — CLOPIDOGREL BISULFATE 75 MG/1
75 TABLET ORAL DAILY
Qty: 7 TABLET | Refills: 0 | Status: SHIPPED | OUTPATIENT
Start: 2023-02-20 | End: 2023-02-27

## 2023-02-20 RX ORDER — GABAPENTIN 100 MG/1
100 CAPSULE ORAL DAILY PRN
Qty: 15 CAPSULE | Refills: 0 | Status: SHIPPED | OUTPATIENT
Start: 2023-02-20

## 2023-02-20 RX ORDER — ACETAMINOPHEN 325 MG/1
650 TABLET ORAL 3 TIMES DAILY PRN
Refills: 0
Start: 2023-02-20

## 2023-02-20 RX ADMIN — CLOPIDOGREL BISULFATE 75 MG: 75 TABLET ORAL at 08:09

## 2023-02-20 RX ADMIN — PANTOPRAZOLE SODIUM 40 MG: 40 TABLET, DELAYED RELEASE ORAL at 05:22

## 2023-02-20 RX ADMIN — DULOXETINE HYDROCHLORIDE 60 MG: 60 CAPSULE, DELAYED RELEASE ORAL at 08:09

## 2023-02-20 RX ADMIN — GLYCERIN 1 DROP: .002; .002; .01 SOLUTION/ DROPS OPHTHALMIC at 08:10

## 2023-02-20 RX ADMIN — METOPROLOL TARTRATE 25 MG: 25 TABLET, FILM COATED ORAL at 08:09

## 2023-02-20 RX ADMIN — NICOTINE 14 MG: 14 PATCH, EXTENDED RELEASE TRANSDERMAL at 08:54

## 2023-02-20 RX ADMIN — LOSARTAN POTASSIUM 25 MG: 25 TABLET, FILM COATED ORAL at 08:09

## 2023-02-20 RX ADMIN — ASPIRIN 81 MG CHEWABLE TABLET 81 MG: 81 TABLET CHEWABLE at 08:09

## 2023-02-20 NOTE — PLAN OF CARE
Problem: PAIN - ADULT  Goal: Verbalizes/displays adequate comfort level or baseline comfort level  Description: Interventions:  - Encourage patient to monitor pain and request assistance  - Assess pain using appropriate pain scale  - Administer analgesics based on type and severity of pain and evaluate response  - Implement non-pharmacological measures as appropriate and evaluate response  - Consider cultural and social influences on pain and pain management  - Notify physician/advanced practitioner if interventions unsuccessful or patient reports new pain  Outcome: Progressing     Problem: INFECTION - ADULT  Goal: Absence or prevention of progression during hospitalization  Description: INTERVENTIONS:  - Assess and monitor for signs and symptoms of infection  - Monitor lab/diagnostic results  - Monitor all insertion sites, i e  indwelling lines, tubes, and drains  - Monitor endotracheal if appropriate and nasal secretions for changes in amount and color  - [place] appropriate cooling/warming therapies per order  - Administer medications as ordered  - Instruct and encourage patient and family to use good hand hygiene technique  - Identify and instruct in appropriate isolation precautions for identified infection/condition  Outcome: Progressing     Problem: SAFETY ADULT  Goal: Patient will remain free of falls  Description: INTERVENTIONS:  - Educate patient/family on patient safety including physical limitations  - Instruct patient to call for assistance with activity   - Consult OT/PT to assist with strengthening/mobility   - Keep Call bell within reach  - Keep bed low and locked with side rails adjusted as appropriate  - Keep care items and personal belongings within reach  - Initiate and maintain comfort rounds  - Make Fall Risk Sign visible to staff  - Offer Toileting every 2 Hours, in advance of need  - Initiate/Maintain bed alarm  - Obtain necessary fall risk management equipment: bed alarm  - Apply yellow socks and bracelet for high fall risk patients  - Consider moving patient to room near nurses station  Outcome: Progressing  Goal: Maintain or return to baseline ADL function  Description: INTERVENTIONS:  -  Assess patient's ability to carry out ADLs; assess patient's baseline for ADL function and identify physical deficits which impact ability to perform ADLs (bathing, care of mouth/teeth, toileting, grooming, dressing, etc )  - Assess/evaluate cause of self-care deficits   - Assess range of motion  - Assess patient's mobility; develop plan if impaired  - Assess patient's need for assistive devices and provide as appropriate  - Encourage maximum independence but intervene and supervise when necessary  - Involve family in performance of ADLs  - Assess for home care needs following discharge   - Consider OT consult to assist with ADL evaluation and planning for discharge  - Provide patient education as appropriate  Outcome: Progressing  Goal: Maintains/Returns to pre admission functional level  Description: INTERVENTIONS:  - Perform BMAT or MOVE assessment daily    - Set and communicate daily mobility goal to care team and patient/family/caregiver  - Collaborate with rehabilitation services on mobility goals if consulted  - Perform Range of Motion 3 times a day  - Reposition patient every 2 hours    - Dangle patient 3 times a day  - Stand patient 3 times a day  - Ambulate patient 3 times a day  - Out of bed to chair 3 times a day   - Out of bed for meals 3 times a day  - Out of bed for toileting  - Record patient progress and toleration of activity level   Outcome: Progressing     Problem: DISCHARGE PLANNING  Goal: Discharge to home or other facility with appropriate resources  Description: INTERVENTIONS:  - Identify barriers to discharge w/patient and caregiver  - Arrange for needed discharge resources and transportation as appropriate  - Identify discharge learning needs (meds, wound care, etc )  - Arrange for interpretive services to assist at discharge as needed  - Refer to Case Management Department for coordinating discharge planning if the patient needs post-hospital services based on physician/advanced practitioner order or complex needs related to functional status, cognitive ability, or social support system  Outcome: Progressing

## 2023-02-20 NOTE — NURSING NOTE
AVS gone over with both patient and son  All questions were answered  Pt's belongings were packed by both the patient and the son  Pt was accompanied out with RN

## 2023-02-20 NOTE — DISCHARGE INSTR - AVS FIRST PAGE
DISCHARGE INSTRUCTIONS:  Please bring these instructions with you to your doctors appointments      ACTIVITY: Please follow mobility, self care instructions as your were taught by inpatient rehabilitation therapists  Please continue using adaptive equipment recommended by [facility] therapists  You will continue your rehabilitation with the Neuro Day Program with continued PT, OT, SLP to work on higher level goals  RESTRICTIONS:  No Driving, until cleared by neurology  You may require a fitness to drive evaluation furthermore with OT at your neuro day program   No Strenuous house chores      MEDICATION CHANGES:  For high blood pressure: You are to continue Lopressor (metoprolol tartrate) 25 mg every 12 hours and Cozaar  (losartan) 25 mg daily  Your primary care physician may adjust doses in the future  For stroke prevention: Continue combination of Aspirin 81 mg daily and Plavix 75 mg daily until 2/27/23  Then continue Aspirin 81 mg daily indefinitely  Also continue Lipitor 40 mg daily indefinitely  *PLEASE SEE YOUR PCP FOR ALL MEDICATION REFILLS       OTHER:  You have been recommended to see a Cardiologist to complete work up for chest pain when you were in the hospital side - referral was made  Please call office if you do not hear from them in 1 week  You will need to follow with a stroke neurologist - referral made for you  Please call office if you do not hear from them in 1 week  Below is more information on stroke  Below is more information on pre-diabetes and diabetic diet (your hemoglobin A1C was 6 4)  Continue a diabetic diet  Follow with you PCP      Smoking cessastion encouraged  You have been recommended to have repeat thyroid testing with you PCP in 1 month - please discuss with you PCP

## 2023-02-20 NOTE — SPEECH THERAPY NOTE
SLP Discharge Summary    Pt was discharged home w/ family supervision/support on 2/20/23  At time of discharge, pt was making slower and steady progress towards overall cognitive and speech goals, noting that pt remained to be min A for expression, executive functions and memory and supervision level for comprehension and social interaction skills  Throughout pt's stay on the acute rehab center, pt was followed primarily for cognitive and speech tx sessions, but also was assessed for dysphagia w/ brief f/u  In regards to swallow function, pt's diet upon admission to the [facility] was regular w/ thin liquids, noting fairly good carryover and compliance in swallow strategies during meals to decrease possible risk for aspiration  Pt was able to [facility] and minimal oropharyngeal dysphagia characterized by R-sided weakness resulting in min R sided pocketing  Pt is aware of pocketing and independently clears w/ lingual or finger sweep  Pt also w/ min lingual residue, cleared effectively w/ liquid wash  Pt able to adequately take cup or straw sips utilizing compensatory strategy of L sided placement  Oral containment and manipulation all WFL, swallow initiation prompt and hyolaryngeal rise WFL upon palpation  Pt w/ cough x1 after toast suspect d/t pt talking during intake  Overall upon f/u, pt did not exhibit overt aspiration sxs given meal and continues to demonstrate independence in swallow strategies given meals to decrease any possible oropharyngeal sxs  Continue to recommend regular diet w/ thin liquids at this time to where no further dysphagia tx sessions warranted but reconsult as needed  For cognitive skills, pt did complete formalized assessment, CLQT+,  with a Composite Severity Rating score of 3 6 out of 4 0, correlating to overall MILD cognitive linguistic impairments at time of evaluation and in comparison to age matched peers ranging from 22-72 y/o   While  denies noticing over deficits given cognitive linguistic skills, pt's son, Mynor Simon did report that he has noticed still decreased memory and likely can exhibit difficulty in new learning given stroke, medications,etc  Plan to focus on higher level cognitive skills to maximize pt's attention, recall, executive function skills in attempts to decrease caregiver burden overt time  As for dysarthria, pt completed Motor Speech Eval, which mild dysarthia cristian by articulatory groping, imprecise articultion, distorted speech sounds, faster/ slower rate of speech dependent on the speech task  Pt spontaneous speech is mildly dysarthric/ intelligible, however pt speech in structured repetition tasks ( monosyllabic and multisyllabic words) cristian by stopping, prolongations, blocks, articulatory groping and distortions noted in [place] more than [address]  It was noted that in [place] faster rate of speech was noted which pt demo imprecise articulation  Oral motor exercises were provided for pt to review and complete as a home exercise program but will plan to review w/ pt in future sessions  Current barriers which present include aspiration risk, decreased communication skills due to mild dysarthria of speech, decreased attentions, decreased ST/working memory, decreased executive function skills which currently impacts overall safety and functional speech/cognitive/mobility  It is noted that new learning in regard to Stroke also required increased education and review of current medications (as pt was not fully compliant in management prior to admission)  Recommendations for medication management include use of pill box which pt was able to demonstrate ability to use in session w/ minimal verbal cues  Family education provided to pt's son, Mynor Simon about this in more recent sessions, to where son agreeable w/ the plan    At time of dishcarge, pt will benefit from ongoing OP SLP services targeting speech/cognitive linguistic skills to maximize overall communication abilities, independence given cognitive and speech skills in attempts to decrease care giver support over time

## 2023-02-20 NOTE — DISCHARGE SUMMARY
Discharge Summary - Axel Barnes 62 y o  female MRN: [id number]  Unit/Bed#: [facility] 962-01 Encounter: [id number]    Admission Date: 2023     Discharge Date: 23    Rehab Diagnosis: Impairment of mobility, safety, Activities of Daily Living (ADLs), and cognitive/communication skills due to Stroke:    Right Body Involvement (Left Brain) Left posterior limb internal capsule acute ischemic CVA     History of Present Illness:   Suma Rider is a 62 y o  female with HTN, HLD, tobacco use, anxiety/depression, insomnia, sciatica, chronic left foot pain after bunion surgery, pre-diabetes who presented to the [address] on 23 with dysarthria and right sided weakness  Stroke alert initiated  TNK was NOT administered as outside treatment window  CTH with acute abnormalities  CTA head and neck: Negative for LVO  MRI Brain both with and without contrast confirming a left posterior limb internal capsule acute ischemic CVA  Echocardiogram with EF 60% and normal function  Patient seen by Neurology  Etiology of CVA related to hypertensive disease, thrombosis panel pending  CT CAP completed and not showing signs of malignancies  Placed on DAPT with Aspirin 81 mg daily and Plavix 75 mg daily x 21 days then Aspirin 81 mg daily monotherapy  Medically developed headache - was treated with Migraine cocktail (Phenergan, Torodol, Benadryl, Magnesium)  Patient also started on Verapamil for prevention  Patient with transient chest pain at rest - troponin negative, EKG without changes  Cardiology consulted, recommended losartan and HCTZ for HTN  They also recommended a change to Crestor instead of Lipitor due to being on Verapamil (CY interaction)  Outpatient follow-up recommended with Dr Jonathan Goddard       After medical stabilization, patient was found to have acute functional deficits in mobility, self care, speech, swallow, cognition, and therefore admitted to [facility] for acute inpatient rehabilitation  Acute Rehabilitation Center Course: Patient participated in a comprehensive interdisciplinary inpatient rehabilitation program which included involvment of MD, therapies (PT, OT, and/or SLP), RN, CM, SW, dietary, and psychology services  She was able to be advanced to a supervision - Mod I level of assist with mobility and self care  She continued to have mild dysarthria and cognitive deficits  See day of discharge functional scores in chart below  Family training was completed and patient was considered safe for discharge home  Her sons were also kept updated during acute rehabilitation stay frequently  Patient will continue her rehabilitative course with the outpatient Neuro Day Program with PT, OT ,SLP  She has been instructed NO DRIVING until cleared by outpatient Neurology and patient may require a fitness to drive evaluation with OT additionally        Physical Therapy Occupational Therapy Speech Therapy   Weight Bearing Status: Full Weight Bearing  Transfers: Supervision  Bed Mobility: Independent  Amulation Distance (ft): 500 feet  Ambulation: Supervision  Assistive Device for Ambulation: Single [employer] (or no AD)  Wheelchair Mobility Distance:  (N/A)  Number of Stairs: 12  Assistive Device for Stairs: Right Hand Rail (descnding, L handrail ascending)  Stair Assistance: Supervision  Ramp:  (not needed for entry to home)  Discharge Recommendations: Home with:  [address] with[de-identified] Family Support, Outpatient Physical Therapy   Eating: Supervision  Grooming: Supervision  Bathing: Supervision  Bathing: Supervision  Upper Body Dressing: Supervision  Lower Body Dressing: Supervision  Toileting: Supervision  Tub/Shower Transfer: Supervision  Toilet Transfer: Supervision  Cognition: Within Defined Limits  Orientation: Person, Place, Time, Situation   Mode of Communication: Verbal  Speech/Language: Dysarthia  Cognition: Exceptions to WNL  Cognition: Decreased Memory, Decreased Executive Functions, Decreased Attention  Orientation: Person, Place, Time, Situation  Swallowing: Within Defined Limits  Diet Recommendations: Regular Diet, Thin  Discharge Recommendations: Home with:  [address] with[de-identified] Family Support, Outpatient Speech Therapy       Acute medical issues addressed while at [facility]:    * Stroke - Left posterior limb internal capsule ischemic stroke  Assessment & Plan  Presented 2/4/23 with right sided weakness, difficulty speaking  MRI confirmed left posterior limb internal capsule acute ischemic stroke  Etiology: likely related to hypertensive disease  Work-up for malignancy negative  Echo with EF 60%  Per Neurology placed on DAPT with ASA/Plavix x 21 days, then ASA monotherapy    Plan:  Begin acute rehab program  Secondary CVA ppx: Lipitor 40 mg daily  Aspirin 81 mg daily and Plavix 75 mg daily x 21 days (around 2/27/23) then Aspirin 81 mg daily monotherapy  Continue CVA education and reduction of modifiable risk factors naomi HTN, smoking cession  Follow-up with Neurology as outpatient - referral made    Tachycardia  Assessment & Plan  HR sometimes 100 at rest -historically has had tachycardia even reviewing outpatient records  EKG reviewed from 2/9: Sinus tachycardia with occ PVCs  Asymptomatic  For home:  Lopressor 25 mg every 12 hours  Losartan to be reduced 25 mg daily  Hydrochlorothiazide to be discontinued  ·  Improvement in her resting heart rate now ranging from 80-90  Continue to monitor and rest breaks  HR max = 130  Patient to follow with Cardiology as outpatient - referral made    Hypothryoidism     Patient with subclinical hypothryoidism    Repeat TFT in 1 month recommended with PCP    Dry eye  Assessment & Plan  On the left side  Artificial tears 3 times daily  +VOR testing on left especially during movement - referral to neuro-optometry also recommended as outpatient - Dr Hanks Fear    Pain in left foot, chronic  Assessment & Plan  Continue Tylenol PRN  Also if needed can add back Gabapentin - previously tolerated as outpatient    Prediabetes  Assessment & Plan  HA1C = 6 4  Diabetic diet   Consult nutrition to review with patient during [facility] stay - education provided    Chest pain  Assessment & Plan  In acute care, patient with transient chest pain at rest - troponin negative, EKG without changes  Cardiology consulted, recommended losartan and HCTZ for HTN  Also recommended a change to Crestor instead of Lipitor due to being on Verapamil (CY interaction)  Discontinue Verapamil 23 as headaches improved  · Outpatient follow-up recommended with Dr Inder Weinstein for further work-up - referral made  · While in [facility] monitor VS and any chest pains- no chest pain in [facility]      Hypertension  Assessment & Plan  Here: Due to at rest tachycardia (chronic), after discussion with internal medicine consultants: Lopressor 25 mg every 12 hours, reduce losartan to 25 mg daily, discontinue hydrochlorothiazide  We will monitor BP and heart rate with new regimen  -overall stable  Plan per Cardiology  Optimize diet (limit salt)  Outpatient follow-up with Cardiology - Dr Inder Weinstein - referral made    Insomnia  Assessment & Plan  At home: Seroquel 25 mg QHS  Here: Seroquel 25 mg QHS  Discontinued trazodone 2/15/2023    Depression  Assessment & Plan  Managed on Cymbalta 60 mg daily (home dose)  Consult placed to Neuropsychology - patient tearful and having difficulty coping with stroke diagnosis  Team to additionally provide emotional support  Mood much improved    Obesity (BMI 35 0-39 9 without comorbidity)  Assessment & Plan  Lifestyle and diet modifications when able  Consult nutrition     Headache  Assessment & Plan  Developed in acute care  Treated with Migraine cocktail  Started on Verapamil for prevention by Neurology  Headaches have improved    Plan:  Discontinue Verapamil 23 as headaches much improved and would like to avoid interaction with Lipitor    If patient has a severe headache utilize gabapentin 100 mg PRN  Has not needed any gabapentin yesterday or today  Monitor for headaches  Follow-up with headache Neurologist as outpatient if persists    Tobacco use  Assessment & Plan  Smoking cessation education frequently during [facility] stay  Nicotine patch for cravings - wean dose weekly -reduce to 14 mg  Can continue OTC patches   Patient has committed to quitting    Other acquired deformities of left foot  Assessment & Plan  Left hallux valgus and hammertoe surgery by Dr Bennett Ramesh (podiatry)  Chronic hypersensitivity/neuritis  Is able to wear shoes  Patient received injection by Dr Bennett Ramesh in past (Sept 2022)  Follow-up with Dr Bennett Ramesh        Discharge Physical Examination:  Physical Exam  Vitals and nursing note reviewed  Constitutional:       General: She is not in acute distress  HENT:      Head: Normocephalic and atraumatic  Nose: Nose normal       Mouth/Throat:      Mouth: Mucous membranes are moist    Eyes:      Conjunctiva/sclera: Conjunctivae normal    Cardiovascular:      Rate and Rhythm: Normal rate and regular rhythm  Pulses: Normal pulses  Pulmonary:      Effort: Pulmonary effort is normal       Breath sounds: Normal breath sounds  No wheezing or rales  Abdominal:      General: Bowel sounds are normal  There is no distension  Palpations: Abdomen is soft  Tenderness: There is no abdominal tenderness  Musculoskeletal:         General: No swelling  Cervical back: Neck supple  Skin:     General: Skin is warm  Neurological:      Mental Status: She is alert and oriented to person, place, and time  Comments: Left eye VOR testing + during movement  Right sided weakness much improved strength 4+/5 - 5/5  Balance much improved  Mild dysarthria   Psychiatric:         Mood and Affect: Mood normal          Discharge Medications:   See after visit summary for reconciled discharge medications provided to patient and family        Condition at Discharge: stable Discharge instructions/Information to patient and family:   See after visit summary for information provided to patient and family  Provisions for Follow-Up Care:  See after visit summary for information related to follow-up care and any pertinent home health orders  Future Appointments   Date Time Provider Doug Castillo   2/21/2023  3:15 PM Josh Tristan, PT BE PT [address]   2/23/2023  8:00 AM Sirena Lora, CCC-SLP [facility] [address]   2/23/2023  9:00 AM Talisha Cronin, OT BE OT [address]   4/13/2023  1:15 PM [facility]in [facility]       Disposition: Home      Planned Readmission: No    Discharge Statement   I spent more than 30 minutes discharging the patient  This time was spent on the day of discharge  I had direct contact with the patient on the day of discharge  Greater than 50% of the total time was spent examining patient, answering all patient questions, arranging and discussing plan of care with patient as well as directly providing post-discharge instructions  Additional time then spent on discharge activities  Discharge Medications:  See after visit summary for reconciled discharge medications provided to patient and family

## 2023-02-20 NOTE — PLAN OF CARE
Problem: PAIN - ADULT  Goal: Verbalizes/displays adequate comfort level or baseline comfort level  Description: Interventions:  - Encourage patient to monitor pain and request assistance  - Assess pain using appropriate pain scale  - Administer analgesics based on type and severity of pain and evaluate response  - Implement non-pharmacological measures as appropriate and evaluate response  - Consider cultural and social influences on pain and pain management  - Notify physician/advanced practitioner if interventions unsuccessful or patient reports new pain  2/20/2023 0732 by Aramis Ramos RN  Outcome: Adequate for Discharge  2/20/2023 0732 by Aramis Ramos RN  Outcome: Progressing

## 2023-02-21 ENCOUNTER — EVALUATION (OUTPATIENT)
Dept: PHYSICAL THERAPY | Facility: CLINIC | Age: 58
End: 2023-02-21

## 2023-02-21 DIAGNOSIS — Z78.9 IMPAIRED MOBILITY AND ACTIVITIES OF DAILY LIVING: ICD-10-CM

## 2023-02-21 DIAGNOSIS — Z74.09 IMPAIRED MOBILITY AND ACTIVITIES OF DAILY LIVING: ICD-10-CM

## 2023-02-21 DIAGNOSIS — I63.9 CEREBROVASCULAR ACCIDENT (CVA), UNSPECIFIED MECHANISM (HCC): ICD-10-CM

## 2023-02-21 NOTE — PROGRESS NOTES
02/21/23 1248   Hello, [Guardian’s Name / Patient’s Vijay Manzanares, this is [Caller Vijay Manzanares from [facility], and our clinical care team wanted to check on you / your child after your recent visit to the hospital  It will only take 3-5 minutes  Is this a good time? Discharge Call Type/ Specific Diagnosis: General Call   General Discharge Phone Call   Were your/your child's discharge instructions clear and understandable? Please tell me in your own words how to care for yourself/your child now that you're home Yes;Patient understood instructions   Have you filled your/your child's new prescriptions yet? Yes   What questions do you have about those medications? No questions   Are you/your child having any unusual symptoms or problems? (Specific to problem- i e , dressing, pain, bruising or swelling, procedure, etc ) No reported symptoms/problems   Do you have follow up appointment with your/your child's physician? Yes   Is there anything preventing you from keeping that appointment? No;Patient able to keep appointment   Are there any physicians, nurses, or hospital staff you would like us to recognize for doing a very good job? Nurse;PCA/Tech;PT/OT/RT/SLP   Thank you for taking the time to share with me about your care and recovery  Do you have any suggestions for us? No   This call resulted in: No interventions needed   Call Complete   Attempted Number of Calls 1   Discharge phone call complete?  Complete

## 2023-02-21 NOTE — CASE MANAGEMENT
Team dc summary -pt made great progress and returned home w/family and contd outpt physical, occupational and speech therapy services at [address]  appmts scheduled and placed on dc instructions  Son Zeinab Mari made aware  Son present for dc instructions and aware of pts functional ability  No determination received from [facility] re: contd stay review request provided on 2/16  Cm faxed dc summary via epic and requested confirmation of days covered

## 2023-02-21 NOTE — PROGRESS NOTES
PT Evaluation     Today's date: 2023  Patient name: Tevin Tyson  : 1965  MRN: [id number]  Referring provider: Tuan Macias MD  Dx:   Encounter Diagnosis     ICD-10-CM    1  Cerebrovascular accident (CVA), unspecified mechanism ([facility] [address] )  I63 9 Ambulatory referral to Physical Therapy      2  Impaired mobility and activities of daily living  Z74 09 Ambulatory referral to Physical Therapy    Z78 9                      Assessment/Plan    Subjective Evaluation    History of Present Illness  Mechanism of injury: Pt was brought to the ED  and diagnosed with CVA  She was in the hospital for 4 days and then was transferred to the acute rehab unit for 10 days  She was d/c home yesterday  Pt occasionally uses a SPC  Prior to stroke she also used the cane for her left foot due to bunionectomy  She also had pain in her sciatic nerve  However now she is having much less pain in her foot and back  Pt is independent with ADLs  She is independent with gait around home and with stairs  She reports too fatigued to cmplete IADLs at this time  Overall primary complaint is fatigue  She denies difficulty with balance, dizziness, numbness everywhere but toes from bunion surgery     Pain  Current pain ratin  At worst pain ratin  Location: knee     Social Support  Steps to enter house: yes  Stairs in house: yes   Lives in: multiple-level home  Lives with: significant other    Employment status: not working  Exercise history: none    Treatments  Current treatment: physical therapy  Discharged from (in last 30 days): inpatient hospitalization  Patient Goals  Patient goals for therapy: improved balance  Patient goal: increase speed, improve fatigue         Objective           Precautions:       Manuals                                                                 Neuro Re-Ed                                                                                                        Ther Ex Ther Activity                                       Gait Training                                       Modalities

## 2023-02-22 ENCOUNTER — TELEPHONE (OUTPATIENT)
Dept: NEUROLOGY | Facility: CLINIC | Age: 58
End: 2023-02-22

## 2023-02-22 NOTE — TELEPHONE ENCOUNTER
ALL SCHEDULED: Appointment was accepted for 4/11/23 at 10 am with Neeraj Centeno in [place]  Address was given        HFU/SLB/FACIAL DROOP        NOTES: Marlys Parada will need follow up in 6-8 weeks  with neurovascular attending/ap/resident (myself Saulo Figueroa)   She will not require outpatient neurological testing

## 2023-02-23 ENCOUNTER — EVALUATION (OUTPATIENT)
Dept: OCCUPATIONAL THERAPY | Facility: CLINIC | Age: 58
End: 2023-02-23

## 2023-02-23 ENCOUNTER — EVALUATION (OUTPATIENT)
Dept: SPEECH THERAPY | Facility: CLINIC | Age: 58
End: 2023-02-23

## 2023-02-23 DIAGNOSIS — Z74.09 IMPAIRED MOBILITY AND ACTIVITIES OF DAILY LIVING: ICD-10-CM

## 2023-02-23 DIAGNOSIS — I63.9 CEREBROVASCULAR ACCIDENT (CVA), UNSPECIFIED MECHANISM (HCC): ICD-10-CM

## 2023-02-23 DIAGNOSIS — Z78.9 IMPAIRED MOBILITY AND ACTIVITIES OF DAILY LIVING: ICD-10-CM

## 2023-02-23 DIAGNOSIS — I63.9 CEREBROVASCULAR ACCIDENT (CVA), UNSPECIFIED MECHANISM (HCC): Primary | ICD-10-CM

## 2023-02-23 DIAGNOSIS — R47.1 DYSARTHRIA: Primary | ICD-10-CM

## 2023-02-23 NOTE — PROGRESS NOTES
OCCUPATIONAL THERAPY INITIAL EVALUATION:    2023  hospitals  1965  [id number]  Esmer Pollard MD  1  Cerebrovascular accident (CVA), unspecified mechanism ([address] )    2  Impaired mobility and activities of daily living      Pt was seen and treated by Melissa Gonzales OTS under direct supervision of KALEB Hollis, OTR/L  Subjective    "I'm trying to wash myself and my hand goes everywhere but the right way "    PATIENT GOAL: "I want to drive"      Assessment/Plan    Skilled Analysis:  Pt is a 62 y o  female referred to Occupational Therapy s/p Cerebrovascular accident (CVA), unspecified mechanism ([address] ) [I63 9]  Pt participated in skilled OT evaluation and following formalized testing, presents with the following areas of deficit: FMC/FMS, GMC/GMS, in hand manipulation/dexterity, binocular teaming, saccades, convergence and accommodation, eye strain/fatigue impacting indep and completion of ADL/IADL, salient, and leisure tasks  Pt does demo the need for skilled Occupational Therapy services 3x/week for 8-12 weeks with focus on UE NMR, UE strengthening, UE endurance, FMC/GMC, FMS/GMS, binocularity (near), saccades, and convergence to address the goals as listed below  Pt in agreement with POC, POC to  w/in 90 days Cognitive skills not assessed today d/t time constraints, will confer with Speech therapy to discuss way to address cognition    Tx today Self-care: 10 mins  Pt and pt's son educ on completing pencil push up exercises to address near point convergence and binocular teaming changes  Pt educ on protocol for completing exercises, pt demo fair understanding of how to complete  Pt and pt's son provided with protocol handout       Goals:  Short Term Goals (4-8 wks)    STRENGTH/ENDURANCE    ·  Pt will increase R UE strength to 4/5 through the use of strengthening exercises and home program for eventual return to life roles and salient tasks    · Pt will demo with G carryover of Home Exercise Program to improve functional progression towards goals in Plan of care and for improved functional use of RUE     [facility]    · Pt will increase R UE rate of manipulation by 10% for all FM tests for improved functional performance with salient tasks     · Pt will increase reaction time to <1 5 second with hand to target timed trials for improved reaction time and automaticity of coordination for improved functional performance with ADLs/IADLs and salient tasks    · Pt will increase R UE prehension patterns for improved utensil and container management with <10% droppage     · Pt will demo improved motor learning of constructional and new motor actions for improved b/l coordination and motor planning evident by 75% accuracy for fxnl ADL/IADL performance     VISION    Pt will increase oculomotor control for improved saccades, pursuits, con/divergent tasks for improved reading, board to table tasks x 75% accuracy with minimal increase in symptoms     Pt will increase oculomotor control for improved dynamic activities with head turns, board/screen to table tasks symptom free with 75% accuracy for improved life roles        Long Term Goals (8-12 wks)    STRENGTH/ENDURANCE    ·  Pt will increase R UE strength to R=L through the use of strengthening exercises and home program for eventual return to life roles and salient tasks    [facility]    · Pt will increase R UE rate of manipulation by 20% for all FM tests for improved functional performance with salient tasks     · Pt will increase reaction time to <1 second with hand to target timed trials for improved reaction time and automaticity of coordination for improved functional performance with ADLs/IADLs and salient tasks    · Pt will increase R UE prehension patterns for improved utensil and container management with <5% droppage     · Pt will demo improved motor learning of constructional and new motor actions for improved b/l coordination and motor planning evident by 90% accuracy for fxnl ADL/IADL performance    VISION    Pt will increase oculomotor control for improved saccades, pursuits, con/divergent tasks for improved reading, board to table tasks x 90% accuracy with minimal increase in symptoms     Pt will increase oculomotor control for improved dynamic activities with head turns, board/screen to table tasks symptom free with 90% accuracy for improved life roles      Treatment Interventions  Motor:  Supine, seated, and in stance neuro re-ed  Fxnl Grasp and Release  FMC/prehension patterns  Fxnl Tool use (tweezers, tongs)  In hand manipulation  Timed Trials to improve automaticity  Manual tx (IASTM if able, PROM/stretching)  Hand to target tasks  Seated functional reach: crossing midline  Supine place and hold  WBearing strategies (seated, prone/quad)  Closed chain activities  Open chain activities    Vision:  Multimatrix for saccades/visual clutter/attention  Multi-modal environment  Sustained/alternating/divided attention  Tracking tube  Oculomotor control: saccades, con/divergence  Conv /div  Dynamic tasks  Work stations with timed transitions    HISTORY OF PRESENT ILLNESS:     Pt is a 62 y o  female who was referred to Occupational Therapy s/p  Cerebrovascular accident (CVA), unspecified mechanism ([facility] [address] ) [I63 9]  Per pt's chart review, patient has "HTN, HLD, tobacco use, anxiety/depression, insomnia, sciatica, chronic left foot pain after bunion surgery, pre-diabetes and presented to the [facility] 2/4/23 with dysarthria and right sided weakness  MRI Brain both with and without contrast confirming a left posterior limb internal capsule acute ischemic CVA  Patient seen by Neurology  Etiology of CVA related to hypertensive disease, thrombosis panel pending   After medical stabilization, patient was found to have acute functional deficits in mobility, self care, speech, swallow, cognition, and therefore admitted to [facility] for acute inpatient rehabilitation  Acute Rehabilitation Center Course: Patient participated in a comprehensive interdisciplinary inpatient rehabilitation program which included involvment of MD, therapies (PT, OT, and/or SLP), RN, CM, SW, dietary, and psychology services from 2/9/23 - 2/20/23  She was able to be advanced to a supervision - Mod I level of assist with mobility and self care  She continued to have mild dysarthria and cognitive deficits " Patient's son reports he got a call from his mother and noticed her slurred speech and disorientation and immediately went to pick her up and bring her to the hospital  Patient reports she felt her tongue in the back of her throat and was drooling, but otherwise had no pain and did not know she was having a stroke  Patient's son states some areas that were affected were her cognition, memory, processing, rational thought, and fine motor skills, but have since improved  She originally wasn't able to draw a straight line, but now she is able to draw lines and color them in  Patient's son adds her R eye was bigger upon initial eval at hospital, and remained bigger until 4-5 days ago  More recently, she has more strain and squinting in the L eye, expressing some vision concerns  Patient states it feels like she has "spider webs" in the corner of her eye, which go away with use of eye drops  Patient reports since she's been home, she's noticed her thoughts have been faster than she can move  She has trouble washing herself because her hand goes everywhere but the right way and is much slower than before  Additionally, pt reports pain in her shoulder with increased movement, though pain is not limiting her range of motion       PMH:   Past Medical History:   Diagnosis Date   • Acquired deformity of left foot    • Anesthesia complication     difficulty awakening   • Arthritis    • Deaf, left    • Depression    • Hallux valgus of left foot    • Hammer toe of left foot    • Headache    • Kidney stone    • Sciatic pain, right     intermittent     Pain Levels:     Restin    With Activity:  4 (knee)    Objective    Impairment Observations:     Upper Extremity      RUE [place] Comments                 UPPER EXTREMITY FXN Impaired Intact Dominant Hand: R                         /Pinch Strength         Dynamometer      - Gross Grasp 42 lbs 50 lbs abnormal (R)   Pinch Meter       - PINCER 8 lbs 9 lbs abnormal (R)    - TRIPOD 8 lbs 10 lbs abnormal (R/L)    - LATERAL 7 lbs  11 lbs abnormal (R/L)            AROM (seated)   Pain reported in R shoulder   Scapular Protraction Full     Scapular Retraction Full     Elevation/Depression Full       Shoulder Abduction Near full Near full    Shoulder Adduction Full  Full     Shoulder Flexion Near full  Near full     Shoulder Extension Full  Full     Horizontal Abduction Full  Full     Horizontal Adduction Full  Full     Elbow Flexion Full  Full     Elbow Extension Full  Full     Pronation Full  Full     Supination Full  Full     Wrist Flexion Full  Full     Wrist Extension Full  Full     Digit Flexion Full  Full     Digit Extension Full  Full     Composite Grasp Full  Full     Hook Grasp Full  Full     Opposition Full  Full  slight bradykinesia noted in R hand                                  MMT     Pain reported in R shoulder    Elevation 5/5 5/5    Shoulder Abduction 4-/5 4+/5    Shoulder Adduction 4/5 4+/5    Shoulder Flexion 4-/5 4+/5    Shoulder Extension 4/5 4+/5    Elbow Flexion 4/5 4+/5    Elbow Extension 4+/5 4+/5    Wrist Flexion 4+/5 4+/5    Wrist Extension 4+/5 4+/5    Gross Grasp 4+/5 4+/5          SENSATION      Proprioception Intact  slightly slower than L Intact    Hot/Cold Temp Intact Intact          COORDINATION      9 Hole Peg Test 28 seconds 24 seconds abnormal   Fxnl Dexterity Test 42 seconds 30 seconds abnormal   Rapid, Alternating Movement Test     Normal     Vision         Comments                                        VISION SCREEN GLASSES (prescription)          Symptoms Include "spider webs in corner of L eye"     Last Eye Exam "year or two ago"           Visual Acuity (near) R eye  20/30     L eye 20/25    Binocularity (near) orthotropia, orthophoria and exophoria (L eye)    Binocularity (far) orthotropia and orthophoria    Convergence  no diplopia reported; slight convergence insufficiency    Accommodation blurriness/loss of focus reported at 4 inches    [employer]             Alignment  misalignment; reports one string throughout    Mobility             Pursuits smooth in all planes            Saccades dysmetric in all planes            Range of Motion WFL    Visual perceptual midline             Midline slight shift to left            Horizon  at      INTERVENTION COMMENTS:  Diagnosis: Cerebrovascular accident (CVA), unspecified mechanism ([facility] [address] ) [I63 9]  Precautions: fall risk, cog deficits  FOTO: will complete  Insurance: Payor: Do Notice / Plan: Do Notice / Product Type: Medicaid HMO /   1 of 8 visits, PN due 3/23/23  POC due 5/23/23 Range of Motion [facility]    Visual perceptual midline             Midline slight shift to left            Horizon  at      RBANS performed by speech therapy  Pt received attention score of 1%, extremely low category     Digit Span 7/16  Coding 27/89  Overall Score: 8th percentile (borderline)      INTERVENTION COMMENTS:  Diagnosis: Cerebrovascular accident (CVA), unspecified mechanism ([facility] [address] ) [I63 9]  Precautions: fall risk, cog deficits  FOTO: will complete  Insurance: Payor: Nina Chapa / Plan: Nina Chapa / Product Type: Medicaid HMO /   1 of 8 visits, PN due 3/23/23  POC due 5/23/23

## 2023-02-23 NOTE — PROGRESS NOTES
Speech-Language Pathology Initial Evaluation    Today's date: 2023   Patient’s name: Bhanu Alvarez  : 1965  MRN: [id number]  Safety measures: CVA  Referring provider: Carla Nguyễn MD    Encounter Diagnosis     ICD-10-CM    1  Other symbolic dysfunctions  S50 7       2  Cerebrovascular accident (CVA), unspecified mechanism ([facility] [address] )  I63 9 Ambulatory referral to Speech Therapy      3  Dysarthria  R47 1 Ambulatory referral to Speech Therapy        Visit tracking:  -Referring provider: Epic  -Billing guidelines: AMA  -Visit #  -Insurance: [facility]  -RE due 23    Subjective comments: Patient arrived today in good spirits with her son    How did the patient hear about us? Physician    Patient's goal(s): "To speak more clearly" - patient rated her intelligibility as 75%    Reason for referral: Change in cognitive status and Decreased speech intelligibility  Prior functional status: Communication effective and appropriate in all situations  Clinically complex situations: Discharge from SNF or Hospital in the last 30 days    History: Patient is a 62 y o  female who was referred to outpatient skilled Speech Therapy services for a cognitive-linguistic and motor speech evaluation  Per chart review; patient arrived to ED on 23 with slurred speech and R sided weakness symptoms  She was dx with CVA (L PLIC/thalamus)  Hospital stay x 4 days and transferred to [facility] for OT/PT/ST x 10 days  Discharged home 23  She was seen by inpatient speech for dysphagia, dysarthria and cognition  She was cleared WNL for dysphagia upon discharge home, with continued goals to target speech and cognition  Patient arrived today with her son; and is reporting she would like to work on her asymmetrical mouth/smile along with her speech  She reports her cognition is "slower" at times when it comes to processing; but she is able to remember things   She is a native [place] speaker, and reports translating in her head can be more cumbersome now  She also reports she does not feel like her body moves as fast as her thoughts  She is not currently driving, but has supportive caregivers in place  Hearing: [facility] for testing  Vision: [facility] for testing    Home environment/lifestyle: home with boyfriend (supportive family)  Highest level of education: n/a  Vocational status: not working (prior to CVA)    Mental status: Alert  Behavior status: Cooperative  Communication modalities: Verbal  Rehabilitation prognosis: Good rehab potential to reach the established goals    Assessments    Motor Speech Evaluation:    -Facial appearance Right facial droop   -Mandible function Deviates to the right   -Dentition Adequate   -Labial function Decreased ROM (right side) and Decreased strength (right side)   -Lingual function Decreased strength (bilateral), Decreased strength (protrusion) and Deviation to the left side   -Velar function Unable to visualize   -Oral apraxia? Absent   -Vocal quality Breathy and Weak   -Volitional cough Weak   -Respiration Support appears weak   -Drooling? No   -Tremor/involuntary movement? Not present   -Tracheostomy present?  No       Sustained phonation    -Vowel prolongation (norm=15-20 sec) 12 5 sec (below avg)       Diadochokinetic (DDK) Rates    -“puh-puh-puh” (norm=3 0-5 5 rep/1 sec) WFL   -“tuh-tuh-tuh” (norm=25 rep/10 sec) Irregular   -“kuh-kuh-kuh” (norm=25 rep/10 sec) Irregular   -“puh-tuh-kuh” (norm=8 rep/10 sec) WFL       Articulation    -Single syllable words 100% - mild articulatory groping with /dj/, /s/   -Multisyllabic words 80%   -Repetition of multisyllabic words 5x 70%   -Words of progressive length 90%   -Sentences 80%   -Reading (“[place]”) 80%    -Conversation 90%       Counting    -1 to 20 Poor breath support       Overall speech intelligibility rating 90% intelligible         Patient presents with mild dysarthria characterized by Articulatory groping and Decreased breath support for speech  Strength and Endurance Testing: The IOPI Pro is used by medical professionals to measure, evaluate, and increase the strength and endurance of the tongue and lip in patients with oral motor disorders, including dysphagia and dysarthria  The IOPI measures the maximum pressure (Pmax) a patient can produce in an air-filled bulb when it is compressed as hard as possible by the tongue or lip against a hard surface (e g  The palate or teeth, respectively)  Pmax is a measure of strength, expressed in kilopascals (kPa, an international unit of pressure)  For patients with dysphagia or dysarthria, oral motor fatigability may be of interest   The IOPI Pro can be used to assess tongue fatigability  Low endurance values are an indicator of a high fatigability  Endurance is measured with the IOPI Pro by quantifying the length of time that a patient can maintain 50% of his or her Pmax  This procedure is conducted in Target Mode by setting the target value to 50% of the patient's Pmax and timing how long the patient can hold the top (green) light on  The medical professional determines what target value is appropriate for exercise therapy purposes and provides specific instructions to the patient for a particular exercise protocol  In Target Mode, the pressure required to illuminate the green light a the top of the light array can be adjusted using the Set Target arrow buttons  This green light is used as a visual target for the patient       Tongue tip Peak Max after 3 trials: 16 Norm for gender is 63   Tongue back Peak Max after 3 trials: 17 6 Norm for gender is 5-10% lower than anterior tongue   Right lip Peak Max after 3 trials: 11 Norm for gender is 35   Left lip Peak Max after 3 trials: 19 3 Norm for gender is 28        Goals    Short-term goals:    Patient will complete cognitive linguistic testing (I e , RBANS) to further assess cognitive status, and additional treatment goals TBD, to be met in 1 week  Patient will perform oral motor exercises using IOPI device on  lingual and labial muscles to facilitate improved strength and function for increased speech intelligibility, to be achieved in 4-6 weeks  Patient will perform oral motor and diadochokinetic speech rate exercises with > 90% accuracy to facilitate increased speech intelligibility, to be achieved in 4-6 weeks  Patient will utilize compensatory strategies (I e , over-articulation, decreased rate, etc) 80% of the time while orally reading sentence/paragraph length material to facilitate increased speech intelligibility, to be achieved in 4-6 weeks  Patient will complete oral reading and conversational tasks with the consistent use of compensatory strategies >90% of the time to facilitate increased speech intelligibility, to be achieved in 4-6 weeks  Patient and caregiver will be educated on speech intelligibility strategies (e g , over-exaggeration, slow rate, breath groups,, etc ) to promote increased communication success (to be achieved in 2-3 weeks)    Patient will utilize speech intelligibility strategies 80% of the time with min verbal cues while reading words, sentences, and paragraph length material to facilitate increased generalization of skills into conversational speech (to be achieved in 4-6 weeks)  Long Term Goals:     Patient will demonstrate improved functional and intelligible speech with the use of adequate labial and lingual function, increased articulatory precision, and speech prosody by discharge  Patient will independently utilize speech intelligibility strategies (e g , over-exaggeration, slow rate, breath groups, etc ) during oral reading tasks >90% intelligibility to facilitate increased generalization of skills into conversational speech by discharge  Patient will demonstrate independent implementation of compensatory strategies in conversation to improve speech intelligibility by discharge  Patient will develop functional and intelligible speech to promote positive communication interractions with the use of speech intelligibility strategies (to be achieved by discharge)  Impressions/Recommendations    Impressions:   -Patient presents with mild dysarthria post CVA  Patient self rating intelligibility is 75% (son reports it can be as high as 90%)  Patient has a moderate R facial droop, lingual and labial weakness  She is motivated at this time to improve her oral motor control and her speech intelligibility  Patient also was found to have mild cognitive deficits post CVA (ARC reported)  Further evaluation testing to be completed to determine any need for additional skilled services       Recommendations:  -Patient would benefit from outpatient skilled Speech Therapy services: Speech-language therapy and Cognitive-linguistic therapy    -Frequency: 3x weekly  -Duration: 4-6 weeks    -Intervention certification from: 0/01/4811  -Intervention certification to: 0/08/5244    -Intervention comments:   Motor speech evaluation x 30 min; education on POC and IOPI exercise practice and HEP discussion x 30 min

## 2023-02-24 ENCOUNTER — OFFICE VISIT (OUTPATIENT)
Dept: PHYSICAL THERAPY | Facility: CLINIC | Age: 58
End: 2023-02-24

## 2023-02-24 DIAGNOSIS — M54.42 ACUTE BILATERAL LOW BACK PAIN WITH LEFT-SIDED SCIATICA: ICD-10-CM

## 2023-02-24 DIAGNOSIS — I63.9 CEREBROVASCULAR ACCIDENT (CVA), UNSPECIFIED MECHANISM (HCC): Primary | ICD-10-CM

## 2023-02-24 DIAGNOSIS — Z78.9 IMPAIRED MOBILITY AND ACTIVITIES OF DAILY LIVING: ICD-10-CM

## 2023-02-24 DIAGNOSIS — Z74.09 IMPAIRED MOBILITY AND ACTIVITIES OF DAILY LIVING: ICD-10-CM

## 2023-02-24 NOTE — PROGRESS NOTES
Daily Note     Today's date: 2023  Patient name: Kevin Saavedra  : 1965  MRN: [id number]  Referring provider: Jess Bird MD  Dx:   Encounter Diagnosis     ICD-10-CM    1  Cerebrovascular accident (CVA), unspecified mechanism ([facility] [address] )  I63 9       2  Impaired mobility and activities of daily living  Z74 09     Z78 9       3  Acute bilateral low back pain with left-sided sciatica  M54 42                      Subjective: Patient reports using cane because she's been having a lot of lef tknee pain  Objective: See treatment diary below      Assessment: After completing the treadmill her knee felt better  Able to do hurdles with great balance  Mostly limited by knee pain through the session  Plan: Continue per plan of care        Precautions: fall risk    STG expiration: 3/21/23  LTG expiraton: 23      Manuals                                                                 Neuro Re-Ed             hurdles Solo 3 laps 6" fwd/lat            Ht/hn Solo tandem 4 laps            E bkwds 4 laps solo step                                                                Ther Ex             treadmill 10 min 1 5 mph                                                                                                       Ther Activity             Stairs  5 reps staircase reciprocal                         Gait Training                                       Modalities

## 2023-02-26 NOTE — PROGRESS NOTES
Speech-Language Pathology Initial Evaluation  continuation of testing (cognitive-linguistic)    Today's date: 2023  Patient’s name: Herrera Callejas  : 1965  MRN: [id number]  Safety measures: CVA  Referring provider: Nuon Mills MD    Encounter Diagnosis     ICD-10-CM    1  Other symbolic dysfunctions  I28 6       2  Cerebrovascular accident (CVA), unspecified mechanism ([facility] [address] )  I63 9         Visit tracking:  -Referring provider: Epic  -Billing guidelines: AMA  -Visit #  -Insurance: [place]  -RE due 23    Subjective comments: Patient's son reported that patient is presenting with deficits in memory, such as forgetting that she had a telephone conversation with her brother a week ago & requiring assistance with medication management tasks (son organizes her medications in a weekly plastic pill box for her)  Patient's goal(s): "To speak more clearly" - patient rated her intelligibility as 75%    Assessments    COGNITIVE CHECKLIST:  *Patient indicated that she is experiencing difficulties in the following areas:    · Memory: Remembering what people have told you and Keeping track of your appointments    · Attention: Keeping your attention/concentrating on a task and Dividing your attention (i e , multi-tasking)    · Processing: Processing new information  and Responding to questions in a timely manner     · Executive Functions: Initiating/starting tasks    · Communication: Word finding in conversation and Expressing thoughts and ideas into writing    · Visual: Eye strain/fatigue when reading    · Emotional: None    · Increased Sensitivities to: None      The Repeatable Battery for the Assessment of Neuropsychological Status (RBANS) is a brief, individually-administered assessment which measures attention, language, visuospatial/constructional abilities, and immediate & delayed memory  The RBANS is intended for use with adolescents to adults, ages 15 to 80 years   The following results were obtained during the administration of the assessment  Form: C    Cognitive Domain/Subtest: Index Score: Percentile Rank: Classification:   IMMEDIATE MEMORY 87 19%ile Low Average        1  List Learning (30/40) (scaled score: 12 - high average)        2  Story Memory (10/24) (scaled score: 4 - borderline)       VISUOSPATIAL/  CONSTRUCTIONAL 102 55%ile Average        3  Figure Copy (20/20) (scaled score: 13 - high average)        4  Line Orientation (15/20) (percentile group: 26-50 - average)       LANGUAGE  (*Results for these subtests should be interpreted with caution  Patient is bilingual; her primary language is [place]  Although patient demonstrated difficulty confrontational naming and verbal fluency tasks in [place], she was observed to have difficulty with word retrieval in [place] as well when prompted by clinician ) 57 0 2%ile Extremely Low        5  Picture Naming (7/10) (percentile group: ?2 - extremely low)         6  Semantic Fluency (10/40) (scaled score: 3 - extremely low)       ATTENTION 64 1%ile Extremely Low        7  Digit Span (7/16) (scaled score: 5 - borderline)        8  Coding (2/89) (scaled score: 4 - borderline)       DELAYED MEMORY 105 63%ile Average        9  List Recall (8/10) (percentile group: >75 - high average)        10  List Recognition (20/20) (percentile group: 51-75 - average)        11  Story Recall (7/12) (scaled score: 7 - low average)        12  Figure Recall (17/20) (scaled score: 13 - high average)         Sum of Index Scores:  415   Total Score:  79   Percentile: 8%ile   Classification: Borderline     Goals    Short-term goals:  Patient will complete cognitive linguistic testing (I e , RBANS) to further assess cognitive status, and additional treatment goals TBD, to be met in 1 week   -- MET    DYSARTHRIA:  Patient will perform oral motor exercises using IOPI device on  lingual and labial muscles to facilitate improved strength and function for increased speech intelligibility, to be achieved in 4-6 weeks  Patient will perform oral motor and diadochokinetic speech rate exercises with > 90% accuracy to facilitate increased speech intelligibility, to be achieved in 4-6 weeks  Patient will utilize compensatory strategies (I e , over-articulation, decreased rate, etc) 80% of the time while orally reading sentence/paragraph length material to facilitate increased speech intelligibility, to be achieved in 4-6 weeks  Patient will complete oral reading and conversational tasks with the consistent use of compensatory strategies >90% of the time to facilitate increased speech intelligibility, to be achieved in 4-6 weeks  Patient and caregiver will be educated on speech intelligibility strategies (e g , over-exaggeration, slow rate, breath groups,, etc ) to promote increased communication success (to be achieved in 2-3 weeks)  Patient will utilize speech intelligibility strategies 80% of the time with min verbal cues while reading words, sentences, and paragraph length material to facilitate increased generalization of skills into conversational speech (to be achieved in 4-6 weeks)  COGNITIVE-LINGUISTIC:  Patient will complete auditory immediate and short-term memory tasks with 80% accuracy to facilitate increased ability to retell narratives and recall information within functional living environment (to be achieved in 4-6 weeks)  Patient will be educated on word finding strategies (i e , circumlocution) for improved generative naming and verbal expression skills (to be achieved in 1-2 weeks)  Patient will complete word generation tasks (e g , verbal fluency, categorization, analogies, synonyms/antonyms, , etc ) with 80% accuracy using word finding strategies to facilitate improved word retrieval skills (to be achieved in 4-6 weeks)         Long Term Goals:   DYSARTHRIA:  Patient will demonstrate improved functional and intelligible speech with the use of adequate labial and lingual function, increased articulatory precision, and speech prosody by discharge  Patient will independently utilize speech intelligibility strategies (e g , over-exaggeration, slow rate, breath groups, etc ) during oral reading tasks >90% intelligibility to facilitate increased generalization of skills into conversational speech by discharge  Patient will demonstrate independent implementation of compensatory strategies in conversation to improve speech intelligibility by discharge  Patient will develop functional and intelligible speech to promote positive communication interractions with the use of speech intelligibility strategies (to be achieved by discharge)  COGNITIVE-LINGUISTIC:  Patient will demonstrate cognitive-linguistic skills consistent with age and education given use of compensatory strategies when needed to resume baseline activities and responsibilities in home and community settings (to be achieved by discharge)  Patient will demonstrate adequate verbal expression during conversation without breakdowns or word finding deficits (to be achieved by discharge)  Impressions/Recommendations    Impressions:   -Patient presents with mild-moderate cognitive-linguistic deficits c/b decreased immediate memory, language, attention and delayed memory skills  Per standardized cognitive-linguistic assessment (RBANS), patient scored within the "Average" level for Visuospatial/Constructional & Delayed Memory domains, within the "Low Average" level for Immediate Memory domain, within the "Extremely Low" level for Language & Attention domains   As the domains were broken down into separate subtests for analysis, it was revealed that patient scored within the "High Average" level for List Learning, Figure Copy, List Recall, & Figure Recall, within the "Average" level for Line Orientation & List Recognition, within the "Low Average" level for Story Recall, within the "Borderline" level for Story Memory, Digit Span, & Coding, within the "Extremely Low" level for Picture Naming & Semantic Fluency  Patient would benefit from outpatient skilled Speech Therapy services to increased functional recall, successful completion of daily tasks, follow directions within functional activities, support positive communication interactions with both familiar and unfamiliar listeners, promote safety, facilitate overall improved quality of life and reduce caregiver burden  Goals targeting patient's attention and organization will be deferred to OT at this time     -Per initial dysarthria evaluation testing on 02/23/2023, "  Shannon Ruvalcaba Patient presents with mild dysarthria post CVA  Patient self rating intelligibility is 75% (son reports it can be as high as 90%)  Patient has a moderate R facial droop, lingual and labial weakness  She is motivated at this time to improve her oral motor control and her speech intelligibility   "    Recommendations:  -Patient would benefit from outpatient skilled Speech Therapy services: Speech-language therapy and Cognitive-linguistic therapy    -Frequency: 3x weekly  -Duration: 4-6 weeks    -Intervention certification from: 7/96/3704  -Intervention certification to: 9/86/8882    -Intervention comments:   -Initial evaluation/administration of standardized cognitive-linguistic test with patient (60 minutes)  -Scoring/interpretation of the standardized test for the development of POC, and write-up of the report (60 minutes)

## 2023-02-27 ENCOUNTER — OFFICE VISIT (OUTPATIENT)
Dept: PHYSICAL THERAPY | Facility: CLINIC | Age: 58
End: 2023-02-27

## 2023-02-27 ENCOUNTER — OFFICE VISIT (OUTPATIENT)
Dept: SPEECH THERAPY | Facility: CLINIC | Age: 58
End: 2023-02-27

## 2023-02-27 ENCOUNTER — OFFICE VISIT (OUTPATIENT)
Dept: OCCUPATIONAL THERAPY | Facility: CLINIC | Age: 58
End: 2023-02-27

## 2023-02-27 DIAGNOSIS — I63.9 CEREBROVASCULAR ACCIDENT (CVA), UNSPECIFIED MECHANISM (HCC): ICD-10-CM

## 2023-02-27 DIAGNOSIS — Z78.9 IMPAIRED MOBILITY AND ACTIVITIES OF DAILY LIVING: Primary | ICD-10-CM

## 2023-02-27 DIAGNOSIS — I63.9 CEREBROVASCULAR ACCIDENT (CVA), UNSPECIFIED MECHANISM (HCC): Primary | ICD-10-CM

## 2023-02-27 DIAGNOSIS — R48.8 OTHER SYMBOLIC DYSFUNCTIONS: Primary | ICD-10-CM

## 2023-02-27 DIAGNOSIS — Z74.09 IMPAIRED MOBILITY AND ACTIVITIES OF DAILY LIVING: Primary | ICD-10-CM

## 2023-02-27 NOTE — PROGRESS NOTES
Daily Note     Today's date: 2023  Patient name: Shayne Mackenzie  : 1965  MRN: [id number]  Referring provider: Dwight Mei MD  Dx:   Encounter Diagnosis     ICD-10-CM    1  Impaired mobility and activities of daily living  Z74 09     Z78 9       2  Cerebrovascular accident (CVA), unspecified mechanism ([facility] [address] )  I63 9                      Subjective: Arrived to session without SPC  Still knee pain but it is feeling a little bit better  Objective: See treatment diary below      Assessment: Advanced exercises this session which pt was able to maintain balance very well but did have some increased knee pain  May attempt adding uneven surfaces as her knee is able to tolerate  Plan: Continue per plan of care        Precautions: fall risk    STG expiration: 3/21/23  LTG expiraton: 23      Manuals                                                                Neuro Re-Ed             hurdles Solo 3 laps 6" fwd/lat airex pads - 6" 4 laps fwd/lat ea           Ht/hn Solo tandem 4 laps tandem, solo step 4 laps           E bkwds 4 laps solo step            HKM  2 5# aw 4 laps solo step                                                  Ther Ex             treadmill 10 min 1 5 mph 10 min 1 5 mph                                                                                                       Ther Activity             Stairs  5 reps staircase reciprocal                         Gait Training                                       Modalities

## 2023-02-27 NOTE — PROGRESS NOTES
Occupational Therapy Daily Note:    Today's date: 2023  Patient name: Flori Price  : 1965  MRN: [id number]  Referring provider: Juan Conteh MD  Dx:   Encounter Diagnosis   Name Primary? • Cerebrovascular accident (CVA), unspecified mechanism ([facility] [address] ) Yes     Pt was seen and treated by KARLO Padilla under direct supervision of Marie Toure, KALEB, OTR/L  Subjective: "My shoulder doesn't hurt as much as before "    Objective: Pt engaged in skilled OT treatment session with focus on UE NMR, UE strengthening, UE endurance, FMC/GMC and FMS/GMS to increase engagement, endurance, tolerance, and independence with daily ADL and IADL tasks  CPT Code Minutes                                           Task Details        Therapeutic Activity               Neuro Re-Ed 35 Pt completed in stance act focusing on increased body awareness, standing/activity tolerance, coordination, neuro motor re-ed, and visual perceptual skills  Pt donned 1 5# weight cuff to distal UE while completing large pegboard task  Pegboard positioned on mirror with template on tabletop to improve near to far accomodation  Pt instructed to retrieve pegs from mat, manipulate to correct side up and place into template  Indep in 95% of task, with 2 visual cues required to improve accuracy  Therapeutic Exercise 10 For therapeutic exercise benefit, pt tolerated 5 min x2 prograde/retrograde UEB #1 5 resistance for neuro motor re-ed, GMC/GMS, proximal UE strength/endurance, & ROM/flexibility  15 Pt performed seated therex w/ 3 0# wt bar to inc GMC/GMS, proximal UE strength/endurance and inc fluidity of movement  Completed 3 sets x 10 reps chest presses, chest presses with OH reach & forward/backward rows  Manual          Self Care       Assessment: Tolerated treatment well  Pt demo good progress with visual accomodation and placekeeping in visual task  Pt continues to demo deficits with in-hand manipulation  Patient would benefit from continued skilled OT      Plan: Continued skilled OT per POC    INTERVENTION COMMENTS:  Diagnosis: Cerebrovascular accident (CVA), unspecified mechanism ([facility] [address] ) [I63 9]  Precautions: fall risk, cog deficits  FOTO: will complete  Insurance: Payor: Krissy Santana / Plan: Krissy Santana / Product Type: Medicaid HMO /   2 of 8 visits, PN due 3/23/23  POC due 5/23/23

## 2023-03-01 ENCOUNTER — OFFICE VISIT (OUTPATIENT)
Dept: SPEECH THERAPY | Facility: CLINIC | Age: 58
End: 2023-03-01

## 2023-03-01 ENCOUNTER — OFFICE VISIT (OUTPATIENT)
Dept: PHYSICAL THERAPY | Facility: CLINIC | Age: 58
End: 2023-03-01

## 2023-03-01 ENCOUNTER — OFFICE VISIT (OUTPATIENT)
Dept: OCCUPATIONAL THERAPY | Facility: CLINIC | Age: 58
End: 2023-03-01

## 2023-03-01 DIAGNOSIS — I63.9 CEREBROVASCULAR ACCIDENT (CVA), UNSPECIFIED MECHANISM (HCC): ICD-10-CM

## 2023-03-01 DIAGNOSIS — R47.1 DYSARTHRIA: ICD-10-CM

## 2023-03-01 DIAGNOSIS — R48.8 OTHER SYMBOLIC DYSFUNCTIONS: Primary | ICD-10-CM

## 2023-03-01 DIAGNOSIS — I63.9 CEREBROVASCULAR ACCIDENT (CVA), UNSPECIFIED MECHANISM (HCC): Primary | ICD-10-CM

## 2023-03-01 DIAGNOSIS — Z78.9 IMPAIRED MOBILITY AND ACTIVITIES OF DAILY LIVING: ICD-10-CM

## 2023-03-01 DIAGNOSIS — Z74.09 IMPAIRED MOBILITY AND ACTIVITIES OF DAILY LIVING: ICD-10-CM

## 2023-03-01 NOTE — PROGRESS NOTES
Daily Speech Treatment Note    Today's date: 3/1/2023  Patient’s name: Ananya Reveles  : 1965  MRN: [id number]  Safety measures: CVA  Referring provider: Yarely Kc MD    Encounter Diagnosis     ICD-10-CM    1  Other symbolic dysfunctions  R41 8       2  Cerebrovascular accident (CVA), unspecified mechanism ([address] )  I63 9       3  Dysarthria  R47 1            Visit tracking:  -Referring provider: Epic  -Billing guidelines: AMA  -Visit #3/24  -Insurance: [facility]  -RE due 23    Subjective/Behavioral:  Patient was pleasant and participated in all therapy activities  Objective/Assessment:  Patient reported that she has been completing the IOPI bulb exercises two times a day  Short-term goals:  Patient will perform oral motor exercises using IOPI device on  lingual and labial muscles to facilitate improved strength and function for increased speech intelligibility, to be achieved in 4-6 weeks  IOPI PEAK MEASUREMENT WEEKLY GRID      23          Tongue tip 16 7          Tongue back 17 6 39          Right Lip 11 9       Left Lip 19 3 19           IOPI -- Exercise Targets    Tongue tip Peak Max after 3 trials: 7 Target for treatment: 4   Tongue back Peak Max after 3 trials: 39 Target for treatment: 20   Right Lip Peak Max after 3 trials: 9 Target for treatment: 5   Left Lip Peak Max after 3 trials: 19 Target for treatment: 10         PEAK MAX 3/1/23        Tongue tip  3 sets 30        Tongue back 3 sets 30        Right Lip 1 set of 30 + 13 due to IOPI device malfunction         Left Lip 3 sets 30              Patient will perform oral motor and diadochokinetic speech rate exercises with > 90% accuracy to facilitate increased speech intelligibility, to be achieved in 4-6 weeks      Patient will utilize compensatory strategies (I e , over-articulation, decreased rate, etc) 80% of the time while orally reading sentence/paragraph length material to facilitate increased speech intelligibility, to be achieved in 4-6 weeks  To facilitate increased intelligibility, patient was educated on speech strategies (over-articulation, decreased rate, pronouncing all sounds, etc)  Patient read sentences of increasing length (from three words to paragraphs) over two trials  Patient used speech intelligibility strategies of slowing down rate of speech, pronouncing all sounds, chunking words by syllables, and tapping hand to pace herself while reading  Patient will complete oral reading and conversational tasks with the consistent use of compensatory strategies >90% of the time to facilitate increased speech intelligibility, to be achieved in 4-6 weeks  Patient and caregiver will be educated on speech intelligibility strategies (e g , over-exaggeration, slow rate, breath groups,, etc ) to promote increased communication success (to be achieved in 2-3 weeks)    Patient was educated on breath support exercises to promote increased communication success  Patient was provided with a handout on breathing exercises and instructed to practice exercises at home  Patient will utilize speech intelligibility strategies 80% of the time with min verbal cues while reading words, sentences, and paragraph length material to facilitate increased generalization of skills into conversational speech (to be achieved in 4-6 weeks)  COGNITIVE-LINGUISTIC:  Patient will complete auditory immediate and short-term memory tasks with 80% accuracy to facilitate increased ability to retell narratives and recall information within functional living environment (to be achieved in 4-6 weeks)  Patient will be educated on word finding strategies (i e , circumlocution) for improved generative naming and verbal expression skills (to be achieved in 1-2 weeks)       Patient will complete word generation tasks (e g , verbal fluency, categorization, analogies, synonyms/antonyms, , etc ) with 80% accuracy using word finding strategies to facilitate improved word retrieval skills (to be achieved in 4-6 weeks)  Plan:  -Patient was provided with home exercises/activities to target goals in plan of care at the end of today's session   -Continue with current plan of care

## 2023-03-01 NOTE — PHYSICAL THERAPY NOTE
PHYSICAL THERAPY DISCHARGE SUMMARY    Patient made good progress during her stay at the acute rehab center where she presented with an Impairment of mobility, safety, Activities of Daily Living (ADLs), and cognitive/communication skills due to Stroke:  01 2  Right Body Involvement (Left Brain)  She presented on evaluation with impairments in RLE strength and coordination, standing static/dynamic balance, and endurance resulting in decreased (I) with functional mobility skills  She responded well to therapeutic activity and exercise, neuro re-ed, and transfer, gait and stair training  She was able to achieve (I) level goals without use of AD  She was provided with HEP and recommended to follow up with OPPT services          Outcome Measure: Date:     2/15 Date:    2/19 Date:   TUG   30 32 seconds with SPC 9 91 seconds   no AD    Self Selected Gait Speed  0 2 m/s  0 61m/s    DGI  22/24    TUG man  14 57 seconds  No AD    TUG cog  14 10 seconds   No AD      Juanita Notice PT, DPT

## 2023-03-01 NOTE — PROGRESS NOTES
Occupational Therapy Daily Note:    Today's date: 3/1/2023  Patient name: Kenia Ro  : 1965  MRN: [id number]  Referring provider: Bharath Humphreys MD  Dx:   Encounter Diagnosis   Name Primary? • Cerebrovascular accident (CVA), unspecified mechanism ([facility] [address] ) Yes     Pt was seen and treated by KARLO Banegas under direct supervision of Jan Melendez, MOT, OTR/L  Subjective: "I feel like a little girl, I can't do things the way I used to "    Objective: Pt engaged in skilled OT treatment session with focus on UE NMR, UE strengthening, UE endurance, FMC/GMC and FMS/GMS to increase engagement, endurance, tolerance, and independence with daily ADL and IADL tasks  CPT Code Minutes                                           Task Details        Therapeutic Activity               Neuro Re-Ed 35 Pt completed in stance act focusing on increased body awareness, standing/activity tolerance, coordination, neuro motor re-ed, and visual perceptual skills  Pt donned 1 5# weight cuff to distal UE while retrieving barrel dowels with resistive tongs with black/red bands attached and placing them to horizontal dowels positioned at top of mirror  Droppage x3  Pt then retrieved dowels and placed to dowel board at tabletop  With use of green resistive clip, pt retrieved small foam blocks and placed on barrel dowels  Therapeutic Exercise 10 For therapeutic exercise benefit, pt tolerated 5 min x2 prograde/retrograde UEB #1 5 resistance for neuro motor re-ed, GMC/GMS, proximal UE strength/endurance, & ROM/flexibility  15 Pt performed in stance therex w/ 3 0# wt bar to inc GMC/GMS, proximal UE strength/endurance and inc fluidity of movement  Completed 3 sets x 10 reps chest presses with OH reach, forward/backward rows, PNF D2 pattern  Manual          Self Care       Assessment: Tolerated treatment well  Pt demo good endurance and functional reaching with minimal ataxia noted at end range   Pt demo good fine motor strength/endurance  Patient would benefit from continued skilled OT      Plan: Continued skilled OT per POC    INTERVENTION COMMENTS:  Diagnosis: Cerebrovascular accident (CVA), unspecified mechanism ([facility] [address] ) [I63 9]  Precautions: fall risk, cog deficits  FOTO: will complete  Insurance: Payor: [place]in [place] / Plan: [place]in Soles / Product Type: Medicaid HMO /   3 of 8 visits, PN due 3/23/23  POC due 5/23/23

## 2023-03-02 NOTE — PROGRESS NOTES
Daily Speech Treatment Note    Today's date: 3/3/2023  Patient’s name: Abi Hudson  : 1965  MRN: [id number]  Safety measures: CVA  Referring provider: Kameron Choi MD    Encounter Diagnosis     ICD-10-CM    1  Other symbolic dysfunctions  G41 7       2  Cerebrovascular accident (CVA), unspecified mechanism ([facility] [address] )  I63 9       3  Dysarthria  R47 1            Visit tracking:  -Referring provider: Epic  -Billing guidelines: AMA  -Visit #  -Insurance: [facility]  -RE due 23    Subjective/Behavioral:  - Patient was pleasant and participated in all therapy activities  - Patient reported that she is trying to practice breathing exercises  Objective/Assessment:    Short-term goals:  Patient will perform oral motor exercises using IOPI device on  lingual and labial muscles to facilitate improved strength and function for increased speech intelligibility, to be achieved in 4-6 weeks  IOPI PEAK MEASUREMENT WEEKLY GRID      23         Tongue tip 16 7 51         Tongue back 17 6 39 45         Right Lip 11 9 22      Left Lip 19 3 19 27          IOPI -- Exercise Targets    Tongue tip Peak Max after 3 trials: 51 Target for treatment: 26   Tongue back Peak Max after 3 trials: 45 Target for treatment: 23   Right Lip Peak Max after 3 trials: 22 Target for treatment: 11   Left Lip Peak Max after 3 trials: 27 Target for treatment: 14         PEAK MAX 3/1/23 3/3/23       Tongue tip  3 sets 30 3 sets 30       Tongue back 3 sets 30 3 sets 30       Right Lip 1 set of 30 + 13 due to IOPI device malfunction 3 sets 30 (patient switched to left lip after 1 set due to fatigue)        Left Lip 3 sets 30 3 sets 30             Patient will perform oral motor and diadochokinetic speech rate exercises with > 90% accuracy to facilitate increased speech intelligibility, to be achieved in 4-6 weeks      Patient will utilize compensatory strategies (I e , over-articulation, decreased rate, etc) 80% of the time while orally reading sentence/paragraph length material to facilitate increased speech intelligibility, to be achieved in 4-6 weeks  Patient will complete oral reading and conversational tasks with the consistent use of compensatory strategies >90% of the time to facilitate increased speech intelligibility, to be achieved in 4-6 weeks  Patient and caregiver will be educated on speech intelligibility strategies (e g , over-exaggeration, slow rate, breath groups,, etc ) to promote increased communication success (to be achieved in 2-3 weeks)    Patient will utilize speech intelligibility strategies 80% of the time with min verbal cues while reading words, sentences, and paragraph length material to facilitate increased generalization of skills into conversational speech (to be achieved in 4-6 weeks)  COGNITIVE-LINGUISTIC:  Patient will complete auditory immediate and short-term memory tasks with 80% accuracy to facilitate increased ability to retell narratives and recall information within functional living environment (to be achieved in 4-6 weeks)  Patient will be educated on word finding strategies (i e , circumlocution) for improved generative naming and verbal expression skills (to be achieved in 1-2 weeks)  Patient will complete word generation tasks (e g , verbal fluency, categorization, analogies, synonyms/antonyms, , etc ) with 80% accuracy using word finding strategies to facilitate improved word retrieval skills (to be achieved in 4-6 weeks)  To target word finding, patient stated an antonym when provided w a word auditorily by the clinician  Patient stated antonyms w 100% acc  To target word finding, patient stated a synonym when provided w a word auditorily by the clinician  Patient stated synonyms w 76% acc w mod verbal prompts and min phonemic cues          Plan:  -Patient was provided with home exercises/activities to target goals in plan of care at the end of today's session   -Continue with current plan of care

## 2023-03-03 ENCOUNTER — OFFICE VISIT (OUTPATIENT)
Dept: OCCUPATIONAL THERAPY | Facility: CLINIC | Age: 58
End: 2023-03-03

## 2023-03-03 ENCOUNTER — OFFICE VISIT (OUTPATIENT)
Dept: SPEECH THERAPY | Facility: CLINIC | Age: 58
End: 2023-03-03

## 2023-03-03 ENCOUNTER — HOSPITAL ENCOUNTER (EMERGENCY)
Facility: HOSPITAL | Age: 58
Discharge: HOME | End: 2023-03-04
Attending: EMERGENCY MEDICINE

## 2023-03-03 ENCOUNTER — OFFICE VISIT (OUTPATIENT)
Dept: PHYSICAL THERAPY | Facility: CLINIC | Age: 58
End: 2023-03-03

## 2023-03-03 VITALS
BODY MASS INDEX: 37.11 KG/M2 | HEART RATE: 87 BPM | OXYGEN SATURATION: 99 % | SYSTOLIC BLOOD PRESSURE: 150 MMHG | WEIGHT: 189 LBS | HEIGHT: 60 IN | TEMPERATURE: 98.4 F | DIASTOLIC BLOOD PRESSURE: 75 MMHG | RESPIRATION RATE: 18 BRPM

## 2023-03-03 DIAGNOSIS — R47.1 DYSARTHRIA: ICD-10-CM

## 2023-03-03 DIAGNOSIS — I63.9 CEREBROVASCULAR ACCIDENT (CVA), UNSPECIFIED MECHANISM (HCC): ICD-10-CM

## 2023-03-03 DIAGNOSIS — I63.9 CEREBROVASCULAR ACCIDENT (CVA), UNSPECIFIED MECHANISM (HCC): Primary | ICD-10-CM

## 2023-03-03 DIAGNOSIS — Z74.09 IMPAIRED MOBILITY AND ACTIVITIES OF DAILY LIVING: ICD-10-CM

## 2023-03-03 DIAGNOSIS — R48.8 OTHER SYMBOLIC DYSFUNCTIONS: Primary | ICD-10-CM

## 2023-03-03 DIAGNOSIS — Z78.9 IMPAIRED MOBILITY AND ACTIVITIES OF DAILY LIVING: ICD-10-CM

## 2023-03-03 DIAGNOSIS — M70.50 PES ANSERINE BURSITIS: Primary | ICD-10-CM

## 2023-03-03 NOTE — PROGRESS NOTES
Daily Note     Today's date: 3/2/2023  Patient name: Homa Andrade  : 1965  MRN: [id number]  Referring provider: Valentin Roper MD  Dx:   Encounter Diagnosis     ICD-10-CM    1  Cerebrovascular accident (CVA), unspecified mechanism ([facility] [address] )  I63 9       2  Impaired mobility and activities of daily living  Z74 09     Z78 9                      Subjective: Feeling pretty well, continued left knee pain      Objective: See treatment diary below      Assessment: Pt able to maintain balance through most activities this session with no instance of LBO into solo step  Plan next session to attempt cone taps on foam and higher level coordination activities  Penidn gknee pain would benefit from step up exercises  Plan: Continue per plan of care        Precautions: fall risk    STG expiration: 3/21/23  LTG expiraton: 23      Manuals 2/24 2/27 3/1                                                              Neuro Re-Ed             hurdles Solo 3 laps 6" fwd/lat airex pads - 6" 4 laps fwd/lat ea airex pads 12" hurdles 4 laps fwd/lat ea          Ht/hn Solo tandem 4 laps tandem, solo step 4 laps tandem waling solo step 4 laps          E bkwds 4 laps solo step  carrying 10 lb bolster 4 laps solo step          HKM  2 5# aw 4 laps solo step carrying 10# bolster 4 laps solo step          Foam beams   Ht/hn lat stepping                                    Ther Ex             treadmill 10 min 1 5 mph 10 min 1 5 mph  10 min 1 8 mph                                                                                                     Ther Activity             Stairs  5 reps staircase reciprocal                         Gait Training                                       Modalities

## 2023-03-03 NOTE — PROGRESS NOTES
Daily Note     Today's date: 3/3/2023  Patient name: Kenia Ro  : 1965  MRN: [id number]  Referring provider: Bharath Humphreys MD  Dx: No diagnosis found  Start Time: 1400  Stop Time: 1441  Total time in clinic (min): 41 minutes    Subjective: Notest hat her L knee is continued to be painful today  Reports that she has been significantly limited by her L knee pain  Has follow up with doctor on Monday  Objective: See treatment diary below    BP pre: 146/93     Assessment: Tolerated treatment poor  Only able to perform minimal weight bearing exercise during today's session secondary to patient's significantly high irritability in medial inferior knee  Patent's L knee pain exacerbated with flexion but not extension, but was significantly limiting participation in session  Upon evaluation of pain - no warmth, redness, or swelling noted compared to R side  TTP occurring along inferior medial L knee joint line  Patient educated on signs and symptoms of DVT as mentioned prior as well to assess if these symptoms occur and to promptly seek further medical attention if she notices these signs  Encouraged patient to ask about consistently high blood pressure and knee pain at her doctor's appointment on Monday  Patient demonstrated fatigue post treatment, exhibited good technique with therapeutic exercises and would benefit from continued PT      Plan: Continue per plan of care  Progress treatment as tolerated         Precautions: fall risk    STG expiration: 3/21/23  LTG expiraton: 23      Manuals 2/24 2/27 3/1 3/3                                                             Neuro Re-Ed             hurdles Solo 3 laps 6" fwd/lat airex pads - 6" 4 laps fwd/lat ea airex pads 12" hurdles 4 laps fwd/lat ea          Ht/hn Solo tandem 4 laps tandem, solo step 4 laps tandem waling solo step 4 laps tandem SOLO 5x laps          E bkwds 4 laps solo step  carrying 10 lb bolster 4 laps solo step          HKM 2 5# aw 4 laps solo step carrying 10# bolster 4 laps solo step Fwd/bwd amb 5x laps SOLO   Lat walking 5x laps SOLO          Foam beams   Ht/hn lat stepping Ht H/V amb 5x laps ea                                   Ther Ex             treadmill 10 min 1 5 mph 10 min 1 5 mph  10 min 1 8 mph 8 min 1 5 mph                                                                                                     Ther Activity             Stairs  5 reps staircase reciprocal                         Gait Training                                       Modalities             Self care    education regarding DVT signs + sx, self-assessment for symptoms and proper response x8 mins

## 2023-03-03 NOTE — PROGRESS NOTES
Occupational Therapy Daily Note:    Today's date: 3/3/2023  Patient name: Flori Price  : 1965  MRN: [id number]  Referring provider: Juan Conteh MD  Dx:   Encounter Diagnosis   Name Primary? • Cerebrovascular accident (CVA), unspecified mechanism ([facility] [address] ) Yes     Pt was seen and treated by KARLO Padilla under direct supervision of Marie Toure, KALEB, OTR/L  Subjective: "The other day it took me 20 minutes to put my earrings in "    Objective: Pt engaged in skilled OT treatment session with focus on fxnl attention, UE NMR, UE strengthening, UE endurance, FMC/GMC and FMS/GMS to increase engagement, endurance, tolerance, and independence with daily ADL and IADL tasks  CPT Code Minutes                                           Task Details        Therapeutic Activity               Neuro Re-Ed 35 Pt completed dynamic seated activity for benefit of sustained attention, word list retention, neuro re-ed, crossing midline, gross grasp, fxnl reaching, visual scanning, and visual perceptual skills  Looking into the mirror, pt instructed to retrieve large foam lettered blocks positioned behind head and place onto tabletop positioned at midline  Pt then provided four words aloud, and required to utilize repeat/rehearse memory strategy and answer sequencing question  Pt then required to manipulate lettered blocks and formulate word, followed by utilizing tongs w/ black and red resistive bands to retrieve blocks and place contralaterally on elevated surface to promote functional reaching  Pt donned 2# WC on wrist to promote UE strengthening  Droppage noted x2  Therapeutic Exercise 10 For therapeutic exercise benefit, pt tolerated 5 min x2 prograde/retrograde UEB #1 5 resistance for neuro motor re-ed, GMC/GMS, proximal UE strength/endurance, & ROM/flexibility  15 Pt performed in stance therex w/ 1# dumbbells to inc GMC/GMS, proximal UE strength/endurance, and fluidity of movement   Pt completed 3 sets x 10 reps of front raises w/ 2 sec hold, lateral raises w/ 2 sec hold, and bicep curls  Manual          Self Care       Assessment: Tolerated treatment well  Patient demonstrated difficulty retaining four word list, requiring one repetition of words for 5/6 trials and two repetitions of words for 2/6 trials  Patient would benefit from continued skilled OT w/ focus on dual tasking, requiring both motor and cognitive elements      Plan: Continued skilled OT per POC    INTERVENTION COMMENTS:  Diagnosis: Cerebrovascular accident (CVA), unspecified mechanism ([facility] [address] ) [I63 9]  Precautions: fall risk, cog deficits  FOTO: will complete  Insurance: Payor: [employer] / Plan: [employer] / Product Type: Medicaid HMO /   4 of 8 visits, PN due 3/23/23  POC due 5/23/23

## 2023-03-04 ENCOUNTER — APPOINTMENT (EMERGENCY)
Dept: RADIOLOGY | Facility: HOSPITAL | Age: 58
End: 2023-03-04

## 2023-03-04 RX ORDER — TRIAMCINOLONE ACETONIDE 40 MG/ML
40 INJECTION, SUSPENSION INTRA-ARTICULAR; INTRAMUSCULAR ONCE
Status: COMPLETED | OUTPATIENT
Start: 2023-03-04 | End: 2023-03-04

## 2023-03-04 RX ORDER — ROPIVACAINE HYDROCHLORIDE 5 MG/ML
15 INJECTION, SOLUTION EPIDURAL; INFILTRATION; PERINEURAL ONCE
Status: COMPLETED | OUTPATIENT
Start: 2023-03-04 | End: 2023-03-04

## 2023-03-04 RX ADMIN — TRIAMCINOLONE ACETONIDE 40 MG: 40 INJECTION, SUSPENSION INTRA-ARTICULAR; INTRAMUSCULAR at 00:40

## 2023-03-04 RX ADMIN — ROPIVACAINE HYDROCHLORIDE 15 ML: 5 INJECTION, SOLUTION EPIDURAL; INFILTRATION; PERINEURAL at 00:39

## 2023-03-04 NOTE — DISCHARGE INSTRUCTIONS
Take Tylenol for your pain  Follow up with the orthopedic doctors  If you have any numbness, weakness, or other new or worsening symptoms, please return to your nearest ER

## 2023-03-04 NOTE — ED ATTENDING ATTESTATION
3/3/2023  IAnthony DO, saw and evaluated the patient  I have discussed the patient with the resident/non-physician practitioner and agree with the resident's/non-physician practitioner's findings, Plan of Care, and MDM as documented in the resident's/non-physician practitioner's note, except where noted  All available labs and Radiology studies were reviewed  I was present for key portions of any procedure(s) performed by the resident/non-physician practitioner and I was immediately available to provide assistance  At this point I agree with the current assessment done in the Emergency Department  I have conducted an independent evaluation of this patient a history and physical is as follows:    61 yo female presents for L inferomedial knee pain after doing PT following a CVA affecting her R side  Apparently prior to the CVA she was using a cane but after the CVA she didn't need the cane  She has been walking without a cane at PT and has had progressive pain over the infermedial knee  Non radiating  Worse with ambulation and with palpation  Denies CP/SOB, focal weakness/numbness/tingling  LLE:  Skin intact  No knee effusion  FROM L knee  TTP over L inferomedial knee - pes anserine area  No erythema  +EHL/FHL  2+ DP    POCUS DVT negative for DVT at the [facility] through the bifurcation, mid thigh, and popliteal through the trifurcation  Imp: L knee pain likely pes anserine bursitis plan: bursa injection - ropivacaine 0 5% w/kenalog  Follow up with ortho        ED Course         Critical Care Time  Procedures

## 2023-03-04 NOTE — ED PROVIDER NOTES
History  Chief Complaint   Patient presents with   • Leg Pain     Pt reports recently being discharged from the hospital but states while she was here she was getting PT and was having left sided leg pain right below the knee and was continuing out patient PT  Pt states the pain is increasing and noticed some swelling  It's not red or hot but painful to touch  Art Bee is a 62year-old woman presenting with left lower leg pain  It is located distal to her knee on her medial left lower leg  She was discharged from rehab about 2 weeks ago after a stroke  She has been continuing to do rehab at home  Since discharge, her left knee pain has worsened  It was there prior to her being discharged from the hospital  She has not fallen or hurt her leg  No history of blood clot  The pain is anteriorly, no posterior calf, knee, or medial thigh pain  No swelling in left lower leg  No knee swelling, erythema  Prior to Admission Medications   Prescriptions Last Dose Informant Patient Reported? Taking?    DULoxetine (CYMBALTA) 60 mg delayed release capsule   No No   Sig: Take 1 capsule (60 mg total) by mouth daily   QUEtiapine (SEROquel) 25 mg tablet   Yes No   Sig: Take 25 mg by mouth every evening   acetaminophen (TYLENOL) 325 mg tablet   No No   Sig: Take 2 tablets (650 mg total) by mouth 3 (three) times a day as needed for mild pain or headaches   albuterol (PROVENTIL HFA,VENTOLIN HFA) 90 mcg/act inhaler   No No   Sig: Inhale 2 puffs every 4 (four) hours as needed for wheezing   aspirin 81 mg chewable tablet   No No   Sig: Chew 1 tablet (81 mg total) daily   atorvastatin (LIPITOR) 40 mg tablet   No No   Sig: Take 1 tablet (40 mg total) by mouth every evening   clopidogrel (PLAVIX) 75 mg tablet   No No   Sig: Take 1 tablet (75 mg total) by mouth daily for 7 days   famotidine (PEPCID) 20 mg tablet   No No   Sig: Take 1 tablet (20 mg total) by mouth 2 (two) times a day   gabapentin (NEURONTIN) 100 mg capsule No No   Sig: Take 1 capsule (100 mg total) by mouth daily as needed (headache)   glycerin-hypromellose- (ARTIFICIAL TEARS) 0 2-0 2-1 % SOLN   No No   Sig: Administer 1 drop into the left eye 3 (three) times a day   losartan (COZAAR) 25 mg tablet   No No   Sig: Take 1 tablet (25 mg total) by mouth daily   metoprolol tartrate (LOPRESSOR) 25 mg tablet   No No   Sig: Take 1 tablet (25 mg total) by mouth every 12 (twelve) hours      Facility-Administered Medications: None       Past Medical History:   Diagnosis Date   • Acquired deformity of left foot    • Anesthesia complication     difficulty awakening   • Arthritis    • Deaf, left    • Depression    • Hallux valgus of left foot    • Hammer toe of left foot    • Headache    • Kidney stone    • Sciatic pain, right     intermittent       Past Surgical History:   Procedure Laterality Date   • BREAST BIOPSY Right 03/04/2021   • BREAST BIOPSY Right 3/10/2021    Procedure: Excisional Breast debridement  7 o'clock position;  Surgeon: Sebas Castellanos MD;  [facility];  Service: General   • CHOLECYSTECTOMY     • CLOSURE DELAYED PRIMARY Right 7/5/2020    Procedure: CLOSURE DELAYED PRIMARY and application of skin graft substitute;  Surgeon: Sivakumar Ward DPM;  [facility];  Service: Podiatry   • HERNIA REPAIR     • INCISION AND DRAINAGE OF WOUND Right 7/1/2020    Procedure: INCISION AND DRAINAGE (I&D) EXTREMITY;  Surgeon: Sivakumar Ward DPM;  [facility];  Service: Podiatry   • AZ CORRJ [address] W/SESMDC W/1METAR MEDIAL CNF Left 11/12/2021    Procedure: Jyothi Almanza;  Surgeon: Sivakumar Ward DPM;  [facility];  Service: Podiatry   • AZ OSTEOT W/ I & Combine SHRT/CORRJ METAR XCP 1ST EA Left 6/14/2019    Procedure: 3rd Metatarsal Osteotomy;  Surgeon: Valaria Cushing, DPM;  [facility];  Service: Podiatry   • AZ OSTEOT W/ I & Combine SHRT/CORRJ METAR XCP 1ST EA Left 11/12/2021    Procedure: OSTEOTOMY 2ND METATARSAL;  Surgeon: Sivakumar Ward KRUPA;  [facility];  Service: Podiatry   • TUBAL LIGATION     • US GUIDED BREAST BIOPSY RIGHT COMPLETE Right 3/4/2021   • VAC DRESSING APPLICATION Right 5/3/2161    Procedure: APPLICATION VAC DRESSING;  Surgeon: Brady Montanez DPM;  Location:  MAIN OR;  Service: Podiatry       Family History   Problem Relation Age of Onset   • No Known Problems Mother    • No Known Problems Father    • No Known Problems Sister    • No Known Problems Daughter    • No Known Problems Maternal Grandmother    • Colon cancer Maternal Grandfather    • No Known Problems Paternal Grandmother    • No Known Problems Paternal Grandfather      I have reviewed and agree with the history as documented  E-Cigarette/Vaping   • E-Cigarette Use Never User      E-Cigarette/Vaping Substances   • Nicotine No    • THC No    • CBD No    • Flavoring No    • Other No    • Unknown No      Social History     Tobacco Use   • Smoking status: Some Days     Packs/day: 1 00     Years: 15      Pack years: 15 00     Types: Cigarettes     Last attempt to quit: 2021     Years since quittin 3   • Smokeless tobacco: Never   • Tobacco comments:     trying to quit - using judson  patch   Vaping Use   • Vaping Use: Never used   Substance Use Topics   • Alcohol use: Not Currently   • Drug use: Not Currently        Review of Systems   All other systems reviewed and are negative  Physical Exam  ED Triage Vitals [23]   Temperature Pulse Respirations Blood Pressure SpO2   98 4 °F (36 9 °C) 87 18 150/75 99 %      Temp Source Heart Rate Source Patient Position - Orthostatic VS BP Location FiO2 (%)   Oral Monitor Sitting Left arm --      Pain Score       --             Orthostatic Vital Signs  Vitals:    23   BP: 150/75   Pulse: 87   Patient Position - Orthostatic VS: Sitting       Physical Exam  Vitals and nursing note reviewed  Constitutional:       General: She is not in acute distress  Appearance: She is well-developed     HENT: Head: Normocephalic and atraumatic  Right Ear: External ear normal       Left Ear: External ear normal       Nose: Nose normal    Eyes:      Conjunctiva/sclera: Conjunctivae normal    Cardiovascular:      Rate and Rhythm: Normal rate and regular rhythm  Comments: +2 left DP pulse  Pulmonary:      Effort: Pulmonary effort is normal    Abdominal:      General: There is no distension  Musculoskeletal:         General: No deformity  Cervical back: Neck supple  Legs:       Comments: Left knee ROM normal, no gross deformity  Skin:     General: Skin is warm and dry  Comments: Skin over left knee normal     Neurological:      General: No focal deficit present  Mental Status: She is alert and oriented to person, place, and time  Comments: Normal strength and sensation in left lower leg  ED Medications  Medications   triamcinolone acetonide (KENALOG-40) 40 mg/mL injection 40 mg (40 mg Intra-articular Given by Other 3/4/23 0040)   ropivacaine (NAROPIN) 0 5 % injection 15 mL (15 mL Infiltration Given by Other 3/4/23 0039)       Diagnostic Studies  Results Reviewed     None                 No orders to display         Procedures  Orthopedic injury treatment    Date/Time: 3/3/2023 12:30 AM  Performed by: Payton Dutton MD  Authorized by: Payton Dutton MD     Patient Location:  ED  [facility] Protocol:  Consent: Verbal consent obtained  Risks and benefits: risks, benefits and alternatives were discussed  Consent given by: patient  Patient understanding: patient states understanding of the procedure being performed  Patient consent: the patient's understanding of the procedure matches consent given  Procedure consent: procedure consent matches procedure scheduled  Relevant documents: relevant documents present and verified  Test results: test results available and properly labeled  Site marked: the operative site was marked  Radiology Images displayed and confirmed   If images not available, report reviewed: imaging studies available  Required items: required blood products, implants, devices, and special equipment available  Patient identity confirmed: verbally with patient      Injury location:  Lower leg  Location details:  Left lower leg  Injury type: pes anserine bursitis  Neurovascular status: Neurovascularly intact    Distal perfusion: normal    Neurological function: normal    Range of motion: normal    Local anesthesia used?: Yes    Anesthesia:  Local infiltration  Local anesthetic: Earlis Lively  Neurovascular status: Neurovascularly intact    Distal perfusion: normal    Neurological function: normal    Range of motion: normal    Patient tolerance:  Patient tolerated the procedure well with no immediate complications          ED Course                                       Medical Decision Making  63 yo woman presenting with left leg pain  Consistent with pes anserine bursitis based on exam  No signs of gout, septic arthritis on exam  No falls or other injuries to suggest fracture  No posterior leg pain or swelling to suggest DVT  Will treat symptomatically with Kennalog and ropivacaine injection  Pain improved after injection  Discharged home in stable condition, follow up with orthopedics, return precautions given  Risk  Prescription drug management  Disposition  Final diagnoses:   Pes anserine bursitis     Time reflects when diagnosis was documented in both MDM as applicable and the Disposition within this note     Time User Action Codes Description Comment    3/4/2023 12:48 AM Orlando Lopez Add [M70 50] Pes anserine bursitis       ED Disposition     ED Disposition   Discharge    Condition   Stable    Date/Time   Sat Mar 4, 2023 12:47 AM    Comment   Elex Conception discharge to home/self care                 Follow-up Information     Follow up With Specialties Details Why Contact Info Additional [facility] [id number] [facility] Surgery   [address] [facility] [facility], [address] [place], [place], [address] [facility]  Use Entrance A     [facility] [facility] Emergency Department Emergency Medicine  If symptoms worsen [address] [id number]  [address] [facility] [address] [facility], [address], [place], [place], [address]          Discharge Medication List as of 3/4/2023 12:52 AM      CONTINUE these medications which have NOT CHANGED    Details   acetaminophen (TYLENOL) 325 mg tablet Take 2 tablets (650 mg total) by mouth 3 (three) times a day as needed for mild pain or headaches, Starting Mon 2/20/2023, No Print      albuterol (PROVENTIL HFA,VENTOLIN HFA) 90 mcg/act inhaler Inhale 2 puffs every 4 (four) hours as needed for wheezing, Starting Thu 2/9/2023, Normal      aspirin 81 mg chewable tablet Chew 1 tablet (81 mg total) daily, Starting Mon 2/20/2023, Normal      atorvastatin (LIPITOR) 40 mg tablet Take 1 tablet (40 mg total) by mouth every evening, Starting Mon 2/20/2023, Normal      clopidogrel (PLAVIX) 75 mg tablet Take 1 tablet (75 mg total) by mouth daily for 7 days, Starting Mon 2/20/2023, Until Mon 2/27/2023, Normal      DULoxetine (CYMBALTA) 60 mg delayed release capsule Take 1 capsule (60 mg total) by mouth daily, Starting Mon 2/20/2023, No Print      famotidine (PEPCID) 20 mg tablet Take 1 tablet (20 mg total) by mouth 2 (two) times a day, Starting Thu 2/9/2023, Until Sat 3/11/2023, Normal      gabapentin (NEURONTIN) 100 mg capsule Take 1 capsule (100 mg total) by mouth daily as needed (headache), Starting Mon 2/20/2023, Normal      glycerin-hypromellose- (ARTIFICIAL TEARS) 0 2-0 2-1 % SOLN Administer 1 drop into the left eye 3 (three) times a day, Starting Mon 2/20/2023, Normal      losartan (COZAAR) 25 mg tablet Take 1 tablet (25 mg total) by mouth daily, Starting Mon 2/20/2023, Normal      metoprolol tartrate (LOPRESSOR) 25 mg tablet Take 1 tablet (25 mg total) by mouth every 12 (twelve) hours, Starting Mon 2/20/2023, Normal      QUEtiapine (SEROquel) 25 mg tablet Take 25 mg by mouth every evening, Starting Thu 6/30/2022, Historical Med               PDMP Review     None           ED Provider  Attending physically available and evaluated Esmer Ferguson I managed the patient along with the ED Attending      Electronically Signed by         Mikey Rosado MD  03/04/23 0034

## 2023-03-06 ENCOUNTER — OFFICE VISIT (OUTPATIENT)
Dept: SPEECH THERAPY | Facility: CLINIC | Age: 58
End: 2023-03-06

## 2023-03-06 ENCOUNTER — HOSPITAL ENCOUNTER (OUTPATIENT)
Dept: RADIOLOGY | Facility: HOSPITAL | Age: 58
Discharge: HOME | End: 2023-03-06

## 2023-03-06 ENCOUNTER — OFFICE VISIT (OUTPATIENT)
Dept: PHYSICAL THERAPY | Facility: CLINIC | Age: 58
End: 2023-03-06

## 2023-03-06 ENCOUNTER — OFFICE VISIT (OUTPATIENT)
Dept: OCCUPATIONAL THERAPY | Facility: CLINIC | Age: 58
End: 2023-03-06

## 2023-03-06 DIAGNOSIS — I63.9 CEREBROVASCULAR ACCIDENT (CVA), UNSPECIFIED MECHANISM (HCC): ICD-10-CM

## 2023-03-06 DIAGNOSIS — Z78.9 IMPAIRED MOBILITY AND ACTIVITIES OF DAILY LIVING: ICD-10-CM

## 2023-03-06 DIAGNOSIS — R47.1 DYSARTHRIA: ICD-10-CM

## 2023-03-06 DIAGNOSIS — M25.562 ACUTE PAIN OF LEFT KNEE: ICD-10-CM

## 2023-03-06 DIAGNOSIS — I63.9 CEREBROVASCULAR ACCIDENT (CVA), UNSPECIFIED MECHANISM (HCC): Primary | ICD-10-CM

## 2023-03-06 DIAGNOSIS — M25.562 CHRONIC PAIN OF LEFT KNEE: Primary | ICD-10-CM

## 2023-03-06 DIAGNOSIS — R48.8 OTHER SYMBOLIC DYSFUNCTIONS: Primary | ICD-10-CM

## 2023-03-06 DIAGNOSIS — Z74.09 IMPAIRED MOBILITY AND ACTIVITIES OF DAILY LIVING: ICD-10-CM

## 2023-03-06 DIAGNOSIS — G89.29 CHRONIC PAIN OF LEFT KNEE: Primary | ICD-10-CM

## 2023-03-06 NOTE — PROGRESS NOTES
Occupational Therapy Daily Note:    Today's date: 3/6/2023  Patient name: Zuri Teixeira  : 1965  MRN: [id number]  Referring provider: Jignesh Fatima MD  Dx:   Encounter Diagnosis   Name Primary? • Cerebrovascular accident (CVA), unspecified mechanism ([facility] [address] ) Yes     Pt was seen and treated by KARLO Spencer under direct supervision of Lois Francis, KALEB, OTR/L  Subjective: "I can't think of one "     Objective: Pt engaged in skilled OT treatment session with focus on fxnl attention, UE NMR, UE strengthening, UE endurance, FMC/GMC and FMS/GMS to increase engagement, endurance, tolerance, and independence with daily ADL and IADL tasks  CPT Code Minutes                                           Task Details        Therapeutic Activity 40 Pt engaged in dynamic standing activity w/ focus on alternating attention, short term recall, mental manipulation, prehension patterns, FMC/FMS, bilateral coordination, and crossing midline  Pt donned 2 5# WC and utilized resistive tongs w/ red and black bands to retrieve pool noodles and wooden cylinders positioned contralaterally and placed on elevated horizontal dowels positioned at midline  Following placement of noodles/cylinders, pt placed green resistive clips, followed by tying fabric  Pt simultaneously completed math problems, followed by naming a place for every letter of the alphabet throughout entirety of activity  Following placement of all items, pt removed them and placed in bucket positioned in various planes  Pt unable to identify name of place for 3/26 letters  Neuro Re-Ed                 Therapeutic Exercise 20 Pt performed in stance therex w/ 3# therabar to inc GMC/GMS, proximal UE strength/endurance, and fluidity of movement  Pt completed 3 sets x 10 reps of front raises w/ 5 sec hold, bicep curls, and D2 PNF pattern  Manual          Self Care       Assessment: Tolerated treatment well   Patient demonstrated good UE endurance and functional reach w/ increased weight placed around wrist  Pt demo good attention throughout dual task, requiring min verbal cues to address place keeping  Patient would benefit from continued skilled OT      Plan: Continued skilled OT per POC    INTERVENTION COMMENTS:  Diagnosis: Cerebrovascular accident (CVA), unspecified mechanism ([facility] [address] ) [I63 9]  Precautions: fall risk, cog deficits  FOTO: will complete  Insurance: Payor: [employer] / Plan: [employer] / Product Type: Medicaid HMO /   5 of 8 visits, PN due 3/23/23  POC due 5/23/23

## 2023-03-06 NOTE — PROGRESS NOTES
Daily Speech Treatment Note    Today's date: 3/6/2023  Patient’s name: Rodger Das  : 1965  MRN: [id number]  Safety measures: CVA  Referring provider: Ryann Campbell MD    Encounter Diagnosis     ICD-10-CM    1  Other symbolic dysfunctions  F77 9       2  Dysarthria  R47 1       3  Cerebrovascular accident (CVA), unspecified mechanism ([facility] [address] )  I63 9            Visit tracking:  -Referring provider: Epic  -Billing guidelines: AMA  -Visit #  -Insurance: [facility]  -RE due 23    Subjective/Behavioral:  - Patient was pleasant and participated in all therapy activities   -Patient reports she is noticing at the end of the day when she is tired, her speech is worse and she feels weaker on the R side     Objective/Assessment:    Short-term goals:  Patient will perform oral motor exercises using IOPI device on  lingual and labial muscles to facilitate improved strength and function for increased speech intelligibility, to be achieved in 4-6 weeks      IOPI PEAK MEASUREMENT WEEKLY GRID      23       Tongue tip 16 7 51  51       Tongue back 17 6 39 45  46       Right Lip 11 9 22 18     Left Lip 19 3 19 27 34         IOPI -- Exercise Targets    Tongue tip Peak Max after 3 trials: 51 Target for treatment: 25   Tongue back Peak Max after 3 trials: 46 Target for treatment: 23   Right Lip Peak Max after 3 trials: 18 Target for treatment: 9   Left Lip Peak Max after 3 trials: 34 Target for treatment: 17         PEAK MAX 3/1/23 3/3/23 03/06/23      Tongue tip  3 sets 30 3 sets 30 3 sets of 30 (held for 3 sec on last set)      Tongue back 3 sets 30 3 sets 30 3 sets of 30 (held for 3 sec on last set)      Right Lip 1 set of 30 + 13 due to IOPI device malfunction 3 sets 30 (patient switched to left lip after 1 set due to fatigue)  3 sets of 30 (held for 3 sec on last set)      Left Lip 3 sets 30 3 sets 30 3 sets of 30  (held for 3 sec on last set)            Patient will perform oral motor and diadochokinetic speech rate exercises with > 90% accuracy to facilitate increased speech intelligibility, to be achieved in 4-6 weeks  Patient will utilize compensatory strategies (I e , over-articulation, decreased rate, etc) 80% of the time while orally reading sentence/paragraph length material to facilitate increased speech intelligibility, to be achieved in 4-6 weeks  Patient will complete oral reading and conversational tasks with the consistent use of compensatory strategies >90% of the time to facilitate increased speech intelligibility, to be achieved in 4-6 weeks  Conversation with patient today completed with minimal speech intelligibility deficits  Patient and caregiver will be educated on speech intelligibility strategies (e g , over-exaggeration, slow rate, breath groups,, etc ) to promote increased communication success (to be achieved in 2-3 weeks)    Patient will utilize speech intelligibility strategies 80% of the time with min verbal cues while reading words, sentences, and paragraph length material to facilitate increased generalization of skills into conversational speech (to be achieved in 4-6 weeks)  COGNITIVE-LINGUISTIC:  Patient will complete auditory immediate and short-term memory tasks with 80% accuracy to facilitate increased ability to retell narratives and recall information within functional living environment (to be achieved in 4-6 weeks)  Patient will be educated on word finding strategies (i e , circumlocution) for improved generative naming and verbal expression skills (to be achieved in 1-2 weeks)  Patient will complete word generation tasks (e g , verbal fluency, categorization, analogies, synonyms/antonyms, , etc ) with 80% accuracy using word finding strategies to facilitate improved word retrieval skills (to be achieved in 4-6 weeks)         Plan:  -Patient was provided with home exercises/activities to target goals in plan of care at the end of today's session   -Continue with current plan of care

## 2023-03-06 NOTE — PROGRESS NOTES
Daily Note     Today's date: 3/6/2023  Patient name: Zuri Teixeira  : 1965  MRN: [id number]  Referring provider: Jignesh Fatima MD  Dx:   Encounter Diagnosis     ICD-10-CM    1  Cerebrovascular accident (CVA), unspecified mechanism ([facility] [address] )  I63 9       2  Impaired mobility and activities of daily living  Z74 09     Z78 9                      Subjective: Went to ER on Friday due toleft knee pain notes she had bursitis and was given  a steroid injection and numbing medication  Follows up with Ortho on 2023  Still with some pain but is feeling better  notes that her BP has been elevated the past few readings Sess PCP today at 3pm and will discuss with her then  Objective: See treatment diary below    Pre PRT: 1:05p seated L arm 140/80  Post treadmill L standing L arm 145/85  Post PT exercises 130/82    RPE for session: 7/10    Assessment: Tolerated treatment well  Patient demonstrated fatigue post treatment  With repots of discomfort in left throughout session, accommodated for with some exercises otherwise patient motivated to push through despite PT recommendation for rest  Balance affected mainly but left knee pain and history of foot surgery  Improved functional modality and balance noted with repeated reps  Increased pain and limping with exiting clinic  Plan: Continue per plan of care  continue to monitor vitals and Left knee pain adjust session as patient tolerates        Precautions: fall risk    STG expiration: 3/21/23  LTG expiraton: 23      Manuals 2/24 2/27 3/1 3/3  03/06                                                           Neuro Re-Ed             hurdles Solo 3 laps 6" fwd/lat airex pads - 6" 4 laps fwd/lat ea airex pads 12" hurdles 4 laps fwd/lat ea   12"  Solo step    fwd reciprocally    x 4 laps    Lat x 4 laps   (no airex today )       Ht/hn Solo tandem 4 laps tandem, solo step 4 laps tandem waling solo step 4 laps tandem SOLO 5x laps          E bkwds 4 laps solo step  carrying 10 lb bolster 4 laps solo step   carrying 10 lb bolster 4 laps solo step    90ft x 2       HKM  2 5# aw 4 laps solo step carrying 10# bolster 4 laps solo step Fwd/bwd amb 5x laps SOLO   Lat walking 5x laps SOLO   Fwd   Solo   90ft x 2       Foam beams   Ht/hn lat stepping Ht H/V amb 5x laps ea  Solo     Tandem walking   20ft x 4 laps  HT/HN 2 laps ea    Side stepping   2 laps  HT/HN 2 laps ea                                        Ther Ex             treadmill 10 min 1 5 mph 10 min 1 5 mph  10 min 1 8 mph 8 min 1 5 mph   1 5 mph  x10 min        Step ups       For strength and muscular endurance    SOLO no UE   R only (due to left Knee pain)  No UE   Fwd 20x  Lat  20x                                                                                      Ther Activity             Stairs  5 reps staircase reciprocal                         Gait Training                                       Modalities             Self care    education regarding DVT signs + sx, self-assessment for symptoms and proper response x8 mins

## 2023-03-07 ENCOUNTER — OFFICE VISIT (OUTPATIENT)
Dept: SPEECH THERAPY | Facility: CLINIC | Age: 58
End: 2023-03-07

## 2023-03-07 ENCOUNTER — OFFICE VISIT (OUTPATIENT)
Dept: OCCUPATIONAL THERAPY | Facility: CLINIC | Age: 58
End: 2023-03-07

## 2023-03-07 ENCOUNTER — OFFICE VISIT (OUTPATIENT)
Dept: PHYSICAL THERAPY | Facility: CLINIC | Age: 58
End: 2023-03-07

## 2023-03-07 DIAGNOSIS — I63.9 CEREBROVASCULAR ACCIDENT (CVA), UNSPECIFIED MECHANISM (HCC): Primary | ICD-10-CM

## 2023-03-07 DIAGNOSIS — I63.9 CEREBROVASCULAR ACCIDENT (CVA), UNSPECIFIED MECHANISM (HCC): ICD-10-CM

## 2023-03-07 DIAGNOSIS — R47.1 DYSARTHRIA: ICD-10-CM

## 2023-03-07 DIAGNOSIS — R48.8 OTHER SYMBOLIC DYSFUNCTIONS: Primary | ICD-10-CM

## 2023-03-07 DIAGNOSIS — Z78.9 IMPAIRED MOBILITY AND ACTIVITIES OF DAILY LIVING: ICD-10-CM

## 2023-03-07 DIAGNOSIS — M54.42 ACUTE BILATERAL LOW BACK PAIN WITH LEFT-SIDED SCIATICA: ICD-10-CM

## 2023-03-07 DIAGNOSIS — Z74.09 IMPAIRED MOBILITY AND ACTIVITIES OF DAILY LIVING: ICD-10-CM

## 2023-03-07 NOTE — PROGRESS NOTES
Daily Speech Treatment Note    Today's date: 3/7/2023  Patient’s name: Nick Mckeon  : 1965  MRN: [id number]  Safety measures: CVA  Referring provider: Reji Covington MD    Encounter Diagnosis     ICD-10-CM    1  Other symbolic dysfunctions  N41 8       2  Dysarthria  R47 1       3  Cerebrovascular accident (CVA), unspecified mechanism ([facility] [address] )  I63 9            Visit tracking:  -Referring provider: Epic  -Billing guidelines: AMA  -Visit #  -Insurance: [facility]  -RE due 23    Subjective/Behavioral:  - Patient was pleasant and participated in all therapy activities  - Patient reported that she has leg pain, which caused her to not sleep well last night  Objective/Assessment:    Short-term goals:  Patient will perform oral motor exercises using IOPI device on  lingual and labial muscles to facilitate improved strength and function for increased speech intelligibility, to be achieved in 4-6 weeks      IOPI PEAK MEASUREMENT WEEKLY GRID      23      Tongue tip 16 7 51  51  51     Tongue back 17 6 39 45  46  42     Right Lip 11 9 22 18 17    Left Lip 19 3 19 27 34 27        IOPI -- Exercise Targets    Tongue tip Peak Max after 3 trials: 51 Target for treatment: 26   Tongue back Peak Max after 3 trials: 42 Target for treatment: 21   Right Lip Peak Max after 3 trials: 17 Target for treatment: 9   Left Lip Peak Max after 3 trials: 27 Target for treatment: 14         PEAK MAX 3/1/23 3/3/23 03/06/23 03/07/23     Tongue tip  3 sets 30 3 sets 30 3 sets of 30 (held for 3 sec on last set) 3 sets of 30 (held for 3 sec on last set)     Tongue back 3 sets 30 3 sets 30 3 sets of 30 (held for 3 sec on last set) 3 sets of 30 (held for 3 sec on last set)     Right Lip 1 set of 30 + 13 due to IOPI device malfunction 3 sets 30 (patient switched to left lip after 1 set due to fatigue) 3 sets of 30 (held for 3 sec on last set) 3 sets of 30 (held for 3 sec on last set)     Left Lip 3 sets 30 3 sets 30 3 sets of 30  (held for 3 sec on last set) 3 sets of 30 (held for 3 sec on last set)           Patient will perform oral motor and diadochokinetic speech rate exercises with > 90% accuracy to facilitate increased speech intelligibility, to be achieved in 4-6 weeks  Patient will utilize compensatory strategies (I e , over-articulation, decreased rate, etc) 80% of the time while orally reading sentence/paragraph length material to facilitate increased speech intelligibility, to be achieved in 4-6 weeks  Patient will complete oral reading and conversational tasks with the consistent use of compensatory strategies >90% of the time to facilitate increased speech intelligibility, to be achieved in 4-6 weeks  To target intelligibility, patient read two paragraphs w 100% intelligibility  Patient was 100% intelligible during conversation about her family  Patient and caregiver will be educated on speech intelligibility strategies (e g , over-exaggeration, slow rate, breath groups,, etc ) to promote increased communication success (to be achieved in 2-3 weeks)    Patient was reminded to use intelligibility strategies (slow rate, over-exaggeration, breathing) before reading paragraphs  Patient will utilize speech intelligibility strategies 80% of the time with min verbal cues while reading words, sentences, and paragraph length material to facilitate increased generalization of skills into conversational speech (to be achieved in 4-6 weeks)  COGNITIVE-LINGUISTIC:  Patient will complete auditory immediate and short-term memory tasks with 80% accuracy to facilitate increased ability to retell narratives and recall information within functional living environment (to be achieved in 4-6 weeks)  Patient will be educated on word finding strategies (i e , circumlocution) for improved generative naming and verbal expression skills (to be achieved in 1-2 weeks)       Patient will complete word generation tasks (e g , verbal fluency, categorization, analogies, synonyms/antonyms, , etc ) with 80% accuracy using word finding strategies to facilitate improved word retrieval skills (to be achieved in 4-6 weeks)  Plan:  -Patient was provided with home exercises/activities to target goals in plan of care at the end of today's session   -Continue with current plan of care

## 2023-03-07 NOTE — PROGRESS NOTES
Occupational Therapy Daily Note:    Today's date: 3/7/2023  Patient name: Zuri Teixeira  : 1965  MRN: [id number]  Referring provider: Jignesh Fatima MD  Dx:   Encounter Diagnosis   Name Primary? • Cerebrovascular accident (CVA), unspecified mechanism ([facility] [address] ) Yes       Subjective: "I try to wash the dishes using this hand (right) but it takes me a long time  Objective: Pt engaged in skilled OT treatment session with focus on fxnl cognition, fxnl attention, UE NMR, visual perceptual training, visual scanning, UE strengthening, UE endurance, FMC/GMC and FMS/GMS to increase engagement, endurance, tolerance, and independence with daily ADL and IADL tasks  CPT Code Minutes                                           Task Details        Therapeutic Activity 10 Pt engaged in mental manipulation task focusing on attention w/dual tasking of motor task  Pt provided w/4 words required to sequence in specific order as instructed while at same time engaging in fm activity  Pt able to recall and mentally sequence 5/5 sets of 4 words following directions provided, w/100% accuracy  Neuro Re-Ed 40 For benefit of neuro re-ed promoting proximal strength/stabilization, FMC/FMC w/fxl tool use, /VS, ocular control, saccades and ataxia reduction donning 2# CW for added proprioceptive input improving abilities in daily activities  Pt presented with mod difficulty to negotiate tongs for accurate puzzle piece placement w/targeted demands, no noted ataxia during activity  Therapeutic Exercise 10 For UB exercise benefit and to increase overall cardiovascular health, proximal strength, gross grasp and endurance, pt engaged in [address] for 5 min prograde/5 min retrograde increasing resistance to L1 5  Manual          Self Care       Pt reported she saw her Dr due to right knee pain  Dr increased BP meds due to high BP, pt to be monitored for further assessment       Assessment: Tolerated treatment well  Able to retrieve and bring puzzle pieces to target board, pt presented with difficulty to position puzzle pieces using tongs  Pt attended to task in multimodal environment  Patient would benefit from continued skilled OT      Plan: Continued skilled OT per POC    INTERVENTION COMMENTS:  Diagnosis: Cerebrovascular accident (CVA), unspecified mechanism ([facility] [address] ) [I63 9]  Precautions: fall risk, cog deficits  FOTO: will complete  Insurance: Payor: Jeremias Khan / Plan: Jeremias Khan / Product Type: Medicaid HMO /   6 of 8 visits, PN due 3/23/23  POC due 5/23/231

## 2023-03-07 NOTE — PROGRESS NOTES
Daily Note     Today's date: 3/7/2023  Patient name: Art Bee  : 1965  MRN: [id number]  Referring provider: Maryanne Jonas MD  Dx: No diagnosis found  Subjective: C/o 6/10 L knee pain at start of session, 7/10 pain post treatment      Objective: See treatment diary below      Assessment: Tolerated treatment fair, limited 2/2 L knee pain  Is seeing orthopedic physician regarding this tomorrow  Modified activities to improve tolerance, pt able to participate in full 60min session w/ modifications and frequent seated rest breaks  Held TM walking and substituted NuStep for cardiovascular endurance today, cued to go through pain-free range  Difficulty w/ lateral hurdles and foam walking towards the L  In SOLO for entire session  Patient demonstrated fatigue post treatment, exhibited good technique with therapeutic exercises and would benefit from continued PT      Plan: Continue per plan of care        Precautions: fall risk    STG expiration: 3/21/23  LTG expiraton: 23      Manuals 2/24 2/27 3/1 3/3  03/06 3/7                                                          Neuro Re-Ed             hurdles Solo 3 laps 6" fwd/lat airex pads - 6" 4 laps fwd/lat ea airex pads 12" hurdles 4 laps fwd/lat ea   12"  Solo step    fwd reciprocally    x 4 laps    Lat x 4 laps   (no airex today ) (Low hurdles today)  Solo step    fwd reciprocally    x 4 laps      Ht/hn Solo tandem 4 laps tandem, solo step 4 laps tandem waling solo step 4 laps tandem SOLO 5x laps          E bkwds 4 laps solo step  carrying 10 lb bolster 4 laps solo step   carrying 10 lb bolster 4 laps solo step    90ft x 2       HKM  2 5# aw 4 laps solo step carrying 10# bolster 4 laps solo step Fwd/bwd amb 5x laps SOLO   Lat walking 5x laps SOLO   Fwd   Solo   90ft x 2 Fwd   Solo   90ft x 2      Foam beams   Ht/hn lat stepping Ht H/V amb 5x laps ea  Solo     Tandem walking   20ft x 4 laps  HT/HN 2 laps ea    Side stepping   2 laps  HT/HN 2 laps ea        Tandem walking   20ft x 4 laps  HT/HN 2 laps ea    Side stepping   2 laps  HT/HN 4 laps ea                                 Ther Ex             treadmill 10 min 1 5 mph 10 min 1 5 mph  10 min 1 8 mph 8 min 1 5 mph   1 5 mph  x10 min        Step ups       For strength and muscular endurance    SOLO no UE   R only (due to left Knee pain)  No UE   Fwd 20x  Lat  20x        NuStep       L2 x10min                                                                       Ther Activity             Stairs  5 reps staircase reciprocal                         Gait Training                                       Modalities             Self care    education regarding DVT signs + sx, self-assessment for symptoms and proper response x8 mins

## 2023-03-08 ENCOUNTER — OFFICE VISIT (OUTPATIENT)
Dept: OBGYN CLINIC | Facility: HOSPITAL | Age: 58
End: 2023-03-08
Attending: EMERGENCY MEDICINE

## 2023-03-08 VITALS
HEIGHT: 60 IN | HEART RATE: 69 BPM | BODY MASS INDEX: 37.15 KG/M2 | DIASTOLIC BLOOD PRESSURE: 70 MMHG | WEIGHT: 189.2 LBS | SYSTOLIC BLOOD PRESSURE: 140 MMHG

## 2023-03-08 DIAGNOSIS — M17.12 PRIMARY OSTEOARTHRITIS OF LEFT KNEE: Primary | ICD-10-CM

## 2023-03-08 DIAGNOSIS — M70.50 PES ANSERINE BURSITIS: ICD-10-CM

## 2023-03-08 RX ADMIN — BUPIVACAINE HYDROCHLORIDE 2 ML: 2.5 INJECTION, SOLUTION INFILTRATION; PERINEURAL at 12:05

## 2023-03-08 RX ADMIN — BETAMETHASONE SODIUM PHOSPHATE AND BETAMETHASONE ACETATE 12 MG: 3; 3 INJECTION, SUSPENSION INTRA-ARTICULAR; INTRALESIONAL; INTRAMUSCULAR; SOFT TISSUE at 12:05

## 2023-03-08 NOTE — PROGRESS NOTES
Orthopaedics Office Visit - 1st Patient Visit    ASSESSMENT/PLAN:    Assessment:   Left pes anserine bursitis   Left knee osteoarthritis       Plan:   - Discussed conservative treatment with patient at length  - Weight bearing as tolerated left lower extremity   - Begin to maintain brace as directed   - Continue physical therapy as directed   - Over the counter analgesics as needed / directed   - Ice / heat as directed   - Discussed possibility of MRI if symptoms persist   - Follow up 6 weeks       To Do Next Visit:  Evaluate left knee pain     _____________________________________________________  CHIEF COMPLAINT:  Chief Complaint   Patient presents with   • Left Knee - Pain         SUBJECTIVE:  Seth Farris is a 62 y o  female who presents to the office for an initial evaluation for left knee  She sustained a stroke on 2/4/23  A week after the stroke she began to develop pain in the left knee  Patient was seen and evaluated on 3/2/2023 where she was recommend cortisone to the pes anserine bursa which did provide relief for a few days  Patient was referred to physical therapy soon after but states that pain was exacerbated with physical activity  patient was seen and evaluated by her primary care doctor who ordered x-rays soon after and was referred to orthopedics for further evaluation  Patient states that her pain is predominantly medial aspect of the knee and becomes worse with physical activity and weightbearing  It is prior to her onset of pain  Patient states that she has left foot surgery requiring her to weight-bear with a cane on the affected side  Patient states that she has been using an ointment for the left knee but is unsure of hte name    Patient offers no other complaints at this    PAST MEDICAL HISTORY:  Past Medical History:   Diagnosis Date   • Acquired deformity of left foot    • Anesthesia complication     difficulty awakening   • Arthritis    • Deaf, left    • Depression    • Hallux valgus of left foot    • Hammer toe of left foot    • Headache    • Kidney stone    • Sciatic pain, right     intermittent       PAST SURGICAL HISTORY:  Past Surgical History:   Procedure Laterality Date   • BREAST BIOPSY Right 03/04/2021   • BREAST BIOPSY Right 3/10/2021    Procedure: Excisional Breast debridement  7 o'clock position;  Surgeon: John Remy MD;  [facility];  Service: General   • CHOLECYSTECTOMY     • CLOSURE DELAYED PRIMARY Right 7/5/2020    Procedure: CLOSURE DELAYED PRIMARY and application of skin graft substitute;  Surgeon: Cristine Cage DPM;  [facility];  Service: Podiatry   • HERNIA REPAIR     • INCISION AND DRAINAGE OF WOUND Right 7/1/2020    Procedure: INCISION AND DRAINAGE (I&D) EXTREMITY;  Surgeon: Cristine Cage DPM;  [facility];  Service: Podiatry   • Piroska U  97  W/SESMDC W/1METAR MEDIAL CNF Left 11/12/2021    Procedure: Zanoni Chelle;  Surgeon: Cristine Cage DPM;  [facility];  Service: Podiatry   • IA OSTEOT W/ Tallyfy SHRT/CORRJ METAR XCP 1ST EA Left 6/14/2019    Procedure: 3rd Metatarsal Osteotomy;  Surgeon: Brisa Dumont DPM;  [facility];  Service: Podiatry   • IA OSTEOT W/ T[place]fy SHRT/CORRJ METAR XCP 1ST EA Left 11/12/2021    Procedure: OSTEOTOMY 2ND METATARSAL;  Surgeon: Cristine Cage DPM;  [facility];  Service: Podiatry   • TUBAL LIGATION     • US GUIDED BREAST BIOPSY RIGHT COMPLETE Right 3/4/2021   • VAC DRESSING APPLICATION Right 6/6/1785    Procedure: APPLICATION VAC DRESSING;  Surgeon: Cristine Cage DPM;  [facility];  Service: Podiatry       FAMILY HISTORY:  Family History   Problem Relation Age of Onset   • No Known Problems Mother    • No Known Problems Father    • No Known Problems Sister    • No Known Problems Daughter    • No Known Problems Maternal Grandmother    • Colon cancer Maternal Grandfather    • No Known Problems Paternal Grandmother    • No Known Problems Paternal Grandfather SOCIAL HISTORY:  Social History     Tobacco Use   • Smoking status: Some Days     Packs/day: 1 00     Years: 15 00     Pack years: 15 00     Types: Cigarettes     Last attempt to quit: 2021     Years since quittin 3   • Smokeless tobacco: Never   • Tobacco comments:     trying to quit - using judson  patch   Vaping Use   • Vaping Use: Never used   Substance Use Topics   • Alcohol use: Not Currently   • Drug use: Not Currently       MEDICATIONS:    Current Outpatient Medications:   •  acetaminophen (TYLENOL) 325 mg tablet, Take 2 tablets (650 mg total) by mouth 3 (three) times a day as needed for mild pain or headaches, Disp: , Rfl: 0  •  albuterol (PROVENTIL HFA,VENTOLIN HFA) 90 mcg/act inhaler, Inhale 2 puffs every 4 (four) hours as needed for wheezing, Disp: 6 7 g, Rfl: 0  •  aspirin 81 mg chewable tablet, Chew 1 tablet (81 mg total) daily, Disp: 30 tablet, Rfl: 0  •  atorvastatin (LIPITOR) 40 mg tablet, Take 1 tablet (40 mg total) by mouth every evening, Disp: 30 tablet, Rfl: 0  •  DULoxetine (CYMBALTA) 60 mg delayed release capsule, Take 1 capsule (60 mg total) by mouth daily, Disp: , Rfl: 0  •  famotidine (PEPCID) 20 mg tablet, Take 1 tablet (20 mg total) by mouth 2 (two) times a day, Disp: 60 tablet, Rfl: 0  •  gabapentin (NEURONTIN) 100 mg capsule, Take 1 capsule (100 mg total) by mouth daily as needed (headache), Disp: 15 capsule, Rfl: 0  •  glycerin-hypromellose- (ARTIFICIAL TEARS) 0 2-0 2-1 % SOLN, Administer 1 drop into the left eye 3 (three) times a day, Disp: 15 mL, Rfl: 0  •  losartan (COZAAR) 25 mg tablet, Take 1 tablet (25 mg total) by mouth daily, Disp: 30 tablet, Rfl: 0  •  metoprolol tartrate (LOPRESSOR) 25 mg tablet, Take 1 tablet (25 mg total) by mouth every 12 (twelve) hours, Disp: 60 tablet, Rfl: 0  •  QUEtiapine (SEROquel) 25 mg tablet, Take 25 mg by mouth every evening, Disp: , Rfl:   •  clopidogrel (PLAVIX) 75 mg tablet, Take 1 tablet (75 mg total) by mouth daily for 7 days, Disp: 7 tablet, Rfl: 0    ALLERGIES:  Allergies   Allergen Reactions   • Sertraline Other (See Comments)     Hand swelling, itching       REVIEW OF SYSTEMS:  MSK: left knee pain   Neuro: WNL   Pertinent items are otherwise noted in HPI  A comprehensive review of systems was otherwise negative  LABS:  HgA1c:   Lab Results   Component Value Date    HGBA1C 6 4 (H) 02/05/2023     BMP:   Lab Results   Component Value Date    GLUCOSE 130 09/08/2014    CALCIUM 9 1 02/16/2023     (L) 09/08/2014    K 3 6 02/16/2023    CO2 28 02/16/2023     02/16/2023    BUN 19 02/16/2023    CREATININE 0 78 02/16/2023     CBC: No components found for: CBC    _____________________________________________________  PHYSICAL EXAMINATION:  Vital signs: /70   Pulse 69   Ht 5' (1 524 m)   Wt 85 8 kg (189 lb 3 2 oz)   BMI 36 95 kg/m²   General: No acute distress, awake and alert  Psychiatric: Mood and affect appear appropriate  HEENT: Trachea Midline, No torticollis, no apparent facial trauma  Cardiovascular: No audible murmurs; Extremities appear perfused  Pulmonary: No audible wheezing or stridor  Skin: No open lesions; see further details (if any) below      MUSCULOSKELETAL EXAMINATION:  Left knee examination:  - Patient sitting comfortably in the office in no acute distress   -Area of ecchymosis noted over the Pes anserine area with no erythema present  Extremity appears well-perfused overall   -Tenderness palpation noted over the Pes anserine bursa  No other bony or soft tissue tender palpation noted at this time  - 0-100 limited by pain  - NV intact      _____________________________________________________  STUDIES REVIEWED:  I personally reviewed the images and interpretation is as follows:  Left knee XR 3 views:  Mild -  Moderate OA noted medial compartment       PROCEDURES PERFORMED:  Large joint arthrocentesis: L knee  Universal Protocol:  Consent: Verbal consent obtained    Risks and benefits: risks, benefits and alternatives were discussed  Consent given by: patient  Patient understanding: patient states understanding of the procedure being performed  Site marked: the operative site was marked  Patient identity confirmed: verbally with patient    Supporting Documentation  Indications: pain   Procedure Details  Location: knee - L knee  Preparation: Patient was prepped and draped in the usual sterile fashion  Needle size: 22 G  Ultrasound guidance: no  Approach: anterolateral  Medications administered: 2 mL bupivacaine 0 25 %; 12 mg betamethasone acetate-betamethasone sodium phosphate 6 (3-3) mg/mL    Patient tolerance: patient tolerated the procedure well with no immediate complications  Dressing:  Sterile dressing applied              Bernadette Cui PA-C - assisting  Cori Vega              Portions of the record may have been created with voice recognition software  Occasional wrong word or "sound a like" substitutions may have occurred due to the inherent limitations of voice recognition software  Read the chart carefully and recognize, using context, where substitutions have occurred

## 2023-03-09 RX ORDER — BETAMETHASONE SODIUM PHOSPHATE AND BETAMETHASONE ACETATE 3; 3 MG/ML; MG/ML
12 INJECTION, SUSPENSION INTRA-ARTICULAR; INTRALESIONAL; INTRAMUSCULAR; SOFT TISSUE
Status: COMPLETED | OUTPATIENT
Start: 2023-03-08 | End: 2023-03-08

## 2023-03-09 RX ORDER — BUPIVACAINE HYDROCHLORIDE 2.5 MG/ML
2 INJECTION, SOLUTION INFILTRATION; PERINEURAL
Status: COMPLETED | OUTPATIENT
Start: 2023-03-08 | End: 2023-03-08

## 2023-03-09 NOTE — PROGRESS NOTES
Daily Speech Treatment Note    Today's date: 3/13/2023  Patient’s name: Darci Rico  : 1965  MRN: [id number]  Safety measures: CVA  Referring provider: Rochelle Garcia MD    Encounter Diagnosis     ICD-10-CM    1  Dysarthria  R47 1       2  Other symbolic dysfunctions  Z76 2       3  Cerebrovascular accident (CVA), unspecified mechanism ([facility] [address] )  I63 9            Visit tracking:  -Referring provider: Epic  -Billing guidelines: AMA  -Visit #  -Insurance: [facility]  -RE due 23    Subjective/Behavioral:  - Patient was pleasant and participated in all therapy activities  - Patient reported yesterday she was stiff today  Objective/Assessment:    Short-term goals:  Patient will perform oral motor exercises using IOPI device on  lingual and labial muscles to facilitate improved strength and function for increased speech intelligibility, to be achieved in 4-6 weeks      IOPI PEAK MEASUREMENT WEEKLY GRID       03/10/23 03/13/23       Tongue tip  (goal = 63) 52 42       Tongue back  (goal = 55) 37 46       Right Lip  (goal = 35) 22 20       Left Lip  (goal = 35) 18 23          IOPI -- Exercise Targets    Tongue tip Peak Max after 3 trials: 42 Target for treatment: 26 (60% effort)   Tongue back Peak Max after 3 trials: 46 Target for treatment: 27 (60% effort)   Right Lip Peak Max after 3 trials: 20 Target for treatment: 12 (60% effort)   Left Lip Peak Max after 3 trials: 23 Target for treatment: 14 (60% effort)        PEAK MAX 3/1/23 3/3/23 03/06/23 03/07/23 03/10/23  03/13/23   Tongue tip  3 sets 30 3 sets 30 3 sets of 30 (held for 3 sec on last set) 3 sets of 30 (held for 3 sec on last set) 3 sets of 30 - patient reports harder (due to increased effort %)  3 sets of 30   Tongue back 3 sets 30 3 sets 30 3 sets of 30 (held for 3 sec on last set) 3 sets of 30 (held for 3 sec on last set) 3 sets of 30   3 sets of 30   Right Lip 1 set of 30 + 13 due to IOPI device malfunction 3 sets 30 (patient switched to left lip after 1 set due to fatigue) 3 sets of 30 (held for 3 sec on last set) 3 sets of 30 (held for 3 sec on last set) 3 sets of 30  3 sets of 30 (held for 3 sec on last set)   Left Lip 3 sets 30 3 sets 30 3 sets of 30  (held for 3 sec on last set) 3 sets of 30 (held for 3 sec on last set) 3 sets of 30  3 sets of 30 (held for 3 sec on last set)       Patient will perform oral motor and diadochokinetic speech rate exercises with > 90% accuracy to facilitate increased speech intelligibility, to be achieved in 4-6 weeks  Patient will utilize compensatory strategies (I e , over-articulation, decreased rate, etc) 80% of the time while orally reading sentence/paragraph length material to facilitate increased speech intelligibility, to be achieved in 4-6 weeks  Patient will complete oral reading and conversational tasks with the consistent use of compensatory strategies >90% of the time to facilitate increased speech intelligibility, to be achieved in 4-6 weeks  Patient and caregiver will be educated on speech intelligibility strategies (e g , over-exaggeration, slow rate, breath groups,, etc ) to promote increased communication success (to be achieved in 2-3 weeks)    Patient was reminded to use intelligibility strategies (slow rate, over-exaggeration, breathing) before reading paragraphs  Provided demonstration of education of intelligibility strategies with min cueing  Reviewed the intelligibility strategies initially missed  Patient will utilize speech intelligibility strategies 80% of the time with min verbal cues while reading words, sentences, and paragraph length material to facilitate increased generalization of skills into conversational speech (to be achieved in 4-6 weeks)       COGNITIVE-LINGUISTIC:  Patient will complete auditory immediate and short-term memory tasks with 80% accuracy to facilitate increased ability to retell narratives and recall information within functional living environment (to be achieved in 4-6 weeks)  Patient will be educated on word finding strategies (i e , circumlocution) for improved generative naming and verbal expression skills (to be achieved in 1-2 weeks)  To target word-finding strategies- Patient played "Heads up" where patient was given card of an object and had to describe it without saying saying the word  Patient independently completed task approximately 4/10 opp (40% acc ), increasing to 7/10 opp (70% acc) with max cueing  Patient benefited from demonstration of tasks, repetition of directions, and verbal models  Patient showed improvement when directions were comprehended  Patient will complete word generation tasks (e g , verbal fluency, categorization, analogies, synonyms/antonyms, , etc ) with 80% accuracy using word finding strategies to facilitate improved word retrieval skills (to be achieved in 4-6 weeks)  Plan:  -Patient was provided with home exercises/activities to target goals in plan of care at the end of today's session   -Continue with current plan of care

## 2023-03-10 ENCOUNTER — OFFICE VISIT (OUTPATIENT)
Dept: PHYSICAL THERAPY | Facility: CLINIC | Age: 58
End: 2023-03-10

## 2023-03-10 ENCOUNTER — OFFICE VISIT (OUTPATIENT)
Dept: OCCUPATIONAL THERAPY | Facility: CLINIC | Age: 58
End: 2023-03-10

## 2023-03-10 ENCOUNTER — OFFICE VISIT (OUTPATIENT)
Dept: SPEECH THERAPY | Facility: CLINIC | Age: 58
End: 2023-03-10

## 2023-03-10 DIAGNOSIS — M54.42 ACUTE BILATERAL LOW BACK PAIN WITH LEFT-SIDED SCIATICA: ICD-10-CM

## 2023-03-10 DIAGNOSIS — I63.9 CEREBROVASCULAR ACCIDENT (CVA), UNSPECIFIED MECHANISM (HCC): Primary | ICD-10-CM

## 2023-03-10 DIAGNOSIS — R47.1 DYSARTHRIA: Primary | ICD-10-CM

## 2023-03-10 DIAGNOSIS — I63.9 CEREBROVASCULAR ACCIDENT (CVA), UNSPECIFIED MECHANISM (HCC): ICD-10-CM

## 2023-03-10 DIAGNOSIS — Z74.09 IMPAIRED MOBILITY AND ACTIVITIES OF DAILY LIVING: ICD-10-CM

## 2023-03-10 DIAGNOSIS — R48.8 OTHER SYMBOLIC DYSFUNCTIONS: ICD-10-CM

## 2023-03-10 DIAGNOSIS — Z78.9 IMPAIRED MOBILITY AND ACTIVITIES OF DAILY LIVING: ICD-10-CM

## 2023-03-10 NOTE — PROGRESS NOTES
Daily Note     Today's date: 3/10/2023  Patient name: Marissa Estrella  : 1965  MRN: [id number]  Referring provider: Carol Steiner MD  Dx:   Encounter Diagnosis     ICD-10-CM    1  Cerebrovascular accident (CVA), unspecified mechanism ([facility] [address] )  I63 9       2  Impaired mobility and activities of daily living  Z74 09     Z78 9       3  Acute bilateral low back pain with left-sided sciatica  M54 42           Start Time: 1400  Stop Time: 1457  Total time in clinic (min): 57 minutes    Subjective: Reports that she is feelng a bit better with her knee  Received another injection in knee on 3/8 at last doctor's visit to accommodate L knee pain  Objective: See treatment diary below      Assessment: Tolerated treatment well  Patient able to participate more with decreased pain levels noted during today's session  Patient did well during today's session able to participate in agility drills with good ability to perform correctly with only minimal verbal cuing  Minor coordination deficits noted with 2 feet in exercise with agility ladder drill but overall did well  Had minor LOB evidenced during single leg balance activities promoted by [email or web address] pass activity - was able to self correct via stepping strategy  Patient educated on possibility of DOMS - patient verbalized good understanding of muscle soreness and ability to differentiate between possible pain  Patient demonstrated fatigue post treatment, exhibited good technique with therapeutic exercises and would benefit from continued PT  Continue high intensity activities as vitals and knee pain allows  Plan: Continue per plan of care  Progress treatment as tolerated         Precautions: fall risk    STG expiration: 3/21/23  LTG expiraton: 23      Manuals 2/24 2/27 3/1 3/3  03/06 3/7 3/10                                                         Neuro Re-Ed             hurdles Solo 3 laps 6" fwd/lat airex pads - 6" 4 laps fwd/lat ea airex pads 12" hurdles 4 laps fwd/lat ea   12"  Solo step    fwd reciprocally    x 4 laps    Lat x 4 laps   (no airex today ) (Low hurdles today)  Solo step    fwd reciprocally    x 4 laps Low hurdles fwd + lat reciprocal 5x laps ea 1/4 length      Ht/hn Solo tandem 4 laps tandem, solo step 4 laps tandem waling solo step 4 laps tandem SOLO 5x laps          E bkwds 4 laps solo step  carrying 10 lb bolster 4 laps solo step   carrying 10 lb bolster 4 laps solo step    90ft x 2       HKM  2 5# aw 4 laps solo step carrying 10# bolster 4 laps solo step Fwd/bwd amb 5x laps SOLO   Lat walking 5x laps SOLO   Fwd   Solo   90ft x 2 Fwd   Solo   90ft x 2 Fwd SOLO no AW  x3 1/2 length      Foam beams   Ht/hn lat stepping Ht H/V amb 5x laps ea  Solo     Tandem walking   20ft x 4 laps  HT/HN 2 laps ea    Side stepping   2 laps  HT/HN 2 laps ea        Tandem walking   20ft x 4 laps  HT/HN 2 laps ea    Side stepping   2 laps  HT/HN 4 laps ea  SOLO tandem walking 1/4 length 4x laps     HT/HN NBOS x4 1/4 length    Side steppng foam beams              Soccer pass x3 mins SLS promotion             Volleyball tap ball in air x3 mins     Ther Ex             treadmill 10 min 1 5 mph 10 min 1 5 mph  10 min 1 8 mph 8 min 1 5 mph   1 5 mph  x10 min        Step ups       For strength and muscular endurance    SOLO no UE   R only (due to left Knee pain)  No UE   Fwd 20x  Lat  20x   Strength and endurance  Up and over 6,6,8" steps 5x laps as quick as possible x2     NuStep       L2 x10min                                                                       Ther Activity             Stairs  5 reps staircase reciprocal       Agility ladder 5x laps 1 foot in, 2 foot in fwd + lat ea                   Gait Training                     Speed + Propulsion training   Hallway laps below baseline 29 seconds (7x laps hallway 100 feet ea)                  Modalities             Self care    education regarding DVT signs + sx, self-assessment for symptoms and proper response x8 mins

## 2023-03-10 NOTE — PROGRESS NOTES
Daily Speech Treatment Note    Today's date: 3/10/2023  Patient’s name: Lynn Moyer  : 1965  MRN: [id number]  Safety measures: CVA  Referring provider: Larisa Burroughs MD    Encounter Diagnosis     ICD-10-CM    1  Dysarthria  R47 1       2  Other symbolic dysfunctions  L18 8       3  Cerebrovascular accident (CVA), unspecified mechanism ([facility] [address] )  I63 9            Visit tracking:  -Referring provider: Epic  -Billing guidelines: AMA  -Visit #  -Insurance: [facility]  -RE due 23    Subjective/Behavioral:  - Patient was pleasant and participated in all therapy activities  - Patient reported that she has leg pain, which caused her to not sleep well last night  Objective/Assessment: Effort % increased today from 50% on previous trials to now 80%    Short-term goals:  Patient will perform oral motor exercises using IOPI device on  lingual and labial muscles to facilitate improved strength and function for increased speech intelligibility, to be achieved in 4-6 weeks      IOPI PEAK MEASUREMENT WEEKLY GRID      02/23/23 03/01/23 03/03/23  03/06/23 03/07/23   03/10/23   Tongue tip  (goal = 63) 16 7 51  51  51  52   Tongue back  (goal = 55) 17 6 39 45  46  42  37   Right Lip  (goal = 35) 11 9 22 18 17 22   Left Lip  (goal = 35) 19 3 19 27 34 27 18     IOPI -- Exercise Targets    Tongue tip Peak Max after 3 trials: 52 Effort 80% Target for treatment: 32   Tongue back Peak Max after 3 trials: 37 Effort 80% Target for treatment: 30   Right Lip Peak Max after 3 trials: 22 Effort 80% Target for treatment: 18   Left Lip Peak Max after 3 trials: 18 Effort 80% Target for treatment: 14         PEAK MAX 3/1/23 3/3/23 03/06/23 03/07/23 03/10/23    Tongue tip  3 sets 30 3 sets 30 3 sets of 30 (held for 3 sec on last set) 3 sets of 30 (held for 3 sec on last set) 3 sets of 30 - patient reports harder (due to increased effort %)    Tongue back 3 sets 30 3 sets 30 3 sets of 30 (held for 3 sec on last set) 3 sets of 30 (held for 3 sec on last set) 3 sets of 30     Right Lip 1 set of 30 + 13 due to IOPI device malfunction 3 sets 30 (patient switched to left lip after 1 set due to fatigue) 3 sets of 30 (held for 3 sec on last set) 3 sets of 30 (held for 3 sec on last set) 3 sets of 30    Left Lip 3 sets 30 3 sets 30 3 sets of 30  (held for 3 sec on last set) 3 sets of 30 (held for 3 sec on last set) 3 sets of 30          Patient will perform oral motor and diadochokinetic speech rate exercises with > 90% accuracy to facilitate increased speech intelligibility, to be achieved in 4-6 weeks  Patient will utilize compensatory strategies (I e , over-articulation, decreased rate, etc) 80% of the time while orally reading sentence/paragraph length material to facilitate increased speech intelligibility, to be achieved in 4-6 weeks  Patient will complete oral reading and conversational tasks with the consistent use of compensatory strategies >90% of the time to facilitate increased speech intelligibility, to be achieved in 4-6 weeks  Patient and caregiver will be educated on speech intelligibility strategies (e g , over-exaggeration, slow rate, breath groups,, etc ) to promote increased communication success (to be achieved in 2-3 weeks)    Patient was reminded to use intelligibility strategies (slow rate, over-exaggeration, breathing) before reading paragraphs  Patient will utilize speech intelligibility strategies 80% of the time with min verbal cues while reading words, sentences, and paragraph length material to facilitate increased generalization of skills into conversational speech (to be achieved in 4-6 weeks)  COGNITIVE-LINGUISTIC:  Patient will complete auditory immediate and short-term memory tasks with 80% accuracy to facilitate increased ability to retell narratives and recall information within functional living environment (to be achieved in 4-6 weeks)       Patient will be educated on word finding strategies (i e , circumlocution) for improved generative naming and verbal expression skills (to be achieved in 1-2 weeks)  Patient will complete word generation tasks (e g , verbal fluency, categorization, analogies, synonyms/antonyms, , etc ) with 80% accuracy using word finding strategies to facilitate improved word retrieval skills (to be achieved in 4-6 weeks)  Plan:  -Patient was provided with home exercises/activities to target goals in plan of care at the end of today's session   -Continue with current plan of care

## 2023-03-10 NOTE — PROGRESS NOTES
Daily Speech Treatment Note    Today's date: 3/14/2023  Patient’s name: Enrique Watson  : 1965  MRN: [id number]  Safety measures: CVA  Referring provider: Alanis Ellis MD    Encounter Diagnosis     ICD-10-CM    1  Other symbolic dysfunctions  K24 2       2  Dysarthria  R47 1       3  Cerebrovascular accident (CVA), unspecified mechanism ([facility] [address] )  I63 9            Visit tracking:  -Referring provider: Epic  -Billing guidelines: AMA  -Visit #  -Insurance: [facility]  -RE due 23    Subjective/Behavioral:  - Patient reports that she is having pain in her knees  She feels that when she wakes up, her mouth feels stiff  In the evening she gets slower (speech-wise) as well  She is speaking well throughout the day, otherwise  Objective/Assessment:    - Patient spoke with 100% intelligibility throughout the therapy session  Demonstrated good use of memory strategies to increase ability to recall information  Short-term goals:  Patient will perform oral motor exercises using IOPI device on  lingual and labial muscles to facilitate improved strength and function for increased speech intelligibility, to be achieved in 4-6 weeks      IOPI PEAK MEASUREMENT WEEKLY GRID       03/10/23 03/13/23       Tongue tip  (goal = 63) 52 42       Tongue back  (goal = 55) 37 46       Right Lip  (goal = 35) 22 20       Left Lip  (goal = 35) 18 23          IOPI -- Exercise Targets    Tongue tip Peak Max after 3 trials: 42 Target for treatment: 26 (60% effort)   Tongue back Peak Max after 3 trials: 46 Target for treatment: 27 (60% effort)   Right Lip Peak Max after 3 trials: 20 Target for treatment: 12 (60% effort)   Left Lip Peak Max after 3 trials: 23 Target for treatment: 14 (60% effort)        PEAK MAX 3/1/23 3/3/23 03/06/23 03/07/23 03/10/23  03/13/23   Tongue tip  3 sets 30 3 sets 30 3 sets of 30 (held for 3 sec on last set) 3 sets of 30 (held for 3 sec on last set) 3 sets of 30 - patient reports harder (due to increased effort %)  3 sets of 30   Tongue back 3 sets 30 3 sets 30 3 sets of 30 (held for 3 sec on last set) 3 sets of 30 (held for 3 sec on last set) 3 sets of 30   3 sets of 30   Right Lip 1 set of 30 + 13 due to IOPI device malfunction 3 sets 30 (patient switched to left lip after 1 set due to fatigue) 3 sets of 30 (held for 3 sec on last set) 3 sets of 30 (held for 3 sec on last set) 3 sets of 30  3 sets of 30 (held for 3 sec on last set)   Left Lip 3 sets 30 3 sets 30 3 sets of 30  (held for 3 sec on last set) 3 sets of 30 (held for 3 sec on last set) 3 sets of 30  3 sets of 30 (held for 3 sec on last set)     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    Patient will perform oral motor and diadochokinetic speech rate exercises with > 90% accuracy to facilitate increased speech intelligibility, to be achieved in 4-6 weeks  Patient will utilize compensatory strategies (I e , over-articulation, decreased rate, etc) 80% of the time while orally reading sentence/paragraph length material to facilitate increased speech intelligibility, to be achieved in 4-6 weeks  Patient will complete oral reading and conversational tasks with the consistent use of compensatory strategies >90% of the time to facilitate increased speech intelligibility, to be achieved in 4-6 weeks  Patient and caregiver will be educated on speech intelligibility strategies (e g , over-exaggeration, slow rate, breath groups,, etc ) to promote increased communication success (to be achieved in 2-3 weeks)    Patient was reminded to use intelligibility strategies (slow rate, over-exaggeration, breathing) before reading paragraphs  Patient will utilize speech intelligibility strategies 80% of the time with min verbal cues while reading words, sentences, and paragraph length material to facilitate increased generalization of skills into conversational speech (to be achieved in 4-6 weeks)  COGNITIVE-LINGUISTIC:  Patient will complete auditory immediate and short-term memory tasks with 80% accuracy to facilitate increased ability to retell narratives and recall information within functional living environment (to be achieved in 4-6 weeks)  Patient was read a brief story (large paragraph) about [employer]  She was then asked a series of questions about what was just read  - Completed with 40% accuracy  Missed questions were reviewed and the paragraph was read (by the patient this time) a second time  She was again asked the same series of questions, answering with 100% accuracy (10 out of 10 questions correct)  Patient benefited from repetition as a strategy for recall  Strategies for memory recall and context clues were reviewed  Patient was shown a series of objects/animals on a page (4 to 8 items)  The patient named each object and created a story to include each item and the patient committed details to memory, repeating story twice  The picture was then taken away and questions were asked to test recall of information  Patient completed task with 100% accuracy, with rare min cueing (2 occasions)  Patient will be educated on word finding strategies (i e , circumlocution) for improved generative naming and verbal expression skills (to be achieved in 1-2 weeks)  Patient will complete word generation tasks (e g , verbal fluency, categorization, analogies, synonyms/antonyms, , etc ) with 80% accuracy using word finding strategies to facilitate improved word retrieval skills (to be achieved in 4-6 weeks)  To target higher level generative naming, patient completed Deljunea [place] activity/game using multiple word and letter restrictions (i e , word associated with sports, contains the letter T, is exactly 2 syllables)  Patient completed using 2 cards (red and yellow)/restrictions over 8 trials with 100% acc           Plan:  -Patient was provided with home exercises/activities to target goals in plan of care at the end of today's session   -Continue with current plan of care

## 2023-03-10 NOTE — PROGRESS NOTES
Occupational Therapy Daily Note:    Today's date: 3/10/2023  Patient name: Tiffany Bowser  : 1965  MRN: [id number]  Referring provider: Kyla Halsted, MD  Dx:   Encounter Diagnosis   Name Primary? • Cerebrovascular accident (CVA), unspecified mechanism ([facility] [address] ) Yes       Subjective: "When I can get my ponytail right in the middle of my head, I'll start cooking "    Objective: Pt engaged in skilled OT treatment session with focus on fxnl cognition, fxnl attention, UE NMR, visual perceptual training, visual scanning, UE strengthening, UE endurance, FMC/GMC and FMS/GMS to increase engagement, endurance, tolerance, and independence with daily ADL and IADL tasks  CPT Code Minutes                                           Task Details        Therapeutic Activity 10 Pt completed FOTO survey on IPad with use of weighted stylus to promote improve functional tool use, hand to target accuracy, fine motor control/coordination & intrinsic strength  Neuro Re-Ed 40 For benefit of neuro re-ed promoting proximal strength/stabilization, FMC/FMC w/fxl tool use, /VS, ocular control, saccades and ataxia reduction donning 1 5# CW, pt in stance while utilizing long handled tweezers to retrieve small pegs and place into pegboard positioned on vertical surface  Template positioned under board to improve near to far accommodation  Droppage x2  Therapeutic Exercise 10 For UB exercise benefit and to increase overall cardiovascular health, proximal strength, gross grasp and endurance, pt engaged in [address] for 5 min prograde/5 min retrograde increasing resistance to L2 0                 Manual          Self Care       Assessment: Tolerated treatment well  Pt demo good progress with functional tool use and hand to target accuracy, with minimal droppage noted with peg placement  Patient would benefit from continued skilled OT      Plan: Continued skilled OT per POC    INTERVENTION COMMENTS:  Diagnosis: Cerebrovascular accident (CVA), unspecified mechanism ([facility] [address] ) [I63 9]  Precautions: fall risk, cog deficits  FOTO: will complete  Insurance: Payor: Bbii Gonzalez / Plan: Bibi Gonzalez / Product Type: Medicaid HMO /   7 of 10 visits, PN due 3/23/23  POC due 5/23/231

## 2023-03-13 ENCOUNTER — OFFICE VISIT (OUTPATIENT)
Dept: OCCUPATIONAL THERAPY | Facility: CLINIC | Age: 58
End: 2023-03-13

## 2023-03-13 ENCOUNTER — OFFICE VISIT (OUTPATIENT)
Dept: PHYSICAL THERAPY | Facility: CLINIC | Age: 58
End: 2023-03-13

## 2023-03-13 ENCOUNTER — OFFICE VISIT (OUTPATIENT)
Dept: SPEECH THERAPY | Facility: CLINIC | Age: 58
End: 2023-03-13

## 2023-03-13 DIAGNOSIS — R48.8 OTHER SYMBOLIC DYSFUNCTIONS: ICD-10-CM

## 2023-03-13 DIAGNOSIS — I63.9 CEREBROVASCULAR ACCIDENT (CVA), UNSPECIFIED MECHANISM (HCC): Primary | ICD-10-CM

## 2023-03-13 DIAGNOSIS — Z74.09 IMPAIRED MOBILITY AND ACTIVITIES OF DAILY LIVING: ICD-10-CM

## 2023-03-13 DIAGNOSIS — I63.9 CEREBROVASCULAR ACCIDENT (CVA), UNSPECIFIED MECHANISM (HCC): ICD-10-CM

## 2023-03-13 DIAGNOSIS — Z78.9 IMPAIRED MOBILITY AND ACTIVITIES OF DAILY LIVING: ICD-10-CM

## 2023-03-13 DIAGNOSIS — R47.1 DYSARTHRIA: Primary | ICD-10-CM

## 2023-03-13 NOTE — PROGRESS NOTES
Occupational Therapy Daily Note:    Today's date: 3/13/2023  Patient name: Zuri Teixeira  : 1965  MRN: [id number]  Referring provider: Jignesh Fatima MD  Dx:   Encounter Diagnosis   Name Primary? • Cerebrovascular accident (CVA), unspecified mechanism ([facility] [address] ) Yes       Subjective: "I can't reach from here, my arms are too short "    Objective: Pt engaged in skilled OT treatment session with focus on fxnl cognition, fxnl attention, UE NMR, visual perceptual training, visual scanning, UE strengthening, UE endurance, FMC/GMC and FMS/GMS to increase engagement, endurance, tolerance, and independence with daily ADL and IADL tasks  CPT Code Minutes                                           Task Details        Therapeutic Activity 10 With 2# weightcuff donned to distal UE, pt twisted large screw from bottom to top of long screw board to promote fine motor coordination, sustained attention, digit isolation and intrinsic strength  Neuro Re-Ed 40 For benefit of neuro re-ed promoting proximal strength/stabilization, FMC/FMC w/fxl tool use, /VS, ocular control, functional cognition, saccades, accommodation and ataxia reduction donning 2 0# weight cuff, pt identified correct word in word [place], then manipulated small foam blocks to spell word  With use of green resistive clip, pt picked up cubes and placed on elevated surface  No droppage noted  Therapeutic Exercise 10 For UB exercise benefit and to increase overall cardiovascular health, proximal strength, gross grasp and endurance, pt engaged in [address] for 5 min prograde/5 min retrograde increasing resistance to L2 0                 Manual          Self Care       Assessment: Tolerated treatment well  No ataxia noted with end range reaching  Pt demo fatigue with sustained intrinsic strengthening, however pt able to stack two levels of blocks with no droppage noted  Patient would benefit from continued skilled OT      Plan: Continued skilled OT per POC    INTERVENTION COMMENTS:  Diagnosis: Cerebrovascular accident (CVA), unspecified mechanism ([facility] [address] ) [I63 9]  Precautions: fall risk, cog deficits  FOTO: will complete  Insurance: Payor: Leesa Field / Plan: Leesa Field / Product Type: Medicaid HMO /   7 of 10 visits, PN due 3/23/23  POC due 5/23/231

## 2023-03-14 ENCOUNTER — OFFICE VISIT (OUTPATIENT)
Dept: OCCUPATIONAL THERAPY | Facility: CLINIC | Age: 58
End: 2023-03-14

## 2023-03-14 ENCOUNTER — OFFICE VISIT (OUTPATIENT)
Dept: SPEECH THERAPY | Facility: CLINIC | Age: 58
End: 2023-03-14

## 2023-03-14 ENCOUNTER — OFFICE VISIT (OUTPATIENT)
Dept: PHYSICAL THERAPY | Facility: CLINIC | Age: 58
End: 2023-03-14

## 2023-03-14 DIAGNOSIS — I63.9 CEREBROVASCULAR ACCIDENT (CVA), UNSPECIFIED MECHANISM (HCC): ICD-10-CM

## 2023-03-14 DIAGNOSIS — Z78.9 IMPAIRED MOBILITY AND ACTIVITIES OF DAILY LIVING: Primary | ICD-10-CM

## 2023-03-14 DIAGNOSIS — M54.42 ACUTE BILATERAL LOW BACK PAIN WITH LEFT-SIDED SCIATICA: ICD-10-CM

## 2023-03-14 DIAGNOSIS — I63.9 CEREBROVASCULAR ACCIDENT (CVA), UNSPECIFIED MECHANISM (HCC): Primary | ICD-10-CM

## 2023-03-14 DIAGNOSIS — R47.1 DYSARTHRIA: ICD-10-CM

## 2023-03-14 DIAGNOSIS — Z74.09 IMPAIRED MOBILITY AND ACTIVITIES OF DAILY LIVING: Primary | ICD-10-CM

## 2023-03-14 DIAGNOSIS — R48.8 OTHER SYMBOLIC DYSFUNCTIONS: Primary | ICD-10-CM

## 2023-03-14 NOTE — PROGRESS NOTES
Occupational Therapy Daily Note:    Today's date: 3/14/2023  Patient name: Lynn Moyer  : 1965  MRN: [id number]  Referring provider: Larisa Burroughs MD  Dx:   Encounter Diagnosis   Name Primary? • Cerebrovascular accident (CVA), unspecified mechanism ([facility] ) Yes       Subjective: "I tried to put on my make up, this hand goes where it wants"  Objective: Pt engaged in skilled OT treatment session with focus on UE NMR, visual perceptual training, UE strengthening, UE endurance, FMC/GMC, FMS/GMS, body awareness and midline awareness to increase engagement, endurance, tolerance, and independence with daily ADL and IADL tasks  CPT Code Minutes                                           Task Details        Therapeutic Activity 10 Pt engaged in fm activity of cutting out template using standard scissors demo-ability to remain on lines during activity  Neuro Re-Ed 40 For benefit of neuro re-ed promoting proximal strength/stability, FMC/FMS w/targeted demands, body/midline awareness, ataxia reduction with proprioceptive input donning 2# CW improving precision and accuracy during FM engagement to apply makeup  In unsupported stance pt engaged in painting activity  Pt required to trace holiday shamrock followed by painting in window using standard paintbrush paint on vertical surface for makeup application simulation  Pt accurate to stay within lines about 75% of time donning CW        Therapeutic Exercise 10 For UB exercise benefit and to increase overall cardiovascular health, proximal strength, gross grasp and endurance, pt engaged in [address] for 5 min prograde/5 min retrograde, resistance 2 0  Manual          Self Care       3/14:  [id number]:9  [id number]  [id number] self directed   [id number]:1    Assessment: Tolerated treatment well  Pt demo-ability to stabilize tripod grasp on paint brush w/fluidity and accuracy to remain with/on lines about 75% of time  during painting activity and Patient would benefit from continued skilled OT      Plan: Continued skilled OT per POC    INTERVENTION COMMENTS:  Diagnosis: Cerebrovascular accident (CVA), unspecified mechanism ([facility] [address] ) [I63 9]  Precautions: fall risk, cog deficits  FOTO: will complete  Insurance: Payor: [address] [place] [address] / Plan: [address] [place] [address] / Product Type: Medicaid HMO /   8 of 10 visits, PN due 3/23/23  POC due 5/23/231

## 2023-03-15 ENCOUNTER — EVALUATION (OUTPATIENT)
Dept: PHYSICAL THERAPY | Facility: REHABILITATION | Age: 58
End: 2023-03-15

## 2023-03-15 DIAGNOSIS — M17.12 PRIMARY OSTEOARTHRITIS OF LEFT KNEE: Primary | ICD-10-CM

## 2023-03-15 NOTE — PROGRESS NOTES
PT Evaluation     Today's date: 3/15/2023  Patient name: Jamal Angel  : 1965  MRN: [id number]  Referring provider: Joey Brady MD  Dx:   Encounter Diagnosis     ICD-10-CM    1  Primary osteoarthritis of left knee  M17 12           Start Time: 0850  Stop Time: 09  Total time in clinic (min): 40 minutes    Assessment  Assessment details:   Problem List:  1) Acute Left Knee Pain    Jamal Angel is a pleasant 62 y o  female who presents with acute left knee pain  Marlys demonstrates antalgic gait, knee strength deficits due to pain, and tenderness with palpation of medial thigh musculature resulting in fear of not being able to keep active and future ill health (and wanting to prevent it)  No further referral appears necessary at this time based upon examination results  I expect she will improve in 4-6 weeks  Positive prognostic indicators include positive attitude toward recovery, good understanding of diagnosis and treatment plan options, acuity of symptoms and absence of peripheralization  Negative prognostic indicators include anxiety, depression, hypertension and obesity  Impairments: abnormal gait, abnormal muscle firing, abnormal muscle tone, activity intolerance, impaired balance, impaired physical strength, lacks appropriate home exercise program, pain with function and weight-bearing intolerance    Symptom irritability: moderateUnderstanding of Dx/Px/POC: good   Prognosis: good    Goals  Short Term Goals (Week 4):  1  Decreased pain by 50%  2  Demonstrate 50% improvements in soft-tissue restrictions in medial thigh  3  Improve strength by 1/2 measure      Long Term Goals (8 weeks):  1  Ambulate long community distances <2/10 pain in her medial knee  2  Patient will exceed FOTO predicted outcome score  3  Patient will be fully independent with HEP by discharge  4  Patient will be able to manage symptoms independently         Plan  Patient would benefit from: skilled physical therapy  Planned modality interventions: low level laser therapy  Planned therapy interventions: manual therapy, massage, activity modification, behavior modification, neuromuscular re-education, patient education, strengthening, stretching, therapeutic activities, therapeutic exercise, graded activity, functional ROM exercises, flexibility and home exercise program  Frequency: 1x week  Duration in visits: 6  Duration in weeks: 6  Treatment plan discussed with: patient        Subjective Evaluation    History of Present Illness  Mechanism of injury: Per ortho note 3/8/23:  "Art Bee is a 62 y o  female who presents to the office for an initial evaluation for left knee  She sustained a stroke on 23  A week after the stroke she began to develop pain in the left knee  Patient was seen and evaluated on 3/2/2023 where she was recommend cortisone to the pes anserine bursa which did provide relief for a few days  Patient was referred to physical therapy soon after but states that pain was exacerbated with physical activity  patient was seen and evaluated by her primary care doctor who ordered x-rays soon after and was referred to orthopedics for further evaluation  Patient states that her pain is predominantly medial aspect of the knee and becomes worse with physical activity and weightbearing  It is prior to her onset of pain  Patient states that she has left foot surgery requiring her to weight-bear with a cane on the affected side  Patient states that she has been using an ointment for the left knee but is unsure of hte name    Patient offers no other complaints at this"  Pain  Current pain ratin  At worst pain ratin  Quality: dull ache and sharp      Diagnostic Tests  X-ray: abnormal  Patient Goals  Patient goals for therapy: decreased pain, increased strength, independence with ADLs/IADLs and return to sport/leisure activities  Patient goal: reduce pain        Objective  Range of Motion  Right Knee:  0-130    Left Knee:  0-130 pain during movement      Myotomes/Strength Testing  Right Knee:   Ext (5/5), Flex (5/5)    Left Knee:  Ext (3/5), Flex (3/5) start of session  Ext (4/5), Flex (4/5) end of session    Palpation  Significant tenderness and increased tissue texture density along medial hamstring and adductor musculature with reproduction of medial knee symptoms          Precautions: standard, hx of CVA, cardiac, falls      Manuals 3/15            STR adductors and medial hamstring PRR                                      Patient education  15'            Neuro Re-Ed                                                                                                        Ther Ex             Hip Abduction Stretch Supine BKFO HEP                                                                                                       Ther Activity                                       Gait Training                                       Modalities

## 2023-03-16 NOTE — PROGRESS NOTES
Daily Speech Treatment Note    Today's date: 3/17/2023  Patient’s name: Kelvin Brooks  : 1965  MRN: [id number]  Safety measures: CVA  Referring provider: Jraon Sahu MD    Encounter Diagnosis     ICD-10-CM    1  Other symbolic dysfunctions  Z34 7       2  Dysarthria  R47 1       3  Cerebrovascular accident (CVA), unspecified mechanism ([facility] [address] )  I63 9            Visit tracking:  -Referring provider: Epic  -Billing guidelines: AMA  -Visit #10/24  -Insurance: [facility]  -RE due 23    Subjective/Behavioral:  Patient was in good spirits today  Objective/Assessment:    Short-term goals:  Patient will perform oral motor exercises using IOPI device on  lingual and labial muscles to facilitate improved strength and function for increased speech intelligibility, to be achieved in 4-6 weeks      IOPI PEAK MEASUREMENT WEEKLY GRID       03/10/23 03/13/23 3/17/23      Tongue tip  (goal = 63) 52 42 45      Tongue back  (goal = 55) 37 46 44      Right Lip  (goal = 35) 22 20 20      Left Lip  (goal = 35) 18 23 25         IOPI -- Exercise Targets    Tongue tip Peak Max after 3 trials: 45 Target for treatment: 26 (60% effort)   Tongue back Peak Max after 3 trials: 44 Target for treatment: 26 (60% effort)   Right Lip Peak Max after 3 trials: 20 Target for treatment: 12 (60% effort)   Left Lip Peak Max after 3 trials: 25 Target for treatment: 16 (60% effort)        PEAK MAX 3/1/23 3/3/23 03/06/23 03/07/23 03/10/23  03/13/23 3/17/23   Tongue tip  3 sets 30 3 sets 30 3 sets of 30 (held for 3 sec on last set) 3 sets of 30 (held for 3 sec on last set) 3 sets of 30 - patient reports harder (due to increased effort %)  3 sets of 30 3 sets of 30 (held for 3 sec on last set)   Tongue back 3 sets 30 3 sets 30 3 sets of 30 (held for 3 sec on last set) 3 sets of 30 (held for 3 sec on last set) 3 sets of 30   3 sets of 30 3 sets of 30 (held for 3 sec on last set)   Right Lip 1 set of 30 + 13 due to IOPI device malfunction 3 sets 30 (patient switched to left lip after 1 set due to fatigue) 3 sets of 30 (held for 3 sec on last set) 3 sets of 30 (held for 3 sec on last set) 3 sets of 30  3 sets of 30 (held for 3 sec on last set) 3 sets of 30 - patient reports harder (due to increased effort %)   Left Lip 3 sets 30 3 sets 30 3 sets of 30  (held for 3 sec on last set) 3 sets of 30 (held for 3 sec on last set) 3 sets of 30  3 sets of 30 (held for 3 sec on last set)      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    Patient will perform oral motor and diadochokinetic speech rate exercises with > 90% accuracy to facilitate increased speech intelligibility, to be achieved in 4-6 weeks  Patient will utilize compensatory strategies (I e , over-articulation, decreased rate, etc) 80% of the time while orally reading sentence/paragraph length material to facilitate increased speech intelligibility, to be achieved in 4-6 weeks  Patient will complete oral reading and conversational tasks with the consistent use of compensatory strategies >90% of the time to facilitate increased speech intelligibility, to be achieved in 4-6 weeks  Patient and caregiver will be educated on speech intelligibility strategies (e g , over-exaggeration, slow rate, breath groups,, etc ) to promote increased communication success (to be achieved in 2-3 weeks)  Patient was reminded to use intelligibility strategies (slow rate, over-exaggeration, breathing) before reading paragraphs  Patient will utilize speech intelligibility strategies 80% of the time with min verbal cues while reading words, sentences, and paragraph length material to facilitate increased generalization of skills into conversational speech (to be achieved in 4-6 weeks)  Patient reported that she is practicing breathing exercises and it is improving her speech intelligibility      COGNITIVE-LINGUISTIC:  Patient will complete auditory immediate and short-term memory tasks with 80% accuracy to facilitate increased ability to retell narratives and recall information within functional living environment (to be achieved in 4-6 weeks)  Patient will be educated on word finding strategies (i e , circumlocution) for improved generative naming and verbal expression skills (to be achieved in 1-2 weeks)  Patient will complete word generation tasks (e g , verbal fluency, categorization, analogies, synonyms/antonyms, , etc ) with 80% accuracy using word finding strategies to facilitate improved word retrieval skills (to be achieved in 4-6 weeks)  To target word retrieval, patient completed analogies (ex: An astronaut flies a spacecraft, a  flies a ___)  Patient completed analogies w 81% acc w min verbal prompts and min phonemic cues  Patient stated synonyms of a provided word w 80% acc w min verbal prompts and min phonemic cues  Plan:  -Patient was provided with home exercises/activities to target goals in plan of care at the end of today's session   -Continue with current plan of care

## 2023-03-16 NOTE — PROGRESS NOTES
Daily Speech Treatment Note    Today's date: 3/20/2023  Patient’s name: Merry Umanzor  : 1965  MRN: [id number]  Safety measures: CVA  Referring provider: Carline Mathew MD    Encounter Diagnosis     ICD-10-CM    1  Other symbolic dysfunctions  Y52 1       2  Dysarthria  R47 1            Visit tracking:  -Referring provider: Epic  -Billing guidelines: AMA  -Visit #  -Insurance: [facility]  -RE due 23    Subjective/Behavioral:  - Patient reports no new concerns for the session  Objective/Assessment:    Short-term goals:  Patient will perform oral motor exercises using IOPI device on  lingual and labial muscles to facilitate improved strength and function for increased speech intelligibility, to be achieved in 4-6 weeks      IOPI PEAK MEASUREMENT WEEKLY GRID       03/10/23 03/13/23 3/17/23 2023        Tongue tip  (goal = 63) 52 42 45 53        Tongue back  (goal = 55) 37 46 44  42       Right Lip  (goal = 35) 22 20 20 18        Left Lip  (goal = 35) 18 23 25 26             IOPI -- Exercise Targets    Tongue tip Peak Max after 3 trials: 53 Target for treatment: 35 (65% effort)   Tongue back Peak Max after 3 trials: 42 Target for treatment: 27 (65% effort)   Right Lip Peak Max after 3 trials: 18 Target for treatment: 12  (65% effort)   Left Lip Peak Max after 3 trials: 26 Target for treatment: 17  (65% effort)       PEAK MAX 3/17/23 3/20/23        Tongue tip  3 sets 30 (held for 3 sec on last set) 3 sets 30 (held for 3 sec on last set)        Tongue back 3 sets 30 (held for 3 sec on last set) 3 sets 30 (held for 3 sec on last set)        Right Lip 3 set of 30- patient reports hard (due to increased effort %) 3 sets 30 (held for 3 sec on last set)        Left Lip 3 sets 30 (held for 3 sec on last set) 3 sets 30 (held for 3 sec on last set)             Patient will perform oral motor and diadochokinetic speech rate exercises with > 90% accuracy to facilitate increased speech intelligibility, to be achieved in 4-6 weeks  Patient will utilize compensatory strategies (I e , over-articulation, decreased rate, etc) 80% of the time while orally reading sentence/paragraph length material to facilitate increased speech intelligibility, to be achieved in 4-6 weeks  Patient will complete oral reading and conversational tasks with the consistent use of compensatory strategies >90% of the time to facilitate increased speech intelligibility, to be achieved in 4-6 weeks  Patient and caregiver will be educated on speech intelligibility strategies (e g , over-exaggeration, slow rate, breath groups,, etc ) to promote increased communication success (to be achieved in 2-3 weeks)    Patient was reminded to use intelligibility strategies (slow rate, over-exaggeration, breathing) before reading paragraphs  Patient will utilize speech intelligibility strategies 80% of the time with min verbal cues while reading words, sentences, and paragraph length material to facilitate increased generalization of skills into conversational speech (to be achieved in 4-6 weeks)  To target speech intelligibility- Patient was given 3 words and had to generate an intelligible sentence utilizing the 3 given words  Patient completed task in 9/10 opp (90% acc) independently, increasing to 100% acc with min cueing  Patient benefited from utilizing a slower rate of speech when sentence was unintelligible  COGNITIVE-LINGUISTIC:  Patient will complete auditory immediate and short-term memory tasks with 80% accuracy to facilitate increased ability to retell narratives and recall information within functional living environment (to be achieved in 4-6 weeks)  Patient will be educated on word finding strategies (i e , circumlocution) for improved generative naming and verbal expression skills (to be achieved in 1-2 weeks)       Patient will complete word generation tasks (e g , verbal fluency, categorization, analogies, synonyms/antonyms, , etc ) with 80% accuracy using word finding strategies to facilitate improved word retrieval skills (to be achieved in 4-6 weeks)  Plan:  -Patient was provided with home exercises/activities to target goals in plan of care at the end of today's session   -Continue with current plan of care

## 2023-03-17 ENCOUNTER — OFFICE VISIT (OUTPATIENT)
Dept: SPEECH THERAPY | Facility: CLINIC | Age: 58
End: 2023-03-17

## 2023-03-17 ENCOUNTER — APPOINTMENT (OUTPATIENT)
Dept: PHYSICAL THERAPY | Facility: CLINIC | Age: 58
End: 2023-03-17

## 2023-03-17 ENCOUNTER — OFFICE VISIT (OUTPATIENT)
Dept: OCCUPATIONAL THERAPY | Facility: CLINIC | Age: 58
End: 2023-03-17

## 2023-03-17 DIAGNOSIS — I63.9 CEREBROVASCULAR ACCIDENT (CVA), UNSPECIFIED MECHANISM (HCC): Primary | ICD-10-CM

## 2023-03-17 DIAGNOSIS — R47.1 DYSARTHRIA: ICD-10-CM

## 2023-03-17 DIAGNOSIS — R48.8 OTHER SYMBOLIC DYSFUNCTIONS: Primary | ICD-10-CM

## 2023-03-17 DIAGNOSIS — I63.9 CEREBROVASCULAR ACCIDENT (CVA), UNSPECIFIED MECHANISM (HCC): ICD-10-CM

## 2023-03-17 NOTE — PROGRESS NOTES
Daily Note     Today's date: 3/17/2023  Patient name: Shahzad Blanchard  : 1965  MRN: [id number]  Referring provider: Darlyn Johnson MD  Dx:   Encounter Diagnosis     ICD-10-CM    1  Cerebrovascular accident (CVA), unspecified mechanism ([facility] [address] )  I63 9       2  Impaired mobility and activities of daily living  Z74 09     Z78 9                      Subjective: Patient reports to physical therapy with r/o continued knee pain   Objective: See treatment diary below      Assessment: Pt had extreme pain with standing and walking this session  Discussed transferring to ortho specialist PT in order to have her knee evaluated and likely d/c from neuro PT  Pt in gareement  Will complete one more session  Plan: Continue per plan of care        Precautions: fall risk    STG expiration: 3/21/23  LTG expiraton: 23      Manuals 2/24 2/27 3/1 3/3  03/06 3/7 3/10 3/13                                                        Neuro Re-Ed             hurdles Solo 3 laps 6" fwd/lat airex pads - 6" 4 laps fwd/lat ea airex pads 12" hurdles 4 laps fwd/lat ea   12"  Solo step    fwd reciprocally    x 4 laps    Lat x 4 laps   (no airex today ) (Low hurdles today)  Solo step    fwd reciprocally    x 4 laps Low hurdles fwd + lat reciprocal 5x laps ea 1/4 length      Ht/hn Solo tandem 4 laps tandem, solo step 4 laps tandem waling solo step 4 laps tandem SOLO 5x laps          E bkwds 4 laps solo step  carrying 10 lb bolster 4 laps solo step   carrying 10 lb bolster 4 laps solo step    90ft x 2       HKM  2 5# aw 4 laps solo step carrying 10# bolster 4 laps solo step Fwd/bwd amb 5x laps SOLO   Lat walking 5x laps SOLO   Fwd   Solo   90ft x 2 Fwd   Solo   90ft x 2 Fwd SOLO no AW  x3 1/2 length      Foam beams   Ht/hn lat stepping Ht H/V amb 5x laps ea  Solo     Tandem walking   20ft x 4 laps  HT/HN 2 laps ea    Side stepping   2 laps  HT/HN 2 laps ea        Tandem walking   20ft x 4 laps  HT/HN 2 laps ea    Side stepping   2 laps  HT/HN 4 laps ea  SOLO tandem walking 1/4 length 4x laps     HT/HN NBOS x4 1/4 length    Side steppng foam beams              Soccer pass x3 mins SLS promotion             Volleyball tap ball in air x3 mins     Ther Ex             treadmill 10 min 1 5 mph 10 min 1 5 mph  10 min 1 8 mph 8 min 1 5 mph   1 5 mph  x10 min        Step ups       For strength and muscular endurance    SOLO no UE   R only (due to left Knee pain)  No UE   Fwd 20x  Lat  20x   Strength and endurance  Up and over 6,6,8" steps 5x laps as quick as possible x2     NuStep       L2 x10min  10 min L5    SLR         5xea    clamshell         10x2    bridge         10x    TKE         10x ea                 Ther Activity             Stairs  5 reps staircase reciprocal       Agility ladder 5x laps 1 foot in, 2 foot in fwd + lat ea                   Gait Training                     Speed + Propulsion training   Hallway laps below baseline 29 seconds (7x laps hallway 100 feet ea)                  Modalities             Self care    education regarding DVT signs + sx, self-assessment for symptoms and proper response x8 mins

## 2023-03-17 NOTE — PROGRESS NOTES
Daily Note     Today's date: 3/17/2023  Patient name: Nikolay Conception  : 1965  MRN: [id number]  Referring provider: Birgit Alejandre MD  Dx: No diagnosis found  Subjective: Patient reports growing knee pain  Continues to be very painful  Objective: See treatment diary below      Assessment: Attempted some supine exercises and completed bike to keep knee moving  Overall pt not able to tolerate most wieght bearing due to knee pain  Plan: Continue per plan of care        Precautions: fall risk    STG expiration: 3/21/23  LTG expiraton: 23      Manuals 2/24 2/27 3/1 3/3  03/06 3/7 3/10 3/14                                                        Neuro Re-Ed             hurdles Solo 3 laps 6" fwd/lat airex pads - 6" 4 laps fwd/lat ea airex pads 12" hurdles 4 laps fwd/lat ea   12"  Solo step    fwd reciprocally    x 4 laps    Lat x 4 laps   (no airex today ) (Low hurdles today)  Solo step    fwd reciprocally    x 4 laps Low hurdles fwd + lat reciprocal 5x laps ea 1/4 length      Ht/hn Solo tandem 4 laps tandem, solo step 4 laps tandem waling solo step 4 laps tandem SOLO 5x laps          E bkwds 4 laps solo step  carrying 10 lb bolster 4 laps solo step   carrying 10 lb bolster 4 laps solo step    90ft x 2       HKM  2 5# aw 4 laps solo step carrying 10# bolster 4 laps solo step Fwd/bwd amb 5x laps SOLO   Lat walking 5x laps SOLO   Fwd   Solo   90ft x 2 Fwd   Solo   90ft x 2 Fwd SOLO no AW  x3 1/2 length      Foam beams   Ht/hn lat stepping Ht H/V amb 5x laps ea  Solo     Tandem walking   20ft x 4 laps  HT/HN 2 laps ea    Side stepping   2 laps  HT/HN 2 laps ea        Tandem walking   20ft x 4 laps  HT/HN 2 laps ea    Side stepping   2 laps  HT/HN 4 laps ea  SOLO tandem walking 1/4 length 4x laps     HT/HN NBOS x4 1/4 length    Side steppng foam beams              Soccer pass x3 mins SLS promotion             Volleyball tap ball in air x3 mins     Ther Ex             treadmill 10 min 1 5 mph 10 min 1 5 mph  10 min 1 8 mph 8 min 1 5 mph   1 5 mph  x10 min        Step ups       For strength and muscular endurance    SOLO no UE   R only (due to left Knee pain)  No UE   Fwd 20x  Lat  20x   Strength and endurance  Up and over 6,6,8" steps 5x laps as quick as possible x2     NuStep       L2 x10min  L3 10 min    bridges         10x2    SLR         unable    clamshell         10x2 ea                               Ther Activity             Stairs  5 reps staircase reciprocal       Agility ladder 5x laps 1 foot in, 2 foot in fwd + lat ea                   Gait Training                     Speed + Propulsion training   Hallway laps below baseline 29 seconds (7x laps hallway 100 feet ea)                  Modalities             Self care    education regarding DVT signs + sx, self-assessment for symptoms and proper response x8 mins

## 2023-03-17 NOTE — PROGRESS NOTES
Occupational Therapy Daily Note:    Today's date: 3/17/2023  Patient name: Seth Farris  : 1965  MRN: [id number]  Referring provider: Martin Mckeon MD  Dx:   Encounter Diagnosis   Name Primary? • Cerebrovascular accident (CVA), unspecified mechanism ([facility] [address] ) Yes     Pt was seen and treated by KARLO Becerra under direct supervision of Olivia Diana, MOT, OTR/L  Subjective: "I put my make up on, and I did OK "    Objective: Pt engaged in skilled OT treatment session with focus on UE NMR, visual perceptual training, UE strengthening, UE endurance, FMC/GMC, FMS/GMS, body awareness and midline awareness to increase engagement, endurance, tolerance, and independence with daily ADL and IADL tasks  CPT Code Minutes                                           Task Details        Therapeutic Activity 10 To improve dual tasking, sustained attention, fine motor control/coordination, gross grasp  Pt retrieved and held hollow pegs with resistive tongs with black/red bands attached, walked ~20 feet distance to place into container  Neuro Re-Ed 40 For benefit of neuro re-ed promoting dual tasking, sustained attention, proximal strength/stability, FMC/FMS w/targeted demands, body/midline awareness, ataxia reduction, reaction time and speed with proprioceptive input donning 2# CW, pt completed balloon toss in air while retrieving hollow pegs from contralateral side of the table and placing into wooden template board then catching balloon  Following balloon catch, pt named places in forward alphabetical order  Therapeutic Exercise 10 For UB exercise benefit and to increase overall cardiovascular health, proximal strength, gross grasp and endurance, pt engaged in UEB for 5 min prograde/5 min retrograde, resistance 2 0  Manual          Self Care       Assessment: Tolerated treatment well   Pt demo fair progress in dual-tasking activity, with most difficulty in focus/concentrating on continuing throughout task  Pt demo good motor abilities with no fatigue or droppage noted with sustained grasp  Patient would benefit from continued skilled OT      Plan: Continued skilled OT per POC    INTERVENTION COMMENTS:  Diagnosis: Cerebrovascular accident (CVA), unspecified mechanism ([facility] [address] ) [I63 9]  Precautions: fall risk, cog deficits  FOTO: will complete  Insurance: Payor: Josephine Clarke / Plan: Josephine Clarke / Product Type: Medicaid HMO /   9 of 10 visits, PN due 3/23/23  POC due 5/23/231

## 2023-03-20 ENCOUNTER — OFFICE VISIT (OUTPATIENT)
Dept: SPEECH THERAPY | Facility: CLINIC | Age: 58
End: 2023-03-20

## 2023-03-20 ENCOUNTER — APPOINTMENT (OUTPATIENT)
Dept: OCCUPATIONAL THERAPY | Facility: CLINIC | Age: 58
End: 2023-03-20

## 2023-03-20 ENCOUNTER — APPOINTMENT (OUTPATIENT)
Dept: PHYSICAL THERAPY | Facility: CLINIC | Age: 58
End: 2023-03-20

## 2023-03-20 DIAGNOSIS — R47.1 DYSARTHRIA: ICD-10-CM

## 2023-03-20 DIAGNOSIS — R48.8 OTHER SYMBOLIC DYSFUNCTIONS: Primary | ICD-10-CM

## 2023-03-21 ENCOUNTER — OFFICE VISIT (OUTPATIENT)
Dept: OCCUPATIONAL THERAPY | Facility: CLINIC | Age: 58
End: 2023-03-21

## 2023-03-21 ENCOUNTER — OFFICE VISIT (OUTPATIENT)
Dept: SPEECH THERAPY | Facility: CLINIC | Age: 58
End: 2023-03-21

## 2023-03-21 ENCOUNTER — APPOINTMENT (OUTPATIENT)
Dept: PHYSICAL THERAPY | Facility: CLINIC | Age: 58
End: 2023-03-21

## 2023-03-21 DIAGNOSIS — I63.9 CEREBROVASCULAR ACCIDENT (CVA), UNSPECIFIED MECHANISM (HCC): ICD-10-CM

## 2023-03-21 DIAGNOSIS — R48.8 OTHER SYMBOLIC DYSFUNCTIONS: Primary | ICD-10-CM

## 2023-03-21 DIAGNOSIS — R47.1 DYSARTHRIA: ICD-10-CM

## 2023-03-21 DIAGNOSIS — I63.9 CEREBROVASCULAR ACCIDENT (CVA), UNSPECIFIED MECHANISM (HCC): Primary | ICD-10-CM

## 2023-03-21 NOTE — PROGRESS NOTES
Daily Speech Treatment Note    Today's date: 3/21/2023  Patient’s name: Bhanu Alvarez  : 1965  MRN: [id number]  Safety measures: CVA  Referring provider: Carla Nguyễn MD    Encounter Diagnosis     ICD-10-CM    1  Other symbolic dysfunctions  E03 5       2  Dysarthria  R47 1       3  Cerebrovascular accident (CVA), unspecified mechanism ([facility] [address] )  I63 9            Visit tracking:  -Referring provider: Epic  -Billing guidelines: AMA  -Visit #  -Insurance: [facility]  -RE due 23    Subjective/Behavioral:  - Patient was in good spirits today  - Patient reported that her head hurt  Objective/Assessment:    Short-term goals:  Patient will perform oral motor exercises using IOPI device on  lingual and labial muscles to facilitate improved strength and function for increased speech intelligibility, to be achieved in 4-6 weeks      IOPI PEAK MEASUREMENT WEEKLY GRID       03/10/23 03/13/23 3/17/23 2023  03/21/23      Tongue tip  (goal = 63) 52 42 45 53  46     Tongue back  (goal = 55) 37 46 44  42 39      Right Lip  (goal = 35) 22 20 20 18  23      Left Lip  (goal = 35) 18 23 25 26  23           IOPI -- Exercise Targets    Tongue tip Peak Max after 3 trials: 46 Target for treatment: 32  (70% effort)   Tongue back Peak Max after 3 trials: 39 Target for treatment: 27 (70% effort)   Right Lip Peak Max after 3 trials: 23 Target for treatment: 16  (70% effort)   Left Lip Peak Max after 3 trials: 23 Target for treatment: 16  (70% effort)       PEAK MAX 3/17/23 3/20/23 3/21/23       Tongue tip  3 sets 30 (held for 3 sec on last set) 3 sets 30 (held for 3 sec on last set) 3 sets 30 (held for 3 sec on last set)       Tongue back 3 sets 30 (held for 3 sec on last set) 3 sets 30 (held for 3 sec on last set) 3 sets 30 (held for 3 sec on last set)       Right Lip 3 set of 30- patient reports hard (due to increased effort %) 3 sets 30 (held for 3 sec on last set) 3 sets 30 (held for 3 sec on last set)       Left Lip 3 sets 30 (held for 3 sec on last set) 3 sets 30 (held for 3 sec on last set) 3 sets 30 (held for 3 sec on last set)            Patient will perform oral motor and diadochokinetic speech rate exercises with > 90% accuracy to facilitate increased speech intelligibility, to be achieved in 4-6 weeks  Patient will utilize compensatory strategies (I e , over-articulation, decreased rate, etc) 80% of the time while orally reading sentence/paragraph length material to facilitate increased speech intelligibility, to be achieved in 4-6 weeks  Patient will complete oral reading and conversational tasks with the consistent use of compensatory strategies >90% of the time to facilitate increased speech intelligibility, to be achieved in 4-6 weeks  Patient and caregiver will be educated on speech intelligibility strategies (e g , over-exaggeration, slow rate, breath groups,, etc ) to promote increased communication success (to be achieved in 2-3 weeks)  Patient was reminded to use intelligibility strategies (slow rate, over-exaggeration, breathing) before reading paragraphs  Patient and clinician practiced breathing exercises (breating in through nose and out through mouth)  Patient will utilize speech intelligibility strategies 80% of the time with min verbal cues while reading words, sentences, and paragraph length material to facilitate increased generalization of skills into conversational speech (to be achieved in 4-6 weeks)  COGNITIVE-LINGUISTIC:  Patient will complete auditory immediate and short-term memory tasks with 80% accuracy to facilitate increased ability to retell narratives and recall information within functional living environment (to be achieved in 4-6 weeks)  Patient will be educated on word finding strategies (i e , circumlocution) for improved generative naming and verbal expression skills (to be achieved in 1-2 weeks)       Patient will complete word generation tasks (e g , verbal fluency, categorization, analogies, synonyms/antonyms, , etc ) with 80% accuracy using word finding strategies to facilitate improved word retrieval skills (to be achieved in 4-6 weeks)  Patient stated synonyms of a provided word w 92% acc w min verbal prompts and min phonemic cues  Plan:  -Patient was provided with home exercises/activities to target goals in plan of care at the end of today's session   -Continue with current plan of care

## 2023-03-21 NOTE — PROGRESS NOTES
Occupational Therapy Daily Note:    Today's date: 3/21/2023  Patient name: Laurel Rogers  : 1965  MRN: [id number]  Referring provider: Nga Blanco MD  Dx:   Encounter Diagnosis   Name Primary? • Cerebrovascular accident (CVA), unspecified mechanism ([facility] [address] ) Yes       Subjective: "My head feels much better now, just a little HA"  Objective: Pt engaged in skilled OT treatment session with focus on UE NMR, visual perceptual training, UE strengthening, UE endurance, FMC/GMC, FMS/GMS and ataxia reduction  to increase engagement, endurance, tolerance, and independence with daily ADL and IADL tasks  CPT Code Minutes                                           Task Details        Therapeutic Activity 15 Pt engaged in motor activity to improve functional performance in FMC/FMC w/targeted demands increasing fluidity and legibility during handwriting tasks  Pt completed shape trace using medium  demo-F fluidity and target accuracy donning 2#CW  Neuro Re-Ed 45 For benefit of neuro re-ed promoting ataxia reduction w/proprioceptive input, prehension patterns, FMC/FMS w/fxl tool use and sustained/alternating attention  Pt instructed to assemble small peg pattern w/use of tweezers donning 2# CW with activity elevated on slant board facilitating proximal stabilization  Pt required hand repositioning 1x to stabilize tweezers in hand w/o droppage noted during small peg retrieval                 Therapeutic Exercise               Manual           Self Care           Assessment: Tolerated treatment well  Upon arrival to OT from [address] [place] [address] , pt verbalized HA and blurred vision, bp reading 130/100  Pt reported taking gabapentin at 4am asking if she should take another as prescribed for HA's  Therapist recommended pt wait until tomorrow as script read 1x a day and she contact  if SWANSON persists  CP applied to nape of neck for pain mngt, pt reported HA calmed    Additional worksheets provided for carryover at home  Patient would benefit from continued skilled OT      Plan: Continued skilled OT per POC    INTERVENTION COMMENTS:  Diagnosis: Cerebrovascular accident (CVA), unspecified mechanism ([facility] [address] ) [I63 9]  Precautions: fall risk, cog deficits  FOTO: will complete  Insurance: Payor: Nicole Singh / Plan: Nicole Certain / Product Type: Medicaid HMO /   10 of 10 visits, PN due 3/23/23  POC due 5/23/231

## 2023-03-22 ENCOUNTER — OFFICE VISIT (OUTPATIENT)
Dept: PHYSICAL THERAPY | Facility: REHABILITATION | Age: 58
End: 2023-03-22

## 2023-03-22 DIAGNOSIS — Z74.09 IMPAIRED MOBILITY AND ACTIVITIES OF DAILY LIVING: ICD-10-CM

## 2023-03-22 DIAGNOSIS — Z78.9 IMPAIRED MOBILITY AND ACTIVITIES OF DAILY LIVING: ICD-10-CM

## 2023-03-22 DIAGNOSIS — M17.12 PRIMARY OSTEOARTHRITIS OF LEFT KNEE: Primary | ICD-10-CM

## 2023-03-22 DIAGNOSIS — M54.42 ACUTE BILATERAL LOW BACK PAIN WITH LEFT-SIDED SCIATICA: ICD-10-CM

## 2023-03-22 DIAGNOSIS — I63.9 CEREBROVASCULAR ACCIDENT (CVA), UNSPECIFIED MECHANISM (HCC): ICD-10-CM

## 2023-03-22 NOTE — PROGRESS NOTES
Daily Note     Today's date: 3/22/2023  Patient name: Suma Rider  : 1965  MRN: [id number]  Referring provider: Ronald Downey MD  Dx:   Encounter Diagnosis     ICD-10-CM    1  Primary osteoarthritis of left knee  M17 12       2  Impaired mobility and activities of daily living  Z74 09     Z78 9       3  Cerebrovascular accident (CVA), unspecified mechanism ([facility] [address] )  I63 9       4  Acute bilateral low back pain with left-sided sciatica  M54 42           Start Time: 1445  Stop Time: 1520  Total time in clinic (min): 35 minutes    Subjective: Patient reports improvements in her right knee pain since last visit  Patient also reports elevated BP  Objective: See treatment diary below  BP: 130/102  HR: 82  SpO2: 98%    Assessment: Patient demonstrates TTP along medial quad and adductors which is greatly improved with soft-tissue release  Improved gait and pain end of session  Elevated BP on assessment today, advised patient to f/u with PCP/cardiology today  Continue to progress as tolerated  Patient would benefit from continued PT      Plan: Continue per plan of care        Precautions: standard, hx of CVA, cardiac, falls      Manuals 3/15 3/22           STR adductors and medial hamstring PRR PRR                                     Patient education, vitals assessment  15' 15           Neuro Re-Ed                                                                                                        Ther Ex             Hip Abduction Stretch Supine BKFO HEP reviewed                                                                                         VG  DL L5 x30           Ther Activity                                       Gait Training                                       Modalities

## 2023-03-23 NOTE — PROGRESS NOTES
Daily Speech Treatment Note    Today's date: 3/24/2023  Patient’s name: Vandana Pacheco  : 1965  MRN: [id number]  Safety measures: CVA  Referring provider: Leonard Aguilera MD    Encounter Diagnosis     ICD-10-CM    1  Other symbolic dysfunctions  W94 6       2  Dysarthria  R47 1       3  Cerebrovascular accident (CVA), unspecified mechanism ([facility] [address] )  I63 9            Visit tracking:  -Referring provider: Epic  -Billing guidelines: AMA  -Visit #  -Insurance: [facility]  -RE due 23    Subjective/Behavioral:  - Patient was in good spirits today  Objective/Assessment:    Short-term goals:  Patient will perform oral motor exercises using IOPI device on  lingual and labial muscles to facilitate improved strength and function for increased speech intelligibility, to be achieved in 4-6 weeks      IOPI PEAK MEASUREMENT WEEKLY GRID       03/10/23 03/13/23 3/17/23 2023  03/21/23   03/24/23   Tongue tip  (goal = 63) 52 42 45 53  46  48   Tongue back  (goal = 55) 37 46 44  42 39   32   Right Lip  (goal = 35) 22 20 20 18  23   25   Left Lip  (goal = 35) 18 23 25 26  23   24        IOPI -- Exercise Targets    Tongue tip Peak Max after 3 trials: 48 Target for treatment: 36 (75% effort)   Tongue back Peak Max after 3 trials: 43 Target for treatment: 32 (75% effort)   Right Lip Peak Max after 3 trials: 25 Target for treatment: 19 (75% effort)   Left Lip Peak Max after 3 trials: 24 Target for treatment: 18 (75% effort)       PEAK MAX 3/17/23 3/20/23 3/21/23 3/24/23      Tongue tip  3 sets 30 (held for 3 sec on last set) 3 sets 30 (held for 3 sec on last set) 3 sets 30 (held for 3 sec on last set) 3 sets 30 (held for 3 sec on last set)      Tongue back 3 sets 30 (held for 3 sec on last set) 3 sets 30 (held for 3 sec on last set) 3 sets 30 (held for 3 sec on last set) 3 sets 30 (held for 3 sec on last set)      Right Lip 3 set of 30- patient reports hard (due to increased effort %) 3 sets 30 (held for 3 sec on last set) 3 sets 30 (held for 3 sec on last set) 3 sets 30 (held for 3 sec on last set)      Left Lip 3 sets 30 (held for 3 sec on last set) 3 sets 30 (held for 3 sec on last set) 3 sets 30 (held for 3 sec on last set) 3 sets 30 (held for 3 sec on last set)           Patient will perform oral motor and diadochokinetic speech rate exercises with > 90% accuracy to facilitate increased speech intelligibility, to be achieved in 4-6 weeks  Patient will utilize compensatory strategies (I e , over-articulation, decreased rate, etc) 80% of the time while orally reading sentence/paragraph length material to facilitate increased speech intelligibility, to be achieved in 4-6 weeks  To target intelligibility, patient read 2 paragraphs w 100% intelligibility using compensatory strategies (decreased rate, breathing) w min verbal cues  Patient will complete oral reading and conversational tasks with the consistent use of compensatory strategies >90% of the time to facilitate increased speech intelligibility, to be achieved in 4-6 weeks  Patient and caregiver will be educated on speech intelligibility strategies (e g , over-exaggeration, slow rate, breath groups,, etc ) to promote increased communication success (to be achieved in 2-3 weeks)  Patient was reminded to use intelligibility strategies (slow rate, over-exaggeration, breathing) before reading paragraphs  Patient will utilize speech intelligibility strategies 80% of the time with min verbal cues while reading words, sentences, and paragraph length material to facilitate increased generalization of skills into conversational speech (to be achieved in 4-6 weeks)  COGNITIVE-LINGUISTIC:  Patient will complete auditory immediate and short-term memory tasks with 80% accuracy to facilitate increased ability to retell narratives and recall information within functional living environment (to be achieved in 4-6 weeks)       Patient will be educated on word finding strategies (i e , circumlocution) for improved generative naming and verbal expression skills (to be achieved in 1-2 weeks)  Patient will complete word generation tasks (e g , verbal fluency, categorization, analogies, synonyms/antonyms, , etc ) with 80% accuracy using word finding strategies to facilitate improved word retrieval skills (to be achieved in 4-6 weeks)  To target word retrieval, patient stated 3 words for a target category (ex: furniture)  Patient completed w 100% acc over 4 trials  To target word finding, patient participated in a game of Lango (stating words in a target category based on first letter)  Patient completed 5 round alternating w the clinician  Plan:  -Patient was provided with home exercises/activities to target goals in plan of care at the end of today's session   -Continue with current plan of care

## 2023-03-23 NOTE — PROGRESS NOTES
Daily Speech Treatment Note    Today's date: 3/27/2023  Patient’s name: Geovani Son  : 1965  MRN: [id number]  Safety measures: CVA  Referring provider: Shilpi Gaona MD    Encounter Diagnosis     ICD-10-CM    1  Other symbolic dysfunctions  N76 7       2  Dysarthria  R47 1       3  Cerebrovascular accident (CVA), unspecified mechanism ([facility] [address] )  I63 9            Visit tracking:  -Referring provider: Epic  -Billing guidelines: AMA  -Visit #  -Insurance: [employer] due 23    Subjective/Behavioral:  -Patient reported that she continues with decreased bolus control on the R side with both solids and liquids  Patient reports when brushing teeth, liquid wash deviated completely to left and nothing on right side (weak side)  -At the end of session, patient reports blood pressure was higher than usual and contact was made with physician (2023)--adjustments were made with medication per patient report  Patient is denying s/sx today, and this SLP education on s/s was provided to seek emergency help or f/u with PCP  SLP accompanied patient to OT, where BP was taken (143/101)  Objective/Assessment:    Short-term goals:  Patient will perform oral motor exercises using IOPI device on  lingual and labial muscles to facilitate improved strength and function for increased speech intelligibility, to be achieved in 4-6 weeks    IOPI PEAK MEASUREMENT WEEKLY GRID       23       Tongue tip  (goal = 63) 48 58       Tongue back  (goal = 55) 32 31       Right Lip  (goal = 35) 25 16       Left Lip  (goal = 35) 24 20             IOPI -- Exercise Targets     Tongue tip Peak Max after 3 trials: 58 Target for treatment: 44 (75% effort) & 41 (70% effort for 3 sec hold)   Tongue back Peak Max after 3 trials: 41 Target for treatment: 31  (75% effort)   Right Lip Peak Max after 3 trials: 21 Target for treatment: 16  (75% effort)   Left Lip Peak Max after 3 trials: 26 Target for treatment: 20  (75% effort)         PEAK MAX 3/17/23 3/20/23 3/21/23 3/24/23  03/27/23       Tongue tip  3 sets 30 (held for 3 sec on last set) 3 sets 30 (held for 3 sec on last set) 3 sets 30 (held for 3 sec on last set) 3 sets 30 (held for 3 sec on last set)  3 sets 30 (held for 3 sec on last set)       Tongue back 3 sets 30 (held for 3 sec on last set) 3 sets 30 (held for 3 sec on last set) 3 sets 30 (held for 3 sec on last set) 3 sets 30 (held for 3 sec on last set)  3 sets 30 (held for 3 sec on last set)       Right Lip 3 set of 30- patient reports hard (due to increased effort %) 3 sets 30 (held for 3 sec on last set) 3 sets 30 (held for 3 sec on last set) 3 sets 30 (held for 3 sec on last set)  3 sets 30 (held for 3 sec on last set)       Left Lip 3 sets 30 (held for 3 sec on last set) 3 sets 30 (held for 3 sec on last set) 3 sets 30 (held for 3 sec on last set) 3 sets 30 (held for 3 sec on last set)  3 sets 30 (held for 3 sec on last set)             Patient will perform oral motor and diadochokinetic speech rate exercises with > 90% accuracy to facilitate increased speech intelligibility, to be achieved in 4-6 weeks  Patient will utilize compensatory strategies (I e , over-articulation, decreased rate, etc) 80% of the time while orally reading sentence/paragraph length material to facilitate increased speech intelligibility, to be achieved in 4-6 weeks  Patient will complete oral reading and conversational tasks with the consistent use of compensatory strategies >90% of the time to facilitate increased speech intelligibility, to be achieved in 4-6 weeks  Patient and caregiver will be educated on speech intelligibility strategies (e g , over-exaggeration, slow rate, breath groups,, etc ) to promote increased communication success (to be achieved in 2-3 weeks)  Patient was reminded to use intelligibility strategies (slow rate, over-exaggeration, breathing) before reading paragraphs        Patient will utilize speech intelligibility strategies 80% of the time with min verbal cues while reading words, sentences, and paragraph length material to facilitate increased generalization of skills into conversational speech (to be achieved in 4-6 weeks)  COGNITIVE-LINGUISTIC:  Patient will complete auditory immediate and short-term memory tasks with 80% accuracy to facilitate increased ability to retell narratives and recall information within functional living environment (to be achieved in 4-6 weeks)  To target recall of functional information- Patient was educated on the FAST acronym for stroke safety (face, arms, speech, and time)  Patient was given a visual aid of FAST acronym  Patient demonstrated competency of FAST acronym through verbal models and visual demonstrations of each task  Patient was given visual aid to take home  Recommended to show family members this visual aid to promote stroke safety and awareness  Patient will be educated on word finding strategies (i e , circumlocution) for improved generative naming and verbal expression skills (to be achieved in 1-2 weeks)  Patient will complete word generation tasks (e g , verbal fluency, categorization, analogies, synonyms/antonyms, , etc ) with 80% accuracy using word finding strategies to facilitate improved word retrieval skills (to be achieved in 4-6 weeks)  Plan:  -Patient was provided with home exercises/activities to target goals in plan of care at the end of today's session   -Continue with current plan of care

## 2023-03-24 ENCOUNTER — APPOINTMENT (OUTPATIENT)
Dept: PHYSICAL THERAPY | Facility: CLINIC | Age: 58
End: 2023-03-24

## 2023-03-24 ENCOUNTER — EVALUATION (OUTPATIENT)
Dept: OCCUPATIONAL THERAPY | Facility: CLINIC | Age: 58
End: 2023-03-24

## 2023-03-24 ENCOUNTER — OFFICE VISIT (OUTPATIENT)
Dept: SPEECH THERAPY | Facility: CLINIC | Age: 58
End: 2023-03-24

## 2023-03-24 DIAGNOSIS — R48.8 OTHER SYMBOLIC DYSFUNCTIONS: Primary | ICD-10-CM

## 2023-03-24 DIAGNOSIS — R47.1 DYSARTHRIA: ICD-10-CM

## 2023-03-24 DIAGNOSIS — I63.9 CEREBROVASCULAR ACCIDENT (CVA), UNSPECIFIED MECHANISM (HCC): Primary | ICD-10-CM

## 2023-03-24 DIAGNOSIS — I63.9 CEREBROVASCULAR ACCIDENT (CVA), UNSPECIFIED MECHANISM (HCC): ICD-10-CM

## 2023-03-24 NOTE — PROGRESS NOTES
OCCUPATIONAL THERAPY PROGRESS NOTE #1    3/24/2023  Suma Means  1965  [id number]  Ronald Downey MD  1  Cerebrovascular accident (CVA), unspecified mechanism ([facility] )      Pt was seen and treated by KARLO Ying under direct supervision of KALEB Hernandez, OTR/L  Subjective    "I can wash myself better, but sometimes my hand does in different places "    PATIENT GOAL: "I want to drive"      Assessment/Plan    Skilled Analysis:  Pt is a 62 y o  female referred to Occupational Therapy s/p Cerebrovascular accident (CVA), unspecified mechanism ([facility] ) [I63 9]  Pt reported since participation in OP OT, she feels like she is getting a lot better  She feels like her hand is getting better, she is able to brush her hair, do her make up, she can wash the dishes better than before  She uses her hand for daily tasks at home and when bathing, her hand mostly stays where she wants it to be  Pt reports her vision is better, as her reading has gotten better  She is not getting spiderwebs in her left eye like before  She takes her blood pressure every morning and it has been high lately, and her physician upped her BP medication to a higher dosage  Pt participated in skilled OT progress note and following formalized testing, presents with improvements in RUE strength, intrinsic strength in pincer, lateral pinch & tripod grasp, reduced pain in shoulder with movement, improved fine motor control & coordination, and slight improvement in oculomotor control with improved saccades/visual scanning  Pt is interested in returning to driving, pt instructed to discuss a plan to return to driving at neurology appointment on April 11th  Pt demo good understanding  Cognition not reassessed today, Saleem Bai will confer with Speech therapy to discuss plan to assess cognition   Pt continues to demo the following areas of deficit: FMC/FMS, GMC/GMS, in hand manipulation/dexterity, binocular teaming, saccades, convergence and accommodation, eye strain/fatigue impacting indep and completion of ADL/IADL, salient, and leisure tasks  Pt does demo the need for skilled Occupational Therapy services 2x/week for an additional 8 weeks with focus on UE NMR, UE strengthening, UE endurance, FMC/GMC, FMS/GMS, binocularity (near), saccades, and convergence to address the goals as listed below   Pt in agreement with POC, POC to  w/in 60 days    Goals:  Short Term Goals (4 weeks)    STRENGTH/ENDURANCE    ·  Pt will increase R UE strength to 4/5 through the use of strengthening exercises and home program for eventual return to life roles and salient tasks MET    · Pt will demo with G carryover of Home Exercise Program to improve functional progression towards goals in Plan of care and for improved functional use of RUE MET     FMC/GMC    · Pt will increase R UE rate of manipulation by 10% for all FM tests for improved functional performance with salient tasks MET    · Pt will increase reaction time to <1 5 second with hand to target timed trials for improved reaction time and automaticity of coordination for improved functional performance with ADLs/IADLs and salient tasks MET    · Pt will increase R UE prehension patterns for improved utensil and container management with <10% droppage MET    · Pt will demo improved motor learning of constructional and new motor actions for improved b/l coordination and motor planning evident by 75% accuracy for fxnl ADL/IADL performance MET    VISION  · Pt will increase oculomotor control for improved saccades, pursuits, con/divergent tasks for improved reading, board to table tasks x 75% accuracy with minimal increase in symptoms PROGRESSING    · Pt will increase oculomotor control for improved dynamic activities with head turns, board/screen to table tasks symptom free with 75% accuracy for improved life roles PROGRESSING    ATTENTION  · Pt will maintain attention to task for 25 minutes in multimodal environment for baseline performance, improved role performance and to improve learning and to simulate return to life environment MET    · Pt will demo ability to participate in dual tasking/divided attention task with 30% accuracy in multimodal environment to simulate return to life roles PROGRESSING    · Pt will demo ability to alternate attention between 2 tasks with cog loading and 30% accuracy with G retention of task directions in multimodal environment to simulate return to life roles PROGRESSING      Long Term Goals (8 weeks)    STRENGTH/ENDURANCE    ·  Pt will increase R UE strength to R=L through the use of strengthening exercises and home program for eventual return to life roles and salient tasks    FMC/GMC    · Pt will increase R UE rate of manipulation by 20% for all FM tests for improved functional performance with salient tasks     · Pt will increase reaction time to <1 second with hand to target timed trials for improved reaction time and automaticity of coordination for improved functional performance with ADLs/IADLs and salient tasks    · Pt will increase R UE prehension patterns for improved utensil and container management with <5% droppage     · Pt will demo improved motor learning of constructional and new motor actions for improved b/l coordination and motor planning evident by 90% accuracy for fxnl ADL/IADL performance    VISION  · Pt will increase oculomotor control for improved saccades, pursuits, con/divergent tasks for improved reading, board to table tasks x 90% accuracy with minimal increase in symptoms     · Pt will increase oculomotor control for improved dynamic activities with head turns, board/screen to table tasks symptom free with 90% accuracy for improved life roles      ATTENTION (added on 3/2/23)  · Pt will maintain attention to task for 45 minutes in multimodal environment for baseline performance,  improved role performance and to improve learning and to simulate return to life environment    · Pt will demo ability to participate in dual tasking/divided attention task with 65% accuracy in multimodal environment to simulate return to life roles    · Pt will demo ability to alternate attention between 2 tasks with cog loading and 65% accuracy with G retention of task directions in multimodal environment to simulate return to life  roles       Treatment Interventions  Motor:  Supine, seated, and in stance neuro re-ed  Fxnl Grasp and Release  FMC/prehension patterns  Fxnl Tool use (tweezers, tongs)  In hand manipulation  Timed Trials to improve automaticity  Manual tx (IASTM if able, PROM/stretching)  Hand to target tasks  Seated functional reach: crossing midline  Supine place and hold  WBearing strategies (seated, prone/quad)  Closed chain activities  Open chain activities    Vision:  Multimatrix for saccades/visual clutter/attention  Multi-modal environment  Sustained/alternating/divided attention  Tracking tube  Oculomotor control: saccades, con/divergence  Conv /div   Dynamic tasks  Work stations with timed transitions    Pain Levels:     Restin    With Activity:  4 (knee)    Objective    Impairment Observations:     Upper Extremity      RUE LUE Comments                 UPPER EXTREMITY FXN Impaired Intact Dominant Hand: R                         /Pinch Strength         Dynamometer      - Gross Grasp 45 lbs 50 lbs RUE: IMPROVED 3#   Pinch Meter       - PINCER 12 lbs 14 lbs RUE: IMPROVED 4#  LUE: IMPROVED 5#    - TRIPOD 11 lbs 11 5 lbs RUE: IMPROVED 3#  LUE: IMPROVED 1 5#    - LATERAL 10 lbs  11 lbs RUE: IMPROVED 3#  LUE: remained             AROM (seated)   No pain reported   Scapular Protraction Full full    Scapular Retraction Full full    Elevation/Depression Full   full    Shoulder Abduction full  full IMPROVED   Shoulder Adduction Full  Full     Shoulder Flexion  full   full  IMPROVED   Shoulder Extension Full  Full     Horizontal Abduction Full  Full     Horizontal Adduction Full  Full     Elbow Flexion Full  Full     Elbow Extension Full  Full     Pronation Full  Full     Supination Full  Full     Wrist Flexion Full  Full     Wrist Extension Full  Full     Digit Flexion Full  Full     Digit Extension Full  Full     Composite Grasp Full  Full     Hook Grasp Full  Full     Opposition Full  Full  Improved automaticity & speed                                  MMT     No pain reported   Elevation 5/5 5/5    Shoulder Abduction 4/5 4+/5 RUE: IMPROVED   Shoulder Adduction 4/5 4+/5    Shoulder Flexion 4/5 4+/5 RUE: IMPROVED   Shoulder Extension 4/5 4+/5    Elbow Flexion 4+/5 4+/5 RUE: IMPROVED   Elbow Extension 4+/5 4+/5    Wrist Flexion 4+/5 4+/5    Wrist Extension 4+/5 4+/5    Gross Grasp 4+/5 4+/5          SENSATION      Proprioception Intact   Intact RUE: IMPROVED Speed and precision   Hot/Cold Temp Intact Intact          COORDINATION      9 Hole Peg Test 26 seconds 21 seconds RUE: IMPROVED 2 secs  LUE: IMPROVED 3 secs     Fxnl Dexterity Test 31 seconds 24 seconds RUE: IMPROVED 9 secs  LUE: IMPROVED 6 secs     Rapid, Alternating Movement Test     Normal     Vision         Comments                                        VISION SCREEN         GLASSES (prescription)          Symptoms Include "spider webs in corner of L eye"     Last Eye Exam "year or two ago"           Visual Acuity (near) R eye  20/30     L eye 20/25 remained   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 IMPROVED (no exophoria in L eye   Binocularity (far) orthotropia and orthophoria    Convergence  no diplopia reported; slight convergence insufficiency remained   Accommodation blurriness/loss of focus reported at 4 inches remained   Feedsky, misalignment and suppression of near; 'Y' reported at red bead Misalignment noted, right eye slight lag > Left   Mobility             Pursuits slightly jerky in all planes Smooth until end point, slight dysmetria noted at end range           Saccades slightly jerky and dysmetric in all planes            Range of Motion [facility]    Visual perceptual midline             Midline at IMPROVED           Horizon  at remained     RBANS: not reassessed today  Performed by speech therapy  Pt received attention score of 1%, extremely low category     Digit Span 7/16  Coding 27/89  Overall Score: 8th percentile (borderline)    INTERVENTION COMMENTS:  Diagnosis: Cerebrovascular accident (CVA), unspecified mechanism ([facility] [address] ) [I63 9]  Precautions: fall risk, cog deficits  FOTO: will complete  Insurance: Payor: [address] [place] [address] / Plan: [address] [place] [address] / Product Type: Medicaid HMO /   1 of 10 visits, PN due 4/23/23  POC due 5/23/231

## 2023-03-27 ENCOUNTER — OFFICE VISIT (OUTPATIENT)
Dept: OCCUPATIONAL THERAPY | Facility: CLINIC | Age: 58
End: 2023-03-27

## 2023-03-27 ENCOUNTER — OFFICE VISIT (OUTPATIENT)
Dept: SPEECH THERAPY | Facility: CLINIC | Age: 58
End: 2023-03-27

## 2023-03-27 ENCOUNTER — APPOINTMENT (OUTPATIENT)
Dept: PHYSICAL THERAPY | Facility: CLINIC | Age: 58
End: 2023-03-27

## 2023-03-27 DIAGNOSIS — R48.8 OTHER SYMBOLIC DYSFUNCTIONS: Primary | ICD-10-CM

## 2023-03-27 DIAGNOSIS — R47.1 DYSARTHRIA: ICD-10-CM

## 2023-03-27 DIAGNOSIS — I63.9 CEREBROVASCULAR ACCIDENT (CVA), UNSPECIFIED MECHANISM (HCC): ICD-10-CM

## 2023-03-27 DIAGNOSIS — I63.9 CEREBROVASCULAR ACCIDENT (CVA), UNSPECIFIED MECHANISM (HCC): Primary | ICD-10-CM

## 2023-03-27 NOTE — PROGRESS NOTES
"Occupational Therapy Daily Note:    Today's date: 3/27/2023  Patient name: Perri Mederos  : 1965  MRN: [id number]  Referring provider: Travis Whatley MD  Dx:   Encounter Diagnosis   Name Primary? • Cerebrovascular accident (CVA), unspecified mechanism ([facility] ) Yes       Subjective: \"I feel dizzy sometimes when my blood pressure is higher  \"     Objective: Pt engaged in skilled OT treatment session with focus on UE NMR, visual perceptual training, UE strengthening, UE endurance, FMC/GMC, FMS/GMS and ataxia reduction  to increase engagement, endurance, tolerance, and independence with daily ADL and IADL tasks  CPT Code Minutes                                           Task Details        Therapeutic Activity 10 Pt completed word-rounds task to improve visual scanning, visual-perceptual deficits, and functional attention  Pt instructed to locate word in word [place] ring based on color of prior card in pile  Pt unable to identify word indep, required verbal cue of which letter begins word to increase success/accuracy  Neuro Re-Ed 40  Pt tolerated 12 mins monocular field taping while completing Spot-it task focusing on oculomotor re-ed, visual scanning/saccades, near to far point accomodation and individual eye strengthening  No reported HA or dizziness with task  Pt tolerated b/l peripheral field taping to improve near point convergence, binocular teaming and overall oculomotor re-ed, while completing iSpy dig in task with cards positioned on slant board to address accomodation deficits  Pt utilized red-handled tweezers to retrieve objects  Therapeutic Exercise               Manual           Self Care 10 Pt reported BP this morning was 144/97  BP reading prior to start of therapy was 143/101  Declined completing UEB d/t pt's elevated BP  No HA reported  Assessment: Tolerated treatment well  Pt continues to demo high BP causing dizziness at HA at times   Pt reported her BP " medication was increased, however pt continues to experience elevated BP  Pt instructed to contact physician, pt demo G understanding  Pt demo difficulty with visual discrimination when locating objects in cluttered field  Patient would benefit from continued skilled OT      Plan: Continued skilled OT per POC    INTERVENTION COMMENTS:  Diagnosis: Cerebrovascular accident (CVA), unspecified mechanism ([facility] ) [I63 9]  Precautions: fall risk, cog deficits  FOTO: will complete  Insurance: Payor: [address] [place] [address] / Plan: [address] [place] [address] / Product Type: Medicaid [facility] Alfie Lopez  4 KV 72 QENQVH, PN due 4/23/23  POC due 5/23/231

## 2023-03-28 ENCOUNTER — OFFICE VISIT (OUTPATIENT)
Dept: SPEECH THERAPY | Facility: CLINIC | Age: 58
End: 2023-03-28

## 2023-03-28 ENCOUNTER — APPOINTMENT (OUTPATIENT)
Dept: PHYSICAL THERAPY | Facility: CLINIC | Age: 58
End: 2023-03-28

## 2023-03-28 ENCOUNTER — OFFICE VISIT (OUTPATIENT)
Dept: OCCUPATIONAL THERAPY | Facility: CLINIC | Age: 58
End: 2023-03-28

## 2023-03-28 DIAGNOSIS — R47.1 DYSARTHRIA: ICD-10-CM

## 2023-03-28 DIAGNOSIS — R48.8 OTHER SYMBOLIC DYSFUNCTIONS: Primary | ICD-10-CM

## 2023-03-28 DIAGNOSIS — I63.9 CEREBROVASCULAR ACCIDENT (CVA), UNSPECIFIED MECHANISM (HCC): Primary | ICD-10-CM

## 2023-03-28 DIAGNOSIS — I63.9 CEREBROVASCULAR ACCIDENT (CVA), UNSPECIFIED MECHANISM (HCC): ICD-10-CM

## 2023-03-28 NOTE — PROGRESS NOTES
"Occupational Therapy Daily Note:    Today's date: 3/28/2023  Patient name: Viviana Meier  : 1965  MRN: [id number]  Referring provider: Majorie Goodpasture, MD  Dx:   Encounter Diagnosis   Name Primary? • C[facility]bro[facility] ([facility]), unspecified [facility] ([facility] ) Yes       Subjective: \"My blood pressure is still high  The Dr is changing my medication\"  Objective: Pt engaged in skilled OT treatment session with focus on fxnl cognition, fxnl attention, visual perceptual training, FMC/GMC and FMS/GMS to increase engagement, endurance, tolerance, and independence with daily ADL and IADL tasks  CPT Code Minutes                                           Task Details        Therapeutic Activity 10 Pt continued activity as listed below focusing on VS/, multi directional saccades and visual tolerance  Neuro Re-Edal 45 For improved spatial awareness and overall oculomotor neuro re-education, session initiated with taping strategies, 12min x 3, monocular taping  R->L eye occluded to address individual eye strengthening followed by peripheral field taping focusing on sustained convergence while engaging in \"Spot It\" w/activity positioned in diagonal, horizontal and vertical plane focusing on multi directional saccades w/accomodation  **Pt instructed to hold 5 small discs in hand and translate palm to digit placing to images on det looking chart  ** Pt w/o noted difficulty  Therapeutic Exercise               Manual          Self Care       3/28:  [id number]  [id number]    Assessment: Tolerated treatment well  Pt reported increased difficulty to locate images on  Looking Chart w/R eye occluded vs left  Patient would benefit from continued skilled OT      Plan: Continued skilled OT per POC    INTERVENTION COMMENTS:  Diagnosis: Cerebrovascular accident (CVA), unspecified mechanism ([facility] ) [I63 9]  Precautions: fall risk, cog deficits  FOTO: will " complete  Insurance: Payor: Merrill Sebastian / Plan: Merrill Sebastian / Product Type: Medicaid [facility] Glenn Jorgensen  7 DL 45 CLHNEM, PN due 4/23/23  POC due 5/23/231

## 2023-03-28 NOTE — PROGRESS NOTES
Daily Speech Treatment Note    Today's date: 3/28/2023  Patient’s name: Charles Tolentino  : 1965  MRN: [id number]  Safety measures: CVA  Referring provider: Britney Hammond MD    Encounter Diagnosis     ICD-10-CM    1  Other symbolic dysfunctions  O03 4       2  Dysarthria  R47 1       3  Cerebrovascular accident (CVA), unspecified mechanism ([facility] [address] )  I63 9            Visit tracking:  -Referring provider: Epic  -Billing guidelines: AMA  -Visit #15/24  -Insurance: [facility]  -RE due 23    Subjective/Behavioral:  -Patient reports that her BP was 152 over 102 today  She reports practicing IOPI on her non-clinic days  Objective/Assessment:    During IOPI trials - the patient demonstrated some difficulty holding for the full 3 seconds during the third and final set of 30 for each position  She was able to hold for 3 seconds best when performing left sided lip trials  Short-term goals:  Patient will perform oral motor exercises using IOPI device on  lingual and labial muscles to facilitate improved strength and function for increased speech intelligibility, to be achieved in 4-6 weeks    IOPI PEAK MEASUREMENT WEEKLY GRID       23      Tongue tip  (goal = 63) 48 58 56      Tongue back  (goal = 55) 32 31 49      Right Lip  (goal = 35) 25 16 21      Left Lip  (goal = 35) 24 20 20            IOPI -- Exercise Targets     Tongue tip Peak Max after 3 trials: 56 Target for treatment:  (75% effort) & 42 (70% effort for 3 sec hold)   Tongue back Peak Max after 3 trials: 49 Target for treatment: 37  (75% effort)   Right Lip Peak Max after 3 trials: 21 Target for treatment: 16  (75% effort)   Left Lip Peak Max after 3 trials: 20 Target for treatment: 15 (75% effort)         PEAK MAX 3/17/23 3/20/23 3/21/23 3/24/23  03/27/23  03/28/23     Tongue tip  3 sets 30 (held for 3 sec on last set) 3 sets 30 (held for 3 sec on last set) 3 sets 30 (held for 3 sec on last set) 3 sets 30 (held for 3 sec on last set)  3 sets 30 (held for 3 sec on last set) 3 sets 30 (held for 3 sec on last set)         Tongue back 3 sets 30 (held for 3 sec on last set) 3 sets 30 (held for 3 sec on last set) 3 sets 30 (held for 3 sec on last set) 3 sets 30 (held for 3 sec on last set)  3 sets 30 (held for 3 sec on last set)  3 sets 30 (held for 3 sec on last set)     Right Lip 3 set of 30- patient reports hard (due to increased effort %) 3 sets 30 (held for 3 sec on last set) 3 sets 30 (held for 3 sec on last set) 3 sets 30 (held for 3 sec on last set)  3 sets 30 (held for 3 sec on last set)  3 sets 30 (held for 3 sec on last set)     Left Lip 3 sets 30 (held for 3 sec on last set) 3 sets 30 (held for 3 sec on last set) 3 sets 30 (held for 3 sec on last set) 3 sets 30 (held for 3 sec on last set)  3 sets 30 (held for 3 sec on last set)  3 sets 30 (held for 3 sec on last set)           Patient will perform oral motor and diadochokinetic speech rate exercises with > 90% accuracy to facilitate increased speech intelligibility, to be achieved in 4-6 weeks  Patient will utilize compensatory strategies (I e , over-articulation, decreased rate, etc) 80% of the time while orally reading sentence/paragraph length material to facilitate increased speech intelligibility, to be achieved in 4-6 weeks  Patient will complete oral reading and conversational tasks with the consistent use of compensatory strategies >90% of the time to facilitate increased speech intelligibility, to be achieved in 4-6 weeks  Patient and caregiver will be educated on speech intelligibility strategies (e g , over-exaggeration, slow rate, breath groups,, etc ) to promote increased communication success (to be achieved in 2-3 weeks)  Patient was reminded to use intelligibility strategies (slow rate, over-exaggeration, breathing) before reading paragraphs        Patient will utilize speech intelligibility strategies 80% of the time with min verbal cues while reading words, sentences, and paragraph length material to facilitate increased generalization of skills into conversational speech (to be achieved in 4-6 weeks)  COGNITIVE-LINGUISTIC:  Patient will complete auditory immediate and short-term memory tasks with 80% accuracy to facilitate increased ability to retell narratives and recall information within functional living environment (to be achieved in 4-6 weeks)  Patient will be educated on word finding strategies (i e , circumlocution) for improved generative naming and verbal expression skills (to be achieved in 1-2 weeks)  Patient will complete word generation tasks (e g , verbal fluency, categorization, analogies, synonyms/antonyms, , etc ) with 80% accuracy using word finding strategies to facilitate improved word retrieval skills (to be achieved in 4-6 weeks)  To target higher level generative naming, patient completed Beyond Alpha activity/game using multiple word and letter restrictions (i e , word associated with sports, contains the letter T, is exactly 2 syllables)  Patient completed using 2 cards (red and yellow)/restrictions over 10 trials with 90% acc  Plan:  -Patient was provided with home exercises/activities to target goals in plan of care at the end of today's session   -Continue with current plan of care

## 2023-03-29 ENCOUNTER — OFFICE VISIT (OUTPATIENT)
Dept: PHYSICAL THERAPY | Facility: REHABILITATION | Age: 58
End: 2023-03-29

## 2023-03-29 DIAGNOSIS — I63.9 CEREBROVASCULAR ACCIDENT (CVA), UNSPECIFIED MECHANISM (HCC): ICD-10-CM

## 2023-03-29 DIAGNOSIS — M17.12 PRIMARY OSTEOARTHRITIS OF LEFT KNEE: Primary | ICD-10-CM

## 2023-03-29 DIAGNOSIS — Z74.09 IMPAIRED MOBILITY AND ACTIVITIES OF DAILY LIVING: ICD-10-CM

## 2023-03-29 DIAGNOSIS — Z78.9 IMPAIRED MOBILITY AND ACTIVITIES OF DAILY LIVING: ICD-10-CM

## 2023-03-29 NOTE — PROGRESS NOTES
"Daily Note     Today's date: 3/29/2023  Patient name: Viviana Meier  : 1965  MRN: [id number]  Referring provider: Majorie Goodpasture, MD  Dx:   Encounter Diagnosis     ICD-10-CM    1  Primary osteoarthritis of left knee  M17 12       2  Impaired mobility and activities of daily living  Z74 09     Z78 9       3  Cerebrovascular accident (CVA), unspecified mechanism ([facility] [address] )  I63 9           Start Time: 1445  Stop Time: 1515  Total time in clinic (min): 30 minutes    Subjective: Patient reports her let knee is getting better, but reports her symptoms  Objective: See treatment diary below      Assessment: Patient symptoms localized to medial adductor  Patient responding well to PT  Tolerated progressions well today  Patient would benefit from continued PT      Plan: Continue per plan of care        Precautions: standard, hx of CVA, cardiac, falls      Manuals 3/15 3/22 3/29          STR adductors and medial hamstring PRR PRR PRR                                     Patient education, vitals assessment  15' 15 15'          Neuro Re-Ed                                                                                                        Ther Ex             Hip Abduction Stretch Supine BKFO HEP reviewed x20 5\"          Hip Add Iso w/ Ball   x20 5\" hold                                                                           VG  DL L5 x30           Ther Activity                                       Gait Training                                       Modalities                                            "

## 2023-03-31 ENCOUNTER — OFFICE VISIT (OUTPATIENT)
Dept: OCCUPATIONAL THERAPY | Facility: CLINIC | Age: 58
End: 2023-03-31

## 2023-03-31 ENCOUNTER — OFFICE VISIT (OUTPATIENT)
Dept: SPEECH THERAPY | Facility: CLINIC | Age: 58
End: 2023-03-31

## 2023-03-31 ENCOUNTER — APPOINTMENT (OUTPATIENT)
Dept: PHYSICAL THERAPY | Facility: CLINIC | Age: 58
End: 2023-03-31

## 2023-03-31 ENCOUNTER — APPOINTMENT (OUTPATIENT)
Dept: OCCUPATIONAL THERAPY | Facility: CLINIC | Age: 58
End: 2023-03-31

## 2023-03-31 DIAGNOSIS — R47.1 DYSARTHRIA: ICD-10-CM

## 2023-03-31 DIAGNOSIS — R48.8 OTHER SYMBOLIC DYSFUNCTIONS: Primary | ICD-10-CM

## 2023-03-31 DIAGNOSIS — I63.9 CEREBROVASCULAR ACCIDENT (CVA), UNSPECIFIED MECHANISM (HCC): ICD-10-CM

## 2023-03-31 DIAGNOSIS — I63.9 CEREBROVASCULAR ACCIDENT (CVA), UNSPECIFIED MECHANISM (HCC): Primary | ICD-10-CM

## 2023-03-31 NOTE — PROGRESS NOTES
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       2  Dysarthria  R47 1       3  Cerebrovascular accident (CVA), unspecified mechanism ([facility] [address] )  I63 9            Visit tracking:  -Referring provider: Epic  -Billing guidelines: AMA  -Visit #  -Insurance: [facility]  -RE due 23    Subjective/Behavioral:  - Patient was in good spirits today  Objective/Assessment:    During IOPI trials - the patient demonstrated some difficulty holding for the full 3 seconds during the third and final set of 30 for each position  She was able to hold for 3 seconds best when performing left sided lip trials  Short-term goals:  Patient will perform oral motor exercises using IOPI device on  lingual and labial muscles to facilitate improved strength and function for increased speech intelligibility, to be achieved in 4-6 weeks    IOPI PEAK MEASUREMENT WEEKLY GRID       23     Tongue tip  (goal = 63) 48 58 56 53     Tongue back  (goal = 55) 32 31 49 43     Right Lip  (goal = 35) 25 16 21 25     Left Lip  (goal = 35) 24 20 20 25           IOPI -- Exercise Targets     Tongue tip Peak Max after 3 trials: 53 Target for treatment: 42 (80% effort)   Tongue back Peak Max after 3 trials: 43 Target for treatment: 34  (80% effort)   Right Lip Peak Max after 3 trials: 25 Target for treatment: 20  (80% effort)   Left Lip Peak Max after 3 trials: 25 Target for treatment: 20 (80% effort)         PEAK MAX 3/17/23 3/20/23 3/21/23 3/24/23  03/27/23  03/28/23  03/31/23   Tongue tip  3 sets 30 (held for 3 sec on last set) 3 sets 30 (held for 3 sec on last set) 3 sets 30 (held for 3 sec on last set) 3 sets 30 (held for 3 sec on last set)  3 sets 30 (held for 3 sec on last set) 3 sets 30 (held for 3 sec on last set)     3 sets 30 (held for 3 sec on last set)     Tongue back 3 sets 30 (held for 3 sec on last set) 3 sets 30 (held for 3 sec on last set) 3 sets 30 (held for 3 sec on last set) 3 sets 30 (held for 3 sec on last set)  3 sets 30 (held for 3 sec on last set)  3 sets 30 (held for 3 sec on last set) 3 sets 30 (held for 3 sec on last set)     Right Lip 3 set of 30- patient reports hard (due to increased effort %) 3 sets 30 (held for 3 sec on last set) 3 sets 30 (held for 3 sec on last set) 3 sets 30 (held for 3 sec on last set)  3 sets 30 (held for 3 sec on last set)  3 sets 30 (held for 3 sec on last set) 3 sets 30 (held for 3 sec on last set)   Left Lip 3 sets 30 (held for 3 sec on last set) 3 sets 30 (held for 3 sec on last set) 3 sets 30 (held for 3 sec on last set) 3 sets 30 (held for 3 sec on last set)  3 sets 30 (held for 3 sec on last set)  3 sets 30 (held for 3 sec on last set) 3 sets 30 (held for 3 sec on last set)         Patient will perform oral motor and diadochokinetic speech rate exercises with > 90% accuracy to facilitate increased speech intelligibility, to be achieved in 4-6 weeks  Patient will utilize compensatory strategies (I e , over-articulation, decreased rate, etc) 80% of the time while orally reading sentence/paragraph length material to facilitate increased speech intelligibility, to be achieved in 4-6 weeks  Patient will complete oral reading and conversational tasks with the consistent use of compensatory strategies >90% of the time to facilitate increased speech intelligibility, to be achieved in 4-6 weeks  Patient and caregiver will be educated on speech intelligibility strategies (e g , over-exaggeration, slow rate, breath groups,, etc ) to promote increased communication success (to be achieved in 2-3 weeks)  Patient was reminded to use intelligibility strategies (slow rate, over-exaggeration, breathing) before reading paragraphs        Patient will utilize speech intelligibility strategies 80% of the time with min verbal cues while reading words, sentences, and paragraph length material to facilitate increased generalization of skills into conversational speech (to be achieved in 4-6 weeks)  COGNITIVE-LINGUISTIC:  Patient will complete auditory immediate and short-term memory tasks with 80% accuracy to facilitate increased ability to retell narratives and recall information within functional living environment (to be achieved in 4-6 weeks)  To target auditory comprehension, patient was read short paragraphs and asked to recall information  Patient recalled information w 79% acc (23/29 questions) w min verbal cues  Patient will be educated on word finding strategies (i e , circumlocution) for improved generative naming and verbal expression skills (to be achieved in 1-2 weeks)  Patient will complete word generation tasks (e g , verbal fluency, categorization, analogies, synonyms/antonyms, , etc ) with 80% accuracy using word finding strategies to facilitate improved word retrieval skills (to be achieved in 4-6 weeks)  To target word finding, patient was asked to provide synonyms to target words  Patient stated synonyms w 90% acc (18/20 trials)  Plan:  -Patient was provided with home exercises/activities to target goals in plan of care at the end of today's session   -Continue with current plan of care

## 2023-03-31 NOTE — PROGRESS NOTES
"Occupational Therapy Daily Note:    Today's date: 3/31/2023  Patient name: Perri Mederos  : 1965  MRN: [id number]  Referring provider: Travis Whatley MD  Dx:   Encounter Diagnosis   Name Primary? • Cerebrovascular accident (CVA), unspecified mechanism ([facility] ) Yes       Subjective: \"I don't know why but my BP was 140/101  I don't know what's going on\"  Objective: Pt engaged in skilled OT treatment session with focus on fxnl cognition, UE NMR, visual perceptual training, visual scanning, FMC/GMC, FMS/GMS and visual tolerance, fixation and sustained convergence to increase engagement, endurance, tolerance, and independence with daily ADL and IADL tasks  CPT Code Minutes                                           Task Details        Therapeutic Activity 15 Pt continued push pin activity using tube paint to fill in dots created by push pins focusing on fmc/fms increasing accuracy and precision during small closure mngt and grooming tasks  Neuro Re-Ed 45 For improved spatial awareness and overall oculomotor neuro re-education, session initiated with taping strategies, 3x10, monocular taping  R->L eye occluded to address individual eye strengthening followed by peripheral field taping focusing on sustained convergence while engaging in push pin activity w/activity placed on slant board to address near/far saccades w/accomodation  Therapeutic Exercise               Manual          Self Care       3/31:  [id number] self directed theract  [id number]  [id number]    Assessment: Tolerated treatment well  Pt demo accuracy and precision completing push pin activity implementing taping strategies  Patient would benefit from continued skilled OT      Plan: Continued skilled OT per POC    INTERVENTION COMMENTS:  Diagnosis: Cerebrovascular accident (CVA), unspecified mechanism ([facility] ) [I63 9]  Precautions: fall risk, cog deficits  FOTO: will complete  Insurance: Payor: " Bill Contreras / Plan: Bill Contreras / Product Type: Medicaid [facility] Lis Ruth  0 JQ 20 EBOQDF, PN due 4/23/23  POC due 5/23/231

## 2023-04-04 ENCOUNTER — OFFICE VISIT (OUTPATIENT)
Dept: SPEECH THERAPY | Facility: CLINIC | Age: 58
End: 2023-04-04

## 2023-04-04 ENCOUNTER — OFFICE VISIT (OUTPATIENT)
Dept: OCCUPATIONAL THERAPY | Facility: CLINIC | Age: 58
End: 2023-04-04

## 2023-04-04 DIAGNOSIS — R48.8 OTHER SYMBOLIC DYSFUNCTIONS: Primary | ICD-10-CM

## 2023-04-04 DIAGNOSIS — I63.9 CEREBROVASCULAR ACCIDENT (CVA), UNSPECIFIED MECHANISM (HCC): Primary | ICD-10-CM

## 2023-04-04 DIAGNOSIS — I63.9 CEREBROVASCULAR ACCIDENT (CVA), UNSPECIFIED MECHANISM (HCC): ICD-10-CM

## 2023-04-04 DIAGNOSIS — R47.1 DYSARTHRIA: ICD-10-CM

## 2023-04-04 NOTE — PROGRESS NOTES
Daily Speech Treatment Note    Today's date: 2023  Patient’s name: Kenny Joel  : 1965  MRN: [id number]  Safety measures: CVA  Referring provider: Gideon العراقي MD    Encounter Diagnosis     ICD-10-CM    1  Other symbolic dysfunctions  Y55 3       2  Dysarthria  R47 1       3  Cerebrovascular accident (CVA), unspecified mechanism ([facility] [address] )  I63 9            Visit tracking:  -Referring provider: Epic  -Billing guidelines: AMA  -Visit #  -Insurance: [employer] due 23    Subjective/Behavioral:  - Patient reported that she had a headache  Took patient's blood pressure - 150/92  - Patient was in good spirits today  Objective/Assessment:    During IOPI trials - the patient demonstrated some difficulty holding for the full 3 seconds during the third and final set of 30 for each position  She was able to hold for 3 seconds best when performing left sided lip trials  Short-term goals:  Patient will perform oral motor exercises using IOPI device on  lingual and labial muscles to facilitate improved strength and function for increased speech intelligibility, to be achieved in 4-6 weeks    IOPI PEAK MEASUREMENT WEEKLY GRID       23    Tongue tip  (goal = 63) 48 58 56 53 54    Tongue back  (goal = 55) 32 31 49 43 49    Right Lip  (goal = 28) 25 16 21 25 26    Left Lip  (goal = 28) 24 20 20 25 27          IOPI -- Exercise Targets     Tongue tip Peak Max after 3 trials: 54 Target for treatment: 46 (85% effort)   Tongue back Peak Max after 3 trials: 49 Target for treatment: 42 (85% effort)   Right Lip Peak Max after 3 trials: 26 Target for treatment: 22 (85% effort)   Left Lip Peak Max after 3 trials: 27 Target for treatment: 23 (85% effort)         PEAK MAX 3/17/23 3/20/23 3/21/23 3/24/23  03/27/23  03/28/23  03/31/23 04/04/23   Tongue tip  3 sets 30 (held for 3 sec on last set) 3 sets 30 (held for 3 sec on last set) 3 sets 30 (held for 3 sec on last set) 3 sets 30 (held for 3 sec on last set)  3 sets 30 (held for 3 sec on last set) 3 sets 30 (held for 3 sec on last set)     3 sets 30 (held for 3 sec on last set)   3 sets 30 (held for 3 sec on last set)   Tongue back 3 sets 30 (held for 3 sec on last set) 3 sets 30 (held for 3 sec on last set) 3 sets 30 (held for 3 sec on last set) 3 sets 30 (held for 3 sec on last set)  3 sets 30 (held for 3 sec on last set)  3 sets 30 (held for 3 sec on last set) 3 sets 30 (held for 3 sec on last set)   3 sets 30 (held for 3 sec on last set)   Right Lip 3 set of 30- patient reports hard (due to increased effort %) 3 sets 30 (held for 3 sec on last set) 3 sets 30 (held for 3 sec on last set) 3 sets 30 (held for 3 sec on last set)  3 sets 30 (held for 3 sec on last set)  3 sets 30 (held for 3 sec on last set) 3 sets 30 (held for 3 sec on last set) 3 sets 30 (held for 3 sec on last set)   Left Lip 3 sets 30 (held for 3 sec on last set) 3 sets 30 (held for 3 sec on last set) 3 sets 30 (held for 3 sec on last set) 3 sets 30 (held for 3 sec on last set)  3 sets 30 (held for 3 sec on last set)  3 sets 30 (held for 3 sec on last set) 3 sets 30 (held for 3 sec on last set) 3 sets 30 (held for 3 sec on last set)         Patient will perform oral motor and diadochokinetic speech rate exercises with > 90% accuracy to facilitate increased speech intelligibility, to be achieved in 4-6 weeks  Patient will utilize compensatory strategies (I e , over-articulation, decreased rate, etc) 80% of the time while orally reading sentence/paragraph length material to facilitate increased speech intelligibility, to be achieved in 4-6 weeks  Patient will complete oral reading and conversational tasks with the consistent use of compensatory strategies >90% of the time to facilitate increased speech intelligibility, to be achieved in 4-6 weeks      Patient and caregiver will be educated on speech intelligibility strategies (e g , over-exaggeration, slow rate, breath groups,, etc ) to promote increased communication success (to be achieved in 2-3 weeks)  Patient was reminded to use intelligibility strategies (slow rate, over-exaggeration, breathing) before reading paragraphs  Patient will utilize speech intelligibility strategies 80% of the time with min verbal cues while reading words, sentences, and paragraph length material to facilitate increased generalization of skills into conversational speech (to be achieved in 4-6 weeks)  COGNITIVE-LINGUISTIC:  Patient will complete auditory immediate and short-term memory tasks with 80% accuracy to facilitate increased ability to retell narratives and recall information within functional living environment (to be achieved in 4-6 weeks)  To target immediate memory, patient was read a short paragraph and asked to recall details from the paragraph (ex: what clothing did he put away?)  Patient recalled details w 66% acc (2/3 trials)  Patient will be educated on word finding strategies (i e , circumlocution) for improved generative naming and verbal expression skills (to be achieved in 1-2 weeks)  Patient will complete word generation tasks (e g , verbal fluency, categorization, analogies, synonyms/antonyms, , etc ) with 80% accuracy using word finding strategies to facilitate improved word retrieval skills (to be achieved in 4-6 weeks)  Plan:  -Patient was provided with home exercises/activities to target goals in plan of care at the end of today's session   -Continue with current plan of care

## 2023-04-04 NOTE — PROGRESS NOTES
Occupational Therapy Daily Note:    Today's date: 2023  Patient name: Cecelia James  : 1965  MRN: [id number]  Referring provider: Luther Dao MD  Dx:   Encounter Diagnosis   Name Primary? • Cerebrovascular accident (CVA), unspecified mechanism ([facility] [address] ) Yes       Subjective: You see my left eye is like kind of shut sometimes, feels like theres webs  Objective: Pt engaged in skilled OT treatment session with focus on fxnl cognition, UE NMR, visual perceptual training, visual scanning, FMC/GMC, FMS/GMS and visual tolerance, fixation and sustained convergence to increase engagement, endurance, tolerance, and independence with daily ADL and IADL tasks  CPT Code Minutes                                           Task Details        Therapeutic Activity 15 Pt continued Sequencing Power Web Task this date with focus to visual tolerance, FMC/FMS, functional tool use, hand to target, visual scanning, functional cognition, and activity tolerance/engagement  Drops noted x 1  Therapist providing pt with backwards letter alphabet on paper to aid in sequence order  Neuro Re-Ed 40 For improved spatial awareness and overall oculomotor neuro re-education, session initiated with taping strategies, 3x10, monocular taping  R->L eye occluded to address individual eye strengthening followed by peripheral field taping focusing on sustained convergence while engaging alphabet sequencing power web task with use of marbles and yellow tension pin to place to target  Pt completed forward and backward sequencing of alphabet/numbers  Therapeutic Exercise               Manual          Self Care         Assessment: Tolerated treatment well  Pt demo good sequencing skills as well and FM and GM endurance throughout task  Pt SWANSON started at 5/10 and decreased to 4/10 during tasks and remained the same until sessions end  Patient would benefit from continued skilled OT      Plan: Continued skilled OT per POC    INTERVENTION COMMENTS:  Diagnosis: Cerebrovascular accident (CVA), unspecified mechanism ([facility] [address] ) [I63 9]  Precautions: fall risk, cog deficits  FOTO: will complete  Insurance: Payor: [address] [place] [address] / Plan: [address] [place] [address] / Product Type: Medicaid [facility] Drusebastian Amaya  0 KF 81 ARWNORMAV, PN due 4/23/23  POC due 5/23/231

## 2023-04-05 ENCOUNTER — OFFICE VISIT (OUTPATIENT)
Dept: PHYSICAL THERAPY | Facility: REHABILITATION | Age: 58
End: 2023-04-05

## 2023-04-05 DIAGNOSIS — M54.42 ACUTE BILATERAL LOW BACK PAIN WITH LEFT-SIDED SCIATICA: ICD-10-CM

## 2023-04-05 DIAGNOSIS — I63.9 CEREBROVASCULAR ACCIDENT (CVA), UNSPECIFIED MECHANISM (HCC): ICD-10-CM

## 2023-04-05 DIAGNOSIS — Z78.9 IMPAIRED MOBILITY AND ACTIVITIES OF DAILY LIVING: ICD-10-CM

## 2023-04-05 DIAGNOSIS — M17.12 PRIMARY OSTEOARTHRITIS OF LEFT KNEE: Primary | ICD-10-CM

## 2023-04-05 DIAGNOSIS — Z74.09 IMPAIRED MOBILITY AND ACTIVITIES OF DAILY LIVING: ICD-10-CM

## 2023-04-05 NOTE — PROGRESS NOTES
"Daily Note     Today's date: 2023  Patient name: Marylou Snow  : 1965  MRN: [id number]  Referring provider: João Woods MD  Dx:   Encounter Diagnosis     ICD-10-CM    1  Primary osteoarthritis of left knee  M17 12       2  Impaired mobility and activities of daily living  Z74 09     Z78 9       3  Acute bilateral low back pain with left-sided sciatica  M54 42       4  Cerebrovascular accident (CVA), unspecified mechanism ([facility] [address] )  I63 9           Start Time: 930  Stop Time:   Total time in clinic (min): 40 minutes    Subjective: Patient reports being sore after last visit  Objective: See treatment diary below      Assessment: Patient had improved gait, pain, and knee ROM end of session  Better tolerance for soft-tissue massage today with less intensity  Progress as tolerated next visit  Patient would benefit from continued PT      Plan: Continue per plan of care        Precautions: standard, hx of CVA, cardiac, falls      Manuals 3/15 3/22 3/29 4/5         STR adductors and medial hamstring PRR PRR PRR  PRR         LASER    12 W DC 4'                      Patient education, vitals assessment  15' 15 15' 10'         Neuro Re-Ed                                                                                                        Ther Ex             Hip Abduction Stretch Supine BKFO HEP reviewed x20 5\"          Hip Add Iso w/ Ball   x20 5\" hold x20 5\" hold         Prone Knee Flexion Stretch    10\"x10 hold                                                             VG  DL L5 x30           Ther Activity                                       Gait Training                                       Modalities                                            "

## 2023-04-06 ENCOUNTER — OFFICE VISIT (OUTPATIENT)
Dept: CARDIOLOGY CLINIC | Facility: CLINIC | Age: 58
End: 2023-04-06

## 2023-04-06 VITALS
WEIGHT: 187 LBS | HEIGHT: 60 IN | HEART RATE: 82 BPM | OXYGEN SATURATION: 99 % | DIASTOLIC BLOOD PRESSURE: 84 MMHG | BODY MASS INDEX: 36.71 KG/M2 | SYSTOLIC BLOOD PRESSURE: 116 MMHG

## 2023-04-06 DIAGNOSIS — E78.2 MIXED HYPERLIPIDEMIA: Primary | ICD-10-CM

## 2023-04-06 DIAGNOSIS — R00.0 TACHYCARDIA: ICD-10-CM

## 2023-04-06 DIAGNOSIS — I63.9 CEREBROVASCULAR ACCIDENT (CVA), UNSPECIFIED MECHANISM (HCC): ICD-10-CM

## 2023-04-06 DIAGNOSIS — I63.9 STROKE (HCC): ICD-10-CM

## 2023-04-06 DIAGNOSIS — I10 HYPERTENSION: ICD-10-CM

## 2023-04-06 RX ORDER — ATORVASTATIN CALCIUM 80 MG/1
80 TABLET, FILM COATED ORAL EVERY EVENING
Qty: 30 TABLET | Refills: 11 | Status: SHIPPED | OUTPATIENT
Start: 2023-04-06

## 2023-04-06 RX ORDER — LOSARTAN POTASSIUM 100 MG/1
100 TABLET ORAL DAILY
COMMUNITY
Start: 2023-03-23

## 2023-04-06 RX ORDER — AMLODIPINE BESYLATE 10 MG/1
10 TABLET ORAL DAILY
COMMUNITY
Start: 2023-04-01

## 2023-04-06 NOTE — PATIENT INSTRUCTIONS
For your blood pressure take amlodipine 10 mg daily and losartan 100 mg daily  Your goal blood pressure is < 130/80  For your stroke history take aspirin 81 mg daily (baby aspirin) and atorvastatin 80 mg daily (I increased this from 40 mg)

## 2023-04-06 NOTE — PROGRESS NOTES
Daily Speech Treatment Note    Today's date: 2023  Patient’s name: Nickolas Vasquez  : 1965  MRN: [id number]  Safety measures: CVA  Referring provider: Orestes Murillo MD    Encounter Diagnosis     ICD-10-CM    1  Dysarthria  R47 1       2  Other symbolic dysfunctions  C04 8       3  Cerebrovascular accident (CVA), unspecified mechanism ([facility] [address] )  I63 9            Visit tracking:  -Referring provider: Epic  -Billing guidelines: AMA  -Visit #  -Insurance: [facility]  -RE due 23    Subjective/Behavioral:  - Patient reported that she could be better -  but she is in good spirits  Objective/Assessment:    Session ended 15 minutes early to accommodate scheduling adjustments with OT  Short-term goals:  Patient will perform oral motor exercises using IOPI device on  lingual and labial muscles to facilitate improved strength and function for increased speech intelligibility, to be achieved in 4-6 weeks    IOPI PEAK MEASUREMENT WEEKLY GRID       23   Tongue tip  (goal = 63) 48 58 56 53 54 57   Tongue back  (goal = 55) 32 31 49 43 49 50   Right Lip  (goal = 28) 25 16 21 25 26 27   Left Lip  (goal = 28) 24 20 20 25 27 33         IOPI -- Exercise Targets     Tongue tip Peak Max after 3 trials: 57 Target for treatment: 48 (85% effort)   Tongue back Peak Max after 3 trials: 50 Target for treatment: 43 (85% effort)   Right Lip Peak Max after 3 trials: 27 Target for treatment: 23 (85% effort)   Left Lip Peak Max after 3 trials: 33 Target for treatment: 28 (85% effort)         PEAK MAX 3/17/23 3/20/23 3/21/23 3/24/23  03/27/23  03/28/23  03/31/23 04/04/23 04/07/23   Tongue tip  3 sets 30 (held for 3 sec on last set) 3 sets 30 (held for 3 sec on last set) 3 sets 30 (held for 3 sec on last set) 3 sets 30 (held for 3 sec on last set)  3 sets 30 (held for 3 sec on last set) 3 sets 30 (held for 3 sec on last set)     3 sets 30 (held for 3 sec on last set)   3 sets 30 (held for 3 sec on last set) 3 sets 30 (held for 3 sec on last set) - only able to hold for 1 second in last set   Tongue back 3 sets 30 (held for 3 sec on last set) 3 sets 30 (held for 3 sec on last set) 3 sets 30 (held for 3 sec on last set) 3 sets 30 (held for 3 sec on last set)  3 sets 30 (held for 3 sec on last set)  3 sets 30 (held for 3 sec on last set) 3 sets 30 (held for 3 sec on last set)   3 sets 30 (held for 3 sec on last set) 3 sets 30 (held for 3 sec on last set) - only able to hold for 1 second in last set   Right Lip 3 set of 30- patient reports hard (due to increased effort %) 3 sets 30 (held for 3 sec on last set) 3 sets 30 (held for 3 sec on last set) 3 sets 30 (held for 3 sec on last set)  3 sets 30 (held for 3 sec on last set)  3 sets 30 (held for 3 sec on last set) 3 sets 30 (held for 3 sec on last set) 3 sets 30 (held for 3 sec on last set) Did not target due to time constraints  Left Lip 3 sets 30 (held for 3 sec on last set) 3 sets 30 (held for 3 sec on last set) 3 sets 30 (held for 3 sec on last set) 3 sets 30 (held for 3 sec on last set)  3 sets 30 (held for 3 sec on last set)  3 sets 30 (held for 3 sec on last set) 3 sets 30 (held for 3 sec on last set) 3 sets 30 (held for 3 sec on last set) Did not target due to time constraints  Patient will perform oral motor and diadochokinetic speech rate exercises with > 90% accuracy to facilitate increased speech intelligibility, to be achieved in 4-6 weeks  Patient will utilize compensatory strategies (I e , over-articulation, decreased rate, etc) 80% of the time while orally reading sentence/paragraph length material to facilitate increased speech intelligibility, to be achieved in 4-6 weeks  Patient will complete oral reading and conversational tasks with the consistent use of compensatory strategies >90% of the time to facilitate increased speech intelligibility, to be achieved in 4-6 weeks      Patient and caregiver will be educated on speech intelligibility strategies (e g , over-exaggeration, slow rate, breath groups,, etc ) to promote increased communication success (to be achieved in 2-3 weeks)  Patient was reminded to use intelligibility strategies (slow rate, over-exaggeration, breathing) before reading paragraphs  Patient will utilize speech intelligibility strategies 80% of the time with min verbal cues while reading words, sentences, and paragraph length material to facilitate increased generalization of skills into conversational speech (to be achieved in 4-6 weeks)  COGNITIVE-LINGUISTIC:  Patient will complete auditory immediate and short-term memory tasks with 80% accuracy to facilitate increased ability to retell narratives and recall information within functional living environment (to be achieved in 4-6 weeks)  Patient will be educated on word finding strategies (i e , circumlocution) for improved generative naming and verbal expression skills (to be achieved in 1-2 weeks)  Patient will complete word generation tasks (e g , verbal fluency, categorization, analogies, synonyms/antonyms, , etc ) with 80% accuracy using word finding strategies to facilitate improved word retrieval skills (to be achieved in 4-6 weeks)  Plan:  -Patient was provided with home exercises/activities to target goals in plan of care at the end of today's session   -Continue with current plan of care

## 2023-04-06 NOTE — PROGRESS NOTES
Cardiology Outpatient Follow-Up Note - Perri Mederos 62 y o  female MRN: [id number]      Assessment/Plan:  1  Hypertension  At goal  Goal BP < 130 /80 for secondary prevention  Continue losartan 100 mg daily and amlodipine 10 mg daily  Stop metoprolol    - Ambulatory referral to Cardiology    2  Stroke [facility])  Continue aspirin 81 mg daily and statin  She has completed P2Y12 inhibitor therapy (clopidogrel)  - Ambulatory referral to Cardiology    3  Tachycardia  During rehab  Asymptomatic mild sinus tachycardia  Stop metoprolol    - Ambulatory referral to Cardiology    4  Cerebrovascular accident (CVA), unspecified mechanism ([address] )    5  Mixed hyperlipidemia  Her pre-treatement LDL was 171 mg/dL  I recommended increasing her atorvastatin to 80 mg daily  Her LDL goal is < 70 mg/dL  - atorvastatin (LIPITOR) 80 mg tablet; Take 1 tablet (80 mg total) by mouth every evening  Dispense: 30 tablet; Refill: 11      We will see Perri Mederos back in 12 months for routine follow-up  Subjective:     HPI: Perri Mederos is a 62y o  year old female with HTN and HLD who was first seen by me at [facility] for chest pain in the setting of a stroke  She had no evidence of ACS at that time and chest pain resolved with repositioning the patient in the bed  We did not pursue further cardiac work-up  She has not had the chest pain since, throughout her entire PM&R inpatient course  She was having some sinus tachycardia in physical rehab and was started on metoprolol  Her HCTZ was stopped  She was since put on amlodipine which was just recently started-- by PCP  Cardiac Testing:    TTE 2/5/23 Interpretation Summary       •  Left Ventricle: Left ventricular cavity size is normal  Wall thickness is normal  The left ventricular ejection fraction is 60%   Systolic function is normal  Wall motion is normal  Diastolic function is normal   •  Technically difficult study            EKGs, personally reviewed:  n/a    Relevant Labs & Results:  Lipid profile 2/5/23 -- reviewed, ,  mg/dL      ROS:  Review of Systems:  Review of Systems    Objective:     Vitals:   Vitals:    04/06/23 1534   BP: 116/84   BP Location: Right arm   Patient Position: Sitting   Cuff Size: Large   Pulse: 82   SpO2: 99%   Weight: 84 8 kg (187 lb)   Height: 5' (1 524 m)    Body surface area is 1 81 meters squared  Wt Readings from Last 3 Encounters:   04/06/23 84 8 kg (187 lb)   03/08/23 85 8 kg (189 lb 3 2 oz)   03/03/23 85 7 kg (189 lb)       Physical Exam:  General: Carissa Pichardo is a well appearing female, in no acute distress, sitting comfortably  HEENT: moist mucous membranes, EOMI  Neck:  No JVD, supple, trachea midline  Cardiovascular: unremarkable S1/S2, regular rate and rhythm, no murmurs, rubs or gallops  Pulmonary: normal respiratory effort, CTAB  Abdomen: soft and nondistended  Extremities: No lower extremity edema  Warm and well perfused extremities  Neuro: AAOx3 (person, place, time)  Psych: Normal mood and affect, cooperative      Medications (at the START of this encounter):   Outpatient Medications Prior to Visit   Medication Sig Dispense Refill   • amLODIPine (NORVASC) 10 mg tablet Take 10 mg by mouth daily     • aspirin 81 mg chewable tablet Chew 1 tablet (81 mg total) daily 30 tablet 0   • atorvastatin (LIPITOR) 40 mg tablet Take 1 tablet (40 mg total) by mouth every evening 30 tablet 0   • gabapentin (NEURONTIN) 100 mg capsule Take 1 capsule (100 mg total) by mouth daily as needed (headache) 15 capsule 0   • losartan (COZAAR) 100 MG tablet Take 100 mg by mouth daily     • metoprolol tartrate (LOPRESSOR) 25 mg tablet Take 1 tablet (25 mg total) by mouth every 12 (twelve) hours 60 tablet 0   • QUEtiapine (SEROquel) 25 mg tablet Take 25 mg by mouth every evening     • acetaminophen (TYLENOL) 325 mg tablet Take 2 tablets (650 mg total) by mouth 3 (three) times a day as needed for mild pain or headaches "(Patient not taking: Reported on 4/6/2023)  0   • albuterol (PROVENTIL HFA,VENTOLIN HFA) 90 mcg/act inhaler Inhale 2 puffs every 4 (four) hours as needed for wheezing (Patient not taking: Reported on 4/6/2023) 6 7 g 0   • clopidogrel (PLAVIX) 75 mg tablet Take 1 tablet (75 mg total) by mouth daily for 7 days (Patient not taking: Reported on 4/6/2023) 7 tablet 0   • DULoxetine (CYMBALTA) 60 mg delayed release capsule Take 1 capsule (60 mg total) by mouth daily (Patient not taking: Reported on 4/6/2023)  0   • famotidine (PEPCID) 20 mg tablet Take 1 tablet (20 mg total) by mouth 2 (two) times a day (Patient not taking: Reported on 4/6/2023) 60 tablet 0   • glycerin-hypromellose- (ARTIFICIAL TEARS) 0 2-0 2-1 % SOLN Administer 1 drop into the left eye 3 (three) times a day (Patient not taking: Reported on 4/6/2023) 15 mL 0   • losartan (COZAAR) 25 mg tablet Take 1 tablet (25 mg total) by mouth daily (Patient not taking: Reported on 4/6/2023) 30 tablet 0     No facility-administered medications prior to visit  Time Spent:  Total time (face-to-face and non-face-to-face) spent on today's visit was 25 minutes  This includes preparation for the visits (i e  reviewing test results), performance of a medically appropriate history and examination, and orders for medications, tests or other procedures  This time is exclusive of procedures performed and time spent teaching  This note was completed in part utilizing [employer] [employer] voice recognition software  Grammatical errors, random word insertion, spelling mistakes, occasional wrong word or \"sound-alike\" substitutions and incomplete sentences may be an occasional consequence of the system secondary to software limitations, ambient noise and hardware issues  At the time of dictation, efforts were made to edit, clarify and /or correct errors  Please read the chart carefully and recognize, using context, where substitutions have occurred    If you " have any questions or concerns about the context, text or information contained within the body of this dictation, please contact myself, the provider, for further clarification

## 2023-04-07 ENCOUNTER — OFFICE VISIT (OUTPATIENT)
Dept: OCCUPATIONAL THERAPY | Facility: CLINIC | Age: 58
End: 2023-04-07

## 2023-04-07 ENCOUNTER — OFFICE VISIT (OUTPATIENT)
Dept: SPEECH THERAPY | Facility: CLINIC | Age: 58
End: 2023-04-07

## 2023-04-07 DIAGNOSIS — I63.9 CEREBROVASCULAR ACCIDENT (CVA), UNSPECIFIED MECHANISM (HCC): ICD-10-CM

## 2023-04-07 DIAGNOSIS — R47.1 DYSARTHRIA: Primary | ICD-10-CM

## 2023-04-07 DIAGNOSIS — I63.9 CEREBROVASCULAR ACCIDENT (CVA), UNSPECIFIED MECHANISM (HCC): Primary | ICD-10-CM

## 2023-04-07 DIAGNOSIS — R48.8 OTHER SYMBOLIC DYSFUNCTIONS: ICD-10-CM

## 2023-04-07 NOTE — PROGRESS NOTES
"Occupational Therapy Daily Note:    Today's date: 2023  Patient name: Lorrie Mirza  : 1965  MRN: [id number]  Referring provider: Karoline Dodson MD  Dx:   Encounter Diagnosis   Name Primary? • Cerebrovascular accident (CVA), unspecified mechanism ([facility] ) Yes     Time:  145-2 G  2-215     Subjective: \"My blood pressure is better, I went to the cardiologist and they upped my medication  \"    Objective: Pt engaged in skilled OT treatment session with focus on fxnl cognition, UE NMR, visual perceptual training, visual scanning, FMC/GMC, FMS/GMS and visual tolerance, fixation and sustained convergence to increase engagement, endurance, tolerance, and independence with daily ADL and IADL tasks  CPT Code Minutes                                           Task Details        Therapeutic Activity 20  Pt completed geoboard task with focus on improving visual scanning, accommodation, placekeeping, fine motor control/coordination and functional tool use  Pt completed task with template positioned at tabletop and geoboard positioned on slant board, utilized tweezers to retrieve rubberbands and place to geoboard  Required increased time to correct placement of bands on board  Neuro Re-Ed                Therapeutic Exercise 10 For therapeutic exercise benefit, pt tolerated 5 min x2 prograde/retrograde UEB #2 0 resistance for neuro motor re-ed, GMC/GMS, proximal UE strength/endurance, & ROM/flexibility  Manual          Self Care         Assessment: Tolerated treatment well  Pt reported BP today was within normal limits due to change in medication  Pt able to tolerate UEB w/ no issues  Pt demo good progress in functional tool use, though required increased time to maintain accuracy in template copy d/t visual deficits  Patient would benefit from continued skilled OT      Plan: Continued skilled OT per POC    INTERVENTION COMMENTS:  Diagnosis: Cerebrovascular accident (CVA), unspecified " mechanism ([facility] ) [I63 9]  Precautions: fall risk, cog deficits  FOTO: will complete  Insurance: Payor: Roro Montanez / Plan: Roro Montanez / Product Type: Medicaid [facility] Neda Cleveland  7 EQ 71 EKWPHD, PN due 4/23/23  POC due 5/23/231

## 2023-04-25 ENCOUNTER — EVALUATION (OUTPATIENT)
Dept: OCCUPATIONAL THERAPY | Facility: CLINIC | Age: 58
End: 2023-04-25

## 2023-04-25 ENCOUNTER — OFFICE VISIT (OUTPATIENT)
Dept: SPEECH THERAPY | Facility: CLINIC | Age: 58
End: 2023-04-25

## 2023-04-25 DIAGNOSIS — I63.9 CEREBROVASCULAR ACCIDENT (CVA), UNSPECIFIED MECHANISM (HCC): ICD-10-CM

## 2023-04-25 DIAGNOSIS — R47.1 DYSARTHRIA: ICD-10-CM

## 2023-04-25 DIAGNOSIS — R48.8 OTHER SYMBOLIC DYSFUNCTIONS: Primary | ICD-10-CM

## 2023-04-25 DIAGNOSIS — I63.9 CEREBROVASCULAR ACCIDENT (CVA), UNSPECIFIED MECHANISM (HCC): Primary | ICD-10-CM

## 2023-04-25 NOTE — PROGRESS NOTES
"OCCUPATIONAL THERAPY PROGRESS NOTE #2    4/25/2023  Brenda Glance  1965  [id number]  Kusum Johnston MD  1  Cerebrovascular accident (CVA), unspecified mechanism ([facility] )        Subjective    \"I can do pretty much everything  \"    PATIENT GOAL: \"I want to drive\"    Pt arrived 20 mins late to today's appointment  Assessment/Plan    Skilled Analysis:  Pt is a 62 y o  female referred to Occupational Therapy s/p Cerebrovascular accident (CVA), unspecified mechanism ([facility] ) [I63 9]  Pt reported therapy is going well  She feels like her hand is 99% compared to normal  She is able to comb and do her hair, cooking, putting her make up on, she is driving and has no issues  Her blood pressure is good since her medication changed a few weeks ago, is generally 116/82 daily  Pt participated in skilled OT progress note and following formalized testing, presents with improvements in RUE strength, intrinsic strength in lateral pinch, gross grasp, improved fine motor control & coordination, and slight improvement in oculomotor control with improved saccades, & improved binocular teaming  Pt demo good understanding  At this time, pt demonstrates indep functional performance of ADL/IADL, salient and leisure tasks and no longer demonstrates the need for skilled Occupational Therapy services  Disc d/c from OP OT, pt in agreement   D/C OT    Goals:  Short Term Goals (4 weeks)    STRENGTH/ENDURANCE    ·  Pt will increase R UE strength to 4/5 through the use of strengthening exercises and home program for eventual return to life roles and salient tasks MET    · Pt will demo with G carryover of Home Exercise Program to improve functional progression towards goals in Plan of care and for improved functional use of RUE MET     FMC/GMC    · Pt will increase R UE rate of manipulation by 10% for all FM tests for improved functional performance with salient tasks MET    · Pt will increase reaction time to <1 5 second with hand to " target timed trials for improved reaction time and automaticity of coordination for improved functional performance with ADLs/IADLs and salient tasks MET    · Pt will increase R UE prehension patterns for improved utensil and container management with <10% droppage MET    · Pt will demo improved motor learning of constructional and new motor actions for improved b/l coordination and motor planning evident by 75% accuracy for fxnl ADL/IADL performance MET    VISION  · Pt will increase oculomotor control for improved saccades, pursuits, con/divergent tasks for improved reading, board to table tasks x 75% accuracy with minimal increase in symptoms MET    · Pt will increase oculomotor control for improved dynamic activities with head turns, board/screen to table tasks symptom free with 75% accuracy for improved life roles MET    ATTENTION  · Pt will maintain attention to task for 25 minutes in multimodal environment for baseline performance, improved role performance and to improve learning and to simulate return to life environment MET    · Pt will demo ability to participate in dual tasking/divided attention task with 30% accuracy in multimodal environment to simulate return to life roles MET    · Pt will demo ability to alternate attention between 2 tasks with cog loading and 30% accuracy with G retention of task directions in multimodal environment to simulate return to life roles MET      Long Term Goals (8 weeks)    STRENGTH/ENDURANCE    ·  Pt will increase R UE strength to R=L through the use of strengthening exercises and home program for eventual return to life roles and salient tasks MET    FMC/GMC    · Pt will increase R UE rate of manipulation by 20% for all FM tests for improved functional performance with salient tasks MET    · Pt will increase reaction time to <1 second with hand to target timed trials for improved reaction time and automaticity of coordination for improved functional performance with ADLs/IADLs and salient tasks MET    · Pt will increase R UE prehension patterns for improved utensil and container management with <5% droppage MET    · Pt will demo improved motor learning of constructional and new motor actions for improved b/l coordination and motor planning evident by 90% accuracy for fxnl ADL/IADL performance MET    VISION  · Pt will increase oculomotor control for improved saccades, pursuits, con/divergent tasks for improved reading, board to table tasks x 90% accuracy with minimal increase in symptoms MET    · Pt will increase oculomotor control for improved dynamic activities with head turns, board/screen to table tasks symptom free with 90% accuracy for improved life roles MET    ATTENTION (added on 3/2/23)  · Pt will maintain attention to task for 45 minutes in multimodal environment for baseline performance,  improved role performance and to improve learning and to simulate return to life environment MET    · Pt will demo ability to participate in dual tasking/divided attention task with 65% accuracy in multimodal environment to simulate return to life roles MET    · Pt will demo ability to alternate attention between 2 tasks with cog loading and 65% accuracy with G retention of task directions in multimodal environment to simulate return to life  roles MET    Pain Levels:     Restin    With Activity:  4 (knee)    Objective    Impairment Observations:     Upper Extremity      RUE LUE Comments                 UPPER EXTREMITY FXN Impaired Intact Dominant Hand: R                         /Pinch Strength         Dynamometer      - Gross Grasp 55 lbs 50 lbs RUE: IMPROVED 10#   Pinch Meter       - PINCER 12 lbs 14 lbs RUE: remained  LUE: IMPROVED 5#    - TRIPOD 11 lbs 11 5 lbs RUE: remained  LUE: IMPROVED 1 5#    - LATERAL 12 5 lbs  11 lbs RUE: remained 2 5#  LUE: remained             AROM (seated)   No pain reported   Scapular Protraction Full full    Scapular Retraction Full full "  Elevation/Depression Full   full    Shoulder Abduction full  full IMPROVED   Shoulder Adduction Full  Full     Shoulder Flexion  full   full  IMPROVED   Shoulder Extension Full  Full     Horizontal Abduction Full  Full     Horizontal Adduction Full  Full     Elbow Flexion Full  Full     Elbow Extension Full  Full     Pronation Full  Full     Supination Full  Full     Wrist Flexion Full  Full     Wrist Extension Full  Full     Digit Flexion Full  Full     Digit Extension Full  Full     Composite Grasp Full  Full     Hook Grasp Full  Full     Opposition Full  Full  Same as LUE                                  MMT     No pain reported   Elevation 5/5 5/5    Shoulder Abduction 4+/5 4+/5 RUE: IMPROVED   Shoulder Adduction 4+/5 4+/5 RUE: IMPROVED   Shoulder Flexion 4+/5 4+/5 RUE: IMPROVED   Shoulder Extension 4+/5 4+/5 RUE: IMPROVED   Elbow Flexion 4+/5 4+/5 RUE: IMPROVED   Elbow Extension 4+/5 4+/5    Wrist Flexion 4+/5 4+/5    Wrist Extension 4+/5 4+/5    Gross Grasp 4+/5 4+/5          SENSATION      Proprioception Intact   Intact    Hot/Cold Temp Intact Intact          COORDINATION      9 Hole Peg Test 23 seconds 21 seconds RUE: IMPROVED 3 secs       Fxnl Dexterity Test 28 seconds 24 seconds RUE: IMPROVED 4 secs   Rapid, Alternating Movement Test     Normal     Vision       Comments                                        VISION SCREEN         GLASSES (prescription)          Symptoms Include      Last Eye Exam \"year or two ago\"           Visual Acuity (near) R eye  20/30     L eye 20/25 remained   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    Binocularity (far) orthotropia and orthophoria    Convergence  no diplopia reported; slight convergence insufficiency @ 2 inches remained   Accommodation blurriness/loss of focus reported at 4 inches remained   [employer], alignment and suppression of far; 'Y' reported at red bead Improved, less misalignment noted   Mobility             Pursuits " smooth in all planes IMPROVED smooth throughout movement           Saccades slightly jerky and dysmetric in vertical and diagonal planes IMPROVED, not jerky in horizontal plane           Range of Motion WFL    Visual perceptual midline             Midline at            Horizon  at      RBANS: not reassessed today  Performed by speech therapy on IE  Pt received attention score of 1%, extremely low category     Digit Span 7/16  Coding 27/89  Overall Score: 8th percentile (borderline)    INTERVENTION COMMENTS:  Diagnosis: Cerebrovascular accident (CVA), unspecified mechanism ([facility] ) [I63 9]  Precautions: fall risk, cog deficits  FOTO: will complete  Insurance: Payor: Bill Contreras / Plan: Bill Contreras / Product Type: Medicaid O Lis Miranda  46 ES 77 [id number], D/C OT

## 2023-04-25 NOTE — PROGRESS NOTES
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 1       2  Dysarthria  R47 1       3  Cerebrovascular accident (CVA), unspecified mechanism ([facility] [address] )  I63 9          Visit tracking:  -Referring provider: Epic  -Billing guidelines: AMA  -Visit #  -Insurance: [facility]      Subjective/Behavioral:   - Patient was in good spirits today  - Patient was discharged at the conclusion of the session today  Objective/Assessment:    The Repeatable Battery for the Assessment of Neuropsychological Status (RBANS) is a brief, individually-administered assessment which measures attention, language, visuospatial/constructional abilities, and immediate & delayed memory  The RBANS is intended for use with adolescents to adults, ages 15 to 80 years  The following results were obtained during the administration of the assessment  Form: B    Cognitive Domain/Subtest: Index Score: Percentile Rank: Classification: IE Index Score: Status:   IMMEDIATE MEMORY 76 5%ile [place] 87 DECLINE        1  List Learning ()          2  Story Memory ()           VISUOSPATIAL/  CONSTRUCTIONAL 92 30%ile Average 102 DECLINE        3  Figure Copy ()          4  Line Orientation ()           LANGUAGE 84 14%ile Low Average 57 IMPROVEMENT        5  Picture Naming (9/10)          6  Semantic Fluency ()           ATTENTION 56 0 2%ile Extremely Low 64 DECLINE        7  Digit Span ()          8  Coding ()           DELAYED MEMORY 64 64%ile Extremely Low 105 DECLINE        9  List Recall (4/10)          10  List Recognition ()          11  Story Recall ()          12   Figure Recall ()           Sum of Index Scores:  372  415 DECLINE   Total Score:  67      Percentile: 1%ile Classification: Extremely Low          “IE” indicates the scores from the initial evaluation (2/27/23)  Form: C      *Patient named 10 concrete category members (zoo animals) in 60 sec (norm=15+)  -- BELOW AVERAGE    *Patient named 8 abstract category members (words beginning with letter 'B') in 60 sec (norm=10+)  -- BELOW AVERAGEw      Short-term goals:  Patient will perform oral motor exercises using IOPI device on  lingual and labial muscles to facilitate improved strength and function for increased speech intelligibility, to be achieved in 4-6 weeks   MET    IOPI PEAK MEASUREMENT WEEKLY GRID       04/11/2023 04/13/2023 04/18/23 04/20/23 04/25/23     Tongue tip  (goal = 63) 57 54 62 56 57     Tongue back  (goal = 55) 52 49 49 54 49     Right Lip  (goal = 28) 27 26 26 30 27     Left Lip  (goal = 28) 27 24 25 28 30           IOPI -- Exercise Targets     Tongue tip Peak Max after 3 trials: 57 Target for treatment: 54 (95% effort)   Tongue back Peak Max after 3 trials: 49 Target for treatment: 46 (95% effort)   Right Lip Peak Max after 3 trials: 27 Target for treatment: 26 (95% effort)   Left Lip Peak Max after 3 trials: 30 Target for treatment: 29 (95% effort)         PEAK MAX 04/11/23 04/13/23 04/18/23 04/20/23 04/25/23        Tongue tip  3 sets 30 (held for 3 sec on last set) 3 sets 30 (held for 3 sec on last set, targeting endurance) 3 sets 30 (held for 3 sec on last set, targeting endurance) Patient completed 13 at 90% of max (resistance - 56) and reported it was too difficult, adjusted to 85% of max (resistance - 53) 2 sets of 30 reps (pumps)    1 set of 30 reps (holds, 3 sec each) 1 set of 30        Tongue back 3 sets 30 (held for 3 sec on last set)  3 sets 30 (held for 3 sec on last set, targeting endurance) 3 sets 30 (held for 3 sec on last set, targeting endurance) 2 sets of 30 reps (pumps)    1 set of 30 reps (holds, 3 sec each) 1 set of 30        Right Lip 3 sets 30 (held for 3 sec on last set) 3 sets 30 (held for 3 sec on last set, targeting endurance) 3 sets 30 (held for 3 sec on last set, targeting endurance)    2 sets of 30 reps (pumps)    1 set of 30 reps (holds, 3 sec each) 1 set of 30        Left Lip 3 sets 30 (held for 3 sec on last set) 3 sets 30 (held for 3 sec on last set, targeting endurance) 3 sets 30 (held for 3 sec on last set, targeting endurance)    2 sets of 30 reps (pumps)    1 set of 30 reps (holds, 3 sec each) 1 set of 30          Patient will perform oral motor and diadochokinetic speech rate exercises with > 90% accuracy to facilitate increased speech intelligibility, to be achieved in 4-6 weeks  MET    Patient will utilize compensatory strategies (I e , over-articulation, decreased rate, etc) 80% of the time while orally reading sentence/paragraph length material to facilitate increased speech intelligibility, to be achieved in 4-6 weeks  MET    Patient will complete oral reading and conversational tasks with the consistent use of compensatory strategies >90% of the time to facilitate increased speech intelligibility, to be achieved in 4-6 weeks  MET    Patient and caregiver will be educated on speech intelligibility strategies (e g , over-exaggeration, slow rate, breath groups,, etc ) to promote increased communication success (to be achieved in 2-3 weeks)  MET    Patient was reminded to use intelligibility strategies (slow rate, over-exaggeration, breathing) before reading paragraphs  MET    Patient will utilize speech intelligibility strategies 80% of the time with min verbal cues while reading words, sentences, and paragraph length material to facilitate increased generalization of skills into conversational speech (to be achieved in 4-6 weeks)   MET    COGNITIVE-LINGUISTIC:  Patient will complete auditory immediate and short-term memory tasks with 80% accuracy to facilitate increased ability to retell narratives and recall information within functional living environment (to be achieved in 4-6 weeks)  MET    Patient will be educated on word finding strategies (i e , circumlocution) for improved generative naming and verbal expression skills (to be achieved in 1-2 weeks)  MET    Patient will complete word generation tasks (e g , verbal fluency, categorization, analogies, synonyms/antonyms, , etc ) with 80% accuracy using word finding strategies to facilitate improved word retrieval skills (to be achieved in 4-6 weeks)  MET    Long Term Goals:     Patient will demonstrate improved functional and intelligible speech with the use of adequate labial and lingual function, increased articulatory precision, and speech prosody by discharge  MET    Patient will independently utilize speech intelligibility strategies (e g , over-exaggeration, slow rate, breath groups, etc ) during oral reading tasks >90% intelligibility to facilitate increased generalization of skills into conversational speech by discharge  MET    Patient will demonstrate independent implementation of compensatory strategies in conversation to improve speech intelligibility by discharge  MET    Patient will develop functional and intelligible speech to promote positive communication interractions with the use of speech intelligibility strategies (to be achieved by discharge)  MET      Plan:  -Discharge

## 2023-04-26 ENCOUNTER — OFFICE VISIT (OUTPATIENT)
Dept: PHYSICAL THERAPY | Facility: REHABILITATION | Age: 58
End: 2023-04-26

## 2023-04-26 DIAGNOSIS — Z78.9 IMPAIRED MOBILITY AND ACTIVITIES OF DAILY LIVING: Primary | ICD-10-CM

## 2023-04-26 DIAGNOSIS — Z74.09 IMPAIRED MOBILITY AND ACTIVITIES OF DAILY LIVING: Primary | ICD-10-CM

## 2023-04-26 DIAGNOSIS — M17.12 PRIMARY OSTEOARTHRITIS OF LEFT KNEE: ICD-10-CM

## 2023-04-26 DIAGNOSIS — I63.9 CEREBROVASCULAR ACCIDENT (CVA), UNSPECIFIED MECHANISM (HCC): ICD-10-CM

## 2023-04-26 NOTE — PROGRESS NOTES
"Daily Note     Today's date: 2023  Patient name: Ileana Natarajan  : 1965  MRN: [id number]  Referring provider: Vidhi Tejeda MD  Dx:   Encounter Diagnosis     ICD-10-CM    1  Impaired mobility and activities of daily living  Z74 09     Z78 9       2  Cerebrovascular accident (CVA), unspecified mechanism ([address] )  I63 9       3  Primary osteoarthritis of left knee  M17 12           Start Time: 1530  Stop Time: 1615  Total time in clinic (min): 45 minutes    Subjective: Patient reports knee is starting to feel slightly improved  Objective: See treatment diary below      Assessment: Patient continues to respond well to activities  Continue to progress slowly  Patient would benefit from continued PT      Plan: Continue per plan of care        Precautions: standard, hx of CVA, cardiac, falls      Manuals 3/15 3/22 3/29 4/5 4/12 4/19 4/26      STR adductors and medial hamstring PRR PRR PRR  PRR PRR PRR pes anserine PRR pes anserine      LASER    12 W DC 4'   14 W DC 4'                   Patient education, vitals assessment  15 15 15' 10' 10' 10' 10'      Neuro Re-Ed                                                                                                        Ther Ex             Hip Abduction Stretch Supine BKFO HEP reviewed x20 5\"          Hip Add Iso w/ Ball   x20 5\" hold x20 5\" hold 2x10 5\" hold 2x10 5\" hold 2x10 5\" hold       Prone Knee Flexion Stretch    10\"x10 hold         Bridges     2x10 yhb 2x10 yhb 2x15 yhb      Supine Clamshell w/ Hip Abd     2x10 5\" hold yhb 2x10 5\" hold yhb 2x10 5\" hold yhb      LAQ      2# 2x10 np resume                   VG  DL L5 x30   DL L5 DL L5 5' yhb DL L5 5' yhb      Ther Activity                                       Gait Training                                       Modalities                                            "

## 2023-04-27 ENCOUNTER — APPOINTMENT (OUTPATIENT)
Dept: SPEECH THERAPY | Facility: CLINIC | Age: 58
End: 2023-04-27

## 2023-04-27 ENCOUNTER — APPOINTMENT (OUTPATIENT)
Dept: OCCUPATIONAL THERAPY | Facility: CLINIC | Age: 58
End: 2023-04-27

## 2023-05-03 ENCOUNTER — OFFICE VISIT (OUTPATIENT)
Dept: PHYSICAL THERAPY | Facility: REHABILITATION | Age: 58
End: 2023-05-03

## 2023-05-03 DIAGNOSIS — Z74.09 IMPAIRED MOBILITY AND ACTIVITIES OF DAILY LIVING: Primary | ICD-10-CM

## 2023-05-03 DIAGNOSIS — M54.42 ACUTE BILATERAL LOW BACK PAIN WITH LEFT-SIDED SCIATICA: ICD-10-CM

## 2023-05-03 DIAGNOSIS — M17.12 PRIMARY OSTEOARTHRITIS OF LEFT KNEE: ICD-10-CM

## 2023-05-03 DIAGNOSIS — Z78.9 IMPAIRED MOBILITY AND ACTIVITIES OF DAILY LIVING: Primary | ICD-10-CM

## 2023-05-03 DIAGNOSIS — I63.9 CEREBROVASCULAR ACCIDENT (CVA), UNSPECIFIED MECHANISM (HCC): ICD-10-CM

## 2023-05-03 NOTE — PROGRESS NOTES
"Daily Note     Today's date: 5/3/2023  Patient name: Larry Lechuga  : 1965  MRN: [id number]  Referring provider: Tommy Mcleod MD  Dx:   Encounter Diagnosis     ICD-10-CM    1  Impaired mobility and activities of daily living  Z74 09     Z78 9       2  Cerebrovascular accident (CVA), unspecified mechanism ([address] )  I63 9       3  Primary osteoarthritis of left knee  M17 12       4  Acute bilateral low back pain with left-sided sciatica  M54 42           Start Time: 1445  Stop Time: 1530  Total time in clinic (min): 45 minutes    Subjective: Patient reports her knee is getting better but symptoms still are present on the medial knee  Objective: See treatment diary below      Assessment: Patient responding well to soft-tissue mobilizations of medial knee  Patient had improved gait and pain end of session  Will continue to progress strengthening activities  Patient would benefit from continued PT      Plan: Continue per plan of care        Precautions: standard, hx of CVA, cardiac, falls      Manuals 3/15 3/22 3/29 4/5 4/12 4/19 4/26 5/3     STR adductors and medial hamstring PRR PRR PRR  PRR PRR PRR pes anserine PRR pes anserine PRR pes anserine, hamstirng     LASER    12 W DC 4'   14 W DC 4' 16W DC 4'                  Patient education, vitals assessment  15' 15 15' 10' 10' 10' 10' 10'     Neuro Re-Ed                                                                                                        Ther Ex             Hip Abduction Stretch Supine BKFO HEP reviewed x20 5\"          Hip Add Iso w/ Ball   x20 5\" hold x20 5\" hold 2x10 5\" hold 2x10 5\" hold 2x10 5\" hold       Prone Knee Flexion Stretch    10\"x10 hold         Bridges     2x10 yhb 2x10 yhb 2x15 yhb 2x15 yhb     Supine Clamshell w/ Hip Abd     2x10 5\" hold yhb 2x10 5\" hold yhb 2x10 5\" hold yhb 2x15 5\" hold yhb     LAQ      2# 2x10 np resume 3# 2x15                  VG  DL L5 x30   DL L5 DL L5 5' yhb DL L5 5' yhb DL L7 5' yhb     Ther " Activity                                       Gait Training                                       Modalities

## 2023-05-10 ENCOUNTER — EVALUATION (OUTPATIENT)
Dept: PHYSICAL THERAPY | Facility: REHABILITATION | Age: 58
End: 2023-05-10

## 2023-05-10 DIAGNOSIS — M17.12 PRIMARY OSTEOARTHRITIS OF LEFT KNEE: Primary | ICD-10-CM

## 2023-05-10 NOTE — PROGRESS NOTES
PT Re-Evaluation     Today's date: 5/10/2023  Patient name: Sharon Potter  : 1965  MRN: [id number]  Referring provider: Juan Rider MD  Dx:   Encounter Diagnosis     ICD-10-CM    1  Primary osteoarthritis of left knee  M17 12           Start Time: 1445  Stop Time: 1530  Total time in clinic (min): 45 minutes    Assessment  Assessment details:   Problem List:  1) Acute Left Knee Pain    Sharon Potter is a pleasant 62 y o  female who presents with acute left knee pain  Fátima Perezost has actively participated in 10 sessions of skilled physical therapy at made mild improvements in her left knee symptoms  Symptom typically are improved for 2 days following PT before a return in symptoms occurs  Patient has only been able to attend PT 1x/week due to ongoing OT/speech for following her CVA which has limited her progress  Patient symptoms continue to limit her with sleep, ambulation, and ADLs/IADLs  Patient would continue to benefit from skilled physical therapy to help address remaining impairments and functional limitations    Impairments: abnormal gait, abnormal muscle firing, abnormal muscle tone, activity intolerance, impaired physical strength and pain with function    Symptom irritability: moderateUnderstanding of Dx/Px/POC: good   Prognosis: good    Goals  Short Term Goals (Week 4): in progress  1  Decreased pain by 50%  2  Demonstrate 50% improvements in soft-tissue restrictions in medial thigh  3  Improve strength by 1/2 measure      Long Term Goals (8 weeks):  1  Ambulate long community distances <2/10 pain in her medial knee  2  Patient will exceed FOTO predicted outcome score  3  Patient will be fully independent with HEP by discharge  4  Patient will be able to manage symptoms independently         Plan  Patient would benefit from: skilled physical therapy  Planned modality interventions: low level laser therapy  Planned therapy interventions: manual therapy, massage, activity modification, "behavior modification, neuromuscular re-education, patient education, strengthening, stretching, therapeutic activities, therapeutic exercise, graded activity, functional ROM exercises, flexibility and home exercise program  Frequency: 1x week  Duration in visits: 6  Duration in weeks: 6  Treatment plan discussed with: patient        Subjective Evaluation    History of Present Illness  Mechanism of injury: Per ortho note 3/8/23:  Desirae Vargas is a 62 y o  female who presents to the office for an initial evaluation for left knee  She sustained a stroke on 23  A week after the stroke she began to develop pain in the left knee  Patient was seen and evaluated on 3/2/2023 where she was recommend cortisone to the pes anserine bursa which did provide relief for a few days  Patient was referred to physical therapy soon after but states that pain was exacerbated with physical activity  patient was seen and evaluated by her primary care doctor who ordered x-rays soon after and was referred to orthopedics for further evaluation  Patient states that her pain is predominantly medial aspect of the knee and becomes worse with physical activity and weightbearing  It is prior to her onset of pain  Patient states that she has left foot surgery requiring her to weight-bear with a cane on the affected side  Patient states that she has been using an ointment for the left knee but is unsure of hte name  Patient offers no other complaints at this\"    Re-Evaluation 5/10/23:  Patient reports she gets 2 days of relief of symptoms following PT  States her pain bothers her the most at night and when sleeping  States she doesn't feel symptoms in her medial knee rather symptoms are in the posterior-medial knee     Pain  Current pain ratin  At best pain ratin  At worst pain ratin  Quality: dull ache and sharp      Diagnostic Tests  X-ray: abnormal  Patient Goals  Patient goals for therapy: decreased pain, increased " "strength, independence with ADLs/IADLs and return to sport/leisure activities  Patient goal: reduce pain        Objective  Range of Motion  Right Knee:  0-130    Left Knee:  0-130 pain during movement      Myotomes/Strength Testing  Right Knee:   Ext (5/5), Flex (5/5)    Left Knee:  Ext (4/5), Flex (4/5)    Neurodynamic Testing  Positive SLR and Slump test for tightness and pain      Palpation  Significant tenderness and increased tissue texture density along medial hamstring and adductor musculature with reproduction of medial knee symptoms          Precautions: standard, hx of CVA, cardiac, falls    Manuals 3/15 3/22 3/29 4/5 4/12 4/19 4/26 5/3 5/10    STR adductors and medial hamstring PRR PRR PRR  PRR PRR PRR pes anserine PRR pes anserine PRR pes anserine, hamstirng PRR posterior and medial hamstring    LASER    12 W DC 4'   14 W DC 4' 16W DC 4'                  Patient education, vitals assessment  15' 15 15' 10' 10' 10' 10' 10' 20'    Neuro Re-Ed             Supine 90/90 nerve Glides         2x5                                                                                   Ther Ex             Hip Abduction Stretch Supine BKFO HEP reviewed x20 5\"          Hip Add Iso w/ Ball   x20 5\" hold x20 5\" hold 2x10 5\" hold 2x10 5\" hold 2x10 5\" hold       Prone Knee Flexion Stretch    10\"x10 hold         Bridges     2x10 yhb 2x10 yhb 2x15 yhb 2x15 yhb 2x15 yhb    Supine Clamshell w/ Hip Abd     2x10 5\" hold yhb 2x10 5\" hold yhb 2x10 5\" hold yhb 2x15 5\" hold yhb 2x15 5\" hold yhb    LAQ      2# 2x10 np resume 3# 2x15 3# 215                 VG  DL L5 x30   DL L5 DL L5 5' yhb DL L5 5' yhb DL L7 5' yhb np resume    Ther Activity                                       Gait Training                                       Modalities                                            "

## 2023-05-17 ENCOUNTER — PROCEDURE VISIT (OUTPATIENT)
Dept: OBGYN CLINIC | Facility: HOSPITAL | Age: 58
End: 2023-05-17

## 2023-05-17 ENCOUNTER — APPOINTMENT (OUTPATIENT)
Dept: PHYSICAL THERAPY | Facility: REHABILITATION | Age: 58
End: 2023-05-17
Payer: COMMERCIAL

## 2023-05-17 VITALS
WEIGHT: 189 LBS | DIASTOLIC BLOOD PRESSURE: 65 MMHG | HEIGHT: 60 IN | BODY MASS INDEX: 37.11 KG/M2 | SYSTOLIC BLOOD PRESSURE: 128 MMHG | HEART RATE: 72 BPM

## 2023-05-17 DIAGNOSIS — M17.12 PRIMARY OSTEOARTHRITIS OF LEFT KNEE: Primary | ICD-10-CM

## 2023-05-17 NOTE — PROGRESS NOTES
[facility]      NAME: Nael Snow is a 62 y o  female  : 1965    MRN: [id number]  DATE: May 17, 2023  TIME: 9:32 AM    Assessment and Plan   Primary osteoarthritis of left knee [M17 12]  1  Primary osteoarthritis of left knee  Large joint arthrocentesis: L knee            Large joint arthrocentesis: L knee  Universal Protocol:  Consent: Verbal consent obtained  Risks and benefits: risks, benefits and alternatives were discussed  Consent given by: patient  Patient understanding: patient states understanding of the procedure being performed  Site marked: the operative site was marked  Patient identity confirmed: verbally with patient    Supporting Documentation  Indications: pain   Procedure Details  Location: knee - L knee  Preparation: Patient was prepped and draped in the usual sterile fashion  Needle size: 22 G  Ultrasound guidance: no  Approach: superior (superiolateral )  Medications administered: 3 mL sodium hyaluronate 60 MG/3ML  Specialty Pharmacy Supplied: received medications from pharmacy  Patient tolerance: patient tolerated the procedure well with no immediate complications  Dressing:  Sterile dressing applied          Patient Instructions     - Follow up 6 weeks for repeat evalaution   - Over the counter analgesics as needed / directed   - Ice / heat as directed       Chief Complaint     Chief Complaint   Patient presents with   • Left Knee - Follow-up         History of Present Illness       Patient is a 70-year-old female presenting to the office for viscosupplementation  Durolane left knee  Patient denies any new or worsening symptoms in her knee  Patient offers no other complaints at this time  Current Medications       Current Outpatient Medications:   •  Acetaminophen Extra Strength 500 MG tablet, take 2 tablets by mouth every 8 hours if needed for MILD PAIN ( P   (REFER TO PRESCRIPTION NOTES)  , Disp: , Rfl:   •  amLODIPine (NORVASC) 10 mg tablet, Take 10 mg by mouth daily, Disp: , Rfl:   •  aspirin 81 mg chewable tablet, Chew 1 tablet (81 mg total) daily, Disp: 30 tablet, Rfl: 3  •  atorvastatin (LIPITOR) 80 mg tablet, Take 1 tablet (80 mg total) by mouth every evening, Disp: 30 tablet, Rfl: 11  •  Diclofenac Sodium (VOLTAREN) 1 %, APPLY 2 GRAMS TOPICALLY TWO TIMES A DAY AS NEEDED FOR (KNEE PAIN)  , Disp: , Rfl:   •  DULoxetine (CYMBALTA) 60 mg delayed release capsule, Take 1 capsule (60 mg total) by mouth daily, Disp: , Rfl: 0  •  gabapentin (NEURONTIN) 100 mg capsule, Take 1 capsule (100 mg total) by mouth daily as needed (headache), Disp: 15 capsule, Rfl: 0  •  losartan (COZAAR) 100 MG tablet, Take 100 mg by mouth daily, Disp: , Rfl:   •  nicotine (NICODERM CQ) 14 mg/24hr TD 24 hr patch, Place 1 patch on the skin daily, Disp: , Rfl:   •  QUEtiapine (SEROquel) 25 mg tablet, Take 25 mg by mouth every evening, Disp: , Rfl:     Current Allergies     Allergies as of 05/17/2023 - Reviewed 05/17/2023   Allergen Reaction Noted   • Sertraline Other (See Comments) 02/17/2022            The following portions of the patient's history were reviewed and updated as appropriate: allergies, current medications, past family history, past medical history, past social history, past surgical history and problem list      Past Medical History:   Diagnosis Date   • Acquired deformity of left foot    • Anesthesia complication     difficulty awakening   • Arthritis    • Deaf, left    • Depression    • Hallux valgus of left foot    • Hammer toe of left foot    • Headache    • Kidney stone    • Sciatic pain, right     intermittent       Past Surgical History:   Procedure Laterality Date   • BREAST BIOPSY Right 03/04/2021   • BREAST BIOPSY Right 3/10/2021    Procedure: Excisional Breast debridement  7 o'clock position;  Surgeon: Juan Ba MD;  Location: [facility];  Service: General   • CHOLECYSTECTOMY     • CLOSURE DELAYED PRIMARY Right 7/5/2020    Procedure: CLOSURE DELAYED PRIMARY and application of skin graft substitute;  Surgeon: Ez Martins DPM;  [facility]: [facility];  Service: Podiatry   • HERNIA REPAIR     • INCISION AND DRAINAGE OF WOUND Right 7/1/2020    Procedure: INCISION AND DRAINAGE (I&D) EXTREMITY;  Surgeon: Ez Martins DPM;  [facility];  Service: Podiatry   • [address] U  97  W/SESMDC W/1METAR MEDIAL CNF Left 11/12/2021    Procedure: Juanita Restrepo;  Surgeon: Ez Martins DPM;  Location: [facility];  Service: Podiatry   • SD OSTEOT W/ [address] SHRT/CORRJ METAR XCP 1ST EA Left 6/14/2019    Procedure: 3rd Metatarsal Osteotomy;  Surgeon: Sharon Noe DPM;  [facility];  Service: Podiatry   • SD OSTEOT W/ [facility] SHRT/CORRJ METAR XCP 1ST EA Left 11/12/2021    Procedure: OSTEOTOMY 2ND METATARSAL;  Surgeon: Ez Martins DPM;  [facility];  Service: Podiatry   • TUBAL LIGATION     • US GUIDED BREAST BIOPSY RIGHT COMPLETE Right 3/4/2021   • VAC DRESSING APPLICATION Right 6/2/0613    Procedure: APPLICATION VAC DRESSING;  Surgeon: Ez Martins DPM;  [facility];  Service: Podiatry       Family History   Problem Relation Age of Onset   • No Known Problems Mother    • No Known Problems Father    • No Known Problems Sister    • No Known Problems Daughter    • No Known Problems Maternal Grandmother    • Colon cancer Maternal Grandfather    • No Known Problems Paternal Grandmother    • No Known Problems Paternal Grandfather          Medications have been verified  Objective   /65   Pulse 72   Ht 5' (1 524 m)   Wt 85 7 kg (189 lb)   BMI 36 91 kg/m²   No LMP recorded   Patient is postmenopausal

## 2023-05-24 ENCOUNTER — OFFICE VISIT (OUTPATIENT)
Dept: PHYSICAL THERAPY | Facility: REHABILITATION | Age: 58
End: 2023-05-24

## 2023-05-24 DIAGNOSIS — I63.9 CEREBROVASCULAR ACCIDENT (CVA), UNSPECIFIED MECHANISM (HCC): ICD-10-CM

## 2023-05-24 DIAGNOSIS — M17.12 PRIMARY OSTEOARTHRITIS OF LEFT KNEE: Primary | ICD-10-CM

## 2023-05-24 DIAGNOSIS — Z78.9 IMPAIRED MOBILITY AND ACTIVITIES OF DAILY LIVING: ICD-10-CM

## 2023-05-24 DIAGNOSIS — Z74.09 IMPAIRED MOBILITY AND ACTIVITIES OF DAILY LIVING: ICD-10-CM

## 2023-05-24 NOTE — PROGRESS NOTES
"Daily Note     Today's date: 2023  Patient name: Shakira Rodríguez  : 1965  MRN: [id number]  Referring provider: Charlotte Gaytan MD  Dx:   Encounter Diagnosis     ICD-10-CM    1  Primary osteoarthritis of left knee  M17 12       2  Impaired mobility and activities of daily living  Z74 09     Z78 9       3  Cerebrovascular accident (CVA), unspecified mechanism ([address] )  I63 9           Start Time: 1450  Stop Time: 1530  Total time in clinic (min): 40 minutes    Subjective: Patient reports increased pain following injection last week, states this pain has reduced over the week  Objective: See treatment diary below      Assessment: Less irratibility and tenderness in medial knee today  Will continue to progress activities as tolerated  Patient would benefit from continued PT      Plan: Continue per plan of care        Precautions: standard, hx of CVA, cardiac, falls    Manuals 3/15 3/22 3/29 4/5 4/12 4/19 4/26 5/3 5/10 5/24   STR adductors and medial hamstring PRR PRR PRR  PRR PRR PRR pes anserine PRR pes anserine PRR pes anserine, hamstirng PRR posterior and medial hamstring PRR posterior and medial hamstring   LASER    12 W DC 4'   14 W DC 4' 16W DC 4'     FOTO           to /   Patient education, vitals assessment  15' 15 15' 10' 10' 10' 10' 10' 20' 15'   Neuro Re-Ed             Supine 90/90 nerve Glides         2x5                                                                                   Ther Ex             Hip Abduction Stretch Supine BKFO HEP reviewed x20 5\"          Hip Add Iso w/ Ball   x20 5\" hold x20 5\" hold 2x10 5\" hold 2x10 5\" hold 2x10 5\" hold       Prone Knee Flexion Stretch    10\"x10 hold         Bridges     2x10 yhb 2x10 yhb 2x15 yhb 2x15 yhb 2x15 yhb    Supine Clamshell w/ Hip Abd     2x10 5\" hold yhb 2x10 5\" hold yhb 2x10 5\" hold yhb 2x15 5\" hold yhb 2x15 5\" hold yhb    LAQ      2# 2x10 np resume 3# 2x15 3# 2x15 3# 2x15                VG  DL L5 x30   DL L5 DL L5 5' " yhb DL L5 5' yhb DL L7 5' yhb np resume    Ther Activity                                       Gait Training                                       Modalities

## 2023-05-30 ENCOUNTER — APPOINTMENT (OUTPATIENT)
Dept: PHYSICAL THERAPY | Facility: REHABILITATION | Age: 58
End: 2023-05-30
Payer: COMMERCIAL

## 2023-05-31 ENCOUNTER — APPOINTMENT (OUTPATIENT)
Dept: PHYSICAL THERAPY | Facility: REHABILITATION | Age: 58
End: 2023-05-31
Payer: COMMERCIAL

## 2023-06-01 ENCOUNTER — OFFICE VISIT (OUTPATIENT)
Dept: PHYSICAL THERAPY | Facility: REHABILITATION | Age: 58
End: 2023-06-01

## 2023-06-01 DIAGNOSIS — Z78.9 IMPAIRED MOBILITY AND ACTIVITIES OF DAILY LIVING: ICD-10-CM

## 2023-06-01 DIAGNOSIS — Z74.09 IMPAIRED MOBILITY AND ACTIVITIES OF DAILY LIVING: ICD-10-CM

## 2023-06-01 DIAGNOSIS — M17.12 PRIMARY OSTEOARTHRITIS OF LEFT KNEE: Primary | ICD-10-CM

## 2023-06-01 NOTE — PROGRESS NOTES
"Daily Note     Today's date: 2023  Patient name: Krishna Barroso  : 1965  MRN: [id number]  Referring provider: Elif Vann MD  Dx:   Encounter Diagnosis     ICD-10-CM    1  Primary osteoarthritis of left knee  M17 12       2  Impaired mobility and activities of daily living  Z74 09     Z78 9           Start Time: 1700  Stop Time: 1730  Total time in clinic (min): 30 minutes    Subjective: Patient reports knee symptoms improve with PT but do return between sessions  Objective: See treatment diary below      Assessment: Patient had very good response to manuals today particularly with TFJ distraction mobilization, large reductions in pain end of session and gait greatly improved  Continue to slowly resume previous activities as tolerated  Patient would benefit from continued PT      Plan: Continue per plan of care        Precautions: standard, hx of CVA, cardiac, falls    Manuals 6/1   4/5 4/12 4/19 4/26 5/3 5/10 5/24   STR adductors and medial hamstring PRR posterior and medial hamstring   PRR PRR PRR pes anserine PRR pes anserine PRR pes anserine, hamstirng PRR posterior and medial hamstring PRR posterior and medial hamstring   Seated Knee Distraction PRR Gr2-3             LASER    12 W DC 4'   14 W DC 4' 16W DC 4'     FOTO           to 47/   Patient education, vitals assessment     10' 10' 10' 10' 10' 20' 15'   Neuro Re-Ed             Supine 90/90 nerve Glides         2x5                                                                                   Ther Ex             Hip Abduction Stretch Supine BKFO             Hip Add Iso w/ Ball    x20 5\" hold 2x10 5\" hold 2x10 5\" hold 2x10 5\" hold       Prone Knee Flexion Stretch    10\"x10 hold         Bridges     2x10 yhb 2x10 yhb 2x15 yhb 2x15 yhb 2x15 yhb    Supine Clamshell w/ Hip Abd     2x10 5\" hold yhb 2x10 5\" hold yhb 2x10 5\" hold yhb 2x15 5\" hold yhb 2x15 5\" hold yhb    LAQ 4# 2x15     2# 2x10 np resume 3# 2x15 3# 2x15 3# 2x15     " VG     DL L5 DL L5 5' yhb DL L5 5' yhb DL L7 5' yhb np resume    Ther Activity                                       Gait Training                                       Modalities

## 2023-06-07 ENCOUNTER — OFFICE VISIT (OUTPATIENT)
Dept: PHYSICAL THERAPY | Facility: REHABILITATION | Age: 58
End: 2023-06-07
Payer: COMMERCIAL

## 2023-06-07 DIAGNOSIS — I63.9 CEREBROVASCULAR ACCIDENT (CVA), UNSPECIFIED MECHANISM (HCC): ICD-10-CM

## 2023-06-07 DIAGNOSIS — M17.12 PRIMARY OSTEOARTHRITIS OF LEFT KNEE: Primary | ICD-10-CM

## 2023-06-07 DIAGNOSIS — Z74.09 IMPAIRED MOBILITY AND ACTIVITIES OF DAILY LIVING: ICD-10-CM

## 2023-06-07 DIAGNOSIS — Z78.9 IMPAIRED MOBILITY AND ACTIVITIES OF DAILY LIVING: ICD-10-CM

## 2023-06-07 PROCEDURE — 97110 THERAPEUTIC EXERCISES: CPT

## 2023-06-07 PROCEDURE — 97140 MANUAL THERAPY 1/> REGIONS: CPT

## 2023-06-07 PROCEDURE — 97112 NEUROMUSCULAR REEDUCATION: CPT

## 2023-06-07 NOTE — PROGRESS NOTES
"Daily Note     Today's date: 2023  Patient name: Olga Lira  : 1965  MRN: [id number]  Referring provider: Yary Mahmood MD  Dx:   Encounter Diagnosis     ICD-10-CM    1  Primary osteoarthritis of left knee  M17 12       2  Impaired mobility and activities of daily living  Z74 09     Z78 9       3  Cerebrovascular accident (CVA), unspecified mechanism ([address] )  I63 9           Start Time: 1510  Stop Time: 1550  Total time in clinic (min): 40 minutes    Subjective: Patient reports good improvements following last session with manuals  Objective: See treatment diary below      Assessment: Patient again responded very well to LAD and knee distraction  Patient tolerated all activities well today  Patient would benefit from continued PT      Plan: Continue per plan of care        Precautions: standard, hx of CVA, cardiac, falls    Manuals 6/1 6/7  4/5 4/12 4/19 4/26 5/3 5/10 5/24   STR adductors and medial hamstring PRR posterior and medial hamstring   PRR PRR PRR pes anserine PRR pes anserine PRR pes anserine, hamstirng PRR posterior and medial hamstring PRR posterior and medial hamstring   LAD  PRR GR 2-3 2 rds           Seated Knee Distraction PRR Gr2-3  PRR Gr 2-3 2 rds           LASER    12 W DC 4'   14 W DC 4' 16W DC 4'     FOTO           to    Patient education, vitals assessment     10' 10' 10' 10' 10' 20' 15'   Neuro Re-Ed             Supine 90/90 nerve Glides         2x5                                                                                   Ther Ex             Hip Abduction Stretch Supine BKFO             Hip Add Iso w/ Ball    x20 5\" hold 2x10 5\" hold 2x10 5\" hold 2x10 5\" hold       Prone Knee Flexion Stretch    10\"x10 hold         Bridges  2x15 5\" hold yhb   2x10 yhb 2x10 yhb 2x15 yhb 2x15 yhb 2x15 yhb    Supine Clamshell w/ Hip Abd  2x15 5\" hold yhb   2x10 5\" hold yhb 2x10 5\" hold yhb 2x10 5\" hold yhb 2x15 5\" hold yhb 2x15 5\" hold yhb    LAQ 4# 2x15 4# 2x15    2# " 2x10 np resume 3# 2x15 3# 2x15 3# 2x15                VG  L7 DL 5'   DL L5 DL L5 5' yhb DL L5 5' yhb DL L7 5' yhb np resume    Ther Activity                                       Gait Training                                       Modalities

## 2023-06-14 ENCOUNTER — OFFICE VISIT (OUTPATIENT)
Dept: PHYSICAL THERAPY | Facility: REHABILITATION | Age: 58
End: 2023-06-14
Payer: COMMERCIAL

## 2023-06-14 DIAGNOSIS — M17.12 PRIMARY OSTEOARTHRITIS OF LEFT KNEE: Primary | ICD-10-CM

## 2023-06-14 DIAGNOSIS — I63.9 CEREBROVASCULAR ACCIDENT (CVA), UNSPECIFIED MECHANISM (HCC): ICD-10-CM

## 2023-06-14 DIAGNOSIS — Z74.09 IMPAIRED MOBILITY AND ACTIVITIES OF DAILY LIVING: ICD-10-CM

## 2023-06-14 DIAGNOSIS — Z78.9 IMPAIRED MOBILITY AND ACTIVITIES OF DAILY LIVING: ICD-10-CM

## 2023-06-14 PROCEDURE — 97140 MANUAL THERAPY 1/> REGIONS: CPT

## 2023-06-14 PROCEDURE — 97110 THERAPEUTIC EXERCISES: CPT

## 2023-06-14 NOTE — PROGRESS NOTES
"Daily Note     Today's date: 2023  Patient name: Nik Smith  : 1965  MRN: [id number]  Referring provider: Kacie Ferraro MD  Dx:   Encounter Diagnosis     ICD-10-CM    1  Primary osteoarthritis of left knee  M17 12       2  Impaired mobility and activities of daily living  Z74 09     Z78 9       3  Cerebrovascular accident (CVA), unspecified mechanism ([facility] [address] )  I63 9           Start Time: 1515  Stop Time: 1600  Total time in clinic (min): 45 minutes    Subjective: Patient reports her knee is doing well  States most recent sessions have been helping out  Objective: See treatment diary below      Assessment: Patient responding very well to manuals treatments  Patient pain, ROM, and function getting significantly better  Continue to progress as toleraetd Patient would benefit from continued PT      Plan: Continue per plan of care        Precautions: standard, hx of CVA, cardiac, falls    Manuals 6/1 6/7 6/14 4/5 4/12 4/19 4/26 5/3 5/10 5/24   STR adductors and medial hamstring PRR posterior and medial hamstring  IASTM anterior knee PRR PRR PRR PRR pes anserine PRR pes anserine PRR pes anserine, hamstirng PRR posterior and medial hamstring PRR posterior and medial hamstring   LAD  PRR GR 2-3 2 rds           Seated Knee Distraction PRR Gr2-3  PRR Gr 2-3 2 rds PRR Gr 2-3 2 rds          LASER    12 W DC 4'   14 W DC 4' 16W DC 4'     FOTO          22/50 to 47/50   Patient education, vitals assessment    15' 10' 10' 10' 10' 10' 20' 15'   Neuro Re-Ed             Supine 90/90 nerve Glides         2x5                                                                                   Ther Ex             Hip Abduction Stretch Supine BKFO             Hip Add Iso w/ Ball    x20 5\" hold 2x10 5\" hold 2x10 5\" hold 2x10 5\" hold       Prone Knee Flexion Stretch    10\"x10 hold         Bridges  2x15 5\" hold yhb 2x15 5\" hold rhb  2x10 yhb 2x10 yhb 2x15 yhb 2x15 yhb 2x15 yhb    Supine Clamshell w/ Hip Abd  2x15 5\" " "hold yhb 2x15 5\" hold rhb  2x10 5\" hold yhb 2x10 5\" hold yhb 2x10 5\" hold yhb 2x15 5\" hold yhb 2x15 5\" hold yhb    LAQ 4# 2x15 4# 2x15 5# 2x15   2# 2x10 np resume 3# 2x15 3# 2x15 3# 2x15                VG  L7 DL 5' L7 DL 5'  DL L5 DL L5 5' yhb DL L5 5' yhb DL L7 5' yhb np resume    Ther Activity                                       Gait Training                                       Modalities                                            "

## 2023-06-21 ENCOUNTER — APPOINTMENT (OUTPATIENT)
Dept: PHYSICAL THERAPY | Facility: REHABILITATION | Age: 58
End: 2023-06-21
Payer: COMMERCIAL

## 2023-06-23 ENCOUNTER — OFFICE VISIT (OUTPATIENT)
Dept: PHYSICAL THERAPY | Facility: REHABILITATION | Age: 58
End: 2023-06-23
Payer: COMMERCIAL

## 2023-06-23 DIAGNOSIS — M17.12 PRIMARY OSTEOARTHRITIS OF LEFT KNEE: Primary | ICD-10-CM

## 2023-06-23 PROCEDURE — 97140 MANUAL THERAPY 1/> REGIONS: CPT

## 2023-06-23 PROCEDURE — 97110 THERAPEUTIC EXERCISES: CPT

## 2023-06-23 NOTE — PROGRESS NOTES
"Daily Note     Today's date: 2023  Patient name: Isaiah Castillo  : 1965  MRN: [id number]  Referring provider: Viviana Atwood MD  Dx:   Encounter Diagnosis     ICD-10-CM    1  Primary osteoarthritis of left knee  M17 12           Start Time: 1150  Stop Time: 1230  Total time in clinic (min): 40 minutes    Subjective: Patient reports her [place] without issues  Objective: See treatment diary below      Assessment: Very good response to hooklying knee distraction today  Significant improvements in pain, function, and gait mechanics  Resume previous activities next visit  Patient would benefit from continued PT      Plan: Continue per plan of care        Precautions: standard, hx of CVA, cardiac, falls    Manuals 6/1 6/7 6/14 6/23   4/26 5/3 5/10 5/24   STR adductors and medial hamstring PRR posterior and medial hamstring  IASTM anterior knee PRR    PRR pes anserine PRR pes anserine, hamstirng PRR posterior and medial hamstring PRR posterior and medial hamstring   LAD  PRR GR 2-3 2 rds  PRR GR 2-3 2 rds         Seated Knee Distraction PRR Gr2-3  PRR Gr 2-3 2 rds PRR Gr 2-3 2 rds PRR Gr 2-3 2 rds         Hooklying Knee Distraction    PRR Gr 203 2 rds         LASER       14 W DC 4' 16W DC 4'     FOTO    perf       to 47   Patient education, vitals assessment    15'    10' 10' 20' 15'   Neuro Re-Ed             Supine 90/90 nerve Glides         2x5                                                                                   Ther Ex             Hip Abduction Stretch Supine BKFO             Hip Add Iso w/ Ball       2x10 5\" hold       Prone Knee Flexion Stretch             Bridges  2x15 5\" hold yhb 2x15 5\" hold rhb np resume   2x15 yhb 2x15 yhb 2x15 yhb    Supine Clamshell w/ Hip Abd  2x15 5\" hold yhb 2x15 5\" hold rhb np resume   2x10 5\" hold yhb 2x15 5\" hold yhb 2x15 5\" hold yhb    LAQ 4# 2x15 4# 2x15 5# 2x15 5# 2x15   np resume 3# 2x15 3# " 2x15 3# 2x15                VG  L7 DL 5' L7 DL 5' np resume   DL L5 5' yhb DL L7 5' yhb np resume    Ther Activity                                       Gait Training                                       Modalities

## 2023-06-26 NOTE — PLAN OF CARE
Gastroenterology Consult Note       Date of Consultation: 2023  Patient: Bonnie Hansen  MRN: [id number]  :  1943  Attending: Donal Scott MD       Reason for Consultation:   Acute colonic pseudo-obstruction    Subjective:  Bonnie Hansen is an 81yo F with a pmhx of CVA with residual L sided weakness, HTN, DM, gastritis, and CAD who presented to [facility] ED s/p fall during attempted transfer into a new bed at home. She suffered a L proximal tibia fx and is being followed by ortho, plan is for nonoperative treatment and an immobilizer was placed.    GI service is consulted for abdominal distention and possible colonic pseudo-obstruction.      CT AP W CON  revealed extensive colonic distention.  She was started on reglan and given dulcolax by both OK and PO route yesterday, then had a BM overnight.    K is 3.8, mag is 1.9. WBC 6.1, Hgb 9.6. A follow-up AXR is pending.     She is sitting up in bed, + BM overnight. Feeling better and less distended. Sounds like this is a chronic issue. She takes simethicone for this and her daughter helps relieve the distention by turning pt on side and holding buttocks apart at home.    Pt denies n/v/abd pain.      History:  Past Medical History:   Diagnosis Date   • CAD (coronary artery disease)    • Cerebral infarction (CMD)    • Diabetes mellitus (CMD)    • Essential (primary) hypertension    • Gastritis 2020   • Hyperlipoproteinemia    • Ileus (CMD) 2022   • Pneumonia        History reviewed. No pertinent surgical history.    Allergies:   ALLERGIES:  Pork allergy   (food or med)    Home Meds:  Medications Prior to Admission   Medication Sig Dispense Refill   • atorvastatin (LIPITOR) 10 MG tablet TAKE 1 TABLET BY MOUTH DAILY (Patient taking differently: Take 10 mg by mouth every evening.) 90 tablet 1   • warfarin (COUMADIN) 3 MG tablet Take 1 to 1 & 1/2 tablets daily by mouth AS DIRECTED BY [facility] ([phone]) (Patient taking differently:  Problem: PAIN - ADULT  Goal: Verbalizes/displays adequate comfort level or baseline comfort level  Description: Interventions:  - Encourage patient to monitor pain and request assistance  - Assess pain using appropriate pain scale  - Administer analgesics based on type and severity of pain and evaluate response  - Implement non-pharmacological measures as appropriate and evaluate response  - Consider cultural and social influences on pain and pain management  - Notify physician/advanced practitioner if interventions unsuccessful or patient reports new pain  Outcome: Progressing     Problem: INFECTION - ADULT  Goal: Absence or prevention of progression during hospitalization  Description: INTERVENTIONS:  - Assess and monitor for signs and symptoms of infection  - Monitor lab/diagnostic results  - Monitor all insertion sites, i e  indwelling lines, tubes, and drains  - Monitor endotracheal if appropriate and nasal secretions for changes in amount and color  - [place] appropriate cooling/warming therapies per order  - Administer medications as ordered  - Instruct and encourage patient and family to use good hand hygiene technique  - Identify and instruct in appropriate isolation precautions for identified infection/condition  Outcome: Progressing     Problem: SAFETY ADULT  Goal: Patient will remain free of falls  Description: INTERVENTIONS:  - Educate patient/family on patient safety including physical limitations  - Instruct patient to call for assistance with activity   - Consult OT/PT to assist with strengthening/mobility   - Keep Call bell within reach  - Keep bed low and locked with side rails adjusted as appropriate  - Keep care items and personal belongings within reach  - Initiate and maintain comfort rounds  - Make Fall Risk Sign visible to staff  - Offer Toileting every 2 Hours, in advance of need  - Initiate/Maintain bed/chair alarm  - Obtain necessary fall risk management equipment: alarms  - Apply Take 1 tablet (3 mg) by mouth 2 days a week (Mon. & Wed.) and 1 &½ tablets (4.5 mg) by mouth the rest of the week.   AS DIRECTED BY [facility] ([phone])) 120 tablet 6   • gabapentin (NEURONTIN) 100 MG capsule TAKE 2 CAPSULES BY MOUTH IN THE MORNING AND AT NOON AND IN THE EVENING 180 capsule 3   • metFORMIN (GLUCOPHAGE) 850 MG tablet Take 1 tablet by mouth daily (with breakfast). 180 tablet 0   • carvedilol (COREG) 25 MG tablet Take 1 tablet by mouth in the morning and 1 tablet in the evening. Take with meals. 180 tablet 3   • dilTIAZem (Cardizem CD) 360 MG 24 hr capsule Take 1 capsule by mouth daily. 90 capsule 3   • famotidine (PEPCID) 20 MG tablet Take 1 tablet by mouth in the morning and 1 tablet in the evening. 180 tablet 3   • isosorbide dinitrate (ISORDIL) 10 MG tablet Take 1 tablet by mouth in the morning and 1 tablet at noon and 1 tablet in the evening. 180 tablet 3   • hydrALAZINE (APRESOLINE) 100 MG tablet Take 1 tablet by mouth in the morning and 1 tablet at noon and 1 tablet in the evening. 180 tablet 3   • losartan (COZAAR) 100 MG tablet Take 1 tablet by mouth daily. 90 tablet 3   • Cyanocobalamin (Vitamin B-12) 3000 MCG/ML Liquid Place 1 mL under the tongue daily.     • guaiFENesin (ROBITUSSIN) 100 MG/5ML syrup Take 10 mLs by mouth every 6 hours as needed for Cough. 240 mL 0   • ipratropium-albuterol (DUONEB) 0.5-2.5 (3) MG/3ML nebulizer solution Take 3 mLs by nebulization every 6 hours as needed for Wheezing. 240 mL 0   • simethicone (MYLICON) 125 MG chewable tablet Chew 1 tablet by mouth every 4 hours as needed for Flatulence. 180 tablet 0   • acetaminophen (TYLENOL) 500 MG tablet Take 500 mg by mouth every 4 hours as needed for Pain.         Inpatient Meds:  Current Facility-Administered Medications   Medication Dose Route Frequency Provider Last Rate Last Admin   • hydrALAZINE (APRESOLINE) injection 10 mg  10 mg Intravenous Q4H PRN Donal Scott MD   10 mg at 06/26/23 0036  yellow socks and bracelet for high fall risk patients  - Consider moving patient to room near nurses station  Outcome: Progressing  Goal: Maintain or return to baseline ADL function  Description: INTERVENTIONS:  -  Assess patient's ability to carry out ADLs; assess patient's baseline for ADL function and identify physical deficits which impact ability to perform ADLs (bathing, care of mouth/teeth, toileting, grooming, dressing, etc )  - Assess/evaluate cause of self-care deficits   - Assess range of motion  - Assess patient's mobility; develop plan if impaired  - Assess patient's need for assistive devices and provide as appropriate  - Encourage maximum independence but intervene and supervise when necessary  - Involve family in performance of ADLs  - Assess for home care needs following discharge   - Consider OT consult to assist with ADL evaluation and planning for discharge  - Provide patient education as appropriate  Outcome: Progressing  Goal: Maintains/Returns to pre admission functional level  Description: INTERVENTIONS:  - Perform BMAT or MOVE assessment daily    - Set and communicate daily mobility goal to care team and patient/family/caregiver  - Collaborate with rehabilitation services on mobility goals if consulted  - Perform Range of Motion 3 times a day  - Reposition patient every 2 hours    - Dangle patient 3 times a day  - Stand patient 3 times a day  - Ambulate patient 3 times a day  - Out of bed to chair 3 times a day   - Out of bed for meals 3 times a day  - Out of bed for toileting  - Record patient progress and toleration of activity level   Outcome: Progressing     Problem: DISCHARGE PLANNING  Goal: Discharge to home or other facility with appropriate resources  Description: INTERVENTIONS:  - Identify barriers to discharge w/patient and caregiver  - Arrange for needed discharge resources and transportation as appropriate  - Identify discharge learning needs (meds, wound care, etc )  - Arrange   • lactated ringers infusion   Intravenous Continuous Donal Scott MD 75 mL/hr at 06/26/23 0348 New Bag at 06/26/23 0348   • metoCLOPramide (REGLAN) injection 5 mg  5 mg Intravenous Q8H Helio Gonzalez MD   5 mg at 06/26/23 0346   • bisacodyl (DULCOLAX) EC tablet 10 mg  10 mg Oral Daily Helio Gonzalez MD   10 mg at 06/25/23 2043   • atorvastatin (LIPITOR) tablet 10 mg  10 mg Per NG Tube Q Evening Helio Gonzalez MD       • carvedilol (COREG) tablet 25 mg  25 mg Per NG Tube BID WC Helio Gonzalez MD   25 mg at 06/26/23 0822   • famotidine (PEPCID) tablet 20 mg  20 mg Per NG Tube BID Heilo Gonzalez MD   20 mg at 06/26/23 0822   • isosorbide dinitrate (ISORDIL) tablet 10 mg  10 mg Per NG Tube TID Helio Gonzalez MD   10 mg at 06/26/23 0822   • losartan (COZAAR) tablet 100 mg  100 mg Per NG Tube Daily Helio Gonzalez MD   100 mg at 06/26/23 0822   • acetaminophen (TYLENOL) tablet 650 mg  650 mg Per NG Tube Q4H PRN Helio Gonzalez MD   650 mg at 06/26/23 0917   • aluminum-magnesium hydroxide-simethicone (MAALOX) 200-200-20 MG/5ML suspension 30 mL  30 mL Per NG Tube Q4H PRN Helio Gonzalez MD       • dextrose (GLUTOSE) 40 % gel 15 g  15 g Per NG Tube PRN Helio Gonzalez MD       • dextrose (GLUTOSE) 40 % gel 30 g  30 g Per NG Tube PRN Helio Gonzalez MD       • docusate sodium-sennosides (SENOKOT S) 50-8.6 MG 2 tablet  2 tablet Per NG Tube Daily PRN Helio Gonzalez MD       • magnesium hydroxide (MILK OF MAGNESIA) 400 MG/5ML suspension 30 mL  30 mL Per NG Tube Daily PRN Helio Gonzalez MD       • polyethylene glycol (MIRALAX) packet 17 g  17 g Per NG Tube Daily PRN Helio Gonzalez MD       • simethicone (MYLICON) tablet 125 mg  125 mg Per NG Tube Q4H PRN Helio Gonzalez MD       • oxyCODONE-acetaminophen (PERCOCET) 5-325 MG tablet 1 tablet  1 tablet Per NG Tube Q4H PRN Helio Gonzalez MD       • gabapentin (NEURONTIN) 250 MG/5ML solution 200 mg  200 mg Per NG Tube 2 times per day Helio Gonzalez MD    for interpretive services to assist at discharge as needed  - Refer to Case Management Department for coordinating discharge planning if the patient needs post-hospital services based on physician/advanced practitioner order or complex needs related to functional status, cognitive ability, or social support system  Outcome: Progressing 200 mg at 06/26/23 0822   • hydrALAZINE (APRESOLINE) tablet 100 mg  100 mg Per NG Tube TID Helio Gonzalez MD   100 mg at 06/26/23 0822   • potassium CHLORIDE (KLOR-CON) packet 40 mEq  40 mEq Per NG Tube Daily with breakfast Helio Gonzalez MD   40 mEq at 06/26/23 0821   • dilTIAZem (CARDIZEM) tablet 120 mg  120 mg Per NG Tube TID Donal Scott MD   120 mg at 06/26/23 0822   • nitroGLYCERIN 50 mg in dextrose 5% 250 mL infusion  0-200 mcg/min Intravenous Continuous Aster Perdue MD   Stopped at 06/25/23 2353   • potassium CHLORIDE (KLOR-CON M) tanesha ER tablet 40 mEq  40 mEq Oral Daily with breakfast Donal Scott MD   40 mEq at 06/25/23 0837   • sodium chloride 0.9 % flush bag 25 mL  25 mL Intravenous PRN Mohsin, Sana, DO       • Potassium Standard Replacement Protocol (Levels 3.5 and lower)   Does not apply See Admin Instructions Mohsin, Sana, DO       • Magnesium Standard Replacement Protocol   Does not apply See Admin Instructions Mohsin, Sana, DO       • sodium chloride 0.9 % flush bag 25 mL  25 mL Intravenous PRN Mohsin, Sana, DO       • sodium chloride (PF) 0.9 % injection 2 mL  2 mL Intracatheter 2 times per day Mohsin, Sana, DO   2 mL at 06/25/23 2002   • dextrose 50 % injection 25 g  25 g Intravenous PRN Mohsin, Sana, DO       • dextrose 50 % injection 12.5 g  12.5 g Intravenous PRN Mohsin, Sana, DO       • glucagon (GLUCAGEN) injection 1 mg  1 mg Intramuscular PRN Mohsin, Sana, DO       • insulin lispro (ADMELOG,HumaLOG) - Correction Dose   Subcutaneous TID WC Mohsin, Sana, DO       • insulin lispro (ADMELOG,HumaLOG) - Correction Dose   Subcutaneous Nightly Mohsin, Sana, DO           Immunizations & Other History:  Immunization History   Administered Date(s) Administered   • COVID Pfizer 12Y+ (Requires Dilution) 08/30/2021, 09/20/2021   • Influenza, High Dose quadrivalent, preserve-free 11/23/2021   • Influenza, high dose seasonal, preservative-free 09/22/2015   • Influenza, quadrivalent, multi-dose  2016   • Influenza, quadrivalent, preserve-free 10/26/2018   • Pneumococcal Conjugate 13 Valent Vacc (Prevnar 13) 2017   • Pneumococcal Polysaccharide Vacc (Pneumovax 23) 2017, 2021   • Tuberculin Skin Test Unspecified Formulation 2017       Social History     Socioeconomic History   • Marital status: Single     Spouse name: Not on file   • Number of children: 3   • Years of education: 12   • Highest education level: High school graduate   Occupational History   • Not on file   Tobacco Use   • Smoking status: Former     Current packs/day: 0.00     Average packs/day: 1 pack/day for 13.0 years (13.0 pk-yrs)     Types: Cigarettes     Start date:      Quit date:      Years since quittin.5   • Smokeless tobacco: Never   Vaping Use   • Vaping Use: never used   Substance and Sexual Activity   • Alcohol use: Never   • Drug use: Never   • Sexual activity: Not Currently   Other Topics Concern   •  Service No   • Blood Transfusions Not Asked   • Caffeine Concern Not Asked   • Occupational Exposure Not Asked   • Hobby Hazards Not Asked   • Sleep Concern Not Asked   • Stress Concern Not Asked   • Weight Concern Not Asked   • Special Diet Not Asked   • Back Care Not Asked   • Exercise Not Asked   • Bike Helmet Not Asked   • Seat Belt Not Asked   • Self-Exams Not Asked   Social History Narrative   • Not on file     Social Determinants of Health     Financial Resource Strain: Low Risk  (2023)    Financial Resource Strain    • Social Determinants: Financial Resource Strain: None   Food Insecurity: No Food Insecurity (2023)    Food Insecurity    • Social Determinants: Food Insecurity: Never   Transportation Needs: No Transportation Needs (2023)    PRAPARE - Transportation    • Lack of Transportation (Medical): No    • Lack of Transportation (Non-Medical): No   Physical Activity: Insufficiently Active (2022)    Exercise Vital Sign    • Days of Exercise per Week: 2  days    • Minutes of Exercise per Session: 60 min   Stress: Low Risk  (9/26/2022)    Stress    • Social Determinants: Stress: Not at all   Social Connections: Socially Integrated (6/24/2023)    Social Connections    • Social Determinants: Social Connections: 5 or more times a week   Intimate Partner Violence: Not At Risk (6/23/2023)    Intimate Partner Violence    • Social Determinants: Intimate Partner Violence Past Fear: No    • Social Determinants: Intimate Partner Violence Current Fear: No       Family History   Problem Relation Age of Onset   • Heart disease Mother    • Hypertension Mother    • Diabetes Mother    • Hypertension Father    • Stroke Father    • Hypertension Sister    • Diabetes Sister    • Hypertension Brother    • Cancer, Throat Brother        Review of Systems  Review of Systems   Constitutional: Negative for chills and fever.   HENT: Negative for nosebleeds and trouble swallowing.    Respiratory: Negative for cough and shortness of breath.    Cardiovascular: Negative for chest pain and palpitations.   Gastrointestinal: Positive for abdominal distention. Negative for abdominal pain, blood in stool, constipation, diarrhea, nausea and vomiting.        Denies Melena   Endocrine: Negative for cold intolerance and heat intolerance.   Genitourinary: Negative for dysuria and hematuria.   Musculoskeletal: Negative for arthralgias and myalgias.   Skin: Negative for pallor and rash.   Neurological: Negative for dizziness, tremors and headaches.   Psychiatric/Behavioral: Negative for agitation and confusion.        Objective:  Vitals Ranges and Last Value:  Temp:  [97.5 °F (36.4 °C)-98.8 °F (37.1 °C)] 98.8 °F (37.1 °C)  Heart Rate:  [64-88] 64  Resp:  [11-30] 14  BP: (124-205)/() 156/54      Exam:  Physical Exam  Vitals reviewed. Exam conducted with a chaperone present (nursing staff).   Constitutional:       Appearance: She is obese.   HENT:      Head: Normocephalic.      Mouth/Throat:      Mouth:  Mucous membranes are moist.      Pharynx: Oropharynx is clear.      Neck: Neck supple.   Eyes:      General: No scleral icterus.  Cardiovascular:      Rate and Rhythm: Normal rate and regular rhythm.      Heart sounds: Normal heart sounds.   Pulmonary:      Breath sounds: Normal breath sounds.   Abdominal:      General: Bowel sounds are normal. There is distension (softly distended).      Palpations: Abdomen is soft. There is no mass.      Tenderness: There is no abdominal tenderness. There is no guarding.      Hernia: No hernia is present.      Comments: ESTEE with large amount of gas passed. Malecott rectal tube placed at bedside with liquid stool released.   Musculoskeletal:         General: Normal range of motion.   Skin:     General: Skin is warm and dry.      Coloration: Skin is not jaundiced or pale.   Neurological:      General: No focal deficit present.      Mental Status: She is alert and oriented to person, place, and time.   Psychiatric:         Mood and Affect: Mood normal.         Behavior: Behavior normal.            Results:  Recent Results (from the past 24 hour(s))   Electrocardiogram 12-Lead    Collection Time: 06/25/23  2:09 PM   Result Value Ref Range    Ventricular Rate EKG/Min (BPM) 70     Atrial Rate (BPM) 70     SC-Interval (MSEC) 172     QRS-Interval (MSEC) 144     QT-Interval (MSEC) 444     QTc 479     P Axis (Degrees) 69     R Axis (Degrees) -19     T Axis (Degrees) 11     REPORT TEXT       Sinus rhythm  with occasional  premature ventricular complexes  Right bundle branch block  Abnormal ECG  When compared with ECG of  25-AUG-2022 15:16,  premature ventricular complexes  are now  present  Confirmed by ISABEL PALOMINO, CEDRIC ([id number]) on 6/25/2023 3:43:41 PM     GLUCOSE, BEDSIDE - POINT OF CARE    Collection Time: 06/25/23  4:49 PM   Result Value Ref Range    GLUCOSE, BEDSIDE - POINT OF CARE 99 70 - 99 mg/dL   GLUCOSE, BEDSIDE - POINT OF CARE    Collection Time: 06/25/23  8:51 PM   Result Value Ref  Range    GLUCOSE, BEDSIDE - POINT OF CARE 98 70 - 99 mg/dL   Basic Metabolic Panel    Collection Time: 06/26/23  3:10 AM   Result Value Ref Range    Fasting Status      Sodium 140 135 - 145 mmol/L    Potassium 3.8 3.4 - 5.1 mmol/L    Chloride 108 97 - 110 mmol/L    Carbon Dioxide 24 21 - 32 mmol/L    Anion Gap 12 7 - 19 mmol/L    Glucose 97 70 - 99 mg/dL    BUN 17 6 - 20 mg/dL    Creatinine 0.62 0.51 - 0.95 mg/dL    Glomerular Filtration Rate 90 >=60    BUN/Cr 27 (H) 7 - 25    Calcium 8.9 8.4 - 10.2 mg/dL   Prothrombin Time    Collection Time: 06/26/23  3:10 AM   Result Value Ref Range    Prothrombin Time 21.9 (H) 9.7 - 11.8 sec    INR 2.2     Magnesium    Collection Time: 06/26/23  3:10 AM   Result Value Ref Range    Magnesium 1.9 1.7 - 2.4 mg/dL   CBC with Automated Differential (performable only)    Collection Time: 06/26/23  3:10 AM   Result Value Ref Range    WBC 6.1 4.2 - 11.0 K/mcL    RBC 3.68 (L) 4.00 - 5.20 mil/mcL    HGB 9.6 (L) 12.0 - 15.5 g/dL    HCT 30.9 (L) 36.0 - 46.5 %    MCV 84.0 78.0 - 100.0 fl    MCH 26.1 26.0 - 34.0 pg    MCHC 31.1 (L) 32.0 - 36.5 g/dL    RDW-CV 16.7 (H) 11.0 - 15.0 %    RDW-SD 51.1 (H) 39.0 - 50.0 fL     140 - 450 K/mcL    NRBC 0 <=0 /100 WBC    Neutrophil, Percent 79 %    Lymphocytes, Percent 15 %    Mono, Percent 5 %    Eosinophils, Percent 0 %    Basophils, Percent 1 %    Immature Granulocytes 0 %    Absolute Neutrophils 4.8 1.8 - 7.7 K/mcL    Absolute Lymphocytes 0.9 (L) 1.0 - 4.0 K/mcL    Absolute Monocytes 0.3 0.3 - 0.9 K/mcL    Absolute Eosinophils  0.0 0.0 - 0.5 K/mcL    Absolute Basophils 0.0 0.0 - 0.3 K/mcL    Absolute Immature Granulocytes 0.0 0.0 - 0.2 K/mcL   GLUCOSE, BEDSIDE - POINT OF CARE    Collection Time: 06/26/23  7:23 AM   Result Value Ref Range    GLUCOSE, BEDSIDE - POINT OF CARE 93 70 - 99 mg/dL       Imaging:  CT ABDOMEN PELVIS W CONTRAST   Final Result      1.   Limited artifactual exam.  Extensive abnormal colonic distention.      Electronically  Signed by: TREVER GARNER MD    Signed on: 6/25/2023 5:01 PM    Workstation ID: [id number]      XR ABDOMEN 1 VIEW   Final Result   Dilated large and small bowel loops.      Electronically Signed by: ALIA CHAVEZ DO    Signed on: 6/25/2023 1:08 PM    Workstation ID: [id number]      XR ABDOMEN 1 VIEW   Final Result   FINDINGS/IMPRESSION:      Distended bowel in all quadrants suspicious for ileus or bowel obstruction.      Electronically Signed by: TREVER GARNER MD    Signed on: 6/24/2023 9:43 PM    Workstation ID: [id number]      CT KNEE WO CONTRAST LEFT   Final Result      1.    Acute fracture involving the proximal right tibia.  An oblique   fracture is noted through the medial tibial plateau and a horizontal,   nondisplaced fracture component extends along the anterior aspect of the   tibial metaphysis involving the tibial tuberosity.  No evidence of   depression of the tibial plateaus.     2.   Moderate size lipohemarthrosis.     3.    Severe diffuse decreased bone mineralization.     4.   Severe vascular calcifications.            Electronically Signed by: PARKER CAMPBELL MD    Signed on: 6/23/2023 11:05 PM    Workstation ID: [id number]      XR KNEE 1 OR 2 VIEWS LEFT   Final Result      1.    Decreased bone mineralization. Acute, horizontal fracture through the   left proximal tibia with questionable intra-articular extension through the   tibial eminence.   2.   Mildly displaced avulsion fracture at the tibial tuberosity.   3.   Moderate size knee joint effusion.      Electronically Signed by: PARKER CAMPBELL MD    Signed on: 6/23/2023 7:47 PM    Workstation ID: [id number]      XR TIBIA FIBULA 2 VIEWS LEFT   Final Result   Avulsion fracture at the tibial tuberosity noted.  Possible extension into   the lateral tibial plateau.  This is incompletely evaluated.  Consider   further evaluation with CT scan.              Electronically Signed by: SHAYAN RAMIREZ MD    Signed on: 6/23/2023 5:26 PM     Workstation ID: [id number]      XR FOOT 2 VIEWS LEFT   Final Result   No acute fracture or dislocation.  Osteopenia.  Soft tissue swelling.    Arthritic changes.  Extensive arterial calcifications.            Electronically Signed by: SHAYAN RAMIREZ MD    Signed on: 6/23/2023 5:22 PM    Workstation ID: [id number]      XR FEMUR 2 OR MORE VIEWS LEFT   Final Result   Avulsion fracture at the tibial tuberosity noted.  Possible extension into   the lateral tibial plateau.  This is incompletely evaluated.  Consider   further evaluation with CT scan.              Electronically Signed by: SHAYAN RAMIREZ MD    Signed on: 6/23/2023 5:26 PM    Workstation ID: [id number]      XR ABDOMEN 1 VIEW    (Results Pending)           IMPRESSION & PLAN:   81yo F with hx CVA s/p ground level fall at home during transfer who suffered L tibial fx, s/p immobilizer placement by ortho. GI service consulted for possible colonic pseudoobstruction.    1. [place]  - CT AP W CON 6/25 - extensive colonic dilation    - K 3.8  - Mag 1.9  - glucose 97  - WBC 6.1  - Hgb 9.6    - Repeat AXR obtained prior to digital decompression at bedside.  - Keep malecott to gravity drainage x 4 hours, then remove  - Clamp NGT - if no emesis by this evening, trial clear liquid diet  - Continue daily dulcolax OK and reglan TID  - Continue miralax   - Schedule simethicone  - Avoid narcotics  - keep K > 4.0    Case discussed with Dr. Kendall who is my collaborating physician for this encounter.    Jesenia Nash, CHRISTIANO   6/26/2023  10:25 AM

## 2023-06-28 ENCOUNTER — OFFICE VISIT (OUTPATIENT)
Dept: OBGYN CLINIC | Facility: HOSPITAL | Age: 58
End: 2023-06-28
Payer: COMMERCIAL

## 2023-06-28 ENCOUNTER — OFFICE VISIT (OUTPATIENT)
Dept: PHYSICAL THERAPY | Facility: REHABILITATION | Age: 58
End: 2023-06-28
Payer: COMMERCIAL

## 2023-06-28 VITALS
HEART RATE: 111 BPM | SYSTOLIC BLOOD PRESSURE: 105 MMHG | DIASTOLIC BLOOD PRESSURE: 74 MMHG | WEIGHT: 188.4 LBS | HEIGHT: 60 IN | BODY MASS INDEX: 36.99 KG/M2

## 2023-06-28 DIAGNOSIS — Z74.09 IMPAIRED MOBILITY AND ACTIVITIES OF DAILY LIVING: ICD-10-CM

## 2023-06-28 DIAGNOSIS — Z78.9 IMPAIRED MOBILITY AND ACTIVITIES OF DAILY LIVING: ICD-10-CM

## 2023-06-28 DIAGNOSIS — M17.12 PRIMARY OSTEOARTHRITIS OF LEFT KNEE: Primary | ICD-10-CM

## 2023-06-28 DIAGNOSIS — M23.92 INTERNAL DERANGEMENT OF LEFT KNEE: Primary | ICD-10-CM

## 2023-06-28 DIAGNOSIS — M17.12 PRIMARY OSTEOARTHRITIS OF LEFT KNEE: ICD-10-CM

## 2023-06-28 PROCEDURE — 97110 THERAPEUTIC EXERCISES: CPT

## 2023-06-28 PROCEDURE — 99213 OFFICE O/P EST LOW 20 MIN: CPT | Performed by: ORTHOPAEDIC SURGERY

## 2023-06-28 PROCEDURE — 97140 MANUAL THERAPY 1/> REGIONS: CPT

## 2023-06-28 NOTE — PROGRESS NOTES
Orthopaedics Office Visit - follow up Patient Visit    ASSESSMENT/PLAN:    Assessment:   Left knee osteoarthritis  Concern for meniscal pathology vs possible stress reaction of proximal tibia/cartilage abnormality    Pain pattern does not quite fit degree of OA and location of wear pattern    Plan:   -Oral analgesics as needed  -Heat in the morning and ice in the evening  -Weight-bear as tolerated the use of a cane  -MRI order to question meniscal pathology vs subchondral stress fracture particularly at the lateral compartment  -Depending on the findings of the MRI nonoperative care versus total knee arthroplasty will be considered  To Do Next Visit:  Review MRI left knee    _____________________________________________________  CHIEF COMPLAINT:  Chief Complaint   Patient presents with   • Left Knee - Follow-up         SUBJECTIVE:  Robert Haque is a 62 y o  female who presents for follow-up evaluation of her left knee  She does have a known history of osteoarthritis at the medial compartment that is mild to moderate in nature  She states that prior injections in the form of steroid injections and viscosupplementation injections have failed to provide her with symptomatic relief  She continues to localize pain at the anterior medial and anterior lateral aspect of her knee  She states the anterolateral knee pain is most significant  She states that weightbearing activities will exacerbate her pain  Her pain is typically better while at rest   However does have significant pain at night trying to sleep  She does ambulate with assistance of a cane secondary to antalgic gait secondary to pain  Physical therapy has also failed to provide her with significant symptomatic relief in regards to the pain  However, has helped some with her ability to walk more steadily  Today she denies any distal paresthesias      PAST MEDICAL HISTORY:  Past Medical History:   Diagnosis Date   • Acquired deformity of left foot    • Anesthesia complication     difficulty awakening   • Arthritis    • Deaf, left    • Depression    • Hallux valgus of left foot    • Hammer toe of left foot    • Headache    • Kidney stone    • Sciatic pain, right     intermittent       PAST SURGICAL HISTORY:  Past Surgical History:   Procedure Laterality Date   • BREAST BIOPSY Right 03/04/2021   • BREAST BIOPSY Right 3/10/2021    Procedure: Excisional Breast debridement  7 o'clock position;  Surgeon: Adama Guerrero MD;  [facility];  Service: General   • CHOLECYSTECTOMY     • CLOSURE DELAYED PRIMARY Right 7/5/2020    Procedure: CLOSURE DELAYED PRIMARY and application of skin graft substitute;  Surgeon: Suly Thomas DPM;  [facility];  Service: Podiatry   • HERNIA REPAIR     • INCISION AND DRAINAGE OF WOUND Right 7/1/2020    Procedure: INCISION AND DRAINAGE (I&D) EXTREMITY;  Surgeon: Suly Thomas DPM;  [facility];  Service: Podiatry   • Piroska U  97  W/SESMDC W/1METAR MEDIAL CNF Left 11/12/2021    Procedure: Taran Winchester;  Surgeon: Suly Thomas DPM;  [facility];  Service: Podiatry   • [place] OSTEOT W/ [employer] SHRT/CORRJ METAR XCP 1ST EA Left 6/14/2019    Procedure: 3rd Metatarsal Osteotomy;  Surgeon: Shiva Medina DPM;  [facility];  Service: Podiatry   • [place] OSTEOT W/ [employer] SHRT/CORRJ METAR XCP 1ST EA Left 11/12/2021    Procedure: OSTEOTOMY 2ND METATARSAL;  Surgeon: Suly Thomas DPM;  [facility];  Service: Podiatry   • TUBAL LIGATION     • US GUIDED BREAST BIOPSY RIGHT COMPLETE Right 3/4/2021   • VAC DRESSING APPLICATION Right 0/4/9202    Procedure: APPLICATION VAC DRESSING;  Surgeon: Suly Thomas DPM;  [facility];  Service: Podiatry       FAMILY HISTORY:  Family History   Problem Relation Age of Onset   • No Known Problems Mother    • No Known Problems Father    • No Known Problems Sister    • No Known Problems Daughter    • No Known Problems Maternal Grandmother    • Colon cancer Maternal Grandfather    • No Known Problems Paternal Grandmother    • No Known Problems Paternal Grandfather        SOCIAL HISTORY:  Social History     Tobacco Use   • Smoking status: Some Days     Packs/day: 1 00     Years: 15 00     Total pack years: 15 00     Types: Cigarettes     Last attempt to quit: 2021     Years since quittin 6   • Smokeless tobacco: Never   • Tobacco comments:     trying to quit - using judson  patch   Vaping Use   • Vaping Use: Never used   Substance Use Topics   • Alcohol use: Not Currently   • Drug use: Not Currently       MEDICATIONS:    Current Outpatient Medications:   •  Acetaminophen Extra Strength 500 MG tablet, take 2 tablets by mouth every 8 hours if needed for MILD PAIN ( P   (REFER TO PRESCRIPTION NOTES)  , Disp: , Rfl:   •  amLODIPine (NORVASC) 10 mg tablet, Take 10 mg by mouth daily, Disp: , Rfl:   •  aspirin 81 mg chewable tablet, Chew 1 tablet (81 mg total) daily, Disp: 30 tablet, Rfl: 3  •  atorvastatin (LIPITOR) 80 mg tablet, Take 1 tablet (80 mg total) by mouth every evening, Disp: 30 tablet, Rfl: 11  •  Diclofenac Sodium (VOLTAREN) 1 %, APPLY 2 GRAMS TOPICALLY TWO TIMES A DAY AS NEEDED FOR (KNEE PAIN)  , Disp: , Rfl:   •  DULoxetine (CYMBALTA) 60 mg delayed release capsule, Take 1 capsule (60 mg total) by mouth daily, Disp: , Rfl: 0  •  gabapentin (NEURONTIN) 100 mg capsule, Take 1 capsule (100 mg total) by mouth daily as needed (headache), Disp: 15 capsule, Rfl: 0  •  losartan (COZAAR) 100 MG tablet, Take 100 mg by mouth daily, Disp: , Rfl:   •  nicotine (NICODERM CQ) 14 mg/24hr TD 24 hr patch, Place 1 patch on the skin daily, Disp: , Rfl:   •  QUEtiapine (SEROquel) 25 mg tablet, Take 25 mg by mouth every evening, Disp: , Rfl:     ALLERGIES:  Allergies   Allergen Reactions   • Sertraline Other (See Comments)     Hand swelling, itching       REVIEW OF SYSTEMS:  MSK: left knee pain  Neuro: WNL  Pertinent items are otherwise noted in HPI    A comprehensive review of "systems was otherwise negative  LABS:  HgA1c:   Lab Results   Component Value Date    HGBA1C 6 6 (H) 05/24/2023     BMP:   Lab Results   Component Value Date    GLUCOSE 130 09/08/2014    CALCIUM 9 1 02/16/2023     (L) 09/08/2014    K 3 6 02/16/2023    CO2 28 02/16/2023     02/16/2023    BUN 19 02/16/2023    CREATININE 0 78 02/16/2023     CBC: No components found for: \"CBC\"    _____________________________________________________  PHYSICAL EXAMINATION:  Vital signs: /74   Pulse (!) 111   Ht 5' (1 524 m)   Wt 85 5 kg (188 lb 6 4 oz)   BMI 36 79 kg/m²   General: No acute distress, awake and alert  Psychiatric: Mood and affect appear appropriate  HEENT: Trachea Midline, No torticollis, no apparent facial trauma  Cardiovascular: No audible murmurs; Extremities appear perfused  Pulmonary: No audible wheezing or stridor  Skin: No open lesions; see further details (if any) below    MUSCULOSKELETAL EXAMINATION:  Extremities: Left knee  -Point tenderness at the , pes anserine, lateral joint line  -+1 effusion, and lateral joint line  -Swelling over prepatellar bursa  -Demonstrates 0 degrees of extension  -Demonstrates 90 degrees of knee flexion  -Antalgic gait secondary to pain  -No erythema or signs of infection      _____________________________________________________  STUDIES REVIEWED:  I personally reviewed the images and interpretation is as follows:  X-rays of the left knee demonstrate mild to moderate medial and patellofemoral compartment osteoarthritis  PROCEDURES PERFORMED:  No procedures performed today    Scribe Attestation    I,:  Reginaldo Grigsby am acting as a scribe while in the presence of the attending physician :       I,:  Laila Camarena MD personally performed the services described in this documentation    as scribed in my presence  :           "

## 2023-06-28 NOTE — PROGRESS NOTES
"Daily Note     Today's date: 2023  Patient name: Wendy Mesa  : 1965  MRN: [id number]  Referring provider: Fanny Lopez MD  Dx:   Encounter Diagnosis     ICD-10-CM    1  Primary osteoarthritis of left knee  M17 12       2  Impaired mobility and activities of daily living  Z74 09     Z78 9           Start Time: 1520  Stop Time: 1600  Total time in clinic (min): 40 minutes    Subjective: Patient reports increased pain over the last few days because of the rain  Patient reports improvements in symptoms following last session  Patient reports she will be getting an MRI for her knee due to ongoing symptoms  Objective: See treatment diary below      Assessment: Patient continues to respond well to manuals  Patient symptoms remain in her medial knee  Resume previous activities    Patient would benefit from continued PT      Plan: Continue per plan of care        Precautions: standard, hx of CVA, cardiac, falls    Manuals 6/1 6/7 6/14 6/23 6/28  4/26 5/3 5/10 5/24   STR adductors and medial hamstring PRR posterior and medial hamstring  IASTM anterior knee PRR    PRR pes anserine PRR pes anserine, hamstirng PRR posterior and medial hamstring PRR posterior and medial hamstring   LAD  PRR GR 2-3 2 rds  PRR GR 2-3 2 rds         Seated Knee Distraction PRR Gr2-3  PRR Gr 2-3 2 rds PRR Gr 2-3 2 rds PRR Gr 2-3 2 rds PRR Gr 2-3 3 rds        Hooklying Knee Distraction    PRR Gr 2-3 2 rds PRR Gr 2-3 3 rds        LASER       15 W DC 4' 16W DC 4'     FOTO    perf      22/50 to 47/50   Patient education, vitals assessment    15'    10' 10' 20' 15'   Neuro Re-Ed             Supine 90/90 nerve Glides         2x5                                                                                   Ther Ex             Hip Abduction Stretch Supine BKFO             Hip Add Iso w/ Ball       2x10 5\" hold       Prone Knee Flexion Stretch             Bridges  2x15 5\" hold yhb 2x15 5\" hold rhb np resume np resume  2x15 yhb " "2x15 yhb 2x15 yhb    Supine Clamshell w/ Hip Abd  2x15 5\" hold yhb 2x15 5\" hold rhb np resume np resume  2x10 5\" hold yhb 2x15 5\" hold yhb 2x15 5\" hold yhb    LAQ 4# 2x15 4# 2x15 5# 2x15 5# 2x15 5# 2x20  np resume 3# 2x15 3# 2x15 3# 2x15                VG  L7 DL 5' L7 DL 5' np resume np resume  DL L5 5' yhb DL L7 5' yhb np resume    Ther Activity                                       Gait Training                                       Modalities                                            "

## 2023-07-05 ENCOUNTER — OFFICE VISIT (OUTPATIENT)
Dept: PHYSICAL THERAPY | Facility: REHABILITATION | Age: 58
End: 2023-07-05
Payer: COMMERCIAL

## 2023-07-05 DIAGNOSIS — I63.9 CEREBROVASCULAR ACCIDENT (CVA), UNSPECIFIED MECHANISM (HCC): ICD-10-CM

## 2023-07-05 DIAGNOSIS — Z78.9 IMPAIRED MOBILITY AND ACTIVITIES OF DAILY LIVING: Primary | ICD-10-CM

## 2023-07-05 DIAGNOSIS — M17.12 PRIMARY OSTEOARTHRITIS OF LEFT KNEE: ICD-10-CM

## 2023-07-05 DIAGNOSIS — Z74.09 IMPAIRED MOBILITY AND ACTIVITIES OF DAILY LIVING: Primary | ICD-10-CM

## 2023-07-05 PROCEDURE — 97110 THERAPEUTIC EXERCISES: CPT

## 2023-07-05 PROCEDURE — 97140 MANUAL THERAPY 1/> REGIONS: CPT

## 2023-07-05 NOTE — PROGRESS NOTES
Daily Note     Today's date: 2023  Patient name: Sebas Dominguez  : 1965  MRN: [id number]  Referring provider: Mariam Cuba MD  Dx:   Encounter Diagnosis     ICD-10-CM    1. Impaired mobility and activities of daily living  Z74.09     Z78.9       2. Primary osteoarthritis of left knee  M17.12       3. Cerebrovascular accident (CVA), unspecified mechanism ([address])  I63.9           Start Time: 58  Stop Time: 55  Total time in clinic (min): 40 minutes    Subjective: Patient reports improvements in her left knee following last visit. Objective: See treatment diary below      Assessment: Patient continues to demonstrate significant muscular tenderness and TrPs throughout her left knee. Very good response to manuals today. Patient gait, pain, and knee ROM improved end of session. Patient would benefit from continued PT      Plan: Continue per plan of care.       Precautions: standard, hx of CVA, cardiac, falls    Manuals 6/1 6/7 6/14 6/23 6/28 7/3    5/24   STM PRR posterior and medial hamstring  IASTM anterior knee PRR   Quads/adductors/hip flexors    PRR posterior and medial hamstring   LAD  PRR GR 2-3 2 rds  PRR GR 2-3 2 rds         Seated Knee Distraction PRR Gr2-3  PRR Gr 2-3 2 rds PRR Gr 2-3 2 rds PRR Gr 2-3 2 rds PRR Gr 2-3 3 rds        Hooklying Knee Distraction    PRR Gr 2-3 2 rds PRR Gr 2-3 3 rds PRR Gr 2-3 3 rds       LASER             FOTO    perf      22/50 to 47/50   Patient education, vitals assessment    15'   10'    15'   Neuro Re-Ed             Supine 90/90 nerve Glides                                                                                           Ther Ex             Hip Abduction Stretch Supine BKFO             Hip Add Iso w/ Ball             Prone Knee Flexion Stretch             Bridges  2x15 5" hold yhb 2x15 5" hold rhb np resume np resume        Supine Clamshell w/ Hip Abd  2x15 5" hold yhb 2x15 5" hold rhb np resume np resume        LAQ 4# 2x15 4# 2x15 5# 2x15 5# 2x15 5# 2x20 5# 2x20    3# 2x15                VG  L7 DL 5' L7 DL 5' np resume np resume L7 DL 5'       Ther Activity                                       Gait Training                                       Modalities

## 2023-07-12 ENCOUNTER — OFFICE VISIT (OUTPATIENT)
Dept: PHYSICAL THERAPY | Facility: REHABILITATION | Age: 58
End: 2023-07-12
Payer: COMMERCIAL

## 2023-07-12 DIAGNOSIS — Z74.09 IMPAIRED MOBILITY AND ACTIVITIES OF DAILY LIVING: Primary | ICD-10-CM

## 2023-07-12 DIAGNOSIS — I63.9 CEREBROVASCULAR ACCIDENT (CVA), UNSPECIFIED MECHANISM (HCC): ICD-10-CM

## 2023-07-12 DIAGNOSIS — Z78.9 IMPAIRED MOBILITY AND ACTIVITIES OF DAILY LIVING: Primary | ICD-10-CM

## 2023-07-12 DIAGNOSIS — M17.12 PRIMARY OSTEOARTHRITIS OF LEFT KNEE: ICD-10-CM

## 2023-07-12 PROCEDURE — 97140 MANUAL THERAPY 1/> REGIONS: CPT

## 2023-07-12 PROCEDURE — 97110 THERAPEUTIC EXERCISES: CPT

## 2023-07-12 PROCEDURE — 97112 NEUROMUSCULAR REEDUCATION: CPT

## 2023-07-12 NOTE — PROGRESS NOTES
Daily Note     Today's date: 2023  Patient name: Yoseph Velazquez  : 1965  MRN: [id number]  Referring provider: Akila Benito MD  Dx:   Encounter Diagnosis     ICD-10-CM    1. Impaired mobility and activities of daily living  Z74.09     Z78.9       2. Primary osteoarthritis of left knee  M17.12       3. Cerebrovascular accident (CVA), unspecified mechanism ([address])  I63.9           Start Time: 1515  Stop Time: 5211  Total time in clinic (min): 40 minutes    Subjective: Patient reports her knee has been feeling pretty good since last visit. Patient reports she did some dancing over the weekend and her knee held up well. Objective: See treatment diary below      Assessment: Patient tolerated activities well today. Patient continues to have good response to manual activities. Patient would benefit from continued PT      Plan: Continue per plan of care.       Precautions: standard, hx of CVA, cardiac, falls    Manuals 6/1 6/7 6/14 6/23 6/28 7/3 7/12   5/24   STM PRR posterior and medial hamstring  IASTM anterior knee PRR   Quads/adductors/hip flexors Quads/adductors/hip flexors   PRR posterior and medial hamstring   LAD  PRR GR 2-3 2 rds  PRR GR 2-3 2 rds         Seated Knee Distraction PRR Gr2-3  PRR Gr 2-3 2 rds PRR Gr 2-3 2 rds PRR Gr 2-3 2 rds PRR Gr 2-3 3 rds        Hooklying Knee Distraction    PRR Gr 2-3 2 rds PRR Gr 2-3 3 rds PRR Gr 2-3 3 rds PRR Gr 2-3 3 rds      LASER             FOTO    perf      / to 47/50   Patient education, vitals assessment    15'   10'    15'   Neuro Re-Ed             Supine 90/90 nerve Glides                                                                                           Ther Ex             Hip Abduction Stretch Supine BKFO             Hip Add Iso w/ Ball             Prone Knee Flexion Stretch             Bridges  2x15 5" hold yhb 2x15 5" hold rhb np resume np resume  2x15 5" hold yhb      Supine Clamshell w/ Hip Abd  2x15 5" hold yhb 2x15 5" hold rhb np resume np resume  2x15 5" hold yhb      LAQ 4# 2x15 4# 2x15 5# 2x15 5# 2x15 5# 2x20 5# 2x20 5# 2x20   3# 2x15                VG  L7 DL 5' L7 DL 5' np resume np resume L7 DL 5' L7 DL 5'      Ther Activity                                       Gait Training                                       Modalities

## 2023-07-13 ENCOUNTER — OFFICE VISIT (OUTPATIENT)
Dept: PODIATRY | Facility: CLINIC | Age: 58
End: 2023-07-13
Payer: COMMERCIAL

## 2023-07-13 VITALS
DIASTOLIC BLOOD PRESSURE: 83 MMHG | BODY MASS INDEX: 36.91 KG/M2 | WEIGHT: 188 LBS | SYSTOLIC BLOOD PRESSURE: 119 MMHG | HEART RATE: 108 BPM | HEIGHT: 60 IN

## 2023-07-13 DIAGNOSIS — G57.92 NEURITIS OF LEFT FOOT: Primary | ICD-10-CM

## 2023-07-13 DIAGNOSIS — M20.11 HALLUX VALGUS OF RIGHT FOOT: ICD-10-CM

## 2023-07-13 PROCEDURE — 99213 OFFICE O/P EST LOW 20 MIN: CPT | Performed by: PODIATRIST

## 2023-07-13 NOTE — PROGRESS NOTES
PATIENT:  Isak Leyva      1965    ASSESSMENT     1. Neuritis of left foot        2. Hallux valgus of right foot               PLAN  1. Reviewed medical records. Patient was counseled and educated on the condition and the diagnosis. 2. Discussed proper skin care and protection on right 1st interspace. Continue toe spacers. 3. HPK trimmed. Discussed proper footwear. 4. RA in 3 months. HISTORY OF PRESENT ILLNESS  Patient presents for foot evaluation. She has chronic pain on plantar right foot. She still has significant deformity of right foot. Left foot pain has been stable. No new complaint. Recently diagnosed with diabetes.       PAST MEDICAL HISTORY:  Past Medical History:   Diagnosis Date   • Acquired deformity of left foot    • Anesthesia complication     difficulty awakening   • Arthritis    • Deaf, left    • Depression    • Hallux valgus of left foot    • Hammer toe of left foot    • Headache    • Kidney stone    • Sciatic pain, right     intermittent       PAST SURGICAL HISTORY:  Past Surgical History:   Procedure Laterality Date   • BREAST BIOPSY Right 03/04/2021   • BREAST BIOPSY Right 3/10/2021    Procedure: Excisional Breast debridement  7 o'clock position;  Surgeon: Marcelo Guadalupe MD;  [facility];  Service: General   • CHOLECYSTECTOMY     • CLOSURE DELAYED PRIMARY Right 7/5/2020    Procedure: CLOSURE DELAYED PRIMARY and application of skin graft substitute;  Surgeon: Odalys Casas DPM;  [facility];  Service: Podiatry   • HERNIA REPAIR     • INCISION AND DRAINAGE OF WOUND Right 7/1/2020    Procedure: INCISION AND DRAINAGE (I&D) EXTREMITY;  Surgeon: Odalys Casas DPM;  [facility];  Service: Podiatry   • [place] CORRJ HALLUX VALGUS W/SESMDC W/1METAR MEDIAL CNF Left 11/12/2021    Procedure: Veronica Guardado;  Surgeon: Odalys Casas DPM;  [facility];  Service: Podiatry   • [place] OSTEOT W/WO [address] [facility] SHRT/CORRJ METAR XCP 1ST EA Left 6/14/2019 Procedure: 3rd Metatarsal Osteotomy;  Surgeon: Remedios Duarte DPM;  [facility];  Service: Podiatry   • [place] OSTEOT W/WO [address] [facility] SHRT/CORRJ METAR XCP 1ST EA Left 11/12/2021    Procedure: OSTEOTOMY 2ND METATARSAL;  Surgeon: Bogdan Todd DPM;  [facility];  Service: Podiatry   • TUBAL LIGATION     • US GUIDED BREAST BIOPSY RIGHT COMPLETE Right 3/4/2021   • VAC DRESSING APPLICATION Right 2/0/8516    Procedure: APPLICATION VAC DRESSING;  Surgeon: Bogdan Todd DPM;  [facility];  Service: Podiatry        ALLERGIES:  Sertraline    MEDICATIONS:  Current Outpatient Medications   Medication Sig Dispense Refill   • Acetaminophen Extra Strength 500 MG tablet take 2 tablets by mouth every 8 hours if needed for MILD PAIN ( P...  (REFER TO PRESCRIPTION NOTES). • amLODIPine (NORVASC) 10 mg tablet Take 10 mg by mouth daily     • aspirin 81 mg chewable tablet Chew 1 tablet (81 mg total) daily 30 tablet 3   • atorvastatin (LIPITOR) 80 mg tablet Take 1 tablet (80 mg total) by mouth every evening 30 tablet 11   • Diclofenac Sodium (VOLTAREN) 1 % APPLY 2 GRAMS TOPICALLY TWO TIMES A DAY AS NEEDED FOR (KNEE PAIN). • DULoxetine (CYMBALTA) 60 mg delayed release capsule Take 1 capsule (60 mg total) by mouth daily  0   • gabapentin (NEURONTIN) 100 mg capsule Take 1 capsule (100 mg total) by mouth daily as needed (headache) 15 capsule 0   • losartan (COZAAR) 100 MG tablet Take 100 mg by mouth daily     • nicotine (NICODERM CQ) 14 mg/24hr TD 24 hr patch Place 1 patch on the skin daily     • QUEtiapine (SEROquel) 25 mg tablet Take 25 mg by mouth every evening       No current facility-administered medications for this visit.        SOCIAL HISTORY:  Social History     Socioeconomic History   • Marital status: Single     Spouse name: None   • Number of children: None   • Years of education: None   • Highest education level: None   Occupational History   • None   Tobacco Use   • Smoking status: Some Days     Packs/day: 1.00 Years: 15.00     Total pack years: 15.00     Types: Cigarettes     Last attempt to quit: 2021     Years since quittin.6   • Smokeless tobacco: Never   • Tobacco comments:     trying to quit - using judson. patch   Vaping Use   • Vaping Use: Never used   Substance and Sexual Activity   • Alcohol use: Not Currently   • Drug use: Not Currently   • Sexual activity: Yes   Other Topics Concern   • None   Social History Narrative   • None     Social Determinants of Health     Financial Resource Strain: Not on file   Food Insecurity: No Food Insecurity (2023)    Hunger Vital Sign    • Worried About Running Out of Food in the Last Year: Never true    • Ran Out of Food in the Last Year: Never true   Transportation Needs: No Transportation Needs (2023)    PRAPARE - Transportation    • Lack of Transportation (Medical): No    • Lack of Transportation (Non-Medical): No   Physical Activity: Not on file   Stress: Not on file   Social Connections: Not on file   Intimate Partner Violence: Not on file   Housing Stability: Low Risk  (2023)    Housing Stability Vital Sign    • Unable to Pay for Housing in the Last Year: No    • Number of Places Lived in the Last Year: 1    • Unstable Housing in the Last Year: No        REVIEW OF SYSTEMS  Patient denied CP, SOB, fever, chills, palpatation, HA, GI problem, or calf pain. PHYSICAL EXAMINATION  GENERAL  The patient appears in NAD / non-toxic. Afebrile. VSS    VASCULAR EXAM  Pedal pulses and vascular status are intact. No calf pain or edema bilaterally. No cyanosis. No ischemia. DERMATOLOGIC EXAM  No wound. No abscess. No redness. No purulence. IPK right submet 2. Mild maceration on right 1st interspace. NEUROLOGIC EXAM  AAO X 3. No focal neurologic deficit. Neurologic status is intact BLE. MMT intact. MUSCULOSKELETAL EXAM  ROM intact. No fluctuation or crepitus. Bunion deformity noted right foot. No acute swelling.

## 2023-07-14 ENCOUNTER — HOSPITAL ENCOUNTER (OUTPATIENT)
Dept: RADIOLOGY | Facility: HOSPITAL | Age: 58
Discharge: HOME | End: 2023-07-14
Attending: ORTHOPAEDIC SURGERY
Payer: COMMERCIAL

## 2023-07-14 DIAGNOSIS — M17.12 PRIMARY OSTEOARTHRITIS OF LEFT KNEE: ICD-10-CM

## 2023-07-14 DIAGNOSIS — M23.92 INTERNAL DERANGEMENT OF LEFT KNEE: ICD-10-CM

## 2023-07-14 PROCEDURE — 73721 MRI JNT OF LWR EXTRE W/O DYE: CPT

## 2023-07-14 PROCEDURE — G1004 CDSM NDSC: HCPCS

## 2023-07-26 ENCOUNTER — OFFICE VISIT (OUTPATIENT)
Dept: PHYSICAL THERAPY | Facility: REHABILITATION | Age: 58
End: 2023-07-26
Payer: COMMERCIAL

## 2023-07-26 ENCOUNTER — OFFICE VISIT (OUTPATIENT)
Dept: OBGYN CLINIC | Facility: HOSPITAL | Age: 58
End: 2023-07-26
Payer: COMMERCIAL

## 2023-07-26 VITALS
HEART RATE: 68 BPM | WEIGHT: 188 LBS | HEIGHT: 60 IN | SYSTOLIC BLOOD PRESSURE: 121 MMHG | BODY MASS INDEX: 36.91 KG/M2 | DIASTOLIC BLOOD PRESSURE: 75 MMHG

## 2023-07-26 DIAGNOSIS — I63.9 CEREBROVASCULAR ACCIDENT (CVA), UNSPECIFIED MECHANISM (HCC): ICD-10-CM

## 2023-07-26 DIAGNOSIS — M17.12 PRIMARY OSTEOARTHRITIS OF LEFT KNEE: Primary | ICD-10-CM

## 2023-07-26 DIAGNOSIS — Z74.09 IMPAIRED MOBILITY AND ACTIVITIES OF DAILY LIVING: Primary | ICD-10-CM

## 2023-07-26 DIAGNOSIS — M23.92 INTERNAL DERANGEMENT OF LEFT KNEE: ICD-10-CM

## 2023-07-26 DIAGNOSIS — I83.93 VARICOSE VEINS OF BOTH LOWER EXTREMITIES, UNSPECIFIED WHETHER COMPLICATED: ICD-10-CM

## 2023-07-26 DIAGNOSIS — Z78.9 IMPAIRED MOBILITY AND ACTIVITIES OF DAILY LIVING: Primary | ICD-10-CM

## 2023-07-26 DIAGNOSIS — M17.12 PRIMARY OSTEOARTHRITIS OF LEFT KNEE: ICD-10-CM

## 2023-07-26 PROCEDURE — 99213 OFFICE O/P EST LOW 20 MIN: CPT | Performed by: ORTHOPAEDIC SURGERY

## 2023-07-26 PROCEDURE — 97110 THERAPEUTIC EXERCISES: CPT

## 2023-07-26 PROCEDURE — 97140 MANUAL THERAPY 1/> REGIONS: CPT

## 2023-07-26 NOTE — PROGRESS NOTES
Daily Note     Today's date: 2023  Patient name: Heaven Ellis  : 1965  MRN: [id number]  Referring provider: Hi Coles MD  Dx:   Encounter Diagnosis     ICD-10-CM    1. Impaired mobility and activities of daily living  Z74.09     Z78.9       2. Primary osteoarthritis of left knee  M17.12       3. Cerebrovascular accident (CVA), unspecified mechanism ([address])  I63.9           Start Time: 0888  Stop Time: 1600  Total time in clinic (min): 45 minutes   Patient 1 on 1 with PT from 330-400p. Subjective: Patient reports she saw othro today and they recommended f/u with surgeon to discuss options. Patient reports she was doing good for a week following last visit but does report symptoms returned. Objective: See treatment diary below      Assessment: Patient continually demonstrates very good responses to manual techniques to address soft-tissue and joint restrictions in her left knee. Patient had reductions in pain and significant improvements in gait end of session. Progress as tolerated. Patient would benefit from continued PT      Plan: Continue per plan of care.       Precautions: standard, hx of CVA, cardiac, falls    Manuals 6/1 6/7 6/14 6/23 6/28 7/3 7/12 7/26  5/24   STM PRR posterior and medial hamstring  IASTM anterior knee PRR   Quads/adductors/hip flexors Quads/adductors/hip flexors Quads/adductors/hip flexors  PRR posterior and medial hamstring   LAD  PRR GR 2-3 2 rds  PRR GR 2-3 2 rds         Seated Knee Distraction PRR Gr2-3  PRR Gr 2-3 2 rds PRR Gr 2-3 2 rds PRR Gr 2-3 2 rds PRR Gr 2-3 3 rds        Hooklying Knee Distraction    PRR Gr 2-3 2 rds PRR Gr 2-3 3 rds PRR Gr 2-3 3 rds PRR Gr 2-3 3 rds PRR Gr 2-3 3 rds     LASER             FOTO    perf      22/50 to 47/50   Patient education, vitals assessment    15'   10'    15'   Neuro Re-Ed             Supine 90/90 nerve Glides                                                                                           Ther Ex Hip Abduction Stretch Supine BKFO             Hip Add Iso w/ Ball             Prone Knee Flexion Stretch             Bridges  2x15 5" hold yhb 2x15 5" hold rhb np resume np resume  2x15 5" hold yhb 2x15 5" hold yhb     Supine Clamshell w/ Hip Abd  2x15 5" hold yhb 2x15 5" hold rhb np resume np resume  2x15 5" hold yhb 2x15 5" hold yhb     LAQ 4# 2x15 4# 2x15 5# 2x15 5# 2x15 5# 2x20 5# 2x20 5# 2x20 5# 2x20  3# 2x15                VG  L7 DL 5' L7 DL 5' np resume np resume L7 DL 5' L7 DL 5' L7 DL 5'     Ther Activity                                       Gait Training                                       Modalities

## 2023-07-26 NOTE — PROGRESS NOTES
Orthopaedics Office Visit - Follow-up patient Visit    ASSESSMENT/PLAN:    Assessment:   1. Primary osteoarthritis of left knee  2. Internal derangement of left knee  3. Varicose veins of both legs    L knee OA refractory to multiple nonop modalities, main symptoms are pain, not mechanical in nature - likely more of OA as contributor than meniscus tear  Small undersurface meniscus tear, likely degen  Significant cartilage loss    Plan:   · Refer to Dr. Drew Marcelino for likely consideration for TKA - she is former patient as well  · Refer to vascular surgery to evaluate varicose veins of both legs at her request    ___________________________________________________  CHIEF COMPLAINT:  Chief Complaint   Patient presents with   • Left Knee - Follow-up         SUBJECTIVE:  Francisco Lopez is a 62 y.o. female who presents who presents today to review the findings of an MRI of the left knee obtained on 7/14/2023. She states she is still having pain on the medial aspect of the left knee. She is taking over-the-counter pain medication as needed.     PAST MEDICAL HISTORY:  Past Medical History:   Diagnosis Date   • Acquired deformity of left foot    • Anesthesia complication     difficulty awakening   • Arthritis    • Deaf, left    • Depression    • Hallux valgus of left foot    • Hammer toe of left foot    • Headache    • Kidney stone    • Sciatic pain, right     intermittent       PAST SURGICAL HISTORY:  Past Surgical History:   Procedure Laterality Date   • BREAST BIOPSY Right 03/04/2021   • BREAST BIOPSY Right 3/10/2021    Procedure: Excisional Breast debridement  7 o'clock position;  Surgeon: Brynn Serra MD;  [facility];  Service: General   • CHOLECYSTECTOMY     • CLOSURE DELAYED PRIMARY Right 7/5/2020    Procedure: CLOSURE DELAYED PRIMARY and application of skin graft substitute;  Surgeon: Linda Brady DPM;  [facility];  Service: Podiatry   • HERNIA REPAIR     • INCISION AND DRAINAGE OF WOUND Right 2020    Procedure: INCISION AND DRAINAGE (I&D) EXTREMITY;  Surgeon: Don Vasquez DPM;  Location[facility];  Service: Podiatry   • [address] W/SESMDC W/1METAR MEDIAL CNF Left 2021    Procedure: Sharyon Clipper;  Surgeon: Don Vasquez DPM;  [facility];  Service: Podiatry   • [facility] OSTEOT W/WO [address] [facility] SHRT/CORRJ METAR XCP 1ST EA Left 2019    Procedure: 3rd Metatarsal Osteotomy;  Surgeon: Issa Bunn DPM;  [facility];  Service: Podiatry   • AL OSTEOT W/WO [address] [facility] SHRT/CORRJ METAR XCP 1ST EA Left 2021    Procedure: OSTEOTOMY 2ND METATARSAL;  Surgeon: Don Vasquez DPM;  [facility];  Service: Podiatry   • TUBAL LIGATION     • US GUIDED BREAST BIOPSY RIGHT COMPLETE Right 3/4/2021   • VAC DRESSING APPLICATION Right 3267    Procedure: APPLICATION VAC DRESSING;  Surgeon: Don Vasquez DPM;  [facility];  Service: Podiatry       FAMILY HISTORY:  Family History   Problem Relation Age of Onset   • No Known Problems Mother    • No Known Problems Father    • No Known Problems Sister    • No Known Problems Daughter    • No Known Problems Maternal Grandmother    • Colon cancer Maternal Grandfather    • No Known Problems Paternal Grandmother    • No Known Problems Paternal Grandfather        SOCIAL HISTORY:  Social History     Tobacco Use   • Smoking status: Some Days     Packs/day: 1.00     Years: 15.00     Total pack years: 15.00     Types: Cigarettes     Last attempt to quit: 2021     Years since quittin.7   • Smokeless tobacco: Never   • Tobacco comments:     trying to quit - using judson. patch   Vaping Use   • Vaping Use: Never used   Substance Use Topics   • Alcohol use: Not Currently   • Drug use: Not Currently       MEDICATIONS:    Current Outpatient Medications:   •  Acetaminophen Extra Strength 500 MG tablet, take 2 tablets by mouth every 8 hours if needed for MILD PAIN ( P...  (REFER TO PRESCRIPTION NOTES). , Disp: , Rfl:   •  amLODIPine (NORVASC) 10 mg tablet, Take 10 mg by mouth daily, Disp: , Rfl:   •  aspirin 81 mg chewable tablet, Chew 1 tablet (81 mg total) daily, Disp: 30 tablet, Rfl: 3  •  atorvastatin (LIPITOR) 80 mg tablet, Take 1 tablet (80 mg total) by mouth every evening, Disp: 30 tablet, Rfl: 11  •  Diclofenac Sodium (VOLTAREN) 1 %, APPLY 2 GRAMS TOPICALLY TWO TIMES A DAY AS NEEDED FOR (KNEE PAIN). , Disp: , Rfl:   •  DULoxetine (CYMBALTA) 60 mg delayed release capsule, Take 1 capsule (60 mg total) by mouth daily, Disp: , Rfl: 0  •  gabapentin (NEURONTIN) 100 mg capsule, Take 1 capsule (100 mg total) by mouth daily as needed (headache), Disp: 15 capsule, Rfl: 0  •  losartan (COZAAR) 100 MG tablet, Take 100 mg by mouth daily, Disp: , Rfl:   •  nicotine (NICODERM CQ) 14 mg/24hr TD 24 hr patch, Place 1 patch on the skin daily, Disp: , Rfl:   •  QUEtiapine (SEROquel) 25 mg tablet, Take 25 mg by mouth every evening, Disp: , Rfl:     ALLERGIES:  Allergies   Allergen Reactions   • Sertraline Other (See Comments)     Hand swelling, itching       REVIEW OF SYSTEMS:  MSK: See ortho exam  Neuro: Normal  Pertinent items are otherwise noted in HPI. A comprehensive review of systems was otherwise negative. LABS:  HgA1c:   Lab Results   Component Value Date    HGBA1C 6.6 (H) 05/24/2023     BMP:   Lab Results   Component Value Date    GLUCOSE 130 09/08/2014    CALCIUM 9.1 02/16/2023     (L) 09/08/2014    K 3.6 02/16/2023    CO2 28 02/16/2023     02/16/2023    BUN 19 02/16/2023    CREATININE 0.78 02/16/2023   ___________________________________________________  PHYSICAL EXAMINATION:  Vital signs: /75   Pulse 68   Ht 5' (1.524 m)   Wt 85.3 kg (188 lb)   BMI 36.72 kg/m²   General: No acute distress, awake and alert  Psychiatric: Mood and affect appear appropriate  HEENT: Trachea Midline, No torticollis, no apparent facial trauma  Cardiovascular: No audible murmurs;  Extremities appear perfused  Pulmonary: No audible wheezing or stridor  Skin: No open lesions; see further details (if any) below    MUSCULOSKELETAL EXAMINATION:  LLE:  NV intact  Medial JLT present  No effusion  Negative Gloria's exam  No open skin lesions  Varicose veins present in calf    Left Knee Exam     Tenderness   The patient is experiencing tenderness in the medial joint line.        _____________________________________________  STUDIES REVIEWED:  I personally reviewed the images and interpretation is as follows: MRI obtained on 7/14/2023 of the left knee demonstrates small meniscus tear      PROCEDURES PERFORMED:  No procedures performed today.     Scribe Attestation    I,:  Rosa Maria Dominguez am acting as a scribe while in the presence of the attending physician.:       I,:  Anyi Leon MD personally performed the services described in this documentation    as scribed in my presence.:

## 2023-08-02 ENCOUNTER — OFFICE VISIT (OUTPATIENT)
Dept: PHYSICAL THERAPY | Facility: REHABILITATION | Age: 58
End: 2023-08-02
Payer: COMMERCIAL

## 2023-08-02 DIAGNOSIS — Z78.9 IMPAIRED MOBILITY AND ACTIVITIES OF DAILY LIVING: Primary | ICD-10-CM

## 2023-08-02 DIAGNOSIS — M17.12 PRIMARY OSTEOARTHRITIS OF LEFT KNEE: ICD-10-CM

## 2023-08-02 DIAGNOSIS — I63.9 CEREBROVASCULAR ACCIDENT (CVA), UNSPECIFIED MECHANISM (HCC): ICD-10-CM

## 2023-08-02 DIAGNOSIS — Z74.09 IMPAIRED MOBILITY AND ACTIVITIES OF DAILY LIVING: Primary | ICD-10-CM

## 2023-08-02 PROCEDURE — 97110 THERAPEUTIC EXERCISES: CPT

## 2023-08-02 PROCEDURE — 97140 MANUAL THERAPY 1/> REGIONS: CPT

## 2023-08-02 NOTE — PROGRESS NOTES
Re-Evaluation/Daily Note     Today's date: 8/3/2023  Patient name: Marcos aDmon  : 1965  MRN: [id number]  Referring provider: Alfie Carmichael MD  Dx:   Encounter Diagnosis     ICD-10-CM    1. Impaired mobility and activities of daily living  Z74.09     Z78.9       2. Primary osteoarthritis of left knee  M17.12       3. Cerebrovascular accident (CVA), unspecified mechanism ([address])  I63.9           Start Time: 3505  Stop Time: 1600  Total time in clinic (min): 45 minutes   Patient 1 on 1 with PT from 330-400p. Subjective: Patient reports she has been feeling better since last visit. Objective: See treatment diary below  Knee ROM  Left Knee 0-130 (pain at end range flexion and extension)  Right Knee 0-140    Knee Strength:  Left Knee: 4+/5 for knee flexion and extension  Right Knee: 5/5 for knee flexion and extension      Gait  Reduced stance time on the LLE, reverse trendelenburg on the left in stance, flat foot at initial contact    Assessment: Patient still demonstrates painful and limited knee ROM on her left knee compared to her right. Patient still demonstrates an antalgic gait on the left compared to the right. Patient still demonstrates noticeable strength deficits on her left knee. Patient FOTO score has improved from 22/50 to 44/50 during the course of her skilled physical therapy indicating functional improvements. The impairments listed above are continuing to result in pain and reduced participation in social and recreational activities. Patient would benefit from continued PT      Plan: Continue per plan of care.         FOTO  3/15/23 (Initial Evaluation): 22/50  23: 44/50    Precautions: standard, hx of CVA, cardiac, falls    Manuals 6/1 6/7 6/14 6/23 6/28 7/3 7/12 7/26 8/2    STM PRR posterior and medial hamstring  IASTM anterior knee PRR   Quads/adductors/hip flexors Quads/adductors/hip flexors Quads/adductors/hip flexors Quads/adductors/hip flexors    LAD  PRR GR 2-3 2 rds PRR GR 2-3 2 rds         Seated Knee Distraction PRR Gr2-3  PRR Gr 2-3 2 rds PRR Gr 2-3 2 rds PRR Gr 2-3 2 rds PRR Gr 2-3 3 rds        Hooklying Knee Distraction    PRR Gr 2-3 2 rds PRR Gr 2-3 3 rds PRR Gr 2-3 3 rds PRR Gr 2-3 3 rds PRR Gr 2-3 3 rds PRR Gr 2-3 3 rds    LASER             FOTO    perf         Patient education, vitals assessment    13'   10'       Neuro Re-Ed             Supine 90/90 nerve Glides                                                                                           Ther Ex             Hip Abduction Stretch Supine BKFO             Hip Add Iso w/ Ball             Prone Knee Flexion Stretch             Bridges  2x15 5" hold yhb 2x15 5" hold rhb np resume np resume  2x15 5" hold yhb 2x15 5" hold yhb 2x15 5" hold yhb    Supine Clamshell w/ Hip Abd  2x15 5" hold yhb 2x15 5" hold rhb np resume np resume  2x15 5" hold yhb 2x15 5" hold yhb 2x15 5" hold yhb    LAQ 4# 2x15 4# 2x15 5# 2x15 5# 2x15 5# 2x20 5# 2x20 5# 2x20 5# 2x20 5# 2x20                 VG  L7 DL 5' L7 DL 5' np resume np resume L7 DL 5' L7 DL 5' L7 DL 5' L7 DL 5'    Ther Activity                                       Gait Training                                       Modalities

## 2023-08-08 ENCOUNTER — OFFICE VISIT (OUTPATIENT)
Dept: PHYSICAL THERAPY | Facility: REHABILITATION | Age: 58
End: 2023-08-08
Payer: COMMERCIAL

## 2023-08-08 DIAGNOSIS — M17.12 PRIMARY OSTEOARTHRITIS OF LEFT KNEE: ICD-10-CM

## 2023-08-08 DIAGNOSIS — Z78.9 IMPAIRED MOBILITY AND ACTIVITIES OF DAILY LIVING: Primary | ICD-10-CM

## 2023-08-08 DIAGNOSIS — Z74.09 IMPAIRED MOBILITY AND ACTIVITIES OF DAILY LIVING: Primary | ICD-10-CM

## 2023-08-08 PROCEDURE — 97140 MANUAL THERAPY 1/> REGIONS: CPT | Performed by: PHYSICAL THERAPIST

## 2023-08-08 PROCEDURE — 97110 THERAPEUTIC EXERCISES: CPT | Performed by: PHYSICAL THERAPIST

## 2023-08-08 NOTE — PROGRESS NOTES
Daily Note     Today's date: 2023  Patient name: Sakshi Reza  : 1965  MRN: [id number]  Referring provider: Arbie Bloch, MD  Dx:   Encounter Diagnosis     ICD-10-CM    1. Impaired mobility and activities of daily living  Z74.09     Z78.9       2. Primary osteoarthritis of left knee  M17.12           Start Time: 1130  Stop Time: 1200  Total time in clinic (min): 30 minutes    Subjective: Pt used a topical cream on her knee one or two times. She experienced short term relief. Objective: See treatment diary below  Sig TTP t/o the left knee, especially in the distal hip adductor. Assessment: Tolerated treatment well. Patient would benefit from continued PT      Plan: Continue per plan of care.       FOTO  3/15/23 (Initial Evaluation): 23: 44/50    Precautions: standard, hx of CVA, cardiac, falls    Manuals 6/1 6/7 6/14 6/23 6/28 7/3 7/12 7/26 8/   STM PRR posterior and medial hamstring  IASTM anterior knee PRR   Quads/adductors/hip flexors Quads/adductors/hip flexors Quads/adductors/hip flexors Quads/adductors/hip flexors LMR - very gentle   LAD  PRR GR 2-3 2 rds  PRR GR 2-3 2 rds         Seated Knee Distraction PRR Gr2-3  PRR Gr 2-3 2 rds PRR Gr 2-3 2 rds PRR Gr 2-3 2 rds PRR Gr 2-3 3 rds        Hooklying Knee Distraction    PRR Gr 2-3 2 rds PRR Gr 2-3 3 rds PRR Gr 2-3 3 rds PRR Gr 2-3 3 rds PRR Gr 2-3 3 rds PRR Gr 2-3 3 rds    LASER             FOTO    perf         Patient education, vitals assessment    15'   10'       Neuro Re-Ed             Supine 90/90 nerve Glides                                                                                           Ther Ex             Hip Abduction Stretch Supine BKFO             Hip Add Iso w/ Ball             Prone Knee Flexion Stretch             Bridges  2x15 5" hold yhb 2x15 5" hold rhb np resume np resume  2x15 5" hold yhb 2x15 5" hold yhb 2x15 5" hold yhb    Supine Clamshell w/ Hip Abd  2x15 5" hold yhb 2x15 5" hold rhb np resume np resume  2x15 5" hold yhb 2x15 5" hold yhb 2x15 5" hold yhb    LAQ 4# 2x15 4# 2x15 5# 2x15 5# 2x15 5# 2x20 5# 2x20 5# 2x20 5# 2x20 5# 2x20    Pt education/HEP          LMR   Seated fig-4 hip stretch          3'x2   VG  L7 DL 5' L7 DL 5' np resume np resume L7 DL 5' L7 DL 5' L7 DL 5' L7 DL 5'    Ther Activity                                       Gait Training                                       Modalities

## 2023-08-09 ENCOUNTER — APPOINTMENT (OUTPATIENT)
Dept: PHYSICAL THERAPY | Facility: REHABILITATION | Age: 58
End: 2023-08-09
Payer: COMMERCIAL

## 2023-08-14 ENCOUNTER — OFFICE VISIT (OUTPATIENT)
Dept: NEUROLOGY | Facility: CLINIC | Age: 58
End: 2023-08-14

## 2023-08-14 VITALS
HEIGHT: 60 IN | WEIGHT: 182.9 LBS | BODY MASS INDEX: 35.91 KG/M2 | SYSTOLIC BLOOD PRESSURE: 117 MMHG | TEMPERATURE: 98.3 F | DIASTOLIC BLOOD PRESSURE: 72 MMHG | HEART RATE: 99 BPM

## 2023-08-14 DIAGNOSIS — E78.2 MIXED HYPERLIPIDEMIA: ICD-10-CM

## 2023-08-14 DIAGNOSIS — I63.9 CEREBROVASCULAR ACCIDENT (CVA), UNSPECIFIED MECHANISM (HCC): Primary | ICD-10-CM

## 2023-08-14 DIAGNOSIS — R51.9 HEADACHE: ICD-10-CM

## 2023-08-14 DIAGNOSIS — R73.03 PREDIABETES: ICD-10-CM

## 2023-08-14 DIAGNOSIS — I10 PRIMARY HYPERTENSION: ICD-10-CM

## 2023-08-14 RX ORDER — BLOOD SUGAR DIAGNOSTIC
STRIP MISCELLANEOUS
COMMUNITY
Start: 2023-06-13

## 2023-08-14 RX ORDER — ATORVASTATIN CALCIUM 80 MG/1
80 TABLET, FILM COATED ORAL EVERY EVENING
Qty: 30 TABLET | Refills: 11 | Status: SHIPPED | OUTPATIENT
Start: 2023-08-14

## 2023-08-14 RX ORDER — ASPIRIN 81 MG/1
81 TABLET, CHEWABLE ORAL DAILY
Qty: 30 TABLET | Refills: 3 | Status: SHIPPED | OUTPATIENT
Start: 2023-08-14

## 2023-08-14 RX ORDER — METFORMIN HYDROCHLORIDE 500 MG/1
TABLET, EXTENDED RELEASE ORAL
COMMUNITY
Start: 2023-06-12

## 2023-08-14 RX ORDER — GABAPENTIN 100 MG/1
100 CAPSULE ORAL DAILY PRN
Qty: 15 CAPSULE | Refills: 0 | Status: SHIPPED | OUTPATIENT
Start: 2023-08-14

## 2023-08-14 RX ORDER — LANCETS 33 GAUGE
EACH MISCELLANEOUS
COMMUNITY
Start: 2023-06-12

## 2023-08-14 NOTE — PROGRESS NOTES
Patient ID: Marcos Damon is a 62 y.o. female who presents to the [address] [place] [id number]. Assessment/Plan:    History of Stroke:    I had the pleasure of seeing Elvia Kramer today in the office at [facility] in [facility]. She is presenting today for an office visit follow-up in regard to her history of stroke. At this time, she is doing very well and not experiencing any new strokelike symptoms. She does not have any residual deficits to note, it appears that the facial droop that she previously had has seemed to resolve at this time. She is not experiencing any upper or lower extremity weakness, besides the fact that she did have a meniscus tear of her left knee which is causing her some pain in that lower extremity. She is currently working with physical therapy in regards to her instability on that left leg at this time. She has been taking her medications as prescribed, however needs a refill on several medications. Some of these medications are ones that we have not filled here in neurology and she will need to receive from her primary care in regard to her blood pressure medication and her diabetes medication. However, for the patient today I have refilled her aspirin 81 mg once daily, atorvastatin 80 mg once daily, and gabapentin 100 mg once daily as needed for headaches. Patient did also receive a diagnostic sleep study since the last visit at [facility], and was confirmed that she did have presence of obstructive sleep apnea. She is now currently wearing a CPAP at bedtime.    -Would continue to encourage smoking cessation at this time, patient states that she has started smoking about 3 to 4 cigarettes a day again at this time. However she states that she has run out of nicotine patches and if she had more patches she would be able to effectively quit again.   Advised her that she would need to talk to her primary care about potentially refilling her nicotine patches since it seems that they were the ones to primarily prescribe this. She should also talk to her primary care about the other medications that she needs filled to in regards to her blood pressure and diabetes medications.  -Recommend her to continue to use her CPAP for obstructive sleep apnea. It is important to have her use her CPAP since obstructive sleep apnea is a stroke risk factor and should be taken very seriously. -Once the patient has resumed her normal regimen of being able to be physically active after being healed from her left knee meniscus tear, I would like her to try to become more physically active which includes going outside and going on walks. While the patient is recovering at this time, she should certainly do her physical therapy and listen to physical therapy in regard to her limitations at this moment in time. However, in the future when she has recovered from this injury I would like her to start walking again outside as much as she possibly can. -Patient can certainly continue to take gabapentin 100 mg capsule as needed when she does have a headache. She has noticed that her headache frequency has seemed to decrease and she has started her CPAP, and has noticed they are not as severe or bothersome to her at this time. She can also take Tylenol over-the-counter as well if needed. In regards to taking NSAIDs I would try to stay away from them especially due to the fact that she is also taking aspirin which has blood thinning capabilities and her history of stroke in the past.      - For ongoing stroke prevention continue: Aspirin 81 mg once daily, atorvastatin 80 mg once daily,  - Discussed the importance of antiplatelet management with the patient to prevent future strokes. - Recommend to check blood pressure occasionally away from the doctor's office to make sure that those numbers are typically less than 130/80.   If they are frequently higher than that, we recommend checking a little more often and to follow up with primary care team   - Will defer to primary care team for monitoring of cholesterol panel and blood sugar numbers with target LDL cholesterol of less than 70 and hemoglobin A1c less than 7%  - Recommend following a low salt, mediterranean diet   - Recommend routine physical exercise as tolerated     We will plan for her to return to the office in 6 months time to see on of the APPs or Dr. Tobin Posey but would be happy to see her sooner if the need should arise. If she has any symptoms concerning for TIA or stroke including sudden painless loss of vision or double vision, difficulty speaking or swallowing, vertigo/room spinning that does not quickly resolve, or weakness/numbness/loss of coordination affecting 1 side of the face or body she should proceed by ambulance to the nearest emergency room immediately. Subjective:    HPI      For Review:    Patient was last seen in the office by myself on 04/11/2023. She was presenting at the time of the hospital follow-up in regards to her most recent stroke of the left posterior limb of the internal capsule. At the time of her appointment, she was noting no new strokelike symptoms at the time. Patient was not dealing with any residual deficits in regards to right-sided weakness. She  was still having deficits of right-sided facial droop at the time. At the last appointment, we had talked about the patient sleeping and she had noticed that she was having snoring while she was asleep at night. I did want to get the patient evaluated for obstructive sleep apnea especially since this could be a risk factor in regards to strokes in the future. I had ordered the patient to have a diagnostic sleep study, would like for the patient to have this scheduled and then performed in the future to see if she needs to have a CPAP for obstructive sleep apnea. The last appointment, I had also cleared the patient to drive. She had no deficits in regards to strength or coordination on either side of her body. She still had a slight right facial droop which she was working for in regard to deficits, however nothing from my perspective that would limit her from driving. Stated that if she had weakness in the future she would have to go for a fitness to drive evaluation with occupational therapy. Recommended that the patient have a lipid panel before next appointment, which appears that the patient had and her LDL was 70 mg/dL. She was still to continue on atorvastatin 80 mg at this time, depending on her lipid panel results we would adjust her medication at the next appointment if needed. She was to again continue on atorvastatin and aspirin 81 mg once daily for secondary stroke prevention. Also encouraged the patient and talk to her about smoking cessation therapy which she is currently using nicotine patches to try and quit smoking at this time. Interval History:      New stroke symptoms/residual symptoms:    Any new, sudden onset weakness, numbness, facial droop, slurred speech, difficulty speaking, trouble swallowing, persistent vertigo, or sudden double vision or vision loss? No new stroke like symptoms     Residual symptoms include: None    Stroke Etiology and Risk Factor modification: This was a(n) lacunar stroke, most likely related to Small Vessel/microvascular    Stroke risk factors were evaluated including: Tobacco use, hypertension, hyperlipidemia, prediabetes    AP/AC therapy: Aspirin 81 mg once daily. No bleeding or bruising issues with aspirin. Statin therapy: Lipitor 80 mg once daily. Blood pressure today and as of late: 117/72, usually 130/80 or less at home at this time when she checks it. Most recent LDL: 70 mg/dL    Most recent hemoglobin A1C: 6.6%    Cardiology evaluation? Any cardiac monitoring required?: No cardiology evaluation. Endocrinology evaluation?  Following proper glycemic treatment/diet? Managed by her PCP for diabetes, currently taking Metformin and does not go to an endocrinologist.     Lifestyle history/modifications:    Diet/Exercise regimen: Currently trying to eat healthier when she does have an appetite. Has lost much of her appetite since having the stroke. Drinking more water at this time. Has not been walking because of her left knee meniscus tear at this time. Any physical therapy, occupational therapy or speech therapy performed/required at this time?: Currently doing physical therapy for the knee     Any difficulty with sleep? Some difficulty with sleep adjusting to the CPAP currently     Any history of sleep apnea apnea? CPAP compliance?: Currently using CPAP for obstructive sleep apnea. Post-stroke depression/anxiety? Does feel depressed/anxious and takes Cymbalta at this time. Any history of smoking? 3 or 4 cigarettes per day, has run out of nicotine patches and never got a refill. Additional History:    Patient states that she is still having headaches but only having headaches every once in a while at this point in time. She states that she takes the gabapentin as needed when she needs it for the headaches. Also, she takes Tylenol as needed which does seem to help as well. She notes that she has been feeling more tired and fatigued as of late. She is not sure if asked to do with any medications that have been increased as of late. She did state that her Cymbalta 60 mg daily had been increased to 90 mg which she is not taking a Cymbalta 60 mg tablet and a 30 mg tablet. Stated that this could certainly be the cause of some of the fatigue she is experiencing. She also describes that she has a decreased appetite since the stroke that occurred. She has lost a few pounds, however there has not been any drastic weight loss at this time.     Lab Results   Component Value Date/Time    CHOLESTEROL 240 (H) 02/05/2023 06:15 AM     Lab Results   Component Value Date/Time    TRIG 132 02/05/2023 06:15 AM     Lab Results   Component Value Date/Time    HDL 43 (L) 02/05/2023 06:15 AM     Lab Results   Component Value Date/Time    LDLCALC 171 (H) 02/05/2023 06:15 AM       Lab Results   Component Value Date/Time    HGBA1C 6.6 (H) 05/24/2023 10:19 AM     Lab Results   Component Value Date/Time     05/24/2023 10:19 AM           Past Medical History:   Diagnosis Date   • Acquired deformity of left foot    • Anesthesia complication     difficulty awakening   • Arthritis    • Deaf, left    • Depression    • Hallux valgus of left foot    • Hammer toe of left foot    • Headache    • Kidney stone    • Sciatic pain, right     intermittent       Current Outpatient Medications:   •  Acetaminophen Extra Strength 500 MG tablet, take 2 tablets by mouth every 8 hours if needed for MILD PAIN ( P...  (REFER TO PRESCRIPTION NOTES). , Disp: , Rfl:   •  amLODIPine (NORVASC) 10 mg tablet, Take 10 mg by mouth daily, Disp: , Rfl:   •  aspirin 81 mg chewable tablet, Chew 1 tablet (81 mg total) daily, Disp: 30 tablet, Rfl: 3  •  atorvastatin (LIPITOR) 80 mg tablet, Take 1 tablet (80 mg total) by mouth every evening, Disp: 30 tablet, Rfl: 11  •  Diclofenac Sodium (VOLTAREN) 1 %, APPLY 2 GRAMS TOPICALLY TWO TIMES A DAY AS NEEDED FOR (KNEE PAIN). , Disp: , Rfl:   •  DULoxetine (CYMBALTA) 60 mg delayed release capsule, Take 1 capsule (60 mg total) by mouth daily, Disp: , Rfl: 0  •  gabapentin (NEURONTIN) 100 mg capsule, Take 1 capsule (100 mg total) by mouth daily as needed (headache), Disp: 15 capsule, Rfl: 0  •  Lancets (OneTouch Delica Plus [id number]) MISC, use 1 LANCET to TEST BLOOD SUGAR daily, Disp: , Rfl:   •  losartan (COZAAR) 100 MG tablet, Take 100 mg by mouth daily, Disp: , Rfl:   •  metFORMIN (GLUCOPHAGE-XR) 500 mg 24 hr tablet, take 1 tablet by mouth EVERY MORNING WITH BREAKFAST, Disp: , Rfl:   •  nicotine (NICODERM CQ) 14 mg/24hr TD 24 hr patch, Place 1 patch on the skin daily, Disp: , Rfl:   •  OneTouch Verio test strip, use 1 TEST STRIP to TEST BLOOD SUGAR daily, Disp: , Rfl:   •  QUEtiapine (SEROquel) 25 mg tablet, Take 25 mg by mouth every evening, Disp: , Rfl:   •  triamcinolone (KENALOG) 0.1 % ointment, apply topically twice a day - FOR NO LONGER THAN 2 WEEKS., Disp: , Rfl:      Objective:    Physical Exam:                                                                 Vitals:            Constitutional:    Ht 5' (1.524 m)   BMI 36.72 kg/m²   BP Readings from Last 3 Encounters:   07/26/23 121/75   07/13/23 119/83   06/28/23 105/74     Pulse Readings from Last 3 Encounters:   07/26/23 68   07/13/23 (!) 108   06/28/23 (!) 111         Well developed, well nourished, well groomed. No dysmorphic features. Psychiatric:  Normal behavior and appropriate affect        Neurological Examination:     Mental status/cognitive function:   Orientated to time, place and person. Recent and remote memory intact. Attention span and concentration as well as fund of knowledge are appropriate for age. Normal language and spontaneous speech. Cranial Nerves:  II-visual fields full. III, IV, VI-Pupils were equal, round, and reactive to light and accomodation. Extraocular movements were full and conjugate without nystagmus. Conjugate gaze, normal smooth pursuits, normal saccades   V-facial sensation symmetric. VII-facial expression symmetric, intact forehead wrinkle, strong eye closure, symmetric smile, slight right facial droop  VIII-hearing grossly intact bilaterally   IX, X-palate elevation symmetric, no dysarthria. XI-shoulder shrug strength intact    XII-tongue protrusion midline. Motor Exam: symmetric bulk and tone throughout, no pronator drift. Power/strength 5/5 bilateral upper and lower extremities except for proximal left lower extremity weakness due to meniscus tear of the left knee. no atrophy, fasciculations or abnormal movements noted.    Sensory: grossly intact light touch in all extremities. Reflexes: brachioradialis 2+, biceps 2+, knee 2+, bilaterally  Coordination: Finger nose finger intact bilaterally, no apparent dysmetria, ataxia or tremor noted  Gait: Uses a cane to ambulate due to left knee      ROS:    Review of Systems   Constitutional: Positive for appetite change (decreased). Negative for fatigue and fever. HENT: Negative. Negative for hearing loss, tinnitus, trouble swallowing and voice change. Eyes: Negative. Negative for photophobia, pain and visual disturbance. Respiratory: Negative. Negative for shortness of breath. Cardiovascular: Negative. Negative for palpitations. Gastrointestinal: Negative. Negative for nausea and vomiting. Endocrine: Negative. Negative for cold intolerance. Genitourinary: Negative. Negative for dysuria, frequency and urgency. Musculoskeletal: Negative for back pain, gait problem, myalgias and neck pain. Skin: Negative. Negative for rash. Allergic/Immunologic: Negative. Neurological: Positive for headaches. Negative for dizziness, tremors, seizures, syncope, facial asymmetry, speech difficulty, weakness, light-headedness and numbness. Hematological: Negative. Does not bruise/bleed easily. Psychiatric/Behavioral: Negative. Negative for confusion, hallucinations and sleep disturbance.            I have spent 45 minutes today on this case including chart review, performing history and exam, patient counseling, and documentation/communication      Araceli Hancock PA-C  8/14/2023 10:49 AM

## 2023-08-14 NOTE — PROGRESS NOTES
ROS:    Review of Systems   Constitutional: Positive for appetite change (decreased). Negative for fatigue and fever. HENT: Negative. Negative for hearing loss, tinnitus, trouble swallowing and voice change. Eyes: Negative. Negative for photophobia, pain and visual disturbance. Respiratory: Negative. Negative for shortness of breath. Cardiovascular: Negative. Negative for palpitations. Gastrointestinal: Negative. Negative for nausea and vomiting. Endocrine: Negative. Negative for cold intolerance. Genitourinary: Negative. Negative for dysuria, frequency and urgency. Musculoskeletal: Negative for back pain, gait problem, myalgias and neck pain. Skin: Negative. Negative for rash. Allergic/Immunologic: Negative. Neurological: Positive for headaches. Negative for dizziness, tremors, seizures, syncope, facial asymmetry, speech difficulty, weakness, light-headedness and numbness. Hematological: Negative. Does not bruise/bleed easily. Psychiatric/Behavioral: Negative. Negative for confusion, hallucinations and sleep disturbance.

## 2023-08-14 NOTE — PATIENT INSTRUCTIONS
History of Stroke:    I had the pleasure of seeing Arnold Braxton today in the office at [facility] in [facility]. She is presenting today for an office visit follow-up in regard to her history of stroke. At this time, she is doing very well and not experiencing any new strokelike symptoms. She does not have any residual deficits to note, it appears that the facial droop that she previously had has seemed to resolve at this time. She is not experiencing any upper or lower extremity weakness, besides the fact that she did have a meniscus tear of her left knee which is causing her some pain in that lower extremity. She is currently working with physical therapy in regards to her instability on that left leg at this time. She has been taking her medications as prescribed, however needs a refill on several medications. Some of these medications are ones that we have not filled here in neurology and she will need to receive from her primary care in regard to her blood pressure medication and her diabetes medication. However, for the patient today I have refilled her aspirin 81 mg once daily, atorvastatin 80 mg once daily, and gabapentin 100 mg once daily as needed for headaches. Patient did also receive a diagnostic sleep study since the last visit at [address] [place] [address], and was confirmed that she did have presence of obstructive sleep apnea. She is now currently wearing a CPAP at bedtime.    -Would continue to encourage smoking cessation at this time, patient states that she has started smoking about 3 to 4 cigarettes a day again at this time. However she states that she has run out of nicotine patches and if she had more patches she would be able to effectively quit again. Advised her that she would need to talk to her primary care about potentially refilling her nicotine patches since it seems that they were the ones to primarily prescribe this.   She should also talk to her primary care about the other medications that she needs filled to in regards to her blood pressure and diabetes medications.  -Recommend her to continue to use her CPAP for obstructive sleep apnea. It is important to have her use her CPAP since obstructive sleep apnea is a stroke risk factor and should be taken very seriously. -Once the patient has resumed her normal regimen of being able to be physically active after being healed from her left knee meniscus tear, I would like her to try to become more physically active which includes going outside and going on walks. While the patient is recovering at this time, she should certainly do her physical therapy and listen to physical therapy in regard to her limitations at this moment in time. However, in the future when she has recovered from this injury I would like her to start walking again outside as much as she possibly can. -Patient can certainly continue to take gabapentin 100 mg capsule as needed when she does have a headache. She has noticed that her headache frequency has seemed to decrease and she has started her CPAP, and has noticed they are not as severe or bothersome to her at this time. She can also take Tylenol over-the-counter as well if needed. In regards to taking NSAIDs I would try to stay away from them especially due to the fact that she is also taking aspirin which has blood thinning capabilities and her history of stroke in the past.      - For ongoing stroke prevention continue: Aspirin 81 mg once daily, atorvastatin 80 mg once daily,  - Discussed the importance of antiplatelet management with the patient to prevent future strokes. - Recommend to check blood pressure occasionally away from the doctor's office to make sure that those numbers are typically less than 130/80.   If they are frequently higher than that, we recommend checking a little more often and to follow up with primary care team   - Will defer to primary care team for monitoring of cholesterol panel and blood sugar numbers with target LDL cholesterol of less than 70 and hemoglobin A1c less than 7%  - Recommend following a low salt, mediterranean diet   - Recommend routine physical exercise as tolerated     We will plan for her to return to the office in 6 months time to see on of the APPs or Dr. Judi Brady but would be happy to see her sooner if the need should arise. If she has any symptoms concerning for TIA or stroke including sudden painless loss of vision or double vision, difficulty speaking or swallowing, vertigo/room spinning that does not quickly resolve, or weakness/numbness/loss of coordination affecting 1 side of the face or body she should proceed by ambulance to the nearest emergency room immediately.

## 2023-08-16 ENCOUNTER — APPOINTMENT (OUTPATIENT)
Dept: PHYSICAL THERAPY | Facility: REHABILITATION | Age: 58
End: 2023-08-16
Payer: COMMERCIAL

## 2023-08-23 ENCOUNTER — OFFICE VISIT (OUTPATIENT)
Dept: PHYSICAL THERAPY | Facility: REHABILITATION | Age: 58
End: 2023-08-23
Payer: COMMERCIAL

## 2023-08-23 DIAGNOSIS — Z74.09 IMPAIRED MOBILITY AND ACTIVITIES OF DAILY LIVING: Primary | ICD-10-CM

## 2023-08-23 DIAGNOSIS — M17.12 PRIMARY OSTEOARTHRITIS OF LEFT KNEE: ICD-10-CM

## 2023-08-23 DIAGNOSIS — Z78.9 IMPAIRED MOBILITY AND ACTIVITIES OF DAILY LIVING: Primary | ICD-10-CM

## 2023-08-23 PROCEDURE — 97110 THERAPEUTIC EXERCISES: CPT

## 2023-08-23 PROCEDURE — 97140 MANUAL THERAPY 1/> REGIONS: CPT

## 2023-08-23 NOTE — PROGRESS NOTES
Daily Note     Today's date: 2023  Patient name: Ann Carter  : 1965  MRN: [id number]  Referring provider: Babatunde Negron MD  Dx:   Encounter Diagnosis     ICD-10-CM    1. Impaired mobility and activities of daily living  Z74.09     Z78.9       2. Primary osteoarthritis of left knee  M17.12           Start Time: 1515  Stop Time: 1600  Total time in clinic (min): 45 minutes    Subjective: Patient reports her knee has been doing better. States she doesn't know if she wants to get surgery. Objective: See treatment diary below      Assessment: Tolerated treatment well. Patient had significantly less tenderness in medial thigh today compared to last. Patient still responding well to manuals and able to resume previous activities. Patient would benefit from continued PT      Plan: Continue per plan of care.       FOTO  3/15/23 (Initial Evaluation): 23: 44/50    Precautions: standard, hx of CVA, cardiac, falls    Manuals 08/23   6/23 6/28 7/3 7/12 7/26 8/   STM PRR gentle medial thigh     Quads/adductors/hip flexors Quads/adductors/hip flexors Quads/adductors/hip flexors Quads/adductors/hip flexors LMR - very gentle   LAD    PRR GR 2-3 2 rds         Seated Knee Distraction    PRR Gr 2-3 2 rds PRR Gr 2-3 3 rds        Hooklying Knee Distraction    PRR Gr 2-3 2 rds PRR Gr 2-3 3 rds PRR Gr 2-3 3 rds PRR Gr 2-3 3 rds PRR Gr 2-3 3 rds PRR Gr 2-3 3 rds    LASER             FOTO    perf         Patient education, vitals assessment       10'       Neuro Re-Ed             Supine 90/90 nerve Glides                                                                                           Ther Ex                                       Hip Add Iso Ball Long sit x30 5" hold            Bridges    np resume np resume  2x15 5" hold yhb 2x15 5" hold yhb 2x15 5" hold yhb    Supine Clamshell w/ Hip Abd    np resume np resume  2x15 5" hold yhb 2x15 5" hold yhb 2x15 5" hold yhb    LAQ 5# 3x20   5# 2x15 5# 2x20 5# 2x20 5# 2x20 5# 2x20 5# 2x20    Pt education/HEP          LMR   Seated fig-4 hip stretch          3'x2   VG L7 DL 5'   np resume np resume L7 DL 5' L7 DL 5' L7 DL 5' L7 DL 5'    Ther Activity                                       Gait Training                                       Modalities

## 2023-08-30 ENCOUNTER — OFFICE VISIT (OUTPATIENT)
Dept: PHYSICAL THERAPY | Facility: REHABILITATION | Age: 58
End: 2023-08-30
Payer: COMMERCIAL

## 2023-08-30 DIAGNOSIS — I63.9 CEREBROVASCULAR ACCIDENT (CVA), UNSPECIFIED MECHANISM (HCC): ICD-10-CM

## 2023-08-30 DIAGNOSIS — M17.12 PRIMARY OSTEOARTHRITIS OF LEFT KNEE: ICD-10-CM

## 2023-08-30 DIAGNOSIS — Z74.09 IMPAIRED MOBILITY AND ACTIVITIES OF DAILY LIVING: Primary | ICD-10-CM

## 2023-08-30 DIAGNOSIS — Z78.9 IMPAIRED MOBILITY AND ACTIVITIES OF DAILY LIVING: Primary | ICD-10-CM

## 2023-08-30 PROCEDURE — 97110 THERAPEUTIC EXERCISES: CPT

## 2023-08-30 PROCEDURE — 97140 MANUAL THERAPY 1/> REGIONS: CPT

## 2023-08-30 NOTE — PROGRESS NOTES
Daily Note     Today's date: 2023  Patient name: Laron Lynn  : 1965  MRN: [id number]  Referring provider: Liz Jimenez MD  Dx:   Encounter Diagnosis     ICD-10-CM    1. Impaired mobility and activities of daily living  Z74.09     Z78.9       2. Primary osteoarthritis of left knee  M17.12       3. Cerebrovascular accident (CVA), unspecified mechanism ([address])  I63.9           Start Time: 1515  Stop Time: 1600  Total time in clinic (min): 45 minutes    Subjective: Patient reports she knee has been doing well. States she is not going to have insurance so she will not be able to continue with PT.       Objective: See treatment diary below      Assessment: Patient has significantly less tone and sensitivity noted in her medial thigh over the last few weeks. Symptoms localized to rec femoris in upper thigh. PT will be placed on hold as patient will no longer have insurance. Patient would benefit from continued PT      Plan: Continue per plan of care.       FOTO  3/15/23 (Initial Evaluation): 2223: 44/50    Precautions: standard, hx of CVA, cardiac, falls    Manuals  7/3 7/12 7/26 8/   STM PRR gentle medial thigh PRR gentle medial thigh    Quads/adductors/hip flexors Quads/adductors/hip flexors Quads/adductors/hip flexors Quads/adductors/hip flexors LMR - very gentle   LAD    PRR GR 2-3 2 rds         Seated Knee Distraction    PRR Gr 2-3 2 rds PRR Gr 2-3 3 rds        Hooklying Knee Distraction    PRR Gr 2-3 2 rds PRR Gr 2-3 3 rds PRR Gr 2-3 3 rds PRR Gr 2-3 3 rds PRR Gr 2-3 3 rds PRR Gr 2-3 3 rds    LASER             FOTO    perf         Patient education, vitals assessment       10'       Neuro Re-Ed             Supine 90/90 nerve Glides                                                                                           Ther Ex                                       Hip Add Iso Ball Long sit x30 5" hold            Bridges    np resume np resume 2x15 5" hold yhb 2x15 5" hold yhb 2x15 5" hold yhb    Supine Clamshell w/ Hip Abd    np resume np resume  2x15 5" hold yhb 2x15 5" hold yhb 2x15 5" hold yhb    LAQ 5# 3x20 5# 3x20  5# 2x15 5# 2x20 5# 2x20 5# 2x20 5# 2x20 5# 2x20    Pt education/HEP          LMR   Seated fig-4 hip stretch          3'x2   VG L7 DL 5' L7 DL 5'  np resume np resume L7 DL 5' L7 DL 5' L7 DL 5' L7 DL 5'    Ther Activity                                       Gait Training                                       Modalities

## 2024-02-09 ENCOUNTER — TELEPHONE (OUTPATIENT)
Dept: NEUROLOGY | Facility: CLINIC | Age: 59
End: 2024-02-09

## 2024-02-09 NOTE — TELEPHONE ENCOUNTER
Called pt to confirm upcoming appointment and she stated she can't make it due to not having any insurance at the moment. She stated  she will call back to r/s.

## 2024-09-05 ENCOUNTER — OFFICE VISIT (OUTPATIENT)
Dept: PODIATRY | Facility: CLINIC | Age: 59
End: 2024-09-05
Payer: MEDICARE

## 2024-09-05 VITALS
WEIGHT: 172 LBS | HEART RATE: 112 BPM | DIASTOLIC BLOOD PRESSURE: 83 MMHG | BODY MASS INDEX: 33.77 KG/M2 | SYSTOLIC BLOOD PRESSURE: 117 MMHG | HEIGHT: 60 IN

## 2024-09-05 DIAGNOSIS — M20.11 HALLUX VALGUS OF RIGHT FOOT: Primary | ICD-10-CM

## 2024-09-05 DIAGNOSIS — E11.9 TYPE 2 DIABETES MELLITUS WITHOUT COMPLICATION, WITHOUT LONG-TERM CURRENT USE OF INSULIN (HCC): ICD-10-CM

## 2024-09-05 PROCEDURE — 99213 OFFICE O/P EST LOW 20 MIN: CPT | Performed by: PODIATRIST

## 2024-09-05 NOTE — PROGRESS NOTES
PATIENT:  Marlys Parada      1965    ASSESSMENT     1. Hallux valgus of right foot        2. Type 2 diabetes mellitus without complication, without long-term current use of insulin ([facility])               PLAN  1. Reviewed medical records.  Patient was counseled on the condition and diagnosis.  Educated disease prevention and risks related to diabetes.    2. Educated proper daily foot care and exam.  Instructed proper skin care / protection and footwear.  Instructed to identify any signs of infection and related foot problem.   3. The recent blood work was reviewed / discussed and the last HbA1c was 6.6.  Discussed proper blood glucose control with diet and exercise.    4. HPK trimmed.  The patient will return in 3 months for diabetic foot exam.      HISTORY OF PRESENT ILLNESS  Patient presents for foot evaluation.  She has chronic pain on plantar right foot.  She still has significant deformity of right foot.  Left foot pain has been stable.  No new complaint.  BS is under control.  No significant numbness or paresthesia in her feet.      PAST MEDICAL HISTORY:  Past Medical History:   Diagnosis Date    Acquired deformity of left foot     Anesthesia complication     difficulty awakening    Arthritis     Deaf, left     Depression     Hallux valgus of left foot     Hammer toe of left foot     Headache     Kidney stone     Sciatic pain, right     intermittent       PAST SURGICAL HISTORY:  Past Surgical History:   Procedure Laterality Date    BREAST BIOPSY Right 03/04/2021    BREAST BIOPSY Right 3/10/2021    Procedure: Excisional Breast debridement  7 o'clock position;  Surgeon: Mayuri Anthony MD;  [facility];  Service: General    CHOLECYSTECTOMY      CLOSURE DELAYED PRIMARY Right 7/5/2020    Procedure: CLOSURE DELAYED PRIMARY and application of skin graft substitute;  Surgeon: Darian Worthington DPM;  Location:  MAIN OR;  Service: Podiatry    HERNIA REPAIR      INCISION AND DRAINAGE OF WOUND Right  7/1/2020    Procedure: INCISION AND DRAINAGE (I&D) EXTREMITY;  Surgeon: Darian Worthington DPM;  [facility];  Service: Podiatry    UT CORRJ HLX VLGS BNCTY SESMDC JOINT ARTHRODESIS Left 11/12/2021    Procedure: BUNIONECTOMY LAPIPLASTY;  Surgeon: Darian Worthington DPM;  [facility];  Service: Podiatry    UT OSTEOT W/WO LNGTH SHRT/CORRJ METAR XCP 1ST EA Left 6/14/2019    Procedure: 3rd Metatarsal Osteotomy;  Surgeon: Star Calle DPM;  [facility];  Service: Podiatry    UT OSTEOT W/WO LNGTH SHRT/CORRJ METAR XCP 1ST EA Left 11/12/2021    Procedure: OSTEOTOMY 2ND METATARSAL;  Surgeon: Darian Worthington DPM;  [facility];  Service: Podiatry    TUBAL LIGATION      US GUIDED BREAST BIOPSY RIGHT COMPLETE Right 3/4/2021    VAC DRESSING APPLICATION Right 7/1/2020    Procedure: APPLICATION VAC DRESSING;  Surgeon: Darian Worthington DPM;  [facility];  Service: Podiatry        ALLERGIES:  Sertraline    MEDICATIONS:  Current Outpatient Medications   Medication Sig Dispense Refill    Acetaminophen Extra Strength 500 MG tablet take 2 tablets by mouth every 8 hours if needed for MILD PAIN ( P...  (REFER TO PRESCRIPTION NOTES).      amLODIPine (NORVASC) 10 mg tablet Take 10 mg by mouth daily      aspirin 81 mg chewable tablet Chew 1 tablet (81 mg total) daily 30 tablet 3    atorvastatin (LIPITOR) 80 mg tablet Take 1 tablet (80 mg total) by mouth every evening 30 tablet 11    Diclofenac Sodium (VOLTAREN) 1 % APPLY 2 GRAMS TOPICALLY TWO TIMES A DAY AS NEEDED FOR (KNEE PAIN).      DULoxetine (CYMBALTA) 60 mg delayed release capsule Take 1 capsule (60 mg total) by mouth daily  0    gabapentin (NEURONTIN) 100 mg capsule Take 1 capsule (100 mg total) by mouth daily as needed (headache) 15 capsule 0    Lancets (OneTouch Delica Plus [id number]) MISC use 1 LANCET to TEST BLOOD SUGAR daily      losartan (COZAAR) 100 MG tablet Take 100 mg by mouth daily      metFORMIN (GLUCOPHAGE-XR) 500 mg 24 hr tablet take 1 tablet by mouth  EVERY MORNING WITH BREAKFAST      OneTouch Verio test strip use 1 TEST STRIP to TEST BLOOD SUGAR daily      QUEtiapine (SEROquel) 25 mg tablet Take 25 mg by mouth every evening      triamcinolone (KENALOG) 0.1 % ointment apply topically twice a day - FOR NO LONGER THAN 2 WEEKS.       No current facility-administered medications for this visit.       SOCIAL HISTORY:  Social History     Socioeconomic History    Marital status: Single     Spouse name: None    Number of children: None    Years of education: None    Highest education level: None   Occupational History    None   Tobacco Use    Smoking status: Some Days     Current packs/day: 0.00     Average packs/day: 1 pack/day for 15.0 years (15.0 ttl pk-yrs)     Types: Cigarettes     Start date: 2006     Last attempt to quit: 2021     Years since quittin.8    Smokeless tobacco: Never    Tobacco comments:     trying to quit - using judson. patch   Vaping Use    Vaping status: Never Used   Substance and Sexual Activity    Alcohol use: Not Currently    Drug use: Not Currently    Sexual activity: Yes   Other Topics Concern    None   Social History Narrative    None     Social Determinants of Health     Financial Resource Strain: High Risk (2023)    Received from [facility]    Overall Financial Resource Strain (CARDIA)     Difficulty of Paying Living Expenses: Very hard   Food Insecurity: Food Insecurity Present (2023)    Received from [facility]    Hunger Vital Sign     Worried About Running Out of Food in the Last Year: Often true     Ran Out of Food in the Last Year: Often true   Transportation Needs: No Transportation Needs (2023)    Received from [facility]    PRAPARE - Transportation     Lack of Transportation (Medical): No     Lack of Transportation (Non-Medical): No   Physical Activity: Not on file   Stress: No Stress Concern Present (2023)    Received from [facility]    [facility] of Occupational Health - Occupational Stress Questionnaire     Feeling of Stress : Only a little   Social Connections: Moderately Integrated (8/24/2023)    Received from [facility]    Social Connection and Isolation Panel [NHANES]     Frequency of Communication with Friends and Family: More than three times a week     Frequency of Social Gatherings with Friends and Family: Twice a week     Attends Church Services: 1 to 4 times per year     Active Member of Clubs or Organizations: No     Attends Club or Organization Meetings: Never     Marital Status: Living with partner   Intimate Partner Violence: Not At Risk (8/24/2023)    Received from [facility]    Humiliation, Afraid, Rape, and Kick questionnaire     Fear of Current or Ex-Partner: No     Emotionally Abused: No     Physically Abused: No     Sexually Abused: No   Housing Stability: Low Risk  (2/5/2023)    Housing Stability Vital Sign     Unable to Pay for Housing in the Last Year: No     Number of Places Lived in the Last Year: 1     Unstable Housing in the Last Year: No        REVIEW OF SYSTEMS  Patient denied CP, SOB, fever, chills, palpatation, HA, GI problem, or calf pain.     PHYSICAL EXAMINATION  GENERAL  The patient appears in NAD / non-toxic. Afebrile. VSS    VASCULAR EXAM  Pedal pulses and vascular status are intact.  No calf pain or edema bilaterally.  No cyanosis.  No ischemia.    DERMATOLOGIC EXAM  No wound.  No abscess.  No redness.  No purulence.  IPK right submet 2.      NEUROLOGIC EXAM  AAO X 3.  No focal neurologic deficit.  Neurologic status is intact BLE.  MMT intact.    MUSCULOSKELETAL EXAM  ROM intact.  No fluctuation or crepitus.  Bunion deformity noted right foot.   No acute swelling.     Diabetic Foot Exam    Patient's shoes and socks removed.    Right Foot/Ankle   Right Foot Inspection  Skin Exam: skin intact, dry skin, callus and callus. No erythema, no maceration and no  ulcer.     Toe Exam: right toe deformity. No swelling, no tenderness and erythema    Sensory   Proprioception: intact  Monofilament testing: intact    Vascular  Capillary refills: < 3 seconds  The right DP pulse is 1+. The right PT pulse is 1+.     Left Foot/Ankle  Left Foot Inspection  Skin Exam: skin intact and dry skin. No erythema, no maceration, no ulcer and no callus.     Toe Exam: left toe deformity. No swelling, no tenderness and no erythema.     Sensory   Proprioception: intact  Monofilament testing: intact    Vascular  Capillary refills: < 3 seconds  The left DP pulse is 1+. The left PT pulse is 1+.     Assign Risk Category  Deformity present  No loss of protective sensation  No weak pulses  Risk: 0

## 2025-01-07 NOTE — PLAN OF CARE
Chief Complaint   Patient presents with    Follow-up     F/u chronic condition. Discuss arthritis.         Sanjuana Stanton is a 64 y.o. female who presents today for Follow-up (F/u chronic condition. Discuss arthritis. )     She is here for follow-up in hypertension, anxiety, and hyperlipidemia,  Completed her labs showing mild hyperglycemia with A1c of 5.7, no significant changes in regards to cholesterol levels, continue to be elevated with elevated LDL and total cholesterol, and triglycerides, she has been trying to make changes in diet, has been trying to do some recipes of Mediterranean diet, but despite her efforts, this has not significantly changed, she is willing to try medications at this time,  She also since last time has been tolerating losartan, which was replacing lisinopril as she was experiencing cough, cough has resolved since then, blood pressure readings at home have been within normal limits,    Today she has concerns about bilateral thumb pain, she likes crocheting  Has been trying doing some exercises, and tumeric teas, she denies any swelling or deformity      The 10-year ASCVD risk score (George BARRAGAN, et al., 2019) is: 8.4%    Values used to calculate the score:      Age: 64 years      Sex: Female      Is Non- : No      Diabetic: No      Tobacco smoker: No      Systolic Blood Pressure: 128 mmHg      Is BP treated: Yes      HDL Cholesterol: 44 MG/DL      Total Cholesterol: 245 MG/DL        Wt Readings from Last 3 Encounters:   01/07/25 79 kg (174 lb 3.2 oz)   12/17/24 78.9 kg (174 lb)   11/21/24 80.7 kg (178 lb)     Vitals:    01/07/25 0934   BP: 128/70   Site: Left Upper Arm   Position: Sitting   Pulse: 77   SpO2: 96%   Weight: 79 kg (174 lb 3.2 oz)   Height: 1.676 m (5' 6\")        Assessment and plan:  1. Mixed hyperlipidemia  Assessment & Plan:   Chronic, not at goal (unstable), changes made today: We decided to start low intensity statin, with pravastatin  Problem: PHYSICAL THERAPY ADULT  Goal: Performs mobility at highest level of function for planned discharge setting  See evaluation for individualized goals  Description: Treatment/Interventions: Functional transfer training, LE strengthening/ROM, Elevations, Patient/family training, Equipment eval/education, Gait training, OT, Spoke to nursing  Equipment Recommended:  (will cont to assess)       See flowsheet documentation for full assessment, interventions and recommendations  Outcome: Progressing  Note: Prognosis: Good  Problem List: Decreased strength, Decreased endurance, Impaired balance, Decreased mobility, Impaired tone, Impaired sensation, Decreased safety awareness, Impaired judgement, Pain  Assessment: Pt seen for PT treatment session w/ focus on bed mobility training, t/f training,  gait training, + ther ex instruction  Pt demonstrated good progress this session w/ decreased assistance required for t/f + ambulation, as well as increased distance covered during ambulation  Pt motivated to progress as her ultimate goal is to go home  Pt still demonstrates slight right lateral lean, requiring MinAx1 for ambulation  Pt is a good candidate for acute medical rehab  Barriers to Discharge: Inaccessible home environment     PT Discharge Recommendation: Post acute rehabilitation services (acute medical rehab)    See flowsheet documentation for full assessment

## 2025-01-30 ENCOUNTER — OFFICE VISIT (OUTPATIENT)
Dept: PODIATRY | Facility: CLINIC | Age: 60
End: 2025-01-30
Payer: COMMERCIAL

## 2025-01-30 VITALS — HEIGHT: 60 IN | BODY MASS INDEX: 33.77 KG/M2 | WEIGHT: 172 LBS

## 2025-01-30 DIAGNOSIS — E11.9 TYPE 2 DIABETES MELLITUS WITHOUT COMPLICATION, WITHOUT LONG-TERM CURRENT USE OF INSULIN (HCC): Primary | ICD-10-CM

## 2025-01-30 DIAGNOSIS — B35.1 ONYCHOMYCOSIS: ICD-10-CM

## 2025-01-30 DIAGNOSIS — M20.11 HALLUX VALGUS OF RIGHT FOOT: ICD-10-CM

## 2025-01-30 PROCEDURE — 99213 OFFICE O/P EST LOW 20 MIN: CPT | Performed by: PODIATRIST

## 2025-01-30 RX ORDER — CICLOPIROX 80 MG/ML
SOLUTION TOPICAL
Qty: 6 ML | Refills: 5 | Status: SHIPPED | OUTPATIENT
Start: 2025-01-30

## 2025-01-30 NOTE — PROGRESS NOTES
PATIENT:  Marlys Parada      1965    ASSESSMENT     1. Type 2 diabetes mellitus without complication, without long-term current use of insulin ([facility])        2. Hallux valgus of right foot        3. Onychomycosis  ciclopirox (PENLAC) 8 % solution             PLAN  1. Reviewed medical records.  Patient was counseled on the condition and diagnosis.  Educated disease prevention and risks related to diabetes.    2. Educated proper daily foot care and exam.  Instructed proper skin care / protection and footwear.  Instructed to identify any signs of infection and related foot problem.   3. The recent blood work was reviewed / discussed and the last HbA1c was 6.4.  Discussed proper blood glucose control with diet and exercise.    4. HPK trimmed.  Rx: Penlac for mild onychomycosis.  The patient will return in 4 months for diabetic foot exam.      HISTORY OF PRESENT ILLNESS  Patient presents for diabetic foot evaluation.  BS is under control.  No pain in left foot.  She has painful callus in right foot with bunion deformity.  No acute pedal problem.  She noticed increased discoloration in great toenails.  No significant numbness or paresthesia in her feet.        PAST MEDICAL HISTORY:  Past Medical History:   Diagnosis Date    Acquired deformity of left foot     Anesthesia complication     difficulty awakening    Arthritis     Deaf, left     Depression     Hallux valgus of left foot     Hammer toe of left foot     Headache     Kidney stone     Sciatic pain, right     intermittent       PAST SURGICAL HISTORY:  Past Surgical History:   Procedure Laterality Date    BREAST BIOPSY Right 03/04/2021    BREAST BIOPSY Right 3/10/2021    Procedure: Excisional Breast debridement  7 o'clock position;  Surgeon: Mayuri Anthony MD;  Location: [facility];  Service: General    CHOLECYSTECTOMY      CLOSURE DELAYED PRIMARY Right 7/5/2020    Procedure: CLOSURE DELAYED PRIMARY and application of skin graft substitute;  Surgeon:  Darian Worthington DPM;  [facility];  Service: Podiatry    HERNIA REPAIR      INCISION AND DRAINAGE OF WOUND Right 7/1/2020    Procedure: INCISION AND DRAINAGE (I&D) EXTREMITY;  Surgeon: Darian Worthington DPM;  [facility];  Service: Podiatry    OH CORRJ HLX VLGS BNCTY SESMDC JOINT ARTHRODESIS Left 11/12/2021    Procedure: BUNIONECTOMY LAPIPLASTY;  Surgeon: Darian Worthington DPM;  [facility];  Service: Podiatry    OH OSTEOT W/WO LNGTH SHRT/CORRJ METAR XCP 1ST EA Left 6/14/2019    Procedure: 3rd Metatarsal Osteotomy;  Surgeon: Star Calle DPM;  [facility];  Service: Podiatry    OH OSTEOT W/WO LNGTH SHRT/CORRJ METAR XCP 1ST EA Left 11/12/2021    Procedure: OSTEOTOMY 2ND METATARSAL;  Surgeon: Darian Worthington DPM;  [facility];  Service: Podiatry    TUBAL LIGATION      US GUIDED BREAST BIOPSY RIGHT COMPLETE Right 3/4/2021    VAC DRESSING APPLICATION Right 7/1/2020    Procedure: APPLICATION VAC DRESSING;  Surgeon: Darian Worthington DPM;  [facility];  Service: Podiatry        ALLERGIES:  Sertraline    MEDICATIONS:  Current Outpatient Medications   Medication Sig Dispense Refill    Acetaminophen Extra Strength 500 MG tablet take 2 tablets by mouth every 8 hours if needed for MILD PAIN ( P...  (REFER TO PRESCRIPTION NOTES).      amLODIPine (NORVASC) 10 mg tablet Take 10 mg by mouth daily      aspirin 81 mg chewable tablet Chew 1 tablet (81 mg total) daily 30 tablet 3    atorvastatin (LIPITOR) 80 mg tablet Take 1 tablet (80 mg total) by mouth every evening 30 tablet 11    ciclopirox (PENLAC) 8 % solution Apply topically daily at bedtime 6 mL 5    Diclofenac Sodium (VOLTAREN) 1 % APPLY 2 GRAMS TOPICALLY TWO TIMES A DAY AS NEEDED FOR (KNEE PAIN).      DULoxetine (CYMBALTA) 60 mg delayed release capsule Take 1 capsule (60 mg total) by mouth daily  0    gabapentin (NEURONTIN) 100 mg capsule Take 1 capsule (100 mg total) by mouth daily as needed (headache) 15 capsule 0    Lancets (OneTouch Delica Plus  [id number]) MISC use 1 LANCET to TEST BLOOD SUGAR daily      losartan (COZAAR) 100 MG tablet Take 100 mg by mouth daily      metFORMIN (GLUCOPHAGE-XR) 500 mg 24 hr tablet take 1 tablet by mouth EVERY MORNING WITH BREAKFAST      OneTouch Verio test strip use 1 TEST STRIP to TEST BLOOD SUGAR daily      QUEtiapine (SEROquel) 25 mg tablet Take 25 mg by mouth every evening      triamcinolone (KENALOG) 0.1 % ointment apply topically twice a day - FOR NO LONGER THAN 2 WEEKS.       No current facility-administered medications for this visit.       SOCIAL HISTORY:  Social History     Socioeconomic History    Marital status: Single     Spouse name: None    Number of children: None    Years of education: None    Highest education level: None   Occupational History    None   Tobacco Use    Smoking status: Some Days     Current packs/day: 0.00     Average packs/day: 1 pack/day for 15.0 years (15.0 ttl pk-yrs)     Types: Cigarettes     Start date: 11/8/2006     Last attempt to quit: 11/8/2021     Years since quitting: 3.2    Smokeless tobacco: Never    Tobacco comments:     trying to quit - using judson. patch   Vaping Use    Vaping status: Never Used   Substance and Sexual Activity    Alcohol use: Not Currently    Drug use: Not Currently    Sexual activity: Yes   Other Topics Concern    None   Social History Narrative    None     Social Drivers of Health     Financial Resource Strain: High Risk (8/24/2023)    Received from [facility]    Overall Financial Resource Strain (CARDIA)     Difficulty of Paying Living Expenses: Very hard   Food Insecurity: Food Insecurity Present (8/24/2023)    Received from [facility]    Hunger Vital Sign     Worried About Running Out of Food in the Last Year: Often true     Ran Out of Food in the Last Year: Often true   Transportation Needs: No Transportation Needs (8/24/2023)    Received from [facility]    PRAPARE - Transportation     Lack of  Transportation (Medical): No     Lack of Transportation (Non-Medical): No   Physical Activity: Not on file   Stress: No Stress Concern Present (8/24/2023)    Received from [facility]    [place] of Occupational Health - Occupational Stress Questionnaire     Feeling of Stress : Only a little   Social Connections: Moderately Integrated (8/24/2023)    Received from [facility]    Social Connection and Isolation Panel [NHANES]     Frequency of Communication with Friends and Family: More than three times a week     Frequency of Social Gatherings with Friends and Family: Twice a week     Attends Alevism Services: 1 to 4 times per year     Active Member of Clubs or Organizations: No     Attends Club or Organization Meetings: Never     Marital Status: Living with partner   Intimate Partner Violence: Not At Risk (8/24/2023)    Received from [facility]    Humiliation, Afraid, Rape, and Kick questionnaire     Fear of Current or Ex-Partner: No     Emotionally Abused: No     Physically Abused: No     Sexually Abused: No   Housing Stability: Low Risk  (2/5/2023)    Housing Stability Vital Sign     Unable to Pay for Housing in the Last Year: No     Number of Places Lived in the Last Year: 1     Unstable Housing in the Last Year: No        REVIEW OF SYSTEMS  Patient denied CP, SOB, fever, chills, palpatation, HA, GI problem, or calf pain.     PHYSICAL EXAMINATION  GENERAL  The patient appears in NAD / non-toxic. Afebrile. VSS    VASCULAR EXAM  Pedal pulses and vascular status are intact.  No calf pain or edema bilaterally.  No cyanosis.  No ischemia.    DERMATOLOGIC EXAM  No wound.  No abscess.  No redness.  No purulence.  IPK right submet 2.  Mild discoloration of great toes bilaterally.    NEUROLOGIC EXAM  AAO X 3.  No focal neurologic deficit.  Neurologic status is intact BLE.  MMT intact.    MUSCULOSKELETAL EXAM  ROM intact.  No fluctuation or crepitus.  Bunion  deformity noted right foot.   No acute swelling.       Diabetic Foot Exam    Patient's shoes and socks removed.    Right Foot/Ankle   Right Foot Inspection  Skin Exam: skin intact, dry skin, callus and callus. No erythema, no maceration and no ulcer.     Toe Exam: right toe deformity. No swelling, no tenderness and erythema    Sensory   Proprioception: intact  Monofilament testing: intact    Vascular  Capillary refills: < 3 seconds  The right DP pulse is 1+. The right PT pulse is 1+.     Left Foot/Ankle  Left Foot Inspection  Skin Exam: skin intact and dry skin. No erythema, no maceration, no ulcer and no callus.     Toe Exam: left toe deformity. No swelling, no tenderness and no erythema.     Sensory   Proprioception: intact  Monofilament testing: intact    Vascular  Capillary refills: < 3 seconds  The left DP pulse is 1+. The left PT pulse is 1+.     Assign Risk Category  Deformity present  No loss of protective sensation  No weak pulses  Risk: 0

## 2025-04-15 ENCOUNTER — TELEPHONE (OUTPATIENT)
Age: 60
End: 2025-04-15

## 2025-04-15 NOTE — TELEPHONE ENCOUNTER
Caller: Marlys Parada    Doctor and/or Office: Dr. Worthington/[place]    #: [phone]    Escalation: Medication Patient is using the nail polish medication for her nails and its not improving at all. She would like to know if there is oral medication she can switch to? She will go for blood work if needed. Please return call. Thank you

## 2025-04-16 NOTE — TELEPHONE ENCOUNTER
Caller: Patient- Marlys     Doctor: Dr. Worthington     Reason for call: Patient is calling in stating that she had contacted the office yesterday relating her using the nail polish medication for her nails and it is not improving at all. She is asking if oral medication can be given to her or what further she can do relating this. The patient is asking for a call back relating this as soon as possible.      Call back#: [phone]

## 2025-04-17 ENCOUNTER — TELEPHONE (OUTPATIENT)
Age: 60
End: 2025-04-17

## 2025-04-17 DIAGNOSIS — B35.1 ONYCHOMYCOSIS: Primary | ICD-10-CM

## 2025-04-17 NOTE — TELEPHONE ENCOUNTER
I l/m informing her the order for blood work was placed and once lab results are received and results are okay Lamisil will be prescribed.

## 2025-04-17 NOTE — TELEPHONE ENCOUNTER
Caller: Marlys Parada    Doctor and/or Office: Dr. Worthington/[place]    #: [phone]    Escalation: Care Patient would like to know if she needs to fast for her blood work. Please return call. Thank  you

## 2025-04-18 ENCOUNTER — TELEPHONE (OUTPATIENT)
Age: 60
End: 2025-04-18

## 2025-04-18 NOTE — TELEPHONE ENCOUNTER
Caller: Marlys Parada    Doctor and/or Office: Dr. Worthington/[place]    #: [phone]    Escalation: Care Requesting lab script to be faxed to [facility] at [phone]. Thank you

## 2025-04-24 NOTE — TELEPHONE ENCOUNTER
[facility] called and asked if you can please refax the script to [phone] she stated she did not receive them  Thank you

## 2025-05-01 LAB
ALBUMIN SERPL-MCNC: 4.3 G/DL (ref 3.5–5.7)
ALP SERPL-CCNC: 96 U/L (ref 35–120)
ALT SERPL-CCNC: 14 U/L
AST SERPL-CCNC: 15 U/L
BILIRUB DIRECT SERPL-MCNC: 0.1 MG/DL (ref 0–0.2)
BILIRUB SERPL-MCNC: 0.3 MG/DL (ref 0.2–1)
PROT SERPL-MCNC: 7 G/DL (ref 6.3–8.3)

## 2025-05-08 DIAGNOSIS — B35.1 ONYCHOMYCOSIS: Primary | ICD-10-CM

## 2025-05-08 RX ORDER — TERBINAFINE HYDROCHLORIDE 250 MG/1
250 TABLET ORAL DAILY
Qty: 28 TABLET | Refills: 2 | Status: SHIPPED | OUTPATIENT
Start: 2025-05-08 | End: 2025-07-31

## (undated) DEVICE — CUFF TOURNIQUET 18 X 4 IN QUICK CONNECT DISP 1 BLADDER

## (undated) DEVICE — COBAN 4 IN STERILE

## (undated) DEVICE — CHLORAPREP HI-LITE 26ML ORANGE

## (undated) DEVICE — UNIVERSAL MAJOR EXTREMITY,KIT: Brand: CARDINAL HEALTH

## (undated) DEVICE — CULTURE TUBE AEROBIC

## (undated) DEVICE — NEEDLE 25G X 1 1/2

## (undated) DEVICE — SCD SEQUENTIAL COMPRESSION COMFORT SLEEVE MEDIUM KNEE LENGTH: Brand: KENDALL SCD

## (undated) DEVICE — BETHLEHEM UNIVERSAL  MIONR EXT: Brand: CARDINAL HEALTH

## (undated) DEVICE — KERLIX BANDAGE ROLL: Brand: KERLIX

## (undated) DEVICE — GAUZE SPONGES,16 PLY: Brand: CURITY

## (undated) DEVICE — U-DRAPE: Brand: CONVERTORS

## (undated) DEVICE — PLUMEPEN PRO 10FT

## (undated) DEVICE — MEDI-VAC YANKAUER SUCTION HANDLE W/BULBOUS AND CONTROL VENT: Brand: CARDINAL HEALTH

## (undated) DEVICE — STOCKINETTE REGULAR

## (undated) DEVICE — SUT VICRYL 3-0 PS-2 27 IN J427H

## (undated) DEVICE — INTENDED FOR TISSUE SEPARATION, AND OTHER PROCEDURES THAT REQUIRE A SHARP SURGICAL BLADE TO PUNCTURE OR CUT.: Brand: BARD-PARKER SAFETY BLADES SIZE 15, STERILE

## (undated) DEVICE — SYRINGE 10ML LL

## (undated) DEVICE — THE SIMPULSE SOLO SYSTEM WITH ULTREX RETRACTABLE SPLASH SHIELD TIP: Brand: SIMPULSE SOLO

## (undated) DEVICE — DRAPE C-ARMOUR

## (undated) DEVICE — NEEDLE HYPO 22G X 1-1/2 IN

## (undated) DEVICE — CULTURE TUBE ANAEROBIC

## (undated) DEVICE — DRAPE EQUIPMENT RF WAND

## (undated) DEVICE — GLOVE SRG BIOGEL 6.5

## (undated) DEVICE — BLADE SAW 11 X 40MM LAPIPLASTY LINVATEC

## (undated) DEVICE — GLOVE PI ULTRA TOUCH SZ.6.5

## (undated) DEVICE — VAC CANISTER 500ML

## (undated) DEVICE — GLOVE INDICATOR PI UNDERGLOVE SZ 6.5 BLUE

## (undated) DEVICE — 22.5 MM X 6.9 MM X 0.53 MM SAGITTAL BLADE

## (undated) DEVICE — SUT MONOCRYL 4-0 PS-2 27 IN Y426H

## (undated) DEVICE — GLOVE INDICATOR PI UNDERGLOVE SZ 7 BLUE

## (undated) DEVICE — CAST PADDING 4 IN SYNTHETIC NON-STRL

## (undated) DEVICE — GLOVE INDICATOR PI UNDERGLOVE SZ 7.5 BLUE

## (undated) DEVICE — INTENDED FOR TISSUE SEPARATION, AND OTHER PROCEDURES THAT REQUIRE A SHARP SURGICAL BLADE TO PUNCTURE OR CUT.: Brand: BARD-PARKER ® CARBON RIB-BACK BLADES

## (undated) DEVICE — SUT VICRYL 1 CTB-1 36 IN JB947

## (undated) DEVICE — PENCIL ELECTROSURG E-Z CLEAN -0035H

## (undated) DEVICE — NEEDLE 18 G X 1 1/2

## (undated) DEVICE — VAC DRESSING SENSATRAC SMALL

## (undated) DEVICE — INTENDED FOR TISSUE SEPARATION, AND OTHER PROCEDURES THAT REQUIRE A SHARP SURGICAL BLADE TO PUNCTURE OR CUT.: Brand: BARD-PARKER SAFETY BLADES SIZE 10, STERILE

## (undated) DEVICE — ACE WRAP 4 IN UNSTERILE

## (undated) DEVICE — 3M™ STERI-STRIP™ REINFORCED ADHESIVE SKIN CLOSURES, R1547, 1/2 IN X 4 IN (12 MM X 100 MM), 6 STRIPS/ENVELOPE: Brand: 3M™ STERI-STRIP™

## (undated) DEVICE — GLOVE SRG BIOGEL 7

## (undated) DEVICE — CURITY NON-ADHERENT STRIPS: Brand: CURITY

## (undated) DEVICE — PADDING CAST 4 IN  COTTON STRL

## (undated) DEVICE — SILVER-COATED ANTIMICROBIAL BARRIER DRESSING: Brand: ACTICOAT   4" X 8"

## (undated) DEVICE — 10FR FRAZIER SUCTION HANDLE: Brand: CARDINAL HEALTH

## (undated) DEVICE — SYRINGE 5ML LL

## (undated) DEVICE — 2000CC GUARDIAN II: Brand: GUARDIAN

## (undated) DEVICE — GLOVE SRG BIOGEL 7.5

## (undated) DEVICE — SUT ETHILON 4-0 PS-2 18 IN 1667H

## (undated) DEVICE — ACE WRAP 6 IN UNSTERILE

## (undated) DEVICE — SUT VICRYL 3-0 SH 27 IN J416H

## (undated) DEVICE — SUT VICRYL 2-0 SH 27 IN UNDYED J417H

## (undated) DEVICE — GLOVE INDICATOR PI UNDERGLOVE SZ 8.5 BLUE

## (undated) DEVICE — TUBING SUCTION 5MM X 12 FT

## (undated) DEVICE — SUT VICRYL 2-0 CTB-1 36 IN JB945

## (undated) DEVICE — PREP PAD BNS: Brand: CONVERTORS

## (undated) DEVICE — ELECTRODE BLADE MOD E-Z CLEAN  2.75IN 7CM -0012AM

## (undated) DEVICE — GLOVE SRG BIOGEL ECLIPSE 7.5

## (undated) DEVICE — OCCLUSIVE GAUZE STRIP,3% BISMUTH TRIBROMOPHENATE IN PETROLATUM BLEND: Brand: XEROFORM

## (undated) DEVICE — STRETCH BANDAGE: Brand: CURITY

## (undated) DEVICE — SUT VICRYL 4-0 PS-2 27 IN J426H

## (undated) DEVICE — SPONGE PVP SCRUB WING STERILE

## (undated) DEVICE — BETHLEHEM UNIVERSAL MINOR GEN: Brand: CARDINAL HEALTH

## (undated) DEVICE — POV-IOD SOLUTION 4OZ BT

## (undated) DEVICE — SPECIMEN CONTAINER STERILE PEEL PACK

## (undated) DEVICE — GLOVE SRG BIOGEL 8

## (undated) DEVICE — SUT VICRYL 3-0 REEL 54 IN J285G

## (undated) DEVICE — PAD GROUNDING ADULT

## (undated) DEVICE — DRAPE C-ARM X-RAY

## (undated) DEVICE — 3M™ STERI-STRIP™ COMPOUND BENZOIN TINCTURE 40 BAGS/CARTON 4 CARTONS/CASE C1544: Brand: 3M™ STERI-STRIP™